# Patient Record
Sex: MALE | Race: WHITE | NOT HISPANIC OR LATINO | Employment: OTHER | URBAN - METROPOLITAN AREA
[De-identification: names, ages, dates, MRNs, and addresses within clinical notes are randomized per-mention and may not be internally consistent; named-entity substitution may affect disease eponyms.]

---

## 2021-03-03 ENCOUNTER — TELEPHONE (OUTPATIENT)
Dept: OBGYN CLINIC | Facility: HOSPITAL | Age: 66
End: 2021-03-03

## 2021-03-03 NOTE — TELEPHONE ENCOUNTER
Patient requested an appointment online for his hand  I tried to call the patient back to schedule appointment but it seems the phone number listed is a fax number

## 2021-04-08 DIAGNOSIS — Z23 ENCOUNTER FOR IMMUNIZATION: ICD-10-CM

## 2021-04-15 ENCOUNTER — OFFICE VISIT (OUTPATIENT)
Dept: OBGYN CLINIC | Facility: CLINIC | Age: 66
End: 2021-04-15
Payer: MEDICARE

## 2021-04-15 VITALS
HEART RATE: 82 BPM | HEIGHT: 67 IN | DIASTOLIC BLOOD PRESSURE: 88 MMHG | BODY MASS INDEX: 40.26 KG/M2 | WEIGHT: 256.5 LBS | SYSTOLIC BLOOD PRESSURE: 156 MMHG

## 2021-04-15 DIAGNOSIS — M62.541 ATROPHY OF MUSCLE OF RIGHT HAND: Primary | ICD-10-CM

## 2021-04-15 DIAGNOSIS — R29.898 RIGHT HAND WEAKNESS: ICD-10-CM

## 2021-04-15 DIAGNOSIS — S54.01XA ULNAR NERVE DAMAGE, RIGHT, INITIAL ENCOUNTER: ICD-10-CM

## 2021-04-15 PROCEDURE — 99204 OFFICE O/P NEW MOD 45 MIN: CPT | Performed by: SURGERY

## 2021-04-15 RX ORDER — GLIMEPIRIDE 2 MG/1
TABLET ORAL
COMMUNITY
Start: 2021-03-16 | End: 2022-08-01 | Stop reason: SDUPTHER

## 2021-04-15 RX ORDER — AMLODIPINE BESYLATE AND BENAZEPRIL HYDROCHLORIDE 5; 20 MG/1; MG/1
1 CAPSULE ORAL DAILY
COMMUNITY
Start: 2021-02-12 | End: 2022-08-01 | Stop reason: SDUPTHER

## 2021-04-15 RX ORDER — PIOGLITAZONE HCL AND METFORMIN HCL 500; 15 MG/1; MG/1
1 TABLET ORAL 2 TIMES DAILY
COMMUNITY
Start: 2021-02-14 | End: 2022-08-01 | Stop reason: SDUPTHER

## 2021-04-15 NOTE — PROGRESS NOTES
Silvio CLEMONS  Attending, Orthopaedic Surgery  Hand, Wrist, and Elbow Surgery  [facility]      ORTHOPAEDIC HAND, WRIST, AND ELBOW OFFICE  VISIT       ASSESSMENT/PLAN:      77year old male here with  right  Hand weakness and dysfunction  On exam,  Most of the patient's dysfunction was within the C7-T1 distribution  It was explained to the patient that there are at least 2 nerves being compromised -  Likely indicating an injury  At the level of the cervical spine  Possibly lower trunks within the brachial plexus  It is recommended to obtain an EMG to further evaluate which nerves are being compromised  Patient understands and agrees with this plan of care  We will follow up with the patient after the EMG  The patient verbalized understanding of exam findings and treatment plan  We engaged in the shared decision-making process and treatment options were discussed at length with the patient  Surgical and conservative management discussed today along with risks and benefits  Diagnoses and all orders for this visit:    Atrophy of muscle of right hand  -     Cancel: EMG 2 Limb Upper Extremity; Future  -     EMG 2 Limb Upper Extremity; Future    Right hand weakness  -     Cancel: EMG 2 Limb Upper Extremity; Future  -     EMG 2 Limb Upper Extremity; Future    Ulnar nerve damage, right, initial encounter    Other orders  -     pioglitazone-metFORMIN (ACTOPLUS MET)  MG per tablet; Take 1 tablet by mouth 2 (two) times a day  -     glimepiride (AMARYL) 2 mg tablet; TAKE 1 TABLET EVERY DAY BEFORE MEALS  -     amLODIPine-benazepril (LOTREL 5-20) 5-20 MG per capsule; Take 1 capsule by mouth daily        Follow Up:  Return for Follow up after Diagnostic testing      To Do Next Visit:  Re-evaluation of current issue        ____________________________________________________________________________________________________________________________________________      CHIEF COMPLAINT:  Chief Complaint   Patient presents with    Right Hand - Pain       SUBJECTIVE:  Isac Santoyo is a 77y o  year old RHD male who presents Today for right hand weakness and dysfunction  He states that about 5 years ago the small finger  Was stuck out extension, the ring and middle fingers followed suit with dis-use  He notes over the past 2 weeks the long finger started becoming more involved where he cannot fully extend the ring and long finger now  Patient is a type 2 diabetic  He is retired   Pain/symptom timing:  Worse during the day when active  Pain/symptom context:  Worse with activites and work  Pain/symptom modifying factors:  Rest makes better, activities make worse  Pain/symptom associated signs/symptoms: none    Prior treatment   · NSAIDsNo   · Injections No   · Bracing/Orthotics No    Physical Therapy No     I have personally reviewed all the relevant PMH, PSH, SH, FH, Medications and allergies      PAST MEDICAL HISTORY:  History reviewed  No pertinent past medical history  PAST SURGICAL HISTORY:  History reviewed  No pertinent surgical history  FAMILY HISTORY:  History reviewed  No pertinent family history  SOCIAL HISTORY:  Social History     Tobacco Use    Smoking status: Former Smoker    Smokeless tobacco: Never Used   Substance Use Topics    Alcohol use: Yes     Comment: social    Drug use: Not Currently       MEDICATIONS:    Current Outpatient Medications:     amLODIPine-benazepril (LOTREL 5-20) 5-20 MG per capsule, Take 1 capsule by mouth daily, Disp: , Rfl:     glimepiride (AMARYL) 2 mg tablet, TAKE 1 TABLET EVERY DAY BEFORE MEALS, Disp: , Rfl:     pioglitazone-metFORMIN (ACTOPLUS MET)  MG per tablet, Take 1 tablet by mouth 2 (two) times a day, Disp: , Rfl:     ALLERGIES:  No Known Allergies      REVIEW OF SYSTEMS:  Review of Systems   Constitutional: Negative for chills, fever and unexpected weight change     HENT: Negative for hearing loss, nosebleeds and sore throat  Eyes: Negative for pain, redness and visual disturbance  Respiratory: Negative for cough, shortness of breath and wheezing  Cardiovascular: Negative for chest pain, palpitations and leg swelling  Gastrointestinal: Negative for abdominal pain, nausea and vomiting  Endocrine: Negative for polydipsia and polyuria  Genitourinary: Negative for dysuria and hematuria  Skin: Negative for rash and wound  Neurological: Negative for dizziness, light-headedness and headaches  Psychiatric/Behavioral: Negative for decreased concentration, dysphoric mood and suicidal ideas  The patient is not nervous/anxious  VITALS:  Vitals:    04/15/21 0817   BP: 156/88   Pulse: 82       LABS:  HgA1c: No results found for: HGBA1C  BMP: No results found for: GLUCOSE, CALCIUM, NA, K, CO2, CL, BUN, CREATININE    _____________________________________________________  PHYSICAL EXAMINATION:  General: well developed and well nourished, alert, oriented times 3 and appears comfortable  Psychiatric: Normal  HEENT: Normocephalic, Atraumatic Trachea Midline, No torticollis  Pulmonary: No audible wheezing or respiratory distress   Cardiovascular: No pitting edema, 2+ radial pulse   Skin: No masses, erythema, lacerations, fluctation, ulcerations  Neurovascular: Sensation Intact to the Median, Ulnar, Radial Nerve and Pulses Intact  Musculoskeletal: Normal, except as noted in detailed exam and in HPI        MUSCULOSKELETAL EXAMINATION:  Right hand:   No swelling   Atrophy noted within the first dorsal webspace  EIP is working  Has FDS but no real FDP of the small and ring fingers  Radially deviates the wrist with extension   Weak EPL function  Ain intact  Opposition no intact  Intrinsics not intact  Biceps 4+/5 when compare to the contralateral side  Triceps 3/5 can not resist   - Tinel's at the wrist  - Tinel's at the elbow  Brisk capillary refill    ___________________________________________________  STUDIES REVIEWED:    No images reviewed at today's visit      PROCEDURES PERFORMED:  Procedures  No Procedures performed today    _____________________________________________________      Rebeka Gallegos    I,:  Delmi Garcia am acting as a scribe while in the presence of the attending physician :       I,:  Bernardo Foote MD personally performed the services described in this documentation    as scribed in my presence :

## 2021-04-20 ENCOUNTER — HOSPITAL ENCOUNTER (OUTPATIENT)
Dept: NEUROLOGY | Facility: CLINIC | Age: 66
Discharge: HOME | End: 2021-04-20
Payer: MEDICARE

## 2021-04-20 DIAGNOSIS — M62.541 ATROPHY OF MUSCLE OF RIGHT HAND: ICD-10-CM

## 2021-04-20 DIAGNOSIS — R29.898 RIGHT HAND WEAKNESS: ICD-10-CM

## 2021-04-20 PROCEDURE — 95912 NRV CNDJ TEST 11-12 STUDIES: CPT | Performed by: PSYCHIATRY & NEUROLOGY

## 2021-04-20 PROCEDURE — 95886 MUSC TEST DONE W/N TEST COMP: CPT | Performed by: PSYCHIATRY & NEUROLOGY

## 2021-05-14 ENCOUNTER — TRANSCRIBE ORDERS (OUTPATIENT)
Dept: ADMINISTRATIVE | Facility: HOSPITAL | Age: 66
End: 2021-05-14

## 2021-05-14 DIAGNOSIS — Z13.6 ENCOUNTER FOR SCREENING FOR CARDIOVASCULAR DISORDERS: ICD-10-CM

## 2021-05-14 DIAGNOSIS — N50.89 SWELLING OF LEFT TESTICLE: Primary | ICD-10-CM

## 2021-05-17 ENCOUNTER — HOSPITAL ENCOUNTER (OUTPATIENT)
Dept: ULTRASOUND IMAGING | Facility: HOSPITAL | Age: 66
Discharge: HOME | End: 2021-05-17
Payer: MEDICARE

## 2021-05-17 DIAGNOSIS — N50.89 SWELLING OF LEFT TESTICLE: ICD-10-CM

## 2021-05-17 PROCEDURE — 76870 US EXAM SCROTUM: CPT

## 2021-05-21 ENCOUNTER — HOSPITAL ENCOUNTER (OUTPATIENT)
Dept: ULTRASOUND IMAGING | Facility: HOSPITAL | Age: 66
Discharge: HOME | End: 2021-05-21
Payer: MEDICARE

## 2021-05-21 DIAGNOSIS — Z13.6 ENCOUNTER FOR SCREENING FOR CARDIOVASCULAR DISORDERS: ICD-10-CM

## 2021-05-21 PROCEDURE — 76775 US EXAM ABDO BACK WALL LIM: CPT

## 2021-05-24 ENCOUNTER — OFFICE VISIT (OUTPATIENT)
Dept: OBGYN CLINIC | Facility: CLINIC | Age: 66
End: 2021-05-24
Payer: MEDICARE

## 2021-05-24 VITALS
SYSTOLIC BLOOD PRESSURE: 119 MMHG | HEART RATE: 78 BPM | HEIGHT: 67 IN | BODY MASS INDEX: 40.81 KG/M2 | WEIGHT: 260 LBS | DIASTOLIC BLOOD PRESSURE: 78 MMHG

## 2021-05-24 DIAGNOSIS — Z01.812 PRE-PROCEDURAL LABORATORY EXAMINATION: ICD-10-CM

## 2021-05-24 DIAGNOSIS — E66.01 OBESITY, MORBID (HCC): ICD-10-CM

## 2021-05-24 DIAGNOSIS — S54.01XA ULNAR NERVE DAMAGE, RIGHT, INITIAL ENCOUNTER: ICD-10-CM

## 2021-05-24 DIAGNOSIS — R29.898 RIGHT HAND WEAKNESS: ICD-10-CM

## 2021-05-24 DIAGNOSIS — G56.01 CARPAL TUNNEL SYNDROME OF RIGHT WRIST: ICD-10-CM

## 2021-05-24 DIAGNOSIS — G56.21 CUBITAL TUNNEL SYNDROME ON RIGHT: ICD-10-CM

## 2021-05-24 DIAGNOSIS — M62.541 ATROPHY OF MUSCLE OF RIGHT HAND: Primary | ICD-10-CM

## 2021-05-24 PROCEDURE — 99214 OFFICE O/P EST MOD 30 MIN: CPT | Performed by: SURGERY

## 2021-05-24 RX ORDER — CHLORHEXIDINE GLUCONATE 0.12 MG/ML
15 RINSE ORAL ONCE
Status: CANCELLED | OUTPATIENT
Start: 2021-05-24 | End: 2021-05-24

## 2021-05-24 NOTE — H&P
ASSESSMENT/PLAN:      Rolf Oakley is a pleasant 78 yo male follow up evaluation of right hand weakness, atrophy of muscle, and ulnar nerve  Acute on chronic injury  EMG of the right upper extremity was reviewed showing  significant ulnar nerve pathology bleeding acute on chronic issues in addition to carpal tunnel syndrome  There is no evidence of radial nerve palsy which could explain his lack of finger extension  Treatment options were dicussed which includes surgical decompression of ulnar nerve  And median nerve, bracing, OT/PT  Advised with surgical decompression extension of middle and ring finger would not be improved  Risks and benefits of surgical procedure was discussed and he elected to proceed with surgery  OT/PT script was provided to begin 5 days post operatively  Surgical consent was signed  I personally obtained a written consent after risks benefits alternatives were discussed in detail with the patient  The patient verbalized understanding of exam findings and treatment plan  We engaged in the shared decision-making process and treatment options were discussed at length with the patient  Surgical and conservative management discussed today along with risks and benefits  Diagnoses and all orders for this visit:    Atrophy of muscle of right hand    Right hand weakness    Ulnar nerve damage, right, initial encounter          Cubital Tunnel Release: The anatomy and physiology of cubital tunnel syndrome were discussed with the patient today in the office  Typically, increased elbow flexion activities decrease blood flow within the intraneural spaces, resulting in a feeling of numbness, tingling, weakness, or clumsiness within the hand and fingers  Occasionally, anatomic structures such as medial elbow osteophytes, the medial head of the triceps, were subluxing ulnar nerve may result in increased pressure or aggravation at the cubital tunnel    Typical signs and symptoms usually include numbness and tingling within the ring and small finger, weakness with , and weakness with pinch  Conservative treatment and includes nocturnal bracing to keep the elbow in a semi-extended position, activity modification, therapy, and avoiding excessive elbow flexion activities  A majority of patients typically respond to conservative treatment over a period of approximately 3-6 months  Vitamin B6 one tablet daily over the counter may helpful to reduce symptoms  EMG/NCV testing of the ulnar nerve at the elbow is not as reliable as carpal tunnel syndrome  Surgical intervention in the form of in situ release of the ulnar nerve at the elbow or ulnar nerve transposition may be required in up to 20% of patients  The patient has elected to undergo cubital tunnel release  The possibility of converting to a subcutaneous or submuscular ulnar nerve transposition depending on the nerve stability was discussed with the patient  Typically, in the postoperative period, light activities are allowed immediately, driving is allowed when narcotic medications have stopped, and the incision may get wet after 5 days  Heavy activities will be allowed after follow up appointment in 1-2 weeks  While the pain within the ring and small finger of the hands generally improves rapidly, the numbness and tingling, as well as the strength, will slowly improve over a period of weeks to months  Total recovery can take up to 18 months from the time of surgery  Numbness and tingling near the incision, or near the medial aspect of the forearm was discussed with the patient  The patient has an understanding of the above mentioned discussion  The risks and benefits of the procedure were explained to the patient, which include, but are not limited to: Bleeding, infection, recurrence, pain, scar, damage to tendons, damage to nerves, and damage to blood vessels, failure to give desired results and complications related to anesthesia    These risks, along with alternative conservative treatment options, and postoperative protocols were voiced back and understood by the patient  All questions were answered to the patient's satisfaction  The patient agrees to comply with a standard postoperative protocol, and is willing to proceed  Education was provided via written and auditory forms  There were no barriers to learning  Written handouts regarding wound care, incision and scar care, and general preoperative information was provided to the patient  Prior to surgery, the patient may be requested to stop all anti-inflammatory medications  Prophylactic aspirin, Plavix, and Coumadin may be allowed to be continued  Medications including vitamin E , ginkgo, and fish oil are requested to be stopped approximately one week prior to surgery  Hypertensive medications and beta blockers, if taken, should be continued  Vitamin B6 one tablet daily over the counter may helpful to reduce symptoms  Follow Up:  No follow-ups on file  To Do Next Visit:  Re-evaluation of current issue, Splint off and Sutures out    ____________________________________________________________________________________________________________________________________________      CHIEF COMPLAINT:  Chief Complaint   Patient presents with    Right Hand - Pain, Follow-up       SUBJECTIVE:  Silvia Smith is a 77y o  year old RHD male who presents for follow up evaluation of right hand weakness, atrophy of muscle and ulnar never damage  At the last visit on 4/15/21, An EMG was ordered to evaluate for nerve compression  I have personally reviewed all the relevant PMH, PSH, SH, FH, Medications and allergies  PAST MEDICAL HISTORY:  No past medical history on file  PAST SURGICAL HISTORY:  No past surgical history on file  FAMILY HISTORY:  No family history on file      SOCIAL HISTORY:  Social History     Tobacco Use    Smoking status: Former Smoker    Smokeless tobacco: Never Used   Substance Use Topics    Alcohol use: Yes     Comment: social    Drug use: Not Currently       MEDICATIONS:    Current Outpatient Medications:     amLODIPine-benazepril (LOTREL 5-20) 5-20 MG per capsule, Take 1 capsule by mouth daily, Disp: , Rfl:     glimepiride (AMARYL) 2 mg tablet, TAKE 1 TABLET EVERY DAY BEFORE MEALS, Disp: , Rfl:     pioglitazone-metFORMIN (ACTOPLUS MET)  MG per tablet, Take 1 tablet by mouth 2 (two) times a day, Disp: , Rfl:     ALLERGIES:  No Known Allergies    REVIEW OF SYSTEMS:  Review of Systems   Constitutional: Negative for chills, fever and unexpected weight change  HENT: Negative for hearing loss, nosebleeds and sore throat  Eyes: Negative for pain, redness and visual disturbance  Respiratory: Negative for cough, shortness of breath and wheezing  Cardiovascular: Negative for chest pain, palpitations and leg swelling  Gastrointestinal: Negative for abdominal pain, nausea and vomiting  Endocrine: Negative for polydipsia and polyuria  Genitourinary: Negative for dysuria and hematuria  Skin: Negative for rash and wound  Neurological: Negative for dizziness, light-headedness and headaches  Psychiatric/Behavioral: Negative for decreased concentration, dysphoric mood and suicidal ideas  The patient is not nervous/anxious          VITALS:  Vitals:    05/24/21 0941   BP: 119/78   Pulse: 78       LABS:  HgA1c: No results found for: HGBA1C  BMP: No results found for: GLUCOSE, CALCIUM, NA, K, CO2, CL, BUN, CREATININE    _____________________________________________________  PHYSICAL EXAMINATION:  General: well developed and well nourished, alert, oriented times 3 and appears comfortable  Psychiatric: Normal  HEENT: Normocephalic, Atraumatic Trachea Midline, No torticollis  Pulmonary: No audible wheezing or respiratory distress   Cardiovascular: No pitting edema, 2+ radial pulse   Abdominal/GI: abdomen non tender, non distended   Skin: No masses, erythema, lacerations, fluctation, ulcerations  Neurovascular: Decreased Sensation to  the Ulnar Nerve and Pulses Intact  Musculoskeletal: Normal, except as noted in detailed exam and in HPI        MUSCULOSKELETAL EXAMINATION:  Right hand:  Intrinsic Atrophy,  Thenar atrophy  Extension of index and  Small finger to approximately neutral but no hyperextension   preserve centralized wrist extension   poor EPL function,  Improved with wrist flexion    ___________________________________________________  STUDIES REVIEWED:  EMG/NCS of  Bilateral upper extremitieswhich demonstrates  Acute on chronic ulnar nerve issues with denervation of the ulnar innervated muscles in addition to median nerve pathology consistent with carpal tunnel bilaterally          PROCEDURES PERFORMED:  Procedures       _____________________________________________________      Scribe Attestation    I,:  Tae Patterson MA am acting as a scribe while in the presence of the attending physician :       I,:  Loly Chaparro MD personally performed the services described in this documentation    as scribed in my presence :

## 2021-05-24 NOTE — PROGRESS NOTES
ASSESSMENT/PLAN:      Orin Benavidez is a pleasant 76 yo male follow up evaluation of right hand weakness, atrophy of muscle, and ulnar nerve  Acute on chronic injury  EMG of the right upper extremity was reviewed showing  significant ulnar nerve pathology bleeding acute on chronic issues in addition to carpal tunnel syndrome  There is no evidence of radial nerve palsy which could explain his lack of finger extension  Treatment options were dicussed which includes surgical decompression of ulnar nerve  And median nerve, bracing, OT/PT  Advised with surgical decompression extension of middle and ring finger would not be improved  Risks and benefits of surgical procedure was discussed and he elected to proceed with surgery  OT/PT script was provided to begin 5 days post operatively  Surgical consent was signed  I personally obtained a written consent after risks benefits alternatives were discussed in detail with the patient  The patient verbalized understanding of exam findings and treatment plan  We engaged in the shared decision-making process and treatment options were discussed at length with the patient  Surgical and conservative management discussed today along with risks and benefits  Diagnoses and all orders for this visit:    Atrophy of muscle of right hand    Right hand weakness    Ulnar nerve damage, right, initial encounter          Cubital Tunnel Release: The anatomy and physiology of cubital tunnel syndrome were discussed with the patient today in the office  Typically, increased elbow flexion activities decrease blood flow within the intraneural spaces, resulting in a feeling of numbness, tingling, weakness, or clumsiness within the hand and fingers  Occasionally, anatomic structures such as medial elbow osteophytes, the medial head of the triceps, were subluxing ulnar nerve may result in increased pressure or aggravation at the cubital tunnel    Typical signs and symptoms usually include numbness and tingling within the ring and small finger, weakness with , and weakness with pinch  Conservative treatment and includes nocturnal bracing to keep the elbow in a semi-extended position, activity modification, therapy, and avoiding excessive elbow flexion activities  A majority of patients typically respond to conservative treatment over a period of approximately 3-6 months  Vitamin B6 one tablet daily over the counter may helpful to reduce symptoms  EMG/NCV testing of the ulnar nerve at the elbow is not as reliable as carpal tunnel syndrome  Surgical intervention in the form of in situ release of the ulnar nerve at the elbow or ulnar nerve transposition may be required in up to 20% of patients  The patient has elected to undergo cubital tunnel release  The possibility of converting to a subcutaneous or submuscular ulnar nerve transposition depending on the nerve stability was discussed with the patient  Typically, in the postoperative period, light activities are allowed immediately, driving is allowed when narcotic medications have stopped, and the incision may get wet after 5 days  Heavy activities will be allowed after follow up appointment in 1-2 weeks  While the pain within the ring and small finger of the hands generally improves rapidly, the numbness and tingling, as well as the strength, will slowly improve over a period of weeks to months  Total recovery can take up to 18 months from the time of surgery  Numbness and tingling near the incision, or near the medial aspect of the forearm was discussed with the patient  The patient has an understanding of the above mentioned discussion  The risks and benefits of the procedure were explained to the patient, which include, but are not limited to: Bleeding, infection, recurrence, pain, scar, damage to tendons, damage to nerves, and damage to blood vessels, failure to give desired results and complications related to anesthesia    These risks, along with alternative conservative treatment options, and postoperative protocols were voiced back and understood by the patient  All questions were answered to the patient's satisfaction  The patient agrees to comply with a standard postoperative protocol, and is willing to proceed  Education was provided via written and auditory forms  There were no barriers to learning  Written handouts regarding wound care, incision and scar care, and general preoperative information was provided to the patient  Prior to surgery, the patient may be requested to stop all anti-inflammatory medications  Prophylactic aspirin, Plavix, and Coumadin may be allowed to be continued  Medications including vitamin E , ginkgo, and fish oil are requested to be stopped approximately one week prior to surgery  Hypertensive medications and beta blockers, if taken, should be continued  Vitamin B6 one tablet daily over the counter may helpful to reduce symptoms  Follow Up:  No follow-ups on file  To Do Next Visit:  Re-evaluation of current issue, Splint off and Sutures out    ____________________________________________________________________________________________________________________________________________      CHIEF COMPLAINT:  Chief Complaint   Patient presents with    Right Hand - Pain, Follow-up       SUBJECTIVE:  Sam Muro is a 77y o  year old RHD male who presents for follow up evaluation of right hand weakness, atrophy of muscle and ulnar never damage  At the last visit on 4/15/21, An EMG was ordered to evaluate for nerve compression  I have personally reviewed all the relevant PMH, PSH, SH, FH, Medications and allergies  PAST MEDICAL HISTORY:  No past medical history on file  PAST SURGICAL HISTORY:  No past surgical history on file  FAMILY HISTORY:  No family history on file      SOCIAL HISTORY:  Social History     Tobacco Use    Smoking status: Former Smoker    Smokeless tobacco: Never Used   Substance Use Topics    Alcohol use: Yes     Comment: social    Drug use: Not Currently       MEDICATIONS:    Current Outpatient Medications:     amLODIPine-benazepril (LOTREL 5-20) 5-20 MG per capsule, Take 1 capsule by mouth daily, Disp: , Rfl:     glimepiride (AMARYL) 2 mg tablet, TAKE 1 TABLET EVERY DAY BEFORE MEALS, Disp: , Rfl:     pioglitazone-metFORMIN (ACTOPLUS MET)  MG per tablet, Take 1 tablet by mouth 2 (two) times a day, Disp: , Rfl:     ALLERGIES:  No Known Allergies    REVIEW OF SYSTEMS:  Review of Systems   Constitutional: Negative for chills, fever and unexpected weight change  HENT: Negative for hearing loss, nosebleeds and sore throat  Eyes: Negative for pain, redness and visual disturbance  Respiratory: Negative for cough, shortness of breath and wheezing  Cardiovascular: Negative for chest pain, palpitations and leg swelling  Gastrointestinal: Negative for abdominal pain, nausea and vomiting  Endocrine: Negative for polydipsia and polyuria  Genitourinary: Negative for dysuria and hematuria  Skin: Negative for rash and wound  Neurological: Negative for dizziness, light-headedness and headaches  Psychiatric/Behavioral: Negative for decreased concentration, dysphoric mood and suicidal ideas  The patient is not nervous/anxious          VITALS:  Vitals:    05/24/21 0941   BP: 119/78   Pulse: 78       LABS:  HgA1c: No results found for: HGBA1C  BMP: No results found for: GLUCOSE, CALCIUM, NA, K, CO2, CL, BUN, CREATININE    _____________________________________________________  PHYSICAL EXAMINATION:  General: well developed and well nourished, alert, oriented times 3 and appears comfortable  Psychiatric: Normal  HEENT: Normocephalic, Atraumatic Trachea Midline, No torticollis  Pulmonary: No audible wheezing or respiratory distress   Cardiovascular: No pitting edema, 2+ radial pulse   Abdominal/GI: abdomen non tender, non distended   Skin: No masses, erythema, lacerations, fluctation, ulcerations  Neurovascular: Decreased Sensation to  the Ulnar Nerve and Pulses Intact  Musculoskeletal: Normal, except as noted in detailed exam and in HPI        MUSCULOSKELETAL EXAMINATION:  Right hand:  Intrinsic Atrophy,  Thenar atrophy  Extension of index and  Small finger to approximately neutral but no hyperextension   preserve centralized wrist extension   poor EPL function,  Improved with wrist flexion    ___________________________________________________  STUDIES REVIEWED:  EMG/NCS of  Bilateral upper extremitieswhich demonstrates  Acute on chronic ulnar nerve issues with denervation of the ulnar innervated muscles in addition to median nerve pathology consistent with carpal tunnel bilaterally          PROCEDURES PERFORMED:  Procedures       _____________________________________________________      Scribe Attestation    I,:  Jo Oneal MA am acting as a scribe while in the presence of the attending physician :       I,:  Haim Wheat MD personally performed the services described in this documentation    as scribed in my presence :

## 2021-06-18 ENCOUNTER — APPOINTMENT (OUTPATIENT)
Dept: LAB | Facility: AMBULARY SURGERY CENTER | Age: 66
End: 2021-06-18
Attending: SURGERY
Payer: MEDICARE

## 2021-06-18 ENCOUNTER — OFFICE VISIT (OUTPATIENT)
Dept: LAB | Facility: CLINIC | Age: 66
End: 2021-06-18
Payer: MEDICARE

## 2021-06-18 DIAGNOSIS — E66.01 OBESITY, MORBID (HCC): ICD-10-CM

## 2021-06-18 DIAGNOSIS — G56.21 CUBITAL TUNNEL SYNDROME ON RIGHT: ICD-10-CM

## 2021-06-18 DIAGNOSIS — G56.01 CARPAL TUNNEL SYNDROME OF RIGHT WRIST: ICD-10-CM

## 2021-06-18 DIAGNOSIS — R35.1 NOCTURIA: ICD-10-CM

## 2021-06-18 DIAGNOSIS — M62.541 ATROPHY OF MUSCLE OF RIGHT HAND: ICD-10-CM

## 2021-06-18 DIAGNOSIS — Z01.812 PRE-PROCEDURAL LABORATORY EXAMINATION: ICD-10-CM

## 2021-06-18 LAB
ANION GAP SERPL CALCULATED.3IONS-SCNC: 5 MMOL/L (ref 4–13)
ATRIAL RATE: 73 BPM
BUN SERPL-MCNC: 35 MG/DL (ref 5–25)
CALCIUM SERPL-MCNC: 9.6 MG/DL (ref 8.3–10.1)
CHLORIDE SERPL-SCNC: 113 MMOL/L (ref 100–108)
CO2 SERPL-SCNC: 24 MMOL/L (ref 21–32)
CREAT SERPL-MCNC: 1.21 MG/DL (ref 0.6–1.3)
GFR SERPL CREATININE-BSD FRML MDRD: 62 ML/MIN/1.73SQ M
GLUCOSE P FAST SERPL-MCNC: 113 MG/DL (ref 65–99)
P AXIS: 54 DEGREES
POTASSIUM SERPL-SCNC: 4.6 MMOL/L (ref 3.5–5.3)
PR INTERVAL: 154 MS
PSA SERPL-MCNC: 1.3 NG/ML (ref 0–4)
QRS AXIS: 32 DEGREES
QRSD INTERVAL: 90 MS
QT INTERVAL: 400 MS
QTC INTERVAL: 440 MS
SODIUM SERPL-SCNC: 142 MMOL/L (ref 136–145)
T WAVE AXIS: 94 DEGREES
VENTRICULAR RATE: 73 BPM

## 2021-06-18 PROCEDURE — G0103 PSA SCREENING: HCPCS

## 2021-06-18 PROCEDURE — 80048 BASIC METABOLIC PNL TOTAL CA: CPT

## 2021-06-18 PROCEDURE — 93005 ELECTROCARDIOGRAM TRACING: CPT

## 2021-06-18 PROCEDURE — 93010 ELECTROCARDIOGRAM REPORT: CPT | Performed by: INTERNAL MEDICINE

## 2021-06-18 PROCEDURE — 36415 COLL VENOUS BLD VENIPUNCTURE: CPT

## 2021-06-22 ENCOUNTER — ANESTHESIA EVENT (OUTPATIENT)
Dept: PERIOP | Facility: AMBULARY SURGERY CENTER | Age: 66
End: 2021-06-22
Payer: MEDICARE

## 2021-07-02 NOTE — PRE-PROCEDURE INSTRUCTIONS
Pre-Surgery Instructions:   Medication Instructions    amLODIPine-benazepril (LOTREL 5-20) 5-20 MG per capsule Instructed patient per Anesthesia Guidelines  HOLD 7/6    glimepiride (AMARYL) 2 mg tablet Instructed patient per Anesthesia Guidelines  HOLD 7/6    pioglitazone-metFORMIN (ACTOPLUS MET)  MG per tablet Instructed patient per Anesthesia Guidelines  HOLD 7/6   Pre procedure instructions reviewed verbalizes understanding  NPO after MN  Bathing reviewed    No NSAIDS  Hold all medications dos

## 2021-07-06 ENCOUNTER — HOSPITAL ENCOUNTER (OUTPATIENT)
Facility: AMBULARY SURGERY CENTER | Age: 66
Setting detail: OUTPATIENT SURGERY
Discharge: HOME | End: 2021-07-06
Attending: SURGERY | Admitting: SURGERY
Payer: MEDICARE

## 2021-07-06 ENCOUNTER — ANESTHESIA (OUTPATIENT)
Dept: PERIOP | Facility: AMBULARY SURGERY CENTER | Age: 66
End: 2021-07-06
Payer: MEDICARE

## 2021-07-06 VITALS
OXYGEN SATURATION: 92 % | HEART RATE: 71 BPM | DIASTOLIC BLOOD PRESSURE: 69 MMHG | TEMPERATURE: 97.2 F | RESPIRATION RATE: 16 BRPM | SYSTOLIC BLOOD PRESSURE: 139 MMHG

## 2021-07-06 DIAGNOSIS — G56.02 CARPAL TUNNEL SYNDROME, LEFT: ICD-10-CM

## 2021-07-06 DIAGNOSIS — M62.541 ATROPHY OF MUSCLE OF RIGHT HAND: ICD-10-CM

## 2021-07-06 DIAGNOSIS — Z47.89 AFTERCARE FOLLOWING SURGERY OF THE MUSCULOSKELETAL SYSTEM: ICD-10-CM

## 2021-07-06 DIAGNOSIS — G56.21 CUBITAL TUNNEL SYNDROME ON RIGHT: Primary | ICD-10-CM

## 2021-07-06 DIAGNOSIS — G56.01 CARPAL TUNNEL SYNDROME OF RIGHT WRIST: ICD-10-CM

## 2021-07-06 LAB
GLUCOSE SERPL-MCNC: 112 MG/DL (ref 65–140)
GLUCOSE SERPL-MCNC: 129 MG/DL (ref 65–140)

## 2021-07-06 PROCEDURE — 82948 REAGENT STRIP/BLOOD GLUCOSE: CPT

## 2021-07-06 PROCEDURE — 64721 CARPAL TUNNEL SURGERY: CPT | Performed by: PHYSICIAN ASSISTANT

## 2021-07-06 PROCEDURE — 64721 CARPAL TUNNEL SURGERY: CPT | Performed by: SURGERY

## 2021-07-06 PROCEDURE — 64718 REVISE ULNAR NERVE AT ELBOW: CPT | Performed by: SURGERY

## 2021-07-06 PROCEDURE — NC001 PR NO CHARGE: Performed by: SURGERY

## 2021-07-06 PROCEDURE — 64718 REVISE ULNAR NERVE AT ELBOW: CPT | Performed by: PHYSICIAN ASSISTANT

## 2021-07-06 RX ORDER — CEFAZOLIN SODIUM 2 G/50ML
2000 SOLUTION INTRAVENOUS ONCE
Status: COMPLETED | OUTPATIENT
Start: 2021-07-06 | End: 2021-07-06

## 2021-07-06 RX ORDER — HYDROCODONE BITARTRATE AND ACETAMINOPHEN 5; 325 MG/1; MG/1
1 TABLET ORAL EVERY 6 HOURS PRN
Status: DISCONTINUED | OUTPATIENT
Start: 2021-07-06 | End: 2021-07-06 | Stop reason: HOSPADM

## 2021-07-06 RX ORDER — MIDAZOLAM HYDROCHLORIDE 2 MG/2ML
INJECTION, SOLUTION INTRAMUSCULAR; INTRAVENOUS
Status: COMPLETED | OUTPATIENT
Start: 2021-07-06 | End: 2021-07-06

## 2021-07-06 RX ORDER — ONDANSETRON 2 MG/ML
INJECTION INTRAMUSCULAR; INTRAVENOUS AS NEEDED
Status: DISCONTINUED | OUTPATIENT
Start: 2021-07-06 | End: 2021-07-06

## 2021-07-06 RX ORDER — LIDOCAINE HYDROCHLORIDE 10 MG/ML
INJECTION, SOLUTION EPIDURAL; INFILTRATION; INTRACAUDAL; PERINEURAL
Status: COMPLETED | OUTPATIENT
Start: 2021-07-06 | End: 2021-07-06

## 2021-07-06 RX ORDER — DEXMEDETOMIDINE HYDROCHLORIDE 100 UG/ML
INJECTION, SOLUTION INTRAVENOUS AS NEEDED
Status: DISCONTINUED | OUTPATIENT
Start: 2021-07-06 | End: 2021-07-06

## 2021-07-06 RX ORDER — MAGNESIUM HYDROXIDE 1200 MG/15ML
LIQUID ORAL AS NEEDED
Status: DISCONTINUED | OUTPATIENT
Start: 2021-07-06 | End: 2021-07-06 | Stop reason: HOSPADM

## 2021-07-06 RX ORDER — HYDROCODONE BITARTRATE AND ACETAMINOPHEN 5; 325 MG/1; MG/1
1 TABLET ORAL EVERY 6 HOURS PRN
Qty: 15 TABLET | Refills: 0 | Status: SHIPPED | OUTPATIENT
Start: 2021-07-06 | End: 2021-07-16

## 2021-07-06 RX ORDER — FENTANYL CITRATE 50 UG/ML
INJECTION, SOLUTION INTRAMUSCULAR; INTRAVENOUS
Status: COMPLETED | OUTPATIENT
Start: 2021-07-06 | End: 2021-07-06

## 2021-07-06 RX ORDER — PROPOFOL 10 MG/ML
INJECTION, EMULSION INTRAVENOUS CONTINUOUS PRN
Status: DISCONTINUED | OUTPATIENT
Start: 2021-07-06 | End: 2021-07-06

## 2021-07-06 RX ORDER — CHLORHEXIDINE GLUCONATE 0.12 MG/ML
15 RINSE ORAL ONCE
Status: DISCONTINUED | OUTPATIENT
Start: 2021-07-06 | End: 2021-07-06 | Stop reason: HOSPADM

## 2021-07-06 RX ORDER — ROPIVACAINE HYDROCHLORIDE 5 MG/ML
INJECTION, SOLUTION EPIDURAL; INFILTRATION; PERINEURAL
Status: COMPLETED | OUTPATIENT
Start: 2021-07-06 | End: 2021-07-06

## 2021-07-06 RX ORDER — SODIUM CHLORIDE, SODIUM LACTATE, POTASSIUM CHLORIDE, CALCIUM CHLORIDE 600; 310; 30; 20 MG/100ML; MG/100ML; MG/100ML; MG/100ML
125 INJECTION, SOLUTION INTRAVENOUS CONTINUOUS
Status: DISCONTINUED | OUTPATIENT
Start: 2021-07-06 | End: 2021-07-06 | Stop reason: HOSPADM

## 2021-07-06 RX ADMIN — DEXMEDETOMIDINE HYDROCHLORIDE 50 MCG: 100 INJECTION, SOLUTION INTRAVENOUS at 08:58

## 2021-07-06 RX ADMIN — FENTANYL CITRATE 25 MCG: 50 INJECTION, SOLUTION INTRAMUSCULAR; INTRAVENOUS at 08:58

## 2021-07-06 RX ADMIN — SODIUM CHLORIDE, SODIUM LACTATE, POTASSIUM CHLORIDE, AND CALCIUM CHLORIDE: .6; .31; .03; .02 INJECTION, SOLUTION INTRAVENOUS at 08:49

## 2021-07-06 RX ADMIN — CEFAZOLIN SODIUM 2000 MG: 2 SOLUTION INTRAVENOUS at 09:16

## 2021-07-06 RX ADMIN — LIDOCAINE HYDROCHLORIDE 5 ML: 10 INJECTION, SOLUTION EPIDURAL; INFILTRATION; INTRACAUDAL at 08:58

## 2021-07-06 RX ADMIN — PROPOFOL 90 MCG/KG/MIN: 10 INJECTION, EMULSION INTRAVENOUS at 09:23

## 2021-07-06 RX ADMIN — ROPIVACAINE HYDROCHLORIDE 20 ML: 5 INJECTION, SOLUTION EPIDURAL; INFILTRATION; PERINEURAL at 08:58

## 2021-07-06 RX ADMIN — MIDAZOLAM HYDROCHLORIDE 1 MG: 1 INJECTION, SOLUTION INTRAMUSCULAR; INTRAVENOUS at 08:58

## 2021-07-06 RX ADMIN — ONDANSETRON 4 MG: 2 INJECTION INTRAMUSCULAR; INTRAVENOUS at 09:59

## 2021-07-06 NOTE — ANESTHESIA PREPROCEDURE EVALUATION
Procedure:  RELEASE CUBITAL TUNNEL (Right Elbow)  RELEASE CARPAL TUNNEL (Right Wrist)    Relevant Problems   No relevant active problems        Physical Exam    Airway    Mallampati score: III  TM Distance: >3 FB  Neck ROM: full     Dental       Cardiovascular      Pulmonary      Other Findings  Multiple missing teeth, none loose      Anesthesia Plan  ASA Score- 3     Anesthesia Type- IV sedation with anesthesia and regional with ASA Monitors  Additional Monitors:   Airway Plan:           Plan Factors-Exercise tolerance (METS): >4 METS  Chart reviewed  EKG reviewed  Existing labs reviewed  Patient summary reviewed  Patient is not a current smoker  There is medical exclusion for perioperative obstructive sleep apnea risk education  Induction- intravenous  Postoperative Plan-     Informed Consent- Anesthetic plan and risks discussed with patient  I personally reviewed this patient with the CRNA  Discussed and agreed on the Anesthesia Plan with the CRNA  Rosella Goldmann

## 2021-07-06 NOTE — ANESTHESIA POSTPROCEDURE EVALUATION
Post-Op Assessment Note    CV Status:  Stable  Pain Score: 0    Pain management: adequate     Mental Status:  Alert and awake   Hydration Status:  Euvolemic   PONV Controlled:  Controlled   Airway Patency:  Patent      Post Op Vitals Reviewed: Yes      Staff: CRNA         No complications documented      BP   88/53   Temp   97   Pulse  65   Resp   16   SpO2   96

## 2021-07-06 NOTE — OP NOTE
OPERATIVE REPORT  PATIENT NAME: Filomena García  :  1955  MRN: [id number]  Pt Location: [facility]    SURGERY DATE: 21    Surgeon(s) and Role:     * Ilda Guerrier MD - Primary     * Antoinette Ortega PA-C - Assisting    Pre-Op Diagnosis:  Atrophy of muscle of right hand [M62 541]  Right hand weakness [R29 898]  Carpal tunnel syndrome of right wrist [G56 01]  Cubital tunnel syndrome on right [G56 21]    Post-Op Diagnosis Codes:     * Atrophy of muscle of right hand [M62 541]     * Right hand weakness [R29 898]     * Carpal tunnel syndrome of right wrist [G56 01]     * Cubital tunnel syndrome on right [G56 ]    Procedure(s):  RELEASE CUBITAL TUNNEL (Right)  RELEASE CARPAL TUNNEL (Right)    Specimen(s):  * No orders in the log *    Estimated Blood Loss:   Minimal    Anesthesia Type:   Regional with Sedation    IMPLANTS:  * No implants in log *    PERIOPERATIVE ANTIBIOTICS:    cefazolin, 2 grams    Tourniquet Time: 22 min  at 250 mmHg          Operative Indications: The patient has a history of Carpal Tunnel Syndrome  right and Cubital Tunnel Syndrome  right that was recalcitrant to conservative management  The decision was made to bring the patient to the operating room for Open Carpal Tunnel Release  right and Cubital Tunnel Release  right  Risks of the procedure were explained which include, but are not limited to bleeding; infection; damage to nerves, arteries,veins, tendons; scar; pain; need for reoperation; failure to give desired result; and risks of anaesthesia  All questions were answered to satisfaction and they were willing to proceed  Operative Findings:  Hypertrophy TCL   significantly flattened ulnar nerve proximal to, at the cubital tunnel and at the deep FCY fascia  Complications:   None    Procedure and Technique:  After the patient, site, and procedure were identified, the patient was brought into the operating room in a supine position    Regional anaesthesia and sedation were provided  A well padded tourniquet was applied to the extremity, set at 250 mmHg  The  right upper extremity was then prepped and drapped in a normal, sterile, orthopedic fashion  An anterior approach to the carpal tunnel was undertaken  A 2 cm incision was made in line with the fourth digit, proximal to Swenson's line  The skin and the subcutaneous tissue were sharply incised  Under loupe magnification, dissection was carried down to the palmar fascia and it was incised  The transverse carpal ligament was visualized and  Released distally with a #64 blade  A freer was was passed above and below the TCL in a retrograde fashion, freeing up any adhesions  A Knife Lite was used to release the proximal portion of the transverse carpal ligament under direct visualization  Distally the superficial arch was identified  A complete release was carried out and exploration of the carpal canal revealed no significant tenosynovitis  The median nerve and its branches were intact  A 6 cm Incision was made with a 15 blade between medial epicondyle and olecranon  two branches of the MABC were  identified distally and protected  One was identified proximal the medial epicondyle, and protected  The ulnar nerve was identified at the level of the cubital tunnel  The nerve was not found to be subluxed  The Ulnar nerve was traced 1st traced proximally  Proximal release was performed under direct visualization  the overlying fascia was released with a scissor up to the level of the intermuscular septum which was incised  Next dissection was taken proximally both with a scissor and with a 64 blade  Care was taken preserve any crossing branches of the medial antebrachial cutaneous nerve  Decompression was taken around the olecranon, and then to the FCU fascia  The super all facial layer of the fascia was released with a knife and the 2 bellies of the FCU spread gently with a scissor    The deep fascia of the FCU was identified and with the nerve protected all times incised  Release was taken just past the level of the 1st motor branch to the FCU  Once a complete release was verified,  the Elbow was taken through a full range of motion and the nerve was found to be stable with no subluxation  After the release, it was appreciated that the nerve had been significantly constricted at the level of the cubital tunnel  with flattening of the nerve for approximately 5 cm segment, proximal to , within and distal tot he cubital tunnel proper  The tourniquet was brought down and hemostasis was obtained  The wound was copiously irrigated and closed in a layered fashion with 3-0 monocryl  for deep dermal       At the completion of the procedure, hemostasis was obtained with cautery and direct pressure  The wounds were copiously irrigated with sterile solution  The wounds were closed with Prolene, Monocryl and Steri-strips  Sterile dressings were applied, including Xeroform, gauze, tweeners, webril, ACE and Sling  Please note, all sponge, needle, and instrument counts were correct prior to closure  Loupe magnification was utilized  The patient tolerated the procedure well  I was present for the entire procedure, A qualified resident physician was not available and A physician assistant was required during the procedure for retraction tissue handling,dissection and suturing  The aid of Heather CARMONA was required in the approach dissection and handling of soft tissues with retraction and with closure during this cubital tunnel release    Her aide was instrumental in protecting the medial antebrachial cutaneous branches  during the approach as well as a allowing  for adequate visualization through a minimally invasive incision      Patient Disposition:  PACU     SIGNATURE: Mariam Goldberg, MD  DATE: 07/06/21  TIME: 10:16 AM

## 2021-07-06 NOTE — INTERVAL H&P NOTE
H&P reviewed  After examining the patient I find no changes in the patients condition since the H&P had been written      Vitals:    07/06/21 0811   BP: 155/71   Resp: 20   SpO2: 97%

## 2021-07-06 NOTE — ANESTHESIA PROCEDURE NOTES
Peripheral Block    Patient location during procedure: holding area  Start time: 7/6/2021 8:50 AM  Reason for block: at surgeon's request and post-op pain management  Staffing  Performed: anesthesiologist   Anesthesiologist: Jamila Gunn MD  Preanesthetic Checklist  Completed: patient identified, IV checked, site marked, risks and benefits discussed, surgical consent, monitors and equipment checked, pre-op evaluation and timeout performed  Peripheral Block  Patient position: supine  Prep: ChloraPrep  Patient monitoring: continuous pulse ox, frequent blood pressure checks, heart rate and cardiac monitor  Block type: supraclavicular  Laterality: right  Injection technique: single-shot  Procedures: ultrasound guided, Ultrasound guidance required for the procedure to increase accuracy and safety of medication placement and decrease risk of complications    Ultrasound permanent image savedropivacaine (NAROPIN) 0 5 % perineural infiltration, 20 mL  midazolam (VERSED) 2 mg/2 mL IV, 1 mg  fentaNYL 50 mcg/mL IV, 25 mcg  lidocaine (PF) (XYLOCAINE-MPF) 1 % infiltration, 5 mL  Needle  Needle type: Stimuplex   Needle gauge: 22 G  Needle length: 10 cm  Needle localization: anatomical landmarks and ultrasound guidance  Needle insertion depth: 6 cm  Assessment  Injection assessment: incremental injection, local visualized surrounding nerve on ultrasound, negative aspiration for heme and no paresthesia on injection  Paresthesia pain: none  Heart rate change: no  Slow fractionated injection: yes  Post-procedure:  site cleaned  patient tolerated the procedure well with no immediate complications

## 2021-07-06 NOTE — DISCHARGE INSTRUCTIONS
Implanted Venous Access Port Removal    WHAT YOU NEED TO KNOW:   An implanted venous access port is a device used to give treatments and take blood  It may also be called a central venous access device (CVAD)  The port is a small container that is placed under your skin, usually in your upper chest  A port can also be placed in your arm or abdomen  The port is attached to a catheter that enters a large vein  DISCHARGE INSTRUCTIONS:   Resume your normal diet  Small sips of flat soda will help with mild nausea  Prevent an infection:     Wash your hands often  Use soap and water  Clean your hands before and  after you care for your incision  Check your skin for infection every day  Look for redness, swelling, or fluid oozing from the incision site  Dressing may come off in 24 hours  Medical glue will peel off on its own in 5 to 10 days  You may shower 24 hours after procedure  Follow up with your healthcare provider as directed  Write down your questions so you remember to ask them during your visits  Activity:  You may return to your daily activities when the area heals  Contact Interventional Radiology at [phone] [place] PATIENTS: Contact Interventional Radiology at [phone]) [place] PATIENTS: Contact Interventional Radiology at [phone]) if:     You have a fever  You have persistent nausea  Your inciscion site is red, swollen, or draining pus  You have questions or concerns about your condition or care  Seek care immediately or call 911 if:  Blood soaks through your bandage  The skin over or around your incision breaks open  Your heart is jumping or fluttering  You have a headache, blurred vision, and feel confused  You have pain in your arm, neck, shoulder, or chest     You have trouble breathing that is getting worse over time  Post Operative Instructions    You have had surgery on your arm today, please read and follow the information below:  · Elevate your hand above your elbow during the next 24-48 hours to help with swelling  · Place your hand and arm over your head with motion at your shoulder three times a day  · Do not apply any cream/ointment/oil to your incisions including antibiotics  · Do not soak your hands in standing water (dishwater, tubs, Jacuzzi's, pools, etc ) until given permission (typically 2-3 weeks after injury)    Call the office if you notice any:  · Increased numbness or tingling of your hand or fingers that is not relieved with elevation  · Increasing pain that is not controlled with medication  · Difficulty chewing, breathing, swallowing  · Chest pains or shortness of breath  · Fever over 101 4 degrees  Bandage: Please keep bandages clean and dry  Remove bandage after 5 days  Once bandages are removed you may wash hands with soap and water  Short showers are okay as well, but please avoid soaking the hand as described above  Any suture tails and steri strips at the elbow will fall off on their own or be removed in office at your first postop visit  Sutures at the hand will be removed in office as well  Please do NOT put any topical agents on the surgical wound including neosporin, peroxide, tea tree oil, vitamin E, etc  as these can delay wound healing  Motion: Move fingers into a fist 5 times a day, DO NOT move any splinted fingers  Weight bearing status: Avoid heavy lifting (>5 pounds) with the extremity that was operated on until follow up appointment  Normal activities of daily living are OK  Ice: Ice for 10 minutes every hour as needed for swelling x 24 hours  Sling: Sling for comfort for 2-3 days  Once block wears off begin moving your elbow and hand  Discontinue sling when comfortable      Pain medication:   Naproxen 220 mg two time a day (do not take this medication if you were told by your doctor that you cannot take anti-inflammatories or NSAIDS)  Tylenol Extended Release 650 mg every 8 hours  Norco/Hydrocodone one tab every 6 hours ONLY AS NEEDED for severe pain         Follow-up Appointment: 10-14 days with Dr Johanna Miller        Please call the office if you have any questions or concerns regarding your post-operative care

## 2021-07-06 NOTE — H&P
ASSESSMENT/PLAN:       Maria Dolores Olson is a pleasant 78 yo male follow up evaluation of right hand weakness, atrophy of muscle, and ulnar nerve  Acute on chronic injury  EMG of the right upper extremity was reviewed showing  significant ulnar nerve pathology bleeding acute on chronic issues in addition to carpal tunnel syndrome  There is no evidence of radial nerve palsy which could explain his lack of finger extension  Treatment options were dicussed which includes surgical decompression of ulnar nerve  And median nerve, bracing, OT/PT  Advised with surgical decompression extension of middle and ring finger would not be improved  Risks and benefits of surgical procedure was discussed and he elected to proceed with surgery  OT/PT script was provided to begin 5 days post operatively  Surgical consent was signed       I personally obtained a written consent after risks benefits alternatives were discussed in detail with the patient      The patient verbalized understanding of exam findings and treatment plan  We engaged in the shared decision-making process and treatment options were discussed at length with the patient  Surgical and conservative management discussed today along with risks and benefits      Diagnoses and all orders for this visit:     Atrophy of muscle of right hand     Right hand weakness     Ulnar nerve damage, right, initial encounter             Cubital Tunnel Release: The anatomy and physiology of cubital tunnel syndrome were discussed with the patient today in the office  Typically, increased elbow flexion activities decrease blood flow within the intraneural spaces, resulting in a feeling of numbness, tingling, weakness, or clumsiness within the hand and fingers  Occasionally, anatomic structures such as medial elbow osteophytes, the medial head of the triceps, were subluxing ulnar nerve may result in increased pressure or aggravation at the cubital tunnel    Typical signs and symptoms usually include numbness and tingling within the ring and small finger, weakness with , and weakness with pinch  Conservative treatment and includes nocturnal bracing to keep the elbow in a semi-extended position, activity modification, therapy, and avoiding excessive elbow flexion activities  A majority of patients typically respond to conservative treatment over a period of approximately 3-6 months  Vitamin B6 one tablet daily over the counter may helpful to reduce symptoms  EMG/NCV testing of the ulnar nerve at the elbow is not as reliable as carpal tunnel syndrome  Surgical intervention in the form of in situ release of the ulnar nerve at the elbow or ulnar nerve transposition may be required in up to 20% of patients  The patient has elected to undergo cubital tunnel release  The possibility of converting to a subcutaneous or submuscular ulnar nerve transposition depending on the nerve stability was discussed with the patient  Typically, in the postoperative period, light activities are allowed immediately, driving is allowed when narcotic medications have stopped, and the incision may get wet after 5 days  Heavy activities will be allowed after follow up appointment in 1-2 weeks  While the pain within the ring and small finger of the hands generally improves rapidly, the numbness and tingling, as well as the strength, will slowly improve over a period of weeks to months  Total recovery can take up to 18 months from the time of surgery  Numbness and tingling near the incision, or near the medial aspect of the forearm was discussed with the patient  The patient has an understanding of the above mentioned discussion  The risks and benefits of the procedure were explained to the patient, which include, but are not limited to: Bleeding, infection, recurrence, pain, scar, damage to tendons, damage to nerves, and damage to blood vessels, failure to give desired results and complications related to anesthesia  These risks, along with alternative conservative treatment options, and postoperative protocols were voiced back and understood by the patient  All questions were answered to the patient's satisfaction  The patient agrees to comply with a standard postoperative protocol, and is willing to proceed  Education was provided via written and auditory forms  There were no barriers to learning  Written handouts regarding wound care, incision and scar care, and general preoperative information was provided to the patient  Prior to surgery, the patient may be requested to stop all anti-inflammatory medications  Prophylactic aspirin, Plavix, and Coumadin may be allowed to be continued  Medications including vitamin E , ginkgo, and fish oil are requested to be stopped approximately one week prior to surgery  Hypertensive medications and beta blockers, if taken, should be continued  Vitamin B6 one tablet daily over the counter may helpful to reduce symptoms        Follow Up:  No follow-ups on file         To Do Next Visit:  Re-evaluation of current issue, Splint off and Sutures out     ____________________________________________________________________________________________________________________________________________        CHIEF COMPLAINT:      Chief Complaint   Patient presents with    Right Hand - Pain, Follow-up         SUBJECTIVE:  Liss Orellana is a 77y o  year old RHD male who presents for follow up evaluation of right hand weakness, atrophy of muscle and ulnar never damage   At the last visit on 4/15/21, An EMG was ordered to evaluate for nerve compression          I have personally reviewed all the relevant PMH, PSH, SH, FH, Medications and allergies       PAST MEDICAL HISTORY:  No past medical history on file      PAST SURGICAL HISTORY:  No past surgical history on file      FAMILY HISTORY:  No family history on file      SOCIAL HISTORY:  Social History            Tobacco Use    Smoking status: Former Smoker    Smokeless tobacco: Never Used   Substance Use Topics    Alcohol use: Yes       Comment: social    Drug use: Not Currently         MEDICATIONS:     Current Outpatient Medications:     amLODIPine-benazepril (LOTREL 5-20) 5-20 MG per capsule, Take 1 capsule by mouth daily, Disp: , Rfl:     glimepiride (AMARYL) 2 mg tablet, TAKE 1 TABLET EVERY DAY BEFORE MEALS, Disp: , Rfl:     pioglitazone-metFORMIN (ACTOPLUS MET)  MG per tablet, Take 1 tablet by mouth 2 (two) times a day, Disp: , Rfl:      ALLERGIES:  No Known Allergies     REVIEW OF SYSTEMS:  Review of Systems   Constitutional: Negative for chills, fever and unexpected weight change  HENT: Negative for hearing loss, nosebleeds and sore throat  Eyes: Negative for pain, redness and visual disturbance  Respiratory: Negative for cough, shortness of breath and wheezing  Cardiovascular: Negative for chest pain, palpitations and leg swelling  Gastrointestinal: Negative for abdominal pain, nausea and vomiting  Endocrine: Negative for polydipsia and polyuria  Genitourinary: Negative for dysuria and hematuria  Skin: Negative for rash and wound  Neurological: Negative for dizziness, light-headedness and headaches  Psychiatric/Behavioral: Negative for decreased concentration, dysphoric mood and suicidal ideas   The patient is not nervous/anxious           VITALS:      Vitals:     05/24/21 0941   BP: 119/78   Pulse: 78         LABS:  HgA1c: No results found for: HGBA1C  BMP: No results found for: GLUCOSE, CALCIUM, NA, K, CO2, CL, BUN, CREATININE     _____________________________________________________  PHYSICAL EXAMINATION:  General: well developed and well nourished, alert, oriented times 3 and appears comfortable  Psychiatric: Normal  HEENT: Normocephalic, Atraumatic Trachea Midline, No torticollis  Pulmonary: No audible wheezing or respiratory distress   Cardiovascular: No pitting edema, 2+ radial pulse   Abdominal/GI: abdomen non tender, non distended   Skin: No masses, erythema, lacerations, fluctation, ulcerations  Neurovascular: Decreased Sensation to  the Ulnar Nerve and Pulses Intact  Musculoskeletal: Normal, except as noted in detailed exam and in HPI         MUSCULOSKELETAL EXAMINATION:  Right hand:  Intrinsic Atrophy,  Thenar atrophy  Extension of index and  Small finger to approximately neutral but no hyperextension   preserve centralized wrist extension   poor EPL function,  Improved with wrist flexion     ___________________________________________________  STUDIES REVIEWED:  EMG/NCS of  Bilateral upper extremitieswhich demonstrates  Acute on chronic ulnar nerve issues with denervation of the ulnar innervated muscles in addition to median nerve pathology consistent with carpal tunnel bilaterally              PROCEDURES PERFORMED:  Procedures

## 2021-07-09 ENCOUNTER — EVALUATION (OUTPATIENT)
Dept: OCCUPATIONAL THERAPY | Facility: CLINIC | Age: 66
End: 2021-07-09
Payer: MEDICARE

## 2021-07-09 DIAGNOSIS — S54.01XA ULNAR NERVE DAMAGE, RIGHT, INITIAL ENCOUNTER: ICD-10-CM

## 2021-07-09 DIAGNOSIS — R29.898 RIGHT HAND WEAKNESS: ICD-10-CM

## 2021-07-09 DIAGNOSIS — M62.541 ATROPHY OF MUSCLE OF RIGHT HAND: ICD-10-CM

## 2021-07-09 PROCEDURE — 97110 THERAPEUTIC EXERCISES: CPT | Performed by: OCCUPATIONAL THERAPIST

## 2021-07-09 PROCEDURE — 97166 OT EVAL MOD COMPLEX 45 MIN: CPT | Performed by: OCCUPATIONAL THERAPIST

## 2021-07-09 NOTE — PROGRESS NOTES
OT Evaluation     Today's date: 2021  Patient name: Mitch Escobar  : 1955  MRN: [id number]  Referring provider: Isadora Mei MD  Dx:   Encounter Diagnosis     ICD-10-CM    1  Atrophy of muscle of right hand  M62 541 Ambulatory referral to PT/OT hand therapy   2  Right hand weakness  R29 898 Ambulatory referral to PT/OT hand therapy   3  Ulnar nerve damage, right, initial encounter  S54  [facility] Ambulatory referral to PT/OT hand therapy                  Assessment  Assessment details: Nieves Crowell presents with significant weakness atrophy and sensory deficits with his right hand status post cubital and carpal tunnel release on 2021  Primarily he shows significant intrinsic weakness as well as loss of range of motion due to his weakness  He does note that he has improved active range of motion in the small finger since his surgery  Goals  STG) Motion increased by 25% in 4-6 weeks  STG) Strength/Motor skills improved by 25% in 4-6 weeks      LTG) ADL and IADL skills improved   LTG) Leisure skills improved    Patient Goals: I wanna use my hand as much as I can      Goals, plan of care and treatment condition discussed with patient  Patient expresses their understanding and questions regarding these issues were addressed        Plan  Patient would benefit from: OT eval and custom splinting  Planned modality interventions: TENS, thermotherapy: hydrocollator packs, thermotherapy: paraffin bath and ultrasound  Planned therapy interventions: neuromuscular re-education, motor coordination training, manual therapy, joint mobilization, Elias taping, strengthening, stretching, therapeutic activities, therapeutic exercise, functional ROM exercises, fine motor coordination training and orthotic fitting/training  Frequency: 3x week  Duration in visits: 10  Treatment plan discussed with: patient        Subjective Evaluation    History of Present Illness  Mechanism of injury:  cubital + carpal tunnel release R, he has a history of extensive weakness    Pain  Current pain ratin          Objective     Active Range of Motion     Right Thumb   Flexion     MP: 60    DIP: 40  Extension     MP: 0    DIP: 0  Palmar Abduction    CMC: 30  Radial Abduction    CMC: 45    Right Digits   Flexion   Index     MCP: 72    PIP: 76    DIP: 50  Middle     MCP: 74    PIP: 74    DIP: 40  Ring     MCP: 80    PIP: 70    DIP: 28  Little     MCP: 60    PIP: 40    DIP: 10  Extension   Index     MCP: 0  Middle     MCP: -30    PIP: -45    DIP: -25  Ring     MCP: -30    PIP: -45    DIP: -25  MCP Abduction     Index: 10    Rin    Little: 10  MCP Adduction     Index: 10    Rin    Little: 10    Additional Active Range of Motion Details  LF RF no active PIP/DIP extension    Strength/Myotome Testing     Left Wrist/Hand      (2nd hand position)     Trial 1: 38 5    Thumb Strength  Key/Lateral Pinch     Trial 1: 6 3    Right Wrist/Hand      (2nd hand position)     Trial 1: 22 5    Thumb Strength   Key/Lateral Pinch     Trial 1: 5 7    Tests     Additional Tests Details  SW 3 61 R IF, 4 56 Thumb, LF RF SF             Precautions: Universal  HEP: TGE, Digit + thumb AROM      Manuals                                                                 Neuro Re-Ed             NMES             Reverse block             Grasp             opposition             Digit ab/ad                                       Ther Ex             PROM                                                                                                        Ther Activity                                                                              Modalities             [facility]

## 2021-07-12 ENCOUNTER — OFFICE VISIT (OUTPATIENT)
Dept: OCCUPATIONAL THERAPY | Facility: CLINIC | Age: 66
End: 2021-07-12
Payer: MEDICARE

## 2021-07-12 DIAGNOSIS — S54.01XA ULNAR NERVE DAMAGE, RIGHT, INITIAL ENCOUNTER: ICD-10-CM

## 2021-07-12 DIAGNOSIS — R29.898 RIGHT HAND WEAKNESS: ICD-10-CM

## 2021-07-12 DIAGNOSIS — M62.541 ATROPHY OF MUSCLE OF RIGHT HAND: Primary | ICD-10-CM

## 2021-07-12 PROCEDURE — 97112 NEUROMUSCULAR REEDUCATION: CPT

## 2021-07-12 PROCEDURE — 97110 THERAPEUTIC EXERCISES: CPT

## 2021-07-12 NOTE — PROGRESS NOTES
Daily Note     Today's date: 2021  Patient name: Tania Islas  : 1955  MRN: [id number]  Referring provider: Irasema Rae MD  Dx:   Encounter Diagnosis     ICD-10-CM    1  Atrophy of muscle of right hand  M62 541    2  Right hand weakness  R29 898    3  Ulnar nerve damage, right, initial encounter  S54  01XA                   Subjective: I can't get these straight on their own (MF/RF)  Objective: See treatment diary below      Assessment: Tolerated treatment good  Patient demonstrated fatigue post treatment and would benefit from continued OT      Plan: Continue per plan of care        Precautions: Universal  HEP: TGE, Digit + thumb AROM      Manuals                                                                 Neuro Re-Ed             NMES             Reverse block 10            Grasp             opposition marbles x20            Digit ab/ad marbles x20            dexterity Med balls 2'                         Ther Ex             PROM wrist/digits 15'                                                                                                       Ther Activity                                                                              Modalities             MH 5' elbow/wrist            Fluido

## 2021-07-14 ENCOUNTER — OFFICE VISIT (OUTPATIENT)
Dept: OCCUPATIONAL THERAPY | Facility: CLINIC | Age: 66
End: 2021-07-14
Payer: MEDICARE

## 2021-07-14 DIAGNOSIS — M62.541 ATROPHY OF MUSCLE OF RIGHT HAND: Primary | ICD-10-CM

## 2021-07-14 DIAGNOSIS — R29.898 RIGHT HAND WEAKNESS: ICD-10-CM

## 2021-07-14 DIAGNOSIS — S54.01XA ULNAR NERVE DAMAGE, RIGHT, INITIAL ENCOUNTER: ICD-10-CM

## 2021-07-14 PROCEDURE — 97110 THERAPEUTIC EXERCISES: CPT

## 2021-07-14 PROCEDURE — 97112 NEUROMUSCULAR REEDUCATION: CPT

## 2021-07-14 NOTE — PROGRESS NOTES
Daily Note     Today's date: 2021  Patient name: Sharene Fothergill  : 1955  MRN: [id number]  Referring provider: Devin Simmons MD  Dx:   Encounter Diagnosis     ICD-10-CM    1  Atrophy of muscle of right hand  M62 541    2  Right hand weakness  R29 898    3  Ulnar nerve damage, right, initial encounter  S54  01XA                   Subjective: I can't get these straight on their own (MF/RF)  Objective: See treatment diary below      Assessment: Tolerated treatment good  Patient demonstrated fatigue post treatment and would benefit from continued OT      Plan: Continue per plan of care        Precautions: Universal  HEP: TGE, Digit + thumb AROM      Manuals                                                                 Neuro Re-Ed             NMES             Reverse block 10            Grasp             opposition marbles x20            Digit ab/ad marbles x20            dexterity Med balls 2'                         Ther Ex             PROM wrist/digits 15'                                                                                                       Ther Activity                                                                              Modalities             MH 5' elbow/wrist            Fluido

## 2021-07-16 ENCOUNTER — OFFICE VISIT (OUTPATIENT)
Dept: OCCUPATIONAL THERAPY | Facility: CLINIC | Age: 66
End: 2021-07-16
Payer: MEDICARE

## 2021-07-16 DIAGNOSIS — R29.898 RIGHT HAND WEAKNESS: ICD-10-CM

## 2021-07-16 DIAGNOSIS — M62.541 ATROPHY OF MUSCLE OF RIGHT HAND: Primary | ICD-10-CM

## 2021-07-16 DIAGNOSIS — S54.01XA ULNAR NERVE DAMAGE, RIGHT, INITIAL ENCOUNTER: ICD-10-CM

## 2021-07-16 PROCEDURE — 97110 THERAPEUTIC EXERCISES: CPT

## 2021-07-16 PROCEDURE — 97112 NEUROMUSCULAR REEDUCATION: CPT

## 2021-07-16 NOTE — PROGRESS NOTES
Daily Note     Today's date: 2021  Patient name: Gissel Carcamo  : 1955  MRN: [id number]  Referring provider: Mariela Ruiz MD  Dx:   Encounter Diagnosis     ICD-10-CM    1  Atrophy of muscle of right hand  M62 541    2  Right hand weakness  R29 898    3  Ulnar nerve damage, right, initial encounter  S54  01XA                   Subjective: The dr said this would never get better (finger ext)  That the damage is permanent  Objective: See treatment diary below      Assessment: Tolerated treatment good  Patient demonstrated fatigue post treatment and would benefit from continued OT  Trial of NMES for finger extensors  Plan: Continue per plan of care        Precautions: Universal  HEP: TGE, Digit + thumb AROM      Manuals                                                                Neuro Re-Ed             NMES  10'           Reverse block 10            Grasp             opposition marbles x20 marbles x20           Digit ab/ad marbles x20 marbles x20           dexterity Med balls 2'                         Ther Ex             PROM wrist/digits 15' 15'                                                                                                      Ther Activity                                                                              Modalities             MH 5' elbow/wrist Wrist 5'           Fluido

## 2021-07-19 ENCOUNTER — OFFICE VISIT (OUTPATIENT)
Dept: OCCUPATIONAL THERAPY | Facility: CLINIC | Age: 66
End: 2021-07-19
Payer: MEDICARE

## 2021-07-19 DIAGNOSIS — S54.01XA ULNAR NERVE DAMAGE, RIGHT, INITIAL ENCOUNTER: ICD-10-CM

## 2021-07-19 DIAGNOSIS — M62.541 ATROPHY OF MUSCLE OF RIGHT HAND: Primary | ICD-10-CM

## 2021-07-19 DIAGNOSIS — R29.898 RIGHT HAND WEAKNESS: ICD-10-CM

## 2021-07-19 PROCEDURE — 97112 NEUROMUSCULAR REEDUCATION: CPT

## 2021-07-19 PROCEDURE — 97110 THERAPEUTIC EXERCISES: CPT

## 2021-07-19 NOTE — PROGRESS NOTES
Daily Note     Today's date: 2021  Patient name: King Aguilar  : 1955  MRN: [id number]  Referring provider: Bree Hooks MD  Dx:   Encounter Diagnosis     ICD-10-CM    1  Atrophy of muscle of right hand  M62 541    2  Right hand weakness  R29 898    3  Ulnar nerve damage, right, initial encounter  S54  01XA                   Subjective: I can do a little more since the surgery  Objective: See treatment diary below      Assessment: Tolerated treatment good  Patient demonstrated fatigue post treatment and would benefit from continued OT   Derick with NMES last session  Focusing on functional hand use  Plan: Continue per plan of care  F/U with dr for suture removal on Wed       Precautions: Universal  HEP: TGE, Digit + thumb AROM      Manuals                                                               Neuro Re-Ed             NMES  10' D/C          Reverse block 10            Grasp             opposition marbles x20 marbles x20 Key pegs          Digit ab/ad marbles x20 marbles x20 marbles x20          dexterity Med balls 2'                         Ther Ex             PROM wrist/digits 15' 15' 15'          Wrist exerciser   10x                                                                                        Ther Activity                                                                              Modalities             MH 5' elbow/wrist Wrist 5' 5'          Fluido

## 2021-07-21 ENCOUNTER — APPOINTMENT (OUTPATIENT)
Dept: OCCUPATIONAL THERAPY | Facility: CLINIC | Age: 66
End: 2021-07-21
Payer: MEDICARE

## 2021-07-21 ENCOUNTER — OFFICE VISIT (OUTPATIENT)
Dept: OBGYN CLINIC | Facility: CLINIC | Age: 66
End: 2021-07-21

## 2021-07-21 VITALS
SYSTOLIC BLOOD PRESSURE: 135 MMHG | DIASTOLIC BLOOD PRESSURE: 82 MMHG | HEIGHT: 67 IN | HEART RATE: 79 BPM | BODY MASS INDEX: 40.81 KG/M2 | WEIGHT: 260 LBS

## 2021-07-21 DIAGNOSIS — R29.898 RIGHT HAND WEAKNESS: ICD-10-CM

## 2021-07-21 DIAGNOSIS — Z98.890 S/P CARPAL TUNNEL RELEASE: Primary | ICD-10-CM

## 2021-07-21 DIAGNOSIS — Z98.890 S/P CUBITAL TUNNEL RELEASE: ICD-10-CM

## 2021-07-21 DIAGNOSIS — M62.541 ATROPHY OF MUSCLE OF RIGHT HAND: ICD-10-CM

## 2021-07-21 PROCEDURE — 99024 POSTOP FOLLOW-UP VISIT: CPT | Performed by: SURGERY

## 2021-07-21 NOTE — PROGRESS NOTES
Assessment/Plan:  Patient ID: Stephen Aceves 77 y o  male   Surgery: Release Cubital Tunnel - Right and Release Carpal Tunnel - Right  Date of Surgery: 7/6/2021    15 days s/p right cubital tunnel release and carpal tunnel release  Sutures were removed today, he was advised to perform scar massage  Advised to avoid standing water x1 week  He can expect to see improvement in paraesthesia's for up to 18 months following surgical intervention  If his long/ring finger extension fails to improve we may order further testing to look at the extensor tendons  Follow Up:  6  week(s)    To Do Next Visit:  Re-evaluation       CHIEF COMPLAINT:  Chief Complaint   Patient presents with    Right Hand - Follow-up         SUBJECTIVE:  Stephen Aceves is a 77y o  year old male who presents for follow up after Release Cubital Tunnel - Right and Release Carpal Tunnel - Right  Overall Simone Kefatmatas is doing well  He notes improvement in his hand paraesthesia's  He has been attending therapy to work on long/ring finger extension, which he notes improvement in  Johann's  has improved         PHYSICAL EXAMINATION:  General: well developed and well nourished, alert, oriented times 3 and appears comfortable  Psychiatric: Normal    MUSCULOSKELETAL EXAMINATION:  Incision: clean, dry and suture(s) intact   Surgery Site: normal, no evidence of infection   Range of Motion: As expected  Neurovascular status: Neuro intact, good cap refill  Activity Restrictions: avoid standing water x1 week  Done today: Sutures out      STUDIES REVIEWED:  No Studies to review      PROCEDURES PERFORMED:  Procedures  No Procedures performed today         Scribe Attestation    I,:  Sukhjinder Elkins am acting as a scribe while in the presence of the attending physician :       I,:  Jolanta Philippe MD personally performed the services described in this documentation    as scribed in my presence :

## 2021-07-22 ENCOUNTER — APPOINTMENT (OUTPATIENT)
Dept: OCCUPATIONAL THERAPY | Facility: CLINIC | Age: 66
End: 2021-07-22
Payer: MEDICARE

## 2021-07-23 ENCOUNTER — OFFICE VISIT (OUTPATIENT)
Dept: OCCUPATIONAL THERAPY | Facility: CLINIC | Age: 66
End: 2021-07-23
Payer: MEDICARE

## 2021-07-23 DIAGNOSIS — S54.01XA ULNAR NERVE DAMAGE, RIGHT, INITIAL ENCOUNTER: ICD-10-CM

## 2021-07-23 DIAGNOSIS — R29.898 RIGHT HAND WEAKNESS: ICD-10-CM

## 2021-07-23 DIAGNOSIS — M62.541 ATROPHY OF MUSCLE OF RIGHT HAND: Primary | ICD-10-CM

## 2021-07-23 PROCEDURE — 97110 THERAPEUTIC EXERCISES: CPT

## 2021-07-23 PROCEDURE — 97112 NEUROMUSCULAR REEDUCATION: CPT

## 2021-07-23 PROCEDURE — 97530 THERAPEUTIC ACTIVITIES: CPT

## 2021-07-23 NOTE — PROGRESS NOTES
Daily Note     Today's date: 2021  Patient name: Jennifer Ellis  : 1955  MRN: [id number]  Referring provider: Janine Marin MD  Dx:   Encounter Diagnosis     ICD-10-CM    1  Atrophy of muscle of right hand  M62 541    2  Right hand weakness  R29 898    3  Ulnar nerve damage, right, initial encounter  S54  01XA                   Subjective: The dr said she will do a test if my fingers aren't straighter when I go back to see her  Objective: See treatment diary below      Assessment: Tolerated treatment good  Patient demonstrated fatigue post treatment and would benefit from continued OT  Tried manipulation of a TB - difficulty due to inability to extend MF & RF  Plan: Continue per plan of care  F/U with dr again        Precautions: Universal  HEP: TGE, Digit + thumb AROM      Manuals                                                              Neuro Re-Ed             NMES  10' D/C          Reverse block 10            Grasp             opposition marbles x20 marbles x20 Key pegs Key pegs         Digit ab/ad marbles x20 marbles x20 marbles x20 marbles x25         dexterity Med balls 2'   TB                      Ther Ex             PROM wrist/digits 15' 15' 15' 10         Wrist exerciser   10x 10x                                                                                       Ther Activity             Functional grasp/ext    lg dowel 15x                                                             Modalities             MH 5' elbow/wrist Wrist 5' 5' 5'         Fluido

## 2021-07-26 ENCOUNTER — OFFICE VISIT (OUTPATIENT)
Dept: OCCUPATIONAL THERAPY | Facility: CLINIC | Age: 66
End: 2021-07-26
Payer: MEDICARE

## 2021-07-26 DIAGNOSIS — S54.01XA ULNAR NERVE DAMAGE, RIGHT, INITIAL ENCOUNTER: ICD-10-CM

## 2021-07-26 DIAGNOSIS — M62.541 ATROPHY OF MUSCLE OF RIGHT HAND: Primary | ICD-10-CM

## 2021-07-26 DIAGNOSIS — R29.898 RIGHT HAND WEAKNESS: ICD-10-CM

## 2021-07-26 PROCEDURE — 97110 THERAPEUTIC EXERCISES: CPT

## 2021-07-26 PROCEDURE — 97112 NEUROMUSCULAR REEDUCATION: CPT

## 2021-07-26 PROCEDURE — 97530 THERAPEUTIC ACTIVITIES: CPT

## 2021-07-26 NOTE — PROGRESS NOTES
Daily Note     Today's date: 2021  Patient name: Abril Napoles  : 1955  MRN: [id number]  Referring provider: Chon Rice MD  Dx:   Encounter Diagnosis     ICD-10-CM    1  Atrophy of muscle of right hand  M62 541    2  Right hand weakness  R29 898    3  Ulnar nerve damage, right, initial encounter  S54  01XA                   Subjective: They are a little better (straighter)  Objective: See treatment diary below      Assessment: Tolerated treatment good  Patient demonstrated fatigue post treatment and would benefit from continued OT  Plan: Continue per plan of care  F/U with dr miranda        Precautions: Universal  HEP: TGE, Digit + thumb AROM      Manuals                                                             Neuro Re-Ed             NMES  10' D/C          Reverse block 10            Grasp             opposition marbles x20 marbles x20 Key pegs Key pegs Key pegs        Digit ab/ad marbles x20 marbles x20 marbles x20 marbles x25 marbles x25        dexterity Med balls 2'   TB                      Ther Ex             PROM wrist/digits 15' 15' 15' 10 10        Wrist exerciser   10x 10x 10x                                                                                      Ther Activity             Functional grasp/ext    lg dowel 15x lg dowel 15x                                                            Modalities             MH 5' elbow/wrist Wrist 5' 5' 5' 5'        Fluido

## 2021-07-28 ENCOUNTER — OFFICE VISIT (OUTPATIENT)
Dept: OCCUPATIONAL THERAPY | Facility: CLINIC | Age: 66
End: 2021-07-28
Payer: MEDICARE

## 2021-07-28 DIAGNOSIS — S54.01XA ULNAR NERVE DAMAGE, RIGHT, INITIAL ENCOUNTER: ICD-10-CM

## 2021-07-28 DIAGNOSIS — R29.898 RIGHT HAND WEAKNESS: ICD-10-CM

## 2021-07-28 DIAGNOSIS — M62.541 ATROPHY OF MUSCLE OF RIGHT HAND: Primary | ICD-10-CM

## 2021-07-28 PROCEDURE — 97110 THERAPEUTIC EXERCISES: CPT

## 2021-07-28 PROCEDURE — 97530 THERAPEUTIC ACTIVITIES: CPT

## 2021-07-28 PROCEDURE — 97112 NEUROMUSCULAR REEDUCATION: CPT

## 2021-07-28 NOTE — PROGRESS NOTES
Daily Note     Today's date: 2021  Patient name: Peterson Smith  : 1955  MRN: [id number]  Referring provider: Christine Beard MD  Dx:   Encounter Diagnosis     ICD-10-CM    1  Atrophy of muscle of right hand  M62 541    2  Right hand weakness  R29 898    3  Ulnar nerve damage, right, initial encounter  S54  01XA                   Subjective:  Reports he notes improvement since IE  Objective: See treatment diary below   NMES re trial   300 us 30 pps 21 mA 2s ramp, 10 min    Assessment: Tolerated treatment well  Patient demonstrated fatigue post treatment and would benefit from continued OT  Plan: Continue per plan of care  F/U with dr miranda        Precautions: Universal  HEP: TGE, Digit + thumb AROM      Manuals                                                            Neuro Re-Ed             NMES  10' D/C   15'       Reverse block 10            Grasp             opposition marbles x20 marbles x20 Key pegs Key pegs Key pegs        Digit ab/ad marbles x20 marbles x20 marbles x20 marbles x25 marbles x25        dexterity Med balls 2'   TB                      Ther Ex             PROM wrist/digits 15' 15' 15' 10 10 10'       Wrist exerciser   10x 10x 10x                                                                                      Ther Activity             Functional grasp/ext    lg dowel 15x lg dowel 15x        In hand manipulation      Timed key pegs 15 I&O, R only 6         Ortho fit and train      Anti claw splint, HB                                 Modalities             MH 5' elbow/wrist Wrist 5' 5' 5' 5' 5'       Fluido

## 2021-07-30 ENCOUNTER — APPOINTMENT (OUTPATIENT)
Dept: OCCUPATIONAL THERAPY | Facility: CLINIC | Age: 66
End: 2021-07-30
Payer: MEDICARE

## 2021-08-02 ENCOUNTER — OFFICE VISIT (OUTPATIENT)
Dept: OCCUPATIONAL THERAPY | Facility: CLINIC | Age: 66
End: 2021-08-02
Payer: MEDICARE

## 2021-08-02 DIAGNOSIS — S54.01XA ULNAR NERVE DAMAGE, RIGHT, INITIAL ENCOUNTER: ICD-10-CM

## 2021-08-02 DIAGNOSIS — M62.541 ATROPHY OF MUSCLE OF RIGHT HAND: Primary | ICD-10-CM

## 2021-08-02 DIAGNOSIS — R29.898 RIGHT HAND WEAKNESS: ICD-10-CM

## 2021-08-02 PROCEDURE — 97110 THERAPEUTIC EXERCISES: CPT

## 2021-08-02 PROCEDURE — 97112 NEUROMUSCULAR REEDUCATION: CPT

## 2021-08-02 NOTE — PROGRESS NOTES
Daily Note     Today's date: 2021  Patient name: Sharene Fothergill  : 1955  MRN: [id number]  Referring provider: Devin Simmons MD  Dx:   Encounter Diagnosis     ICD-10-CM    1  Atrophy of muscle of right hand  M62 541    2  Right hand weakness  R29 898    3  Ulnar nerve damage, right, initial encounter  S54  01XA                   Subjective: This splint is really helpful  Objective: See treatment diary below       Assessment: Tolerated treatment well  Patient demonstrated fatigue post treatment and would benefit from continued OT  Added extensor strengthening; good tolerance  Plan: Continue per plan of care  F/U with dr miranda        Precautions: Universal  HEP: TGE, Digit + thumb AROM      Manuals                                                           Neuro Re-Ed             NMES  10' D/C   15' 10'      Reverse block 10            Grasp             opposition marbles x20 marbles x20 Key pegs Key pegs Key pegs  Key pegs      Digit ab/ad marbles x20 marbles x20 marbles x20 marbles x25 marbles x25  marbles       dexterity Med balls 2'   TB                      Ther Ex             PROM wrist/digits 15' 15' 15' 10 10 10' 10'      Wrist exerciser   10x 10x 10x        Wrist ext        3# 3x10      s/p       hammer 30x                                                          Ther Activity             Functional grasp/ext    lg dowel 15x lg dowel 15x        In hand manipulation      Timed key pegs 15 I&O, R only 6         Ortho fit and train      Anti claw splint, HB                                 Modalities             MH 5' elbow/wrist Wrist 5' 5' 5' 5' 5' 5'      Fluido

## 2021-08-04 ENCOUNTER — EVALUATION (OUTPATIENT)
Dept: OCCUPATIONAL THERAPY | Facility: CLINIC | Age: 66
End: 2021-08-04
Payer: MEDICARE

## 2021-08-04 DIAGNOSIS — R29.898 RIGHT HAND WEAKNESS: ICD-10-CM

## 2021-08-04 DIAGNOSIS — S54.01XA ULNAR NERVE DAMAGE, RIGHT, INITIAL ENCOUNTER: ICD-10-CM

## 2021-08-04 DIAGNOSIS — M62.541 ATROPHY OF MUSCLE OF RIGHT HAND: Primary | ICD-10-CM

## 2021-08-04 PROCEDURE — 97530 THERAPEUTIC ACTIVITIES: CPT

## 2021-08-04 PROCEDURE — 97110 THERAPEUTIC EXERCISES: CPT

## 2021-08-04 NOTE — PROGRESS NOTES
OT Re-Evaluation     Today's date: 2021  Patient name: Ailyn Delgado  : 1955  MRN: [id number]  Referring provider: Pat Jacobo MD  Dx:   Encounter Diagnosis     ICD-10-CM    1  Atrophy of muscle of right hand  M62 541    2  Right hand weakness  R29 898    3  Ulnar nerve damage, right, initial encounter  S54  01XA                   Assessment  Assessment details: Manuelito Jacobs presented with significant weakness atrophy and sensory deficits with his right hand status post cubital and carpal tunnel release on 2021  Primarily, he showed significant intrinsic weakness as well as loss of range of motion due to his weakness  Since IE, Manuelito Jacobs has met all established STG and exceeded expectations as far as strength and motion improvement  He continues with significant intrinsic weakness and loss of AROM  He would benefit from continued OPOT, as well as possible fitting for digital extension assist bracing, as recommended below to address above listed deficits and maximize (I) in ADL/IADL  Impairments: abnormal muscle firing, abnormal or restricted ROM, impaired physical strength, lacks appropriate home exercise program and weight-bearing intolerance  Understanding of Dx/Px/POC: good   Prognosis: fair    Goals  STG) Motion increased by 25% in 4-6 weeks MET  STG) Strength/Motor skills improved by 25% in 4-6 weeks MET    NEW STG  1) Improve both pinch and grasp strength by 25% in 4-6 weeks  2) Improve AROM by 15% in 4-6 weeks  3) (I) with orthosis mgmt for function within 4-6 weeks   4) Improve tricep MMT by 1/2 grade  LTG) ADL and IADL skills improved   LTG) Leisure skills improved      Goals, plan of care and treatment condition discussed with patient  Patient expresses their understanding and questions regarding these issues were addressed        Plan  Patient would benefit from: custom splinting and skilled occupational therapy  Planned modality interventions: TENS, thermotherapy: hydrocollator packs, thermotherapy: paraffin bath and ultrasound  Planned therapy interventions: neuromuscular re-education, motor coordination training, manual therapy, joint mobilization, Elias taping, strengthening, stretching, therapeutic activities, therapeutic exercise, functional ROM exercises, fine motor coordination training, orthotic fitting/training and patient education  Frequency: 2x week  Duration in visits: 10  Treatment plan discussed with: patient        Subjective Evaluation    History of Present Illness  Mechanism of injury:  cubital + carpal tunnel release R, he has a history of extensive weakness  Pain  Current pain ratin  At best pain ratin  At worst pain ratin    Patient Goals  Patient goals for therapy: increased motion and increased strength  Patient goal: cutting with knife, scissors, etc  (due to both strength and knife)        Objective     Active Range of Motion     Right Elbow   Flexion: WFL  Extension: -15 degrees   Forearm supination: 70 degrees   Forearm pronation: 90 degrees WFL    Right Wrist   Wrist flexion: 45 degrees   Wrist extension: 60 degrees   Radial deviation: 15 degrees   Ulnar deviation: -10 degrees     Right Thumb   Flexion     MP: 60    DIP: 60  Extension     MP: 0    DIP: 0    Right Digits   Flexion   Index     MCP: 90    PIP: 105    DIP: 60  Middle     MCP: 90    PIP: 105    DIP: 65  Ring     MCP: 90    PIP: 110    DIP: 30  Little     MCP: 70    PIP: 75    DIP: 10  Extension   Index     MCP: -25    PIP: 0    DIP: 0  Middle     MCP: -70    PIP: -50    DIP: 0  Ring     MCP: -65    PIP: -25    DIP: 0    Additional Active Range of Motion Details  Stiffness geared radial wrist 2* weak to absent UD actively in GE range     LF RF no active PIP/DIP extension  Trace to no abd/add IF,LR,RF  Min SF up to 10* active arc  Heavy compensation noted     Strength/Myotome Testing     Right Elbow   Extension: 3-  Forearm supination: 3+    Left Wrist/Hand      (2nd hand position)     Trial 1: 38 5    Thumb Strength  Key/Lateral Pinch     Trial 1: 6 3    Right Wrist/Hand      (2nd hand position)     Trial 1: 27    Trial 2: 27    Trial 3: 25    Thumb Strength   Key/Lateral Pinch     Trial 1: 6 6    Trial 2: 5 6    Trial 3: 6 2    Average: 6 13    Tests     Additional Tests Details  SW 3 61 R IF LF RF, 6 65 SF             Precautions: Universal  HEP: TGE, Digit + thumb AROM      Manuals 7/12 7/16 7/19 7/23 7/26 7/28 8/2 8/4                                                         Neuro Re-Ed             NMES  10' D/C   15' 10'      Reverse block 10            Grasp             opposition marbles x20 marbles x20 Key pegs Key pegs Key pegs  Key pegs      Digit ab/ad marbles x20 marbles x20 marbles x20 marbles x25 marbles x25  marbles       dexterity Med balls 2'   TB                      Ther Ex             PROM wrist/digits 15' 15' 15' 10 10 10' 10'      Wrist exerciser   10x 10x 10x        Wrist ext        3# 3x10      s/p       hammer 30x      RE        30'                                            Ther Activity             Functional grasp/ext    lg dowel 15x lg dowel 15x        In hand manipulation      Timed key pegs 15 I&O, R only 6         Ortho fit and train      Anti claw splint, HB       Grasp & pinch        YP (for Hep) 10'                  Modalities             MH 5' elbow/wrist Wrist 5' 5' 5' 5' 5' 5' 5'     Fluido

## 2021-08-06 ENCOUNTER — APPOINTMENT (OUTPATIENT)
Dept: OCCUPATIONAL THERAPY | Facility: CLINIC | Age: 66
End: 2021-08-06
Payer: MEDICARE

## 2021-08-09 ENCOUNTER — OFFICE VISIT (OUTPATIENT)
Dept: OCCUPATIONAL THERAPY | Facility: CLINIC | Age: 66
End: 2021-08-09
Payer: MEDICARE

## 2021-08-09 DIAGNOSIS — M62.541 ATROPHY OF MUSCLE OF RIGHT HAND: Primary | ICD-10-CM

## 2021-08-09 DIAGNOSIS — S54.01XA ULNAR NERVE DAMAGE, RIGHT, INITIAL ENCOUNTER: ICD-10-CM

## 2021-08-09 DIAGNOSIS — R29.898 RIGHT HAND WEAKNESS: ICD-10-CM

## 2021-08-09 PROCEDURE — 97110 THERAPEUTIC EXERCISES: CPT

## 2021-08-09 PROCEDURE — 97112 NEUROMUSCULAR REEDUCATION: CPT

## 2021-08-09 NOTE — PROGRESS NOTES
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; he plays drums  Plan: Continue per plan of care        Precautions: Universal  HEP: TGE, Digit + thumb AROM      Manuals                                                         Neuro Re-Ed             NMES  10' D/C   15' 10'  10'    Reverse block 10            Grasp             opposition marbles x20 marbles x20 Key pegs Key pegs Key pegs  Key pegs  Key pegs    Digit ab/ad marbles x20 marbles x20 marbles x20 marbles x25 marbles x25  marbles       dexterity Med balls 2'   TB                      Ther Ex             PROM wrist/digits 15' 15' 15' 10 10 10' 10'  10'    Wrist exerciser   10x 10x 10x        Wrist ext        3# 3x10  3# 3x10    s/p       hammer 30x  hammer 30x    RE        30'                                            Ther Activity             Functional grasp/ext    lg dowel 15x lg dowel 15x        In hand manipulation      Timed key pegs 15 I&O, R only 6         Ortho fit and train      Anti claw splint, HB       Grasp & pinch        YP (for Hep) 10'                  Modalities             MH 5' elbow/wrist Wrist 5' 5' 5' 5' 5' 5' 5' 5'    Fluido

## 2021-08-11 ENCOUNTER — OFFICE VISIT (OUTPATIENT)
Dept: OCCUPATIONAL THERAPY | Facility: CLINIC | Age: 66
End: 2021-08-11
Payer: MEDICARE

## 2021-08-11 DIAGNOSIS — S54.01XA ULNAR NERVE DAMAGE, RIGHT, INITIAL ENCOUNTER: ICD-10-CM

## 2021-08-11 DIAGNOSIS — R29.898 RIGHT HAND WEAKNESS: ICD-10-CM

## 2021-08-11 DIAGNOSIS — M62.541 ATROPHY OF MUSCLE OF RIGHT HAND: Primary | ICD-10-CM

## 2021-08-11 PROCEDURE — 97112 NEUROMUSCULAR REEDUCATION: CPT

## 2021-08-11 PROCEDURE — 97110 THERAPEUTIC EXERCISES: CPT

## 2021-08-11 NOTE — PROGRESS NOTES
Daily Note     Today's date: 2021  Patient name: Jennifer Ellis  : 1955  MRN: [id number]  Referring provider: Janine Marin MD  Dx:   Encounter Diagnosis     ICD-10-CM    1  Atrophy of muscle of right hand  M62 541    2  Right hand weakness  R29 898    3  Ulnar nerve damage, right, initial encounter  S54  01XA                   Subjective:  I'm going to try the drums tonight so we'll see how that goes  Objective: See treatment diary below      Assessment: Tolerated treatment well  Patient demonstrated fatigue post treatment and would benefit from continued OT  Still unable to extend LF and RF  Functional hand use is improved with use of anti-claw splint  Plan: Continue per plan of care        Precautions: Universal  HEP: TGE, Digit + thumb AROM      Manuals                                                        Neuro Re-Ed             NMES  10' D/C   15' 10'  10' 10'   Reverse block 10            Grasp             opposition marbles x20 marbles x20 Key pegs Key pegs Key pegs  Key pegs  Key pegs coins   Digit ab/ad marbles x20 marbles x20 marbles x20 marbles x25 marbles x25  marbles       dexterity Med balls 2'   TB                      Ther Ex             PROM wrist/digits 15' 15' 15' 10 10 10' 10'  10' 10   Wrist exerciser   10x 10x 10x        Wrist ext        3# 3x10  3# 3x10 3# 4x10   s/p       hammer 30x  hammer 30x hammer 3x10   RE        30'                                            Ther Activity             Functional grasp/ext    lg dowel 15x lg dowel 15x        In hand manipulation      Timed key pegs 15 I&O, R only 6         Ortho fit and train      Anti claw splint, HB       Grasp & pinch        YP (for Hep) 10'  YPW 3x10                Modalities             MH 5' elbow/wrist Wrist 5' 5' 5' 5' 5' 5' 5' 5' 5'   Fluido

## 2021-08-17 ENCOUNTER — OFFICE VISIT (OUTPATIENT)
Dept: OCCUPATIONAL THERAPY | Facility: CLINIC | Age: 66
End: 2021-08-17
Payer: MEDICARE

## 2021-08-17 DIAGNOSIS — R29.898 RIGHT HAND WEAKNESS: ICD-10-CM

## 2021-08-17 DIAGNOSIS — S54.01XA ULNAR NERVE DAMAGE, RIGHT, INITIAL ENCOUNTER: Primary | ICD-10-CM

## 2021-08-17 DIAGNOSIS — M62.541 ATROPHY OF MUSCLE OF RIGHT HAND: ICD-10-CM

## 2021-08-17 PROCEDURE — 97110 THERAPEUTIC EXERCISES: CPT

## 2021-08-17 PROCEDURE — 97112 NEUROMUSCULAR REEDUCATION: CPT

## 2021-08-17 NOTE — PROGRESS NOTES
Daily Note     Today's date: 2021  Patient name: Cassius Buckley  : 1955  MRN: [id number]  Referring provider: Starla Andrew MD  Dx:   Encounter Diagnosis     ICD-10-CM    1  Ulnar nerve damage, right, initial encounter  S54  01XA    2  Right hand weakness  R29 898    3  Atrophy of muscle of right hand  M62 541                   Subjective:  Patient reports it's going to take some time to get back to [facility], due to being out of practice and wrists/ are weak  Objective: See treatment diary below      Assessment: Tolerated treatment well  Patient demonstrated fatigue post treatment and would benefit from continued OT  Still unable to extend LF and RF regardless of MP position and with significantly impaired coordination/strength/motion  Plan: Continue per plan of care        Precautions: Universal  HEP: TGE, Digit + thumb AROM      Manuals                                                        Neuro Re-Ed             NMES   D/C   15' 10'  10' 10'   Reverse block             Grasp             opposition Key pegs w/ translation (was too difficult, completed without) *emphasis on avoiding full body compensation to encourage manipulation  Key pegs Key pegs Key pegs  Key pegs  Key pegs coins   Digit ab/ad x15 assisted, GE  marbles x20 marbles x25 marbles x25  marbles       dexterity    TB                      Ther Ex             PROM wrist/digits 10  15' 10 10 10' 10'  10' 10   Wrist exerciser   10x 10x 10x        Wrist ext  3#4x10      3# 3x10  3# 3x10 3# 4x10   s/p       hammer 30x  hammer 30x hammer 3x10   RE        30'     Wrist flex 3# 4x10, assist for ulnar control                                      Ther Activity             Functional grasp/ext    lg dowel 15x lg dowel 15x        In hand manipulation      Timed key pegs 15 I&O, R only 6         Ortho fit and train      Anti claw splint, HB       Grasp & pinch        YP (for Hep) 10' YPW 3x10                Modalities              5'  5' 5' 5' 5' 5' 5' 5' 5'   Fluido

## 2021-08-19 ENCOUNTER — OFFICE VISIT (OUTPATIENT)
Dept: OCCUPATIONAL THERAPY | Facility: CLINIC | Age: 66
End: 2021-08-19
Payer: MEDICARE

## 2021-08-19 DIAGNOSIS — R29.898 RIGHT HAND WEAKNESS: ICD-10-CM

## 2021-08-19 DIAGNOSIS — S54.01XA ULNAR NERVE DAMAGE, RIGHT, INITIAL ENCOUNTER: Primary | ICD-10-CM

## 2021-08-19 DIAGNOSIS — M62.541 ATROPHY OF MUSCLE OF RIGHT HAND: ICD-10-CM

## 2021-08-19 PROCEDURE — 97110 THERAPEUTIC EXERCISES: CPT

## 2021-08-19 PROCEDURE — 97530 THERAPEUTIC ACTIVITIES: CPT

## 2021-08-19 PROCEDURE — 97112 NEUROMUSCULAR REEDUCATION: CPT

## 2021-08-19 NOTE — PROGRESS NOTES
Daily Note     Today's date: 2021  Patient name: Sanjay Larson  : 1955  MRN: [id number]  Referring provider: John Dennis MD  Dx:   Encounter Diagnosis     ICD-10-CM    1  Ulnar nerve damage, right, initial encounter  S54  01XA    2  Right hand weakness  R29 898    3  Atrophy of muscle of right hand  M62 541                   Subjective:  Patient reports hand feels good today  Anti claw splint is feeling a bit loose  Objective: See treatment diary below      Assessment: Tolerated treatment well  Patient demonstrated fatigue post treatment and would benefit from continued OT  Still with significantly impaired coordination/strength/motion  Heavy compensation for gravity assist, focus manip on in hand coord  Plan: Continue per plan of care        Precautions: Universal  HEP: TGE, Digit + thumb AROM      Manuals                                                        Neuro Re-Ed             NMES  (D/C)  10' - d/c 2* poor peripheral response    15' 10'  10' 10'   Reverse block             Grasp             opposition Key pegs w/ translation (was too difficult, completed without) *emphasis on avoiding full body compensation to encourage manipulation    Key pegs  Key pegs  Key pegs coins   Digit ab/ad x15 assisted, GE    marbles x25  marbles       dexterity                          Ther Ex             PROM wrist/digits 10 10'   10 10' 10'  10' 10   Wrist exerciser     10x        Wrist ext  3#4x10      3# 3x10  3# 3x10 3# 4x10   s/p       hammer 30x  hammer 30x hammer 3x10   RE        30'     Wrist flex 3# 4x10, assist for ulnar control                                      Ther Activity             Functional grasp/ext     lg dowel 15x        In hand manipulation  Unable to transition peg; RB textured TB x2 FW and BW *with palm up*    Timed key pegs 15 I&O, R only 6         Ortho fit and train  Anti claw splint re-fit, pegs with and without x2 @ 15 Anti claw splint, HB       Grasp & pinch        YP (for Hep) 10'  YPW 3x10                Modalities             MH 5' 5'   5' 5' 5' 5' 5' 5'   Fluido

## 2021-08-24 ENCOUNTER — APPOINTMENT (OUTPATIENT)
Dept: OCCUPATIONAL THERAPY | Facility: CLINIC | Age: 66
End: 2021-08-24
Payer: MEDICARE

## 2021-08-27 ENCOUNTER — OFFICE VISIT (OUTPATIENT)
Dept: OCCUPATIONAL THERAPY | Facility: CLINIC | Age: 66
End: 2021-08-27
Payer: MEDICARE

## 2021-08-27 DIAGNOSIS — S54.01XA ULNAR NERVE DAMAGE, RIGHT, INITIAL ENCOUNTER: Primary | ICD-10-CM

## 2021-08-27 DIAGNOSIS — R29.898 RIGHT HAND WEAKNESS: ICD-10-CM

## 2021-08-27 DIAGNOSIS — M62.541 ATROPHY OF MUSCLE OF RIGHT HAND: ICD-10-CM

## 2021-08-27 PROCEDURE — 97110 THERAPEUTIC EXERCISES: CPT

## 2021-08-27 PROCEDURE — 97530 THERAPEUTIC ACTIVITIES: CPT

## 2021-08-27 NOTE — PROGRESS NOTES
Daily Note     Today's date: 2021  Patient name: Baltazar Honeycutt  : 1955  MRN: [id number]  Referring provider: Sylvie Hussein MD  Dx:   Encounter Diagnosis     ICD-10-CM    1  Ulnar nerve damage, right, initial encounter  S54  01XA    2  Right hand weakness  R29 898    3  Atrophy of muscle of right hand  M62 541                   Subjective:  (the hand) is ok  The splint definitely helps (with functional hand use)  Objective: See treatment diary below      Assessment: Tolerated treatment well  Patient demonstrated fatigue post treatment and would benefit from continued OT  Most difficulty is dexterity and in hand manipulation  Plan: Continue per plan of care  F/U with  next Wed       Precautions: Universal  HEP: TGE, Digit + thumb AROM      Manuals                                                        Neuro Re-Ed             NMES  (D/C)  10' - d/c 2* poor peripheral response    15' 10'  10' 10'   Reverse block             Grasp             opposition Key pegs w/ translation (was too difficult, completed without) *emphasis on avoiding full body compensation to encourage manipulation    Key pegs  Key pegs  Key pegs coins   Digit ab/ad x15 assisted, GE    marbles x25  marbles       dexterity                          Ther Ex             PROM wrist/digits 10 10' 10'  10 10' 10'  10' 10   Wrist exerciser     10x        Wrist ext  3#4x10  4# 3x10    3# 3x10  3# 3x10 3# 4x10   s/p       hammer 30x  hammer 30x hammer 3x10   RE        30'     Wrist flex 3# 4x10, assist for ulnar control  3# 4x10                                    Ther Activity             Functional grasp/ext     lg dowel 15x        In hand manipulation  Unable to transition peg; RB textured TB x2 FW and BW *with palm up* TB manipulation - AA from L hand prn   Timed key pegs 15 I&O, R only 6         Ortho fit and train  Anti claw splint re-fit, pegs with and without x2 @ 15    Anti claw splint, HB       Grasp & pinch        YP (for Hep) 10'  YPW 3x10                Modalities             MH 5' 5' 5'  5' 5' 5' 5' 5' 5'   Fluido

## 2021-08-31 ENCOUNTER — EVALUATION (OUTPATIENT)
Dept: OCCUPATIONAL THERAPY | Facility: CLINIC | Age: 66
End: 2021-08-31
Payer: MEDICARE

## 2021-08-31 DIAGNOSIS — M62.541 ATROPHY OF MUSCLE OF RIGHT HAND: ICD-10-CM

## 2021-08-31 DIAGNOSIS — S54.01XA ULNAR NERVE DAMAGE, RIGHT, INITIAL ENCOUNTER: Primary | ICD-10-CM

## 2021-08-31 DIAGNOSIS — R29.898 RIGHT HAND WEAKNESS: ICD-10-CM

## 2021-08-31 PROCEDURE — 97530 THERAPEUTIC ACTIVITIES: CPT

## 2021-08-31 PROCEDURE — 97110 THERAPEUTIC EXERCISES: CPT

## 2021-08-31 NOTE — PROGRESS NOTES
OT Discharge     Today's date: 2021  Patient name: Jolie James  : 1955  MRN: [id number]  Referring provider: Carolina Knowles MD  Dx:   Encounter Diagnosis     ICD-10-CM    1  Ulnar nerve damage, right, initial encounter  S54  01XA    2  Right hand weakness  R29 898    3  Atrophy of muscle of right hand  M62 541                   Assessment  Assessment details: Since last re-evaluation, Kg Castro has met 2 and partially met 2 STG  He has, in a modified capacity, achieved LTG est on IE improving his participation in ADL/IADL and leisure  He states he has also met his established personal goal of improving use of hand for cutting with knife/scissors  Both are "coming along " He continues with significant intrinsic weakness and loss of AROM, which seem to have plateau-ed in progress at the digital level  As demonstrated by below, coordination and function are improved objectively and subjectively by use of anti claw splint  At this time, recommend discharge from [address] with HEP and continued use of anti claw splint recommended unless otherwise indicated by physician  Impairments: abnormal muscle firing, abnormal or restricted ROM and impaired physical strength  Understanding of Dx/Px/POC: good   Prognosis: fair    Goals  STG) Motion increased by 25% in 4-6 weeks MET  STG) Strength/Motor skills improved by 25% in 4-6 weeks MET    NEW STG  1) Improve both pinch and grasp strength by 25% in 4-6 weeks PARTIALLY MET (pinch over )  2) Improve AROM by 15% in 4-6 weeks PARTIALLY MET (wrist over hand)  3) (I) with orthosis mgmt for function within 4-6 weeks MET  4) Improve tricep MMT by 1/2 grade  MET    LTG) ADL and IADL skills improved MET  LTG) Leisure skills improved MET      Goals, plan of care and treatment condition discussed with patient  Patient expresses their understanding and questions regarding these issues were addressed        Plan  Planned therapy interventions: home exercise program  Treatment plan discussed with: patient        Subjective Evaluation    History of Present Illness  Mechanism of injury:  cubital + carpal tunnel release R, he has a history of extensive weakness       D/C - "Use of scissors is way better and knife is coming along"  Pain  Current pain ratin  At best pain ratin  At worst pain ratin    Patient Goals  Patient goals for therapy: increased motion and increased strength  Patient goal: cutting with knife, scissors, etc  (due to both strength and knife) MET        Objective     Active Range of Motion     Right Elbow   Flexion: WFL  Extension: -5 degrees   Forearm supination: 75 degrees   Forearm pronation: 90 degrees WFL    Right Wrist   Wrist flexion: 55 degrees   Wrist extension: 60 degrees   Radial deviation: 20 degrees   Ulnar deviation: 0 degrees     Right Thumb   Flexion     MP: 60    DIP: 60  Extension     MP: 0    DIP: 0    Right Digits   Flexion   Index     MCP: 90    PIP: 105    DIP: 60  Middle     MCP: 90    PIP: 105    DIP: 65  Ring     MCP: 90    PIP: 110    DIP: 30  Little     MCP: 70    PIP: 75    DIP: 10  Extension   Index     MCP: -25    PIP: 0    DIP: 0  Middle     MCP: -60    PIP: -50    DIP: 0  Ring     MCP: -65    PIP: -55    DIP: 0  Little     MCP: -60    Additional Active Range of Motion Details  Neutral wrist achieved increasing arc of deviation to 20* actively   LF RF 20* arc of PIP/DIP extension  Trace abd/add IF,LR,RF  Min SF up to 10* active arc  Heavy compensation noted     Strength/Myotome Testing     Right Elbow   Extension: 3+  Forearm supination: 3+    Left Wrist/Hand      (2nd hand position)     Trial 1: 38 5    Thumb Strength  Key/Lateral Pinch     Trial 1: 6 3    Right Wrist/Hand      (2nd hand position)     Trial 1: 27    Trial 2: 24    Trial 3: 18    Thumb Strength   Key/Lateral Pinch     Trial 1: 6    Trial 2: 7    Trial 3: 6 2    Average: 6 4    Additional Strength Details  Min improvement in pinch, decreased  strength since last re-eval     Tests     Additional Tests Details  SW 3 61 R IF LF RF, 6 65 SF    RUE  Fine Motor Coordination  9 Hole Peg   Without anti claw 61 3 sec  With anti claw 59 sec             Precautions: Universal  HEP: TGE, Digit + thumb AROM  Precautions: Universal  HEP: TGE, Digit + thumb AROM      Manuals 8/17 8/19 8/27 8/31 7/26 7/28 8/2 8/4 8/9 8/11                                                       Neuro Re-Ed             NMES  (D/C)  10' - d/c 2* poor peripheral response    15' 10'  10' 10'   Reverse block             Grasp             opposition Key pegs w/ translation (was too difficult, completed without) *emphasis on avoiding full body compensation to encourage manipulation    Key pegs  Key pegs  Key pegs coins   Digit ab/ad x15 assisted, GE    marbles x25  marbles       dexterity                          Ther Ex             PROM wrist/digits 10 10' 10'  10 10' 10'  10' 10   Wrist exerciser     10x        Wrist ext  3#4x10  4# 3x10    3# 3x10  3# 3x10 3# 4x10   s/p       hammer 30x  hammer 30x hammer 3x10   RE    15'    30'     Wrist flex 3# 4x10, assist for ulnar control  3# 4x10                                    Ther Activity             Functional grasp/ext     lg dowel 15x        In hand manipulation  Unable to transition peg; RB textured TB x2 FW and BW *with palm up* TB manipulation - AA from L hand prn   Timed key pegs 15 I&O, R only 6         Ortho fit and train  Anti claw splint re-fit, pegs with and without x2 @ 15    Anti claw splint, HB       Grasp & pinch    15' 9 hole peg with and without anti claw, large pegs with and without anti claw, yellow clip large pegs in/out x2     YP (for Hep) 10'  YPW 3x10                Modalities             MH 5' 5' 5'  5' 5' 5' 5' 5' 5'   Fluido

## 2021-09-01 ENCOUNTER — OFFICE VISIT (OUTPATIENT)
Dept: OBGYN CLINIC | Facility: CLINIC | Age: 66
End: 2021-09-01

## 2021-09-01 VITALS
DIASTOLIC BLOOD PRESSURE: 74 MMHG | WEIGHT: 265 LBS | SYSTOLIC BLOOD PRESSURE: 122 MMHG | HEART RATE: 79 BPM | RESPIRATION RATE: 18 BRPM | BODY MASS INDEX: 42.59 KG/M2 | HEIGHT: 66 IN

## 2021-09-01 DIAGNOSIS — Z98.890 S/P CARPAL TUNNEL RELEASE: ICD-10-CM

## 2021-09-01 DIAGNOSIS — M62.541 ATROPHY OF MUSCLE OF RIGHT HAND: ICD-10-CM

## 2021-09-01 DIAGNOSIS — Z98.890 S/P CUBITAL TUNNEL RELEASE: Primary | ICD-10-CM

## 2021-09-01 PROCEDURE — 99024 POSTOP FOLLOW-UP VISIT: CPT | Performed by: SURGERY

## 2021-09-01 PROCEDURE — 1124F ACP DISCUSS-NO DSCNMKR DOCD: CPT | Performed by: SURGERY

## 2021-09-01 NOTE — PROGRESS NOTES
Assessment/Plan:  Patient ID: Gissel Carcamo 77 y o  male   Surgery: Release Cubital Tunnel - Right and Release Carpal Tunnel - Right  Date of Surgery: 7/6/2021    Michele Morton is doing overall very well, he has progressed functionally with OT and reports he is able to do most things with only minor difficulty  He does have little to no active extension of the long and ring fingers, unchanged from previous exam  We discussed possible further evaluation of the extensor tendons at his last visit, however patient would like to live with his hand the way it is and see how it goes  He will follow up with us in the future if need be  Activity as tolerated  Continue HEP    Follow Up:  PRN    To Do Next Visit:         CHIEF COMPLAINT:  Chief Complaint   Patient presents with    Right Wrist - Follow-up    Right Elbow - Follow-up         SUBJECTIVE:  Gissel Carcamo is a 77y o  year old male who presents for follow up after Release Cubital Tunnel - Right and Release Carpal Tunnel - Right  Today patient has no pain and minimal paresthesias  He has been doing OT since his last visit with us and reports significant functional improvement  He and his therapist do feel that he has plateaued in his progress and he has been discharged to a HEP  Overall he is happy with his progress         PHYSICAL EXAMINATION:  General: well developed and well nourished, alert, oriented times 3 and appears comfortable  Psychiatric: Normal    MUSCULOSKELETAL EXAMINATION:  Incision: healed  Surgery Site: normal, no evidence of infection   Range of Motion: full passive ROM of the digits, full composite fist possible, no active extension of the long and ring fingers  Neurovascular status: sensation intact, cap refill appropriate  Activity Restrictions: No restrictions         STUDIES REVIEWED:  No Studies to review      PROCEDURES PERFORMED:  Procedures  No Procedures performed today      Uzair Pugh PA-C

## 2021-09-10 ENCOUNTER — APPOINTMENT (OUTPATIENT)
Dept: LAB | Facility: HOSPITAL | Age: 66
End: 2021-09-10
Attending: SPECIALIST
Payer: MEDICARE

## 2021-09-10 DIAGNOSIS — Z01.818 PREOPERATIVE CLEARANCE: ICD-10-CM

## 2021-09-10 LAB
BASOPHILS # BLD AUTO: 0.03 THOUSANDS/ΜL (ref 0–0.1)
BASOPHILS NFR BLD AUTO: 0 % (ref 0–1)
EOSINOPHIL # BLD AUTO: 0.18 THOUSAND/ΜL (ref 0–0.61)
EOSINOPHIL NFR BLD AUTO: 2 % (ref 0–6)
ERYTHROCYTE [DISTWIDTH] IN BLOOD BY AUTOMATED COUNT: 15.1 % (ref 11.6–15.1)
HCT VFR BLD AUTO: 46.2 % (ref 36.5–49.3)
HGB BLD-MCNC: 14.7 G/DL (ref 12–17)
IMM GRANULOCYTES # BLD AUTO: 0.04 THOUSAND/UL (ref 0–0.2)
IMM GRANULOCYTES NFR BLD AUTO: 0 % (ref 0–2)
LYMPHOCYTES # BLD AUTO: 1.15 THOUSANDS/ΜL (ref 0.6–4.47)
LYMPHOCYTES NFR BLD AUTO: 12 % (ref 14–44)
MCH RBC QN AUTO: 29.1 PG (ref 26.8–34.3)
MCHC RBC AUTO-ENTMCNC: 31.8 G/DL (ref 31.4–37.4)
MCV RBC AUTO: 92 FL (ref 82–98)
MONOCYTES # BLD AUTO: 0.82 THOUSAND/ΜL (ref 0.17–1.22)
MONOCYTES NFR BLD AUTO: 9 % (ref 4–12)
NEUTROPHILS # BLD AUTO: 7.43 THOUSANDS/ΜL (ref 1.85–7.62)
NEUTS SEG NFR BLD AUTO: 77 % (ref 43–75)
NRBC BLD AUTO-RTO: 0 /100 WBCS
PLATELET # BLD AUTO: 280 THOUSANDS/UL (ref 149–390)
PMV BLD AUTO: 11.4 FL (ref 8.9–12.7)
RBC # BLD AUTO: 5.05 MILLION/UL (ref 3.88–5.62)
WBC # BLD AUTO: 9.65 THOUSAND/UL (ref 4.31–10.16)

## 2021-09-10 PROCEDURE — 85025 COMPLETE CBC W/AUTO DIFF WBC: CPT

## 2021-09-10 PROCEDURE — 36415 COLL VENOUS BLD VENIPUNCTURE: CPT

## 2021-09-13 RX ORDER — CEPHALEXIN 500 MG/1
500 CAPSULE ORAL EVERY 12 HOURS SCHEDULED
COMMUNITY
End: 2022-03-04

## 2021-09-13 NOTE — PRE-PROCEDURE INSTRUCTIONS
My Surgical Experience    The following information was developed to assist you to prepare for your operation  What do I need to do before coming to the hospital?   Arrange for a responsible person to drive you to and from the hospital    Arrange care for your children at home  Children are not allowed in the recovery areas of the hospital   Plan to wear clothing that is easy to put on and take off  If you are having shoulder surgery, wear a shirt that buttons or zippers in the front  Bathing  o Shower the evening before and the morning of your surgery with an antibacterial soap  Please refer to the Pre Op Showering Instructions for Surgery Patients Sheet   o Remove nail polish and all body piercing jewelry  o Do not shave any body part for at least 24 hours before surgery-this includes face, arms, legs and upper body  Food  o Nothing to eat or drink after midnight the night before your surgery  This includes candy and chewing gum  o Exception: If your surgery is after 12:00pm (noon), you may have clear liquids such as 7-Up®, ginger ale, apple or cranberry juice, Jell-O®, water, or clear broth until 8:00 am  o Do not drink milk or juice with pulp on the morning before surgery  o Do not drink alcohol 24 hours before surgery  Medicine  o Follow instructions you received from your surgeon about which medicines you may take on the day of surgery  o If instructed to take medicine on the morning of surgery, take pills with just a small sip of water  Call your prescribing doctor for specific infroamtion on what to do if you take insulin    What should I bring to the hospital?    Bring:  Virginia Vu or a walker, if you have them, for foot or knee surgery   A list of the daily medicines, vitamins, minerals, herbals and nutritional supplements you take   Include the dosages of medicines and the time you take them each day   Glasses, dentures or hearing aids   Minimal clothing; you will be wearing hospital sleepwear   Photo ID; required to verify your identity   If you have a Living Will or Power of , bring a copy of the documents   If you have an ostomy, bring an extra pouch and any supplies you use    Do not bring   Medicines or inhalers   Money, valuables or jewelry    What other information should I know about the day of surgery?  Notify your surgeons if you develop a cold, sore throat, cough, fever, rash or any other illness   Report to the Ambulatory Surgical/Same Day Surgery Unit   You will be instructed to stop at Registration only if you have not been pre-registered   Inform your  fi they do not stay that they will be asked by the staff to leave a phone number where they can be reached   Be available to be reached before surgery  In the event the operating room schedule changes, you may be asked to come in earlier or later than expected    *It is important to tell your doctor and others involved in your health care if you are taking or have been taking any non-prescription drugs, vitamins, minerals, herbals or other nutritional supplements  Any of these may interact with some food or medicines and cause a reaction      Pre-Surgery Instructions:   Medication Instructions    amLODIPine-benazepril (LOTREL 5-20) 5-20 MG per capsule Instructed patient per Anesthesia Guidelines   cephalexin (KEFLEX) 500 mg capsule Instructed patient per Anesthesia Guidelines   glimepiride (AMARYL) 2 mg tablet Instructed patient per Anesthesia Guidelines   pioglitazone-metFORMIN (ACTOPLUS MET)  MG per tablet Instructed patient per Anesthesia Guidelines  To take lotrel a m  of surgery

## 2021-09-15 ENCOUNTER — ANESTHESIA EVENT (OUTPATIENT)
Dept: PERIOP | Facility: HOSPITAL | Age: 66
End: 2021-09-15
Payer: MEDICARE

## 2021-09-15 NOTE — H&P
H&P Exam - Urology       Patient: Baltazar Honeycutt   : 1955 Sex: male   MRN: [id number]     CSN: [id number]      History of Present Illness   HPI:  Baltazar Honeycutt is a 77 y o  male who presents with worsening left scrotal swelling and pain for the last few years found to have tense large 500 cc left spermatocele  Patient on initial evaluation also had open draining left scrotal abscess now healing with secondary granulation tissue        Review of Systems:   Constitutional:  Negative for activity change, fever, chills and diaphoresis  HENT: Negative for hearing loss and trouble swallowing  Eyes: Negative for itching and visual disturbance  Respiratory: Negative for chest tightness and shortness of breath  Cardiovascular: Negative for chest pain, edema  Gastrointestinal: Negative for abdominal distention, na abdominal pain, constipation, diarrhea, Nausea and vomiting  Genitourinary: Negative for decreased urine volume, difficulty urinating, dysuria, enuresis, frequency, hematuria and urgency  Musculoskeletal: Negative for gait problem and myalgias  Neurological: Negative for dizziness and headaches  Hematological: Does not bruise/bleed easily         Historical Information   Past Medical History:   Diagnosis Date    Diabetes mellitus ([facility] [address] )     Hypertension      Past Surgical History:   Procedure Laterality Date    CARPAL TUNNEL RELEASE      EYE SURGERY      strabibusmus    HAND SURGERY      left index finger    AK REVISE MEDIAN N/CARPAL TUNNEL SURG Right 2021    Procedure: RELEASE CARPAL TUNNEL;  Surgeon: Delores Pizano MD;  [facility] MA[facility];  Service: Orthopedics    AK REVISE ULNAR NERVE AT ELBOW Right 2021    Procedure: RELEASE CUBITAL TUNNEL;  Surgeon: Delores Pizano MD;  Location: [facility] MAIN [facility];  Service: Orthopedics     Social History   Social History     Substance and Sexual Activity   Alcohol Use Yes    Comment: social     Social History     Substance and Sexual Activity   Drug Use Not Currently     Social History     Tobacco Use   Smoking Status Former Smoker    Packs/day: 1 00    Years: 47 00    Pack years: 47 00    Types: Cigarettes    Quit date: 2021    Years since quittin 6   Smokeless Tobacco Never Used   Tobacco Comment    advised not to smoke     Family History:   Family History   Problem Relation Age of Onset    Diabetes Mother     Diabetes Father        Meds/Allergies   No medications prior to admission  No Known Allergies    Objective   Vitals: There were no vitals taken for this visit  Physical Exam:  General Alert awake   Normocephalic atraumatic PERRLA  Lungs clear bilaterally  Cardiac normal S1 normal S2  Abdomen soft, flank pain  Normal right testicle  Tense left hemiscrotum  2 cm opened subcu granulating tissue  Extremities no edema    No intake/output data recorded      Invasive Devices     None                     Lab Results: CBC:   Lab Results   Component Value Date    WBC 9 65 09/10/2021    HGB 14 7 09/10/2021    HCT 46 2 09/10/2021    MCV 92 09/10/2021     09/10/2021    MCH 29 1 09/10/2021    MCHC 31 8 09/10/2021    RDW 15 1 09/10/2021    MPV 11 4 09/10/2021    NRBC 0 09/10/2021     CMP:   Lab Results   Component Value Date     (H) 2021    CO2 24 2021    BUN 35 (H) 2021    CREATININE 1 21 2021    CALCIUM 9 6 2021    EGFR 62 2021     Urinalysis: No results found for: Tacy Serve, SPECGRAV, PHUR, LEUKOCYTESUR, NITRITE, PROTEINUA, GLUCOSEU, KETONESU, BILIRUBINUR, BLOODU  Urine Culture: No results found for: URINECX  PSA:   Lab Results   Component Value Date    PSA 1 3 2021           Assessment/ Plan:  Left spermatocele ectomy left scrotal debridement      Lisa Seth MD

## 2021-09-16 ENCOUNTER — HOSPITAL ENCOUNTER (OUTPATIENT)
Facility: HOSPITAL | Age: 66
Setting detail: OUTPATIENT SURGERY
Discharge: HOME | End: 2021-09-16
Attending: SPECIALIST | Admitting: SPECIALIST
Payer: MEDICARE

## 2021-09-16 ENCOUNTER — ANESTHESIA (OUTPATIENT)
Dept: PERIOP | Facility: HOSPITAL | Age: 66
End: 2021-09-16
Payer: MEDICARE

## 2021-09-16 VITALS
BODY MASS INDEX: 41.75 KG/M2 | WEIGHT: 259.8 LBS | DIASTOLIC BLOOD PRESSURE: 69 MMHG | TEMPERATURE: 98.1 F | RESPIRATION RATE: 18 BRPM | SYSTOLIC BLOOD PRESSURE: 128 MMHG | HEART RATE: 77 BPM | HEIGHT: 66 IN | OXYGEN SATURATION: 94 %

## 2021-09-16 DIAGNOSIS — N43.40 SPERMATOCELE OF EPIDIDYMIS, UNSPECIFIED: ICD-10-CM

## 2021-09-16 LAB — GLUCOSE SERPL-MCNC: 115 MG/DL (ref 65–140)

## 2021-09-16 PROCEDURE — 88302 TISSUE EXAM BY PATHOLOGIST: CPT | Performed by: PATHOLOGY

## 2021-09-16 PROCEDURE — 82948 REAGENT STRIP/BLOOD GLUCOSE: CPT

## 2021-09-16 RX ORDER — HYDROMORPHONE HCL/PF 1 MG/ML
0.5 SYRINGE (ML) INJECTION
Status: DISCONTINUED | OUTPATIENT
Start: 2021-09-16 | End: 2021-09-16 | Stop reason: HOSPADM

## 2021-09-16 RX ORDER — PROPOFOL 10 MG/ML
INJECTION, EMULSION INTRAVENOUS AS NEEDED
Status: DISCONTINUED | OUTPATIENT
Start: 2021-09-16 | End: 2021-09-16

## 2021-09-16 RX ORDER — FENTANYL CITRATE 50 UG/ML
INJECTION, SOLUTION INTRAMUSCULAR; INTRAVENOUS AS NEEDED
Status: DISCONTINUED | OUTPATIENT
Start: 2021-09-16 | End: 2021-09-16

## 2021-09-16 RX ORDER — MIDAZOLAM HYDROCHLORIDE 2 MG/2ML
INJECTION, SOLUTION INTRAMUSCULAR; INTRAVENOUS AS NEEDED
Status: DISCONTINUED | OUTPATIENT
Start: 2021-09-16 | End: 2021-09-16

## 2021-09-16 RX ORDER — MAGNESIUM HYDROXIDE 1200 MG/15ML
LIQUID ORAL AS NEEDED
Status: DISCONTINUED | OUTPATIENT
Start: 2021-09-16 | End: 2021-09-16 | Stop reason: HOSPADM

## 2021-09-16 RX ORDER — ONDANSETRON 2 MG/ML
4 INJECTION INTRAMUSCULAR; INTRAVENOUS ONCE AS NEEDED
Status: DISCONTINUED | OUTPATIENT
Start: 2021-09-16 | End: 2021-09-16 | Stop reason: HOSPADM

## 2021-09-16 RX ORDER — CEFAZOLIN SODIUM 2 G/50ML
2000 SOLUTION INTRAVENOUS ONCE
Status: COMPLETED | OUTPATIENT
Start: 2021-09-16 | End: 2021-09-16

## 2021-09-16 RX ORDER — ONDANSETRON 2 MG/ML
INJECTION INTRAMUSCULAR; INTRAVENOUS AS NEEDED
Status: DISCONTINUED | OUTPATIENT
Start: 2021-09-16 | End: 2021-09-16

## 2021-09-16 RX ORDER — DEXAMETHASONE SODIUM PHOSPHATE 10 MG/ML
INJECTION, SOLUTION INTRAMUSCULAR; INTRAVENOUS AS NEEDED
Status: DISCONTINUED | OUTPATIENT
Start: 2021-09-16 | End: 2021-09-16

## 2021-09-16 RX ORDER — LIDOCAINE HYDROCHLORIDE 10 MG/ML
INJECTION, SOLUTION EPIDURAL; INFILTRATION; INTRACAUDAL; PERINEURAL AS NEEDED
Status: DISCONTINUED | OUTPATIENT
Start: 2021-09-16 | End: 2021-09-16 | Stop reason: HOSPADM

## 2021-09-16 RX ORDER — LIDOCAINE HYDROCHLORIDE 10 MG/ML
INJECTION, SOLUTION EPIDURAL; INFILTRATION; INTRACAUDAL; PERINEURAL AS NEEDED
Status: DISCONTINUED | OUTPATIENT
Start: 2021-09-16 | End: 2021-09-16

## 2021-09-16 RX ORDER — MEPERIDINE HYDROCHLORIDE 25 MG/ML
12.5 INJECTION INTRAMUSCULAR; INTRAVENOUS; SUBCUTANEOUS
Status: DISCONTINUED | OUTPATIENT
Start: 2021-09-16 | End: 2021-09-16 | Stop reason: HOSPADM

## 2021-09-16 RX ORDER — FENTANYL CITRATE/PF 50 MCG/ML
25 SYRINGE (ML) INJECTION
Status: DISCONTINUED | OUTPATIENT
Start: 2021-09-16 | End: 2021-09-16 | Stop reason: HOSPADM

## 2021-09-16 RX ORDER — SODIUM CHLORIDE, SODIUM LACTATE, POTASSIUM CHLORIDE, CALCIUM CHLORIDE 600; 310; 30; 20 MG/100ML; MG/100ML; MG/100ML; MG/100ML
125 INJECTION, SOLUTION INTRAVENOUS CONTINUOUS
Status: DISCONTINUED | OUTPATIENT
Start: 2021-09-16 | End: 2021-09-16 | Stop reason: HOSPADM

## 2021-09-16 RX ADMIN — DEXAMETHASONE SODIUM PHOSPHATE 4 MG: 10 INJECTION, SOLUTION INTRAMUSCULAR; INTRAVENOUS at 08:47

## 2021-09-16 RX ADMIN — CEFAZOLIN SODIUM 2000 MG: 2 SOLUTION INTRAVENOUS at 08:48

## 2021-09-16 RX ADMIN — FENTANYL CITRATE 50 MCG: 50 INJECTION, SOLUTION INTRAMUSCULAR; INTRAVENOUS at 09:01

## 2021-09-16 RX ADMIN — FENTANYL CITRATE 25 MCG: 50 INJECTION, SOLUTION INTRAMUSCULAR; INTRAVENOUS at 08:42

## 2021-09-16 RX ADMIN — SODIUM CHLORIDE, SODIUM LACTATE, POTASSIUM CHLORIDE, AND CALCIUM CHLORIDE: .6; .31; .03; .02 INJECTION, SOLUTION INTRAVENOUS at 09:12

## 2021-09-16 RX ADMIN — MIDAZOLAM 1 MG: 1 INJECTION INTRAMUSCULAR; INTRAVENOUS at 08:27

## 2021-09-16 RX ADMIN — SODIUM CHLORIDE, SODIUM LACTATE, POTASSIUM CHLORIDE, AND CALCIUM CHLORIDE: .6; .31; .03; .02 INJECTION, SOLUTION INTRAVENOUS at 08:33

## 2021-09-16 RX ADMIN — MIDAZOLAM 1 MG: 1 INJECTION INTRAMUSCULAR; INTRAVENOUS at 08:38

## 2021-09-16 RX ADMIN — ONDANSETRON 4 MG: 2 INJECTION INTRAMUSCULAR; INTRAVENOUS at 08:47

## 2021-09-16 RX ADMIN — FENTANYL CITRATE 25 MCG: 50 INJECTION, SOLUTION INTRAMUSCULAR; INTRAVENOUS at 09:18

## 2021-09-16 RX ADMIN — FENTANYL CITRATE 25 MCG: 50 INJECTION, SOLUTION INTRAMUSCULAR; INTRAVENOUS at 08:49

## 2021-09-16 RX ADMIN — PROPOFOL 150 MG: 10 INJECTION, EMULSION INTRAVENOUS at 08:37

## 2021-09-16 RX ADMIN — LIDOCAINE HYDROCHLORIDE 50 MG: 10 INJECTION, SOLUTION EPIDURAL; INFILTRATION; INTRACAUDAL; PERINEURAL at 08:37

## 2021-09-16 NOTE — OP NOTE
OPERATIVE REPORT  PATIENT NAME: Gregg Cox    :  1955  MRN: [id number]  Pt Location: [facility] OR ROOM 04    SURGERY DATE: 2021    Surgeon(s) and Role:     * Brandon Slade MD - Primary    Preop Diagnosis:  Spermatocele of epididymis, unspecified [N43 40]    Post-Op Diagnosis Codes:  Left hydrocele  Left scrotal    Procedure(s) (LRB):  HYDROCELECTOMY, SCROTAL DEBRIDEMENT (Left)    Specimen(s):  ID Type Source Tests Collected by Time Destination   1 : Hydrocele Sac Tissue Hydrocele Sac TISSUE EXAM Brandon Slade MD 2021 [id number]        Estimated Blood Loss:   Minimal    Drains:  Open Drain Left Groin (Active)   Number of days: 0       Anesthesia Type:   Choice    Operative Indications: This 23-year-old male seen in the office last week for significant worsening left scrotal pain and swelling found to have a tense hemiscrotum on ultrasound told to have spermatocele as well as open granulating scrotal carbuncle  Patient stated that some week before he had severe pain swelling and pus in the hemiscrotum patient was started on Keflex and return to the office yesterday now noting healing granulating lower inferior scrotal caruncle patient now wishes to undergo left spermatocele ectomy    FINDINGS  LARGE 700 CC LEFT HYDROCELE NO OBVIOUS SPERMATOCELE AS INDICATED PREVIOUSLY ON ULTRASOUND SMALL 1 CM CARUNCLE GRANULATING IN SLIGHTLY DEBRIDED DRAIN PLACED    Complications:   None    Procedure and Technique:  After the patient identified in the holding area left side marked consent signed he was placed in the op suite after anesthesia was induced he was placed thigh position draped prep standard fashion  Time-out performed  A 4 x 4 with Ancef irrigation placed over the 1 cm granulating conchal and covered with a sterile dressing time-out performed 3 cm incision made in the left hemiscrotum carried down through subcu identifying the blue dome hydrocele    Blunt dissection carried out surrounding the hydrocele in dissecting the cremasteric fibers were bluntly dissected and left in site 2 in the left hemiscrotum the blue dome cyst was then brought through the incision site opened 7 cc of hydrocele fluid was drained the sac was completely opened there was no spermatocele present  Hydrocele sac was sent to pathology the hydrocele sac was then completely invaginated around the epididymis and a bile capping technique sutured underneath the epididymis to prevent recurrence with 2-0 Vicryl running suture hemostasis by cautery    Copious amounts of Ancef irrigation Will placed into the incision site the scrotal caruncle the granulating did appear to be opened into the left hemiscrotum a quarter-inch drain was then placed through this opening into the left hemiscrotum the testicle was then dropped back down into normal position the scrotal fascia was closed with a 2-0 Vicryl running suture the skin was closed with a 2-0 chromic interrupted suture the drain was sutured into position with 2-0 chromic sterile dressing applied postop procedure was awakened taken recovery room stable condition   I was present for the entire procedure    Patient Disposition:  PACU     SIGNATURE: Karin Sotelo MD  DATE: September 16, 2021  TIME: 10:30 AM

## 2021-09-16 NOTE — ANESTHESIA POSTPROCEDURE EVALUATION
Post-Op Assessment Note    CV Status:  Stable  Pain Score: 0    Pain management: adequate     Mental Status:  Awake   Hydration Status:  Stable   PONV Controlled:  None   Airway Patency:  Patent      Post Op Vitals Reviewed: Yes      Staff: CRNA         No complications documented      BP      Temp 98 1 °F (36 7 °C) (09/16/21 1015)    Pulse     Resp      SpO2

## 2021-09-16 NOTE — DISCHARGE INSTRUCTIONS
#1 no heavy straining or lifting above 10 pounds for 2 weeks    #2 call office fevers, chills, or worsening blood in the urine  #3 Patient has follow-up Monday September 27th 9:30 p m  To remove drain no bathtub can shower on Saturday      Gwyn CLEMONS  [address] [facility] office  [address] [place] [address] [facility]  [phone]  8:30 AM to 4:30 PM  Monday through Friday    [place] office  [phone] [address]  [address]  [phone]  1:00 to 5:00 PM  Wednesday

## 2021-09-16 NOTE — PERIOPERATIVE NURSING NOTE
Pt d/c to home at this time w/friend sharonda,  Via w/c  Pt left with all belongings  Iv was D/C intact with dry sterile dressing, sEncouraged to keep follow up appointments, Verbalized understanding  D/C instructions reviewed and explained with patient and family member  Verbalized understanding  New Rx given and  explained, Verbalized understanding

## 2021-09-16 NOTE — ANESTHESIA POSTPROCEDURE EVALUATION
Post-Op Assessment Note    CV Status:  Stable  Pain Score: 0    Pain management: adequate     Mental Status:  Awake   Hydration Status:  Stable   PONV Controlled:  None   Airway Patency:  Patent      Post Op Vitals Reviewed: Yes      Staff: CRNA   Comments: spontaneousl breathing, HOB @ 30 degrees, vss, simple maask to O2, fully endorsed to recovery w/o AC        No complications documented      BP      Temp 98 1 °F (36 7 °C) (09/16/21 1015)    Pulse     Resp      SpO2

## 2021-09-16 NOTE — PROGRESS NOTES
Progress Note - Urology      Patient: Gregg Cox   : 1955 Sex: male   MRN: [id number]     CSN: [id number]  Unit/Bed#: OR POOL     SUBJECTIVE:   In preop area  No change to history/physical  Aware infection, bleeding, additional procedure      Objective   Vitals: There were no vitals taken for this visit  No intake/output data recorded        Physical Exam:   General Alert awake   Normocephalic atraumatic PERRLA  Lungs clear bilaterally  Cardiac normal S1 normal S2  Abdomen soft, flank pain  Lt spermocele    Extremities no edema      Lab Results: CBC:   Lab Results   Component Value Date    WBC 9 65 09/10/2021    HGB 14 7 09/10/2021    HCT 46 2 09/10/2021    MCV 92 09/10/2021     09/10/2021    MCH 29 1 09/10/2021    MCHC 31 8 09/10/2021    RDW 15 1 09/10/2021    MPV 11 4 09/10/2021    NRBC 0 09/10/2021     CMP:   Lab Results   Component Value Date     (H) 2021    CO2 24 2021    BUN 35 (H) 2021    CREATININE 1 21 2021    CALCIUM 9 6 2021    EGFR 62 2021     Urinalysis: No results found for: Raguel Haff, SPECGRAV, PHUR, LEUKOCYTESUR, NITRITE, PROTEINUA, GLUCOSEU, KETONESU, BILIRUBINUR, BLOODU  Urine Culture: No results found for: URINECX  PSA:   Lab Results   Component Value Date    PSA 1 3 2021         Assessment/ Plan:  Left spermatocelectomy scrotal debridement/          Brandon Slade MD

## 2021-09-16 NOTE — PERIOPERATIVE NURSING NOTE
Patient received from  in room 2, patient is alert and awake, VSS, no distress noted, penrose drain intact in surgical site, draining serosanguinous drainage, sutures intact, patient tolerating PO fluids, call bell placed in reach,   Will continue to monitor until discharge criteria is met

## 2022-03-04 ENCOUNTER — APPOINTMENT (EMERGENCY)
Dept: ULTRASOUND IMAGING | Facility: HOSPITAL | Age: 67
End: 2022-03-04
Payer: MEDICARE

## 2022-03-04 ENCOUNTER — APPOINTMENT (EMERGENCY)
Dept: CT IMAGING | Facility: HOSPITAL | Age: 67
End: 2022-03-04
Payer: MEDICARE

## 2022-03-04 ENCOUNTER — HOSPITAL ENCOUNTER (EMERGENCY)
Facility: HOSPITAL | Age: 67
Discharge: HOME | End: 2022-03-04
Attending: EMERGENCY MEDICINE
Payer: MEDICARE

## 2022-03-04 VITALS
SYSTOLIC BLOOD PRESSURE: 145 MMHG | OXYGEN SATURATION: 95 % | HEART RATE: 82 BPM | RESPIRATION RATE: 18 BRPM | TEMPERATURE: 98.1 F | DIASTOLIC BLOOD PRESSURE: 69 MMHG

## 2022-03-04 DIAGNOSIS — R10.9 ABDOMINAL PAIN: Primary | ICD-10-CM

## 2022-03-04 DIAGNOSIS — R10.9 FLANK PAIN: ICD-10-CM

## 2022-03-04 LAB
ALBUMIN SERPL BCP-MCNC: 3.5 G/DL (ref 3.5–5)
ALP SERPL-CCNC: 82 U/L (ref 46–116)
ALT SERPL W P-5'-P-CCNC: 28 U/L (ref 12–78)
ANION GAP SERPL CALCULATED.3IONS-SCNC: 9 MMOL/L (ref 4–13)
AST SERPL W P-5'-P-CCNC: 27 U/L (ref 5–45)
BACTERIA UR QL AUTO: NORMAL /HPF
BASOPHILS # BLD AUTO: 0.03 THOUSANDS/ΜL (ref 0–0.1)
BASOPHILS NFR BLD AUTO: 0 % (ref 0–1)
BILIRUB SERPL-MCNC: 0.53 MG/DL (ref 0.2–1)
BILIRUB UR QL STRIP: NEGATIVE
BUN SERPL-MCNC: 14 MG/DL (ref 5–25)
CALCIUM SERPL-MCNC: 10.1 MG/DL (ref 8.3–10.1)
CHLORIDE SERPL-SCNC: 105 MMOL/L (ref 100–108)
CLARITY UR: CLEAR
CO2 SERPL-SCNC: 25 MMOL/L (ref 21–32)
COLOR UR: YELLOW
CREAT SERPL-MCNC: 0.97 MG/DL (ref 0.6–1.3)
EOSINOPHIL # BLD AUTO: 0.11 THOUSAND/ΜL (ref 0–0.61)
EOSINOPHIL NFR BLD AUTO: 1 % (ref 0–6)
ERYTHROCYTE [DISTWIDTH] IN BLOOD BY AUTOMATED COUNT: 14.7 % (ref 11.6–15.1)
GFR SERPL CREATININE-BSD FRML MDRD: 79 ML/MIN/1.73SQ M
GLUCOSE SERPL-MCNC: 131 MG/DL (ref 65–140)
GLUCOSE UR STRIP-MCNC: NEGATIVE MG/DL
HCT VFR BLD AUTO: 46.4 % (ref 36.5–49.3)
HGB BLD-MCNC: 15.7 G/DL (ref 12–17)
HGB UR QL STRIP.AUTO: NEGATIVE
IMM GRANULOCYTES # BLD AUTO: 0.02 THOUSAND/UL (ref 0–0.2)
IMM GRANULOCYTES NFR BLD AUTO: 0 % (ref 0–2)
KETONES UR STRIP-MCNC: NEGATIVE MG/DL
LEUKOCYTE ESTERASE UR QL STRIP: NEGATIVE
LIPASE SERPL-CCNC: 41 U/L (ref 73–393)
LYMPHOCYTES # BLD AUTO: 1.07 THOUSANDS/ΜL (ref 0.6–4.47)
LYMPHOCYTES NFR BLD AUTO: 11 % (ref 14–44)
MCH RBC QN AUTO: 29.2 PG (ref 26.8–34.3)
MCHC RBC AUTO-ENTMCNC: 33.8 G/DL (ref 31.4–37.4)
MCV RBC AUTO: 86 FL (ref 82–98)
MONOCYTES # BLD AUTO: 0.79 THOUSAND/ΜL (ref 0.17–1.22)
MONOCYTES NFR BLD AUTO: 8 % (ref 4–12)
NEUTROPHILS # BLD AUTO: 7.7 THOUSANDS/ΜL (ref 1.85–7.62)
NEUTS SEG NFR BLD AUTO: 80 % (ref 43–75)
NITRITE UR QL STRIP: NEGATIVE
NON-SQ EPI CELLS URNS QL MICRO: NORMAL /HPF
NRBC BLD AUTO-RTO: 0 /100 WBCS
PH UR STRIP.AUTO: 6 [PH]
PLATELET # BLD AUTO: 297 THOUSANDS/UL (ref 149–390)
PMV BLD AUTO: 10.8 FL (ref 8.9–12.7)
POTASSIUM SERPL-SCNC: 5 MMOL/L (ref 3.5–5.3)
PROT SERPL-MCNC: 7.2 G/DL (ref 6.4–8.2)
PROT UR STRIP-MCNC: ABNORMAL MG/DL
RBC # BLD AUTO: 5.38 MILLION/UL (ref 3.88–5.62)
RBC #/AREA URNS AUTO: NORMAL /HPF
SODIUM SERPL-SCNC: 139 MMOL/L (ref 136–145)
SP GR UR STRIP.AUTO: >=1.03 (ref 1–1.03)
UROBILINOGEN UR QL STRIP.AUTO: 0.2 E.U./DL
WBC # BLD AUTO: 9.72 THOUSAND/UL (ref 4.31–10.16)
WBC #/AREA URNS AUTO: NORMAL /HPF

## 2022-03-04 PROCEDURE — 74176 CT ABD & PELVIS W/O CONTRAST: CPT

## 2022-03-04 PROCEDURE — 36415 COLL VENOUS BLD VENIPUNCTURE: CPT

## 2022-03-04 PROCEDURE — 85025 COMPLETE CBC W/AUTO DIFF WBC: CPT

## 2022-03-04 PROCEDURE — 80053 COMPREHEN METABOLIC PANEL: CPT

## 2022-03-04 PROCEDURE — 83690 ASSAY OF LIPASE: CPT

## 2022-03-04 PROCEDURE — 81001 URINALYSIS AUTO W/SCOPE: CPT

## 2022-03-04 PROCEDURE — G1004 CDSM NDSC: HCPCS

## 2022-03-04 PROCEDURE — 99284 EMERGENCY DEPT VISIT MOD MDM: CPT | Performed by: EMERGENCY MEDICINE

## 2022-03-04 PROCEDURE — 99284 EMERGENCY DEPT VISIT MOD MDM: CPT

## 2022-03-04 PROCEDURE — 93005 ELECTROCARDIOGRAM TRACING: CPT

## 2022-03-04 PROCEDURE — 76705 ECHO EXAM OF ABDOMEN: CPT

## 2022-03-04 RX ORDER — METHOCARBAMOL 500 MG/1
500 TABLET, FILM COATED ORAL 2 TIMES DAILY PRN
Qty: 20 TABLET | Refills: 0 | Status: SHIPPED | OUTPATIENT
Start: 2022-03-04

## 2022-03-04 RX ORDER — KETOROLAC TROMETHAMINE 30 MG/ML
15 INJECTION, SOLUTION INTRAMUSCULAR; INTRAVENOUS ONCE
Status: DISCONTINUED | OUTPATIENT
Start: 2022-03-04 | End: 2022-03-04 | Stop reason: HOSPADM

## 2022-03-04 RX ORDER — IBUPROFEN 600 MG/1
600 TABLET ORAL EVERY 6 HOURS PRN
Qty: 20 TABLET | Refills: 0 | Status: SHIPPED | OUTPATIENT
Start: 2022-03-04 | End: 2022-03-09

## 2022-03-04 NOTE — ED PROVIDER NOTES
History  Chief Complaint   Patient presents with    Groin Pain     Pt arrives via EMS from home for groin pain since Wednesday morning  Pt reports waking up to go to the bathroom at 430, stepped out of bed and got a sharp right sided groin pain, radiated down his leg, and even into his lower back  Pt reports pain comes and goes  Reports drinking a lot of ice tea and coffee  Pt reports his urine is darker than normal and his stream is not as strong  43-year-old male with past medical history of diabetes, hypertension, left spermatocele excision, who came in to the emergency department because of the right groin pain  He felt the pain in his right groin area going up to the right side of the abdomen and feeling discomfort in the right flank as well  Before that he was feeling sensation of pressure in his lower back  His pain started around a week ago, when he woke up around 4:00 a m  To go to the bathroom and that is when he felt this sharp pain  The pain subsided by itself after a while so the patient did not seek help  Last night the same happen and the patient decided to come to the emergency department to get checked out  He says that the pain sensation is not completely gone and is still there, but is not bothering him right now  Prior to Admission Medications   Prescriptions Last Dose Informant Patient Reported? Taking?    amLODIPine-benazepril (LOTREL 5-20) 5-20 MG per capsule 3/4/2022 at Unknown time Self Yes Yes   Sig: Take 1 capsule by mouth daily   glimepiride (AMARYL) 2 mg tablet 3/4/2022 at Unknown time Self Yes Yes   Sig: daily at bedtime    pioglitazone-metFORMIN (ACTOPLUS MET)  MG per tablet 3/4/2022 at Unknown time Self Yes Yes   Sig: Take 1 tablet by mouth 2 (two) times a day      Facility-Administered Medications: None       Past Medical History:   Diagnosis Date    Diabetes mellitus ([facility] [address] )     Hypertension        Past Surgical History:   Procedure Laterality Date    CARPAL TUNNEL RELEASE      EYE SURGERY      strabibusmus    HAND SURGERY      left index finger    HI EXCIS SPERMATOCELE Left 2021    Procedure: HYDROCELECTOMY, SCROTAL DEBRIDEMENT;  Surgeon: Artem Kaiser MD;  Location: [address] [place] [address];  Service: Urology    HI REVISE MEDIAN N/CARPAL TUNNEL SURG Right 2021    Procedure: RELEASE CARPAL TUNNEL;  Surgeon: Hayes Dalton MD;  Location[facility];  Service: Orthopedics    HI REVISE ULNAR NERVE AT ELBOW Right 2021    Procedure: Queta Bright;  Surgeon: Hayes Dalton MD;  Location[facility];  Service: Orthopedics       Family History   Problem Relation Age of Onset    Diabetes Mother     Diabetes Father      I have reviewed and agree with the history as documented  E-Cigarette/Vaping    E-Cigarette Use Never User      E-Cigarette/Vaping Substances    Nicotine No     THC No     CBD No     Flavoring No     Other No     Unknown No      Social History     Tobacco Use    Smoking status: Former Smoker     Packs/day: 1 00     Years: 47 00     Pack years: 47 00     Types: Cigarettes     Quit date: 2021     Years since quittin 1    Smokeless tobacco: Never Used    Tobacco comment: advised not to smoke   Vaping Use    Vaping Use: Never used   Substance Use Topics    Alcohol use: Not Currently     Comment: social    Drug use: Not Currently        Review of Systems   Constitutional: Negative for activity change, chills, fatigue and unexpected weight change  HENT: Negative for congestion, facial swelling, nosebleeds, sneezing and sore throat  Eyes: Negative for photophobia and discharge  Respiratory: Negative for apnea, chest tightness and shortness of breath  Cardiovascular: Negative for chest pain and leg swelling  Gastrointestinal: Positive for abdominal pain (Right groin, radiating to the right sign of the abdomen) and constipation  Negative for blood in stool, diarrhea, nausea and vomiting  Endocrine: Negative for cold intolerance and heat intolerance  Genitourinary: Positive for flank pain (Discomfort bilateral)  Negative for difficulty urinating, frequency, scrotal swelling, testicular pain and urgency  Musculoskeletal: Negative for arthralgias, joint swelling and neck stiffness  Neurological: Negative for dizziness, speech difficulty, light-headedness and headaches  Psychiatric/Behavioral: Negative for agitation, confusion, hallucinations and self-injury  Physical Exam  ED Triage Vitals [03/04/22 1238]   Temperature Pulse Respirations Blood Pressure SpO2   98 1 °F (36 7 °C) 82 18 145/69 95 %      Temp Source Heart Rate Source Patient Position - Orthostatic VS BP Location FiO2 (%)   Oral Monitor Lying Right arm --      Pain Score       --             Orthostatic Vital Signs  Vitals:    03/04/22 1238   BP: 145/69   Pulse: 82   Patient Position - Orthostatic VS: Lying       Physical Exam  Constitutional:       General: He is not in acute distress  HENT:      Nose: No congestion or rhinorrhea  Mouth/Throat:      Pharynx: No oropharyngeal exudate or posterior oropharyngeal erythema  Eyes:      General: No scleral icterus  Right eye: No discharge  Left eye: No discharge  Cardiovascular:      Rate and Rhythm: Normal rate  Pulses: Normal pulses  Heart sounds: No murmur heard  Pulmonary:      Effort: No respiratory distress  Breath sounds: No wheezing  Abdominal:      Tenderness: There is abdominal tenderness (Discomfort on palpation) in the right lower quadrant  There is no guarding  Positive signs include Rovsing's sign  Hernia: No hernia is present  Musculoskeletal:         General: No swelling  Cervical back: No rigidity  Right lower leg: No edema  Left lower leg: No edema  Skin:     Coloration: Skin is not jaundiced  Neurological:      Mental Status: He is alert and oriented to person, place, and time        Cranial Nerves: No cranial nerve deficit  Motor: No weakness  Psychiatric:         Mood and Affect: Mood normal          Behavior: Behavior normal          Thought Content:  Thought content normal          Judgment: Judgment normal          ED Medications  Medications   ketorolac (TORADOL) injection 15 mg (0 mg Intravenous Hold 3/4/22 1636)       Diagnostic Studies  Results Reviewed     Procedure Component Value Units Date/Time    Urine Microscopic [[id number]]  (Normal) Collected: 03/04/22 1631    Lab Status: Final result Specimen: Urine, Clean Catch Updated: 03/04/22 1740     RBC, UA 0-1 /hpf      WBC, UA 0-1 /hpf      Epithelial Cells None Seen /hpf      Bacteria, UA None Seen /hpf     UA w Reflex to Microscopic w Reflex to Culture [[id number]]  (Abnormal) Collected: 03/04/22 1631    Lab Status: Final result Specimen: Urine, Clean Catch Updated: 03/04/22 1711     Color, UA Yellow     Clarity, UA Clear     Specific Gravity, UA >=1 030     pH, UA 6 0     Leukocytes, UA Negative     Nitrite, UA Negative     Protein,  (2+) mg/dl      Glucose, UA Negative mg/dl      Ketones, UA Negative mg/dl      Urobilinogen, UA 0 2 E U /dl      Bilirubin, UA Negative     Blood, UA Negative    Comprehensive metabolic panel [[id number]] Collected: 03/04/22 1352    Lab Status: Final result Specimen: Blood from Arm, Right Updated: 03/04/22 1434     Sodium 139 mmol/L      Potassium 5 0 mmol/L      Chloride 105 mmol/L      CO2 25 mmol/L      ANION GAP 9 mmol/L      BUN 14 mg/dL      Creatinine 0 97 mg/dL      Glucose 131 mg/dL      Calcium 10 1 mg/dL      AST 27 U/L      ALT 28 U/L      Alkaline Phosphatase 82 U/L      Total Protein 7 2 g/dL      Albumin 3 5 g/dL      Total Bilirubin 0 53 mg/dL      eGFR 79 ml/min/1 73sq m     Narrative:      [place] guidelines for Chronic Kidney Disease (CKD):     Stage 1 with normal or high GFR (GFR > 90 mL/min/1 73 square meters)    Stage 2 Mild CKD (GFR = 60-89 mL/min/1 73 square meters)    Stage 3A Moderate CKD (GFR = 45-59 mL/min/1 73 square meters)    Stage 3B Moderate CKD (GFR = 30-44 mL/min/1 73 square meters)    Stage 4 Severe CKD (GFR = 15-29 mL/min/1 73 square meters)    Stage 5 End Stage CKD (GFR <15 mL/min/1 73 square meters)  Note: GFR calculation is accurate only with a steady state creatinine    Lipase [[id number]]  (Abnormal) Collected: 03/04/22 1352    Lab Status: Final result Specimen: Blood from Arm, Right Updated: 03/04/22 1434     Lipase 41 u/L     CBC and differential [[id number]]  (Abnormal) Collected: 03/04/22 1352    Lab Status: Final result Specimen: Blood from Arm, Right Updated: 03/04/22 1403     WBC 9 72 Thousand/uL      RBC 5 38 Million/uL      Hemoglobin 15 7 g/dL      Hematocrit 46 4 %      MCV 86 fL      MCH 29 2 pg      MCHC 33 8 g/dL      RDW 14 7 %      MPV 10 8 fL      Platelets 901 Thousands/uL      nRBC 0 /100 WBCs      Neutrophils Relative 80 %      Immat GRANS % 0 %      Lymphocytes Relative 11 %      Monocytes Relative 8 %      Eosinophils Relative 1 %      Basophils Relative 0 %      Neutrophils Absolute 7 70 Thousands/µL      Immature Grans Absolute 0 02 Thousand/uL      Lymphocytes Absolute 1 07 Thousands/µL      Monocytes Absolute 0 79 Thousand/µL      Eosinophils Absolute 0 11 Thousand/µL      Basophils Absolute 0 03 Thousands/µL                  US right upper quadrant   Final Result by Eneida Kruger MD (03/04 1627)      Cholelithiasis  No biliary dilatation or sonographic evidence of acute cholecystitis  Workstation performed: [id number]         CT renal stone study abdomen pelvis wo contrast   Final Result by Dameon Chan MD (03/04 1427)      Bilateral nonobstructing intrarenal calculi  No hydronephrosis  Gallstones without surrounding inflammation  No acute inflammatory process identified in the abdomen or pelvis           Workstation performed: [id number] Procedures  Procedures      ED Course  ED Course as of 03/04/22 1809   Kely Tovar Mar 04, 2022   1344 Patient denied any need of pain medication for now  He said that he feels comfortable and does not feel pain right now  Kidney stone CT scan, UA were ordered   1737 It was discussed with the patient what kind of side effects Motrin, Advil and NSAIDs can have in elderly  Patient feels okay to go home, the information was provided regarding gallstones, kidney stones  He will follow-up with his PCP             SBIRT 22yo+      Most Recent Value   SBIRT (22 yo +)    In order to provide better care to our patients, we are screening all of our patients for alcohol and drug use  Would it be okay to ask you these screening questions? Yes Filed at: 03/04/2022 1239   Initial Alcohol Screen: US AUDIT-C     1  How often do you have a drink containing alcohol? 0 Filed at: 03/04/2022 1239   2  How many drinks containing alcohol do you have on a typical day you are drinking? 0 Filed at: 03/04/2022 1239   3b  FEMALE Any Age, or MALE 65+: How often do you have 4 or more drinks on one occassion? 0 Filed at: 03/04/2022 1239   Audit-C Score 0 Filed at: 03/04/2022 1239   NEVIN: How many times in the past year have you    Used an illegal drug or used a prescription medication for non-medical reasons? Never Filed at: 03/04/2022 1239                MDM  Number of Diagnoses or Management Options  Abdominal pain  Flank pain  Diagnosis management comments: The patient's CT scan showed bilateral kidney stones, nonobstructing, no signs of hydronephrosis and also showed gallstones  No signs of appendicitis, or cholecystitis  Abdominal ultrasound was done and it did not show any inflammation in gallbladder  UA did not show any blood in the urine  The patient feels well and does not have any complaints now  Most likely this has been musculoskeletal pain  The patient is getting discharged on Robaxin and Motrin as needed    He will follow-up with his PCP       Amount and/or Complexity of Data Reviewed  Clinical lab tests: reviewed and ordered  Tests in the radiology section of CPT®: reviewed and ordered  Tests in the medicine section of CPT®: reviewed  Review and summarize past medical records: yes  Discuss the patient with other providers: yes        Disposition  Final diagnoses:   Abdominal pain   Flank pain     Time reflects when diagnosis was documented in both MDM as applicable and the Disposition within this note     Time User Action Codes Description Comment    3/4/2022  4:53 PM Lacey Dalal Add [R10 9] Abdominal pain     3/4/2022  4:53 PM Lacey Dalal Add [R10 9] Flank pain       ED Disposition     ED Disposition Condition Date/Time Comment    Discharge Stable Fri Mar 4, 2022  4:53 PM Beto Cruz discharge to home/self care  Follow-up Information     Follow up With Specialties Details Why Contact Info Additional Information    Romi Becerra MD Internal Medicine Call  For making the PCP aware about the visit, and letting the PCP know that you have kidney stones and gallstones  Also to follow up as needed   [address] [place] [address] Emergency Department Emergency Medicine  As needed, If symptoms worsen [address] Emergency Department, [address], [place], [place], [address]          Patient's Medications   Discharge Prescriptions    IBUPROFEN (MOTRIN) 600 MG TABLET    Take 1 tablet (600 mg total) by mouth every 6 (six) hours as needed for mild pain for up to 5 days       Start Date: 3/4/2022  End Date: 3/9/2022       Order Dose: 600 mg       Quantity: 20 tablet    Refills: 0    METHOCARBAMOL (ROBAXIN) 500 MG TABLET    Take 1 tablet (500 mg total) by mouth 2 (two) times a day as needed for muscle spasms       Start Date: 3/4/2022  End Date: --       Order Dose: 500 mg       Quantity: 20 tablet    Refills: 0     No discharge procedures on file  PDMP Review       Value Time User    PDMP Reviewed  Yes 7/6/2021  9:10 AM ARSENIO BruceC           ED Provider  Attending physically available and evaluated Kristy Angie BAUTISTA managed the patient along with the ED Attending      Electronically Signed by         Stormy Hidalgo MD  03/04/22 407 Yaakov Lopez MD  03/04/22 0355

## 2022-03-04 NOTE — DISCHARGE INSTRUCTIONS
Please take Motrin as needed 1 tablet every 6 hours for pain, and Robaxin 2 times daily as needed for muscle spasms

## 2022-03-04 NOTE — ED ATTENDING ATTESTATION
3/4/2022  Claude BAUTISTA, DO, saw and evaluated the patient  I have discussed the patient with the resident/non-physician practitioner and agree with the resident's/non-physician practitioner's findings, Plan of Care, and MDM as documented in the resident's/non-physician practitioner's note, except where noted  All available labs and Radiology studies were reviewed  I was present for key portions of any procedure(s) performed by the resident/non-physician practitioner and I was immediately available to provide assistance  At this point I agree with the current assessment done in the Emergency Department  I have conducted an independent evaluation of this patient a history and physical is as follows:    73 yo M presenting for eval of right sided groin and abdominal pain since Wednesday, ongoing for 2 days  Pain radiates down the right leg at times, also in the right flank  Pain very mild at this time  C/o darker urine than normal but no dysuria, no fevers, chills, vomiting or diarrhea  On physical exam: pt is well appearing, in NAD  Morbidly obese  mucous membranes moist   CTA b/l , heart RRR  Abdomen: mild TTP RLQ and right mid abdomen, no hernia or mass Normal bowel sounds  Neuro intact, gcs 15  Cap refill < 2 sec, skin warm and dry  ED Course     Workup negative - no acute findings on CT abd pelvis, or labs/urine  Symptoms likely musculoskeletal, ie  Muscle strain or radiculapthy from back  Stable for d/c home, outpt pcp f/u      Critical Care Time  Procedures

## 2022-03-06 LAB
ATRIAL RATE: 73 BPM
P AXIS: 33 DEGREES
PR INTERVAL: 144 MS
QRS AXIS: 0 DEGREES
QRSD INTERVAL: 88 MS
QT INTERVAL: 388 MS
QTC INTERVAL: 427 MS
T WAVE AXIS: 72 DEGREES
VENTRICULAR RATE: 73 BPM

## 2022-03-06 PROCEDURE — 93010 ELECTROCARDIOGRAM REPORT: CPT | Performed by: INTERNAL MEDICINE

## 2022-04-01 ENCOUNTER — HOSPITAL ENCOUNTER (OUTPATIENT)
Dept: RADIOLOGY | Facility: HOSPITAL | Age: 67
Discharge: HOME | End: 2022-04-01

## 2022-04-01 ENCOUNTER — HOSPITAL ENCOUNTER (OUTPATIENT)
Dept: MRI IMAGING | Facility: HOSPITAL | Age: 67
Discharge: HOME | End: 2022-04-01
Payer: MEDICARE

## 2022-04-01 DIAGNOSIS — G44.53 PRIMARY THUNDERCLAP HEADACHE: ICD-10-CM

## 2022-04-01 PROCEDURE — A9585 GADOBUTROL INJECTION: HCPCS

## 2022-04-01 PROCEDURE — 70553 MRI BRAIN STEM W/O & W/DYE: CPT

## 2022-04-01 PROCEDURE — G1004 CDSM NDSC: HCPCS

## 2022-04-01 RX ADMIN — GADOBUTROL 11 ML: 604.72 INJECTION INTRAVENOUS at 19:01

## 2022-07-14 ENCOUNTER — OFFICE VISIT (OUTPATIENT)
Dept: FAMILY MEDICINE CLINIC | Facility: CLINIC | Age: 67
End: 2022-07-14
Payer: MEDICARE

## 2022-07-14 VITALS
SYSTOLIC BLOOD PRESSURE: 134 MMHG | DIASTOLIC BLOOD PRESSURE: 73 MMHG | OXYGEN SATURATION: 98 % | HEART RATE: 75 BPM | WEIGHT: 250 LBS | TEMPERATURE: 98 F | BODY MASS INDEX: 40.18 KG/M2 | HEIGHT: 66 IN

## 2022-07-14 DIAGNOSIS — E66.01 OBESITY, MORBID (HCC): ICD-10-CM

## 2022-07-14 DIAGNOSIS — M62.541 ATROPHY OF MUSCLE OF RIGHT HAND: Primary | ICD-10-CM

## 2022-07-14 DIAGNOSIS — M54.50 CHRONIC BILATERAL LOW BACK PAIN WITHOUT SCIATICA: ICD-10-CM

## 2022-07-14 DIAGNOSIS — E55.9 VITAMIN D DEFICIENCY: ICD-10-CM

## 2022-07-14 DIAGNOSIS — G89.29 CHRONIC BILATERAL LOW BACK PAIN WITHOUT SCIATICA: ICD-10-CM

## 2022-07-14 DIAGNOSIS — E11.9 TYPE 2 DIABETES MELLITUS WITHOUT COMPLICATION, WITHOUT LONG-TERM CURRENT USE OF INSULIN (HCC): ICD-10-CM

## 2022-07-14 PROBLEM — I10 PRIMARY HYPERTENSION: Status: ACTIVE | Noted: 2022-07-14

## 2022-07-14 PROCEDURE — 99214 OFFICE O/P EST MOD 30 MIN: CPT

## 2022-07-14 NOTE — PROGRESS NOTES
Assessment/Plan:         Problem List Items Addressed This Visit        Musculoskeletal and Integument    Atrophy of muscle of right hand - Primary     Okay to refer patient to a neurologist           Relevant Orders    Ambulatory Referral to Neurology       Other    Chronic bilateral low back pain without sciatica     We will refer patient to pain management           Relevant Orders    Ambulatory Referral to Pain Management            Subjective:      Patient ID: Yasmine Lomas is a 74 y o  male  Patient here complaining of weakness on the right upper extremity it is a chronic problem but getting worse  Even did not improve after his carpal tunnel surgery  His muscles in the hand and forearm are wasting  Patient also complaining of chronic low back pain  Which is also getting worse  But not radiating to lower extremity or no neurological symptoms  The following portions of the patient's history were reviewed and updated as appropriate:   Past Medical History:  He has a past medical history of Diabetes mellitus ([address] ), ED (erectile dysfunction), Hyperlipidemia, Hypertension, and Obesity  ,  _______________________________________________________________________  Medical Problems:  does not have any pertinent problems on file ,  _______________________________________________________________________  Past Surgical History:   has a past surgical history that includes Hand surgery; Eye surgery; pr revise ulnar nerve at elbow (Right, 07/06/2021); pr revise median n/carpal tunnel surg (Right, 07/06/2021); Carpal tunnel release (Right); and pr excis spermatocele (Left, 09/16/2021)  ,  _______________________________________________________________________  Family History:  family history includes Diabetes in his father and mother; Hypertension in his father and mother ,  _______________________________________________________________________  Social History:   reports that he quit smoking about 17 months ago  His smoking use included cigarettes  He has a 47 00 pack-year smoking history  He has never used smokeless tobacco  He reports previous alcohol use  He reports previous drug use ,  _______________________________________________________________________  Allergies:  has No Known Allergies     _______________________________________________________________________  Current Outpatient Medications   Medication Sig Dispense Refill    amLODIPine-benazepril (LOTREL 5-20) 5-20 MG per capsule Take 1 capsule by mouth daily      glimepiride (AMARYL) 2 mg tablet daily at bedtime       methocarbamol (ROBAXIN) 500 mg tablet Take 1 tablet (500 mg total) by mouth 2 (two) times a day as needed for muscle spasms 20 tablet 0    pioglitazone-metFORMIN (ACTOPLUS MET)  MG per tablet Take 1 tablet by mouth 2 (two) times a day      ibuprofen (MOTRIN) 600 mg tablet Take 1 tablet (600 mg total) by mouth every 6 (six) hours as needed for mild pain for up to 5 days 20 tablet 0     No current facility-administered medications for this visit      _______________________________________________________________________  Review of Systems   Constitutional: Negative for chills and fever  HENT: Negative for congestion, ear pain and sore throat  Eyes: Negative for pain and visual disturbance  Respiratory: Negative for cough and shortness of breath  Cardiovascular: Negative for chest pain and palpitations  Gastrointestinal: Negative for abdominal pain and vomiting  Genitourinary: Negative for difficulty urinating, dysuria, frequency and hematuria  Musculoskeletal: Positive for back pain  Negative for arthralgias  Skin: Negative for color change and rash  Neurological: Positive for weakness (Right hand and right forearm)  Negative for dizziness, seizures, syncope, light-headedness and headaches  Psychiatric/Behavioral: Negative for agitation and behavioral problems  All other systems reviewed and are negative  Objective:  Vitals:    07/14/22 1333   BP: 134/73   BP Location: Left arm   Patient Position: Sitting   Pulse: 75   Temp: 98 °F (36 7 °C)   SpO2: 98%   Weight: 113 kg (250 lb)   Height: 5' 6" (1 676 m)     Body mass index is 40 35 kg/m²  Physical Exam  Vitals reviewed  Constitutional:       Appearance: He is obese  Comments: Patient walks with a cane   HENT:      Head: Normocephalic and atraumatic  Right Ear: Tympanic membrane normal       Left Ear: Tympanic membrane normal       Nose: Nose normal       Mouth/Throat:      Mouth: Mucous membranes are moist    Eyes:      Conjunctiva/sclera: Conjunctivae normal       Pupils: Pupils are equal, round, and reactive to light  Cardiovascular:      Rate and Rhythm: Normal rate and regular rhythm  Heart sounds: Normal heart sounds  Pulmonary:      Effort: Pulmonary effort is normal       Breath sounds: Normal breath sounds  Abdominal:      General: Abdomen is flat  Bowel sounds are normal       Palpations: Abdomen is soft  Musculoskeletal:         General: Normal range of motion  Cervical back: Normal range of motion and neck supple  Comments: Paraspinal muscular tightness   Skin:     General: Skin is warm and dry  Capillary Refill: Capillary refill takes 2 to 3 seconds  Neurological:      General: No focal deficit present  Mental Status: He is alert and oriented to person, place, and time  Motor: Weakness and atrophy (Right hand and right forearm) present        Gait: Gait abnormal    Psychiatric:         Mood and Affect: Mood normal

## 2022-07-14 NOTE — ASSESSMENT & PLAN NOTE
No results found for: HGBA1C   Patient has been prescribed for his lab meanwhile current medication should be continued

## 2022-07-29 ENCOUNTER — TELEPHONE (OUTPATIENT)
Dept: NEUROLOGY | Facility: CLINIC | Age: 67
End: 2022-07-29

## 2022-08-01 DIAGNOSIS — I10 BENIGN ESSENTIAL HYPERTENSION: ICD-10-CM

## 2022-08-01 DIAGNOSIS — E11.9 TYPE 2 DIABETES MELLITUS WITHOUT COMPLICATION, WITHOUT LONG-TERM CURRENT USE OF INSULIN (HCC): Primary | ICD-10-CM

## 2022-08-01 RX ORDER — GLIMEPIRIDE 2 MG/1
2 TABLET ORAL
Qty: 90 TABLET | Refills: 0 | Status: SHIPPED | OUTPATIENT
Start: 2022-08-01

## 2022-08-01 RX ORDER — AMLODIPINE BESYLATE AND BENAZEPRIL HYDROCHLORIDE 5; 20 MG/1; MG/1
1 CAPSULE ORAL DAILY
Qty: 90 CAPSULE | Refills: 0 | Status: SHIPPED | OUTPATIENT
Start: 2022-08-01

## 2022-08-01 RX ORDER — PIOGLITAZONE HCL AND METFORMIN HCL 500; 15 MG/1; MG/1
1 TABLET ORAL 2 TIMES DAILY
Qty: 180 TABLET | Refills: 0 | Status: SHIPPED | OUTPATIENT
Start: 2022-08-01

## 2022-08-01 NOTE — TELEPHONE ENCOUNTER
Souleymane Nicole called and is requesting:      Glimipride 2mg 1 poqd #90  Pigliotizole metformin  1 po bid #180  Amlodipine Benazpril  5-20mg 1 poQD #90      [facility] to [facility] [address]     Please Sabrina RODRIGUEZA

## 2022-08-08 ENCOUNTER — APPOINTMENT (OUTPATIENT)
Dept: LAB | Facility: HOSPITAL | Age: 67
End: 2022-08-08
Payer: MEDICARE

## 2022-08-08 DIAGNOSIS — E11.9 TYPE 2 DIABETES MELLITUS WITHOUT COMPLICATION, WITHOUT LONG-TERM CURRENT USE OF INSULIN (HCC): ICD-10-CM

## 2022-08-08 DIAGNOSIS — E55.9 VITAMIN D DEFICIENCY: ICD-10-CM

## 2022-08-08 LAB
25(OH)D3 SERPL-MCNC: 56.3 NG/ML (ref 30–100)
ALBUMIN SERPL BCP-MCNC: 3.9 G/DL (ref 3.5–5)
ALP SERPL-CCNC: 89 U/L (ref 34–104)
ALT SERPL W P-5'-P-CCNC: 15 U/L (ref 7–52)
ANION GAP SERPL CALCULATED.3IONS-SCNC: 10 MMOL/L (ref 4–13)
AST SERPL W P-5'-P-CCNC: 19 U/L (ref 13–39)
BASOPHILS # BLD AUTO: 0.03 THOUSANDS/ΜL (ref 0–0.1)
BASOPHILS NFR BLD AUTO: 0 % (ref 0–1)
BILIRUB SERPL-MCNC: 0.47 MG/DL (ref 0.2–1)
BILIRUB UR QL STRIP: NEGATIVE
BUN SERPL-MCNC: 26 MG/DL (ref 5–25)
CALCIUM SERPL-MCNC: 9.4 MG/DL (ref 8.4–10.2)
CHLORIDE SERPL-SCNC: 104 MMOL/L (ref 96–108)
CHOLEST SERPL-MCNC: 132 MG/DL
CLARITY UR: CLEAR
CO2 SERPL-SCNC: 26 MMOL/L (ref 21–32)
COLOR UR: YELLOW
CREAT SERPL-MCNC: 1.04 MG/DL (ref 0.6–1.3)
CREAT UR-MCNC: 50.6 MG/DL
EOSINOPHIL # BLD AUTO: 0.19 THOUSAND/ΜL (ref 0–0.61)
EOSINOPHIL NFR BLD AUTO: 2 % (ref 0–6)
ERYTHROCYTE [DISTWIDTH] IN BLOOD BY AUTOMATED COUNT: 14.6 % (ref 11.6–15.1)
EST. AVERAGE GLUCOSE BLD GHB EST-MCNC: 140 MG/DL
GFR SERPL CREATININE-BSD FRML MDRD: 73 ML/MIN/1.73SQ M
GLUCOSE P FAST SERPL-MCNC: 93 MG/DL (ref 65–99)
GLUCOSE UR STRIP-MCNC: NEGATIVE MG/DL
HBA1C MFR BLD: 6.5 %
HCT VFR BLD AUTO: 45 % (ref 36.5–49.3)
HDLC SERPL-MCNC: 40 MG/DL
HGB BLD-MCNC: 14.9 G/DL (ref 12–17)
HGB UR QL STRIP.AUTO: NEGATIVE
IMM GRANULOCYTES # BLD AUTO: 0.02 THOUSAND/UL (ref 0–0.2)
IMM GRANULOCYTES NFR BLD AUTO: 0 % (ref 0–2)
KETONES UR STRIP-MCNC: NEGATIVE MG/DL
LDLC SERPL CALC-MCNC: 66 MG/DL (ref 0–100)
LEUKOCYTE ESTERASE UR QL STRIP: NEGATIVE
LYMPHOCYTES # BLD AUTO: 1 THOUSANDS/ΜL (ref 0.6–4.47)
LYMPHOCYTES NFR BLD AUTO: 12 % (ref 14–44)
MCH RBC QN AUTO: 29.9 PG (ref 26.8–34.3)
MCHC RBC AUTO-ENTMCNC: 33.1 G/DL (ref 31.4–37.4)
MCV RBC AUTO: 90 FL (ref 82–98)
MICROALBUMIN UR-MCNC: 116 MG/L (ref 0–20)
MICROALBUMIN/CREAT 24H UR: 229 MG/G CREATININE (ref 0–30)
MONOCYTES # BLD AUTO: 0.73 THOUSAND/ΜL (ref 0.17–1.22)
MONOCYTES NFR BLD AUTO: 9 % (ref 4–12)
NEUTROPHILS # BLD AUTO: 6.48 THOUSANDS/ΜL (ref 1.85–7.62)
NEUTS SEG NFR BLD AUTO: 77 % (ref 43–75)
NITRITE UR QL STRIP: NEGATIVE
NRBC BLD AUTO-RTO: 0 /100 WBCS
PH UR STRIP.AUTO: 7 [PH]
PLATELET # BLD AUTO: 303 THOUSANDS/UL (ref 149–390)
PMV BLD AUTO: 11.4 FL (ref 8.9–12.7)
POTASSIUM SERPL-SCNC: 4.2 MMOL/L (ref 3.5–5.3)
PROT SERPL-MCNC: 6.8 G/DL (ref 6.4–8.4)
PROT UR STRIP-MCNC: NEGATIVE MG/DL
RBC # BLD AUTO: 4.99 MILLION/UL (ref 3.88–5.62)
SODIUM SERPL-SCNC: 140 MMOL/L (ref 135–147)
SP GR UR STRIP.AUTO: 1.01 (ref 1–1.03)
TRIGL SERPL-MCNC: 131 MG/DL
UROBILINOGEN UR QL STRIP.AUTO: 0.2 E.U./DL
WBC # BLD AUTO: 8.45 THOUSAND/UL (ref 4.31–10.16)

## 2022-08-08 PROCEDURE — 82570 ASSAY OF URINE CREATININE: CPT

## 2022-08-08 PROCEDURE — 80053 COMPREHEN METABOLIC PANEL: CPT

## 2022-08-08 PROCEDURE — 82306 VITAMIN D 25 HYDROXY: CPT

## 2022-08-08 PROCEDURE — 83036 HEMOGLOBIN GLYCOSYLATED A1C: CPT

## 2022-08-08 PROCEDURE — 85025 COMPLETE CBC W/AUTO DIFF WBC: CPT

## 2022-08-08 PROCEDURE — 81003 URINALYSIS AUTO W/O SCOPE: CPT

## 2022-08-08 PROCEDURE — 36415 COLL VENOUS BLD VENIPUNCTURE: CPT

## 2022-08-08 PROCEDURE — 80061 LIPID PANEL: CPT

## 2022-08-08 PROCEDURE — 82043 UR ALBUMIN QUANTITATIVE: CPT

## 2022-08-16 ENCOUNTER — TRANSCRIBE ORDERS (OUTPATIENT)
Dept: PAIN MEDICINE | Facility: CLINIC | Age: 67
End: 2022-08-16

## 2022-08-16 ENCOUNTER — CONSULT (OUTPATIENT)
Dept: PAIN MEDICINE | Facility: CLINIC | Age: 67
End: 2022-08-16
Payer: MEDICARE

## 2022-08-16 VITALS
SYSTOLIC BLOOD PRESSURE: 169 MMHG | BODY MASS INDEX: 42.13 KG/M2 | DIASTOLIC BLOOD PRESSURE: 98 MMHG | HEART RATE: 70 BPM | WEIGHT: 261 LBS

## 2022-08-16 DIAGNOSIS — M51.16 LUMBAR DISC DISEASE WITH RADICULOPATHY: ICD-10-CM

## 2022-08-16 DIAGNOSIS — R29.898 WEAKNESS OF EXTREMITY: ICD-10-CM

## 2022-08-16 DIAGNOSIS — G89.4 CHRONIC PAIN SYNDROME: Primary | ICD-10-CM

## 2022-08-16 PROCEDURE — 99204 OFFICE O/P NEW MOD 45 MIN: CPT | Performed by: PHYSICAL MEDICINE & REHABILITATION

## 2022-08-16 NOTE — PROGRESS NOTES
Assessment  1  Chronic pain syndrome    2  Lumbar disc disease with radiculopathy    3  Weakness of extremity        Plan  Mr Shaq Duarte is a pleasant 57-year-old male presents to [facility] for 2 5 years duration of low back pain with worsening radiculopathy into the right and left lower extremities  Patient reports previously trying physical therapy at [facility] for at least 6 weeks in the last 6 months with minimal improvements in his pain  Also reports near falls while climbing stairs  During today's evaluation he is demonstrating clinical evidence of lumbar radiculopathy and suspected lumbar spinal stenosis with neurogenic claudication, however, no recent diagnostic imaging has been done  At this time we will   1  Order lumbar x-ray and lumbar MRI without contrast to better identify disc and spine pathology contributing to symptoms  If diagnostic imaging matches clinical presentation would likely benefit from interventional approaches including epidural steroid injection, however, he does also have subtle weakness in right hip flexion and may require neurosurgical evaluation  For now we will await imaging results and discuss plan moving forward from there  My impressions and treatment recommendations were discussed in detail with the patient who verbalized understanding and had no further questions  Discharge instructions were provided  I personally saw and examined the patient and I agree with the above discussed plan of care  Orders Placed This Encounter   Procedures    X-ray lumbar spine 2 or 3 views     Standing Status:   Future     Standing Expiration Date:   8/16/2026     Scheduling Instructions:      Bring along any outside films relating to this procedure   MRI lumbar spine without contrast     Standing Status:   Future     Standing Expiration Date:   8/16/2026     Scheduling Instructions:       There is no preparation for this test  Please leave your jewelry and valuables at home, wedding rings are the exception  Magnetic nail polish must be removed prior to arrival for your test  Please bring your insurance cards, a form of photo ID and a list of your medications with you  Arrive 15 minutes prior to your appointment time in order to register  Please bring any prior CT or MRI studies of this area that were not performed at a [facility]  To schedule this appointment, please contact Central Scheduling at [phone]  Prior to your appointment, please make sure you complete the MRI Screening Form when you e-Check in for your appointment  This will be available starting 7 days before your appointment in [address]  You may receive an e-mail with an activation code if you do not have a [employer] account  If you do not have access to a device, we will complete your screening at your appointment  Order Specific Question:   What is the patient's sedation requirement? Answer:   No Sedation     Order Specific Question:   Release to patient through [employer]     Answer:   Immediate     Order Specific Question:   Is order priority selected as STAT? Answer:   No     Order Specific Question:   Reason for Exam (FREE TEXT)     Answer:   Lumbar radiculopathy     No orders of the defined types were placed in this encounter  History of Present Illness    August Castellanos is a 74 y o  male significant past medical history of bilateral ulnar and median neuropathies presents for initial evaluation regarding 2 5 years duration of bilateral upper extremity pain and low back pain with radicular symptoms into bilateral lower extremities  He has been referred by his PCP Dr Zoë Looney  Today patient reports moderate to severe pain rated 7/10 and interfering with daily activities  Pain is intermittent 30-60% of the time that is worse in the morning  Describes symptoms as cramping, numbness, sharp, pressure-like    Also reports upper and lower extremity weakness as well as dropping objects  Requires a cane for ambulation  Symptoms are worse with bending, exercise  Has had no significant improvements with physical therapy and reports moderate relief with heat  Drinks alcohol once a week  Denies smoking or marijuana use  Currently taking over-the-counter Motrin and Robaxin which has provided some relief  Presents today for initial evaluation  I have personally reviewed and/or updated the patient's past medical history, past surgical history, family history, social history, current medications, allergies, and vital signs today  Review of Systems   Constitutional: Negative for fever and unexpected weight change  HENT: Negative for trouble swallowing  Eyes: Negative for visual disturbance  Respiratory: Negative for shortness of breath and wheezing  Cardiovascular: Negative for chest pain and palpitations  Gastrointestinal: Negative for constipation, diarrhea, nausea and vomiting  Endocrine: Negative for cold intolerance, heat intolerance and polydipsia  Genitourinary: Negative for difficulty urinating and frequency  Musculoskeletal: Positive for back pain, gait problem and joint swelling  Negative for arthralgias and myalgias  Skin: Negative for rash  Neurological: Positive for weakness and numbness  Negative for dizziness, seizures, syncope and headaches  Hematological: Does not bruise/bleed easily  Psychiatric/Behavioral: Negative for dysphoric mood  All other systems reviewed and are negative        Patient Active Problem List   Diagnosis    Cubital tunnel syndrome on right    Carpal tunnel syndrome, left    Obesity, morbid (HCC)    Carpal tunnel syndrome of right wrist    Atrophy of muscle of right hand    Primary hypertension    Type 2 diabetes mellitus without complication, without long-term current use of insulin (HCC)    Chronic bilateral low back pain without sciatica       Past Medical History: Diagnosis Date    Diabetes mellitus ([facility] )     ED (erectile dysfunction)     Hyperlipidemia     Hypertension     Obesity        Past Surgical History:   Procedure Laterality Date    CARPAL TUNNEL RELEASE Right     EYE SURGERY      strabibusmus    HAND SURGERY      left index finger    DE EXCIS SPERMATOCELE Left 2021    Procedure: HYDROCELECTOMY, SCROTAL DEBRIDEMENT;  Surgeon: Robyn Randle MD;  Location: [address] [place] [address];  Service: Urology    DE REVISE MEDIAN N/CARPAL TUNNEL SURG Right 2021    Procedure: RELEASE CARPAL TUNNEL;  Surgeon: Angel Carrera MD;  [facility];  Service: Orthopedics    DE REVISE ULNAR NERVE AT ELBOW Right 2021    Procedure: RELEASE CUBITAL TUNNEL;  Surgeon: Angel Carrera MD;  [facility];  Service: Orthopedics       Family History   Problem Relation Age of Onset    Hypertension Mother     Diabetes Mother     Hypertension Father     Diabetes Father        Social History     Occupational History    Not on file   Tobacco Use    Smoking status: Former Smoker     Packs/day: 1 00     Years: 47 00     Pack years: 47 00     Types: Cigarettes     Quit date: 2021     Years since quittin 5    Smokeless tobacco: Never Used    Tobacco comment: advised not to smoke   Vaping Use    Vaping Use: Never used   Substance and Sexual Activity    Alcohol use: Not Currently     Comment: social    Drug use: Not Currently    Sexual activity: Not Currently       Current Outpatient Medications on File Prior to Visit   Medication Sig    amLODIPine-benazepril (LOTREL 5-20) 5-20 MG per capsule Take 1 capsule by mouth daily    glimepiride (AMARYL) 2 mg tablet Take 1 tablet (2 mg total) by mouth daily with breakfast    ibuprofen (MOTRIN) 600 mg tablet Take 1 tablet (600 mg total) by mouth every 6 (six) hours as needed for mild pain for up to 5 days    methocarbamol (ROBAXIN) 500 mg tablet Take 1 tablet (500 mg total) by mouth 2 (two) times a day as needed for muscle spasms    pioglitazone-metFORMIN (ACTOPLUS MET)  MG per tablet Take 1 tablet by mouth 2 (two) times a day     No current facility-administered medications on file prior to visit  No Known Allergies    Physical Exam    /98   Pulse 70   Wt 118 kg (261 lb)   BMI 42 13 kg/m²     Constitutional: normal, well developed, well nourished, alert, in no distress and non-toxic and no overt pain behavior    Eyes: anicteric  HEENT: grossly intact  Neck: supple, symmetric, trachea midline and no masses   Pulmonary:even and unlabored  Cardiovascular:No edema or pitting edema present  Skin:Normal without rashes or lesions and well hydrated  Psychiatric:Mood and affect appropriate  Neurologic:Cranial Nerves II-XII grossly intact  Musculoskeletal:antalgic, tenderness to palpation bilateral lumbar paraspinals, decreased active and passive range of motion with lumbar flexion and extension limited by pain, MMT 5/5 bilateral lower extremities except for right hip flexion 4/5, decreased sensation to light touch in patchy distribution right lower extremity, DTRs hyporeflexic bilateral patellar and Achilles reflexes, positive straight leg raise in the seated position radicular pain into the right leg    Imaging

## 2022-08-17 PROBLEM — F17.200 TOBACCO DEPENDENCE SYNDROME: Status: ACTIVE | Noted: 2022-08-17

## 2022-08-17 PROBLEM — E78.2 MIXED HYPERLIPIDEMIA: Status: ACTIVE | Noted: 2022-08-17

## 2022-08-26 ENCOUNTER — OFFICE VISIT (OUTPATIENT)
Dept: FAMILY MEDICINE CLINIC | Facility: CLINIC | Age: 67
End: 2022-08-26
Payer: MEDICARE

## 2022-08-26 VITALS
TEMPERATURE: 97.2 F | WEIGHT: 253 LBS | HEIGHT: 66 IN | SYSTOLIC BLOOD PRESSURE: 120 MMHG | HEART RATE: 73 BPM | OXYGEN SATURATION: 97 % | DIASTOLIC BLOOD PRESSURE: 64 MMHG | BODY MASS INDEX: 40.66 KG/M2 | RESPIRATION RATE: 16 BRPM

## 2022-08-26 DIAGNOSIS — E66.01 OBESITY, MORBID (HCC): ICD-10-CM

## 2022-08-26 DIAGNOSIS — E11.9 TYPE 2 DIABETES MELLITUS WITHOUT COMPLICATION, WITHOUT LONG-TERM CURRENT USE OF INSULIN (HCC): Primary | ICD-10-CM

## 2022-08-26 DIAGNOSIS — I10 PRIMARY HYPERTENSION: ICD-10-CM

## 2022-08-26 DIAGNOSIS — E78.2 MIXED HYPERLIPIDEMIA: ICD-10-CM

## 2022-08-26 PROCEDURE — 99214 OFFICE O/P EST MOD 30 MIN: CPT

## 2022-08-26 NOTE — PATIENT INSTRUCTIONS
Frequent home monitor of blood pressure  Diet high in fruit and vegetables and low in salt and cholesterol  1800 [address]  Monitor home glucose at least 2 times a day  Regular cardiovascular exercise  Take all medications regularly as prescribed  Loose weight   Potential consequences of obesity explained to patient

## 2022-08-26 NOTE — ASSESSMENT & PLAN NOTE
Lab Results   Component Value Date    HGBA1C 6 5 (H) 08/08/2022   Under control  Continue current medication  We will re-evaluate at next office visit

## 2022-08-26 NOTE — PROGRESS NOTES
Assessment/Plan:         Problem List Items Addressed This Visit        Endocrine    Type 2 diabetes mellitus without complication, without long-term current use of insulin ([facility] [address] ) - Primary       Lab Results   Component Value Date    HGBA1C 6 5 (H) 08/08/2022   Under control  Continue current medication  We will re-evaluate at next office visit  Cardiovascular and Mediastinum    Primary hypertension     Under control  Continue current medication  We will re-evaluate at next office visit  Other    Obesity, morbid ([facility] [address] )     Discussed with patient about importance of losing weight with portion control and proper food to eat patient understood well about the consequences0         Mixed hyperlipidemia     Under control  Continue current medication  We will re-evaluate at next office visit  Other Visit Diagnoses     BMI 40 0-44 9, adult ([facility])                Subjective:      Patient ID: Beto Cruz is a 74 y o  male  Patient here for review of chronic medical problems and review of the labs and imaging if it is applicable  Currently has no specific complaints other than mentioned in the review of systems  Denies chest pain, SOB, cough, abdominal pain, nausea, vomiting, fever, chills, lightheadedness, dizziness,headache, tingling or numbness  No bowel or bladder problem  The following portions of the patient's history were reviewed and updated as appropriate:   Past Medical History:  He has a past medical history of Diabetes mellitus ([facility] [address] ), ED (erectile dysfunction), Hyperlipidemia, Hypertension, and Obesity  ,  _______________________________________________________________________  Medical Problems:  does not have any pertinent problems on file ,  _______________________________________________________________________  Past Surgical History:   has a past surgical history that includes Hand surgery;  Eye surgery; pr revise ulnar nerve at elbow (Right, 07/06/2021); pr revise median n/carpal tunnel surg (Right, 07/06/2021); Carpal tunnel release (Right); and pr excis spermatocele (Left, 09/16/2021)  ,  _______________________________________________________________________  Family History:  family history includes Diabetes in his father and mother; Hypertension in his father and mother ,  _______________________________________________________________________  Social History:   reports that he quit smoking about 19 months ago  His smoking use included cigarettes  He has a 47 00 pack-year smoking history  He has never used smokeless tobacco  He reports previous alcohol use  He reports previous drug use ,  _______________________________________________________________________  Allergies:  has No Known Allergies     _______________________________________________________________________  Current Outpatient Medications   Medication Sig Dispense Refill    amLODIPine-benazepril (LOTREL 5-20) 5-20 MG per capsule Take 1 capsule by mouth daily 90 capsule 0    glimepiride (AMARYL) 2 mg tablet Take 1 tablet (2 mg total) by mouth daily with breakfast 90 tablet 0    ibuprofen (MOTRIN) 600 mg tablet Take 1 tablet (600 mg total) by mouth every 6 (six) hours as needed for mild pain for up to 5 days 20 tablet 0    methocarbamol (ROBAXIN) 500 mg tablet Take 1 tablet (500 mg total) by mouth 2 (two) times a day as needed for muscle spasms 20 tablet 0    pioglitazone-metFORMIN (ACTOPLUS MET)  MG per tablet Take 1 tablet by mouth 2 (two) times a day 180 tablet 0     No current facility-administered medications for this visit      _______________________________________________________________________  Review of Systems   Constitutional: Negative for chills, fatigue and fever  HENT: Negative for congestion, ear pain, rhinorrhea, sneezing and sore throat  Eyes: Negative for pain, redness and visual disturbance     Respiratory: Negative for cough, chest tightness and shortness of breath  Cardiovascular: Negative for chest pain, palpitations and leg swelling  Gastrointestinal: Negative for abdominal pain, blood in stool, diarrhea, nausea and vomiting  Endocrine: Negative for cold intolerance and heat intolerance  Genitourinary: Negative for dysuria, frequency and hematuria  Musculoskeletal: Positive for arthralgias, back pain, gait problem and myalgias  Skin: Negative for color change and rash  Neurological: Positive for weakness (both hands)  Negative for dizziness, light-headedness, numbness and headaches  Hematological: Does not bruise/bleed easily  Psychiatric/Behavioral: Negative for behavioral problems and confusion  Objective:  Vitals:    08/26/22 0937   BP: 120/64   Pulse: 73   Resp: 16   Temp: (!) 97 2 °F (36 2 °C)   SpO2: 97%   Weight: 115 kg (253 lb)   Height: 5' 6" (1 676 m)     Body mass index is 40 84 kg/m²  Physical Exam  Vitals and nursing note reviewed  Constitutional:       General: He is not in acute distress  Appearance: Normal appearance  He is obese  He is not ill-appearing, toxic-appearing or diaphoretic  HENT:      Head: Normocephalic and atraumatic  Right Ear: Tympanic membrane normal       Left Ear: Tympanic membrane normal       Nose: Nose normal  No congestion  Mouth/Throat:      Mouth: Mucous membranes are moist    Eyes:      General: No scleral icterus  Right eye: No discharge  Left eye: No discharge  Extraocular Movements: Extraocular movements intact  Conjunctiva/sclera: Conjunctivae normal       Pupils: Pupils are equal, round, and reactive to light  Cardiovascular:      Rate and Rhythm: Normal rate and regular rhythm  Pulses: Normal pulses  no weak pulses          Dorsalis pedis pulses are 2+ on the right side and 2+ on the left side  Heart sounds: Normal heart sounds  No murmur heard  No gallop     Pulmonary:      Effort: Pulmonary effort is normal  No respiratory distress  Breath sounds: Normal breath sounds  No wheezing, rhonchi or rales  Abdominal:      General: Abdomen is flat  Bowel sounds are normal  There is no distension  Palpations: Abdomen is soft  Tenderness: There is no abdominal tenderness  There is no guarding  Musculoskeletal:         General: No swelling or tenderness  Normal range of motion  Cervical back: Normal range of motion and neck supple  No rigidity  Comments: Venous stasis changes  Paraspinal muscular tightness   Feet:      Right foot:      Skin integrity: Callus and dry skin present  No ulcer, skin breakdown, erythema or warmth  Left foot:      Skin integrity: Callus and dry skin present  No ulcer, skin breakdown, erythema or warmth  Lymphadenopathy:      Cervical: No cervical adenopathy  Skin:     General: Skin is warm  Capillary Refill: Capillary refill takes 2 to 3 seconds  Coloration: Skin is not jaundiced  Findings: No bruising or rash  Neurological:      General: No focal deficit present  Mental Status: He is alert and oriented to person, place, and time  Mental status is at baseline  Motor: Weakness (both hands  weak 3/5) present  Gait: Gait abnormal (Ambulates with cane)  Psychiatric:         Mood and Affect: Mood normal        BMI Counseling: Body mass index is 40 84 kg/m²  The BMI is above normal  Nutrition recommendations include decreasing portion sizes, decreasing fast food intake, limiting drinks that contain sugar, moderation in carbohydrate intake, increasing intake of lean protein and reducing intake of saturated and trans fat  Exercise recommendations include vigorous physical activity 75 minutes/week  No pharmacotherapy was ordered  Rationale for BMI follow-up plan is due to patient being overweight or obese  Patient's shoes and socks removed      Right Foot/Ankle   Right Foot Inspection  Skin Exam: skin normal, skin intact, dry skin, callus and callus  No warmth, no erythema, no maceration, no abnormal color, no pre-ulcer and no ulcer  Toe Exam: ROM and strength within normal limits  No swelling and erythema    Sensory   Monofilament testing: intact    Vascular  Capillary refills: < 3 seconds  The right DP pulse is 2+  Left Foot/Ankle  Left Foot Inspection  Skin Exam: skin normal, skin intact, dry skin and callus  No warmth, no erythema, no maceration, normal color, no pre-ulcer and no ulcer  Toe Exam: ROM and strength within normal limits  No swelling and no erythema  Sensory   Monofilament testing: intact    Vascular  Capillary refills: < 3 seconds  The left DP pulse is 2+       Assign Risk Category  No deformity present  No loss of protective sensation  No weak pulses  Risk: 0

## 2022-08-26 NOTE — ASSESSMENT & PLAN NOTE
Discussed with patient about importance of losing weight with portion control and proper food to eat patient understood well about the consequences0

## 2022-09-13 ENCOUNTER — HOSPITAL ENCOUNTER (OUTPATIENT)
Dept: MRI IMAGING | Facility: HOSPITAL | Age: 67
Discharge: HOME | End: 2022-09-13
Attending: PHYSICAL MEDICINE & REHABILITATION
Payer: MEDICARE

## 2022-09-13 ENCOUNTER — HOSPITAL ENCOUNTER (OUTPATIENT)
Dept: RADIOLOGY | Facility: HOSPITAL | Age: 67
Discharge: HOME | End: 2022-09-13
Attending: PHYSICAL MEDICINE & REHABILITATION
Payer: MEDICARE

## 2022-09-13 DIAGNOSIS — R29.898 WEAKNESS OF EXTREMITY: ICD-10-CM

## 2022-09-13 DIAGNOSIS — M51.16 LUMBAR DISC DISEASE WITH RADICULOPATHY: ICD-10-CM

## 2022-09-13 DIAGNOSIS — G89.4 CHRONIC PAIN SYNDROME: ICD-10-CM

## 2022-09-13 PROCEDURE — 72100 X-RAY EXAM L-S SPINE 2/3 VWS: CPT

## 2022-09-13 PROCEDURE — G1004 CDSM NDSC: HCPCS

## 2022-09-13 PROCEDURE — 72148 MRI LUMBAR SPINE W/O DYE: CPT

## 2022-09-16 DIAGNOSIS — M48.062 SPINAL STENOSIS OF LUMBAR REGION WITH NEUROGENIC CLAUDICATION: Primary | ICD-10-CM

## 2022-09-16 DIAGNOSIS — G89.4 CHRONIC PAIN SYNDROME: ICD-10-CM

## 2022-10-14 ENCOUNTER — CLINICAL SUPPORT (OUTPATIENT)
Dept: FAMILY MEDICINE CLINIC | Facility: CLINIC | Age: 67
End: 2022-10-14
Payer: MEDICARE

## 2022-10-14 DIAGNOSIS — Z23 IMMUNIZATION DUE: Primary | ICD-10-CM

## 2022-10-14 PROCEDURE — G0008 ADMIN INFLUENZA VIRUS VAC: HCPCS

## 2022-10-14 PROCEDURE — 90662 IIV NO PRSV INCREASED AG IM: CPT

## 2022-11-01 DIAGNOSIS — E11.9 TYPE 2 DIABETES MELLITUS WITHOUT COMPLICATION, WITHOUT LONG-TERM CURRENT USE OF INSULIN (HCC): ICD-10-CM

## 2022-11-01 RX ORDER — PIOGLITAZONE HCL AND METFORMIN HCL 500; 15 MG/1; MG/1
1 TABLET ORAL 2 TIMES DAILY
Qty: 180 TABLET | Refills: 0 | Status: SHIPPED | OUTPATIENT
Start: 2022-11-01 | End: 2022-11-02 | Stop reason: SDUPTHER

## 2022-11-01 NOTE — TELEPHONE ENCOUNTER
This is [employer]  I'm calling about getting a refill on one of my medications that prozone metformin  and I usually get it at [address] [place] [address] [facility] [place] [facility] and my number is [phone]  Refill has been sent to pharmacy   [facility] on [address] as per patient request    Ramesh Valadez

## 2022-11-02 DIAGNOSIS — I10 BENIGN ESSENTIAL HYPERTENSION: ICD-10-CM

## 2022-11-02 DIAGNOSIS — E11.9 TYPE 2 DIABETES MELLITUS WITHOUT COMPLICATION, WITHOUT LONG-TERM CURRENT USE OF INSULIN (HCC): ICD-10-CM

## 2022-11-02 RX ORDER — AMLODIPINE BESYLATE AND BENAZEPRIL HYDROCHLORIDE 5; 20 MG/1; MG/1
1 CAPSULE ORAL DAILY
Qty: 90 CAPSULE | Refills: 0 | Status: SHIPPED | OUTPATIENT
Start: 2022-11-02

## 2022-11-02 RX ORDER — GLIMEPIRIDE 2 MG/1
2 TABLET ORAL
Qty: 90 TABLET | Refills: 0 | Status: SHIPPED | OUTPATIENT
Start: 2022-11-02

## 2022-11-02 RX ORDER — PIOGLITAZONE HCL AND METFORMIN HCL 500; 15 MG/1; MG/1
1 TABLET ORAL 2 TIMES DAILY
Qty: 180 TABLET | Refills: 0 | Status: SHIPPED | OUTPATIENT
Start: 2022-11-02

## 2022-11-20 ENCOUNTER — RA CDI HCC (OUTPATIENT)
Dept: OTHER | Facility: HOSPITAL | Age: 67
End: 2022-11-20

## 2022-11-20 NOTE — PROGRESS NOTES
[facility] [address]  coding opportunities       Chart reviewed, no opportunity found: CHART REVIEWED, NO OPPORTUNITY FOUND        Patients Insurance     Medicare Insurance: Medicare

## 2022-11-30 ENCOUNTER — OFFICE VISIT (OUTPATIENT)
Dept: FAMILY MEDICINE CLINIC | Facility: CLINIC | Age: 67
End: 2022-11-30

## 2022-11-30 VITALS
SYSTOLIC BLOOD PRESSURE: 120 MMHG | OXYGEN SATURATION: 93 % | RESPIRATION RATE: 20 BRPM | BODY MASS INDEX: 41.14 KG/M2 | WEIGHT: 256 LBS | HEIGHT: 66 IN | HEART RATE: 74 BPM | TEMPERATURE: 97.6 F | DIASTOLIC BLOOD PRESSURE: 60 MMHG

## 2022-11-30 DIAGNOSIS — E11.9 TYPE 2 DIABETES MELLITUS WITHOUT COMPLICATION, WITHOUT LONG-TERM CURRENT USE OF INSULIN (HCC): ICD-10-CM

## 2022-11-30 DIAGNOSIS — Z00.00 MEDICARE ANNUAL WELLNESS VISIT, SUBSEQUENT: Primary | ICD-10-CM

## 2022-11-30 DIAGNOSIS — E78.2 MIXED HYPERLIPIDEMIA: ICD-10-CM

## 2022-11-30 DIAGNOSIS — G89.29 CHRONIC BILATERAL LOW BACK PAIN WITHOUT SCIATICA: ICD-10-CM

## 2022-11-30 DIAGNOSIS — I10 PRIMARY HYPERTENSION: ICD-10-CM

## 2022-11-30 DIAGNOSIS — Z11.59 NEED FOR HEPATITIS C SCREENING TEST: ICD-10-CM

## 2022-11-30 DIAGNOSIS — M54.50 CHRONIC BILATERAL LOW BACK PAIN WITHOUT SCIATICA: ICD-10-CM

## 2022-11-30 NOTE — ASSESSMENT & PLAN NOTE
Under control  Continue current medication  We will re-evaluate at next office visit    Has been followed by [facility]

## 2022-11-30 NOTE — PATIENT INSTRUCTIONS
Medicare Preventive Visit Patient Instructions  Thank you for completing your Welcome to Medicare Visit or Medicare Annual Wellness Visit today  Your next wellness visit will be due in one year (12/1/2023)  The screening/preventive services that you may require over the next 5-10 years are detailed below  Some tests may not apply to you based off risk factors and/or age  Screening tests ordered at today's visit but not completed yet may show as past due  Also, please note that scanned in results may not display below  Preventive Screenings:  Service Recommendations Previous Testing/Comments   Colorectal Cancer Screening  · Colonoscopy    · Fecal Occult Blood Test (FOBT)/Fecal Immunochemical Test (FIT)  · Fecal DNA/Cologuard Test  · Flexible Sigmoidoscopy Age: 39-70 years old   Colonoscopy: every 10 years (May be performed more frequently if at higher risk)  OR  FOBT/FIT: every 1 year  OR  Cologuard: every 3 years  OR  Sigmoidoscopy: every 5 years  Screening may be recommended earlier than age 39 if at higher risk for colorectal cancer  Also, an individualized decision between you and your healthcare provider will decide whether screening between the ages of 74-80 would be appropriate  Colonoscopy: Not on file  FOBT/FIT: Not on file  Cologuard: Not on file  Sigmoidoscopy: Not on file          Prostate Cancer Screening Individualized decision between patient and health care provider in men between ages of 53-78   Medicare will cover every 12 months beginning on the day after your 50th birthday PSA: 1 3 ng/mL           Hepatitis C Screening Once for adults born between 1945 and 1965  More frequently in patients at high risk for Hepatitis C Hep C Antibody: Not on file        Diabetes Screening 1-2 times per year if you're at risk for diabetes or have pre-diabetes Fasting glucose: 93 mg/dL (8/8/2022)  A1C: 6 5 % (8/8/2022)      Cholesterol Screening Once every 5 years if you don't have a lipid disorder   May order more often based on risk factors  Lipid panel: 08/08/2022         Other Preventive Screenings Covered by Medicare:  1  Abdominal Aortic Aneurysm (AAA) Screening: covered once if your at risk  You're considered to be at risk if you have a family history of AAA or a male between the age of 73-68 who smoking at least 100 cigarettes in your lifetime  2  Lung Cancer Screening: covers low dose CT scan once per year if you meet all of the following conditions: (1) Age 50-69; (2) No signs or symptoms of lung cancer; (3) Current smoker or have quit smoking within the last 15 years; (4) You have a tobacco smoking history of at least 20 pack years (packs per day x number of years you smoked); (5) You get a written order from a healthcare provider  3  Glaucoma Screening: covered annually if you're considered high risk: (1) You have diabetes OR (2) Family history of glaucoma OR (3)  aged 48 and older OR (3)  American aged 72 and older  3  Osteoporosis Screening: covered every 2 years if you meet one of the following conditions: (1) Have a vertebral abnormality; (2) On glucocorticoid therapy for more than 3 months; (3) Have primary hyperparathyroidism; (4) On osteoporosis medications and need to assess response to drug therapy  5  HIV Screening: covered annually if you're between the age of 12-76  Also covered annually if you are younger than 13 and older than 72 with risk factors for HIV infection  For pregnant patients, it is covered up to 3 times per pregnancy      Immunizations:  Immunization Recommendations   Influenza Vaccine Annual influenza vaccination during flu season is recommended for all persons aged >= 6 months who do not have contraindications   Pneumococcal Vaccine   * Pneumococcal conjugate vaccine = PCV13 (Prevnar 13), PCV15 (Vaxneuvance), PCV20 (Prevnar 20)  * Pneumococcal polysaccharide vaccine = PPSV23 (Pneumovax) Adults 25-60 years old: 1-3 doses may be recommended based on certain risk factors  Adults 72 years old: 1-2 doses may be recommended based off what pneumonia vaccine you previously received   Hepatitis B Vaccine 3 dose series if at intermediate or high risk (ex: diabetes, end stage renal disease, liver disease)   Tetanus (Td) Vaccine - COST NOT COVERED BY MEDICARE PART B Following completion of primary series, a booster dose should be given every 10 years to maintain immunity against tetanus  Td may also be given as tetanus wound prophylaxis  Tdap Vaccine - COST NOT COVERED BY MEDICARE PART B Recommended at least once for all adults  For pregnant patients, recommended with each pregnancy  Shingles Vaccine (Shingrix) - COST NOT COVERED BY MEDICARE PART B  2 shot series recommended in those aged 48 and above     Health Maintenance Due:      Topic Date Due   • Hepatitis C Screening  Never done   • Lung Cancer Screening  08/26/2023 (Originally 9/26/2003)   • Colorectal Cancer Screening  08/26/2023 (Originally 9/26/1998)     Immunizations Due:      Topic Date Due   • Hepatitis A Vaccine (1 of 2 - Risk 2-dose series) Never done   • Hepatitis B Vaccine (1 of 3 - Risk 3-dose series) Never done   • COVID-19 Vaccine (4 - Booster for Salma Amie series) 03/19/2022     Advance Directives   What are advance directives? Advance directives are legal documents that state your wishes and plans for medical care  These plans are made ahead of time in case you lose your ability to make decisions for yourself  Advance directives can apply to any medical decision, such as the treatments you want, and if you want to donate organs  What are the types of advance directives? There are many types of advance directives, and each state has rules about how to use them  You may choose a combination of any of the following:  · Living will: This is a written record of the treatment you want  You can also choose which treatments you do not want, which to limit, and which to stop at a certain time   This includes surgery, medicine, IV fluid, and tube feedings  · Durable power of  for healthcare [facility]): This is a written record that states who you want to make healthcare choices for you when you are unable to make them for yourself  This person, called a proxy, is usually a family member or a friend  You may choose more than 1 proxy  · Do not resuscitate (DNR) order:  A DNR order is used in case your heart stops beating or you stop breathing  It is a request not to have certain forms of treatment, such as CPR  A DNR order may be included in other types of advance directives  · Medical directive: This covers the care that you want if you are in a coma, near death, or unable to make decisions for yourself  You can list the treatments you want for each condition  Treatment may include pain medicine, surgery, blood transfusions, dialysis, IV or tube feedings, and a ventilator (breathing machine)  · Values history: This document has questions about your views, beliefs, and how you feel and think about life  This information can help others choose the care that you would choose  Why are advance directives important? An advance directive helps you control your care  Although spoken wishes may be used, it is better to have your wishes written down  Spoken wishes can be misunderstood, or not followed  Treatments may be given even if you do not want them  An advance directive may make it easier for your family to make difficult choices about your care  Weight Management   Why it is important to manage your weight:  Being overweight increases your risk of health conditions such as heart disease, high blood pressure, type 2 diabetes, and certain types of cancer  It can also increase your risk for osteoarthritis, sleep apnea, and other respiratory problems  Aim for a slow, steady weight loss  Even a small amount of weight loss can lower your risk of health problems    How to lose weight safely:  A safe and healthy way to lose weight is to eat fewer calories and get regular exercise  You can lose up about 1 pound a week by decreasing the number of calories you eat by 500 calories each day  Healthy meal plan for weight management:  A healthy meal plan includes a variety of foods, contains fewer calories, and helps you stay healthy  A healthy meal plan includes the following:  · Eat whole-grain foods more often  A healthy meal plan should contain fiber  Fiber is the part of grains, fruits, and vegetables that is not broken down by your body  Whole-grain foods are healthy and provide extra fiber in your diet  Some examples of whole-grain foods are whole-wheat breads and pastas, oatmeal, brown rice, and bulgur  · Eat a variety of vegetables every day  Include dark, leafy greens such as spinach, kale, sarah beth greens, and mustard greens  Eat yellow and orange vegetables such as carrots, sweet potatoes, and winter squash  · Eat a variety of fruits every day  Choose fresh or canned fruit (canned in its own juice or light syrup) instead of juice  Fruit juice has very little or no fiber  · Eat low-fat dairy foods  Drink fat-free (skim) milk or 1% milk  Eat fat-free yogurt and low-fat cottage cheese  Try low-fat cheeses such as mozzarella and other reduced-fat cheeses  · Choose meat and other protein foods that are low in fat  Choose beans or other legumes such as split peas or lentils  Choose fish, skinless poultry (chicken or turkey), or lean cuts of red meat (beef or pork)  Before you cook meat or poultry, cut off any visible fat  · Use less fat and oil  Try baking foods instead of frying them  Add less fat, such as margarine, sour cream, regular salad dressing and mayonnaise to foods  Eat fewer high-fat foods  Some examples of high-fat foods include french fries, doughnuts, ice cream, and cakes  · Eat fewer sweets  Limit foods and drinks that are high in sugar   This includes candy, cookies, regular soda, and sweetened drinks  Exercise:  Exercise at least 30 minutes per day on most days of the week  Some examples of exercise include walking, biking, dancing, and swimming  You can also fit in more physical activity by taking the stairs instead of the elevator or parking farther away from stores  Ask your healthcare provider about the best exercise plan for you  © Copyright [employer] 2018 Information is for End User's use only and may not be sold, redistributed or otherwise used for commercial purposes   All illustrations and images included in CareNotes® are the copyrighted property of A D A M , Inc  or [address] [place] [address] [employer]

## 2022-11-30 NOTE — PROGRESS NOTES
Assessment and Plan:     Problem List Items Addressed This Visit        Endocrine    Type 2 diabetes mellitus without complication, without long-term current use of insulin ([facility] [address] )       Lab Results   Component Value Date    HGBA1C 6 5 (H) 08/08/2022   Under control  Continue current medication  We will re-evaluate at next office visit  Relevant Orders    Glucose, fasting    Hemoglobin A1C       Cardiovascular and Mediastinum    Primary hypertension     Under control  Continue current medication  We will re-evaluate at next office visit  Other    Chronic bilateral low back pain without sciatica     Under control  Continue current medication  We will re-evaluate at next office visit  Has been followed by Orthopedic and Pain Management         Mixed hyperlipidemia     Under control  Continue current medication  We will re-evaluate at next office visit  Other Visit Diagnoses     Medicare annual wellness visit, subsequent    -  Primary    Need for hepatitis C screening test        Relevant Orders    Hepatitis C Antibody ([facility])        BMI Counseling: Body mass index is 41 32 kg/m²  The BMI is above normal  Nutrition recommendations include decreasing portion sizes, decreasing fast food intake, limiting drinks that contain sugar, moderation in carbohydrate intake, increasing intake of lean protein and reducing intake of saturated and trans fat  Exercise recommendations include vigorous physical activity 75 minutes/week  No pharmacotherapy was ordered  Rationale for BMI follow-up plan is due to patient being overweight or obese  Depression Screening and Follow-up Plan: Patient was screened for depression during today's encounter  They screened negative with a PHQ-2 score of 2  Preventive health issues were discussed with patient, and age appropriate screening tests were ordered as noted in patient's After Visit Summary    [facility] advice and appropriate referrals for health education or preventive services given if needed, as noted in patient's After Visit Summary  History of Present Illness:     Patient presents for a Medicare Wellness Visit    Patient here for review of chronic medical problems and review of the labs and imaging if it is applicable  Currently has no specific complaints other than mentioned in the review of systems  Denies chest pain, SOB, cough, abdominal pain, nausea, vomiting, fever, chills, lightheadedness, dizziness,headache, tingling or numbness  No bowel or bladder problem  Patient Care Team:  Dallin Mensah MD as PCP - General (Internal Medicine)     Review of Systems:     Review of Systems   Constitutional: Negative for chills, fatigue and fever  HENT: Negative for congestion, ear pain, rhinorrhea, sneezing and sore throat  Eyes: Negative for redness, itching and visual disturbance  Respiratory: Negative for cough, chest tightness and shortness of breath  Cardiovascular: Negative for chest pain, palpitations and leg swelling  Gastrointestinal: Negative for abdominal pain, blood in stool, diarrhea, nausea and vomiting  Endocrine: Negative for cold intolerance and heat intolerance  Genitourinary: Negative for dysuria, frequency and urgency  Musculoskeletal: Positive for arthralgias, back pain and gait problem  Negative for myalgias  Skin: Negative for color change and rash  Neurological: Positive for weakness (Both hands)  Negative for dizziness, light-headedness, numbness and headaches  Hematological: Does not bruise/bleed easily  Psychiatric/Behavioral: Negative for agitation, behavioral problems and confusion          Problem List:     Patient Active Problem List   Diagnosis   • Cubital tunnel syndrome on right   • Carpal tunnel syndrome, left   • Obesity, morbid (HCC)   • Carpal tunnel syndrome of right wrist   • Atrophy of muscle of right hand   • Primary hypertension   • Type 2 diabetes mellitus without complication, without long-term current use of insulin (HCC)   • Chronic bilateral low back pain without sciatica   • Mixed hyperlipidemia   • Tobacco dependence syndrome      Past Medical and Surgical History:     Past Medical History:   Diagnosis Date   • Diabetes mellitus ([address] )    • ED (erectile dysfunction)    • Hyperlipidemia    • Hypertension    • Obesity      Past Surgical History:   Procedure Laterality Date   • CARPAL TUNNEL RELEASE Right    • EYE SURGERY      strabibusmus   • HAND SURGERY      left index finger   • AL EXCIS SPERMATOCELE Left 2021    Procedure: HYDROCELECTOMY, SCROTAL DEBRIDEMENT;  Surgeon: Solis Blanchard MD;  Location: [address] [place] [address];  Service: Urology   • AL REVISE MEDIAN N/CARPAL TUNNEL SURG Right 2021    Procedure: RELEASE CARPAL TUNNEL;  Surgeon: Imelda Shirley MD;  Location: [facility];  Service: Orthopedics   • AL REVISE ULNAR NERVE AT ELBOW Right 2021    Procedure: Merloril Jagjit;  Surgeon: Imelda Shirley MD;  Location: [facility];  Service: Orthopedics      Family History:     Family History   Problem Relation Age of Onset   • Hypertension Mother    • Diabetes Mother    • Hypertension Father    • Diabetes Father       Social History:     Social History     Socioeconomic History   • Marital status:       Spouse name: None   • Number of children: None   • Years of education: None   • Highest education level: None   Occupational History   • None   Tobacco Use   • Smoking status: Former     Packs/day: 1 00     Years: 47 00     Pack years: 47 00     Types: Cigarettes     Quit date: 2021     Years since quittin 8   • Smokeless tobacco: Never   • Tobacco comments:     advised not to smoke   Vaping Use   • Vaping Use: Never used   Substance and Sexual Activity   • Alcohol use: Not Currently     Comment: social   • Drug use: Not Currently   • Sexual activity: Not Currently   Other Topics Concern   • None Social History Narrative   • None     Social Determinants of Health     Financial Resource Strain: Low Risk    • Difficulty of Paying Living Expenses: Not very hard   Food Insecurity: Not on file   Transportation Needs: No Transportation Needs   • Lack of Transportation (Medical): No   • Lack of Transportation (Non-Medical): No   Physical Activity: Not on file   Stress: Not on file   Social Connections: Not on file   Intimate Partner Violence: Not on file   Housing Stability: Not on file      Medications and Allergies:     Current Outpatient Medications   Medication Sig Dispense Refill   • amLODIPine-benazepril (LOTREL 5-20) 5-20 MG per capsule Take 1 capsule by mouth daily 90 capsule 0   • glimepiride (AMARYL) 2 mg tablet Take 1 tablet (2 mg total) by mouth daily with breakfast 90 tablet 0   • methocarbamol (ROBAXIN) 500 mg tablet Take 1 tablet (500 mg total) by mouth 2 (two) times a day as needed for muscle spasms 20 tablet 0   • pioglitazone-metFORMIN (ACTOPLUS MET)  MG per tablet Take 1 tablet by mouth 2 (two) times a day 180 tablet 0   • ibuprofen (MOTRIN) 600 mg tablet Take 1 tablet (600 mg total) by mouth every 6 (six) hours as needed for mild pain for up to 5 days 20 tablet 0     No current facility-administered medications for this visit  No Known Allergies   Immunizations:     Immunization History   Administered Date(s) Administered   • COVID-19 MODERNA VACC 0 5 ML IM 03/24/2021, 04/23/2021, 11/19/2021   • INFLUENZA 10/10/2020   • Influenza, high dose seasonal 0 7 mL 10/14/2022   • Pneumococcal Conjugate 13-Valent 03/05/2018   • Pneumococcal Polysaccharide PPV23 04/24/2019      Health Maintenance:         Topic Date Due   • Hepatitis C Screening  Never done   • Lung Cancer Screening  08/26/2023 (Originally 9/26/2003)   • Colorectal Cancer Screening  08/26/2023 (Originally 9/26/1998)     There are no preventive care reminders to display for this patient     Medicare Screening Tests and Risk Assessments:     Emily Yepez is here for his Subsequent Wellness visit  Last Medicare Wellness visit information reviewed, patient interviewed and updates made to the record today  Health Risk Assessment:   Patient rates overall health as fair  Patient feels that their physical health rating is slightly worse  Patient is satisfied with their life  Eyesight was rated as same  Hearing was rated as same  Patient feels that their emotional and mental health rating is slightly worse  Patients states they are sometimes angry  Patient states they are sometimes unusually tired/fatigued  Pain experienced in the last 7 days has been a lot  Patient's pain rating has been 8/10  Patient states that he has experienced no weight loss or gain in last 6 months  Fall Risk Screening: In the past year, patient has experienced: history of falling in past year    Injured during fall?: No    Feels unsteady when standing or walking?: Yes    Worried about falling?: Yes      Home Safety:  Patient has trouble with stairs inside or outside of their home  Patient has working smoke alarms and has working carbon monoxide detector  Home safety hazards include: none  Nutrition:   Current diet is Regular  Medications:   Patient is not currently taking any over-the-counter supplements  Patient is able to manage medications  Activities of Daily Living (ADLs)/Instrumental Activities of Daily Living (IADLs):   Walk and transfer into and out of bed and chair?: Yes  Dress and groom yourself?: Yes    Bathe or shower yourself?: Yes    Feed yourself? Yes  Do your laundry/housekeeping?: Yes  Manage your money, pay your bills and track your expenses?: Yes  Make your own meals?: Yes    Do your own shopping?: Yes    Previous Hospitalizations:   Any hospitalizations or ED visits within the last 12 months?: Yes      Hospitalization Comments: ER visit in March    Advance Care Planning:   Living will: No    Durable POA for healthcare:  No Advanced directive: No    Advanced directive counseling given: Yes    Five wishes given: Yes    Patient declined ACP directive: No    End of Life Decisions reviewed with patient: Yes    Provider agrees with end of life decisions: Yes      Cognitive Screening:   Provider or family/friend/caregiver concerned regarding cognition?: No    PREVENTIVE SCREENINGS      Cardiovascular Screening:    General: Screening Not Indicated and History Lipid Disorder      Diabetes Screening:     General: Screening Not Indicated and History Diabetes      Abdominal Aortic Aneurysm (AAA) Screening:    Risk factors include: age between 73-69 yo and tobacco use      Screening, Brief Intervention, and Referral to Treatment (SBIRT)    Screening    Typical number of drinks in a week: 2    AUDIT-C Screenin) How often did you have a drink containing alcohol in the past year? 2 to 4 times a month  2) How many drinks did you have on a typical day when you were drinking in the past year? 1 to 2  3) How often did you have 6 or more drinks on one occasion in the past year? never    AUDIT-C Score: 2  Interpretation: Score 0-3 (male): Negative screen for alcohol misuse    Single Item Drug Screening:  How often have you used an illegal drug (including marijuana) or a prescription medication for non-medical reasons in the past year? never    Single Item Drug Screen Score: 0  Interpretation: Negative screen for possible drug use disorder    No results found  Physical Exam:     /60 (BP Location: Left arm, Patient Position: Sitting, Cuff Size: Standard)   Pulse 74 Comment: irregular  Temp 97 6 °F (36 4 °C) (Tympanic)   Resp 20   Ht 5' 6" (1 676 m)   Wt 116 kg (256 lb)   SpO2 93%   BMI 41 32 kg/m²     Physical Exam  Vitals and nursing note reviewed  Constitutional:       General: He is not in acute distress  Appearance: Normal appearance  He is well-developed  He is obese  He is not ill-appearing, toxic-appearing or diaphoretic  HENT:      Head: Normocephalic and atraumatic  Nose: Nose normal  No congestion or rhinorrhea  Mouth/Throat:      Mouth: Mucous membranes are moist       Pharynx: Oropharynx is clear  Eyes:      General: No scleral icterus  Right eye: No discharge  Left eye: No discharge  Extraocular Movements: Extraocular movements intact  Conjunctiva/sclera: Conjunctivae normal       Pupils: Pupils are equal, round, and reactive to light  Cardiovascular:      Rate and Rhythm: Normal rate and regular rhythm  Pulses: Normal pulses  Heart sounds: Normal heart sounds  No murmur heard  Pulmonary:      Effort: Pulmonary effort is normal  No respiratory distress  Breath sounds: Normal breath sounds  No wheezing  Abdominal:      General: Abdomen is flat  Bowel sounds are normal  There is no distension  Palpations: Abdomen is soft  Tenderness: There is no abdominal tenderness  Musculoskeletal:      Cervical back: Normal range of motion and neck supple  Comments: Atrophy both hands  Bilateral paraspinal muscular spasms lower back   Skin:     General: Skin is warm and dry  Capillary Refill: Capillary refill takes 2 to 3 seconds  Coloration: Skin is not jaundiced  Neurological:      General: No focal deficit present  Mental Status: He is alert and oriented to person, place, and time  Mental status is at baseline  Sensory: Sensory deficit ( both hands) present  Motor: Weakness (both hands) present  Gait: Gait abnormal (ambulates with walker)     Psychiatric:         Mood and Affect: Mood normal           Karl Pulliam MD

## 2022-11-30 NOTE — ASSESSMENT & PLAN NOTE
Quit smoking  Potential consequences of smoking discussed with patient  We will continue to re-evaluate every visit

## 2022-12-22 ENCOUNTER — OFFICE VISIT (OUTPATIENT)
Dept: NEUROSURGERY | Facility: CLINIC | Age: 67
End: 2022-12-22

## 2022-12-22 VITALS
HEART RATE: 76 BPM | SYSTOLIC BLOOD PRESSURE: 140 MMHG | WEIGHT: 256 LBS | HEIGHT: 66 IN | TEMPERATURE: 98.3 F | DIASTOLIC BLOOD PRESSURE: 72 MMHG | BODY MASS INDEX: 41.14 KG/M2 | OXYGEN SATURATION: 96 %

## 2022-12-22 DIAGNOSIS — M48.062 SPINAL STENOSIS OF LUMBAR REGION WITH NEUROGENIC CLAUDICATION: ICD-10-CM

## 2022-12-22 DIAGNOSIS — G89.4 CHRONIC PAIN SYNDROME: ICD-10-CM

## 2022-12-22 PROBLEM — M48.061 SPINAL STENOSIS OF LUMBAR REGION AT MULTIPLE LEVELS: Status: ACTIVE | Noted: 2022-12-22

## 2022-12-22 NOTE — ASSESSMENT & PLAN NOTE
Patient seen for evaluation of spinal stenosis with RLE heaviness  · 2-3/10 LBP with RLE heaviness/lead weight feeling with walking  If walks too far, feels like his legs are going to cave in  No BBI  · Exam: no low back TTP, right HF 4/5 otherwise 5/5 throughout  LT intact, 2+ DTRs, no clonus  Imaging:   · MRI lumbar spine 9/13/22: No omental spinal canal narrowing with superimposed degenerative changes throughout the lumbar spine resulting in moderate to severe central stenosis L1-2 through L4-5  Severe facet degenerative change L4-5 with annular bulge eccentric to the right results in severe right foraminal narrowing  · XR lumbar spine 9/13/22: Advanced multilevel degenerative disc disease of the lumbar spine and mild grade 1 anterolisthesis at L4-L5  No compression fracture    Plan:   · Reviewed images with patient and Dr Tana Aguirre  He has evidence of spinal stenosis at multiple levels, but notably at L4-5 with right foraminal stenosis which would account for his symptoms  · He would like to avoid surgery at this point and exhaust all conservative options  · Recommend PT evaluation again  Referral placed  Discussed weight loss  Follow up with pain management for possible injections  · Reviewed that if his symptoms change or worsen, advised to call the office  · Follow up as needed  Call with any questions or concerns

## 2022-12-22 NOTE — PROGRESS NOTES
Neurosurgery Office Note  Altagracia Dawkins 69 y o  male MRN: [id number]      Assessment/Plan     Spinal stenosis of lumbar region at multiple levels  Patient seen for evaluation of spinal stenosis with RLE heaviness  · 2-3/10 LBP with RLE heaviness/lead weight feeling with walking  If walks too far, feels like his legs are going to cave in  No BBI  · Exam: no low back TTP, right HF 4/5 otherwise 5/5 throughout  LT intact, 2+ DTRs, no clonus  Imaging:   · MRI lumbar spine 9/13/22: No omental spinal canal narrowing with superimposed degenerative changes throughout the lumbar spine resulting in moderate to severe central stenosis L1-2 through L4-5  Severe facet degenerative change L4-5 with annular bulge eccentric to the right results in severe right foraminal narrowing  · XR lumbar spine 9/13/22: Advanced multilevel degenerative disc disease of the lumbar spine and mild grade 1 anterolisthesis at L4-L5  No compression fracture    Plan:   · Reviewed images with patient and Dr Bess Filter  He has evidence of spinal stenosis at multiple levels, but notably at L4-5 with right foraminal stenosis which would account for his symptoms  · He would like to avoid surgery at this point and exhaust all conservative options  · Recommend PT evaluation again  Referral placed  Discussed weight loss  Follow up with pain management for possible injections  · Reviewed that if his symptoms change or worsen, advised to call the office  · Follow up as needed  Call with any questions or concerns  Diagnoses and all orders for this visit:    Spinal stenosis of lumbar region with neurogenic claudication  -     Ambulatory referral to Neurosurgery  -     Ambulatory Referral to Physical Therapy;  Future    Chronic pain syndrome  -     Ambulatory referral to Neurosurgery          I spent 40 minutes with the patient today in which >50% of the time was spent counseling/coordination of care regarding diagnosis, imaging review, symptoms and treatment plan  CHIEF COMPLAINT    Chief Complaint   Patient presents with   • Consult     Spinal stenosis of lumbar region with neurogenic claudication       HISTORY    History of Present Illness     69y o  year old male     69year old gentleman seen for evaluation of LBP with right leg heaviness  Has had back pain x 5 years, started after he went to step on a mat and had a twinge in his back  About 5 months later it came back  With a muscle relaxer he did ok for a couple years  In 3/2022 he started with right sided back and flank pain  His right leg feels like a lead weight and at times feels like he has trouble lifting it  Describes it as the right leg feeling heavy and tight  Currently his back pain is 2-3/10  He has been ambulating with a walker since his right leg gave out on him causing him to fall  If he walks around the grocery store, he can't go as far because he feels like both legs are going to cave in  Leaning forward on the cart helps him go farther  No BBI  He has tried PT which helped at the time, but the next day his pain was worse  Saw pain management, but has not had injections yet  Hx of DM2 and morbid obesity  Last hemoglobin A1c of 6 5  No MI/CVA  No CP/SOB  Hx of right carpal and cubital tunnel release with right claw hand for about a year  Right handed  See Discussion    REVIEW OF SYSTEMS    Review of Systems   Constitutional: Negative  HENT: Negative  Eyes: Negative  Respiratory: Negative  Cardiovascular: Negative  Gastrointestinal: Negative  Endocrine: Negative  Genitourinary:        Occasional urinary incontinence "once in a blue moon," occasional urinary hesitancy   Musculoskeletal: Positive for gait problem (using rolling walker, feels wobbly when walking, right leg has given out) and myalgias (right leg feels tight and stiff)          PT- first session help at the time, next day pain was worse, next few session aggravated even more, then d/c PT and sent to PM  PM- no injections, wanted him to see nsx first   Skin: Negative  Allergic/Immunologic: Negative  Neurological: Positive for weakness (right leg feels like lead, has given out) and numbness (a little bit in the bottom of right foot)  Hematological: Negative  Psychiatric/Behavioral: Positive for sleep disturbance (sometimes leg discomfort keeps him up)  All other systems reviewed and are negative  Meds/Allergies     Current Outpatient Medications   Medication Sig Dispense Refill   • amLODIPine-benazepril (LOTREL 5-20) 5-20 MG per capsule Take 1 capsule by mouth daily 90 capsule 0   • glimepiride (AMARYL) 2 mg tablet Take 1 tablet (2 mg total) by mouth daily with breakfast 90 tablet 0   • methocarbamol (ROBAXIN) 500 mg tablet Take 1 tablet (500 mg total) by mouth 2 (two) times a day as needed for muscle spasms 20 tablet 0   • pioglitazone-metFORMIN (ACTOPLUS MET)  MG per tablet Take 1 tablet by mouth 2 (two) times a day 180 tablet 0     No current facility-administered medications for this visit         No Known Allergies    PAST HISTORY    Past Medical History:   Diagnosis Date   • Diabetes mellitus ([facility] [address] )    • ED (erectile dysfunction)    • Hyperlipidemia    • Hypertension    • Obesity    • Spinal stenosis of lumbar region at multiple levels 12/22/2022       Past Surgical History:   Procedure Laterality Date   • CARPAL TUNNEL RELEASE Right    • EYE SURGERY      strabibusmus   • HAND SURGERY      left index finger   • HI EXCIS SPERMATOCELE Left 09/16/2021    Procedure: HYDROCELECTOMY, SCROTAL DEBRIDEMENT;  Surgeon: Anthony Goodman MD;  Location: [address] [place] [address];  Service: Urology   • HI REVISE MEDIAN N/CARPAL TUNNEL SURG Right 07/06/2021    Procedure: RELEASE CARPAL TUNNEL;  Surgeon: Darwin Butt MD;  [facility] [place] [facility];  Service: Orthopedics   • HI REVISE ULNAR NERVE AT ELBOW Right 07/06/2021    Procedure: RELEASE CUBITAL TUNNEL;  Surgeon: Darwin Butt MD; [place] [facility];  Service: Orthopedics       Social History     Tobacco Use   • Smoking status: Former     Packs/day: 1 00     Years: 47 00     Pack years: 47 00     Types: Cigarettes     Quit date: 2021     Years since quittin 9   • Smokeless tobacco: Never   • Tobacco comments:     advised not to smoke   Vaping Use   • Vaping Use: Never used   Substance Use Topics   • Alcohol use: Not Currently     Comment: social   • Drug use: Not Currently       Family History   Problem Relation Age of Onset   • Hypertension Mother    • Diabetes Mother    • Hypertension Father    • Diabetes Father          Above history personally reviewed  EXAM    Vitals:Blood pressure 140/72, pulse 76, temperature 98 3 °F (36 8 °C), temperature source Temporal, height 5' 6" (1 676 m), weight 116 kg (256 lb), SpO2 96 %  ,Body mass index is 41 32 kg/m²  Physical Exam  Vitals reviewed  Constitutional:       General: He is awake  Appearance: Normal appearance  HENT:      Head: Normocephalic and atraumatic  Eyes:      Conjunctiva/sclera: Conjunctivae normal    Neck:      Comments: No neck TTP  Cardiovascular:      Rate and Rhythm: Normal rate  Pulmonary:      Effort: Pulmonary effort is normal    Musculoskeletal:         General: No tenderness  Cervical back: No tenderness  Comments: No TTP midline spine or paraspinal muscles  No pain with ROM  Right claw hand, left hand muscle wasting  Skin:     General: Skin is warm and dry  Neurological:      Mental Status: He is alert and oriented to person, place, and time  Deep Tendon Reflexes:      Reflex Scores:       Bicep reflexes are 3+ on the right side and 3+ on the left side  Brachioradialis reflexes are 2+ on the right side and 2+ on the left side  Patellar reflexes are 2+ on the right side and 2+ on the left side  Achilles reflexes are 2+ on the right side and 2+ on the left side    Psychiatric:         Attention and Perception: Attention and perception normal          Mood and Affect: Mood and affect normal          Speech: Speech normal          Behavior: Behavior normal  Behavior is cooperative  Thought Content: Thought content normal          Cognition and Memory: Cognition and memory normal          Judgment: Judgment normal          Neurologic Exam     Mental Status   Oriented to person, place, and time  Attention: normal  Concentration: normal    Speech: speech is normal   Level of consciousness: alert  Knowledge: good  Normal comprehension  Motor Exam   RUE - deltoid/biceps/WE 5/5, triceps/WF 4/5,  4/5, IO 2/5, claw hand, pt has to lift his fingers to extend/lift them but is able grasp hand  LUE - 5/5 throughout  RLE - 4/5 right HF, otherwise 5/5  LLE - 5/5 throughout     Sensory Exam   Right arm light touch: normal  Left arm light touch: normal  Right leg light touch: normal  Left leg light touch: normal    Gait, Coordination, and Reflexes     Reflexes   Right brachioradialis: 2+  Left brachioradialis: 2+  Right biceps: 3+  Left biceps: 3+  Right patellar: 2+  Left patellar: 2+  Right achilles: 2+  Left achilles: 2+  Right ankle clonus: absent  Left ankle clonus: absent  Ambulates with walker          MEDICAL DECISION MAKING    Imaging Studies:     No results found  I have personally reviewed pertinent reports     and I have personally reviewed pertinent films in PACS

## 2023-01-10 DIAGNOSIS — E11.9 TYPE 2 DIABETES MELLITUS WITHOUT COMPLICATION, WITHOUT LONG-TERM CURRENT USE OF INSULIN (HCC): ICD-10-CM

## 2023-01-10 DIAGNOSIS — I10 BENIGN ESSENTIAL HYPERTENSION: ICD-10-CM

## 2023-01-10 RX ORDER — AMLODIPINE BESYLATE AND BENAZEPRIL HYDROCHLORIDE 5; 20 MG/1; MG/1
1 CAPSULE ORAL DAILY
Qty: 90 CAPSULE | Refills: 0 | Status: SHIPPED | OUTPATIENT
Start: 2023-01-10

## 2023-01-10 RX ORDER — GLIMEPIRIDE 2 MG/1
2 TABLET ORAL
Qty: 90 TABLET | Refills: 0 | Status: SHIPPED | OUTPATIENT
Start: 2023-01-10

## 2023-01-10 RX ORDER — PIOGLITAZONE HCL AND METFORMIN HCL 500; 15 MG/1; MG/1
1 TABLET ORAL 2 TIMES DAILY
Qty: 180 TABLET | Refills: 0 | Status: SHIPPED | OUTPATIENT
Start: 2023-01-10

## 2023-01-10 NOTE — TELEPHONE ENCOUNTER
Hi, this is [employer]  I'm calling because what I want to do is change my pharmacy from [facility] and get my maintenance drugs through the mail with my prescription plan  So I guess you have to call it in  Probably need the refills in about 3 weeks  So I'm hoping that we can get it together and I can get them through the mail by then  But if you could give me a call when you have time  [phone] Area code 610  Talk to you soon

## 2023-01-16 ENCOUNTER — EVALUATION (OUTPATIENT)
Dept: PHYSICAL THERAPY | Facility: REHABILITATION | Age: 68
End: 2023-01-16

## 2023-01-16 DIAGNOSIS — M48.062 SPINAL STENOSIS OF LUMBAR REGION WITH NEUROGENIC CLAUDICATION: Primary | ICD-10-CM

## 2023-01-16 NOTE — PROGRESS NOTES
PT Evaluation     Today's date: 2023  Patient name: Rishi Etienne  : 1955  MRN: [id number]  Referring provider: Liz Becker PA-C  Dx:   Encounter Diagnosis     ICD-10-CM    1  Spinal stenosis of lumbar region with neurogenic claudication  [id number] Ambulatory Referral to Physical Therapy          Start Time: 1215  Stop Time: 1305  Total time in clinic (min): 50 minutes    Assessment  Assessment details: Rishi Etienne is a 69y o  year old male who presents with chronic midline to right sided low back pain  Current deficits include limited ROM, impaired RLE strength, increased neural tension, and limited positional and activity tolerance  These deficits impair his ability to perform all typical I/ADLs, household tasks, and social/recreational activities  Recommend skilled PT services to address previously stated deficits to promote progress towards PLOF  Impairments: abnormal gait, abnormal muscle firing, abnormal or restricted ROM, activity intolerance, impaired balance, impaired physical strength, lacks appropriate home exercise program, pain with function and poor body mechanics  Understanding of Dx/Px/POC: good   Prognosis: good    Goals  STG (4 weeks):  1  R lumbar side bend ROM without symptom provocation to promote improved activity tolerance  2  Increase R hip IR strength to at least 4-/5 for improved activity tolerance  3  Decrease pain at worst from 5/10 to 3/10 or less for improved QoL  LTG (8 weeks):  1  Increased global hip girdle strength to at least 4+/5 to promote improved activity tolerance  2  Able to stand for at least 30 minutes without symptom exacerbation to promote improved ability to cook dinner  3  Able to walk at least 10 minutes without symptom exacerbation to promote improved community ambulation  4  Able to ascend/descend 1 flight of stairs without compensation to promote improved household and community ambulation  5  Independent with HEP      Plan  Plan details: Thank you for referring Albina Candelario to Physical Therapy at [place] [address] and for the opportunity to coordinate care  Patient would benefit from: skilled physical therapy  Planned modality interventions: cryotherapy, electrical stimulation/Russian stimulation, TENS, thermotherapy: hydrocollator packs and unattended electrical stimulation  Planned therapy interventions: abdominal trunk stabilization, activity modification, ADL retraining, balance, balance/weight bearing training, behavior modification, body mechanics training, breathing training, fine motor coordination training, flexibility, functional ROM exercises, gait training, graded activity, graded exercise, graded motor, home exercise program, work reintegration, transfer training, therapeutic training, therapeutic exercise, therapeutic activities, stretching, strengthening, sensory integrative techniques, self care, postural training, patient education, neuromuscular re-education, muscle pump exercises, motor coordination training, Elias taping, manual therapy, joint mobilization and IADL retraining  Frequency: 2x week  Duration in visits: 10  Plan of Care beginning date: 1/16/2023  Treatment plan discussed with: patient        Subjective Evaluation    History of Present Illness  Mechanism of injury:   01/16/23:  Albina Candelario presents to skilled physical therapy with complaints of chronic low back pain; states pain has started to go down into the R leg  Patient reports symptoms began in March of 2022  Kolton Huber reports pain located at midline of the low back to the R anterior lateral thigh  Since onset, symptoms have worsened  Kolton Huber previously did PT for these symptoms 3x/week for about a month in June 2022 but states symptoms worsened over time  Jennifermurray Cecile then followed up with pain management who referred him to neurosurgery   Currently, Kolton Huber is having difficulty with walking, prolonged sitting and standing, bending to pick something up, balance, donning socks/shoes and stair navigation  States his R leg is weaker than his L  Patient denies difficulty sleeping secondary to low back pain  Patient admits to intermittent numbness, tingling in right foot  Denies bowel and bladder incontinence  Pain  Current pain rating: 3  At best pain ratin  At worst pain ratin  Location: low back  Relieving factors: medications    Social Support  Steps to enter house: no  Stairs in house: yes   Lives in: multiple-level home  Lives with: alone    Employment status: not working (retired)    Diagnostic Tests  X-ray: abnormal  MRI studies: abnormal  Treatments  Previous treatment: physical therapy and medication  Current treatment: physical therapy  Patient Goals  Patient goals for therapy: independence with ADLs/IADLs, return to sport/leisure activities and increased motion          Objective     Palpation   Left   No palpable tenderness to the erector spinae and lumbar paraspinals  Right   No palpable tenderness to the erector spinae and lumbar paraspinals  Tenderness     Lumbar Spine  No tenderness in the spinous process  Left Hip   No tenderness in the PSIS  Right Hip   No tenderness in the PSIS  Active Range of Motion     Lumbar   Left lateral flexion: Active left lumbar lateral flexion: R lateral thigh tightness  Restriction level: moderate  Right lateral flexion:  WFL  Left rotation:  Restriction level: moderate  Right rotation: Active right lumbar rotation: R lateral leg tightness  Restriction level: moderate  Left Hip   Flexion: WFL  Abduction: [facility]  External rotation (90/90): [facility]  Internal rotation (90/90): WFL    Right Hip   Flexion: WFL  Abduction: [facility]  External rotation (90/90): [facility]  Internal rotation (90/90):  [facility]    Strength/Myotome Testing     Left Hip   Planes of Motion   Flexion: 5  Abduction: 5  Adduction: 5  External rotation: 5  Internal rotation: 5    Right Hip   Planes of Motion   Flexion: 4-  Abduction: 4+  Adduction: 5  External rotation: 4  Internal rotation: 3-    Tests     Lumbar     Left   Positive femoral stretch  Negative crossed SLR, passive SLR and slump test      Right   Positive femoral stretch     Negative crossed SLR, passive SLR and slump test              Precautions: Diabetes, hyperlipidemia, HTN, lumbar spinal stenosis    * Indicates part of HEP     Daily Treatment Diary     Manuals 1/16          Lumbar distraction w/ feet on ball Try nv                                Therapeutic Exercise           Bike  nv          Hamstring stretch           Quad stretch* Seated 10x5" ea          Seated ball roll out Try nv          Clamshells nv          SLR flexion           SLR abduction           Paloff press           Supine hip extension bridge on ball           Supine lateral ball roll                                 Patient education done                     Neuro Re-ed           Slump slider           Femoral nerve glide nv          ADIM with PPT           ADIM with kate           ADIM with BKFO           Bridges nv          Supine ball crush nv          Standing ball crush           Standing ball crush kate           [employer] holds           Dead bug           Plank shoulder taps                                                       Therapeutic Activity           Sit to stands           Leg press           Band resisted side stepping           Monster walks           Goblet squat           Bent over row           Deadlift           Farmer's carry                                            Modalities

## 2023-01-19 ENCOUNTER — CONSULT (OUTPATIENT)
Dept: NEUROLOGY | Facility: CLINIC | Age: 68
End: 2023-01-19

## 2023-01-19 VITALS — DIASTOLIC BLOOD PRESSURE: 70 MMHG | SYSTOLIC BLOOD PRESSURE: 100 MMHG | HEART RATE: 90 BPM

## 2023-01-19 DIAGNOSIS — R29.2 HYPERREFLEXIA: ICD-10-CM

## 2023-01-19 DIAGNOSIS — M62.541 ATROPHY OF MUSCLE OF RIGHT HAND: Primary | ICD-10-CM

## 2023-01-19 DIAGNOSIS — G56.23 CUBITAL TUNNEL SYNDROME, BILATERAL: ICD-10-CM

## 2023-01-19 DIAGNOSIS — G56.03 BILATERAL CARPAL TUNNEL SYNDROME: ICD-10-CM

## 2023-01-19 NOTE — PATIENT INSTRUCTIONS
Patient Instructions:  -please schedule your MRI cervical spine with and without contrast  -please schedule your EMG of your bilateral upper extremities  -occupational therapy referral  -follow up in about 4-5 months

## 2023-01-19 NOTE — PROGRESS NOTES
[address] Neurology Consult  PATIENT:  Timothy Quintero  MRN:  [id number]  :  1955  DATE OF SERVICE:  2023  REFERRED BY: Sheyla Benson MD  PMD: Simone Nunes MD    Assessment/Plan:     Timothy Quintero is a very pleasant 69 y o  male with a past medical history that includes lumbar spinal stenosis at multiple levels, T2DM, HLD, HTN, carpal tunnel syndrome, and obesity who is presenting for a consult regarding atrophy of his right hand  Bilateral CTS  Bilateral cubital tunnel syndrome  BUE hyperreflexia  -exam notable for bilateral UE weakness detailed further below, hyperreflexia and positive nelson's  Given findings, recommend further imaging with MRI cervical spine  Previously found to have bilateral CTS and cubital tunnel on EMG and is now s/p carpal/cubital tunnel release on the R  -will repeat EMG bilateral UE  -will likely need to f/u with orthopedic surgery for possible intervention on his LUE as well given previous findings, but will await further testing to assess degree of cervical involvement  -occupational therapy referral    CC:   R hand atrophy    History of Present Illness:     69 y o  male with a past medical history significant for lumbar spinal stenosis at multiple levels, T2DM, HLD, HTN, carpal tunnel syndrome, and obesity who is presenting for a consult regarding atrophy of his right hand  This started 7 years ago when he initially noticed weakness in his 4th and 5th digits that progressed to difficulty extending/flexing and using those fingers  This has now progressed to his entire hand over the last several years  He notices the pattern of weakness is occurring in a stepwise fashion, with an increase in weakness and loss of function each winter  Last year, his thumb and 2nd digit were working well, but this past winter, they have lost function and he is having difficulty extending and gripping objects with those fingers as well   He also notes atrophy of his right forearm progressing slowly over a similar time frame  He denies any pain, tingling, or twitching in his right arm and hand  He denies any pain in his neck  EMG performed 4/20/21:    1  There is evidence of a bilateral median nerve entrapment neuropathy at the wrist  It is of a moderate degree on the left and a mild to moderate degree on the right  There is evidence of reinnervation changes suggestive of a chronic process  This is consistent with a diagnosis of carpal tunnel syndrome   2  There is evidence of an ulnar neuropathy on the right at or below the elbow predominantly axonal with secondary demyelination across the elbow  There is evidence of denervation as well as reinnervation changes in ulnar innervated muscles consistent with a severe with on ongoing process of a 4 to 6 week duration  This is consistent with a diagnosis of cubital tunnel syndrome   3  There is evidence of an ulnar neuropathy on the left at or below the elbow predominantly axonal with denervation noted in ulnar innervated muscles below the elbow and reinnervation consistent with a chronic process but now with acute denervation suggestive of duration of 4 to 6 week duration   But this is consistent with a diagnosis of cubital tunnel syndrome   There is no evidence of a cervical radiculopathy or a brachial given the lack of other muscle involvement as well as normal paraspinal testing     Throughout 2021, he did see occupational therapy, but stopped because he was told it was not helping his strength  In July of 2021, during the time he was seeing OT, he had a carpal and cubital tunnel release on his right, which improved function for several months until it became cold and he regressed  Over the last several months, he has now noticed the same pattern beginning in his left hand, beginning with weakness and loss of some functionality of the 4th and 5th digits  He denies any trauma to the head or neck          Past Medical History:     Past Medical History:   Diagnosis Date   • Diabetes mellitus ([facility] [address] )    • ED (erectile dysfunction)    • Hyperlipidemia    • Hypertension    • Obesity    • Spinal stenosis of lumbar region at multiple levels 12/22/2022       Patient Active Problem List   Diagnosis   • Cubital tunnel syndrome on right   • Carpal tunnel syndrome, left   • Obesity, morbid ([facility] [address] )   • Carpal tunnel syndrome of right wrist   • Atrophy of muscle of right hand   • Primary hypertension   • Type 2 diabetes mellitus without complication, without long-term current use of insulin ([facility])   • Chronic bilateral low back pain without sciatica   • Mixed hyperlipidemia   • Tobacco dependence syndrome   • Spinal stenosis of lumbar region at multiple levels       Medications:      Current Outpatient Medications   Medication Sig Dispense Refill   • amLODIPine-benazepril (LOTREL 5-20) 5-20 MG per capsule Take 1 capsule by mouth daily 90 capsule 0   • glimepiride (AMARYL) 2 mg tablet Take 1 tablet (2 mg total) by mouth daily with breakfast 90 tablet 0   • methocarbamol (ROBAXIN) 500 mg tablet Take 1 tablet (500 mg total) by mouth 2 (two) times a day as needed for muscle spasms 20 tablet 0   • pioglitazone-metFORMIN (ACTOPLUS MET)  MG per tablet Take 1 tablet by mouth 2 (two) times a day 180 tablet 0     No current facility-administered medications for this visit  Allergies:    No Known Allergies    Family History:     Family History   Problem Relation Age of Onset   • Hypertension Mother    • Diabetes Mother    • Hypertension Father    • Diabetes Father        Social History:       Social History     Socioeconomic History   • Marital status:       Spouse name: Not on file   • Number of children: Not on file   • Years of education: Not on file   • Highest education level: Not on file   Occupational History   • Not on file   Tobacco Use   • Smoking status: Former     Packs/day: 1 00     Years: 47 00     Pack years: 47 00     Types: Cigarettes Quit date: 2021     Years since quittin 0   • Smokeless tobacco: Never   • Tobacco comments:     advised not to smoke   Vaping Use   • Vaping Use: Never used   Substance and Sexual Activity   • Alcohol use: Not Currently     Comment: social   • Drug use: Not Currently   • Sexual activity: Not Currently   Other Topics Concern   • Not on file   Social History Narrative   • Not on file     Social Determinants of Health     Financial Resource Strain: Low Risk    • Difficulty of Paying Living Expenses: Not very hard   Food Insecurity: Not on file   Transportation Needs: No Transportation Needs   • Lack of Transportation (Medical): No   • Lack of Transportation (Non-Medical): No   Physical Activity: Not on file   Stress: Not on file   Social Connections: Not on file   Intimate Partner Violence: Not on file   Housing Stability: Not on file         Objective:   /70 (BP Location: Left arm, Patient Position: Sitting, Cuff Size: Large)   Pulse 90     General: Patient is not in any acute/apparent distress, well nourished, well developed and cooperative  HEENT: normocephalic, atraumatic, moist membranes  Neck: No TTP  Extremities: no edema noted   Skin: no lesions or rash  Musculosketal: no bony abnormalities    Neurologic Examination:   Mental status: alert, awake, oriented X 3 and following commands  Speech/Language: Speech is fluent without any dysarthria, no aphasia noted, can name, repeat, read and write and comprehension intact    Cranial Nerves:   CN I: smell not tested  CN II: Visual fields full to confrontation, fundus - no papilledema noted  CN III, IV, VI: Extraocular movements intact bilaterally  Pupils equal round and reactive to light bilaterally  CN V: Facial sensation is normal   CN VII: Full and symmetric facial movement  CN VIII: Hearing is normal   CN IX, X: Palate elevates symmetrically     CN XI: Shoulder shrug strength is normal   CN XII: Tongue midline without atrophy or fasciculations  Motor:   3/5 RUE extension, 4-/5 LUE extension  4+/5 BUE flexion   3/5 bilaterally  Decreased bulk in bilateral UE  Bilateral thenar atrophy R>L  RLE hip flexion 4/5  Otherwise strength 5/5  RUE claw hand  Fasiculations - none    Sensory:   Sensation intact to soft touch, vibration and pinprick in all 4 extremities  Cerebellar:   Finger-to-nose intact, normal heel to shin  Reflexes: 3+ in bilateral UE 4 extremities  2+ throughout BLE  Pathologic reflexes - Hoffmans reflex - positive    Gait:   Antalgic gait  Romberg negative       Review of Systems:     Review of Systems   Constitutional: Negative  Negative for appetite change and fever  HENT: Negative  Negative for hearing loss, tinnitus, trouble swallowing and voice change  Eyes: Negative  Negative for photophobia, pain and visual disturbance  Respiratory: Negative  Negative for shortness of breath  Cardiovascular: Negative  Negative for palpitations  Gastrointestinal: Negative  Negative for nausea and vomiting  Endocrine: Negative  Negative for cold intolerance  Genitourinary: Negative  Negative for dysuria, frequency and urgency  Musculoskeletal: Negative  Negative for gait problem, myalgias and neck pain  Skin: Negative  Negative for rash  Allergic/Immunologic: Negative  Neurological: Positive for numbness  Negative for dizziness, tremors, seizures, syncope, facial asymmetry, speech difficulty, weakness, light-headedness and headaches  Patient states R hand numbness  Starting to feel slight same symptoms on L hand  Difficulty opening and closing on R hand  Hematological: Negative  Does not bruise/bleed easily  Psychiatric/Behavioral: Negative  Negative for confusion, hallucinations and sleep disturbance       I have spent 65 minutes with Patient today in which greater than 50% of this time was spent in counseling/coordination of care regarding Risks and benefits of tx options, Intructions for management, Patient and family education, Risk factor reductions and Impressions  I also spent 20 minutes non face to face for this patient the same day

## 2023-01-23 ENCOUNTER — OFFICE VISIT (OUTPATIENT)
Dept: PHYSICAL THERAPY | Facility: REHABILITATION | Age: 68
End: 2023-01-23

## 2023-01-23 DIAGNOSIS — M48.062 SPINAL STENOSIS OF LUMBAR REGION WITH NEUROGENIC CLAUDICATION: Primary | ICD-10-CM

## 2023-01-23 NOTE — PROGRESS NOTES
Daily Note     Today's date: 2023  Patient name: Adrianne Rabago  : 1955  MRN: [id number]  Referring provider: Leonila Brown PA-C  Dx:   Encounter Diagnosis     ICD-10-CM    1  Spinal stenosis of lumbar region with neurogenic claudication  M48 062           Start Time: 1133  Stop Time: 1220  Total time in clinic (min): 47 minutes    Subjective: Adam Lopezedel reports he followed up with MD regarding his hand and was referred for OT  Denies any significant changes since IE regarding the low back pain  Objective: See treatment diary below      Assessment: Tolerated treatment well  Difficulty noted with bed mobility during session  Good tolerance to initiation of POC this session with no reports of pain or discomfort  Patient demonstrated appropriate level of challenge and muscular fatigue throughout session and noted good status at end of visit  Updated and reviewed HEP with patient; patient verbalized and demonstrated understanding of all exercises  Patient demonstrated fatigue post treatment, exhibited good technique with therapeutic exercises and would benefit from continued PT  Plan: Continue per plan of care  Progress treatment as tolerated         Precautions: Diabetes, hyperlipidemia, HTN, lumbar spinal stenosis    * Indicates part of HEP   HEP code: [id number]     Daily Treatment Diary     Manuals          Lumbar distraction w/ feet on ball Try nv done                               Therapeutic Exercise           Bike (muscular endurance) nv 5' lvl 0         Hamstring stretch           Quad stretch* Seated 10x5" ea Seated 10x5" R         Seated ball roll out Try nv 30x5"         Clamshells* nv Supine GTB 3x10         SLR flexion           SLR abduction           Paloff press           Supine hip extension bridge on ball           Supine lateral ball roll                                 Patient education done                     Neuro Re-ed           Slump slider  15x3" ea Femoral nerve glide nv 15x3" R supine w/ leg off table         ADIM with PPT           ADIM with kate           ADIM with BKFO           Bridges* nv 3x10         Supine ball crush nv 2x10x3"         Standing ball crush           Standing ball crush kate           Bird dogs           Bear holds           Dead bug           Plank shoulder taps                                                       Therapeutic Activity           Sit to stands           Leg press           Band resisted side stepping           Monster walks           Goblet squat           Bent over row           Deadlift           Farmer's carry                                            Modalities

## 2023-01-25 ENCOUNTER — OFFICE VISIT (OUTPATIENT)
Dept: PHYSICAL THERAPY | Facility: REHABILITATION | Age: 68
End: 2023-01-25

## 2023-01-25 DIAGNOSIS — M48.062 SPINAL STENOSIS OF LUMBAR REGION WITH NEUROGENIC CLAUDICATION: Primary | ICD-10-CM

## 2023-01-25 NOTE — PROGRESS NOTES
Daily Note     Today's date: 2023  Patient name: Elidia Garcia  : 1955  MRN: [id number]  Referring provider: Amaya Nassar PA-C  Dx:   Encounter Diagnosis     ICD-10-CM    1  Spinal stenosis of lumbar region with neurogenic claudication  M48 062           Start Time: 1000  Stop Time: 1048  Total time in clinic (min): 48 minutes    Subjective: Avery Thomas reports a heavy sensation in the R leg following bridges; otherwise, no complaints since last session  Objective: See treatment diary below      Assessment: Tolerated treatment well  Held bridges this session secondary to symptom provocation  Introduced ADIM exercises for improved inner unit stability with good tolerance; difficulty maintaining TA engagement with LE movements  Patient demonstrated appropriate level of challenge and muscular fatigue throughout session and noted good status at end of visit  Updated and reviewed HEP with patient; patient verbalized and demonstrated understanding of all exercises  Patient demonstrated fatigue post treatment, exhibited good technique with therapeutic exercises and would benefit from continued PT  Plan: Continue per plan of care  Progress treatment as tolerated         Precautions: Diabetes, hyperlipidemia, HTN, lumbar spinal stenosis    * Indicates part of HEP   HEP code: [id number]     Daily Treatment Diary     Manuals         Lumbar distraction w/ feet on ball Try nv done np                              Therapeutic Exercise           Bike (muscular endurance) nv 5' lvl 0 5' lvl 1        Hamstring stretch           Quad stretch* Seated 10x5" ea Seated 10x5" R         Seated ball roll out Try nv 30x5"         Clamshells* nv Supine GTB 3x10 Supine GTB 3x12        SLR flexion           SLR abduction           Paloff press           Supine hip extension bridge on ball           Supine lateral ball roll                                 Patient education done                     Neuro Re-ed           Slump slider  15x3" ea         Femoral nerve glide nv 15x3" R supine w/ leg off table 15x3" R supine w/ leg off table        ADIM with PPT   2x10        ADIM with marches   10x        ADIM with BKFO           Bridges* nv 3x10         Supine ball crush nv 2x10x3"         Standing ball crush           Standing ball crush kate           Bird dogs           Bear holds           Dead bug           Plank shoulder taps                                                       Therapeutic Activity           Sit to stands   3x10 from 24" table        Leg press           Band resisted side stepping           Monster walks           Goblet squat           Bent over row           Deadlift           Farmer's carry                                            Modalities

## 2023-01-30 ENCOUNTER — OFFICE VISIT (OUTPATIENT)
Dept: PHYSICAL THERAPY | Facility: REHABILITATION | Age: 68
End: 2023-01-30

## 2023-01-30 DIAGNOSIS — M48.062 SPINAL STENOSIS OF LUMBAR REGION WITH NEUROGENIC CLAUDICATION: Primary | ICD-10-CM

## 2023-01-30 NOTE — PROGRESS NOTES
Daily Note     Today's date: 2023  Patient name: Becky Srivastava  : 1955  MRN: [id number]  Referring provider: Lazara Khan PA-C  Dx:   Encounter Diagnosis     ICD-10-CM    1  Spinal stenosis of lumbar region with neurogenic claudication  M48 062           Start Time: 1130  Stop Time: 1219  Total time in clinic (min): 49 minutes    Subjective: Victorino Zhao reports his low back and leg are feeling good at start of session  Objective: See treatment diary below      Assessment: Tolerated treatment well  Good response to all exercises throughout session without reports of symptoms  Improved coordination of ADIM marches this session but increased challenge lifting the R leg compared to the L leg  Patient demonstrated appropriate level of challenge and muscular fatigue throughout session and noted good status at end of visit  Patient demonstrated fatigue post treatment, exhibited good technique with therapeutic exercises and would benefit from continued PT  Plan: Continue per plan of care  Progress treatment as tolerated         Precautions: Diabetes, hyperlipidemia, HTN, lumbar spinal stenosis    * Indicates part of HEP   HEP code: [id number]     Daily Treatment Diary     Manuals        Lumbar distraction w/ feet on ball Try nv done np np                             Therapeutic Exercise           Bike (muscular endurance) nv 5' lvl 0 5' lvl 1 5' lvl 1       Hamstring stretch    seated 10x5" ea       LTR    10x5"       Quad stretch* Seated 10x5" ea Seated 10x5" R         Seated ball roll out Try nv 30x5"  2x10x5"       Clamshells* nv Supine GTB 3x10 Supine GTB 3x12 Supine GTB 3x12       SLR flexion           SLR abduction           Paloff press           Supine hip extension bridge on ball           Supine lateral ball roll                                 Patient education done                     Neuro Re-ed           Slump slider  15x3" ea         Femoral nerve glide nv 15x3" R supine w/ leg off table 15x3" R supine w/ leg off table        ADIM with PPT   2x10 3x10       ADIM with marches   10x 2x10       ADIM with BKFO           Bridges* nv 3x10         Supine ball crush nv 2x10x3"         Standing ball crush           Standing ball crush marches           Bird dogs           Bear holds           Dead bug           Plank shoulder taps                                                       Therapeutic Activity           Sit to stands   3x10 from 24" table 3x10 from 24" table       Leg press           Band resisted side stepping           Monster walks           Goblet squat           Bent over row           Deadlift           Farmer's carry                                            Modalities

## 2023-02-02 ENCOUNTER — OFFICE VISIT (OUTPATIENT)
Dept: PHYSICAL THERAPY | Facility: REHABILITATION | Age: 68
End: 2023-02-02

## 2023-02-02 DIAGNOSIS — M48.062 SPINAL STENOSIS OF LUMBAR REGION WITH NEUROGENIC CLAUDICATION: Primary | ICD-10-CM

## 2023-02-02 NOTE — PROGRESS NOTES
Daily Note     Today's date: 2023  Patient name: Reginald Lazcano  : 1955  MRN: [id number]  Referring provider: Adela Fierro PA-C  Dx:   Encounter Diagnosis     ICD-10-CM    1  Spinal stenosis of lumbar region with neurogenic claudication  M48 062           Start Time: 97  Stop Time: 162  Total time in clinic (min): 54 minutes    Subjective: Semaj Gentile reports he woke up this morning and the R side of his back is tight and painful which is making his R leg feel heavy  Objective: See treatment diary below      Assessment: Tolerated treatment well  Improvement noted in low back discomfort; continued to report pain at end of session but lower intensity  Improved ability to perform sit to stands from lower surface; decreased cuing required for proper form  Patient demonstrated appropriate level of challenge and muscular fatigue throughout session and noted good status at end of visit  Patient demonstrated fatigue post treatment, exhibited good technique with therapeutic exercises and would benefit from continued PT  Plan: Continue per plan of care  Progress treatment as tolerated         Precautions: Diabetes, hyperlipidemia, HTN, lumbar spinal stenosis    * Indicates part of HEP   HEP code: [id number]     Daily Treatment Diary     Manuals       Lumbar distraction w/ feet on ball Try nv done np np                             Therapeutic Exercise           Bike (muscular endurance) nv 5' lvl 0 5' lvl 1 5' lvl 1 5' lvl 1      Hamstring stretch    seated 10x5" ea Seated 10x5" ea      LTR    10x5"       Quad stretch* Seated 10x5" ea Seated 10x5" R         Seated ball roll out Try nv 30x5"  2x10x5" 10x5" 3-way      Clamshells* nv Supine GTB 3x10 Supine GTB 3x12 Supine GTB 3x12 Supine GTB 3x15      SLR flexion           SLR abduction           Paloff press           Supine hip extension bridge on ball           Supine lateral ball roll                                 Patient education done                     Neuro Re-ed           Slump slider  15x3" ea         Femoral nerve glide nv 15x3" R supine w/ leg off table 15x3" R supine w/ leg off table        ADIM with PPT   2x10 3x10 3x10      ADIM with marches   10x 2x10 2x10      ADIM with BKFO           Bridges* nv 3x10   3x10      Supine ball crush nv 2x10x3"         Standing ball crush           Standing ball crush marches           Bird dogs           Bear holds           Dead bug           Plank shoulder taps                                                       Therapeutic Activity           Sit to stands   3x10 from 24" table 3x10 from 24" table 1x10 23" table, 2x10 chair + foam      Leg press           Band resisted side stepping           Monster walks           Goblet squat           Bent over row           Deadlift           Farmer's carry                                            Modalities

## 2023-02-06 ENCOUNTER — OFFICE VISIT (OUTPATIENT)
Dept: PHYSICAL THERAPY | Facility: REHABILITATION | Age: 68
End: 2023-02-06

## 2023-02-06 DIAGNOSIS — M48.062 SPINAL STENOSIS OF LUMBAR REGION WITH NEUROGENIC CLAUDICATION: Primary | ICD-10-CM

## 2023-02-09 ENCOUNTER — OFFICE VISIT (OUTPATIENT)
Dept: PHYSICAL THERAPY | Facility: REHABILITATION | Age: 68
End: 2023-02-09

## 2023-02-09 DIAGNOSIS — M48.062 SPINAL STENOSIS OF LUMBAR REGION WITH NEUROGENIC CLAUDICATION: Primary | ICD-10-CM

## 2023-02-09 NOTE — PROGRESS NOTES
Daily Note     Today's date: 2023  Patient name: Jayshree Coffman  : 1955  MRN: [id number]  Referring provider: Herman Mckeon PA-C  Dx:   Encounter Diagnosis     ICD-10-CM    1  Spinal stenosis of lumbar region with neurogenic claudication  M48 062           Start Time: 1530  Stop Time: 1615  Total time in clinic (min): 45 minutes    Subjective: Reports improvement in symptoms since starting PT but states "I think I have more issues with my leg than I do with my back," stating his R leg feels heavy when he's walking  Reports the muscles at the anterior/lateral thigh are very tight  Objective: See treatment diary below      Assessment: Tolerated treatment well  Notes tightness in the front of the R thigh; no significant change with seated quad stretch  Patient demonstrated appropriate level of challenge and muscular fatigue throughout session and noted good status at end of visit  Patient demonstrated fatigue post treatment, exhibited good technique with therapeutic exercises and would benefit from continued PT  Plan: Continue per plan of care  Progress treatment as tolerated         Precautions: Diabetes, hyperlipidemia, HTN, lumbar spinal stenosis    * Indicates part of HEP   HEP code: [id number]     Daily Treatment Diary     Manuals     Lumbar distraction w/ feet on ball Try nv done np np                             Therapeutic Exercise           Bike (muscular endurance) nv 5' lvl 0 5' lvl 1 5' lvl 1 5' lvl 1 5' lvl 1  5' lvl 1    Hamstring stretch    seated 10x5" ea Seated 10x5" ea Seated 15x5" Seated 10x10" ea    LTR    10x5"   2x10x5" to L    Quad stretch* Seated 10x5" ea Seated 10x5" R     Seated 2x10x5"  R    Seated ball roll out Try nv 30x5"  2x10x5" 10x5" 3-way 10x5" 3-way  10x5" 3-way     Clamshells* nv Supine GTB 3x10 Supine GTB 3x12 Supine GTB 3x12 Supine GTB 3x15 Supine GTB 3x15x2"      SLR flexion           SLR abduction           Paloff press Seated 1x10x3" ea side     Supine hip extension bridge on ball           Supine lateral ball roll                                 Patient education done                     Neuro Re-ed           Slump slider  15x3" ea         Femoral nerve glide nv 15x3" R supine w/ leg off table 15x3" R supine w/ leg off table        ADIM with PPT   2x10 3x10 3x10 3x10 3x10    ADIM with marches   10x 2x10 2x10 2x10     ADIM with BKFO           Bridges* nv 3x10   3x10 3x10 3x12    Supine ball crush nv 2x10x3"         Standing ball crush           Standing ball crush marches           Bird dogs           Bear holds           Dead bug           Plank shoulder taps           Rows      Blue TB 2x10x1" seated                                        Therapeutic Activity           Sit to stands   3x10 from 24" table 3x10 from 24" table 1x10 23" table, 2x10 chair + foam 2x10   23" table 3x10 chair + foam    Leg press           Band resisted side stepping           Monster walks           Goblet squat           Bent over row           Deadlift           Farmer's carry                                            Modalities

## 2023-02-13 ENCOUNTER — OFFICE VISIT (OUTPATIENT)
Dept: PHYSICAL THERAPY | Facility: REHABILITATION | Age: 68
End: 2023-02-13

## 2023-02-13 DIAGNOSIS — M48.062 SPINAL STENOSIS OF LUMBAR REGION WITH NEUROGENIC CLAUDICATION: Primary | ICD-10-CM

## 2023-02-13 NOTE — PROGRESS NOTES
Daily Note     Today's date: 2023  Patient name: Sherman Barone  : 1955  MRN: [id number]  Referring provider: Cooper Peters PA-C  Dx:   Encounter Diagnosis     ICD-10-CM    1  Spinal stenosis of lumbar region with neurogenic claudication  M48 062                      Subjective: Pt reports that he was doing good until he had to get into his car this morning that he got a lot of pain on his R low back  He was able to walk yesterday without his walker  Objective: See treatment diary below      Assessment: Tolerated treatment poor  Patient demonstrated fatigue post treatment and would benefit from continued PT  Pt had difficulty performing program this visit and required signficiant assistance to get on the bike, session was modified this visit  When performing PB 3 way pt's pt on the R sided low back pain kept increasing with forward flexion  Trail of seated L/S extension which decreased the pt's overall pain at rest and decreased pain with forward flexion  Pt did continue to require modified session as he was unable to perform sets at previous  Plan: Continue per plan of care  Progress treatment as tolerated  Precautions: Diabetes, hyperlipidemia, HTN, lumbar spinal stenosis    * Indicates part of HEP   HEP code: [id number]     Daily Treatment Diary     Manuals    Lumbar distraction w/ feet on ball Try nv done np np                             Therapeutic Exercise           Bike (muscular endurance) nv 5' lvl 0 5' lvl 1 5' lvl 1 5' lvl 1 5' lvl 1  5' lvl 1 5' lvl 0   Hamstring stretch    seated 10x5" ea Seated 10x5" ea Seated 15x5" Seated 10x10" ea Seated 10x10" ea   LTR    10x5"   2x10x5" to L 10x5" to L   Quad stretch* Seated 10x5" ea Seated 10x5" R     Seated 2x10x5"  R Seated 2x10x5"  R   Seated ball roll out Try nv 30x5"  2x10x5" 10x5" 3-way 10x5" 3-way  10x5" 3-way  Pain !    Clamshells* nv Supine GTB 3x10 Supine GTB 3x12 Supine GTB 3x12 Supine GTB 3x15 Supine GTB 3x15x2"   Supine GTB 3x15x2"    SLR flexion           SLR abduction           Paloff press      Seated 1x10x3" ea side     Supine hip extension bridge on ball           Supine lateral ball roll           Seated lumbar ext        2x10              Patient education done                     Neuro Re-ed           Slump slider  15x3" ea         Femoral nerve glide nv 15x3" R supine w/ leg off table 15x3" R supine w/ leg off table        ADIM with PPT   2x10 3x10 3x10 3x10 3x10 3x10   ADIM with marches   10x 2x10 2x10 2x10     ADIM with BKFO           Bridges* nv 3x10   3x10 3x10 3x12 3x12   Supine ball crush nv 2x10x3"         Standing ball crush           Standing ball crush marches           Bird dogs           Bear holds           Dead bug           Plank shoulder taps           Rows      Blue TB 2x10x1" seated                                        Therapeutic Activity           Sit to stands   3x10 from 24" table 3x10 from 24" table 1x10 23" table, 2x10 chair + foam 2x10   23" table 3x10 chair + foam 2x10 chair + foam   Leg press           Band resisted side stepping           Monster walks           Goblet squat           Bent over row           Deadlift           Farmer's carry                                            Modalities

## 2023-02-16 ENCOUNTER — OFFICE VISIT (OUTPATIENT)
Dept: PHYSICAL THERAPY | Facility: REHABILITATION | Age: 68
End: 2023-02-16

## 2023-02-16 DIAGNOSIS — M48.062 SPINAL STENOSIS OF LUMBAR REGION WITH NEUROGENIC CLAUDICATION: Primary | ICD-10-CM

## 2023-02-16 NOTE — PROGRESS NOTES
Daily Note     Today's date: 2023  Patient name: Radha Lezama  : 1955  MRN: [id number]  Referring provider: Juanito Hernandez PA-C  Dx:   Encounter Diagnosis     ICD-10-CM    1  Spinal stenosis of lumbar region with neurogenic claudication  M48 062           Start Time: 1530  Stop Time: 1624  Total time in clinic (min): 54 minutes    Subjective: Ayleen Cid reports he's doing "okay" at start of session  Objective: See treatment diary below      Assessment: Tolerated treatment well  Patient demonstrated appropriate level of challenge and muscular fatigue throughout session and noted good status at end of visit  Patient demonstrated fatigue post treatment, exhibited good technique with therapeutic exercises and would benefit from continued PT  Plan: Continue per plan of care  Progress treatment as tolerated  Precautions: Diabetes, hyperlipidemia, HTN, lumbar spinal stenosis    * Indicates part of HEP   HEP code: [id number]     Daily Treatment Diary     Manuals    Lumbar distraction w/ feet on ball   np np                             Therapeutic Exercise           Bike (muscular endurance) 5' lvl 1  5' lvl 1 5' lvl 1 5' lvl 1 5' lvl 1  5' lvl 1 5' lvl 0   Hamstring stretch Seated 10x10" ea   seated 10x5" ea Seated 10x5" ea Seated 15x5" Seated 10x10" ea Seated 10x10" ea   LTR 10x5"   10x5"   2x10x5" to L 10x5" to L   Quad stretch*       Seated 2x10x5"  R Seated 2x10x5"  R   Seated ball roll out    2x10x5" 10x5" 3-way 10x5" 3-way  10x5" 3-way  Pain !    Clamshells* Supine BTB 3x12x3"  Supine GTB 3x12 Supine GTB 3x12 Supine GTB 3x15 Supine GTB 3x15x2"   Supine GTB 3x15x2"    SLR flexion           SLR abduction           Paloff press      Seated 1x10x3" ea side     Supine hip extension bridge on ball           Supine lateral ball roll           Seated lumbar ext        2x10              Patient education                      Neuro Re-ed           Slump slider Femoral nerve glide   15x3" R supine w/ leg off table        ADIM with PPT 2x10x5"  2x10 3x10 3x10 3x10 3x10 3x10   ADIM with marches   10x 2x10 2x10 2x10     ADIM with BKFO           Bridges* 3x12    3x10 3x10 3x12 3x12   Supine ball crush           Standing ball crush           Standing ball crush marches           Bird dogs           Bear holds           Dead bug           Plank shoulder taps           Rows      Blue TB 2x10x1" seated                                        Therapeutic Activity           Sit to stands 3x10 chair + foam  3x10 from 24" table 3x10 from 24" table 1x10 23" table, 2x10 chair + foam 2x10   23" table 3x10 chair + foam 2x10 chair + foam   Leg press           Band resisted side stepping           Monster walks           Goblet squat           Bent over row           Deadlift           Farmer's carry                                            Modalities

## 2023-02-21 ENCOUNTER — APPOINTMENT (OUTPATIENT)
Dept: LAB | Facility: HOSPITAL | Age: 68
End: 2023-02-21

## 2023-02-21 DIAGNOSIS — E11.9 TYPE 2 DIABETES MELLITUS WITHOUT COMPLICATION, WITHOUT LONG-TERM CURRENT USE OF INSULIN (HCC): ICD-10-CM

## 2023-02-21 DIAGNOSIS — R29.2 HYPERREFLEXIA: ICD-10-CM

## 2023-02-21 DIAGNOSIS — Z11.59 NEED FOR HEPATITIS C SCREENING TEST: ICD-10-CM

## 2023-02-21 LAB
ALBUMIN SERPL BCP-MCNC: 4.1 G/DL (ref 3.5–5)
ANION GAP SERPL CALCULATED.3IONS-SCNC: 9 MMOL/L (ref 4–13)
BUN SERPL-MCNC: 25 MG/DL (ref 5–25)
CALCIUM SERPL-MCNC: 9.7 MG/DL (ref 8.4–10.2)
CHLORIDE SERPL-SCNC: 103 MMOL/L (ref 96–108)
CO2 SERPL-SCNC: 29 MMOL/L (ref 21–32)
CREAT SERPL-MCNC: 0.93 MG/DL (ref 0.6–1.3)
GFR SERPL CREATININE-BSD FRML MDRD: 83 ML/MIN/1.73SQ M
GLUCOSE P FAST SERPL-MCNC: 99 MG/DL (ref 65–99)
GLUCOSE P FAST SERPL-MCNC: 99 MG/DL (ref 65–99)
HCV AB SER QL: NORMAL
PHOSPHATE SERPL-MCNC: 3.4 MG/DL (ref 2.3–4.1)
POTASSIUM SERPL-SCNC: 4.7 MMOL/L (ref 3.5–5.3)
SODIUM SERPL-SCNC: 141 MMOL/L (ref 135–147)

## 2023-02-22 ENCOUNTER — HOSPITAL ENCOUNTER (OUTPATIENT)
Dept: MRI IMAGING | Facility: HOSPITAL | Age: 68
Discharge: HOME | End: 2023-02-22
Attending: STUDENT IN AN ORGANIZED HEALTH CARE EDUCATION/TRAINING PROGRAM

## 2023-02-22 DIAGNOSIS — R29.2 HYPERREFLEXIA: ICD-10-CM

## 2023-02-22 DIAGNOSIS — M62.541 ATROPHY OF MUSCLE OF RIGHT HAND: ICD-10-CM

## 2023-02-22 LAB
EST. AVERAGE GLUCOSE BLD GHB EST-MCNC: 126 MG/DL
HBA1C MFR BLD: 6 %

## 2023-02-22 RX ADMIN — GADOBUTROL 11 ML: 604.72 INJECTION INTRAVENOUS at 20:15

## 2023-02-28 ENCOUNTER — APPOINTMENT (OUTPATIENT)
Dept: PHYSICAL THERAPY | Facility: REHABILITATION | Age: 68
End: 2023-02-28

## 2023-03-01 ENCOUNTER — OFFICE VISIT (OUTPATIENT)
Dept: FAMILY MEDICINE CLINIC | Facility: CLINIC | Age: 68
End: 2023-03-01

## 2023-03-01 VITALS
WEIGHT: 247.2 LBS | BODY MASS INDEX: 41.19 KG/M2 | OXYGEN SATURATION: 99 % | HEART RATE: 53 BPM | DIASTOLIC BLOOD PRESSURE: 66 MMHG | TEMPERATURE: 96.6 F | HEIGHT: 65 IN | SYSTOLIC BLOOD PRESSURE: 118 MMHG | RESPIRATION RATE: 16 BRPM

## 2023-03-01 DIAGNOSIS — E78.2 MIXED HYPERLIPIDEMIA: ICD-10-CM

## 2023-03-01 DIAGNOSIS — E11.9 TYPE 2 DIABETES MELLITUS WITHOUT COMPLICATION, WITHOUT LONG-TERM CURRENT USE OF INSULIN (HCC): ICD-10-CM

## 2023-03-01 DIAGNOSIS — G95.20 CERVICAL SPINAL CORD COMPRESSION (HCC): Primary | ICD-10-CM

## 2023-03-01 DIAGNOSIS — I10 PRIMARY HYPERTENSION: ICD-10-CM

## 2023-03-01 NOTE — ASSESSMENT & PLAN NOTE
MRI done - pt is waiting for results still  Reviewed with patient - given cervical cord compression, neurosurg eval is indicated  Symptoms have been ongoing for a while now and not necessarily getting worse day to day so will place outpatient consult to neurosurg to be seen ASAP however explained that if he hears from neurology office and is given different directions then should follow their recommendation- pt understood and agreed  ER precautions reviewed  Explained risk for paralysis with cervical cord compression, hence the more urgent referral - pt understood

## 2023-03-01 NOTE — ASSESSMENT & PLAN NOTE
Lab Results   Component Value Date    HGBA1C 6 0 (H) 02/21/2023     Continue same medications, well controlled  Follow up in 3 months  Encouraged low carb diet

## 2023-03-01 NOTE — PROGRESS NOTES
Assessment/Plan:       Problem List Items Addressed This Visit        Endocrine    Type 2 diabetes mellitus without complication, without long-term current use of insulin ([facility] [address] )       Lab Results   Component Value Date    HGBA1C 6 0 (H) 02/21/2023     Continue same medications, well controlled  Follow up in 3 months  Encouraged low carb diet           Relevant Orders    Ambulatory referral to Podiatry       Cardiovascular and Mediastinum    Primary hypertension     Blood pressure is well controlled on current regimen  Continue same meds without changes              Nervous and Auditory    Cervical spinal cord compression ([facility] [address] ) - Primary     MRI done - pt is waiting for results still  Reviewed with patient - given cervical cord compression, neurosurg eval is indicated  Symptoms have been ongoing for a while now and not necessarily getting worse day to day so will place outpatient consult to neurosurg to be seen ASAP however explained that if he hears from neurology office and is given different directions then should follow their recommendation- pt understood and agreed  ER precautions reviewed  Explained risk for paralysis with cervical cord compression, hence the more urgent referral - pt understood         Relevant Orders    Ambulatory referral to Neurosurgery       Other    Mixed hyperlipidemia         Subjective:     Chief Complaint   Patient presents with   • Follow-up     Regular follow up and still struggling with his hands they seize up and he is unable to use them  Patient ID: Altagracia Dawkins is a 69 y o  male who presents for a follow up  History of diabetes treated with oral medicaitons  Compliant with medications  Denies side effects  Does not check blood glucose at home  Denies feelings of hypoglycemia  Foot exam up todate, eye exam due, urine microalbumin up to date  Denies neuropathy  He is compliant with medications       Last HbA1c:  Lab Results   Component Value Date    HGBA1C 6 0 (H) 02/21/2023      He has been having issues with his right hand for 3-4 years now  He saw ortho hand in the past  Saw neurology recently who ordered an MRI which was just recently done  Feels like symptoms continue to be progressive  Denies significant lower extremity issues at this time  No numbness  No incontinence  Patient's past medical history, surgical history, family history, medications, allergies and social history reviewed and updated    Review of Systems   Constitutional: Negative for chills and fever  HENT: Negative for congestion and sore throat  Respiratory: Negative for cough and shortness of breath  Cardiovascular: Negative for chest pain and leg swelling  Gastrointestinal: Negative for abdominal pain, blood in stool and diarrhea  Genitourinary: Negative for dysuria and frequency  Musculoskeletal: Positive for arthralgias and neck stiffness  Neurological: Negative for tremors and headaches  All other ROS negative  Objective:    Vitals:    03/01/23 1056   BP: 118/66   Pulse: (!) 53   Resp: 16   Temp: (!) 96 6 °F (35 9 °C)   SpO2: 99%          Physical Exam  Vitals reviewed  Constitutional:       General: He is not in acute distress  Appearance: He is obese  HENT:      Right Ear: External ear normal       Left Ear: External ear normal       Nose: Nose normal       Mouth/Throat:      Mouth: Mucous membranes are moist    Eyes:      Conjunctiva/sclera: Conjunctivae normal    Cardiovascular:      Rate and Rhythm: Normal rate and regular rhythm  Heart sounds: No murmur heard  Pulmonary:      Effort: Pulmonary effort is normal  No respiratory distress  Breath sounds: No wheezing  Abdominal:      General: There is no distension  Palpations: Abdomen is soft  Tenderness: There is no abdominal tenderness     Musculoskeletal:      Comments: Right hand deformity, strength decreased compared to left, lower extremity strength and sensation normal and equal Lymphadenopathy:      Cervical: No cervical adenopathy  Neurological:      Mental Status: He is alert and oriented to person, place, and time     Psychiatric:         Mood and Affect: Mood normal              Aretha Centeno MD  Internal Medicine and Pediatrics

## 2023-03-02 ENCOUNTER — OFFICE VISIT (OUTPATIENT)
Dept: PHYSICAL THERAPY | Facility: REHABILITATION | Age: 68
End: 2023-03-02

## 2023-03-02 DIAGNOSIS — M48.062 SPINAL STENOSIS OF LUMBAR REGION WITH NEUROGENIC CLAUDICATION: Primary | ICD-10-CM

## 2023-03-02 NOTE — PROGRESS NOTES
Daily Note     Today's date: 3/2/2023  Patient name: Reginald Lazcano  : 1955  MRN: [id number]  Referring provider: Adela Fierro PA-C  Dx:   Encounter Diagnosis     ICD-10-CM    1  Spinal stenosis of lumbar region with neurogenic claudication  M48 062                      Subjective: Patient reports that he is not having a good day today  Objective: See treatment diary below      Assessment: Tolerated treatment well  Patient fatigues quickly, continue to progress as pt is able to tolerate  Plan: Continue per plan of care  Precautions: Diabetes, hyperlipidemia, HTN, lumbar spinal stenosis    * Indicates part of HEP   HEP code: [id number]     Daily Treatment Diary     Manuals 2/16 3/2  1/30 2/2 2/6 2/9 2/13   Lumbar distraction w/ feet on ball    np                             Therapeutic Exercise           Bike (muscular endurance) 5' lvl 1 5' lvl 1  5' lvl 1 5' lvl 1 5' lvl 1  5' lvl 1 5' lvl 0   Hamstring stretch Seated 10x10" ea Seated 10x10"  seated 10x5" ea Seated 10x5" ea Seated 15x5" Seated 10x10" ea Seated 10x10" ea   LTR 10x5"   10x5"   2x10x5" to L 10x5" to L   Quad stretch*       Seated 2x10x5"  R Seated 2x10x5"  R   Seated ball roll out    2x10x5" 10x5" 3-way 10x5" 3-way  10x5" 3-way  Pain !    Clamshells* Supine BTB 3x12x3"   Supine GTB 3x12 Supine GTB 3x15 Supine GTB 3x15x2"   Supine GTB 3x15x2"    SLR flexion           SLR abduction           Paloff press      Seated 1x10x3" ea side     Supine hip extension bridge on ball           Supine lateral ball roll           Seated lumbar ext        2x10              Patient education                      Neuro Re-ed           Slump slider           Femoral nerve glide           ADIM with PPT 2x10x5" 3x10 5"  3x10 3x10 3x10 3x10 3x10   ADIM with marches    2x10 2x10 2x10     ADIM with BKFO           Bridges* 3x12 3x12   3x10 3x10 3x12 3x12   Supine ball crush           Standing ball crush           Standing ball crush [employer] dogs           Bear holds           Dead bug           Plank shoulder taps           Rows      Blue TB 2x10x1" seated                                        Therapeutic Activity           Sit to stands 3x10 chair + foam 3x10 chair + foam  3x10 from 24" table 1x10 23" table, 2x10 chair + foam 2x10   23" table 3x10 chair + foam 2x10 chair + foam   Leg press           Band resisted side stepping           Monster walks           Goblet squat           Bent over row           Deadlift           Farmer's carry                                            Modalities

## 2023-03-06 ENCOUNTER — OFFICE VISIT (OUTPATIENT)
Dept: PHYSICAL THERAPY | Facility: REHABILITATION | Age: 68
End: 2023-03-06

## 2023-03-06 ENCOUNTER — TELEPHONE (OUTPATIENT)
Dept: NEUROLOGY | Facility: CLINIC | Age: 68
End: 2023-03-06

## 2023-03-06 DIAGNOSIS — M48.062 SPINAL STENOSIS OF LUMBAR REGION WITH NEUROGENIC CLAUDICATION: Primary | ICD-10-CM

## 2023-03-06 NOTE — PROGRESS NOTES
Daily Note     Today's date: 3/6/2023  Patient name: Timothy Quintero  : 1955  MRN: [id number]  Referring provider: Ivan Sandoval PA-C  Dx:   Encounter Diagnosis     ICD-10-CM    1  Spinal stenosis of lumbar region with neurogenic claudication  M48 062                      Subjective: Pt reports that it is not a good day for him  He notes that last couple of days have been really bad  He has needed his walker  Objective: See treatment diary below      Assessment: Pt had difficulty with maintaining RLE in neutral in order to keep his foot on the bike  He demonstrated lateral rotation on the RLE during bridges and required cues to activate musculature to keep his leg in neutral  Due to this integrated supine hip adduction this visit  He did require frequent rest breaks in order to complete program  Continue to progress as able  Plan: Continue per plan of care  Progress treatment as tolerated  Precautions: Diabetes, hyperlipidemia, HTN, lumbar spinal stenosis    * Indicates part of HEP   HEP code: [id number]     Daily Treatment Diary     Manuals 2/16 3/2 3/6  2/2 2/6 2/9 2/13   Lumbar distraction w/ feet on ball                                 Therapeutic Exercise           Bike (muscular endurance) 5' lvl 1 5' lvl 1 5' lvl 0  5' lvl 1 5' lvl 1  5' lvl 1 5' lvl 0   Hamstring stretch Seated 10x10" ea Seated 10x10" Seated 10x10"  Seated 10x5" ea Seated 15x5" Seated 10x10" ea Seated 10x10" ea   LTR 10x5"      2x10x5" to L 10x5" to L   Quad stretch*       Seated 2x10x5"  R Seated 2x10x5"  R   Seated ball roll out     10x5" 3-way 10x5" 3-way  10x5" 3-way  Pain !    Clamshells* Supine BTB 3x12x3"  Supine BTB 3x12x3"  Supine GTB 3x15 Supine GTB 3x15x2"   Supine GTB 3x15x2"    SLR flexion           SLR abduction           Paloff press      Seated 1x10x3" ea side     Supine hip extension bridge on ball           Supine lateral ball roll           Seated lumbar ext        2x10   hooklying hip add sets    3x10x3"        Patient education                      Neuro Re-ed           Slump slider           Femoral nerve glide           ADIM with PPT 2x10x5" 3x10 5" 3x10 5"  3x10 3x10 3x10 3x10   ADIM with marches     2x10 2x10     ADIM with BKFO           Bridges* 3x12 3x12 3x12  3x10 3x10 3x12 3x12   Supine ball crush           Standing ball crush           Standing ball crush marches           Bird dogs           Bear holds           Dead bug           Plank shoulder taps           Rows      Blue TB 2x10x1" seated                                        Therapeutic Activity           Sit to stands 3x10 chair + foam 3x10 chair + foam 3x10 chair + foam  1x10 23" table, 2x10 chair + foam 2x10   23" table 3x10 chair + foam 2x10 chair + foam   Leg press           Band resisted side stepping           Monster walks           Goblet squat           Bent over row           Deadlift           Farmer's carry                                            Modalities

## 2023-03-06 NOTE — TELEPHONE ENCOUNTER
Called and advised ptl of the below  He verbalized clear understanding of all instructions  Pt states that someone from the neurosurgery dept called him this morning   He has neurosurgery appt w/ Dr Analy Oviedo on 4/27/23 at [address] [place] [address]

## 2023-03-06 NOTE — TELEPHONE ENCOUNTER
----- Message from Mallory Sorto MD sent at 2/27/2023  8:18 AM EST -----  Reviewed imaging findings which show cervical cord compression  Please contact pt and advise him to present to the ED for neurosurgical evaluation  Admission

## 2023-03-09 ENCOUNTER — EVALUATION (OUTPATIENT)
Dept: PHYSICAL THERAPY | Facility: REHABILITATION | Age: 68
End: 2023-03-09

## 2023-03-09 DIAGNOSIS — M48.062 SPINAL STENOSIS OF LUMBAR REGION WITH NEUROGENIC CLAUDICATION: Primary | ICD-10-CM

## 2023-03-09 NOTE — PROGRESS NOTES
PT Re-Evaluation     Today's date: 3/9/2023  Patient name: Ely Millan  : 1955  MRN: [id number]  Referring provider: Mamadou Riojas PA-C  Dx:   Encounter Diagnosis     ICD-10-CM    1  Spinal stenosis of lumbar region with neurogenic claudication  M48 062           Start Time: 1226  Stop Time: 1310  Total time in clinic (min): 44 minutes    Assessment  Assessment details: Per patient report and clinical findings, Ely Millan has made fair progress since starting skilled physical therapy services  Ely Millan has been compliant with PT POC and HEP since initial evaluation  To this date, Ely Millan has completed 12 visits of skilled physical therapy  Ely Millan demonstrates improvements in objective data however, continues to be limited compared to his prior level of function  Remaining deficits include impaired RLE strength, impaired gait, and limited activity and positional tolerance  Recommend continuation of skilled physical therapy services with remaining scheduled appointments (2x/2 weeks) given improvements in symptoms since IE, to address remaining deficits to promote progress towards his prior level of function then put POC on hold until follow up with neurosurgeon  Twila Drake verbalized understanding and was in agreement with POC  Impairments: abnormal gait, abnormal muscle firing, abnormal or restricted ROM, activity intolerance, impaired balance, impaired physical strength, lacks appropriate home exercise program, pain with function and poor body mechanics  Understanding of Dx/Px/POC: good   Prognosis: good    Goals  STG (4 weeks):  1  R lumbar side bend ROM without symptom provocation to promote improved activity tolerance  - progressing  2  Increase R hip IR strength to at least 4-/5 for improved activity tolerance  - met  3  Decrease pain at worst from 5/10 to 3/10 or less for improved QoL  - progressing    LTG (8 weeks):  1   Increased global hip girdle strength to at least 4+/5 to promote improved activity tolerance  - progressing  2  Able to stand for at least 30 minutes without symptom exacerbation to promote improved ability to cook dinner  - progressing  3  Able to walk at least 10 minutes without symptom exacerbation to promote improved community ambulation  - progressing  4  Able to ascend/descend 1 flight of stairs without compensation to promote improved household and community ambulation  - progressing  5  Independent with HEP  - progressing    Plan  Plan details: Thank you for referring Horace Pereyra to Physical Therapy at [place] [address] and for the opportunity to coordinate care  Patient would benefit from: skilled physical therapy  Planned modality interventions: cryotherapy, electrical stimulation/Russian stimulation, TENS, thermotherapy: hydrocollator packs and unattended electrical stimulation  Planned therapy interventions: abdominal trunk stabilization, activity modification, ADL retraining, balance, balance/weight bearing training, behavior modification, body mechanics training, breathing training, fine motor coordination training, flexibility, functional ROM exercises, gait training, graded activity, graded exercise, graded motor, home exercise program, work reintegration, transfer training, therapeutic training, therapeutic exercise, therapeutic activities, stretching, strengthening, sensory integrative techniques, self care, postural training, patient education, neuromuscular re-education, muscle pump exercises, motor coordination training, Elias taping, manual therapy, joint mobilization and IADL retraining  Frequency: 2x week  Duration in visits: 5  Plan of Care beginning date: 3/9/2023  Treatment plan discussed with: patient        Subjective Evaluation    History of Present Illness  Mechanism of injury: 03/09/23:  Horace Pereyra reports feeling he has made 40% progress since IE   Eduard Pederson reports improvements in LE strength, slight increase in standing tolerance, and decreased pain frequency and intensity  Despite improvements, Cheryle Pun reports continued difficulty with varying levels of back pain with the colder weather  Cheryle Pun reports difficulty with walking, prolonged sitting and standing, bending to pick something up, balance, donning socks/shoes and stair navigation  23:  Timothy Quintero presents to skilled physical therapy with complaints of chronic low back pain; states pain has started to go down into the R leg  Patient reports symptoms began in 2022  Cheryle Pun reports pain located at midline of the low back to the R anterior lateral thigh  Since onset, symptoms have worsened  Cheryle Pun previously did PT for these symptoms 3x/week for about a month in 2022 but states symptoms worsened over time  Cheryle Pun then followed up with pain management who referred him to neurosurgery  Currently, Cheryle Pun is having difficulty with walking, prolonged sitting and standing, bending to pick something up, balance, donning socks/shoes and stair navigation  States his R leg is weaker than his L  Patient denies difficulty sleeping secondary to low back pain  Patient admits to intermittent numbness, tingling in right foot  Denies bowel and bladder incontinence  Pain  Current pain ratin  At best pain ratin  At worst pain ratin  Location: low back  Relieving factors: medications    Social Support  Steps to enter house: no  Stairs in house: yes   Lives in: multiple-level home  Lives with: alone    Employment status: not working (retired)    Diagnostic Tests  X-ray: abnormal  MRI studies: abnormal  Treatments  Previous treatment: physical therapy and medication  Current treatment: physical therapy  Patient Goals  Patient goals for therapy: independence with ADLs/IADLs, return to sport/leisure activities and increased motion          Objective     Palpation   Left   No palpable tenderness to the erector spinae and lumbar paraspinals  Right   No palpable tenderness to the erector spinae and lumbar paraspinals  Tenderness     Lumbar Spine  No tenderness in the spinous process  Left Hip   No tenderness in the PSIS  Right Hip   No tenderness in the PSIS  Active Range of Motion     Lumbar   Left lateral flexion: Active left lumbar lateral flexion: R lateral thigh tightness  Restriction level: moderate  Right lateral flexion:  WFL  Left rotation:  Restriction level: moderate  Right rotation: Active right lumbar rotation: R lateral leg tightness  Restriction level: moderate  Left Hip   Flexion: WFL  [facility]: [facility] [place]  External rotation (90/90): [facility] [place]  Internal rotation (90/90): WFL    Right Hip   Flexion: WFL  [facility] [place]  External rotation (90/90): [facility] [place]  Internal rotation (90/90): [facility] [employer]    Strength/Myotome Testing     Left Hip   Planes of Motion   Flexion: 5  Abduction: 5  Adduction: 5  External rotation: 5  Internal rotation: 5    Right Hip   Planes of Motion   Flexion: 4  Abduction: 5  Adduction: 5  External rotation: 4+  Internal rotation: 3+    Left Knee   Flexion: 5  Extension: 5    Right Knee   Flexion: 5  Extension: 5    Left Ankle/Foot   Dorsiflexion: 5  Plantar flexion: 5    Right Ankle/Foot   Dorsiflexion: 4-  Plantar flexion: 4    Tests     Lumbar     Left   Positive femoral stretch  Negative crossed SLR, passive SLR and slump test      Right   Positive femoral stretch     Negative crossed SLR, passive SLR and slump test              Precautions: Diabetes, hyperlipidemia, HTN, lumbar spinal stenosis    * Indicates part of HEP   HEP code: [id number]     Daily Treatment Diary     Manuals 2/16 3/2 3/6 3/9  2/6 2/9 2/13   Lumbar distraction w/ feet on ball                                 Therapeutic Exercise           Bike (muscular endurance) 5' lvl 1 5' lvl 1 5' lvl 0 5' lvl 1  5' lvl 1  5' lvl 1 5' lvl 0   Hamstring stretch Seated 10x10" ea Seated 10x10" Seated 10x10" Seated 10x10" ea  Seated 15x5" Seated 10x10" ea Seated 10x10" ea   LTR 10x5"      2x10x5" to L 10x5" to L   Quad stretch*       Seated 2x10x5"  R Seated 2x10x5"  R   Seated ball roll out      10x5" 3-way  10x5" 3-way  Pain !    Clamshells* Supine BTB 3x12x3"  Supine BTB 3x12x3" Seated BTB 3x10x5"  Supine GTB 3x15x2"   Supine GTB 3x15x2"    SLR flexion           SLR abduction           Paloff press      Seated 1x10x3" ea side     Supine hip extension bridge on ball           Supine lateral ball roll           Seated lumbar ext        2x10   hooklying hip add sets    3x10x3" Seated ball squeeze 3x10x5"       Patient education    done                  Neuro Re-ed           Slump slider           Femoral nerve glide           ADIM with PPT 2x10x5" 3x10 5" 3x10 5"   3x10 3x10 3x10   ADIM with marches      2x10     ADIM with BKFO           Bridges* 3x12 3x12 3x12   3x10 3x12 3x12   Supine ball crush           Standing ball crush           Standing ball crush marches           Bird dogs           Bear holds           Dead bug           Plank shoulder taps           Rows      Blue TB 2x10x1" seated                                        Therapeutic Activity           Sit to stands 3x10 chair + foam 3x10 chair + foam 3x10 chair + foam   2x10   23" table 3x10 chair + foam 2x10 chair + foam   Leg press           Band resisted side stepping           Monster walks           Goblet squat           Bent over row           Deadlift           Farmer's carry                                            Modalities

## 2023-03-13 ENCOUNTER — OFFICE VISIT (OUTPATIENT)
Dept: PHYSICAL THERAPY | Facility: REHABILITATION | Age: 68
End: 2023-03-13

## 2023-03-13 DIAGNOSIS — M48.062 SPINAL STENOSIS OF LUMBAR REGION WITH NEUROGENIC CLAUDICATION: Primary | ICD-10-CM

## 2023-03-13 NOTE — PROGRESS NOTES
Daily Note     Today's date: 3/13/2023  Patient name: Jayshree Coffman  : 1955  MRN: [id number]  Referring provider: Herman Mckeon PA-C  Dx:   Encounter Diagnosis     ICD-10-CM    1  Spinal stenosis of lumbar region with neurogenic claudication  M48 062           Start Time: 1215  Stop Time: 1301  Total time in clinic (min): 46 minutes    Subjective: No Ojeda reports his R leg is feeling stiff and heavy over the weekend and today  Objective: See treatment diary below      Assessment: Tolerated treatment well  Reintroduced seated quad stretch with reported decrease in R leg stiffness following  Patient demonstrated appropriate level of challenge and muscular fatigue throughout session and noted good status at end of visit  Patient demonstrated fatigue post treatment, exhibited good technique with therapeutic exercises and would benefit from continued PT  Plan: Continue per plan of care  Progress treatment as tolerated  Precautions: Diabetes, hyperlipidemia, HTN, lumbar spinal stenosis    * Indicates part of HEP   HEP code: [id number]     Daily Treatment Diary     Manuals 2/16 3/2 3/6 3/9 3/13  2/9 2/13   Lumbar distraction w/ feet on ball                                 Therapeutic Exercise           Bike (muscular endurance) 5' lvl 1 5' lvl 1 5' lvl 0 5' lvl 1 5' lvl 1  5' lvl 1 5' lvl 0   Hamstring stretch Seated 10x10" ea Seated 10x10" Seated 10x10" Seated 10x10" ea Seated 10x10" ea  Seated 10x10" ea Seated 10x10" ea   LTR 10x5"      2x10x5" to L 10x5" to L   Quad stretch*     Seated 10x5" R  Seated 2x10x5"  R Seated 2x10x5"  R   Seated ball roll out       10x5" 3-way  Pain !    Clamshells* Supine BTB 3x12x3"  Supine BTB 3x12x3" Seated BTB 3x10x5"    Supine GTB 3x15x2"    SLR flexion           SLR abduction           Paloff press           Supine hip extension bridge on ball           Supine lateral ball roll           Seated lumbar ext        2x10   hooklying hip add sets    3x10x3" Seated ball squeeze 3x10x5" 3x12x3"      Patient education    done                  Neuro Re-ed           Slump slider     15x3" ea      Femoral nerve glide           ADIM with PPT 2x10x5" 3x10 5" 3x10 5"  3x12x5"  3x10 3x10   ADIM with marches           ADIM with BKFO           Bridges* 3x12 3x12 3x12  3x15  3x12 3x12   Supine ball crush           Standing ball crush           Standing ball crush marches           Bird dogs           Bear holds           Dead bug           Plank shoulder taps           Rows                                            Therapeutic Activity           Sit to stands 3x10 chair + foam 3x10 chair + foam 3x10 chair + foam  3x12 chair + foam  3x10 chair + foam 2x10 chair + foam   Leg press           Band resisted side stepping           Monster walks           Goblet squat           Bent over row           Deadlift           Farmer's carry                                            Modalities

## 2023-03-16 ENCOUNTER — OFFICE VISIT (OUTPATIENT)
Dept: PHYSICAL THERAPY | Facility: REHABILITATION | Age: 68
End: 2023-03-16

## 2023-03-16 DIAGNOSIS — M48.062 SPINAL STENOSIS OF LUMBAR REGION WITH NEUROGENIC CLAUDICATION: Primary | ICD-10-CM

## 2023-03-16 NOTE — PROGRESS NOTES
Daily Note     Today's date: 3/16/2023  Patient name: Sherman Barone  : 1955  MRN: [id number]  Referring provider: Cooper Peters PA-C  Dx:   Encounter Diagnosis     ICD-10-CM    1  Spinal stenosis of lumbar region with neurogenic claudication  M48 062           Start Time: 1200  Stop Time: 1249  Total time in clinic (min): 49 minutes    Subjective: Bakari Waddell reports that he "almost didn't make it today " States his R leg got very heavy starting last night and could barely lift his leg to get up his steps  Denies saddle numbness/tingling and bowel and bladder incontinence  Objective: See treatment diary below      Assessment: Tolerated treatment well  Slow performance of exercises throughout session  Improvement in leg heaviness following recumbent bike warm up but no other change in symptoms through session  Patient demonstrated appropriate level of challenge and muscular fatigue throughout session and noted good status at end of visit  Patient demonstrated fatigue post treatment, exhibited good technique with therapeutic exercises and would benefit from continued PT  Plan: Continue per plan of care  Progress treatment as tolerated  Precautions: Diabetes, hyperlipidemia, HTN, lumbar spinal stenosis    * Indicates part of HEP   HEP code: [id number]     Daily Treatment Diary     Manuals 2/16 3/2 3/6 3/9 3/13 3/16  2/13   Lumbar distraction w/ feet on ball                                 Therapeutic Exercise           Bike (muscular endurance) 5' lvl 1 5' lvl 1 5' lvl 0 5' lvl 1 5' lvl 1 5' lvl 0  5' lvl 0   Hamstring stretch Seated 10x10" ea Seated 10x10" Seated 10x10" Seated 10x10" ea Seated 10x10" ea Seated 10x10" ea  Seated 10x10" ea   LTR 10x5"       10x5" to L   Quad stretch*     Seated 10x5" R   Seated 2x10x5"  R   Seated ball roll out        Pain !    Clamshells* Supine BTB 3x12x3"  Supine BTB 3x12x3" Seated BTB 3x10x5"    Supine GTB 3x15x2"    SLR flexion           SLR abduction Paloff press           Supine hip extension bridge on ball           Supine lateral ball roll           Seated lumbar ext        2x10   hooklying hip add sets    3x10x3" Seated ball squeeze 3x10x5" 3x12x3" 20x5" seated squeeze     Patient education    done                  Neuro Re-ed           Slump slider     15x3" ea 15x3" ea     Femoral nerve glide           ADIM with PPT 2x10x5" 3x10 5" 3x10 5"  3x12x5"   3x10   ADIM with marches           ADIM with BKFO           Bridges* 3x12 3x12 3x12  3x15   3x12   Supine ball crush           Standing ball crush           Standing ball crush marches           Bird dogs           Bear holds           Dead bug           Plank shoulder taps           Rows                                            Therapeutic Activity           Sit to stands 3x10 chair + foam 3x10 chair + foam 3x10 chair + foam  3x12 chair + foam 3x12 chair + foam  2x10 chair + foam   Leg press           Band resisted side stepping           Monster walks           Goblet squat           Bent over row           Deadlift           Farmer's carry                                            Modalities

## 2023-03-20 ENCOUNTER — OFFICE VISIT (OUTPATIENT)
Dept: PHYSICAL THERAPY | Facility: REHABILITATION | Age: 68
End: 2023-03-20

## 2023-03-20 DIAGNOSIS — M48.062 SPINAL STENOSIS OF LUMBAR REGION WITH NEUROGENIC CLAUDICATION: Primary | ICD-10-CM

## 2023-03-20 NOTE — PROGRESS NOTES
Daily Note     Today's date: 3/20/2023  Patient name: Kristy Adams  : 1955  MRN: [id number]  Referring provider: Adeola House PA-C  Dx:   Encounter Diagnosis     ICD-10-CM    1  Spinal stenosis of lumbar region with neurogenic claudication  M48 062           Start Time: 1448  Stop Time: 1304  Total time in clinic (min): 44 minutes    Subjective: Delores Balderas reports he's tired today but doesn't know why because he slept well last night  Denies changes in the stiffness/heaviness in the R leg since last session  Reports hypersensitivity when running his hand over the R thigh  Objective: See treatment diary below      Assessment: Tolerated treatment well  Occasional VCs required for proper form during self stretches  No significant change in R leg symptoms with exercises this session  Patient demonstrated appropriate level of challenge and muscular fatigue throughout session and noted good status at end of visit  Patient demonstrated fatigue post treatment, exhibited good technique with therapeutic exercises and would benefit from continued PT  Plan: Continue per plan of care  Progress treatment as tolerated         Precautions: Diabetes, hyperlipidemia, HTN, lumbar spinal stenosis    * Indicates part of HEP   HEP code: [id number]     Daily Treatment Diary     Manuals 2/16 3/2 3/6 3/9 3/13 3/16 3/20    Lumbar distraction w/ feet on ball                                 Therapeutic Exercise           Bike (muscular endurance) 5' lvl 1 5' lvl 1 5' lvl 0 5' lvl 1 5' lvl 1 5' lvl 0 5' lvl 1    Hamstring stretch Seated 10x10" ea Seated 10x10" Seated 10x10" Seated 10x10" ea Seated 10x10" ea Seated 10x10" ea Seated 10x10" ea    LTR 10x5"          Quad stretch*     Seated 10x5" R  Seated 10x10" ea    Seated ball roll out           Clamshells* Supine BTB 3x12x3"  Supine BTB 3x12x3" Seated BTB 3x10x5"   Seated BTB 3x12x5"    SLR flexion           SLR abduction           Paloff press           Supine hip extension bridge on ball           Supine lateral ball roll           Seated lumbar ext           hooklying hip add sets    3x10x3" Seated ball squeeze 3x10x5" 3x12x3" 20x5" seated squeeze     Patient education    done                  Neuro Re-ed           Slump slider     15x3" ea 15x3" ea 15x3" ea    Femoral nerve glide           ADIM with PPT 2x10x5" 3x10 5" 3x10 5"  3x12x5"      ADIM with marches           ADIM with BKFO           Bridges* 3x12 3x12 3x12  3x15      Supine ball crush           Standing ball crush           Standing ball crush marches           Bird dogs           Bear holds           Dead bug           Plank shoulder taps           Rows                                            Therapeutic Activity           Sit to stands 3x10 chair + foam 3x10 chair + foam 3x10 chair + foam  3x12 chair + foam 3x12 chair + foam 3x10     Leg press           Band resisted side stepping           Monster walks           Goblet squat           Bent over row           Deadlift           Farmer's carry                                            Modalities

## 2023-03-23 ENCOUNTER — OFFICE VISIT (OUTPATIENT)
Dept: PHYSICAL THERAPY | Facility: REHABILITATION | Age: 68
End: 2023-03-23

## 2023-03-23 DIAGNOSIS — M48.062 SPINAL STENOSIS OF LUMBAR REGION WITH NEUROGENIC CLAUDICATION: Primary | ICD-10-CM

## 2023-03-23 NOTE — PROGRESS NOTES
Daily Note     Today's date: 3/23/2023  Patient name: Gustavo Celestin  : 1955  MRN: [id number]  Referring provider: Adrian Newman PA-C  Dx:   Encounter Diagnosis     ICD-10-CM    1  Spinal stenosis of lumbar region with neurogenic claudication  M48 062           Start Time: 008  Stop Time: 1304  Total time in clinic (min): 44 minutes    Subjective: Ole Reed reports "today is not a good day" and attributes increased pain and stiffness to the rainy weather  Objective: See treatment diary below      Assessment: Tolerated treatment well  Demonstrates good form with all exercises throughout session with no reports of symptom provocation  Updated and reviewed HEP with patient; patient verbalized and demonstrated understanding of all exercises  Answered all questions prior to end of session  Patient demonstrated fatigue post treatment and exhibited good technique with therapeutic exercises  Plan: POC to be placed on hold with transition to independent HEP until follow up with neurosurgery; will follow up as appropriate       Precautions: Diabetes, hyperlipidemia, HTN, lumbar spinal stenosis    * Indicates part of HEP   HEP code: [id number]     Daily Treatment Diary     Manuals 2/16 3/2 3/6 3/9 3/13 3/16 3/20 3/23   Lumbar distraction w/ feet on ball                                 Therapeutic Exercise           Bike (muscular endurance) 5' lvl 1 5' lvl 1 5' lvl 0 5' lvl 1 5' lvl 1 5' lvl 0 5' lvl 1    Hamstring stretch* Seated 10x10" ea Seated 10x10" Seated 10x10" Seated 10x10" ea Seated 10x10" ea Seated 10x10" ea Seated 10x10" ea Seated 10x10" ea   LTR 10x5"          Quad stretch*     Seated 10x5" R  Seated 10x10" ea Seated 10x10" ea   Seated ball roll out*        3x10x5"   Clamshells* Supine BTB 3x12x3"  Supine BTB 3x12x3" Seated BTB 3x10x5"   Seated BTB 3x12x5" Seated BTB 3x12x5"   SLR flexion           SLR abduction           Paloff press           Supine hip extension bridge on ball Supine lateral ball roll           Seated lumbar ext           hooklying hip add sets*   3x10x3" Seated ball squeeze 3x10x5" 3x12x3" 20x5" seated squeeze  3x10x5" seated squeeze   Patient education    done                  Neuro Re-ed           Slump slider*     15x3" ea 15x3" ea 15x3" ea 15x3" ea   Femoral nerve glide           ADIM with PPT 2x10x5" 3x10 5" 3x10 5"  3x12x5"      ADIM with marches           ADIM with BKFO           Bridges* 3x12 3x12 3x12  3x15      Supine ball crush           Standing ball crush           Standing ball crush marches           Bird dogs           Bear holds           Dead bug           Plank shoulder taps           Rows                                            Therapeutic Activity           Sit to stands* 3x10 chair + foam 3x10 chair + foam 3x10 chair + foam  3x12 chair + foam 3x12 chair + foam 3x10     Leg press           Band resisted side stepping           Monster walks           Goblet squat           Bent over row           Deadlift           Farmer's carry                                            Modalities

## 2023-04-03 DIAGNOSIS — I10 BENIGN ESSENTIAL HYPERTENSION: ICD-10-CM

## 2023-04-03 DIAGNOSIS — E11.9 TYPE 2 DIABETES MELLITUS WITHOUT COMPLICATION, WITHOUT LONG-TERM CURRENT USE OF INSULIN (HCC): ICD-10-CM

## 2023-04-04 RX ORDER — PIOGLITAZONE HCL AND METFORMIN HCL 500; 15 MG/1; MG/1
TABLET ORAL
Qty: 180 TABLET | Refills: 0 | Status: SHIPPED | OUTPATIENT
Start: 2023-04-04

## 2023-04-04 RX ORDER — GLIMEPIRIDE 2 MG/1
TABLET ORAL
Qty: 90 TABLET | Refills: 0 | Status: SHIPPED | OUTPATIENT
Start: 2023-04-04

## 2023-04-04 RX ORDER — AMLODIPINE BESYLATE AND BENAZEPRIL HYDROCHLORIDE 5; 20 MG/1; MG/1
CAPSULE ORAL
Qty: 90 CAPSULE | Refills: 0 | Status: SHIPPED | OUTPATIENT
Start: 2023-04-04

## 2023-04-27 ENCOUNTER — OFFICE VISIT (OUTPATIENT)
Dept: NEUROSURGERY | Facility: CLINIC | Age: 68
End: 2023-04-27

## 2023-04-27 VITALS
TEMPERATURE: 98 F | SYSTOLIC BLOOD PRESSURE: 133 MMHG | RESPIRATION RATE: 16 BRPM | DIASTOLIC BLOOD PRESSURE: 70 MMHG | HEIGHT: 65 IN | WEIGHT: 250 LBS | BODY MASS INDEX: 41.65 KG/M2 | HEART RATE: 72 BPM

## 2023-04-27 DIAGNOSIS — G95.19 NEUROGENIC CLAUDICATION: ICD-10-CM

## 2023-04-27 DIAGNOSIS — E66.01 OBESITY, MORBID (HCC): ICD-10-CM

## 2023-04-27 DIAGNOSIS — G95.20 CERVICAL SPINAL CORD COMPRESSION (HCC): ICD-10-CM

## 2023-04-27 DIAGNOSIS — S14.105A: Primary | ICD-10-CM

## 2023-04-27 PROBLEM — R29.818 NEUROGENIC CLAUDICATION: Status: ACTIVE | Noted: 2023-04-27

## 2023-04-27 RX ORDER — CHLORHEXIDINE GLUCONATE 0.12 MG/ML
15 RINSE ORAL ONCE
OUTPATIENT
Start: 2023-04-27 | End: 2023-04-27

## 2023-04-27 RX ORDER — CEFAZOLIN SODIUM 2 G/50ML
2000 SOLUTION INTRAVENOUS ONCE
OUTPATIENT
Start: 2023-04-27 | End: 2023-04-27

## 2023-04-27 NOTE — PROGRESS NOTES
DISCUSSION SUMMARY   This is a 69 y o  male with neurogenic claudication and increasing difficulty with ambulation and severe spinal stenosis at the cervical thoracic junction  He nearly fell in the office  He is using a walker  He is unsteady but was much improved after some instruction in using a walker properly and being careful with turnaround's  He does need to have a decompression done urgently  I believe this should be done within the next month  The risks, benefits and complications of surgery were explained in detail to [facility]  including hemorrhage, infection, CSF leakage, wound problems, pain, weakness, numbness, dysesthesiae, paralysis, coma, and death  Also, the possibility  of further surgery being required was emphasized  Other potential medical complications were outlined, including deep venous thrombosis, pulmonary embolism, pneumonia, urinary tract infection, myocardial infarction,  and stroke  The need for physical therapy, occupational therapy, and rehabilitation was also mentioned  The alternatives to surgery were discussed  [facility] asked relevant questions and asked that we proceed with arrangements for surgery  Return for Surgical intervention  Diagnosis ICD-10-CM Associated Orders   1  Spinal cord injury at C5-C7 level without injury of spinal bone ([facility] [address] )  S14 105A Case request operating room: Posterior cervical decompressive laminectomy and instrumented fusion C4-T1     CBC and differential     EKG 12 lead     Type and screen     Case request operating room: Posterior cervical decompressive laminectomy and instrumented fusion C4-T1      2   Cervical spinal cord compression (HCC)  G95 20 Ambulatory referral to Neurosurgery     Case request operating room: Posterior cervical decompressive laminectomy and instrumented fusion C4-T1     CBC and differential     EKG 12 lead     Type and screen     Case request operating room: Posterior cervical decompressive laminectomy and instrumented fusion C4-T1      3  Neurogenic claudication  R29 818 Case request operating room: Posterior cervical decompressive laminectomy and instrumented fusion C4-T1     CBC and differential     EKG 12 lead     Type and screen     Case request operating room: Posterior cervical decompressive laminectomy and instrumented fusion C4-T1      4  Obesity, morbid ([facility] [address] )  E66 01            Chief Complaint   Follow-up and Neck Pain (F/U NEW PROBLEM CSPINE MRI 2/22/2023; SEE REFERRAL)       History of Present Illness   Neck Pain   This is a new problem  The current episode started more than 1 month ago  The problem occurs daily  The problem has been unchanged  The pain is associated with nothing  Pain location: left posterior neck slightly into the left shoulder  The pain is at a severity of 2/10  Stiffness is present all day  Associated symptoms include leg pain (right thigh tightness), numbness (R>L hands), tingling (R>L hands) and weakness (R>L hands)  Pertinent negatives include no headaches, pain with swallowing or trouble swallowing  He has tried acetaminophen, bed rest, heat, home exercises, muscle relaxants and NSAIDs for the symptoms  The treatment provided mild relief  Has had increasing difficulties with sleeping  At night his legs jump with the right side being involved to a much greater extent than the left  His hands have become progressively less useful  He is status post a carpal tunnel release however this was not helpful for him and in fact his hands continue to get worse  He has severe atrophy of the thenar musculature  He says that he has nearly fallen multiple times  He sometimes uses a cane but more frequently uses a walker  I recommended very strongly that he continue to use only as a walker  His difficulty in eating has progressed because his his hands do not hold the utensils properly  He lives alone but there is family support for him    He lives on a split-level and "he has difficulty navigating the stairs  We talked about specific mechanisms to make sure that his hands are never filled and he never carries anything up and down the stairs  I specifically recommended the use of a backpack for him  All of the symptoms are progressing and have gotten worse just since December  Review of Systems   Constitutional: Positive for activity change (restricted)  HENT: Negative  Negative for trouble swallowing  Eyes: Negative  Respiratory: Negative  Cardiovascular: Negative  Gastrointestinal: Negative  Endocrine: Negative  Genitourinary: Negative  Musculoskeletal: Positive for back pain (chronic h/o of low back pain), gait problem (using a walker), neck pain (posterior neck slightly ionto left shoulder) and neck stiffness  Skin: Negative  Allergic/Immunologic: Negative  Neurological: Positive for tingling (R>L hands), weakness (R>L hands) and numbness (R>L hands)  Negative for headaches  Hematological: Negative  Psychiatric/Behavioral: Negative  All other systems reviewed and are negative        Vitals:    /70 (BP Location: Right arm, Patient Position: Sitting, Cuff Size: Standard)   Pulse 72   Temp 98 °F (36 7 °C) (Temporal)   Resp 16   Ht 5' 5\" (1 651 m)   Wt 113 kg (250 lb)   BMI 41 60 kg/m²     MEDICAL HISTORY  Past Medical History:   Diagnosis Date   • Diabetes mellitus ([address] )    • ED (erectile dysfunction)    • Hyperlipidemia    • Hypertension    • Obesity    • Spinal stenosis of lumbar region at multiple levels 12/22/2022     Past Surgical History:   Procedure Laterality Date   • CARPAL TUNNEL RELEASE Right    • EYE SURGERY      strabibusmus   • HAND SURGERY      left index finger   • MA EXCISION SPERMATOCELE W/WO EPIDIDYMECTOMY Left 09/16/2021    Procedure: HYDROCELECTOMY, SCROTAL DEBRIDEMENT;  Surgeon: Yaquelin Chapman MD;  Location: [address] [place] [address];  Service: Urology   • MA NEUROPLASTY &/TRANSPOS MEDIAN NRV CARPAL Saqib Maxwell Right " 2021    Procedure: RELEASE CARPAL TUNNEL;  Surgeon: Cristo Blanton MD;  [facility];  Service: Orthopedics   • AR NEUROPLASTY &/TRANSPOSITION ULNAR NERVE ELBOW Right 2021    Procedure: RELEASE CUBITAL TUNNEL;  Surgeon: Cristo Blanton MD;  Location[facility];  Service: Orthopedics     Social History     Tobacco Use   • Smoking status: Former     Packs/day: 1 00     Years: 47 00     Pack years: 47 00     Types: Cigarettes     Quit date: 2021     Years since quittin 2   • Smokeless tobacco: Never   • Tobacco comments:     advised not to smoke   Vaping Use   • Vaping Use: Never used   Substance Use Topics   • Alcohol use: Not Currently     Comment: social   • Drug use: Not Currently      Family History   Problem Relation Age of Onset   • Hypertension Mother    • Diabetes Mother    • Hypertension Father    • Diabetes Father         Current Outpatient Medications:   •  amLODIPine-benazepril (LOTREL 5-20) 5-20 MG per capsule, TAKE 1 CAPSULE DAILY, Disp: 90 capsule, Rfl: 0  •  glimepiride (AMARYL) 2 mg tablet, TAKE 1 TABLET DAILY WITH   BREAKFAST, Disp: 90 tablet, Rfl: 0  •  methocarbamol (ROBAXIN) 500 mg tablet, Take 1 tablet (500 mg total) by mouth 2 (two) times a day as needed for muscle spasms, Disp: 20 tablet, Rfl: 0  •  pioglitazone-metFORMIN (ACTOPLUS MET)  MG per tablet, TAKE 1 TABLET TWICE A DAY, Disp: 180 tablet, Rfl: 0     No Known Allergies     The following portions of the patient's history were updated by MA and reviewed by MD: allergies, current medications, past family history, past medical history, past social history, past surgical history and problem list     Physical Exam  Vitals and nursing note reviewed  Constitutional:       General: He is not in acute distress  Appearance: Normal appearance  He is obese  He is not ill-appearing, toxic-appearing or diaphoretic  HENT:      Head: Normocephalic and atraumatic        Nose: Nose normal    Eyes: Extraocular Movements: Extraocular movements intact  Pupils: Pupils are equal, round, and reactive to light  Cardiovascular:      Rate and Rhythm: Regular rhythm  Tachycardia present  Pulses: Normal pulses  Heart sounds: Normal heart sounds  No murmur heard  No friction rub  No gallop  Pulmonary:      Effort: Pulmonary effort is normal  No respiratory distress  Breath sounds: Normal breath sounds  No stridor  No wheezing, rhonchi or rales  Chest:      Chest wall: No tenderness  Abdominal:      General: Bowel sounds are normal  There is no distension  Comments: Obese   Musculoskeletal:         General: No swelling, tenderness, deformity or signs of injury  Normal range of motion  Cervical back: Rigidity present  Right lower leg: No edema  Left lower leg: No edema  Skin:     General: Skin is warm and dry  Neurological:      Mental Status: He is alert and oriented to person, place, and time  Mental status is at baseline  Cranial Nerves: No cranial nerve deficit  Sensory: Sensory deficit (  Sensory deficits in the C6-C7 and C8 dermatome) present  Motor: Weakness present  Coordination: Coordination abnormal ( Very slow hand opening and utility with the right being worse than the left)  Gait: Gait abnormal (  Severely abnormal gait  He uses a walker but at turnaround loses his balance  His feet and hands are very clumsy  )  Deep Tendon Reflexes: Reflexes abnormal ( Hyperreflexic in the lower extremities much more severely so on the right than on the left)  Comments: Hands are clenched in a claw form  The right side is much worse than the left  There is atrophy of the thenar musculature and the snuffbox is completely open on the right side and to a lesser extent but also present on the left side  His tricep power is diminished bilaterally but almost 0 out of 5 on the right side and 1 out of 5 on the left      His deltoids are at 4+ out of 5 with the left being slightly better than the right  His pectoralis muscle on the right side is at 4 out of 5 in 5- out of 5 on the left  Bicep is at 4 out of 5 on the right side and 4+ out of 5 on the left  Brachioradialis is 4 out of 5 on the right  Sensation is reduced significantly on the right lower extremity as compared to the left  Deep vibratory is absent in the lower extremity   Psychiatric:         Mood and Affect: Mood normal          Behavior: Behavior normal          Thought Content: Thought content normal          Judgment: Judgment normal          RESULTS/DATA  I have personally reviewed pertinent films in PACS     RI of the cervical spine is carefully reviewed  This demonstrates cord signal change from severe spinal stenosis  There is autofusion occurring in the anterior spine  There is a reversal of the normal lordosis

## 2023-04-27 NOTE — H&P (VIEW-ONLY)
DISCUSSION SUMMARY   This is a 69 y o  male with neurogenic claudication and increasing difficulty with ambulation and severe spinal stenosis at the cervical thoracic junction  He nearly fell in the office  He is using a walker  He is unsteady but was much improved after some instruction in using a walker properly and being careful with turnaround's  He does need to have a decompression done urgently  I believe this should be done within the next month  The risks, benefits and complications of surgery were explained in detail to [facility]  including hemorrhage, infection, CSF leakage, wound problems, pain, weakness, numbness, dysesthesiae, paralysis, coma, and death  Also, the possibility  of further surgery being required was emphasized  Other potential medical complications were outlined, including deep venous thrombosis, pulmonary embolism, pneumonia, urinary tract infection, myocardial infarction,  and stroke  The need for physical therapy, occupational therapy, and rehabilitation was also mentioned  The alternatives to surgery were discussed  [facility] asked relevant questions and asked that we proceed with arrangements for surgery  Return for Surgical intervention  Diagnosis ICD-10-CM Associated Orders   1  Spinal cord injury at C5-C7 level without injury of spinal bone ([facility] [address] )  S14 105A Case request operating room: Posterior cervical decompressive laminectomy and instrumented fusion C4-T1     CBC and differential     EKG 12 lead     Type and screen     Case request operating room: Posterior cervical decompressive laminectomy and instrumented fusion C4-T1      2   Cervical spinal cord compression (HCC)  G95 20 Ambulatory referral to Neurosurgery     Case request operating room: Posterior cervical decompressive laminectomy and instrumented fusion C4-T1     CBC and differential     EKG 12 lead     Type and screen     Case request operating room: Posterior cervical decompressive laminectomy and instrumented fusion C4-T1      3  Neurogenic claudication  R29 818 Case request operating room: Posterior cervical decompressive laminectomy and instrumented fusion C4-T1     CBC and differential     EKG 12 lead     Type and screen     Case request operating room: Posterior cervical decompressive laminectomy and instrumented fusion C4-T1      4  Obesity, morbid ([facility] [address] )  E66 01            Chief Complaint   Follow-up and Neck Pain (F/U NEW PROBLEM CSPINE MRI 2/22/2023; SEE REFERRAL)       History of Present Illness   Neck Pain   This is a new problem  The current episode started more than 1 month ago  The problem occurs daily  The problem has been unchanged  The pain is associated with nothing  Pain location: left posterior neck slightly into the left shoulder  The pain is at a severity of 2/10  Stiffness is present all day  Associated symptoms include leg pain (right thigh tightness), numbness (R>L hands), tingling (R>L hands) and weakness (R>L hands)  Pertinent negatives include no headaches, pain with swallowing or trouble swallowing  He has tried acetaminophen, bed rest, heat, home exercises, muscle relaxants and NSAIDs for the symptoms  The treatment provided mild relief  Has had increasing difficulties with sleeping  At night his legs jump with the right side being involved to a much greater extent than the left  His hands have become progressively less useful  He is status post a carpal tunnel release however this was not helpful for him and in fact his hands continue to get worse  He has severe atrophy of the thenar musculature  He says that he has nearly fallen multiple times  He sometimes uses a cane but more frequently uses a walker  I recommended very strongly that he continue to use only as a walker  His difficulty in eating has progressed because his his hands do not hold the utensils properly  He lives alone but there is family support for him    He lives on a split-level and "he has difficulty navigating the stairs  We talked about specific mechanisms to make sure that his hands are never filled and he never carries anything up and down the stairs  I specifically recommended the use of a backpack for him  All of the symptoms are progressing and have gotten worse just since December  Review of Systems   Constitutional: Positive for activity change (restricted)  HENT: Negative  Negative for trouble swallowing  Eyes: Negative  Respiratory: Negative  Cardiovascular: Negative  Gastrointestinal: Negative  Endocrine: Negative  Genitourinary: Negative  Musculoskeletal: Positive for back pain (chronic h/o of low back pain), gait problem (using a walker), neck pain (posterior neck slightly ionto left shoulder) and neck stiffness  Skin: Negative  Allergic/Immunologic: Negative  Neurological: Positive for tingling (R>L hands), weakness (R>L hands) and numbness (R>L hands)  Negative for headaches  Hematological: Negative  Psychiatric/Behavioral: Negative  All other systems reviewed and are negative        Vitals:    /70 (BP Location: Right arm, Patient Position: Sitting, Cuff Size: Standard)   Pulse 72   Temp 98 °F (36 7 °C) (Temporal)   Resp 16   Ht 5' 5\" (1 651 m)   Wt 113 kg (250 lb)   BMI 41 60 kg/m²     MEDICAL HISTORY  Past Medical History:   Diagnosis Date   • Diabetes mellitus ([address] )    • ED (erectile dysfunction)    • Hyperlipidemia    • Hypertension    • Obesity    • Spinal stenosis of lumbar region at multiple levels 12/22/2022     Past Surgical History:   Procedure Laterality Date   • CARPAL TUNNEL RELEASE Right    • EYE SURGERY      strabibusmus   • HAND SURGERY      left index finger   • SD EXCISION SPERMATOCELE W/WO EPIDIDYMECTOMY Left 09/16/2021    Procedure: HYDROCELECTOMY, SCROTAL DEBRIDEMENT;  Surgeon: Macey Calero MD;  Location: [address] [place] [address];  Service: Urology   • SD NEUROPLASTY &/TRANSPOS MEDIAN NRV CARPAL Brittany Lone Right " 2021    Procedure: RELEASE CARPAL TUNNEL;  Surgeon: Gordon Staley MD;  Location[facility];  Service: Orthopedics   • [place] NEUROPLASTY &/TRANSPOSITION ULNAR NERVE ELBOW Right 2021    Procedure: RELEASE CUBITAL TUNNEL;  Surgeon: Gordon Staley MD;  [facility];  Service: Orthopedics     Social History     Tobacco Use   • Smoking status: Former     Packs/day: 1 00     Years: 47 00     Pack years: 47 00     Types: Cigarettes     Quit date: 2021     Years since quittin 2   • Smokeless tobacco: Never   • Tobacco comments:     advised not to smoke   Vaping Use   • Vaping Use: Never used   Substance Use Topics   • Alcohol use: Not Currently     Comment: social   • Drug use: Not Currently      Family History   Problem Relation Age of Onset   • Hypertension Mother    • Diabetes Mother    • Hypertension Father    • Diabetes Father         Current Outpatient Medications:   •  amLODIPine-benazepril (LOTREL 5-20) 5-20 MG per capsule, TAKE 1 CAPSULE DAILY, Disp: 90 capsule, Rfl: 0  •  glimepiride (AMARYL) 2 mg tablet, TAKE 1 TABLET DAILY WITH   BREAKFAST, Disp: 90 tablet, Rfl: 0  •  methocarbamol (ROBAXIN) 500 mg tablet, Take 1 tablet (500 mg total) by mouth 2 (two) times a day as needed for muscle spasms, Disp: 20 tablet, Rfl: 0  •  pioglitazone-metFORMIN (ACTOPLUS MET)  MG per tablet, TAKE 1 TABLET TWICE A DAY, Disp: 180 tablet, Rfl: 0     No Known Allergies     The following portions of the patient's history were updated by MA and reviewed by MD: allergies, current medications, past family history, past medical history, past social history, past surgical history and problem list     Physical Exam  Vitals and nursing note reviewed  Constitutional:       General: He is not in acute distress  Appearance: Normal appearance  He is obese  He is not ill-appearing, toxic-appearing or diaphoretic  HENT:      Head: Normocephalic and atraumatic        Nose: Nose normal    Eyes: Extraocular Movements: Extraocular movements intact  Pupils: Pupils are equal, round, and reactive to light  Cardiovascular:      Rate and Rhythm: Regular rhythm  Tachycardia present  Pulses: Normal pulses  Heart sounds: Normal heart sounds  No murmur heard  No friction rub  No gallop  Pulmonary:      Effort: Pulmonary effort is normal  No respiratory distress  Breath sounds: Normal breath sounds  No stridor  No wheezing, rhonchi or rales  Chest:      Chest wall: No tenderness  Abdominal:      General: Bowel sounds are normal  There is no distension  Comments: Obese   Musculoskeletal:         General: No swelling, tenderness, deformity or signs of injury  Normal range of motion  Cervical back: Rigidity present  Right lower leg: No edema  Left lower leg: No edema  Skin:     General: Skin is warm and dry  Neurological:      Mental Status: He is alert and oriented to person, place, and time  Mental status is at baseline  Cranial Nerves: No cranial nerve deficit  Sensory: Sensory deficit (  Sensory deficits in the C6-C7 and C8 dermatome) present  Motor: Weakness present  Coordination: Coordination abnormal ( Very slow hand opening and utility with the right being worse than the left)  Gait: Gait abnormal (  Severely abnormal gait  He uses a walker but at turnaround loses his balance  His feet and hands are very clumsy  )  Deep Tendon Reflexes: Reflexes abnormal ( Hyperreflexic in the lower extremities much more severely so on the right than on the left)  Comments: Hands are clenched in a claw form  The right side is much worse than the left  There is atrophy of the thenar musculature and the snuffbox is completely open on the right side and to a lesser extent but also present on the left side  His tricep power is diminished bilaterally but almost 0 out of 5 on the right side and 1 out of 5 on the left      His deltoids are at 4+ out of 5 with the left being slightly better than the right  His pectoralis muscle on the right side is at 4 out of 5 in 5- out of 5 on the left  Bicep is at 4 out of 5 on the right side and 4+ out of 5 on the left  Brachioradialis is 4 out of 5 on the right  Sensation is reduced significantly on the right lower extremity as compared to the left  Deep vibratory is absent in the lower extremity   Psychiatric:         Mood and Affect: Mood normal          Behavior: Behavior normal          Thought Content: Thought content normal          Judgment: Judgment normal          RESULTS/DATA  I have personally reviewed pertinent films in PACS     RI of the cervical spine is carefully reviewed  This demonstrates cord signal change from severe spinal stenosis  There is autofusion occurring in the anterior spine  There is a reversal of the normal lordosis

## 2023-05-02 ENCOUNTER — LAB REQUISITION (OUTPATIENT)
Dept: LAB | Facility: HOSPITAL | Age: 68
End: 2023-05-02

## 2023-05-02 ENCOUNTER — APPOINTMENT (OUTPATIENT)
Dept: LAB | Facility: CLINIC | Age: 68
End: 2023-05-02

## 2023-05-02 ENCOUNTER — OFFICE VISIT (OUTPATIENT)
Dept: LAB | Facility: CLINIC | Age: 68
End: 2023-05-02

## 2023-05-02 DIAGNOSIS — G95.19 NEUROGENIC CLAUDICATION: ICD-10-CM

## 2023-05-02 DIAGNOSIS — S14.105A: ICD-10-CM

## 2023-05-02 DIAGNOSIS — G95.20 UNSPECIFIED CORD COMPRESSION (HCC): ICD-10-CM

## 2023-05-02 DIAGNOSIS — G95.20 CERVICAL SPINAL CORD COMPRESSION (HCC): ICD-10-CM

## 2023-05-02 DIAGNOSIS — E66.01 OBESITY, MORBID (HCC): ICD-10-CM

## 2023-05-02 LAB
ABO GROUP BLD: NORMAL
ALBUMIN SERPL BCP-MCNC: 3.7 G/DL (ref 3.5–5)
ALP SERPL-CCNC: 68 U/L (ref 34–104)
ALT SERPL W P-5'-P-CCNC: 18 U/L (ref 7–52)
ANION GAP SERPL CALCULATED.3IONS-SCNC: 8 MMOL/L (ref 4–13)
APTT PPP: 26 SECONDS (ref 23–37)
AST SERPL W P-5'-P-CCNC: 19 U/L (ref 13–39)
BACTERIA UR QL AUTO: NORMAL /HPF
BASOPHILS # BLD AUTO: 0.02 THOUSANDS/ÂΜL (ref 0–0.1)
BASOPHILS NFR BLD AUTO: 0 % (ref 0–1)
BILIRUB SERPL-MCNC: 0.55 MG/DL (ref 0.2–1)
BILIRUB UR QL STRIP: NEGATIVE
BLD GP AB SCN SERPL QL: NEGATIVE
BUN SERPL-MCNC: 27 MG/DL (ref 5–25)
CALCIUM SERPL-MCNC: 9.5 MG/DL (ref 8.4–10.2)
CHLORIDE SERPL-SCNC: 106 MMOL/L (ref 96–108)
CLARITY UR: CLEAR
CO2 SERPL-SCNC: 28 MMOL/L (ref 21–32)
COLOR UR: YELLOW
CREAT SERPL-MCNC: 1.02 MG/DL (ref 0.6–1.3)
EOSINOPHIL # BLD AUTO: 0.28 THOUSAND/ÂΜL (ref 0–0.61)
EOSINOPHIL NFR BLD AUTO: 3 % (ref 0–6)
ERYTHROCYTE [DISTWIDTH] IN BLOOD BY AUTOMATED COUNT: 15.5 % (ref 11.6–15.1)
GFR SERPL CREATININE-BSD FRML MDRD: 74 ML/MIN/1.73SQ M
GLUCOSE P FAST SERPL-MCNC: 73 MG/DL (ref 65–99)
GLUCOSE UR STRIP-MCNC: NEGATIVE MG/DL
HCT VFR BLD AUTO: 42.2 % (ref 36.5–49.3)
HGB BLD-MCNC: 13.5 G/DL (ref 12–17)
HGB UR QL STRIP.AUTO: NEGATIVE
IMM GRANULOCYTES # BLD AUTO: 0.02 THOUSAND/UL (ref 0–0.2)
IMM GRANULOCYTES NFR BLD AUTO: 0 % (ref 0–2)
INR PPP: 1.07 (ref 0.84–1.19)
KETONES UR STRIP-MCNC: NEGATIVE MG/DL
LEUKOCYTE ESTERASE UR QL STRIP: NEGATIVE
LYMPHOCYTES # BLD AUTO: 1.11 THOUSANDS/ÂΜL (ref 0.6–4.47)
LYMPHOCYTES NFR BLD AUTO: 13 % (ref 14–44)
MCH RBC QN AUTO: 28.9 PG (ref 26.8–34.3)
MCHC RBC AUTO-ENTMCNC: 32 G/DL (ref 31.4–37.4)
MCV RBC AUTO: 90 FL (ref 82–98)
MONOCYTES # BLD AUTO: 0.79 THOUSAND/ÂΜL (ref 0.17–1.22)
MONOCYTES NFR BLD AUTO: 10 % (ref 4–12)
NEUTROPHILS # BLD AUTO: 6.06 THOUSANDS/ÂΜL (ref 1.85–7.62)
NEUTS SEG NFR BLD AUTO: 74 % (ref 43–75)
NITRITE UR QL STRIP: NEGATIVE
NON-SQ EPI CELLS URNS QL MICRO: NORMAL /HPF
NRBC BLD AUTO-RTO: 0 /100 WBCS
PH UR STRIP.AUTO: 6.5 [PH]
PLATELET # BLD AUTO: 271 THOUSANDS/UL (ref 149–390)
PMV BLD AUTO: 11.3 FL (ref 8.9–12.7)
POTASSIUM SERPL-SCNC: 4.4 MMOL/L (ref 3.5–5.3)
PROT SERPL-MCNC: 6.4 G/DL (ref 6.4–8.4)
PROT UR STRIP-MCNC: ABNORMAL MG/DL
PROTHROMBIN TIME: 14.2 SECONDS (ref 11.6–14.5)
RBC # BLD AUTO: 4.67 MILLION/UL (ref 3.88–5.62)
RBC #/AREA URNS AUTO: NORMAL /HPF
RH BLD: POSITIVE
SODIUM SERPL-SCNC: 142 MMOL/L (ref 135–147)
SP GR UR STRIP.AUTO: 1.02 (ref 1–1.03)
SPECIMEN EXPIRATION DATE: NORMAL
UROBILINOGEN UR STRIP-ACNC: <2 MG/DL
WBC # BLD AUTO: 8.28 THOUSAND/UL (ref 4.31–10.16)
WBC #/AREA URNS AUTO: NORMAL /HPF

## 2023-05-03 LAB
ATRIAL RATE: 72 BPM
MRSA NOSE QL CULT: NORMAL
P AXIS: 39 DEGREES
PR INTERVAL: 162 MS
QRS AXIS: -4 DEGREES
QRSD INTERVAL: 98 MS
QT INTERVAL: 392 MS
QTC INTERVAL: 429 MS
T WAVE AXIS: 111 DEGREES
VENTRICULAR RATE: 72 BPM

## 2023-05-04 ENCOUNTER — CONSULT (OUTPATIENT)
Dept: FAMILY MEDICINE CLINIC | Facility: CLINIC | Age: 68
End: 2023-05-04

## 2023-05-04 VITALS
WEIGHT: 248 LBS | OXYGEN SATURATION: 97 % | DIASTOLIC BLOOD PRESSURE: 82 MMHG | TEMPERATURE: 97.9 F | HEART RATE: 72 BPM | HEIGHT: 65 IN | SYSTOLIC BLOOD PRESSURE: 122 MMHG | BODY MASS INDEX: 41.32 KG/M2

## 2023-05-04 DIAGNOSIS — G95.20 CERVICAL SPINAL CORD COMPRESSION (HCC): ICD-10-CM

## 2023-05-04 DIAGNOSIS — I10 PRIMARY HYPERTENSION: ICD-10-CM

## 2023-05-04 DIAGNOSIS — E11.9 TYPE 2 DIABETES MELLITUS WITHOUT COMPLICATION, WITHOUT LONG-TERM CURRENT USE OF INSULIN (HCC): ICD-10-CM

## 2023-05-04 DIAGNOSIS — E78.2 MIXED HYPERLIPIDEMIA: ICD-10-CM

## 2023-05-04 DIAGNOSIS — Z01.818 PREOPERATIVE CLEARANCE: Primary | ICD-10-CM

## 2023-05-04 LAB
EST. AVERAGE GLUCOSE BLD GHB EST-MCNC: 126 MG/DL
HBA1C MFR BLD: 6 %

## 2023-05-04 NOTE — PROGRESS NOTES
Assessment/Plan:         Problem List Items Addressed This Visit        Endocrine    Type 2 diabetes mellitus without complication, without long-term current use of insulin ([address] )       Lab Results   Component Value Date    HGBA1C 6 0 (H) 05/02/2023   Under control  Continue current medication  We will re-evaluate at next office visit  Cardiovascular and Mediastinum    Primary hypertension     Under control  Continue current medication  We will re-evaluate at next office visit  Other    Mixed hyperlipidemia     Under control  Continue current medication  We will re-evaluate at next office visit  Cervical spinal cord compression (HCC)     Scheduled for cervical decompression and fusion         Preoperative clearance - Primary     Higher than average risk for surgery  No contraindication for surgery  Continue all medication postoperatively  Call for any perioperative problems                Subjective:      Patient ID: Janae Saini is a 69 y o  male  Patient here for preoperative clearance as he is going forPosterior cervical decompressive laminectomy and instrumented fusion C4-T1 (Spine Cervical) and  review of chronic medical problems and review of the labs and imaging if it is applicable  Currently has no specific complaints other than mentioned in the review of systems  Denies chest pain, SOB, cough, abdominal pain, nausea, vomiting, fever, chills, lightheadedness, dizziness,headache, tingling or numbness  No bowel or bladder problem  The following portions of the patient's history were reviewed and updated as appropriate:   Past Medical History:  He has a past medical history of Diabetes mellitus ([facility] [address] ), ED (erectile dysfunction), Hyperlipidemia, Hypertension, Obesity, and Spinal stenosis of lumbar region at multiple levels (12/22/2022)  ,  _______________________________________________________________________  Medical Problems:  does not have any pertinent problems on file ,  _______________________________________________________________________  Past Surgical History:   has a past surgical history that includes Hand surgery; Eye surgery; pr neuroplasty &/transposition ulnar nerve elbow (Right, 07/06/2021); pr neuroplasty &/transpos median nrv carpal tunne (Right, 07/06/2021); Carpal tunnel release (Right); and pr excision spermatocele w/wo epididymectomy (Left, 09/16/2021)  ,  _______________________________________________________________________  Family History:  family history includes Diabetes in his father and mother; Hypertension in his father and mother ,  _______________________________________________________________________  Social History:   reports that he quit smoking about 2 years ago  His smoking use included cigarettes  He has a 47 00 pack-year smoking history  He has never used smokeless tobacco  He reports that he does not currently use alcohol  He reports that he does not currently use drugs  ,  _______________________________________________________________________  Allergies:  has No Known Allergies     _______________________________________________________________________  Current Outpatient Medications   Medication Sig Dispense Refill    amLODIPine-benazepril (LOTREL 5-20) 5-20 MG per capsule TAKE 1 CAPSULE DAILY 90 capsule 0    glimepiride (AMARYL) 2 mg tablet TAKE 1 TABLET DAILY WITH   BREAKFAST 90 tablet 0    methocarbamol (ROBAXIN) 500 mg tablet Take 1 tablet (500 mg total) by mouth 2 (two) times a day as needed for muscle spasms 20 tablet 0    pioglitazone-metFORMIN (ACTOPLUS MET)  MG per tablet TAKE 1 TABLET TWICE A  tablet 0     No current facility-administered medications for this visit      _______________________________________________________________________  Review of Systems   Constitutional: Negative for chills, fatigue and fever     HENT: Negative for congestion, ear pain, rhinorrhea, sneezing and sore "throat  Eyes: Negative for redness, itching and visual disturbance  Respiratory: Negative for cough, chest tightness and shortness of breath  Cardiovascular: Negative for chest pain, palpitations and leg swelling  Gastrointestinal: Negative for abdominal pain, blood in stool, diarrhea, nausea and vomiting  Endocrine: Negative for cold intolerance and heat intolerance  Genitourinary: Negative for dysuria, frequency and urgency  Musculoskeletal: Positive for arthralgias, back pain, gait problem, myalgias and neck stiffness  Skin: Negative for color change and rash  Neurological: Positive for weakness (Both hands)  Negative for dizziness, tremors, light-headedness, numbness and headaches  Hematological: Does not bruise/bleed easily  Psychiatric/Behavioral: Negative for agitation, behavioral problems and confusion  Objective:  Vitals:    05/04/23 1443   BP: 122/82   BP Location: Left arm   Patient Position: Sitting   Cuff Size: Adult   Pulse: 72   Temp: 97 9 °F (36 6 °C)   TempSrc: Tympanic   SpO2: 97%   Weight: 112 kg (248 lb)   Height: 5' 5\" (1 651 m)     Body mass index is 41 27 kg/m²  Physical Exam  Vitals and nursing note reviewed  Constitutional:       General: He is not in acute distress  Appearance: He is obese  He is not ill-appearing, toxic-appearing or diaphoretic  Comments: Patient walks with a cane   HENT:      Head: Normocephalic and atraumatic  Right Ear: Tympanic membrane normal       Left Ear: Tympanic membrane normal       Nose: Nose normal       Mouth/Throat:      Mouth: Mucous membranes are moist       Pharynx: No oropharyngeal exudate or posterior oropharyngeal erythema  Eyes:      General: No scleral icterus  Right eye: No discharge  Left eye: No discharge  Conjunctiva/sclera: Conjunctivae normal       Pupils: Pupils are equal, round, and reactive to light     Cardiovascular:      Rate and Rhythm: Normal rate and regular " rhythm  Heart sounds: Normal heart sounds  No murmur heard  Pulmonary:      Effort: Pulmonary effort is normal  No respiratory distress  Breath sounds: Normal breath sounds  No wheezing  Abdominal:      General: Abdomen is flat  Bowel sounds are normal       Palpations: Abdomen is soft  Tenderness: There is no abdominal tenderness  Musculoskeletal:      Cervical back: Normal range of motion and neck supple  Comments: Paraspinal muscular tightness   Skin:     General: Skin is warm and dry  Capillary Refill: Capillary refill takes 2 to 3 seconds  Findings: No erythema or rash  Neurological:      General: No focal deficit present  Mental Status: He is alert and oriented to person, place, and time  Mental status is at baseline  Motor: Weakness (Both upper extremity) and atrophy (Right hand and right forearm) present  Gait: Gait abnormal (ambulates with walker)     Psychiatric:         Mood and Affect: Mood normal          Behavior: Behavior normal

## 2023-05-04 NOTE — ASSESSMENT & PLAN NOTE
Lab Results   Component Value Date    HGBA1C 6 0 (H) 05/02/2023   Under control  Continue current medication  We will re-evaluate at next office visit

## 2023-05-05 NOTE — PRE-PROCEDURE INSTRUCTIONS
Pre-Surgery Instructions:   Medication Instructions   • amLODIPine-benazepril (LOTREL 5-20) 5-20 MG per capsule Hold day of surgery     • glimepiride (AMARYL) 2 mg tablet Hold day of surgery     • methocarbamol (ROBAXIN) 500 mg tablet Hold day of surgery     • pioglitazone-metFORMIN (ACTOPLUS MET)  MG per tablet Hold day of surgery      Medication instructions for day surgery reviewed  Please use only a sip of water to take your instructed medications  Avoid all over the counter vitamins, supplements and NSAIDS for one week prior to surgery per anesthesia guidelines  Tylenol is ok to take as needed  You will receive a call one business day prior to surgery with an arrival time and hospital directions  If your surgery is scheduled on a Monday, the hospital will be calling you on the Friday prior to your surgery  If you have not heard from anyone by 8pm, please call the hospital supervisor through the hospital  at [phone]  [employer] [phone])  Do not eat or drink anything after midnight the night before your surgery, including candy, mints, lifesavers, or chewing gum  Do not drink alcohol 24hrs before your surgery  Try not to smoke at least 24hrs before your surgery  Follow the pre surgery showering instructions as listed in the [facility] Surgical Experience Booklet” or otherwise provided by your surgeon's office  Do not shave the surgical area 24 hours before surgery  Do not apply any lotions, creams, including makeup, cologne, deodorant, or perfumes after showering on the day of your surgery  No contact lenses, eye make-up, or artificial eyelashes  Remove nail polish, including gel polish, and any artificial, gel, or acrylic nails if possible  Remove all jewelry including rings and body piercing jewelry  Wear causal clothing that is easy to take on and off  Consider your type of surgery  Keep any valuables, jewelry, piercings at home   Please bring any specially ordered equipment (sling, braces) if indicated  Arrange for a responsible person to drive you to and from the hospital on the day of your surgery  Visitor Guidelines discussed  Call the surgeon's office with any new illnesses, exposures, or additional questions prior to surgery  Please reference your [facility] Surgical Experience Booklet” for additional information to prepare for your upcoming surgery

## 2023-05-10 ENCOUNTER — ANESTHESIA EVENT (OUTPATIENT)
Dept: PERIOP | Facility: HOSPITAL | Age: 68
End: 2023-05-10

## 2023-05-10 ENCOUNTER — HOSPITAL ENCOUNTER (INPATIENT)
Facility: HOSPITAL | Age: 68
LOS: 4 days | End: 2023-05-14
Attending: NEUROLOGICAL SURGERY | Admitting: NEUROLOGICAL SURGERY

## 2023-05-10 ENCOUNTER — ANESTHESIA (OUTPATIENT)
Dept: PERIOP | Facility: HOSPITAL | Age: 68
End: 2023-05-10

## 2023-05-10 ENCOUNTER — HOSPITAL ENCOUNTER (OUTPATIENT)
Dept: RADIOLOGY | Facility: HOSPITAL | Age: 68
Setting detail: SURGERY ADMIT
Discharge: HOME | End: 2023-05-10

## 2023-05-10 DIAGNOSIS — S14.105D SPINAL CORD INJURY AT C5-C7 LEVEL WITHOUT INJURY OF SPINAL BONE, SUBSEQUENT ENCOUNTER (HCC): ICD-10-CM

## 2023-05-10 DIAGNOSIS — G95.19 NEUROGENIC CLAUDICATION: ICD-10-CM

## 2023-05-10 DIAGNOSIS — Z98.890 POST-OPERATIVE STATE: ICD-10-CM

## 2023-05-10 DIAGNOSIS — M62.541 ATROPHY OF MUSCLE OF RIGHT HAND: ICD-10-CM

## 2023-05-10 DIAGNOSIS — G95.20 CERVICAL SPINAL CORD COMPRESSION (HCC): Primary | ICD-10-CM

## 2023-05-10 DIAGNOSIS — G95.20 CERVICAL SPINAL CORD COMPRESSION (HCC): ICD-10-CM

## 2023-05-10 DIAGNOSIS — M48.061 SPINAL STENOSIS OF LUMBAR REGION AT MULTIPLE LEVELS: ICD-10-CM

## 2023-05-10 LAB
ABO GROUP BLD: NORMAL
ANION GAP SERPL CALCULATED.3IONS-SCNC: 5 MMOL/L (ref 4–13)
BUN SERPL-MCNC: 26 MG/DL (ref 5–25)
CALCIUM SERPL-MCNC: 10.4 MG/DL (ref 8.3–10.1)
CHLORIDE SERPL-SCNC: 107 MMOL/L (ref 96–108)
CO2 SERPL-SCNC: 26 MMOL/L (ref 21–32)
CREAT SERPL-MCNC: 1.03 MG/DL (ref 0.6–1.3)
GFR SERPL CREATININE-BSD FRML MDRD: 73 ML/MIN/1.73SQ M
GLUCOSE P FAST SERPL-MCNC: 101 MG/DL (ref 65–99)
GLUCOSE SERPL-MCNC: 101 MG/DL (ref 65–140)
GLUCOSE SERPL-MCNC: 143 MG/DL (ref 65–140)
GLUCOSE SERPL-MCNC: 145 MG/DL (ref 65–140)
GLUCOSE SERPL-MCNC: 254 MG/DL (ref 65–140)
GLUCOSE SERPL-MCNC: 93 MG/DL (ref 65–140)
INR PPP: 0.99 (ref 0.84–1.19)
PLATELET # BLD AUTO: 259 THOUSANDS/UL (ref 149–390)
PMV BLD AUTO: 10.8 FL (ref 8.9–12.7)
POTASSIUM SERPL-SCNC: 4.1 MMOL/L (ref 3.5–5.3)
PROTHROMBIN TIME: 13.3 SECONDS (ref 11.6–14.5)
RH BLD: POSITIVE
SODIUM SERPL-SCNC: 138 MMOL/L (ref 135–147)

## 2023-05-10 PROCEDURE — 0RG4071 FUSION OF CERVICOTHORACIC VERTEBRAL JOINT WITH AUTOLOGOUS TISSUE SUBSTITUTE, POSTERIOR APPROACH, POSTERIOR COLUMN, OPEN APPROACH: ICD-10-PCS | Performed by: NEUROLOGICAL SURGERY

## 2023-05-10 PROCEDURE — 01N10ZZ RELEASE CERVICAL NERVE, OPEN APPROACH: ICD-10-PCS | Performed by: NEUROLOGICAL SURGERY

## 2023-05-10 PROCEDURE — 0RG2071 FUSION OF 2 OR MORE CERVICAL VERTEBRAL JOINTS WITH AUTOLOGOUS TISSUE SUBSTITUTE, POSTERIOR APPROACH, POSTERIOR COLUMN, OPEN APPROACH: ICD-10-PCS | Performed by: NEUROLOGICAL SURGERY

## 2023-05-10 DEVICE — TRANSITION MAS G3605525 5.5 X 25MM
Type: IMPLANTABLE DEVICE | Site: POSTERIOR CERVICAL | Status: FUNCTIONAL
Brand: INFINITY™ OCCIPITOCERVICAL UPPER THORACIC SYSTEM

## 2023-05-10 DEVICE — DBM T45010 10CC PASTE GRAFTON PLUS
Type: IMPLANTABLE DEVICE | Site: POSTERIOR CERVICAL | Status: FUNCTIONAL
Brand: GRAFTON®AND GRAFTON PLUS®DEMINERALIZED BONE MATRIX (DBM)

## 2023-05-10 RX ORDER — ONDANSETRON 2 MG/ML
INJECTION INTRAMUSCULAR; INTRAVENOUS AS NEEDED
Status: DISCONTINUED | OUTPATIENT
Start: 2023-05-10 | End: 2023-05-10

## 2023-05-10 RX ORDER — METHOCARBAMOL 500 MG/1
250 TABLET, FILM COATED ORAL EVERY 6 HOURS SCHEDULED
Status: DISCONTINUED | OUTPATIENT
Start: 2023-05-10 | End: 2023-05-14 | Stop reason: HOSPADM

## 2023-05-10 RX ORDER — FENTANYL CITRATE/PF 50 MCG/ML
50 SYRINGE (ML) INJECTION
Status: DISCONTINUED | OUTPATIENT
Start: 2023-05-10 | End: 2023-05-10 | Stop reason: HOSPADM

## 2023-05-10 RX ORDER — AMLODIPINE BESYLATE 5 MG/1
5 TABLET ORAL DAILY
Status: DISCONTINUED | OUTPATIENT
Start: 2023-05-11 | End: 2023-05-14 | Stop reason: HOSPADM

## 2023-05-10 RX ORDER — HEPARIN SODIUM 5000 [USP'U]/ML
5000 INJECTION, SOLUTION INTRAVENOUS; SUBCUTANEOUS EVERY 8 HOURS SCHEDULED
Status: DISCONTINUED | OUTPATIENT
Start: 2023-05-11 | End: 2023-05-14 | Stop reason: HOSPADM

## 2023-05-10 RX ORDER — DEXAMETHASONE SODIUM PHOSPHATE 10 MG/ML
INJECTION, SOLUTION INTRAMUSCULAR; INTRAVENOUS AS NEEDED
Status: DISCONTINUED | OUTPATIENT
Start: 2023-05-10 | End: 2023-05-10

## 2023-05-10 RX ORDER — HYDROMORPHONE HCL/PF 1 MG/ML
0.5 SYRINGE (ML) INJECTION EVERY 2 HOUR PRN
Status: DISCONTINUED | OUTPATIENT
Start: 2023-05-10 | End: 2023-05-10

## 2023-05-10 RX ORDER — LIDOCAINE HYDROCHLORIDE AND EPINEPHRINE 10; 10 MG/ML; UG/ML
INJECTION, SOLUTION INFILTRATION; PERINEURAL AS NEEDED
Status: DISCONTINUED | OUTPATIENT
Start: 2023-05-10 | End: 2023-05-10 | Stop reason: HOSPADM

## 2023-05-10 RX ORDER — ONDANSETRON 2 MG/ML
4 INJECTION INTRAMUSCULAR; INTRAVENOUS EVERY 6 HOURS PRN
Status: DISCONTINUED | OUTPATIENT
Start: 2023-05-10 | End: 2023-05-14 | Stop reason: HOSPADM

## 2023-05-10 RX ORDER — SODIUM CHLORIDE, SODIUM LACTATE, POTASSIUM CHLORIDE, CALCIUM CHLORIDE 600; 310; 30; 20 MG/100ML; MG/100ML; MG/100ML; MG/100ML
125 INJECTION, SOLUTION INTRAVENOUS CONTINUOUS
Status: DISCONTINUED | OUTPATIENT
Start: 2023-05-10 | End: 2023-05-10

## 2023-05-10 RX ORDER — SODIUM CHLORIDE, SODIUM LACTATE, POTASSIUM CHLORIDE, CALCIUM CHLORIDE 600; 310; 30; 20 MG/100ML; MG/100ML; MG/100ML; MG/100ML
75 INJECTION, SOLUTION INTRAVENOUS CONTINUOUS
Status: DISCONTINUED | OUTPATIENT
Start: 2023-05-10 | End: 2023-05-14 | Stop reason: HOSPADM

## 2023-05-10 RX ORDER — ONDANSETRON 2 MG/ML
4 INJECTION INTRAMUSCULAR; INTRAVENOUS ONCE AS NEEDED
Status: DISCONTINUED | OUTPATIENT
Start: 2023-05-10 | End: 2023-05-10 | Stop reason: HOSPADM

## 2023-05-10 RX ORDER — HYDROMORPHONE HCL IN WATER/PF 6 MG/30 ML
0.2 PATIENT CONTROLLED ANALGESIA SYRINGE INTRAVENOUS EVERY 4 HOURS PRN
Status: DISCONTINUED | OUTPATIENT
Start: 2023-05-10 | End: 2023-05-12

## 2023-05-10 RX ORDER — INSULIN LISPRO 100 [IU]/ML
1-6 INJECTION, SOLUTION INTRAVENOUS; SUBCUTANEOUS
Status: DISCONTINUED | OUTPATIENT
Start: 2023-05-10 | End: 2023-05-14 | Stop reason: HOSPADM

## 2023-05-10 RX ORDER — METHOCARBAMOL 750 MG/1
750 TABLET, FILM COATED ORAL EVERY 6 HOURS SCHEDULED
Status: DISCONTINUED | OUTPATIENT
Start: 2023-05-10 | End: 2023-05-10

## 2023-05-10 RX ORDER — SODIUM CHLORIDE 9 MG/ML
INJECTION, SOLUTION INTRAVENOUS CONTINUOUS PRN
Status: DISCONTINUED | OUTPATIENT
Start: 2023-05-10 | End: 2023-05-10

## 2023-05-10 RX ORDER — SENNOSIDES 8.6 MG
1 TABLET ORAL DAILY
Status: DISCONTINUED | OUTPATIENT
Start: 2023-05-10 | End: 2023-05-14 | Stop reason: HOSPADM

## 2023-05-10 RX ORDER — BISACODYL 10 MG
10 SUPPOSITORY, RECTAL RECTAL DAILY PRN
Status: DISCONTINUED | OUTPATIENT
Start: 2023-05-10 | End: 2023-05-14 | Stop reason: HOSPADM

## 2023-05-10 RX ORDER — METHADONE HYDROCHLORIDE 5 MG/1
5 TABLET ORAL EVERY 12 HOURS SCHEDULED
Status: COMPLETED | OUTPATIENT
Start: 2023-05-11 | End: 2023-05-11

## 2023-05-10 RX ORDER — CHLORHEXIDINE GLUCONATE 0.12 MG/ML
15 RINSE ORAL ONCE
Status: COMPLETED | OUTPATIENT
Start: 2023-05-10 | End: 2023-05-10

## 2023-05-10 RX ORDER — CEFAZOLIN SODIUM 2 G/50ML
2000 SOLUTION INTRAVENOUS ONCE
Status: DISCONTINUED | OUTPATIENT
Start: 2023-05-10 | End: 2023-05-10

## 2023-05-10 RX ORDER — ALBUMIN, HUMAN INJ 5% 5 %
SOLUTION INTRAVENOUS CONTINUOUS PRN
Status: DISCONTINUED | OUTPATIENT
Start: 2023-05-10 | End: 2023-05-10

## 2023-05-10 RX ORDER — BUPIVACAINE HYDROCHLORIDE AND EPINEPHRINE 5; 5 MG/ML; UG/ML
INJECTION, SOLUTION EPIDURAL; INTRACAUDAL; PERINEURAL AS NEEDED
Status: DISCONTINUED | OUTPATIENT
Start: 2023-05-10 | End: 2023-05-10 | Stop reason: HOSPADM

## 2023-05-10 RX ORDER — DOCUSATE SODIUM 100 MG/1
100 CAPSULE, LIQUID FILLED ORAL 2 TIMES DAILY
Status: DISCONTINUED | OUTPATIENT
Start: 2023-05-10 | End: 2023-05-14 | Stop reason: HOSPADM

## 2023-05-10 RX ORDER — LIDOCAINE HYDROCHLORIDE 20 MG/ML
INJECTION, SOLUTION EPIDURAL; INFILTRATION; INTRACAUDAL; PERINEURAL AS NEEDED
Status: DISCONTINUED | OUTPATIENT
Start: 2023-05-10 | End: 2023-05-10

## 2023-05-10 RX ORDER — ACETAMINOPHEN 325 MG/1
975 TABLET ORAL EVERY 8 HOURS SCHEDULED
Status: DISCONTINUED | OUTPATIENT
Start: 2023-05-10 | End: 2023-05-14 | Stop reason: HOSPADM

## 2023-05-10 RX ORDER — SODIUM CHLORIDE 9 MG/ML
75 INJECTION, SOLUTION INTRAVENOUS CONTINUOUS
Status: DISCONTINUED | OUTPATIENT
Start: 2023-05-10 | End: 2023-05-10

## 2023-05-10 RX ORDER — FENTANYL CITRATE 50 UG/ML
INJECTION, SOLUTION INTRAMUSCULAR; INTRAVENOUS AS NEEDED
Status: DISCONTINUED | OUTPATIENT
Start: 2023-05-10 | End: 2023-05-10

## 2023-05-10 RX ORDER — METHADONE HYDROCHLORIDE 10 MG/ML
20 INJECTION, SOLUTION INTRAMUSCULAR; INTRAVENOUS; SUBCUTANEOUS ONCE
Status: CANCELLED | OUTPATIENT
Start: 2023-05-10 | End: 2023-05-10

## 2023-05-10 RX ORDER — METHADONE HYDROCHLORIDE 5 MG/1
2.5 TABLET ORAL EVERY 12 HOURS SCHEDULED
Status: COMPLETED | OUTPATIENT
Start: 2023-05-12 | End: 2023-05-12

## 2023-05-10 RX ORDER — SODIUM CHLORIDE, SODIUM LACTATE, POTASSIUM CHLORIDE, CALCIUM CHLORIDE 600; 310; 30; 20 MG/100ML; MG/100ML; MG/100ML; MG/100ML
50 INJECTION, SOLUTION INTRAVENOUS CONTINUOUS
Status: DISCONTINUED | OUTPATIENT
Start: 2023-05-10 | End: 2023-05-10

## 2023-05-10 RX ORDER — MIDAZOLAM HYDROCHLORIDE 2 MG/2ML
INJECTION, SOLUTION INTRAMUSCULAR; INTRAVENOUS AS NEEDED
Status: DISCONTINUED | OUTPATIENT
Start: 2023-05-10 | End: 2023-05-10

## 2023-05-10 RX ORDER — CEFAZOLIN SODIUM 2 G/50ML
SOLUTION INTRAVENOUS AS NEEDED
Status: DISCONTINUED | OUTPATIENT
Start: 2023-05-10 | End: 2023-05-10

## 2023-05-10 RX ORDER — CEFAZOLIN SODIUM 1 G/50ML
1000 SOLUTION INTRAVENOUS EVERY 8 HOURS
Status: COMPLETED | OUTPATIENT
Start: 2023-05-10 | End: 2023-05-11

## 2023-05-10 RX ORDER — NALOXONE HYDROCHLORIDE 0.4 MG/ML
0.1 INJECTION, SOLUTION INTRAMUSCULAR; INTRAVENOUS; SUBCUTANEOUS
Status: DISCONTINUED | OUTPATIENT
Start: 2023-05-10 | End: 2023-05-10

## 2023-05-10 RX ORDER — SUCCINYLCHOLINE/SOD CL,ISO/PF 100 MG/5ML
SYRINGE (ML) INTRAVENOUS AS NEEDED
Status: DISCONTINUED | OUTPATIENT
Start: 2023-05-10 | End: 2023-05-10

## 2023-05-10 RX ORDER — LIDOCAINE HYDROCHLORIDE 10 MG/ML
0.5 INJECTION, SOLUTION EPIDURAL; INFILTRATION; INTRACAUDAL; PERINEURAL ONCE
Status: COMPLETED | OUTPATIENT
Start: 2023-05-10 | End: 2023-05-10

## 2023-05-10 RX ORDER — LISINOPRIL 20 MG/1
20 TABLET ORAL DAILY
Status: DISCONTINUED | OUTPATIENT
Start: 2023-05-11 | End: 2023-05-14 | Stop reason: HOSPADM

## 2023-05-10 RX ORDER — PROPOFOL 10 MG/ML
INJECTION, EMULSION INTRAVENOUS AS NEEDED
Status: DISCONTINUED | OUTPATIENT
Start: 2023-05-10 | End: 2023-05-10

## 2023-05-10 RX ORDER — PROPOFOL 10 MG/ML
INJECTION, EMULSION INTRAVENOUS CONTINUOUS PRN
Status: DISCONTINUED | OUTPATIENT
Start: 2023-05-10 | End: 2023-05-10

## 2023-05-10 RX ORDER — OXYCODONE HYDROCHLORIDE 5 MG/1
5 TABLET ORAL EVERY 4 HOURS PRN
Status: DISCONTINUED | OUTPATIENT
Start: 2023-05-10 | End: 2023-05-14 | Stop reason: HOSPADM

## 2023-05-10 RX ORDER — CALCIUM CARBONATE 200(500)MG
1000 TABLET,CHEWABLE ORAL DAILY PRN
Status: DISCONTINUED | OUTPATIENT
Start: 2023-05-10 | End: 2023-05-11

## 2023-05-10 RX ORDER — METHADONE HYDROCHLORIDE 10 MG/ML
INJECTION, SOLUTION INTRAMUSCULAR; INTRAVENOUS; SUBCUTANEOUS AS NEEDED
Status: DISCONTINUED | OUTPATIENT
Start: 2023-05-10 | End: 2023-05-10

## 2023-05-10 RX ORDER — LIDOCAINE HYDROCHLORIDE 10 MG/ML
INJECTION, SOLUTION EPIDURAL; INFILTRATION; INTRACAUDAL; PERINEURAL AS NEEDED
Status: DISCONTINUED | OUTPATIENT
Start: 2023-05-10 | End: 2023-05-10 | Stop reason: HOSPADM

## 2023-05-10 RX ADMIN — REMIFENTANIL HYDROCHLORIDE 0.2 MCG/KG/MIN: 1 INJECTION, POWDER, LYOPHILIZED, FOR SOLUTION INTRAVENOUS at 10:45

## 2023-05-10 RX ADMIN — FENTANYL CITRATE 100 MCG: 50 INJECTION INTRAMUSCULAR; INTRAVENOUS at 12:00

## 2023-05-10 RX ADMIN — CHLORHEXIDINE GLUCONATE 15 ML: 1.2 SOLUTION ORAL at 10:10

## 2023-05-10 RX ADMIN — OXYCODONE HYDROCHLORIDE 5 MG: 5 TABLET ORAL at 16:26

## 2023-05-10 RX ADMIN — SENNOSIDES 8.6 MG: 8.6 TABLET, FILM COATED ORAL at 16:26

## 2023-05-10 RX ADMIN — METHADONE HYDROCHLORIDE 10 MG: 10 INJECTION, SOLUTION INTRAMUSCULAR; INTRAVENOUS; SUBCUTANEOUS at 10:42

## 2023-05-10 RX ADMIN — SODIUM CHLORIDE: 0.9 INJECTION, SOLUTION INTRAVENOUS at 10:56

## 2023-05-10 RX ADMIN — SODIUM CHLORIDE, SODIUM LACTATE, POTASSIUM CHLORIDE, AND CALCIUM CHLORIDE: .6; .31; .03; .02 INJECTION, SOLUTION INTRAVENOUS at 13:06

## 2023-05-10 RX ADMIN — DEXAMETHASONE SODIUM PHOSPHATE 10 MG: 10 INJECTION, SOLUTION INTRAMUSCULAR; INTRAVENOUS at 10:58

## 2023-05-10 RX ADMIN — LIDOCAINE HYDROCHLORIDE 100 MG: 20 INJECTION, SOLUTION EPIDURAL; INFILTRATION; INTRACAUDAL; PERINEURAL at 10:42

## 2023-05-10 RX ADMIN — PROPOFOL 50 MG: 10 INJECTION, EMULSION INTRAVENOUS at 10:42

## 2023-05-10 RX ADMIN — INSULIN LISPRO 3 UNITS: 100 INJECTION, SOLUTION INTRAVENOUS; SUBCUTANEOUS at 21:52

## 2023-05-10 RX ADMIN — METHOCARBAMOL 250 MG: 500 TABLET ORAL at 20:13

## 2023-05-10 RX ADMIN — OXYCODONE HYDROCHLORIDE 5 MG: 5 TABLET ORAL at 21:52

## 2023-05-10 RX ADMIN — PROPOFOL 150 MCG/KG/MIN: 10 INJECTION, EMULSION INTRAVENOUS at 10:45

## 2023-05-10 RX ADMIN — ONDANSETRON 4 MG: 2 INJECTION INTRAMUSCULAR; INTRAVENOUS at 13:15

## 2023-05-10 RX ADMIN — DOCUSATE SODIUM 100 MG: 100 CAPSULE, LIQUID FILLED ORAL at 20:13

## 2023-05-10 RX ADMIN — MIDAZOLAM 2 MG: 1 INJECTION INTRAMUSCULAR; INTRAVENOUS at 10:36

## 2023-05-10 RX ADMIN — LIDOCAINE HYDROCHLORIDE 0.5 ML: 10 INJECTION, SOLUTION EPIDURAL; INFILTRATION; INTRACAUDAL; PERINEURAL at 10:20

## 2023-05-10 RX ADMIN — ACETAMINOPHEN 975 MG: 325 TABLET ORAL at 21:52

## 2023-05-10 RX ADMIN — CEFAZOLIN SODIUM 2000 MG: 2 SOLUTION INTRAVENOUS at 11:30

## 2023-05-10 RX ADMIN — ACETAMINOPHEN 975 MG: 325 TABLET ORAL at 16:26

## 2023-05-10 RX ADMIN — PHENYLEPHRINE HYDROCHLORIDE 40 MCG/MIN: 10 INJECTION INTRAVENOUS at 10:56

## 2023-05-10 RX ADMIN — SODIUM CHLORIDE, SODIUM LACTATE, POTASSIUM CHLORIDE, AND CALCIUM CHLORIDE 125 ML/HR: .6; .31; .03; .02 INJECTION, SOLUTION INTRAVENOUS at 10:21

## 2023-05-10 RX ADMIN — Medication 120 MG: at 10:43

## 2023-05-10 RX ADMIN — CEFAZOLIN SODIUM 1000 MG: 1 SOLUTION INTRAVENOUS at 20:13

## 2023-05-10 RX ADMIN — ALBUMIN (HUMAN): 12.5 INJECTION, SOLUTION INTRAVENOUS at 12:17

## 2023-05-10 NOTE — OP NOTE
OPERATIVE REPORT  PATIENT NAME: Alejandra Torres    :  1955  MRN: [id number]  Pt Location: [facility] OR ROOM 17    SURGERY DATE: 5/10/2023    Surgeon(s) and Role:     * Carmen Gonzales MD - Primary    Preop Diagnosis:  Cervical spinal cord compression ([address] ) [G95 20]  Spinal cord injury at C5-C7 level without injury of spinal bone ([address] ) [S14 105A]  Neurogenic claudication [R29 818]    Post-Op Diagnosis Codes:     * Cervical spinal cord compression ([address] ) [G95 20]     * Spinal cord injury at C5-C7 level without injury of spinal bone ([address] ) [S14 105A]     * Neurogenic claudication [R29 818]    Procedure(s):  Posterior cervical decompressive laminectomy and instrumented fusion C4-T1    Specimen(s):  * No specimens in log *    Estimated Blood Loss:   150 mL    Drains:  Closed/Suction Drain Right;Posterior Neck Bulb (Active)   Number of days: 0       Open Drain Left Groin (Active)   Number of days: 601       [REMOVED] Urethral Catheter Latex 16 Fr  (Removed)   Number of days: 0       Anesthesia Type:   General    Operative Indications:  Cervical spinal cord compression (HCC) [G95 20]  Spinal cord injury at C5-C7 level without injury of spinal bone ([address] ) [S14 105A]  Neurogenic claudication [R29 818]    Operative Findings:  Severe spinal stenosis    Complications:   None    Procedure and Technique: The head was placed into 3 point head fixation after the patient had been placed in adequate general endotracheal anesthesia  The patient was turned prone and the head and neck were clipped free of hair  This was then prepped with Betadine soap then DuraPrep  Double layer drapes were placed in normal fashion and a Betadine impregnated sticky drape was placed over these  A time-out was called and all parameters a time-out were followed  The the skin in the midline centered over C3 spinous process to the T2 spinous process was injected with 1% lidocaine with 1 100,000 epinephrine  A 15    Blade was used to incise the skin   Bovie and bipolar were used throughout the procedure to maintain hemostasis  The Bovie and a cutting setting was used to divide the tissues to the dorsal fascia  A subperiosteal dissection was carried out of the spinous processes lamina and lateral masses of C4 through C7  Self-retaining retractors retra 70 ctors were used to maintain operative exposure  Under fluoroscopic guidance Infinity (Ansible) lateral mass screws were placed into the lateral masses of C4 through C7   5 5 mm by 25 mm screws were placed into T1 after each pedicle had been probed with a pedicle finder and then tapped  Next, 70 mm rods were cut to the appropriate length and introduced into the tulips of the aforementioned screws  Caps were placed and tightened to the appropriate torque  The cartilaginous material in the facets was carefully debrided and the bone was lightly decorticated  This was done so as to facilitate later posterior lateral fusion  A trough was then drilled at the lateral aspect of the lamina and medial to the lateral masses from C5 through C7  The interspinous ligament was released with the use of the Bovie, bipolar and scissors  Trenton Beltre was then used to hold the spinous process and elevate the spinous process and laminar an interspinous ligament elements off the underlying dura  As this was done epidural veins were coagulated and divided  Copious quantities of irrigation were used to cleanse out the region  FloSeal was placed in the lateral gutters as well as bone wax were appropriate  Copious quantities of antibiotic irrigation were used to cleanse out the region  Locally harvested bone from the spinous process and lamina was debrided of soft tissue and then placed the bone mill and morcellated  This was mixed with demineralized bone matrix and then placed in the posterior lateral position so as to allow for a posterior lateral fusion to be performed    The muscle was then approximated with interrupted 0 Vicryl suture  The fascia was closed with interrupted 0 Vicryl suture  The fat was approximated with interrupted inverted to 0 Vicryl suture the skin was closed with staples  A 7 mm flat Jarrett-Tarango drain was placed in the epidural space  Clean sterile dressings were placed  The patient was taken out of pins, turned supine  There were no significant alterations in the EMG the, somatosensory-evoked potentials were motor-evoked potentials in this case  I was present for the entire procedure      Patient Disposition:  PACU         SIGNATURE: Rajeev Vargas MD  DATE: May 10, 2023  TIME: 1:48 PM

## 2023-05-10 NOTE — PLAN OF CARE
Problem: Prexisting or High Potential for Compromised Skin Integrity  Goal: Skin integrity is maintained or improved  Description: INTERVENTIONS:  - Identify patients at risk for skin breakdown  - Assess and monitor skin integrity  - Assess and monitor nutrition and hydration status  - Monitor labs   - Assess for incontinence   - Turn and reposition patient  - Assist with mobility/ambulation  - Relieve pressure over bony prominences  - Avoid friction and shearing  - Provide appropriate hygiene as needed including keeping skin clean and dry  - Evaluate need for skin moisturizer/barrier cream  - Collaborate with interdisciplinary team   - Patient/family teaching  - Consider wound care consult   Outcome: Progressing     Problem: MOBILITY - ADULT  Goal: Maintain or return to baseline ADL function  Description: INTERVENTIONS:  -  Assess patient's ability to carry out ADLs; assess patient's baseline for ADL function and identify physical deficits which impact ability to perform ADLs (bathing, care of mouth/teeth, toileting, grooming, dressing, etc )  - Assess/evaluate cause of self-care deficits   - Assess range of motion  - Assess patient's mobility; develop plan if impaired  - Assess patient's need for assistive devices and provide as appropriate  - Encourage maximum independence but intervene and supervise when necessary  - Involve family in performance of ADLs  - Assess for home care needs following discharge   - Consider OT consult to assist with ADL evaluation and planning for discharge  - Provide patient education as appropriate  Outcome: Progressing  Goal: Maintains/Returns to pre admission functional level  Description: INTERVENTIONS:  - Perform BMAT or MOVE assessment daily    - Set and communicate daily mobility goal to care team and patient/family/caregiver  - Collaborate with rehabilitation services on mobility goals if consulted  - Perform Range of Motion  times a day    - Reposition patient every hours   - Dangle patient  times a day  - Stand patient  times a day  - Ambulate patient  times a day  - Out of bed to chair  times a day   - Out of bed for meals  times a day  - Out of bed for toileting  - Record patient progress and toleration of activity level   Outcome: Progressing     Problem: PAIN - ADULT  Goal: Verbalizes/displays adequate comfort level or baseline comfort level  Description: Interventions:  - Encourage patient to monitor pain and request assistance  - Assess pain using appropriate pain scale  - Administer analgesics based on type and severity of pain and evaluate response  - Implement non-pharmacological measures as appropriate and evaluate response  - Consider cultural and social influences on pain and pain management  - Notify physician/advanced practitioner if interventions unsuccessful or patient reports new pain  Outcome: Progressing     Problem: INFECTION - ADULT  Goal: Absence or prevention of progression during hospitalization  Description: INTERVENTIONS:  - Assess and monitor for signs and symptoms of infection  - Monitor lab/diagnostic results  - Monitor all insertion sites, i e  indwelling lines, tubes, and drains  - Monitor endotracheal if appropriate and nasal secretions for changes in amount and color  - [place] appropriate cooling/warming therapies per order  - Administer medications as ordered  - Instruct and encourage patient and family to use good hand hygiene technique  - Identify and instruct in appropriate isolation precautions for identified infection/condition  Outcome: Progressing  Goal: Absence of fever/infection during neutropenic period  Description: INTERVENTIONS:  - Monitor WBC    Outcome: Progressing     Problem: SAFETY ADULT  Goal: Maintain or return to baseline ADL function  Description: INTERVENTIONS:  -  Assess patient's ability to carry out ADLs; assess patient's baseline for ADL function and identify physical deficits which impact ability to perform ADLs (bathing, care of mouth/teeth, toileting, grooming, dressing, etc )  - Assess/evaluate cause of self-care deficits   - Assess range of motion  - Assess patient's mobility; develop plan if impaired  - Assess patient's need for assistive devices and provide as appropriate  - Encourage maximum independence but intervene and supervise when necessary  - Involve family in performance of ADLs  - Assess for home care needs following discharge   - Consider OT consult to assist with ADL evaluation and planning for discharge  - Provide patient education as appropriate  Outcome: Progressing  Goal: Maintains/Returns to pre admission functional level  Description: INTERVENTIONS:  - Perform BMAT or MOVE assessment daily    - Set and communicate daily mobility goal to care team and patient/family/caregiver  - Collaborate with rehabilitation services on mobility goals if consulted  - Perform Range of Motion  times a day  - Reposition patient every  hours    - Dangle patient  times a day  - Stand patient  times a day  - Ambulate patient  times a day  - Out of bed to chair  times a day   - Out of bed for meals  times a day  - Out of bed for toileting  - Record patient progress and toleration of activity level   Outcome: Progressing  Goal: Patient will remain free of falls  Description: INTERVENTIONS:  - Educate patient/family on patient safety including physical limitations  - Instruct patient to call for assistance with activity   - Consult OT/PT to assist with strengthening/mobility   - Keep Call bell within reach  - Keep bed low and locked with side rails adjusted as appropriate  - Keep care items and personal belongings within reach  - Initiate and maintain comfort rounds  - Make Fall Risk Sign visible to staff  - Offer Toileting every  Hours, in advance of need  - Initiate/Maintain alarm  - Obtain necessary fall risk management equipment:  - Apply yellow socks and bracelet for high fall risk patients  - Consider moving patient to room near nurses station  Outcome: Progressing     Problem: DISCHARGE PLANNING  Goal: Discharge to home or other facility with appropriate resources  Description: INTERVENTIONS:  - Identify barriers to discharge w/patient and caregiver  - Arrange for needed discharge resources and transportation as appropriate  - Identify discharge learning needs (meds, wound care, etc )  - Arrange for interpretive services to assist at discharge as needed  - Refer to Case Management Department for coordinating discharge planning if the patient needs post-hospital services based on physician/advanced practitioner order or complex needs related to functional status, cognitive ability, or social support system  Outcome: Progressing     Problem: Knowledge Deficit  Goal: Patient/family/caregiver demonstrates understanding of disease process, treatment plan, medications, and discharge instructions  Description: Complete learning assessment and assess knowledge base    Interventions:  - Provide teaching at level of understanding  - Provide teaching via preferred learning methods  Outcome: Progressing

## 2023-05-10 NOTE — CONSULTS
Consult Note- Acute Pain Service   Salazar Malik 69 y o  male MRN: [id number]  Unit/Bed#: -01 Encounter: [id number]               Assessment/Plan     Assessment:   Patient Active Problem List   Diagnosis   • Cubital tunnel syndrome on right   • Carpal tunnel syndrome, left   • Obesity, morbid (HCC)   • Carpal tunnel syndrome of right wrist   • Atrophy of muscle of right hand   • Primary hypertension   • Type 2 diabetes mellitus without complication, without long-term current use of insulin (HCC)   • Chronic bilateral low back pain without sciatica   • Mixed hyperlipidemia   • Tobacco dependence syndrome   • Spinal stenosis of lumbar region at multiple levels   • Cervical spinal cord compression (HCC)   • Spinal cord injury at C5-C7 level without injury of spinal bone (HCC)   • Neurogenic claudication   • Preoperative clearance      Salazar Malik is a 69 y o  male with a past medical history significant for morbid obesity, hypertension, insulin-dependent type 2 diabetes mellitus with most recent hemoglobin A1c of 6 0, hyperlipidemia, chronic pain in the setting of lumbar spinal stenosis, C5-C7 spinal cord injury with cord compression and neurogenic claudication in the form of increased difficulty with ambulation who presents for surgical repair of cervical spinal injury  Patient underwent posterior cervical decompressive laminectomy and instrumented fusion of C4-T1 on 5/10/2023  Patient received intraoperative methadone for postoperative analgesic assistance  Acute pain service has been consulted for postoperative pain management in the setting of intraoperative methadone  On evaluation, patient is awake in bed answering questions appropriately  He appears comfortable though he does refer significant achy neck pain      Plan:   Continue acetaminophen 975 mg p o  every 8 hours scheduled  Start oxycodone 2 5 mg p o  every 4 hours as needed for moderate pain  Start oxycodone 5 mg p o  every 4 hours as needed for severe pain  Start hydromorphone 0 2 mg IV every 4 hours as needed for breakthrough pain  We will start methadone taper to start on 5/11/2023 as follows:   5/11/2023 - 5 mg p o  twice daily   5/12/2023 - 2 5 mg p o  twice daily   5/13/2023 - off  Decrease methocarbamol to 250 mg p o  every 6 hours scheduled  Bowel regimen per primary team to avoid opioid-induced constipation  Start naloxone as needed for opioid reversal/respiratory depression    APS will continue to follow  Please contact Acute Pain Service - [facility] via [employer] from [phone] with additional questions or concerns  See [employer] or [facility] for additional contacts and after hours information  History of Present Illness    Admit Date:  5/10/2023  Hospital Day:  0 days  Primary Service:  Neurosurgery  Attending Provider:  Haresh Lerma MD  Reason for Consult / Principal Problem: Postoperative pain management in the setting of intraoperative methadone  HPI: Sb Cook is a 69 y o  male with a past medical history significant for morbid obesity, hypertension, insulin-dependent type 2 diabetes mellitus, hyperlipidemia, chronic pain in the setting of lumbar spinal stenosis, C5-C7 spinal cord injury with cord compression and neurogenic claudication in the form of increased difficulty with ambulation who presents for surgical repair of cervical spinal injury  Patient underwent posterior cervical decompressive laminectomy and instrumented fusion of C4-T1 on 5/10/2023  Patient received intraoperative methadone for postoperative analgesic assistance  Postoperatively, patient has been hypertensive at times but otherwise hemodynamically stable and afebrile  Patient is not requiring supplemental oxygen  Most recent laboratory studies significant for creatinine 1 03 with EGFR 73, calcium 10 4, INR 0 99, AST 19, ALT 18, alkaline phosphatase 68, albumin 3 7, total bilirubin 0 55, PTT 26, platelets 550      Current pain location(s): Incision site  Pain Scale:   0-5  Quality: Achy  Current Analgesic regimen:    Acetaminophen 975 mg p o  every 8 hours scheduled  Methocarbamol 750 mg p o  every 6 hours scheduled  Patient received 10 mg of intravenous methadone at 10:42 AM    Pain History: Patient has chronic pain as above  Patient follows with Jose Coles  He states he will take acetaminophen or ibuprofen at home for pain  He says he will occasionally take methocarbamol but this makes him sleepy/weak  I have reviewed the patient's controlled substance dispensing history in the Prescription Drug Monitoring Program in compliance with the [facility] regulations before prescribing any controlled substances  Filled  Written  Sold  ID  Drug  QTY  Days  Prescriber  RX #  Dispenser  Refill  Daily Dose*  Pymt Type       09/16/2021 09/16/2021   1  Oxycodone-Acetaminophen 5-325 12 00  6  Shobha Ant  [id number]   Pen (7857)   15 00 MME  [place]     07/06/2021 07/06/2021   1  Hydrocodone-Acetamin 5-325 Mg 15 00  4  Ju Ashley  [id number]   Pen (5630)   18 75 MME  [place]         Inpatient consult to Acute Pain Service  Consult performed by: Keaton Cole MD  Consult ordered by: Cynthia Anand PA-C    Inpatient consult to Acute Pain Service  Consult performed by: Keaton Cole MD  Consult ordered by: Jarod Zapien MD          Review of Systems   Constitutional: Negative for appetite change, chills, diaphoresis, fatigue, fever and unexpected weight change  HENT: Negative for sore throat  Eyes: Negative for visual disturbance  Respiratory: Negative for cough, chest tightness, shortness of breath and wheezing  Cardiovascular: Negative for chest pain, palpitations and leg swelling  Gastrointestinal: Negative for abdominal distention, abdominal pain, blood in stool, constipation, diarrhea, nausea and vomiting  Genitourinary: Negative for difficulty urinating, flank pain and urgency  Musculoskeletal: Positive for neck pain   Negative for arthralgias and myalgias  Skin: Negative for pallor and rash  Neurological: Negative for dizziness, weakness, light-headedness and headaches         Historical Information   Past Medical History:   Diagnosis Date   • Diabetes mellitus ([address] )    • ED (erectile dysfunction)    • Hyperlipidemia    • Hypertension    • Obesity    • Spinal stenosis of lumbar region at multiple levels 2022     Past Surgical History:   Procedure Laterality Date   • CARPAL TUNNEL RELEASE Right    • EYE SURGERY      strabibusmus   • HAND SURGERY      left index finger   • HI EXCISION SPERMATOCELE W/WO EPIDIDYMECTOMY Left 2021    Procedure: HYDROCELECTOMY, SCROTAL DEBRIDEMENT;  Surgeon: Jyoti Tanner MD;  Location: [facility];  Service: Urology   • HI NEUROPLASTY &/TRANSPOS MEDIAN NRV CARPAL [place] Right 2021    Procedure: RELEASE CARPAL TUNNEL;  Surgeon: Chon Hernandez MD;  Location: [facility];  Service: Orthopedics   • HI NEUROPLASTY &/TRANSPOSITION ULNAR NERVE ELBOW Right 2021    Procedure: RELEASE CUBITAL TUNNEL;  Surgeon: Chon Hernandez MD;  Location: [facility];  Service: Orthopedics     Social History   Social History     Substance and Sexual Activity   Alcohol Use Not Currently    Comment: social     Social History     Substance and Sexual Activity   Drug Use Not Currently     Social History     Tobacco Use   Smoking Status Former   • Packs/day: 1 00   • Years: 47 00   • Pack years: 47 00   • Types: Cigarettes   • Quit date: 2021   • Years since quittin 3   Smokeless Tobacco Never   Tobacco Comments    advised not to smoke     Family History:   Family History   Problem Relation Age of Onset   • Hypertension Mother    • Diabetes Mother    • Hypertension Father    • Diabetes Father        Meds/Allergies   all current active meds have been reviewed    No Known Allergies    Objective   Temp:  [94 3 °F (34 6 °C)-98 2 °F (36 8 °C)] 94 3 °F (34 6 °C)  HR:  [72-80] 74  Resp:  [12-18] 14  BP: (119-151)/() 144/91    Intake/Output Summary (Last 24 hours) at 5/10/2023 1533  Last data filed at 5/10/2023 1430  Gross per 24 hour   Intake 2014 58 ml   Output 305 ml   Net 1709 58 ml       Physical Exam  Vitals and nursing note reviewed  Constitutional:       General: He is not in acute distress  Appearance: Normal appearance  He is not ill-appearing or toxic-appearing  HENT:      Head: Normocephalic and atraumatic  Eyes:      General: No scleral icterus  Conjunctiva/sclera: Conjunctivae normal    Cardiovascular:      Rate and Rhythm: Normal rate  Pulmonary:      Effort: Pulmonary effort is normal  No respiratory distress  Musculoskeletal:      Cervical back: Tenderness present  Skin:     General: Skin is warm and dry  Neurological:      Mental Status: He is alert  Comments: Awake, alert and oriented to person place time situation  POSS 1   Psychiatric:         Thought Content: Thought content normal          Lab Results: I have personally reviewed pertinent labs  Imaging Studies: I have personally reviewed pertinent reports  EKG, Pathology, and Other Studies: I have personally reviewed pertinent reports  Please note that the APS provides consultative services regarding pain management only  With the exception of ketamine and epidural infusions and except when indicated, final decisions regarding starting or changing doses of analgesic medications are at the discretion of the consulting service  Off hours consultation and/or medication management is generally not available      Chay Mckeon MD  Acute Pain Service

## 2023-05-10 NOTE — INTERVAL H&P NOTE
H&P reviewed  After examining the patient I find no changes in the patients condition since the H&P had been written      Vitals:    05/10/23 0954   BP: 139/71   Pulse: 80   Resp: 18   Temp: 98 2 °F (36 8 °C)   SpO2: 96%

## 2023-05-10 NOTE — ANESTHESIA PREPROCEDURE EVALUATION
Procedure:  Posterior cervical decompressive laminectomy and instrumented fusion C4-T1 (Spine Cervical)    Relevant Problems   CARDIO   (+) Mixed hyperlipidemia   (+) Primary hypertension      ENDO   (+) Type 2 diabetes mellitus without complication, without long-term current use of insulin (HCC)      MUSCULOSKELETAL   (+) Atrophy of muscle of right hand   (+) Chronic bilateral low back pain without sciatica   (+) Neurogenic claudication      NEURO/PSYCH   (+) Cervical spinal cord compression (HCC)   (+) Chronic bilateral low back pain without sciatica   (+) Neurogenic claudication   (+) Spinal cord injury at C5-C7 level without injury of spinal bone (HCC)        Physical Exam    Airway    Mallampati score: III  TM Distance: <3 FB  Neck ROM: limited     Dental       Cardiovascular  Rhythm: regular, Rate: normal,     Pulmonary  Breath sounds clear to auscultation, Decreased breath sounds,     Other Findings        Anesthesia Plan  ASA Score- 3     Anesthesia Type- general with ASA Monitors  Additional Monitors:   Airway Plan: ETT  Plan Factors-Exercise tolerance (METS): >4 METS  Chart reviewed  EKG reviewed  Existing labs reviewed  Patient summary reviewed  Patient instructed to abstain from smoking on day of procedure  Patient did not smoke on day of surgery  Induction- intravenous  Postoperative Plan- Plan for postoperative opioid use  Informed Consent- Anesthetic plan and risks discussed with patient and spouse  I personally reviewed this patient with the CRNA  Discussed and agreed on the Anesthesia Plan with the CRNA  Harrison Hernández

## 2023-05-10 NOTE — ANESTHESIA POSTPROCEDURE EVALUATION
Post-Op Assessment Note    CV Status:  Stable  Pain Score: 0    Pain management: adequate     Mental Status:  Awake and alert   Hydration Status:  Stable   PONV Controlled:  Controlled   Airway Patency:  Patent   Two or more mitigation strategies used for obstructive sleep apnea   Post Op Vitals Reviewed: Yes      Staff: Anesthesiologist, CRNA         No notable events documented      BP   119/80   Temp   98 8   Pulse 75   Resp 12   SpO2 100

## 2023-05-10 NOTE — PERIOPERATIVE NURSING NOTE
"  PACU to Floor TRANSFER NOTE      Pre-op Diagnosis:  Cervical spinal cord compression (HCC) [G95 20]  Spinal cord injury at C5-C7 level without injury of spinal bone ([address] ) [S14 105A]  Neurogenic claudication [R29 818]    Procedure Done:  Posterior cervical decompressive laminectomy and instrumented fusion C4-T1 (Spine Cervical)     Post Op Diagnosis:  * No post-op diagnosis entered *    Any Significant Events Intra-Op:  none    Abnormal Assessment in PACU: none    Level of Consciousness:  Level of Consciousness: Alert/awake    Last Set of VS:   Vitals:    05/05/23 1056 05/10/23 0954 05/10/23 1359 05/10/23 1415   BP:  139/71 119/80 125/100   BP Location:  Left arm     Pulse:  80 74 74   Resp:  18 16 12   Temp:  98 2 °F (36 8 °C) (!) 96 9 °F (36 1 °C)    TempSrc:  Temporal     SpO2:  96% 98% 97%   Weight: 112 kg (248 lb)      Height: 5' 6\" (1 676 m)                   Baseline Neuro/developmental Status: A&Ox4  Pupils PERRLA 3  Full sensation BLE &BUE, RUE 4+ strength, LUE 4+ strength, LLE 4+ strength, RLE 3+ strength    Labs Collected: No  Special Needs of the Patient: NA  Antibiotics: Given in OR    MEDICATION LIST LAST 24 HOURS  Periop Administered Medications from 05/09/2023 0229 to 05/10/2023 1429       Date/Time Order Dose Route Action Action by Comments     05/10/2023 1300 EDT albumin human (FLEXBUMIN) 5 % injection 0  Intravenous Stopped Tomer Cordova CRNA --     05/10/2023 1217 EDT albumin human (FLEXBUMIN) 5 % injection -- Intravenous New Mert Govea CRNA --     05/10/2023 1327 EDT bupivacaine-epinephrine (PF) (MARCAINE/EPINEPHRINE PF) 0 5 %-1:[id number] injection 10 mL Infiltration Given Glory Hinojosa MD --     05/10/2023 1130 EDT ceFAZolin (ANCEF) IVPB (premix in dextrose) 2,000 mg Intravenous Given Tomer Cordova CRNA --     05/10/2023 1411 EDT ceFAZolin (ANCEF) IVPB (premix in dextrose) 2,000 mg 50 mL 2,000 mg Intravenous Not Given Ferny Rivas LPN for pre-procedure     05/10/2023 1010 " EDT chlorhexidine (PERIDEX) 0 12 % oral rinse 15 mL 15 mL Swish & Spit Given Anjali Hernandez, RN --     05/10/2023 1058 EDT dexamethasone (PF) (DECADRON) injection 10 mg Intravenous Given Nankillian Govea, CRNA --     05/10/2023 1200 EDT fentanyl citrate (PF) 100 MCG/2ML 100 mcg Intravenous Given Juliann Plaza, CRNA --     05/10/2023 1359 EDT lactated ringers infusion 125 mL/hr Intravenous Continued from [facility] , RN --     05/10/2023 1358 EDT lactated ringers infusion -- Intravenous Anesthesia Volume Adjustment Juliann Plaza, CRNA --     05/10/2023 1306 EDT lactated ringers infusion -- Intravenous New Bag Juliann Plaza, CRNA --     05/10/2023 1305 EDT lactated ringers infusion -- Intravenous Paused Juliann Plaza, CRNA Switch to gravity     05/10/2023 1038 EDT lactated ringers infusion -- Intravenous Continue from [id number] [facility], CRNA --     05/10/2023 1021 EDT lactated ringers infusion 125 mL/hr Intravenous New Bag Anjali Hernandez, RN --     05/10/2023 1104 EDT lidocaine (PF) (XYLOCAINE-MPF) 1 % injection 5 mL Infiltration Given Michael Sharpe MD [facility]     05/10/2023 1020 EDT lidocaine (PF) (XYLOCAINE-MPF) 1 % injection 0 5 mL 0 5 mL Infiltration Given Anjali Hernandez, RN --     05/10/2023 1042 EDT lidocaine (PF) (XYLOCAINE-MPF) 2 % injection 100 mg Intravenous Given Juliann Plaza, CRNA --     05/10/2023 1154 EDT lidocaine-epinephrine (XYLOCAINE/EPINEPHRINE) 1 %-1:100,000 injection 7 5 mL Infiltration Given Michael Sharpe MD --     05/10/2023 1042 EDT methadone (DOLOPHINE) injection 10 mg Intravenous Given Juliann Plaza, CRNA --     05/10/2023 1036 EDT midazolam (VERSED) injection 2 mg Intravenous Given Juliann Plaza, CRNA --     05/10/2023 1300 EDT neomycin-polymyxin B (NEOSPORIN-) 1 mL in sodium chloride 0 9 % 1,000 mL irrigation bottle 1,000 mL Irrigation Given Michael Sharpe MD --     05/10/2023 1315 EDT ondansetron (ZOFRAN) injection 4 mg Intravenous Given [facility] Radha Fleming, CRNA --     05/10/2023 1347 EDT phenylephrine (ROSA MARIA-SYNEPHRINE) 50 mg (STANDARD CONCENTRATION) in sodium chloride 0 9% 250 mL 0 mcg/min Intravenous Stopped Nan Govea, CRNA --     05/10/2023 1334 EDT phenylephrine (ROSA MARIA-SYNEPHRINE) 50 mg (STANDARD CONCENTRATION) in sodium chloride 0 9% 250 mL 50 mcg/min Intravenous Rate/Dose Change Nan Govea, CRNA --     05/10/2023 1221 EDT phenylephrine (ROSA MARIA-SYNEPHRINE) 50 mg (STANDARD CONCENTRATION) in sodium chloride 0 9% 250 mL 10 mcg/min Intravenous Rate/Dose Change Nan Govea, CRNA --     05/10/2023 1219 EDT phenylephrine (ROSA MARIA-SYNEPHRINE) 50 mg (STANDARD CONCENTRATION) in sodium chloride 0 9% 250 mL 40 mcg/min Intravenous Rate/Dose Change Nan Govea, CRNA --     05/10/2023 1211 EDT phenylephrine (ROSA MARIA-SYNEPHRINE) 50 mg (STANDARD CONCENTRATION) in sodium chloride 0 9% 250 mL 50 mcg/min Intravenous Rate/Dose Change Nan Govea, CRNA --     05/10/2023 1209 EDT phenylephrine (ROSA MARIA-SYNEPHRINE) 50 mg (STANDARD CONCENTRATION) in sodium chloride 0 9% 250 mL 40 mcg/min Intravenous Restarted Gilberto Roblero, CRNA --     05/10/2023 1159 EDT phenylephrine (ROSA MARIA-SYNEPHRINE) 50 mg (STANDARD CONCENTRATION) in sodium chloride 0 9% 250 mL 0 mcg/min Intravenous Stopped Nan Govea CRNA --     05/10/2023 1157 EDT phenylephrine (ROSA MARIA-SYNEPHRINE) 50 mg (STANDARD CONCENTRATION) in sodium chloride 0 9% 250 mL 20 mcg/min Intravenous Rate/Dose Change Nan Govea, CRNA --     05/10/2023 1155 EDT phenylephrine (ROSA MARIA-SYNEPHRINE) 50 mg (STANDARD CONCENTRATION) in sodium chloride 0 9% 250 mL 70 mcg/min Intravenous Rate/Dose Change Nan Govea, CRNA --     05/10/2023 1151 EDT phenylephrine (ROSA MARIA-SYNEPHRINE) 50 mg (STANDARD CONCENTRATION) in sodium chloride 0 9% 250 mL 90 mcg/min Intravenous Rate/Dose Change Nan Govea CRNA --     05/10/2023 1149 EDT phenylephrine (ROSA MARIA-SYNEPHRINE) 50 mg (STANDARD CONCENTRATION) in sodium chloride 0 9% 250 mL 50 mcg/min Intravenous Rate/Dose Change Steph Patti, CRNA --     05/10/2023 1147 EDT phenylephrine (ROSA MARIA-SYNEPHRINE) 50 mg (STANDARD CONCENTRATION) in sodium chloride 0 9% 250 mL 30 mcg/min Intravenous Restarted Stephbeatriz Armstrong, CRNA --     05/10/2023 1141 EDT phenylephrine (ROSA MARIA-SYNEPHRINE) 50 mg (STANDARD CONCENTRATION) in sodium chloride 0 9% 250 mL 0 mcg/min Intravenous Stopped Nan Govea CRNA --     05/10/2023 1136 EDT phenylephrine (ROSA MARIA-SYNEPHRINE) 50 mg (STANDARD CONCENTRATION) in sodium chloride 0 9% 250 mL 40 mcg/min Intravenous Rate/Dose Change Nan Govea, CRNA --     05/10/2023 1128 EDT phenylephrine (ROSA MAIRA-SYNEPHRINE) 50 mg (STANDARD CONCENTRATION) in sodium chloride 0 9% 250 mL 60 mcg/min Intravenous Rate/Dose Change Nan Govea, NOHELIA --     05/10/2023 1056 EDT phenylephrine (ROSA MARIA-SYNEPHRINE) 50 mg (STANDARD CONCENTRATION) in sodium chloride 0 9% 250 mL 40 mcg/min Intravenous [employer], CRNA --     05/10/2023 1334 EDT propofol (DIPRIVAN) 1000 mg in 100 mL infusion (premix) 0 mcg/kg/min Intravenous Stopped Nan Govea CRNA --     05/10/2023 1318 EDT propofol (DIPRIVAN) 1000 mg in 100 mL infusion (premix) 50 mcg/kg/min Intravenous Rate/Dose Change Nan Govea CRNA --     05/10/2023 1137 EDT propofol (DIPRIVAN) 1000 mg in 100 mL infusion (premix) 130 mcg/kg/min Intravenous Rate/Dose Change Nan Govea CRNA --     05/10/2023 1045 EDT propofol (DIPRIVAN) 1000 mg in 100 mL infusion (premix) 150 mcg/kg/min Intravenous New Bag Nan Govea, CRNA --     05/10/2023 1042 EDT propofol (DIPRIVAN) 200 MG/20ML bolus injection 50 mg Intravenous Given Steph Armstrong, CRNA --     05/10/2023 1329 EDT remifentanil (ULTIVA) 50 mcg/mL in sodium chloride 0 9 % 40 mL 0 mcg/kg/min Intravenous Stopped Nan Govea CRNA --     05/10/2023 1317 EDT remifentanil (ULTIVA) 50 mcg/mL in sodium chloride 0 9 % 40 mL 0 1 mcg/kg/min Intravenous Rate/Dose Change Nan Govea CRNA --     05/10/2023 1218 EDT remifentanil (ULTIVA) 50 mcg/mL in sodium chloride 0 9 % 40 mL 0 2 mcg/kg/min Intravenous Rate/Dose Change Nan Govea, CRNA --     05/10/2023 1045 EDT remifentanil (ULTIVA) 50 mcg/mL in sodium chloride 0 9 % 40 mL 0 2 mcg/kg/min Intravenous New Bag Nan Govea, CRNA --     05/10/2023 1358 EDT sodium chloride 0 9 % infusion 0  Intravenous Stopped Kevin Gamez, CRNA --     05/10/2023 1056 EDT sodium chloride 0 9 % infusion -- Intravenous New Bag [place] Franco, CRNA --     05/10/2023 1043 EDT Succinylcholine Chloride 100 mg/5 mL syringe 120 mg Intravenous Given [place] Billyumb CRNA --             IV Access:   Peripheral IV 05/10/23 Left Arm (Active)   Site Assessment WDL 05/10/23 1359   Dressing Type Transparent 05/10/23 1359   Line Status Infusing 05/10/23 1359   Dressing Status Clean;Dry; Intact 05/10/23 1359     IV Fluids: lactated ringers, 125 mL/hr, Last Rate: 125 mL/hr (05/10/23 1359)        INTAKE / OUTPUT  I/O last 24 hours: In: 1950 [I V :1700; IV Piggyback:250]  Out: 270 [Urine:120; Blood:150]     WOUNDS  Wound 07/06/21 Skin Tear Abrasion(s) Leg Left; Lower (Active)   Date First Assessed/Time First Assessed: 07/06/21 0814   Pre-Existing Wound: Yes  Primary Wound Type: Skin Tear  Traumatic Wound Type: Abrasion(s)  Location: Leg  Wound Location Orientation: Left; Lower      Assessments 7/6/2021  8:11 AM   Wound Description Clean;Dry; Intact   Yuni-wound Assessment Clean;Dry; Intact   Drainage Amount None   Dressing Open to air       No Linked orders to display       Wound 07/06/21 Hand Right (Active)   Date First Assessed/Time First Assessed: 07/06/21 1002   Location: Hand  Wound Location Orientation: Right  Wound Description (Comments): [place], 2 approximated surgical incisions  Incision's 1st Dressing: DRESSING SPONGE 4 x 4 SINGLE PACK (x1), DRES          Assessments 7/6/2021 10:28 AM 7/6/2021 11:38 AM   Wound Description Unable to assess --   Drainage Amount None None   Treatments Ice applied --   Dressing Dry dressing --   Dressing Status Clean;Dry; Intact Clean;Dry; Intact       No Linked orders to display       Wound 09/16/21 Scrotum Left (Active)   Date First Assessed/Time First Assessed: 09/16/21 1005   Location: Scrotum  Wound Location Orientation: Left  Wound Description (Comments): Scrotal Support  Incision's 1st Dressing: KERLIX SUPER SPONGE 6 IN      Assessments 9/16/2021 10:15 AM   Wound Description Unable to assess   Dressing Other (Comment)   Wound packed? No       No Linked orders to display       Wound 09/16/21 Abdomen Left (Active)   Date First Assessed/Time First Assessed: 09/16/21 1005   Location: Abdomen  Wound Location Orientation: Left      Assessments 9/16/2021 10:15 AM   Wound Description Unable to assess   Dressing Gauze   Wound packed? No       No Linked orders to display       Wound 09/16/21 Penis Left (Active)   Date First Assessed/Time First Assessed: 09/16/21 1005   Location: Penis  Wound Location Orientation: Left      Assessments 9/16/2021 10:15 AM   Wound Description Unable to assess   Wound packed? No       No Linked orders to display       Wound 09/16/21 Thigh Left (Active)   Date First Assessed/Time First Assessed: 09/16/21 1005   Location: Thigh  Wound Location Orientation: Left      Assessments 9/16/2021 10:15 AM   Wound Description Unable to assess       No Linked orders to display       Wound 05/10/23 Head Bilateral (Active)   Date First Assessed/Time First Assessed: 05/10/23 1250   Location: Head  Wound Location Orientation: Bilateral  Wound Description (Comments): [place] pin sites x3 open to air      Assessments 5/10/2023  1:59 PM 5/10/2023  2:15 PM   Wound Description Unable to assess Unable to assess   Yuni-wound Assessment Unable to assess Unable to assess   Wound Site Closure None None   Drainage Amount Small Small   Drainage Description Bloody Bloody   Dressing Other (Comment) Other (Comment)   Dressing Status Clean;Dry; Intact Clean;Dry; Intact       No Linked orders to display       Wound 05/10/23 Cervical Neck Posterior N/A (Active)   Date First Assessed/Time First Assessed: 05/10/23 1324   Location: Cervical Neck Posterior  Wound Location Orientation: N/A  Wound Description (Comments): closed with staples  Incision's 1st Dressing: DRESSING MEPILEX BORDER 4 X 8 IN (x1), DRESSING TR    Assessments 5/10/2023  1:59 PM 5/10/2023  2:15 PM   Wound Description Unable to assess Unable to assess   Yuni-wound Assessment Unable to assess Unable to assess   Wound Site Closure Staples Staples   Drainage Amount Moderate Moderate   Drainage Description Bloody Bloody   Dressing Other (Comment) Other (Comment)   Dressing Status Clean;Dry; Intact Clean;Dry; Intact       No Linked orders to display                                                Time of Last Void or Time that Urinary Catheter was Removed Intra-Op: Hua removed @ removed before PACU arrival    Patient family members in attendance upon patient transport to floor:   With family in [address] waiting area    Patient Belongings:  with patient @ bedside    Additional Information: baseline, patient has weakness in RLE    Contact DILSHAD Reilly Counts RN #[phone]

## 2023-05-11 ENCOUNTER — APPOINTMENT (OUTPATIENT)
Dept: RADIOLOGY | Facility: HOSPITAL | Age: 68
End: 2023-05-11

## 2023-05-11 ENCOUNTER — TELEPHONE (OUTPATIENT)
Dept: NEUROSURGERY | Facility: CLINIC | Age: 68
End: 2023-05-11

## 2023-05-11 LAB
ANION GAP SERPL CALCULATED.3IONS-SCNC: 6 MMOL/L (ref 4–13)
APTT PPP: 28 SECONDS (ref 23–37)
BUN SERPL-MCNC: 23 MG/DL (ref 5–25)
CALCIUM SERPL-MCNC: 10.1 MG/DL (ref 8.3–10.1)
CHLORIDE SERPL-SCNC: 104 MMOL/L (ref 96–108)
CO2 SERPL-SCNC: 25 MMOL/L (ref 21–32)
CREAT SERPL-MCNC: 1.07 MG/DL (ref 0.6–1.3)
ERYTHROCYTE [DISTWIDTH] IN BLOOD BY AUTOMATED COUNT: 14.7 % (ref 11.6–15.1)
GFR SERPL CREATININE-BSD FRML MDRD: 70 ML/MIN/1.73SQ M
GLUCOSE SERPL-MCNC: 138 MG/DL (ref 65–140)
GLUCOSE SERPL-MCNC: 165 MG/DL (ref 65–140)
GLUCOSE SERPL-MCNC: 167 MG/DL (ref 65–140)
GLUCOSE SERPL-MCNC: 174 MG/DL (ref 65–140)
GLUCOSE SERPL-MCNC: 192 MG/DL (ref 65–140)
HCT VFR BLD AUTO: 43.1 % (ref 36.5–49.3)
HGB BLD-MCNC: 14.1 G/DL (ref 12–17)
INR PPP: 1.11 (ref 0.84–1.19)
MCH RBC QN AUTO: 28.7 PG (ref 26.8–34.3)
MCHC RBC AUTO-ENTMCNC: 32.7 G/DL (ref 31.4–37.4)
MCV RBC AUTO: 88 FL (ref 82–98)
PLATELET # BLD AUTO: 293 THOUSANDS/UL (ref 149–390)
PMV BLD AUTO: 10.9 FL (ref 8.9–12.7)
POTASSIUM SERPL-SCNC: 4.1 MMOL/L (ref 3.5–5.3)
PROTHROMBIN TIME: 14.5 SECONDS (ref 11.6–14.5)
RBC # BLD AUTO: 4.91 MILLION/UL (ref 3.88–5.62)
SODIUM SERPL-SCNC: 135 MMOL/L (ref 135–147)
WBC # BLD AUTO: 18.52 THOUSAND/UL (ref 4.31–10.16)

## 2023-05-11 RX ORDER — CALCIUM CARBONATE 200(500)MG
750 TABLET,CHEWABLE ORAL 3 TIMES DAILY PRN
Status: DISCONTINUED | OUTPATIENT
Start: 2023-05-11 | End: 2023-05-14 | Stop reason: HOSPADM

## 2023-05-11 RX ADMIN — OXYCODONE HYDROCHLORIDE 5 MG: 5 TABLET ORAL at 17:42

## 2023-05-11 RX ADMIN — DOCUSATE SODIUM 100 MG: 100 CAPSULE, LIQUID FILLED ORAL at 17:42

## 2023-05-11 RX ADMIN — CEFAZOLIN SODIUM 1000 MG: 1 SOLUTION INTRAVENOUS at 02:54

## 2023-05-11 RX ADMIN — DOCUSATE SODIUM 100 MG: 100 CAPSULE, LIQUID FILLED ORAL at 08:32

## 2023-05-11 RX ADMIN — CALCIUM CARBONATE (ANTACID) CHEW TAB 500 MG 1000 MG: 500 CHEW TAB at 02:52

## 2023-05-11 RX ADMIN — CEFAZOLIN SODIUM 1000 MG: 1 SOLUTION INTRAVENOUS at 11:18

## 2023-05-11 RX ADMIN — METHOCARBAMOL 250 MG: 500 TABLET ORAL at 02:52

## 2023-05-11 RX ADMIN — INSULIN LISPRO 2 UNITS: 100 INJECTION, SOLUTION INTRAVENOUS; SUBCUTANEOUS at 16:17

## 2023-05-11 RX ADMIN — METHADONE HYDROCHLORIDE 5 MG: 5 TABLET ORAL at 08:32

## 2023-05-11 RX ADMIN — HEPARIN SODIUM 5000 UNITS: 5000 INJECTION INTRAVENOUS; SUBCUTANEOUS at 22:30

## 2023-05-11 RX ADMIN — METHOCARBAMOL 250 MG: 500 TABLET ORAL at 13:30

## 2023-05-11 RX ADMIN — SENNOSIDES 8.6 MG: 8.6 TABLET, FILM COATED ORAL at 08:32

## 2023-05-11 RX ADMIN — INSULIN LISPRO 1 UNITS: 100 INJECTION, SOLUTION INTRAVENOUS; SUBCUTANEOUS at 22:33

## 2023-05-11 RX ADMIN — CALCIUM CARBONATE (ANTACID) CHEW TAB 500 MG 750 MG: 500 CHEW TAB at 13:37

## 2023-05-11 RX ADMIN — AMLODIPINE BESYLATE 5 MG: 5 TABLET ORAL at 08:32

## 2023-05-11 RX ADMIN — METHOCARBAMOL 250 MG: 500 TABLET ORAL at 08:32

## 2023-05-11 RX ADMIN — ACETAMINOPHEN 975 MG: 325 TABLET ORAL at 06:18

## 2023-05-11 RX ADMIN — LISINOPRIL 20 MG: 20 TABLET ORAL at 08:32

## 2023-05-11 RX ADMIN — INSULIN LISPRO 1 UNITS: 100 INJECTION, SOLUTION INTRAVENOUS; SUBCUTANEOUS at 06:19

## 2023-05-11 RX ADMIN — OXYCODONE HYDROCHLORIDE 5 MG: 5 TABLET ORAL at 06:18

## 2023-05-11 RX ADMIN — ACETAMINOPHEN 975 MG: 325 TABLET ORAL at 13:30

## 2023-05-11 RX ADMIN — METHADONE HYDROCHLORIDE 5 MG: 5 TABLET ORAL at 22:30

## 2023-05-11 RX ADMIN — HEPARIN SODIUM 5000 UNITS: 5000 INJECTION INTRAVENOUS; SUBCUTANEOUS at 13:31

## 2023-05-11 RX ADMIN — ACETAMINOPHEN 975 MG: 325 TABLET ORAL at 22:30

## 2023-05-11 RX ADMIN — METHOCARBAMOL 250 MG: 500 TABLET ORAL at 17:42

## 2023-05-11 NOTE — PROGRESS NOTES
Progress Note - Acute Pain Service    Celestine Isaacs 69 y o  male MRN: [id number]  Unit/Bed#: -01 Encounter: [id number]      Assessment:   Celestine Isaacs is a 69 y o  male with a past medical history significant for morbid obesity, hypertension, insulin-dependent type 2 diabetes mellitus with most recent hemoglobin A1c of 6 0, hyperlipidemia, chronic pain in the setting of lumbar spinal stenosis, C5-C7 spinal cord injury with cord compression and neurogenic claudication in the form of increased difficulty with ambulation who presents for surgical repair of cervical spinal injury  Patient underwent posterior cervical decompressive laminectomy and instrumented fusion of C4-T1 on 5/10/2023  Patient received intraoperative methadone for postoperative analgesic assistance  Acute pain service has been consulted for postoperative pain management in the setting of intraoperative methadone  Most recent QTc 429 ms from 5/2/23      On evaluation, patient is awake in bed answering questions appropriately  He appears comfortable though he does refer achy neck pain  He states he has been able to work with physical therapy and even was able to walk the steps  He does not refer any oversedation or trouble breathing    He does have some gastric reflux for which he required calcium carbonate      Plan:   Continue acetaminophen 975 mg p o  every 8 hours scheduled  Continue oxycodone 2 5 mg p o  every 4 hours as needed for moderate pain  Continue oxycodone 5 mg p o  every 4 hours as needed for severe pain  Continue hydromorphone 0 2 mg IV every 4 hours as needed for breakthrough pain  We will continue methadone taper as follows:              5/11/2023 - 5 mg p o  twice daily              5/12/2023 - 2 5 mg p o  twice daily              5/13/2023 - off  Continue methocarbamol to 250 mg p o  every 6 hours scheduled  Bowel regimen per primary team to avoid opioid-induced constipation  Continue naloxone as needed for opioid reversal/respiratory depression    APS will continue to follow  Please contact Acute Pain Service - [facility] via [facility] from [phone] with additional questions or concerns  See Fozia or Taras for additional contacts and after hours information  Pain History  Current pain location(s): Upper back/neck midline  Pain Scale:   7-8  Quality: Achy  24 hour history: Patient underwent procedures as above  Patient was started on methadone taper this morning  Patient is remained hemodynamically stable and afebrile  Patient is not requiring supplemental oxygen  Lab studies from today significant for leukocytosis with WBC 18 52  No imaging from today  Opioid requirement previous 24 hours:   Methadone 5 mg p o  Oxycodone 15 mg p o  Meds/Allergies   all current active meds have been reviewed    No Known Allergies    Objective     Temp:  [94 3 °F (34 6 °C)-98 3 °F (36 8 °C)] 97 5 °F (36 4 °C)  HR:  [61-89] 88  Resp:  [12-17] 17  BP: (118-154)/() 118/84    Physical Exam  Vitals and nursing note reviewed  Constitutional:       General: He is not in acute distress  Appearance: Normal appearance  He is not ill-appearing or toxic-appearing  HENT:      Head: Normocephalic and atraumatic  Eyes:      General: No scleral icterus  Conjunctiva/sclera: Conjunctivae normal    Cardiovascular:      Rate and Rhythm: Normal rate  Pulmonary:      Effort: Pulmonary effort is normal  No respiratory distress  Musculoskeletal:      Cervical back: Tenderness present  Skin:     General: Skin is warm and dry  Neurological:      Mental Status: He is alert  Comments: Awake, alert and oriented to person place time situation  POSS 1   Psychiatric:         Thought Content:  Thought content normal          Lab Results:   Results from last 7 days   Lab Units 05/11/23  0920   WBC Thousand/uL 18 52*   HEMOGLOBIN g/dL 14 1   HEMATOCRIT % 43 1   PLATELETS Thousands/uL 293      Results from last 7 days   Lab Units 05/11/23  0557   POTASSIUM mmol/L 4 1   CHLORIDE mmol/L 104   CO2 mmol/L 25   BUN mg/dL 23   CREATININE mg/dL 1 07   CALCIUM mg/dL 10 1       Imaging Studies: I have personally reviewed pertinent reports  EKG, Pathology, and Other Studies: I have personally reviewed pertinent reports  Please note that the APS provides consultative services regarding pain management only  With the exception of ketamine and epidural infusions and except when indicated, final decisions regarding starting or changing doses of analgesic medications are at the discretion of the consulting service  Off hours consultation and/or medication management is generally not available      Marcela Lake MD  Acute Pain Service

## 2023-05-11 NOTE — ASSESSMENT & PLAN NOTE
POD 1 Posterior cervical decompressive laminectomy and instrumented fusion C4-T1 (DKO, 5/10)    Imaging:  · XR cervical spine, 5/11/23: Final read pending  Per my interpretation, stable appearance of C4-T1 posterior fusion  Plan:  · Continue to monitor neurological exam  · HILLARY drain to FS, 270cc since surgery  · Will maintain at this time  · Upright imaging obtained and personally reviewed  · Post op labs reviewed  · WBC 18 52, possibly due to intraoperative decadron  Will repeat CBC in am  · No collar indicated  · Pain control as ordered, APS following given intraoperative methadone use  · Home medications ordered  · SSI ordered for diabetes management   · Bowel regimen with colace, senna, dulcolax PRN  · D/c IVF as patient tolerating PO  · PT OT evaluation, acute rehab recommended  · DVT ppx: SCDs, SQH    Rounds completed with Quin Gupta RN    Neurosurgery will continue to follow as primary team   Please call questions or concerns

## 2023-05-11 NOTE — TELEPHONE ENCOUNTER
05/12/2023- [address]  05/24/2023-2 [facility] POV W/NURSE  06/27/2023-6 [facility] POV W/DKO W/CERVICAL SPINE XRAY

## 2023-05-11 NOTE — PLAN OF CARE
Problem: Prexisting or High Potential for Compromised Skin Integrity  Goal: Skin integrity is maintained or improved  Description: INTERVENTIONS:  - Identify patients at risk for skin breakdown  - Assess and monitor skin integrity  - Assess and monitor nutrition and hydration status  - Monitor labs   - Assess for incontinence   - Turn and reposition patient  - Assist with mobility/ambulation  - Relieve pressure over bony prominences  - Avoid friction and shearing  - Provide appropriate hygiene as needed including keeping skin clean and dry  - Evaluate need for skin moisturizer/barrier cream  - Collaborate with interdisciplinary team   - Patient/family teaching  - Consider wound care consult   Outcome: Progressing     Problem: MOBILITY - ADULT  Goal: Maintain or return to baseline ADL function  Description: INTERVENTIONS:  -  Assess patient's ability to carry out ADLs; assess patient's baseline for ADL function and identify physical deficits which impact ability to perform ADLs (bathing, care of mouth/teeth, toileting, grooming, dressing, etc )  - Assess/evaluate cause of self-care deficits   - Assess range of motion  - Assess patient's mobility; develop plan if impaired  - Assess patient's need for assistive devices and provide as appropriate  - Encourage maximum independence but intervene and supervise when necessary  - Involve family in performance of ADLs  - Assess for home care needs following discharge   - Consider OT consult to assist with ADL evaluation and planning for discharge  - Provide patient education as appropriate  Outcome: Progressing  Goal: Maintains/Returns to pre admission functional level  Description: INTERVENTIONS:  - Perform BMAT or MOVE assessment daily    - Set and communicate daily mobility goal to care team and patient/family/caregiver  - Collaborate with rehabilitation services on mobility goals if consulted  - Perform Range of Motion 3 times a day    - Reposition patient every 2 hours   - Dangle patient 3 times a day  - Stand patient 3 times a day  - Ambulate patient 3 times a day  - Out of bed to chair 3 times a day   - Out of bed for meals 3 times a day  - Out of bed for toileting  - Record patient progress and toleration of activity level   Outcome: Progressing     Problem: PAIN - ADULT  Goal: Verbalizes/displays adequate comfort level or baseline comfort level  Description: Interventions:  - Encourage patient to monitor pain and request assistance  - Assess pain using appropriate pain scale  - Administer analgesics based on type and severity of pain and evaluate response  - Implement non-pharmacological measures as appropriate and evaluate response  - Consider cultural and social influences on pain and pain management  - Notify physician/advanced practitioner if interventions unsuccessful or patient reports new pain  Outcome: Not Progressing     Problem: INFECTION - ADULT  Goal: Absence or prevention of progression during hospitalization  Description: INTERVENTIONS:  - Assess and monitor for signs and symptoms of infection  - Monitor lab/diagnostic results  - Monitor all insertion sites, i e  indwelling lines, tubes, and drains  - Monitor endotracheal if appropriate and nasal secretions for changes in amount and color  - [place] appropriate cooling/warming therapies per order  - Administer medications as ordered  - Instruct and encourage patient and family to use good hand hygiene technique  - Identify and instruct in appropriate isolation precautions for identified infection/condition  Outcome: Progressing  Goal: Absence of fever/infection during neutropenic period  Description: INTERVENTIONS:  - Monitor WBC    Outcome: Progressing     Problem: SAFETY ADULT  Goal: Maintain or return to baseline ADL function  Description: INTERVENTIONS:  -  Assess patient's ability to carry out ADLs; assess patient's baseline for ADL function and identify physical deficits which impact ability to perform ADLs (bathing, care of mouth/teeth, toileting, grooming, dressing, etc )  - Assess/evaluate cause of self-care deficits   - Assess range of motion  - Assess patient's mobility; develop plan if impaired  - Assess patient's need for assistive devices and provide as appropriate  - Encourage maximum independence but intervene and supervise when necessary  - Involve family in performance of ADLs  - Assess for home care needs following discharge   - Consider OT consult to assist with ADL evaluation and planning for discharge  - Provide patient education as appropriate  Outcome: Progressing  Goal: Maintains/Returns to pre admission functional level  Description: INTERVENTIONS:  - Perform BMAT or MOVE assessment daily    - Set and communicate daily mobility goal to care team and patient/family/caregiver  - Collaborate with rehabilitation services on mobility goals if consulted  - Perform Range of Motion 3 times a day  - Reposition patient every 2 hours    - Dangle patient 3 times a day  - Stand patient 3 times a day  - Ambulate patient 3 times a day  - Out of bed to chair 3 times a day   - Out of bed for meals 3 times a day  - Out of bed for toileting  - Record patient progress and toleration of activity level   Outcome: Progressing  Goal: Patient will remain free of falls  Description: INTERVENTIONS:  - Educate patient/family on patient safety including physical limitations  - Instruct patient to call for assistance with activity   - Consult OT/PT to assist with strengthening/mobility   - Keep Call bell within reach  - Keep bed low and locked with side rails adjusted as appropriate  - Keep care items and personal belongings within reach  - Initiate and maintain comfort rounds  - Make Fall Risk Sign visible to staff  - Offer Toileting every 2 Hours, in advance of need  - Obtain necessary fall risk management equipment: non-skid footwear, instructed to call for assistance, call bell in reach, patient in room near nurses' station  - Apply yellow socks and bracelet for high fall risk patients  - Consider moving patient to room near nurses station  Outcome: Progressing     Problem: DISCHARGE PLANNING  Goal: Discharge to home or other facility with appropriate resources  Description: INTERVENTIONS:  - Identify barriers to discharge w/patient and caregiver  - Arrange for needed discharge resources and transportation as appropriate  - Identify discharge learning needs (meds, wound care, etc )  - Arrange for interpretive services to assist at discharge as needed  - Refer to Case Management Department for coordinating discharge planning if the patient needs post-hospital services based on physician/advanced practitioner order or complex needs related to functional status, cognitive ability, or social support system  Outcome: Progressing     Problem: Knowledge Deficit  Goal: Patient/family/caregiver demonstrates understanding of disease process, treatment plan, medications, and discharge instructions  Description: Complete learning assessment and assess knowledge base    Interventions:  - Provide teaching at level of understanding  - Provide teaching via preferred learning methods  Outcome: Progressing

## 2023-05-11 NOTE — PROGRESS NOTES
[facility]  Progress Note  Name: Edith Mccarthy  MRN: [id number]  Unit/Bed#: -89 I Date of Admission: 5/10/2023   Date of Service: 5/11/2023 I Hospital Day: 1    Assessment/Plan   * Cervical spinal cord compression [facility])  Assessment & Plan  POD 1 Posterior cervical decompressive laminectomy and instrumented fusion C4-T1 (DKO, 5/10)    Imaging:  · XR cervical spine, 5/11/23: Final read pending  Per my interpretation, stable appearance of C4-T1 posterior fusion  Plan:  · Continue to monitor neurological exam  · HILLARY drain to FS, 270cc since surgery  · Will maintain at this time  · Upright imaging obtained and personally reviewed  · Post op labs reviewed  · WBC 18 52, possibly due to intraoperative decadron  Will repeat CBC in am  · No collar indicated  · Pain control as ordered, APS following given intraoperative methadone use  · Home medications ordered  · SSI ordered for diabetes management   · Bowel regimen with colace, senna, dulcolax PRN  · D/c IVF as patient tolerating PO  · PT OT evaluation, acute rehab recommended  · DVT ppx: SCDs, SQH    Rounds completed with Herman Wheeler RN    Neurosurgery will continue to follow as primary team   Please call questions or concerns  Type 2 diabetes mellitus without complication, without long-term current use of insulin [facility])  Assessment & Plan  Lab Results   Component Value Date    HGBA1C 6 0 (H) 05/02/2023       Recent Labs     05/10/23  1624 05/10/23  2107 05/11/23  0556 05/11/23  1118   POCGLU 145* 254* 165* 138       Blood Sugar Average: Last 72 hrs:  (P) 422 [id number]     SSI ordered                     Subjective/Objective   Chief Complaint: none    Subjective: Patient with no acute events overnight  He states that he did have severe heartburn around 11 PM which kept him up  He is requesting additional Tums to be ordered  He states that he feels stiff but his left hand numbness has improved    He is still "unable to open up his right hand  He ambulated with therapy  Overall, his pain is well controlled  He is urinating and tolerating p o  diet  Objective: Patient sitting in chair eating lunch in no acute distress  Vital signs stable  HILLARY drain to full suction with serosanguineous output  Intake/Output                 05/11/23 0701 - 05/12/23 0700     [id number] 1950-4300 Total              Intake    P O   100  -- 100    Total Intake 100 -- 100       Output    Urine  100  -- 100    Urine 100 -- 100    Drains  20  -- 20    Output (mL) (Closed/Suction Drain Right;Posterior Neck Bulb) 20 -- 20    Total Output 120 -- 120       Net I/O     -20 -- -20          Invasive Devices     Peripheral Intravenous Line  Duration           Peripheral IV 05/10/23 Left Arm 1 day    Peripheral IV 05/10/23 Right;Ventral (anterior) Forearm <1 day          Drain  Duration           Open Drain Left Groin 602 days    Closed/Suction Drain Right;Posterior Neck Bulb <1 day                Vitals: Blood pressure 118/84, pulse 88, temperature 97 5 °F (36 4 °C), resp  rate 17, height 5' 6\" (1 676 m), weight 111 kg (244 lb 14 9 oz), SpO2 96 %  ,Body mass index is 39 53 kg/m²  General appearance: alert, appears stated age, cooperative and no distress  Head: Normocephalic, without obvious abnormality, atraumatic  Eyes: Conjugate gaze, tracks appropriately  Neck: Posterior cervical incision CDI with Mepilex dressing  HILLARY drain to full suction  Lungs: non labored breathing  Heart: regular heart rate  Neurologic:   Mental status: Alert, oriented, thought content appropriate, speech clear and fluent  Cranial nerves: grossly intact (Cranial nerves II-XII)  Sensory: normal to LT except decreased sensation RUE lateral forearm and fifth finger  Motor: moving all extremities  5/5 throughout except right hand contracture    Lab Results: I have personally reviewed pertinent results        Results from last 7 days   Lab Units 05/11/23  0920 " 05/10/23  1617   WBC Thousand/uL 18 52*  --    HEMOGLOBIN g/dL 14 1  --    HEMATOCRIT % 43 1  --    PLATELETS Thousands/uL 293 259     Results from last 7 days   Lab Units 05/11/23  0557 05/10/23  1000   POTASSIUM mmol/L 4 1 4 1   CHLORIDE mmol/L 104 107   CO2 mmol/L 25 26   BUN mg/dL 23 26*   CREATININE mg/dL 1 07 1 03   CALCIUM mg/dL 10 1 10 4*             Results from last 7 days   Lab Units 05/11/23  0759 05/10/23  1000   INR  1 11 0 99   PTT seconds 28  --      No results found for: TROPONINT  ABG:No results found for: PHART, XGL8MUC, PO2ART, USA5IEG, J4MMDVFP, BEART, SOURCE    Imaging Studies: I have personally reviewed pertinent reports  and I have personally reviewed pertinent films in PACS     No results found  EKG, Pathology, and Other Studies: I have personally reviewed pertinent reports        VTE Pharmacologic Prophylaxis: Heparin    VTE Mechanical Prophylaxis: sequential compression device

## 2023-05-11 NOTE — CASE MANAGEMENT
Case Management Assessment & Discharge Planning Note    Patient name Shaggy Nipple  Location [id number] MRN [id number]  : 1955 Date 2023       Current Admission Date: 5/10/2023  Current Admission Diagnosis:Cervical spinal cord compression [facility])   Patient Active Problem List    Diagnosis Date Noted   • Preoperative clearance 2023   • Spinal cord injury at C5-C7 level without injury of spinal bone ([address] ) 2023   • Neurogenic claudication 2023   • Cervical spinal cord compression ([address] ) 2023   • Spinal stenosis of lumbar region at multiple levels 2022   • Mixed hyperlipidemia 2022   • Tobacco dependence syndrome 2022   • Primary hypertension 2022   • Type 2 diabetes mellitus without complication, without long-term current use of insulin ([facility] [address] ) 2022   • Chronic bilateral low back pain without sciatica 2022   • Carpal tunnel syndrome of right wrist 2021   • Atrophy of muscle of right hand 2021   • Obesity, morbid ([address] ) 2021   • Cubital tunnel syndrome on right    • Carpal tunnel syndrome, left       LOS (days): 1  Geometric Mean LOS (GMLOS) (days): 4 90  Days to GMLOS:4     OBJECTIVE:    Risk of Unplanned Readmission Score: 7 84         Current admission status: Inpatient       Preferred Pharmacy:   [facility]eg[facility] [address]  Phone: [phone] Fax: [phone] , [place] - [address] [place] [address] [facility] 18 [facility] 94 [facility] 38 [address] [facility]  Phone: [phone] Fax: [phone]    Primary Care Provider: Galen Lopez MD    Primary Insurance: MEDICARE  Secondary Insurance: BLUE CROSS    ASSESSMENT:  [facility], [address] [place] [address] Drive Representative - Brother   Primary Phone: [phone] (Mobile)                              Patient Information  Admitted from[de-identified] Home  Mental Status: Alert  During Assessment patient was accompanied by: Not accompanied during assessment  Assessment information provided by[de-identified] Patient  Primary Caregiver: Self  Support Systems: Self, Family members  What [place] you [place] [facility] [address] [place] [address] Street entry access options   Select all that apply : Stairs  Number of steps to enter home : 6  Do the steps have railings?: Yes  Type of Current Residence: 2 story home  Upon entering residence, is there a bedroom on the main floor (no further steps)?: No  A bedroom is located on the following floor levels of residence (select all that apply):: 2nd Floor  Upon entering residence, is there a bathroom on the main floor (no further steps)?: No  Indicate which floors of current residence have a bathroom (select all the apply):: 2nd Floor  Number of steps to 2nd floor from main floor: 6  In the last 12 months, was there a time when you were not able to pay the mortgage or rent on time?: No  In the last 12 months, was there a time when you did not have a steady place to sleep or slept in a shelter (including now)?: No  Homeless/housing insecurity resource given?: N/A  Living Arrangements: Lives Alone    Activities of Daily Living Prior to Admission  Functional Status: Independent  Completes ADLs independently?: Yes  Ambulates independently?: Yes  Does patient use assisted devices?: Yes  Assisted Devices (DME) used: Reginald Muscat  Does patient currently own DME?: Yes  What DME does the patient currently own?: Abbie Nirav  Does patient have a history of Outpatient Therapy (PT/OT)?: Yes  Does the patient have a history of Short-Term Rehab?: No  Does patient have a history of HHC?: No  Does patient currently have [address]?: No         Patient Information Continued  Income Source: SSI/SSD  Does patient have prescription coverage?: Yes  Within the past 12 months, you worried that your food would run out before you got the money to buy more : Never true  Within the past 12 months, the food you bought just didn't last and you didn't have money to get more : Never true  Food insecurity resource given?: N/A  Does patient receive dialysis treatments?: No  Does patient have a history of substance abuse?: No  Does patient have a history of Mental Health Diagnosis?: No         Means of Transportation  Means of Transport to Appts[de-identified] Drives Self  In the past 12 months, has lack of transportation kept you from medical appointments or from getting medications?: No  In the past 12 months, has lack of transportation kept you from meetings, work, or from getting things needed for daily living?: No  Was application for public transport provided?: N/A        DISCHARGE DETAILS:    Discharge planning discussed with[de-identified] Patient at bedside  Freedom of Choice: Yes  Comments - Freedom of Choice: FOC discussed regarding rehab placement  CM contacted family/caregiver?: No- see comments (declined)  Were Treatment Team discharge recommendations reviewed with patient/caregiver?: Yes  Did patient/caregiver verbalize understanding of patient care needs?: Yes  Were patient/caregiver advised of the risks associated with not following Treatment Team discharge recommendations?: Yes    Contacts  Patient Contacts: Cesar Lopes Book  Contact Method: In Person  Reason/Outcome: Discharge [address]         Is the patient interested in [address] at discharge?: No    DME Referral Provided  Referral made for DME?: No    Other Referral/Resources/Interventions Provided:  Interventions: Short Term Rehab, Acute Rehab  Referral Comments: Pt is in agreement with rehab placement at discharge -- [facility] discussed and pt would like to admit to [facility] AND [facility] for faster-paced therapies  Pt prefers [facility] as SNF b/u  [place] referrals placed -- CM will follow for responses           Treatment Team Recommendation: Short Term Rehab, Acute Rehab  Discharge Destination Plan[de-identified] Short Term Rehab, Acute Rehab

## 2023-05-11 NOTE — PLAN OF CARE
Problem: OCCUPATIONAL THERAPY ADULT  Goal: Performs self-care activities at highest level of function for planned discharge setting  See evaluation for individualized goals  Description: Treatment Interventions: ADL retraining, Functional transfer training, UE strengthening/ROM, Endurance training, Patient/family training, Equipment evaluation/education, Neuromuscular reeducation, Fine motor coordination activities, Activityengagement          See flowsheet documentation for full assessment, interventions and recommendations  Note: Limitation: Decreased ADL status, Decreased endurance, Decreased UE ROM, Decreased UE strength, Decreased sensation, Decreased fine motor control, Decreased self-care trans, Decreased high-level ADLs  Prognosis: Good  Assessment: Pt is a 69 y o  male who was admitted to [facility] on 5/10/2023 with Cervical spinal cord compression (HCC) POD 1 Posterior cervical decompressive laminectomy and instrumented fusion C4-T1    Pt's problem list also includes PMH of DM, HTN, obesity and HLD, spinal stenosis of lumbar region  At baseline pt was completing adls and mobility independently- I iadls  Pt lives alone in split level home with 6 steps between levels  Currently pt requires min to mod assist for overall ADLS and min assist for functional mobility/transfers  Pt currently presents with impairments in the following categories -steps to enter environment, limited home support, difficulty performing ADLS, difficulty performing IADLS , health management  and environment activity tolerance, endurance, standing balance/tolerance, sitting balance/tolerance, UE strength, UE ROM and FMC   These impairments, as well as pt's fatigue, pain, spinal precautions, decreased caregiver support, risk for falls and home environment  limit pt's ability to safely engage in all baseline areas of occupation, includinggrooming, bathing, dressing, toileting, functional mobility/transfers, community mobility, laundry , driving, house maintenance, meal prep, cleaning, social participation  and leisure activities  From OT standpoint, recommend inpt rehab upon D/C  OT will continue to follow to address the below stated goals       OT Discharge Recommendation: Post acute rehabilitation services

## 2023-05-11 NOTE — PHYSICAL THERAPY NOTE
PHYSICAL THERAPY EVALUATION  NAME:  Chalo Shepherd  DATE: 05/11/23    AGE:   69 y o   Mrn:   [id number]  ADMIT DX:  Cervical spinal cord compression (HCC) [G95 20]  Spinal cord injury at C5-C7 level without injury of spinal bone ([address] ) [S14 105A]  Neurogenic claudication [R29 818]    Past Medical History:   Diagnosis Date    Diabetes mellitus [facility])     ED (erectile dysfunction)     Hyperlipidemia     Hypertension     Obesity     Spinal stenosis of lumbar region at multiple levels 12/22/2022       Past Surgical History:   Procedure Laterality Date    CARPAL TUNNEL RELEASE Right     EYE SURGERY      strabibusmus    HAND SURGERY      left index finger    OH EXCISION SPERMATOCELE W/WO EPIDIDYMECTOMY Left 09/16/2021    Procedure: HYDROCELECTOMY, SCROTAL DEBRIDEMENT;  Surgeon: Biju Lerma MD;  Location: [address] [place] [address];  Service: Urology    OH NEUROPLASTY &/TRANSPOS MEDIAN NRV CARPAL Jillian Mulch Right 07/06/2021    Procedure: RELEASE CARPAL TUNNEL;  Surgeon: Murray Collet, MD;  [facility];  Service: Orthopedics    OH NEUROPLASTY &/TRANSPOSITION ULNAR NERVE ELBOW Right 07/06/2021    Procedure: RELEASE CUBITAL TUNNEL;  Surgeon: Murray Collet, MD;  [facility];  Service: Orthopedics       Length Of Stay: 1    PHYSICAL THERAPY EVALUATION:        05/11/23 0905   Note Type   Note type Evaluation   Pain Assessment   Pain Assessment Tool 0-10   Pain Score 8   Pain Location/Orientation Location: Neck   Pain Onset/Description Onset: Ongoing;Frequency: Constant/Continuous; Descriptor: Aching   Effect of Pain on Daily Activities increased pain with activity   Patient's Stated Pain Goal No pain   Hospital Pain Intervention(s) Ambulation/increased activity;Repositioned   Restrictions/Precautions   Weight Bearing Precautions Per Order No   Braces or Orthoses   (no c/s brace required per neurosx)   Other Precautions Multiple lines; Fall Risk;Pain;Spinal precautions   Home Living   Type of [facility] 25 Layout Multi-level  (6+6 steps to bedroom level)   Home Equipment Walker;Cane   Additional Comments Pt reports living alone, bt reports he has local family who could provide some assistance if needed   Prior Function   Level of [place] Independent with functional mobility   Lives With Alone   Receives Help From [facility] in the last 6 months 0   Comments Pt reports the use of a RW for ambulation PTA   General   Family/Caregiver Present No   Cognition   Overall Cognitive Status WFL   Arousal/Participation Alert   Orientation Level Oriented X4   Memory Within functional limits   Following Commands Follows all commands and directions without difficulty   RUE Assessment   RUE Assessment X   LUE Assessment   LUE Assessment X   RLE Assessment   RLE Assessment WFL   Strength RLE   RLE Overall Strength 4-/5   LLE Assessment   LLE Assessment WFL   Strength LLE   LLE Overall Strength 4/5   Bed Mobility   Additional Comments NA, Pt seated OOB in chair at time of PT eval   Transfers   Sit to Stand 4  Minimal assistance   Additional items Assist x 1; Increased time required;Verbal cues   Stand to Sit 4  Minimal assistance   Additional items Assist x 1; Increased time required;Verbal cues   Additional Comments cues needed for hand placement during transfers   Ambulation/Elevation   Gait pattern Excessively slow; Short stride; Foward flexed; Inconsistent preeti   Gait Assistance 4  Minimal assist   Additional items Assist x 1   Assistive Device Rolling walker   Distance 55ft x 2  (seated rest break required)   Stair Management Assistance 4  Minimal assist   Additional items Assist x 1   Stair Management Technique Two rails   Number of Stairs 3   Balance   Static Sitting Fair -   Static Standing Poor +   Ambulatory Poor +   Endurance Deficit   Endurance Deficit Yes   Endurance Deficit Description fatigue, pain   Activity Tolerance   Activity Tolerance Patient limited by fatigue;Patient limited by pain   Medical Staff Made Rodríguez Hernandez, LORENZO   Nurse Made Aware Pt appropriate to be seen and mobilize per nsg   Assessment   Prognosis Good   Problem List Decreased strength;Decreased range of motion;Decreased endurance; Impaired balance;Decreased mobility;Pain;Orthopedic restrictions   Assessment Pt is 69 y o  male seen for PT evaluation s/p admit to [facility] on 5/10/2023  Two pt identifiers were used to confirm  Pt presented for scheduled Posterior cervical decompressive laminectomy and instrumented fusion C4-T1 which was performed on 5/10/23   Pt was admitted with a primary dx of: cervical spinal cord compression s/p Posterior cervical decompressive laminectomy and instrumented fusion C4-T1  PT now consulted for assessment of mobility and d/c needs  Pt with Out of bed orders  Pts current co morbidities affecting treatment include:  has a past medical history of Diabetes mellitus ([facility] [address] ), ED (erectile dysfunction), Hyperlipidemia, Hypertension, Obesity, and Spinal stenosis of lumbar region at multiple levels    Pts current clinical presentation is Unstable/ Unpredictable (high complexity) due to Ongoing medical management for primary dx, Decreased activity tolerance compared to baseline, Fall risk, Increased assistance needed from caregiver at current time, Spinal precautions at current time, Continuous pulse oximetry monitoring , s/p surgical intervention    Upon evaluation, pt currently is requiring Min Ax1 for transfers and Min Ax1 for ambulation w/ RW  Pt presents at PT eval functioning below baseline and currently w/ overall mobility deficits 2* to: BLE weakness, decreased ROM, impaired balance, decreased endurance, gait deviations, pain, decreased activity tolerance compared to baseline, fall risk, spinal precautions  Pt currently at a fall risk 2* to impairments listed above    Based on the aforementioned PT evaluation, pt will continue to benefit from skilled Acute PT interventions to address stated impairments; to maximize "functional mobility; for ongoing pt/ family training; and DME needs  At conclusion of PT session pt returned back in chair with phone and call bell within reach  Pt denies any further questions at this time  PT is currently recommending Rehab  PT will continue to follow during hospital stay  Barriers to Discharge Inaccessible home environment;Decreased caregiver support   Goals   Patient Goals \" to get better\"   STG Expiration Date 05/21/23   Short Term Goal #1 In 10 days pt will complete: 1) Bed mobility skills with mod I to increase safety and independence as well as decrease caregiver burden  2) Functional transfers with mod I to promote increased independence, safety, and QOL  3) Ambulate 200' using least restrictive AD with mod I without LOB and stable vitals so that pt can negotiate previous living environment safely and promote independence with functional mobility and return to PLOF  4) Stair training up/ down 6 step/s using rail/s with mod I so that pt can enter/negotiate previous living environment safely and decrease fall risk  5) Improve balance grades by 1/2 grade to increase safety with all mobility and decrease fall risk  6) Improve BLE strength by 1/2 grade to help increase overall functional mobility and decrease fall risk  Plan   Treatment/Interventions Functional transfer training;LE strengthening/ROM; Elevations; Therapeutic exercise; Endurance training;Patient/family training;Equipment eval/education; Bed mobility;Gait training;Spoke to nursing;OT   PT Frequency 3-5x/wk   Recommendation   PT Discharge Recommendation Post acute rehabilitation services   Equipment Recommended [facility] Recommended Wheeled walker   AM-PAC Basic Mobility Inpatient   Turning in Flat Bed Without Bedrails 3   Lying on Back to Sitting on Edge of Flat Bed Without Bedrails 3   Moving Bed to Chair 3   Standing Up From Chair Using Arms 3   Walk in Room 2   Climb 3-5 Stairs With Railing 2   Basic Mobility " Inpatient Raw Score 16   Basic Mobility Standardized Score 38 32   Highest Level Of Mobility   -[facility] Goal 5: Stand one or more mins   -HL Achieved 7: Walk 25 feet or more   Modified Ferdinand Scale   Modified [place] Scale 4   Barthel Index   Feeding 10   Bathing 0   Grooming Score 5   Dressing Score 5   Bladder Score 10   Bowels Score 10   Toilet Use Score 10   Transfers (Bed/Chair) Score 10   Mobility (Level Surface) Score 0  (< 50 yds during PT eval)   Stairs Score 5   Barthel Index Score 65   Portions of the documentation may have been created using voice recognition software  Occasional wrong word or sound alike substitutions may have occurred due to the inherent limitations of the voice recognition software  Read the chart carefully and recognize, using context, where substitutions have occurred      Sheela Ornelast, PT, DPT

## 2023-05-11 NOTE — PLAN OF CARE
Problem: PHYSICAL THERAPY ADULT  Goal: Performs mobility at highest level of function for planned discharge setting  See evaluation for individualized goals  Description: Treatment/Interventions: Functional transfer training, LE strengthening/ROM, Elevations, Therapeutic exercise, Endurance training, Patient/family training, Equipment eval/education, Bed mobility, Gait training, Spoke to nursing, OT  Equipment Recommended: Joelle Richardson       See flowsheet documentation for full assessment, interventions and recommendations  Note: Prognosis: Good  Problem List: Decreased strength, Decreased range of motion, Decreased endurance, Impaired balance, Decreased mobility, Pain, Orthopedic restrictions  Assessment: Pt is 69 y o  male seen for PT evaluation s/p admit to [facility] on 5/10/2023  Two pt identifiers were used to confirm  Pt presented for scheduled Posterior cervical decompressive laminectomy and instrumented fusion C4-T1 which was performed on 5/10/23   Pt was admitted with a primary dx of: cervical spinal cord compression s/p Posterior cervical decompressive laminectomy and instrumented fusion C4-T1  PT now consulted for assessment of mobility and d/c needs  Pt with Out of bed orders  Pts current co morbidities affecting treatment include:  has a past medical history of Diabetes mellitus ([address] ), ED (erectile dysfunction), Hyperlipidemia, Hypertension, Obesity, and Spinal stenosis of lumbar region at multiple levels    Pts current clinical presentation is Unstable/ Unpredictable (high complexity) due to Ongoing medical management for primary dx, Decreased activity tolerance compared to baseline, Fall risk, Increased assistance needed from caregiver at current time, Spinal precautions at current time, Continuous pulse oximetry monitoring , s/p surgical intervention    Upon evaluation, pt currently is requiring Min Ax1 for transfers and Min Ax1 for ambulation w/ RW   Pt presents at PT eval functioning below baseline and currently w/ overall mobility deficits 2* to: BLE weakness, decreased ROM, impaired balance, decreased endurance, gait deviations, pain, decreased activity tolerance compared to baseline, fall risk, spinal precautions  Pt currently at a fall risk 2* to impairments listed above  Based on the aforementioned PT evaluation, pt will continue to benefit from skilled Acute PT interventions to address stated impairments; to maximize functional mobility; for ongoing pt/ family training; and DME needs  At conclusion of PT session pt returned back in chair with phone and call bell within reach  Pt denies any further questions at this time  PT is currently recommending Rehab  PT will continue to follow during hospital stay  Barriers to Discharge: Inaccessible home environment, Decreased caregiver support     PT Discharge Recommendation: Post acute rehabilitation services    See flowsheet documentation for full assessment

## 2023-05-11 NOTE — ASSESSMENT & PLAN NOTE
Lab Results   Component Value Date    HGBA1C 6 0 (H) 05/02/2023       Recent Labs     05/10/23  1624 05/10/23  2107 05/11/23  0556 05/11/23  1118   POCGLU 145* 254* 165* 138       Blood Sugar Average: Last 72 hrs:  (P) 861 [id number]     SSI ordered

## 2023-05-11 NOTE — OCCUPATIONAL THERAPY NOTE
Occupational Therapy Evaluation     Patient Name: Nyasia SANCHEZ Date: 5/11/2023  Problem List  Principal Problem:    Cervical spinal cord compression ([facility] [address] )  Active Problems:    Type 2 diabetes mellitus without complication, without long-term current use of insulin [facility])    Past Medical History  Past Medical History:   Diagnosis Date    Diabetes mellitus ([facility] [address] )     ED (erectile dysfunction)     Hyperlipidemia     Hypertension     Obesity     Spinal stenosis of lumbar region at multiple levels 12/22/2022     Past Surgical History  Past Surgical History:   Procedure Laterality Date    CARPAL TUNNEL RELEASE Right     EYE SURGERY      strabibusmus    HAND SURGERY      left index finger    NC ARTHRD PST/PSTLAT TQ 1NTRSPC CRV BELW C2 SEGMENT N/A 5/10/2023    Procedure: Posterior cervical decompressive laminectomy and instrumented fusion C4-T1; Surgeon: Devin Pimentel MD;  [facility];  Service: Neurosurgery    NC EXCISION SPERMATOCELE W/WO EPIDIDYMECTOMY Left 09/16/2021    Procedure: HYDROCELECTOMY, SCROTAL DEBRIDEMENT;  Surgeon: Theresa Randolph MD;  Location: [address] [place] [address];  Service: Urology    NC NEUROPLASTY &/TRANSPOS MEDIAN NRV Rody Yessy Right 07/06/2021    Procedure: RELEASE CARPAL TUNNEL;  Surgeon: Jaki Pulido MD;  [facility];  Service: Orthopedics    NC NEUROPLASTY &/TRANSPOSITION ULNAR NERVE ELBOW Right 07/06/2021    Procedure: RELEASE CUBITAL TUNNEL;  Surgeon: Jaki Pulido MD;  [facility];  Service: Orthopedics         05/11/23 0845   OT Last Visit   OT Visit Date 05/11/23   Note Type   Note type Evaluation   Pain Assessment   Pain Assessment Tool 0-10   Pain Score 6   Pain Location/Orientation Location: Neck; Location: Shoulder   Hospital Pain Intervention(s) Repositioned; Ambulation/increased activity; Emotional support;Relaxation technique   Restrictions/Precautions   Weight Bearing Precautions Per Order No   Home Living   Type of Home House "  Home Layout Multi-level  (split level home)   Home Equipment Walker;Cane   Prior Function   Level of [place] Independent with ADLs; Independent with functional mobility; Independent with IADLS   Lives With (S)  Alone   Receives Help From Family   IADLs Independent with driving; Independent with medication management; Independent with meal prep   Falls in the last 6 months 0   Vocational Retired   [address] and mobility - i iadls   Reciprocal Relationships supportive family/friends   Service to Others retired   Intrinsic Gratification mostly sedentary   Subjective   Subjective c/o neck and shld pain   ADL   Eating Assistance 7  Independent   Grooming Assistance 5  Supervision/Setup   UB Bathing Assistance 4  Minimal Assistance   LB Bathing Assistance 3  Moderate Assistance   UB Dressing Assistance 4  Minimal Assistance   LB Dressing Assistance 3  Moderate Assistance   Toileting Assistance  3  Moderate Assistance   Bed Mobility   Additional Comments oob in chair   Transfers   Sit to Stand 4  Minimal assistance   Stand to Sit 4  Minimal assistance   Functional Mobility   Functional Mobility 4  Minimal assistance   Additional Comments admits to feeling \"off balance\"   Additional items Rolling walker   Balance   Static Sitting Fair +   Dynamic Sitting Fair   Static Standing Fair   Dynamic Standing Fair -   Ambulatory Poor +   Activity Tolerance   Activity Tolerance Patient limited by fatigue;Patient limited by pain   RUE Assessment   RUE Assessment X  (limited digit function especially extension - gross grasp/poor dexterity)   LUE Assessment   LUE Assessment X  (impaired dexterity)   Hand Function   Gross Motor Coordination Functional   Fine Motor Coordination Impaired   Hand Function Comments decreased strength and dexterity R>L   Sensation   Additional Comments admits to numbness B hands   Cognition   Overall Cognitive Status WFL   Assessment   Limitation Decreased ADL status; Decreased " "endurance;Decreased UE ROM; Decreased UE strength;Decreased sensation;Decreased fine motor control;Decreased self-care trans;Decreased high-level ADLs   Prognosis Good   Assessment Pt is a 69 y o  male who was admitted to [facility] on 5/10/2023 with Cervical spinal cord compression (HCC) POD 1 Posterior cervical decompressive laminectomy and instrumented fusion C4-T1    Pt's problem list also includes PMH of DM, HTN, obesity and HLD, spinal stenosis of lumbar region  At baseline pt was completing adls and mobility independently- I iadls  Pt lives alone in split level home with 6 steps between levels  Currently pt requires min to mod assist for overall ADLS and min assist for functional mobility/transfers  Pt currently presents with impairments in the following categories -steps to enter environment, limited home support, difficulty performing ADLS, difficulty performing IADLS , health management  and environment activity tolerance, endurance, standing balance/tolerance, sitting balance/tolerance, UE strength, UE ROM and FMC  These impairments, as well as pt's fatigue, pain, spinal precautions, decreased caregiver support, risk for falls and home environment  limit pt's ability to safely engage in all baseline areas of occupation, includinggrooming, bathing, dressing, toileting, functional mobility/transfers, community mobility, laundry , driving, house maintenance, meal prep, cleaning, social participation  and leisure activities  From OT standpoint, recommend inpt rehab upon D/C  OT will continue to follow to address the below stated goals  Goals   Patient Goals \"get better\"   LTG Time Frame 10-14   Long Term Goal #1 refer to established goals below   Plan   Treatment Interventions ADL retraining;Functional transfer training;UE strengthening/ROM; Endurance training;Patient/family training;Equipment evaluation/education; Neuromuscular reeducation; Fine motor coordination activities; Activityengagement   Goal " Expiration Date 05/25/23   OT Frequency 3-5x/wk   Recommendation   OT Discharge Recommendation Post acute rehabilitation services   AM-[facility] Daily Activity Inpatient   Lower Body Dressing 2   Bathing 2   Toileting 2   Upper Body Dressing 3   Grooming 4   Eating 4   Daily Activity Raw Score 17   Daily Activity Standardized Score (Calc for Raw Score >=11) 37 26   AM-[facility] Applied Cognition Inpatient   Following a Speech/Presentation 4   Understanding Ordinary Conversation 4   Taking Medications 4   Remembering Where Things Are Placed or Put Away 4   Remembering List of 4-5 Errands 4   Taking Care of Complicated Tasks 4   Applied Cognition Raw Score 24   Applied Cognition Standardized Score 62 21   End of Consult   Education Provided Yes   Patient Position at End of Consult Bedside chair;Bed/Chair alarm activated; All needs within reach   Nurse Communication Nurse aware of consult       OCCUPATIONAL THERAPY GOALS:    *Mod I adls after setup with use of AE PRN  *Mod I toileting and clothing management   *Mod I functional mobility and transfers to/from all surfaces with good dynamic balance and safety for participation in dynamic adls and iadl tasks   *Increase BUE AROM to [facility] with at least 4/5 strength and fair to fair+ [facility] for functional use with self care tasks   *Demonstrate good carryover with safe use of RW, spinal precautions and use of proper body mechanics during functional tasks   *Assess DME needs   *Increase activity tolerance to 35-40 minutes for participation in adls and enjoyable activities  *Assist with safe d/c recommendations         The patient's raw score on the AM-PAC Daily Activity Inpatient Short Form is 17  A raw score of less than 19 suggests the patient may benefit from discharge to post-acute rehabilitation services  Please refer to the recommendation of the Occupational Therapist for safe discharge planning      [facility]

## 2023-05-12 LAB
BASOPHILS # BLD AUTO: 0.02 THOUSANDS/ÂΜL (ref 0–0.1)
BASOPHILS NFR BLD AUTO: 0 % (ref 0–1)
EOSINOPHIL # BLD AUTO: 0.05 THOUSAND/ÂΜL (ref 0–0.61)
EOSINOPHIL NFR BLD AUTO: 0 % (ref 0–6)
ERYTHROCYTE [DISTWIDTH] IN BLOOD BY AUTOMATED COUNT: 15 % (ref 11.6–15.1)
GLUCOSE SERPL-MCNC: 145 MG/DL (ref 65–140)
GLUCOSE SERPL-MCNC: 149 MG/DL (ref 65–140)
GLUCOSE SERPL-MCNC: 155 MG/DL (ref 65–140)
GLUCOSE SERPL-MCNC: 216 MG/DL (ref 65–140)
HCT VFR BLD AUTO: 40.8 % (ref 36.5–49.3)
HGB BLD-MCNC: 13.7 G/DL (ref 12–17)
IMM GRANULOCYTES # BLD AUTO: 0.05 THOUSAND/UL (ref 0–0.2)
IMM GRANULOCYTES NFR BLD AUTO: 0 % (ref 0–2)
LYMPHOCYTES # BLD AUTO: 0.85 THOUSANDS/ÂΜL (ref 0.6–4.47)
LYMPHOCYTES NFR BLD AUTO: 7 % (ref 14–44)
MCH RBC QN AUTO: 29.1 PG (ref 26.8–34.3)
MCHC RBC AUTO-ENTMCNC: 33.6 G/DL (ref 31.4–37.4)
MCV RBC AUTO: 87 FL (ref 82–98)
MONOCYTES # BLD AUTO: 1.61 THOUSAND/ÂΜL (ref 0.17–1.22)
MONOCYTES NFR BLD AUTO: 12 % (ref 4–12)
NEUTROPHILS # BLD AUTO: 10.51 THOUSANDS/ÂΜL (ref 1.85–7.62)
NEUTS SEG NFR BLD AUTO: 81 % (ref 43–75)
NRBC BLD AUTO-RTO: 0 /100 WBCS
PLATELET # BLD AUTO: 286 THOUSANDS/UL (ref 149–390)
PMV BLD AUTO: 11.1 FL (ref 8.9–12.7)
RBC # BLD AUTO: 4.71 MILLION/UL (ref 3.88–5.62)
WBC # BLD AUTO: 13.09 THOUSAND/UL (ref 4.31–10.16)

## 2023-05-12 RX ORDER — LIDOCAINE 50 MG/G
2 PATCH TOPICAL ONCE
Status: COMPLETED | OUTPATIENT
Start: 2023-05-12 | End: 2023-05-12

## 2023-05-12 RX ADMIN — ACETAMINOPHEN 975 MG: 325 TABLET ORAL at 05:53

## 2023-05-12 RX ADMIN — METHOCARBAMOL 250 MG: 500 TABLET ORAL at 01:16

## 2023-05-12 RX ADMIN — CALCIUM CARBONATE (ANTACID) CHEW TAB 500 MG 750 MG: 500 CHEW TAB at 21:24

## 2023-05-12 RX ADMIN — HEPARIN SODIUM 5000 UNITS: 5000 INJECTION INTRAVENOUS; SUBCUTANEOUS at 13:35

## 2023-05-12 RX ADMIN — SENNOSIDES 8.6 MG: 8.6 TABLET, FILM COATED ORAL at 08:34

## 2023-05-12 RX ADMIN — METHADONE HYDROCHLORIDE 2.5 MG: 5 TABLET ORAL at 21:22

## 2023-05-12 RX ADMIN — HEPARIN SODIUM 5000 UNITS: 5000 INJECTION INTRAVENOUS; SUBCUTANEOUS at 21:22

## 2023-05-12 RX ADMIN — METHADONE HYDROCHLORIDE 2.5 MG: 5 TABLET ORAL at 08:43

## 2023-05-12 RX ADMIN — ACETAMINOPHEN 975 MG: 325 TABLET ORAL at 21:24

## 2023-05-12 RX ADMIN — LIDOCAINE 5% 2 PATCH: 700 PATCH TOPICAL at 11:10

## 2023-05-12 RX ADMIN — ACETAMINOPHEN 975 MG: 325 TABLET ORAL at 13:36

## 2023-05-12 RX ADMIN — METHOCARBAMOL 250 MG: 500 TABLET ORAL at 17:29

## 2023-05-12 RX ADMIN — DOCUSATE SODIUM 100 MG: 100 CAPSULE, LIQUID FILLED ORAL at 08:34

## 2023-05-12 RX ADMIN — METHOCARBAMOL 250 MG: 500 TABLET ORAL at 11:10

## 2023-05-12 RX ADMIN — INSULIN LISPRO 2 UNITS: 100 INJECTION, SOLUTION INTRAVENOUS; SUBCUTANEOUS at 11:10

## 2023-05-12 RX ADMIN — METHOCARBAMOL 250 MG: 500 TABLET ORAL at 05:53

## 2023-05-12 RX ADMIN — INSULIN LISPRO 1 UNITS: 100 INJECTION, SOLUTION INTRAVENOUS; SUBCUTANEOUS at 21:24

## 2023-05-12 RX ADMIN — DOCUSATE SODIUM 100 MG: 100 CAPSULE, LIQUID FILLED ORAL at 17:29

## 2023-05-12 RX ADMIN — HEPARIN SODIUM 5000 UNITS: 5000 INJECTION INTRAVENOUS; SUBCUTANEOUS at 05:53

## 2023-05-12 NOTE — PLAN OF CARE
Problem: Prexisting or High Potential for Compromised Skin Integrity  Goal: Skin integrity is maintained or improved  Description: INTERVENTIONS:  - Identify patients at risk for skin breakdown  - Assess and monitor skin integrity  - Assess and monitor nutrition and hydration status  - Monitor labs   - Assess for incontinence   - Turn and reposition patient  - Assist with mobility/ambulation  - Relieve pressure over bony prominences  - Avoid friction and shearing  - Provide appropriate hygiene as needed including keeping skin clean and dry  - Evaluate need for skin moisturizer/barrier cream  - Collaborate with interdisciplinary team   - Patient/family teaching  - Consider wound care consult   Outcome: Progressing     Problem: MOBILITY - ADULT  Goal: Maintain or return to baseline ADL function  Description: INTERVENTIONS:  -  Assess patient's ability to carry out ADLs; assess patient's baseline for ADL function and identify physical deficits which impact ability to perform ADLs (bathing, care of mouth/teeth, toileting, grooming, dressing, etc )  - Assess/evaluate cause of self-care deficits   - Assess range of motion  - Assess patient's mobility; develop plan if impaired  - Assess patient's need for assistive devices and provide as appropriate  - Encourage maximum independence but intervene and supervise when necessary  - Involve family in performance of ADLs  - Assess for home care needs following discharge   - Consider OT consult to assist with ADL evaluation and planning for discharge  - Provide patient education as appropriate  Outcome: Progressing  Goal: Maintains/Returns to pre admission functional level  Description: INTERVENTIONS:  - Perform BMAT or MOVE assessment daily    - Set and communicate daily mobility goal to care team and patient/family/caregiver  - Collaborate with rehabilitation services on mobility goals if consulted  - Perform Range of Motion 3 times a day    - Reposition patient every 2 hours   - Dangle patient 3 times a day  - Stand patient 3 times a day  - Ambulate patient 3 times a day  - Out of bed to chair 3 times a day   - Out of bed for meals 3 times a day  - Out of bed for toileting  - Record patient progress and toleration of activity level   Outcome: Progressing     Problem: PAIN - ADULT  Goal: Verbalizes/displays adequate comfort level or baseline comfort level  Description: Interventions:  - Encourage patient to monitor pain and request assistance  - Assess pain using appropriate pain scale  - Administer analgesics based on type and severity of pain and evaluate response  - Implement non-pharmacological measures as appropriate and evaluate response  - Consider cultural and social influences on pain and pain management  - Notify physician/advanced practitioner if interventions unsuccessful or patient reports new pain  Outcome: Progressing     Problem: INFECTION - ADULT  Goal: Absence or prevention of progression during hospitalization  Description: INTERVENTIONS:  - Assess and monitor for signs and symptoms of infection  - Monitor lab/diagnostic results  - Monitor all insertion sites, i e  indwelling lines, tubes, and drains  - Monitor endotracheal if appropriate and nasal secretions for changes in amount and color  - [place] appropriate cooling/warming therapies per order  - Administer medications as ordered  - Instruct and encourage patient and family to use good hand hygiene technique  - Identify and instruct in appropriate isolation precautions for identified infection/condition  Outcome: Progressing  Goal: Absence of fever/infection during neutropenic period  Description: INTERVENTIONS:  - Monitor WBC    Outcome: Progressing     Problem: SAFETY ADULT  Goal: Maintain or return to baseline ADL function  Description: INTERVENTIONS:  -  Assess patient's ability to carry out ADLs; assess patient's baseline for ADL function and identify physical deficits which impact ability to perform ADLs (bathing, care of mouth/teeth, toileting, grooming, dressing, etc )  - Assess/evaluate cause of self-care deficits   - Assess range of motion  - Assess patient's mobility; develop plan if impaired  - Assess patient's need for assistive devices and provide as appropriate  - Encourage maximum independence but intervene and supervise when necessary  - Involve family in performance of ADLs  - Assess for home care needs following discharge   - Consider OT consult to assist with ADL evaluation and planning for discharge  - Provide patient education as appropriate  Outcome: Progressing  Goal: Maintains/Returns to pre admission functional level  Description: INTERVENTIONS:  - Perform BMAT or MOVE assessment daily    - Set and communicate daily mobility goal to care team and patient/family/caregiver  - Collaborate with rehabilitation services on mobility goals if consulted  - Perform Range of Motion 3 times a day  - Reposition patient every 2 hours    - Dangle patient 3 times a day  - Stand patient 3 times a day  - Ambulate patient 3 times a day  - Out of bed to chair 3 times a day   - Out of bed for meals 3 times a day  - Out of bed for toileting  - Record patient progress and toleration of activity level   Outcome: Progressing  Goal: Patient will remain free of falls  Description: INTERVENTIONS:  - Educate patient/family on patient safety including physical limitations  - Instruct patient to call for assistance with activity   - Consult OT/PT to assist with strengthening/mobility   - Keep Call bell within reach  - Keep bed low and locked with side rails adjusted as appropriate  - Keep care items and personal belongings within reach  - Initiate and maintain comfort rounds  - Make Fall Risk Sign visible to staff  - Offer Toileting every 2 Hours, in advance of need  - Initiate/Maintain alarm  - Obtain necessary fall risk management equipment:   - Apply yellow socks and bracelet for high fall risk patients  - Consider moving patient to room near nurses station  Outcome: Progressing     Problem: DISCHARGE PLANNING  Goal: Discharge to home or other facility with appropriate resources  Description: INTERVENTIONS:  - Identify barriers to discharge w/patient and caregiver  - Arrange for needed discharge resources and transportation as appropriate  - Identify discharge learning needs (meds, wound care, etc )  - Arrange for interpretive services to assist at discharge as needed  - Refer to Case Management Department for coordinating discharge planning if the patient needs post-hospital services based on physician/advanced practitioner order or complex needs related to functional status, cognitive ability, or social support system  Outcome: Progressing     Problem: Knowledge Deficit  Goal: Patient/family/caregiver demonstrates understanding of disease process, treatment plan, medications, and discharge instructions  Description: Complete learning assessment and assess knowledge base    Interventions:  - Provide teaching at level of understanding  - Provide teaching via preferred learning methods  Outcome: Progressing

## 2023-05-12 NOTE — PROGRESS NOTES
"[facility]  Progress Note  Name: Wesley Garrison  MRN: [id number]  Unit/Bed#: -94 I Date of Admission: 5/10/2023   Date of Service: 5/12/2023 I Hospital Day: 2    Assessment/Plan   * Cervical spinal cord compression [facility])  Assessment & Plan  POD 2 Posterior cervical decompressive laminectomy and instrumented fusion C4-T1 (DKO, 5/10)    Imaging:  · XR cervical spine, 5/11/23: Final read pending  Per my interpretation, stable appearance of C4-T1 posterior fusion  Plan:  · Continue to monitor neurological exam  · HILLARY drain to FS, 180cc p24h  · Will maintain at this time  · Upright imaging obtained and personally reviewed  · Post op labs reviewed  · WBC 18 52, possibly due to intraoperative decadron  Will repeat CBC today to trend  · No collar indicated  · Pain control as ordered, APS following given intraoperative methadone use  · Today is last day of methadone  · Will add lidocaine patches  · Home medications ordered  · SSI ordered for diabetes management   · Bowel regimen with colace, senna, dulcolax PRN  · PT OT evaluation, acute rehab recommended  · DVT ppx: SCDs, SQH    Rounds completed with Pelon Rosen RN    Neurosurgery will continue to follow as primary team   Please call questions or concerns  Type 2 diabetes mellitus without complication, without long-term current use of insulin [facility])  Assessment & Plan  Lab Results   Component Value Date    HGBA1C 6 0 (H) 05/02/2023       Recent Labs     05/11/23  1118 05/11/23  1551 05/11/23  2030 05/12/23  0548   POCGLU 138 192* 174* 145*       Blood Sugar Average: Last 72 hrs:  (P) 161     SSI ordered                     Subjective/Objective   Chief Complaint: \"My shoulders are sore\"    Subjective: Patient with NAEO  Drain continues with high output but decreasing  Ambulated to bathroom  Tolerating PO  Passing gas but no BM yet; will eat prunes today as this usually works for him   States incisional pain is well " "controlled and more itchy than anything  Urinating without difficulty  Shoulder soreness improved with repositioning  Objective: Patient sitting in chair in NAD  Intake/Output                 05/12/23 0701 - 05/13/23 0700     5360-0522 [id number] Total              Intake    P O   300  -- 300    Total Intake 300 -- 300       Output    Urine  100  -- 100    Urine 100 -- 100    Total Output 100 -- 100       Net I/O     200 -- 200          Invasive Devices     Peripheral Intravenous Line  Duration           Peripheral IV 05/10/23 Left Arm 2 days    Peripheral IV 05/10/23 Right;Ventral (anterior) Forearm 1 day          Drain  Duration           Open Drain Left Groin 603 days    Closed/Suction Drain Right;Posterior Neck Bulb 1 day                Vitals: Blood pressure 102/53, pulse 98, temperature 98 3 °F (36 8 °C), resp  rate 18, height 5' 6\" (1 676 m), weight 111 kg (244 lb 14 9 oz), SpO2 92 %  ,Body mass index is 39 53 kg/m²  General appearance: alert, appears stated age, cooperative and no distress  Head: Normocephalic, without obvious abnormality, atraumatic  Eyes: conjugate gaze, tracks appropriately  Neck: posterior incision CDI, HILLARY drain to FS with SS drainage in bulb  Lungs: non labored breathing  Heart: regular heart rate  Neurologic:   Mental status: Alert, oriented, thought content appropriate, speech clear and fluent  Cranial nerves: grossly intact (Cranial nerves II-XII)  Sensory: normal to LT except decreased sensation R lateral forearm and 5th finger  Motor: moving all extremities without focal weakness except right hand contracture which is slightly improved today    Lab Results: I have personally reviewed pertinent results        Results from last 7 days   Lab Units 05/11/23  0920 05/10/23  1617   WBC Thousand/uL 18 52*  --    HEMOGLOBIN g/dL 14 1  --    HEMATOCRIT % 43 1  --    PLATELETS Thousands/uL 293 259     Results from last 7 days   Lab Units 05/11/23  0557 05/10/23  1000   POTASSIUM " mmol/L 4 1 4 1   CHLORIDE mmol/L 104 107   CO2 mmol/L 25 26   BUN mg/dL 23 26*   CREATININE mg/dL 1 07 1 03   CALCIUM mg/dL 10 1 10 4*             Results from last 7 days   Lab Units 05/11/23  0759 05/10/23  1000   INR  1 11 0 99   PTT seconds 28  --      No results found for: TROPONINT  ABG:No results found for: PHART, TEE8RUB, PO2ART, BPJ3GYX, T0WYAUPY, BEART, SOURCE    Imaging Studies: I have personally reviewed pertinent reports  and I have personally reviewed pertinent films in PACS     XR spine cervical 2 or 3 vw injury    Result Date: 5/11/2023  Narrative: C-ARM -cervical spine INDICATION: G95 20: Unspecified cord compression  Procedure guidance  COMPARISON: MRI cervical spine 2/22/2023 TECHNIQUE: FLUOROSCOPY TIME:   122 2 SEC 8 FLUOROSCOPIC IMAGES FINDINGS: Fluoroscopic guidance provided for intraoperative localization and procedure guidance  Lateral cervical fluoroscopic  view (image 1) demonstrates a horizontal alexandro like density projecting along the posterior elements at C4  Intraoperative localization provided by my colleague Dr Shaylee Solano  Subsequent images demonstrate posterior alexandro and screw fusion C4-T1  Osseous and soft tissue detail limited by technique  Impression: Fluoroscopic guidance provided for procedure guidance as above  Please refer to the separate procedure notes for additional details  Workstation performed: [id number]     EKG, Pathology, and Other Studies: I have personally reviewed pertinent reports        VTE Pharmacologic Prophylaxis: Heparin    VTE Mechanical Prophylaxis: sequential compression device

## 2023-05-12 NOTE — ASSESSMENT & PLAN NOTE
POD 2 Posterior cervical decompressive laminectomy and instrumented fusion C4-T1 (DKO, 5/10)    Imaging:  · XR cervical spine, 5/11/23: Final read pending  Per my interpretation, stable appearance of C4-T1 posterior fusion  Plan:  · Continue to monitor neurological exam  · HILLARY drain to FS, 180cc p24h  · Will maintain at this time  · Upright imaging obtained and personally reviewed  · Post op labs reviewed  · WBC 18 52, possibly due to intraoperative decadron  Will repeat CBC today to trend  · No collar indicated  · Pain control as ordered, APS following given intraoperative methadone use  · Today is last day of methadone  · Will add lidocaine patches  · Home medications ordered  · SSI ordered for diabetes management   · Bowel regimen with colace, senna, dulcolax PRN  · PT OT evaluation, acute rehab recommended  · DVT ppx: SCDs, SQH    Rounds completed with Jennifer Oleary, RN    Neurosurgery will continue to follow as primary team   Please call questions or concerns

## 2023-05-12 NOTE — [facility] ADMISSION
Patients case reviewed with [facility] MD and patient is approved for [facility] admission pending medical stability and clearance  If drain is to stay in place while at [facility] patient will need to admit to B  If no drain in place, patient will be able to admit to [address] [place] [address]  Please have PT/OT continue to work with patient as able  CM updated regarding same

## 2023-05-12 NOTE — PLAN OF CARE
Problem: Prexisting or High Potential for Compromised Skin Integrity  Goal: Skin integrity is maintained or improved  Description: INTERVENTIONS:  - Identify patients at risk for skin breakdown  - Assess and monitor skin integrity  - Assess and monitor nutrition and hydration status  - Monitor labs   - Assess for incontinence   - Turn and reposition patient  - Assist with mobility/ambulation  - Relieve pressure over bony prominences  - Avoid friction and shearing  - Provide appropriate hygiene as needed including keeping skin clean and dry  - Evaluate need for skin moisturizer/barrier cream  - Collaborate with interdisciplinary team   - Patient/family teaching  - Consider wound care consult   Outcome: Progressing     Problem: MOBILITY - ADULT  Goal: Maintain or return to baseline ADL function  Description: INTERVENTIONS:  -  Assess patient's ability to carry out ADLs; assess patient's baseline for ADL function and identify physical deficits which impact ability to perform ADLs (bathing, care of mouth/teeth, toileting, grooming, dressing, etc )  - Assess/evaluate cause of self-care deficits   - Assess range of motion  - Assess patient's mobility; develop plan if impaired  - Assess patient's need for assistive devices and provide as appropriate  - Encourage maximum independence but intervene and supervise when necessary  - Involve family in performance of ADLs  - Assess for home care needs following discharge   - Consider OT consult to assist with ADL evaluation and planning for discharge  - Provide patient education as appropriate  Outcome: Progressing  Goal: Maintains/Returns to pre admission functional level  Description: INTERVENTIONS:  - Perform BMAT or MOVE assessment daily    - Set and communicate daily mobility goal to care team and patient/family/caregiver  - Collaborate with rehabilitation services on mobility goals if consulted  - Perform Range of Motion times a day    - Reposition patient every hours   - Dangle patient  times a day  - Stand patient  times a day  - Ambulate patient  times a day  - Out of bed to chair times a day   - Out of bed for meals times a day  - Out of bed for toileting  - Record patient progress and toleration of activity level   Outcome: Progressing     Problem: PAIN - ADULT  Goal: Verbalizes/displays adequate comfort level or baseline comfort level  Description: Interventions:  - Encourage patient to monitor pain and request assistance  - Assess pain using appropriate pain scale  - Administer analgesics based on type and severity of pain and evaluate response  - Implement non-pharmacological measures as appropriate and evaluate response  - Consider cultural and social influences on pain and pain management  - Notify physician/advanced practitioner if interventions unsuccessful or patient reports new pain  Outcome: Progressing     Problem: INFECTION - ADULT  Goal: Absence or prevention of progression during hospitalization  Description: INTERVENTIONS:  - Assess and monitor for signs and symptoms of infection  - Monitor lab/diagnostic results  - Monitor all insertion sites, i e  indwelling lines, tubes, and drains  - Monitor endotracheal if appropriate and nasal secretions for changes in amount and color  - [place] appropriate cooling/warming therapies per order  - Administer medications as ordered  - Instruct and encourage patient and family to use good hand hygiene technique  - Identify and instruct in appropriate isolation precautions for identified infection/condition  Outcome: Progressing  Goal: Absence of fever/infection during neutropenic period  Description: INTERVENTIONS:  - Monitor WBC    Outcome: Progressing     Problem: SAFETY ADULT  Goal: Maintain or return to baseline ADL function  Description: INTERVENTIONS:  -  Assess patient's ability to carry out ADLs; assess patient's baseline for ADL function and identify physical deficits which impact ability to perform ADLs (bathing, care of mouth/teeth, toileting, grooming, dressing, etc )  - Assess/evaluate cause of self-care deficits   - Assess range of motion  - Assess patient's mobility; develop plan if impaired  - Assess patient's need for assistive devices and provide as appropriate  - Encourage maximum independence but intervene and supervise when necessary  - Involve family in performance of ADLs  - Assess for home care needs following discharge   - Consider OT consult to assist with ADL evaluation and planning for discharge  - Provide patient education as appropriate  Outcome: Progressing  Goal: Maintains/Returns to pre admission functional level  Description: INTERVENTIONS:  - Perform BMAT or MOVE assessment daily    - Set and communicate daily mobility goal to care team and patient/family/caregiver  - Collaborate with rehabilitation services on mobility goals if consulted  - Perform Range of Motion times a day  - Reposition patient every  hours    - Dangle patient  times a day  - Stand patient times a day  - Ambulate patient  times a day  - Out of bed to chair times a day   - Out of bed for meals  times a day  - Out of bed for toileting  - Record patient progress and toleration of activity level   Outcome: Progressing  Goal: Patient will remain free of falls  Description: INTERVENTIONS:  - Educate patient/family on patient safety including physical limitations  - Instruct patient to call for assistance with activity   - Consult OT/PT to assist with strengthening/mobility   - Keep Call bell within reach  - Keep bed low and locked with side rails adjusted as appropriate  - Keep care items and personal belongings within reach  - Initiate and maintain comfort rounds  - Make Fall Risk Sign visible to staff  - Offer Toileting every  Hours, in advance of need  - Initiate/Maintain alarm  - Obtain necessary fall risk management equipment:   - Apply yellow socks and bracelet for high fall risk patients  - Consider moving patient to room near nurses station  Outcome: Progressing     Problem: DISCHARGE PLANNING  Goal: Discharge to home or other facility with appropriate resources  Description: INTERVENTIONS:  - Identify barriers to discharge w/patient and caregiver  - Arrange for needed discharge resources and transportation as appropriate  - Identify discharge learning needs (meds, wound care, etc )  - Arrange for interpretive services to assist at discharge as needed  - Refer to Case Management Department for coordinating discharge planning if the patient needs post-hospital services based on physician/advanced practitioner order or complex needs related to functional status, cognitive ability, or social support system  Outcome: Progressing     Problem: Knowledge Deficit  Goal: Patient/family/caregiver demonstrates understanding of disease process, treatment plan, medications, and discharge instructions  Description: Complete learning assessment and assess knowledge base    Interventions:  - Provide teaching at level of understanding  - Provide teaching via preferred learning methods  Outcome: Progressing

## 2023-05-12 NOTE — ASSESSMENT & PLAN NOTE
Lab Results   Component Value Date    HGBA1C 6 0 (H) 05/02/2023       Recent Labs     05/11/23  1118 05/11/23  1551 05/11/23  2030 05/12/23  0548   POCGLU 138 192* 174* 145*       Blood Sugar Average: Last 72 hrs:  (P) 161     SSI ordered

## 2023-05-12 NOTE — CASE MANAGEMENT
Case Management Discharge Planning Note    Patient name [facility]  Location [address] [phone]/-[id number] MRN [id number]  : 1955 Date 2023       Current Admission Date: 5/10/2023  Current Admission Diagnosis:Cervical spinal cord compression [facility])   Patient Active Problem List    Diagnosis Date Noted   • Preoperative clearance 2023   • Spinal cord injury at C5-C7 level without injury of spinal bone ([address] ) 2023   • Neurogenic claudication 2023   • Cervical spinal cord compression ([address] ) 2023   • Spinal stenosis of lumbar region at multiple levels 2022   • Mixed hyperlipidemia 2022   • Tobacco dependence syndrome 2022   • Primary hypertension 2022   • Type 2 diabetes mellitus without complication, without long-term current use of insulin ([facility] [address] ) 2022   • Chronic bilateral low back pain without sciatica 2022   • Carpal tunnel syndrome of right wrist 2021   • Atrophy of muscle of right hand 2021   • Obesity, morbid ([address] ) 2021   • Cubital tunnel syndrome on right    • Carpal tunnel syndrome, left       LOS (days): 2  Geometric Mean LOS (GMLOS) (days): 4 90  Days to GMLOS:2 9     OBJECTIVE:  Risk of Unplanned Readmission Score: 7 78         Current admission status: Inpatient   Preferred Pharmacy:   [facility] [address] [place] [address]  Phone: [phone] [facility] [address] [facility] [address] [facility] [address] [facility] 94 [facility] 38 [facility]  Phone: [phone] Fax: [phone]    Primary Care Provider: Allen Lockhart MD    Primary Insurance: MEDICARE  Secondary Insurance: BLUE CROSS    DISCHARGE DETAILS:    Discharge planning discussed with[de-identified] Patient at bedside  Freedom of Choice: Yes  Comments - Freedom of Choice: CM provided list of accepting acute rehab facilities at bedside, per his request ([facility] vs  [facility]) -- Pt prefers [facility]  [address] made aware  Other Referral/Resources/Interventions Provided:  Interventions: Acute Rehab  Referral Comments: Pt will likely be stable for discharge tomorrow, per neurosurgery team  Pt may admit with HILLARY drain, per admissions representative Randy Sandhu 83  Pt will admit to [facility] once bed is available           Treatment Team Recommendation: Acute Rehab  Discharge Destination Plan[de-identified] Acute Rehab

## 2023-05-12 NOTE — PROGRESS NOTES
Progress Note - Acute Pain Service    Chalo Shepherd 69 y o  male MRN: [id number]  Unit/Bed#: -01 Encounter: [id number]      Assessment:   Deisy Sanchez a 69 y  o  male with a past medical history significant for morbid obesity, hypertension, insulin-dependent type 2 diabetes mellitus with most recent hemoglobin A1c of 6 0, hyperlipidemia, chronic pain in the setting of lumbar spinal stenosis, C5-C7 spinal cord injury with cord compression and neurogenic claudication in the form of increased difficulty with ambulation who presents for surgical repair of cervical spinal injury   Patient underwent posterior cervical decompressive laminectomy and instrumented fusion of C4-T1 on 5/10/2023   Patient received intraoperative methadone for postoperative analgesic assistance   Acute pain service has been consulted for postoperative pain management in the setting of intraoperative methadone  Most recent QTc 429 ms from 5/2/23      On evaluation, patient is awake in bed answering questions appropriately   He appears comfortable though he does refer achy neck pain which he attributes to not be able to find a comfortable position  He has been working with physical therapy   He does not refer any oversedation or trouble breathing      Plan:   Continue acetaminophen 975 mg p o  every 8 hours scheduled  Continue oxycodone 2 5 mg p o  every 4 hours as needed for moderate pain  Continue oxycodone 5 mg p o  every 4 hours as needed for severe pain  Discontinue intravenous hydromorphone  We will continue methadone taper as follows:              5/11/2023 - 5 mg p o  twice daily              5/12/2023 - 2 5 mg p o  twice daily              5/13/2023 - off  Continue methocarbamol to 250 mg p o  every 6 hours scheduled  Start lidocaine patch x2 to the upper back/neck avoiding incision  Bowel regimen per primary team to avoid opioid-induced constipation  Continue naloxone as needed for opioid reversal/respiratory depression    APS will continue to follow  Please contact Acute Pain Service - [facility] via DJZ from [phone] with additional questions or concerns  See Julianexkarli or Taras for additional contacts and after hours information  Pain History  Current pain location(s): post neck  Pain Scale:   3-5  Quality: achy, sore  24 hour history: Patient continues with methadone taper  Patient has remained hemodynamically stable and afebrile  Patient is not requiring supplemental oxygen  No labs or imaging from today  Opioid requirement previous 24 hours:   Methadone 7 5 mg p o  Oxycodone 5 mg p o  Meds/Allergies   all current active meds have been reviewed    No Known Allergies    Objective     Temp:  [97 4 °F (36 3 °C)-98 6 °F (37 °C)] 98 3 °F (36 8 °C)  HR:  [69-99] 98  Resp:  [17-18] 18  BP: (101-137)/(52-94) 102/53    Physical Exam  Vitals and nursing note reviewed  Constitutional:       General: He is not in acute distress  Appearance: Normal appearance  He is not ill-appearing or toxic-appearing  HENT:      Head: Normocephalic and atraumatic  Eyes:      General: No scleral icterus  Conjunctiva/sclera: Conjunctivae normal    Cardiovascular:      Rate and Rhythm: Normal rate  Pulmonary:      Effort: Pulmonary effort is normal  No respiratory distress  Musculoskeletal:      Cervical back: Tenderness present  Skin:     General: Skin is warm and dry  Neurological:      Mental Status: He is alert  Comments: Awake, alert and oriented to person place time situation  POSS 1   Psychiatric:         Thought Content:  Thought content normal          Lab Results:   Results from last 7 days   Lab Units 05/11/23  0920   WBC Thousand/uL 18 52*   HEMOGLOBIN g/dL 14 1   HEMATOCRIT % 43 1   PLATELETS Thousands/uL 293      Results from last 7 days   Lab Units 05/11/23  0557   POTASSIUM mmol/L 4 1   CHLORIDE mmol/L 104   CO2 mmol/L 25   BUN mg/dL 23   CREATININE mg/dL 1 07   CALCIUM mg/dL 10 1       Imaging Studies: I have personally reviewed pertinent reports  EKG, Pathology, and Other Studies: I have personally reviewed pertinent reports  Please note that the APS provides consultative services regarding pain management only  With the exception of ketamine and epidural infusions and except when indicated, final decisions regarding starting or changing doses of analgesic medications are at the discretion of the consulting service  Off hours consultation and/or medication management is generally not available      Paulette Roberts MD  Acute Pain Service

## 2023-05-13 LAB
FLUAV RNA RESP QL NAA+PROBE: NEGATIVE
FLUBV RNA RESP QL NAA+PROBE: NEGATIVE
GLUCOSE SERPL-MCNC: 150 MG/DL (ref 65–140)
GLUCOSE SERPL-MCNC: 178 MG/DL (ref 65–140)
GLUCOSE SERPL-MCNC: 195 MG/DL (ref 65–140)
GLUCOSE SERPL-MCNC: 198 MG/DL (ref 65–140)
RSV RNA RESP QL NAA+PROBE: NEGATIVE
SARS-COV-2 RNA RESP QL NAA+PROBE: NEGATIVE

## 2023-05-13 RX ORDER — POLYETHYLENE GLYCOL 3350 17 G/17G
17 POWDER, FOR SOLUTION ORAL DAILY
Status: DISCONTINUED | OUTPATIENT
Start: 2023-05-13 | End: 2023-05-14 | Stop reason: HOSPADM

## 2023-05-13 RX ADMIN — LISINOPRIL 20 MG: 20 TABLET ORAL at 08:20

## 2023-05-13 RX ADMIN — ACETAMINOPHEN 975 MG: 325 TABLET ORAL at 05:06

## 2023-05-13 RX ADMIN — METHOCARBAMOL 250 MG: 500 TABLET ORAL at 05:06

## 2023-05-13 RX ADMIN — DOCUSATE SODIUM 100 MG: 100 CAPSULE, LIQUID FILLED ORAL at 17:22

## 2023-05-13 RX ADMIN — ACETAMINOPHEN 975 MG: 325 TABLET ORAL at 21:33

## 2023-05-13 RX ADMIN — HEPARIN SODIUM 5000 UNITS: 5000 INJECTION INTRAVENOUS; SUBCUTANEOUS at 14:03

## 2023-05-13 RX ADMIN — METHOCARBAMOL 250 MG: 500 TABLET ORAL at 11:21

## 2023-05-13 RX ADMIN — INSULIN LISPRO 2 UNITS: 100 INJECTION, SOLUTION INTRAVENOUS; SUBCUTANEOUS at 21:34

## 2023-05-13 RX ADMIN — ACETAMINOPHEN 975 MG: 325 TABLET ORAL at 14:03

## 2023-05-13 RX ADMIN — HEPARIN SODIUM 5000 UNITS: 5000 INJECTION INTRAVENOUS; SUBCUTANEOUS at 05:06

## 2023-05-13 RX ADMIN — POLYETHYLENE GLYCOL 3350 17 G: 17 POWDER, FOR SOLUTION ORAL at 14:59

## 2023-05-13 RX ADMIN — DOCUSATE SODIUM 100 MG: 100 CAPSULE, LIQUID FILLED ORAL at 08:20

## 2023-05-13 RX ADMIN — HEPARIN SODIUM 5000 UNITS: 5000 INJECTION INTRAVENOUS; SUBCUTANEOUS at 21:35

## 2023-05-13 RX ADMIN — CALCIUM CARBONATE (ANTACID) CHEW TAB 500 MG 750 MG: 500 CHEW TAB at 21:45

## 2023-05-13 RX ADMIN — SENNOSIDES 8.6 MG: 8.6 TABLET, FILM COATED ORAL at 08:20

## 2023-05-13 RX ADMIN — METHOCARBAMOL 250 MG: 500 TABLET ORAL at 21:34

## 2023-05-13 RX ADMIN — METHOCARBAMOL 250 MG: 500 TABLET ORAL at 17:22

## 2023-05-13 RX ADMIN — AMLODIPINE BESYLATE 5 MG: 5 TABLET ORAL at 08:20

## 2023-05-13 RX ADMIN — INSULIN LISPRO 2 UNITS: 100 INJECTION, SOLUTION INTRAVENOUS; SUBCUTANEOUS at 17:23

## 2023-05-13 RX ADMIN — BISACODYL 10 MG: 10 SUPPOSITORY RECTAL at 14:03

## 2023-05-13 RX ADMIN — INSULIN LISPRO 1 UNITS: 100 INJECTION, SOLUTION INTRAVENOUS; SUBCUTANEOUS at 11:21

## 2023-05-13 NOTE — CASE MANAGEMENT
Case Management Assessment & Discharge Planning Note    Patient name Zenaida Bush  Location [address] 463/-[id number] MRN [id number]  : 1955 Date 2023       Current Admission Date: 5/10/2023  Current Admission Diagnosis:Cervical spinal cord compression [facility])   Patient Active Problem List    Diagnosis Date Noted   • Preoperative clearance 2023   • Spinal cord injury at C5-C7 level without injury of spinal bone ([address] ) 2023   • Neurogenic claudication 2023   • Cervical spinal cord compression ([address] ) 2023   • Spinal stenosis of lumbar region at multiple levels 2022   • Mixed hyperlipidemia 2022   • Tobacco dependence syndrome 2022   • Primary hypertension 2022   • Type 2 diabetes mellitus without complication, without long-term current use of insulin ([address] ) 2022   • Chronic bilateral low back pain without sciatica 2022   • Carpal tunnel syndrome of right wrist 2021   • Atrophy of muscle of right hand 2021   • Obesity, morbid ([address] ) 2021   • Cubital tunnel syndrome on right    • Carpal tunnel syndrome, left       LOS (days): 3  Geometric Mean LOS (GMLOS) (days): 4 90  Days to GMLOS:2     OBJECTIVE:    Risk of Unplanned Readmission Score: 7 8         Current admission status: Inpatient       [facility] [address] [place] [address]  Phone: [phone] [facility] [address] [facility] [address] [facility] 38 210 [facility]  Phone: [phone] Fax: [phone]    Primary Care Provider: Swetha Rizvi MD    Primary Insurance: MEDICARE  Secondary Insurance: BLUE CROSS    ASSESSMENT:  [facility], [address] [place] [address] Representative - Brother   Primary Phone: [phone] (Mobile)                                                                DISCHARGE DETAILS:    Discharge planning discussed with[de-identified] Cm rec'd TT from medical team, per medical team, pt is medically cleared for discharged pending removal drain and placement  ARC following pt pending medical clearance and bed availability  Cm contact ARC liaison via TT made aware of medical clearance and enquired on bed availbility  ARC reviewing pending determination  Cm will continue to follow

## 2023-05-13 NOTE — PHYSICAL THERAPY NOTE
Physical Therapy Progress Note     05/13/23 0920   PT Last Visit   PT Visit Date 05/13/23   Note Type   Note Type Treatment   Pain Assessment   Pain Assessment Tool FLACC   Pain Rating: FLACC (Rest) - Face 1   Pain Rating: FLACC (Rest) - Legs 0   Pain Rating: FLACC (Rest) - Activity 0   Pain Rating: FLACC (Rest) - Cry 1   Pain Rating: FLACC (Rest) - Consolability 0   Score: FLACC (Rest) 2   Pain Rating: FLACC (Activity) - Face 1   Pain Rating: FLACC (Activity) - Legs 1   Pain Rating: FLACC (Activity) - Activity 1   Pain Rating: FLACC (Activity) - Cry 1   Pain Rating: FLACC (Activity) - Consolability 0   Score: FLACC (Activity) 4   Restrictions/Precautions   Other Precautions Pain; Fall Risk;Spinal precautions;Multiple lines  (HILLARY drain to gravity?)   Subjective   Subjective Pt encountered seated in recliner, pleasant and agreeable to treatment  Reports controlled pain & RN reports he has declined opioid pain meds this AM   Pt reports ongoing LE weakness, but premorbid pain has resided since procedure  When asked if family can assist, he reports he has a very supportive brother who may offer to stay with him if necessary at d/c  Transfers   Sit to Stand 5  Supervision   Additional items Assist x 1; Armrests; Increased time required;Verbal cues   Stand to Sit 5  Supervision   Additional items Assist x 1; Armrests; Increased time required;Verbal cues   Ambulation/Elevation   Gait pattern Short stride; Foward flexed; Inconsistent preeti; Shuffling;Decreased foot clearance;Decreased R stance;Narrow MICHAEL; Improper Weight shift   Gait Assistance 4  Minimal assist   Additional items Assist x 1   Assistive Device Rolling walker   Distance 55' x 2   Balance   Static Sitting Fair -   Static Standing Poor +   Ambulatory Poor +   Activity Tolerance   Activity Tolerance Patient tolerated treatment well;Patient limited by fatigue;Patient limited by pain   Nurse Made Aware Jeryl Councilman, RN   Assessment   Prognosis Good   Problem List Decreased strength;Decreased range of motion;Decreased endurance; Impaired balance;Decreased mobility;Pain;Orthopedic restrictions   Assessment Pt continues to perform all transfers & ambulatory tasks with Ax1 throughout, which is similar to eval   He performed transfers with improved technique after instructions for hand placement  He demosntrates gait deviations as noted above, with worsening RLE foot clearance & foot drag as he fatigues, eventually requiring seated rest after 1st gait trial & 1 minor  LOB while turning towards R when returning to room  Pt also demosntrates kyphotic posture throughout gait, which pt can correct with sitting lumbar, toracic & cerical extension activities in sitting & standing  pt encouraged to maintain hip extension in standing to facilitate improved posture along with closer RW support, which did improve posture slightly during return to room  Although pt is making progress in terms of mobilty since surgery, he will continue to benefit from aggresive rehab intervention at d/c to address functional deficits & progress to PLOF  Goals   Patient Goals to keep getting better   STG Expiration Date 05/21/23   PT Treatment Day 1   Plan   Treatment/Interventions Functional transfer training;LE strengthening/ROM; Elevations; Therapeutic exercise; Endurance training;Patient/family training;Equipment eval/education; Bed mobility;Gait training   Progress Progressing toward goals   PT Frequency 3-5x/wk   Recommendation   PT Discharge Recommendation Post acute rehabilitation services   Equipment Recommended [facility] Recommended Wheeled walker   AM-PAC Basic Mobility Inpatient   Turning in Flat Bed Without Bedrails 3   Lying on Back to Sitting on Edge of Flat Bed Without Bedrails 3   Moving Bed to Chair 3   Standing Up From Chair Using Arms 3   Walk in Room 3   Climb 3-5 Stairs With Railing 2   Basic Mobility Inpatient Raw Score 17   Basic Mobility Standardized Score 39 67   Highest Level Of Mobility   JH-HLM Goal 5: Stand one or more mins   JH-HLM Achieved 7: Walk 25 feet or more       Sha Mccarthy PTA    An [facility] Basic Mobility Raw Score less than 17 suggests pt would benefit from post acute rehab  Please also refer to the recommendation of the Physical Therapist for safe discharge planning

## 2023-05-13 NOTE — PHYSICAL THERAPY NOTE
Physical Therapy Progress Note     05/13/23 0920   PT Last Visit   PT Visit Date 05/13/23   Note Type   Note Type Treatment   Pain Assessment   Pain Assessment Tool FLACC   Pain Rating: FLACC (Rest) - Face 1   Pain Rating: FLACC (Rest) - Legs 0   Pain Rating: FLACC (Rest) - Activity 0   Pain Rating: FLACC (Rest) - Cry 1   Pain Rating: FLACC (Rest) - Consolability 0   Score: FLACC (Rest) 2   Pain Rating: FLACC (Activity) - Face 1   Pain Rating: FLACC (Activity) - Legs 1   Pain Rating: FLACC (Activity) - Activity 1   Pain Rating: FLACC (Activity) - Cry 1   Pain Rating: FLACC (Activity) - Consolability 0   Score: FLACC (Activity) 4   Restrictions/Precautions   Other Precautions Pain; Fall Risk;Spinal precautions;Multiple lines  (HILLARY drain to gravity?)   Subjective   Subjective Pt encountered seated in recliner, pleasant and agreeable to treatment  Reports controlled pain & RN reports he has declined opioid pain meds this AM   Pt reports ongoing LE weakness, but premorbid pain has resided since procedure  When asked if family can assist, he reports he has a very supportive brother who may offer to stay with him if necessary at d/c  Transfers   Sit to Stand 5  Supervision   Additional items Assist x 1; Armrests; Increased time required;Verbal cues   Stand to Sit 5  Supervision   Additional items Assist x 1; Armrests; Increased time required;Verbal cues   Ambulation/Elevation   Gait pattern Short stride; Foward flexed; Inconsistent preeti; Shuffling;Decreased foot clearance;Decreased R stance;Narrow MICHAEL; Improper Weight shift   Gait Assistance 4  Minimal assist   Additional items Assist x 1   Assistive Device Rolling walker   Distance 55' x 2   Balance   Static Sitting Fair -   Static Standing Poor +   Ambulatory Poor +   Activity Tolerance   Activity Tolerance Patient tolerated treatment well;Patient limited by fatigue;Patient limited by pain   Nurse [address], RN   Exercises   Glute Sets Standing;10 reps;AROM; Bilateral  (2-3 second holds)   UE Exercise Sitting;15 reps;AROM; Bilateral  (shoulder retraction/rows)   Assessment   Prognosis Good   Problem List Decreased strength;Decreased range of motion;Decreased endurance; Impaired balance;Decreased mobility;Pain;Orthopedic restrictions   Assessment Pt continues to perform all transfers & ambulatory tasks with Ax1 throughout, which is similar to eval   He performed transfers with improved technique after instructions for hand placement  He demosntrates gait deviations as noted above, with worsening RLE foot clearance & foot drag as he fatigues, eventually requiring seated rest after 1st gait trial & 1 minor  LOB while turning towards R when returning to room  Pt also demosntrates kyphotic posture throughout gait, which pt can correct with sitting lumbar, toracic & cerical extension activities in sitting & standing  pt encouraged to maintain hip extension in standing to facilitate improved posture along with closer RW support, which did improve posture slightly during return to room  Although pt is making progress in terms of mobilty since surgery, he will continue to benefit from aggresive rehab intervention at d/c to address functional deficits & progress to PLOF  Goals   Patient Goals to keep getting better   STG Expiration Date 05/21/23   PT Treatment Day 1   Plan   Treatment/Interventions Functional transfer training;LE strengthening/ROM; Elevations; Therapeutic exercise; Endurance training;Patient/family training;Equipment eval/education; Bed mobility;Gait training   Progress Progressing toward goals   PT Frequency 3-5x/wk   Recommendation   PT Discharge Recommendation Post acute rehabilitation services   Equipment Recommended [facility] Recommended Wheeled walker   AM-PAC Basic Mobility Inpatient   Turning in Flat Bed Without Bedrails 3   Lying on Back to Sitting on Edge of Flat Bed Without Bedrails 3   Moving Bed to Chair 3   Standing Up From Chair Using Arms 3   Walk in Room 3   Climb 3-5 Stairs With Railing 2   Basic Mobility Inpatient Raw Score 17   Basic Mobility Standardized Score 39 67   Highest Level Of Mobility   JH-HLM Goal 5: Stand one or more mins   JH-HLM Achieved 7: Walk 25 feet or more       Katia Magaña PTA    An [facility] Basic Mobility Raw Score less than 17 suggests pt would benefit from post acute rehab  Please also refer to the recommendation of the Physical Therapist for safe discharge planning

## 2023-05-13 NOTE — CASE MANAGEMENT
Case Management Assessment & Discharge Planning Note    Patient name Macy Denny  Location [address] 463/-56 MRN [id number]  : 1955 Date 2023       Current Admission Date: 5/10/2023  Current Admission Diagnosis:Cervical spinal cord compression [facility])   Patient Active Problem List    Diagnosis Date Noted   • Preoperative clearance 2023   • Spinal cord injury at C5-C7 level without injury of spinal bone ([address] ) 2023   • Neurogenic claudication 2023   • Cervical spinal cord compression ([address] ) 2023   • Spinal stenosis of lumbar region at multiple levels 2022   • Mixed hyperlipidemia 2022   • Tobacco dependence syndrome 2022   • Primary hypertension 2022   • Type 2 diabetes mellitus without complication, without long-term current use of insulin ([facility] ) 2022   • Chronic bilateral low back pain without sciatica 2022   • Carpal tunnel syndrome of right wrist 2021   • Atrophy of muscle of right hand 2021   • Obesity, morbid ([address] ) 2021   • Cubital tunnel syndrome on right    • Carpal tunnel syndrome, left       LOS (days): 3  Geometric Mean LOS (GMLOS) (days): 4 90  Days to GMLOS:2     OBJECTIVE:    Risk of Unplanned Readmission Score: 7 8         Current admission status: Inpatient       Preferred Pharmacy:   [facility] [address] [place] [address]  Phone: [phone] Fax: [phone] [facility] 308 [facility] [address] [facility] 94 [facility] 38 210 [facility]  Phone: [phone] Fax: [phone]    Primary Care Provider: Ole Minor MD    Primary Insurance: MEDICARE  Secondary Insurance: BLUE CROSS    ASSESSMENT:  [facility], [address] [place] [address] Representative - Brother   Primary Phone: [phone] (Mobile)                                                                DISCHARGE DETAILS:    Discharge planning discussed with[de-identified] Cm [facility]'d [facility] and [facility] pt today pending covid test  Assigned bed  room 451 pending time of transfer  Cm spoke to pt updated on accepting facility and assigned bed                                                                                     IMM Given (Date):: 05/13/23  IMM Given to[de-identified] Patient          Accepting Facility Name, [facility] : [facility]

## 2023-05-13 NOTE — ASSESSMENT & PLAN NOTE
Lab Results   Component Value Date    HGBA1C 6 0 (H) 05/02/2023       Recent Labs     05/12/23  0548 05/12/23  1042 05/12/23  1607 05/12/23 2057   POCGLU 145* 216* 149* 155*       Blood Sugar Average: Last 72 hrs:  (P) 151 [id number]     SSI ordered  - will continue home regimen upon discharge   - encourage continued outpatient follow-up with PCP

## 2023-05-13 NOTE — PROGRESS NOTES
[address] [facility]  Progress Note  Name: Kris Sanabria  MRN: [id number]  Unit/Bed#: -41 I Date of Admission: 5/10/2023   Date of Service: 5/13/2023 I Hospital Day: 3    Assessment/Plan   * Cervical spinal cord compression [facility])  Assessment & Plan  POD#3 - s/p Posterior cervical decompressive laminectomy and instrumented fusion C4-T1 (DKO, 5/10)    Imaging:  · XR cervical spine, 5/11/23: pending final radiology read  Per my interpretation, stable appearance of C4-T1 posterior fusion  Plan:  · Continue to monitor neurological exam  · Continue frequent neuro checks   · Maintain normotensive BP goals, MAP > 65   · No further neurosurgical intervention indicated at this time   · Case and imaging reviewed this am on rounds   · Pt doing well post-op, pain well controlled, he has ambulated with assistance in the halls   · 1 posterior HILLARY drain to FS  · ~100cc/24hr output noted, serosanguinous fluid   · Will place drain to gravity this am and reassess this afternoon to remove   · No collar indicated  · Labs/Vitals:   · Leukocytosis resolved, hgb stable   · VSS overnight, afebrile  · Pain control:  · APS was following --> pt received intra-op methadone   · Completed methadone taper   · Continue sched tylenol 975mg q 8hrs   · Continue sched robaxin 250mg q 6hrs   · Prn oxycodone 2 5/5mg q 4hrs prn mod-severe pain   · No longer requiring IV pain medications   · Bowel regimen: senna, colace, prn dulcolax   · Advance as needed   · DVT ppx: SCDs, SQH   · Continue medical management   · Home medications ordered  · SSI ordered for diabetes management   · PT OT evaluation --> acute rehab recommended  · Social work following for assistance with dispo once medically cleared   · Pending [facility] admission    Neurosurgery will continue to follow as primary team   Please call questions or concerns          Type 2 diabetes mellitus without complication, without long-term current use of insulin "([facility] )  Assessment & Plan  Lab Results   Component Value Date    HGBA1C 6 0 (H) 05/02/2023       Recent Labs     05/12/23  0548 05/12/23  1042 05/12/23  1607 05/12/23 2057   POCGLU 145* 216* 149* 155*       Blood Sugar Average: Last 72 hrs:  (P) 997 [id number]     SSI ordered  - will continue home regimen upon discharge   - encourage continued outpatient follow-up with PCP     Subjective/Objective   Chief Complaint: \" Good morning\"    Subjective: Patient seen and examined this a m  on rounds  No acute events overnight  This morning, the patient is well-appearing sitting up comfortably in bed  Offers no complaints at this time  When asked, he states he does have some posterior neck pain as well as some bilateral shoulder pain  Patient states his pain is worse with movements  Patient states that the pain medications do significantly help his pain  States he has peed on his own but has not yet had a bowel movement  We will advance his bowel regimen in order for this to occur  He states he is passing gas  Patient states he is eating and drinking well but not as much as he typically would at home  Encourage p o  intake at this time  Patient is understanding of the plan for discharge to rehab  Objective: Pleasant, middle-aged male sitting comfortably in bed  No acute distress  I/O       05/11 0701 05/12 0700 05/12 0701 05/13 0700    P  O  340 540    Total Intake(mL/kg) 340 (3 1) 540 (4 9)    Urine (mL/kg/hr) 500 (0 2) 100 (0)    Drains 180 50    Total Output 680 150    Net -340 +390          Unmeasured Urine Occurrence 2 x         Invasive Devices     Peripheral Intravenous Line  Duration           Peripheral IV 05/10/23 Left Arm 2 days    Peripheral IV 05/10/23 Right;Ventral (anterior) Forearm 2 days          Drain  Duration           Open Drain Left Groin 603 days    Closed/Suction Drain Right;Posterior Neck Bulb 2 days              Physical Exam:  Vitals: Blood pressure 103/64, pulse 91, " "temperature 97 6 °F (36 4 °C), resp  rate 21, height 5' 6\" (1 676 m), weight 111 kg (244 lb 14 9 oz), SpO2 94 %  ,Body mass index is 39 53 kg/m²  General appearance: alert, appears stated age, cooperative and no distress  Head: Normocephalic, without obvious abnormality, atraumatic  Eyes: EOMI, PERRL  Neck: supple, symmetrical, trachea midline and NT  Posterior incision is clean, dry, intact  1 HILLARY drain to full suction noted with serosanguineous output  Back: no kyphosis present, no tenderness to percussion or palpation  Lungs: non labored breathing, no respiratory distress on room air  Heart: regular heart rate  Neurologic:   Mental status: Alert, oriented x3, thought content appropriate  Cranial nerves: grossly intact (Cranial nerves II-XII)  Sensory: Mild decrease in sensation to bilateral feet/toes consistent with peripheral neuropathy  Mild decrease in sensation over the right lateral fingers which is chronic  Otherwise sensation is equal and intact bilaterally throughout  Motor:   -Mild RUE weakness, R  4/5, R tricep 4/5    Right hand mildly contracted at baseline   -Otherwise rest of right upper extremity with intact strength rated 5/5 throughout  -LUE with intact strength rated 5/5 throughout  -Bilateral LE with intact strength rated 5/5 throughout  Reflexes: Risk in the lower extremities, R > L    Lab Results:  Results from last 7 days   Lab Units 05/12/23  1102 05/11/23  0920 05/10/23  1617   WBC Thousand/uL 13 09* 18 52*  --    HEMOGLOBIN g/dL 13 7 14 1  --    HEMATOCRIT % 40 8 43 1  --    PLATELETS Thousands/uL 286 293 259   NEUTROS PCT % 81*  --   --    MONOS PCT % 12  --   --      Results from last 7 days   Lab Units 05/11/23  0557 05/10/23  1000   POTASSIUM mmol/L 4 1 4 1   CHLORIDE mmol/L 104 107   CO2 mmol/L 25 26   BUN mg/dL 23 26*   CREATININE mg/dL 1 07 1 03   CALCIUM mg/dL 10 1 10 4*             Results from last 7 days   Lab Units 05/11/23  0759 05/10/23  1000   INR  1 11 0 99   PTT " seconds 28  --      No results found for: TROPONINT  ABG:No results found for: PHART, ELP0HEW, PO2ART, XGP7NHK, V6UJMTJB, BEART, SOURCE    Imaging Studies: I have personally reviewed pertinent reports  and I have personally reviewed pertinent films in PACS    XR cervical spine 2 or 3 views    Result Date: 5/12/2023  Impression: Postoperative changes reflecting posterior instrumented fusion C4-T1  Workstation performed: [id number]     XR spine cervical 2 or 3 vw injury    Result Date: 5/11/2023  Impression: Fluoroscopic guidance provided for procedure guidance as above  Please refer to the separate procedure notes for additional details  Workstation performed: [id number]       EKG, Pathology, and Other Studies: I have personally reviewed pertinent reports        VTE Pharmacologic Prophylaxis: Sequential compression device (Venodyne)  and Heparin    VTE Mechanical Prophylaxis: sequential compression device

## 2023-05-13 NOTE — PLAN OF CARE
Problem: Prexisting or High Potential for Compromised Skin Integrity  Goal: Skin integrity is maintained or improved  Description: INTERVENTIONS:  - Identify patients at risk for skin breakdown  - Assess and monitor skin integrity  - Assess and monitor nutrition and hydration status  - Monitor labs   - Assess for incontinence   - Turn and reposition patient  - Assist with mobility/ambulation  - Relieve pressure over bony prominences  - Avoid friction and shearing  - Provide appropriate hygiene as needed including keeping skin clean and dry  - Evaluate need for skin moisturizer/barrier cream  - Collaborate with interdisciplinary team   - Patient/family teaching  - Consider wound care consult   Outcome: Progressing     Problem: MOBILITY - ADULT  Goal: Maintain or return to baseline ADL function  Description: INTERVENTIONS:  -  Assess patient's ability to carry out ADLs; assess patient's baseline for ADL function and identify physical deficits which impact ability to perform ADLs (bathing, care of mouth/teeth, toileting, grooming, dressing, etc )  - Assess/evaluate cause of self-care deficits   - Assess range of motion  - Assess patient's mobility; develop plan if impaired  - Assess patient's need for assistive devices and provide as appropriate  - Encourage maximum independence but intervene and supervise when necessary  - Involve family in performance of ADLs  - Assess for home care needs following discharge   - Consider OT consult to assist with ADL evaluation and planning for discharge  - Provide patient education as appropriate  Outcome: Progressing  Goal: Maintains/Returns to pre admission functional level  Description: INTERVENTIONS:  - Perform BMAT or MOVE assessment daily    - Set and communicate daily mobility goal to care team and patient/family/caregiver  - Collaborate with rehabilitation services on mobility goals if consulted  - Perform Range of Motion times a day    - Reposition patient every hours   - Dangle patient  times a day  - Stand patient times a day  - Ambulate patient  times a day  - Out of bed to chair  times a day   - Out of bed for meals times a day  - Out of bed for toileting  - Record patient progress and toleration of activity level   Outcome: Progressing     Problem: PAIN - ADULT  Goal: Verbalizes/displays adequate comfort level or baseline comfort level  Description: Interventions:  - Encourage patient to monitor pain and request assistance  - Assess pain using appropriate pain scale  - Administer analgesics based on type and severity of pain and evaluate response  - Implement non-pharmacological measures as appropriate and evaluate response  - Consider cultural and social influences on pain and pain management  - Notify physician/advanced practitioner if interventions unsuccessful or patient reports new pain  Outcome: Progressing     Problem: INFECTION - ADULT  Goal: Absence or prevention of progression during hospitalization  Description: INTERVENTIONS:  - Assess and monitor for signs and symptoms of infection  - Monitor lab/diagnostic results  - Monitor all insertion sites, i e  indwelling lines, tubes, and drains  - Monitor endotracheal if appropriate and nasal secretions for changes in amount and color  - Suches appropriate cooling/warming therapies per order  - Administer medications as ordered  - Instruct and encourage patient and family to use good hand hygiene technique  - Identify and instruct in appropriate isolation precautions for identified infection/condition  Outcome: Progressing  Goal: Absence of fever/infection during neutropenic period  Description: INTERVENTIONS:  - Monitor WBC    Outcome: Progressing     Problem: SAFETY ADULT  Goal: Maintain or return to baseline ADL function  Description: INTERVENTIONS:  -  Assess patient's ability to carry out ADLs; assess patient's baseline for ADL function and identify physical deficits which impact ability to perform ADLs (bathing, care of mouth/teeth, toileting, grooming, dressing, etc )  - Assess/evaluate cause of self-care deficits   - Assess range of motion  - Assess patient's mobility; develop plan if impaired  - Assess patient's need for assistive devices and provide as appropriate  - Encourage maximum independence but intervene and supervise when necessary  - Involve family in performance of ADLs  - Assess for home care needs following discharge   - Consider OT consult to assist with ADL evaluation and planning for discharge  - Provide patient education as appropriate  Outcome: Progressing  Goal: Maintains/Returns to pre admission functional level  Description: INTERVENTIONS:  - Perform BMAT or MOVE assessment daily    - Set and communicate daily mobility goal to care team and patient/family/caregiver  - Collaborate with rehabilitation services on mobility goals if consulted  - Perform Range of Motion  times a day  - Reposition patient every  hours    - Dangle patient  times a day  - Stand patient  times a day  - Ambulate patient  times a day  - Out of bed to chair  times a day   - Out of bed for meals times a day  - Out of bed for toileting  - Record patient progress and toleration of activity level   Outcome: Progressing  Goal: Patient will remain free of falls  Description: INTERVENTIONS:  - Educate patient/family on patient safety including physical limitations  - Instruct patient to call for assistance with activity   - Consult OT/PT to assist with strengthening/mobility   - Keep Call bell within reach  - Keep bed low and locked with side rails adjusted as appropriate  - Keep care items and personal belongings within reach  - Initiate and maintain comfort rounds  - Make Fall Risk Sign visible to staff  - Offer Toileting every  Hours, in advance of need  - Initiate/Maintain alarm  - Obtain necessary fall risk management equipment:   - Apply yellow socks and bracelet for high fall risk patients  - Consider moving patient to room near nurses station  Outcome: Progressing     Problem: DISCHARGE PLANNING  Goal: Discharge to home or other facility with appropriate resources  Description: INTERVENTIONS:  - Identify barriers to discharge w/patient and caregiver  - Arrange for needed discharge resources and transportation as appropriate  - Identify discharge learning needs (meds, wound care, etc )  - Arrange for interpretive services to assist at discharge as needed  - Refer to Case Management Department for coordinating discharge planning if the patient needs post-hospital services based on physician/advanced practitioner order or complex needs related to functional status, cognitive ability, or social support system  Outcome: Progressing     Problem: Knowledge Deficit  Goal: Patient/family/caregiver demonstrates understanding of disease process, treatment plan, medications, and discharge instructions  Description: Complete learning assessment and assess knowledge base    Interventions:  - Provide teaching at level of understanding  - Provide teaching via preferred learning methods  Outcome: Progressing

## 2023-05-13 NOTE — PROGRESS NOTES
PHYSICAL MEDICINE AND REHABILITATION   PREADMISSION ASSESSMENT     Projected [facility] Diagnoses:  Impairment of mobility, safety and Activities of Daily Living (ADLs) due to Spinal Cord Dysfunction:  Non-Traumatic:  04 130 Other Non-Traumatic cervical spinal cord compression, SCI at C5-C7 level, neurogenic claudication  Etiologic: cervical cord compression, SCI at C5-C7, neurogenic claudication s/p posterior cervical decompressive laminectomy and instrumented fusion C4-T1  Date of Onset: 5/10/23   Date of surgery: 5/10/23 posterior cervical decompressive laminectomy and instrumented fusion C4-T1    PATIENT INFORMATION  Name: Salazar Malik Phone #: [phone] (home)   Address: [address] [place] [address]  YOB: 1955 Age: 69 y o  SS#   Marital Status:    Ethnicity: Not  or  or Croatian   Employment Status: retired  Extended Emergency Contact Information  Primary Emergency Contact: [address] [facility],  [address]  Mobile Phone: [phone]  Relation: Brother  Secondary Emergency Contact: [facility]  Mobile Phone: [phone]  Relation: Sister In-Law  Advance Directive: Level 1 Full Code (no ACP docs)    INSURANCE/COVERAGE:     Primary Payor: MEDICARE / Plan: MEDICARE A AND B / Product Type: Medicare A & B Fee for Service /   Secondary PayerCleon Kocher #[id number]   Payer Contact:  Payer Contact:   Contact Phone:  Contact Phone:     Authorization #: n/a  Coverage Dates: n/a  LCD: n/a  MEDICARE #: [id number]  Medicare Days: 60/30/60  Medical Record #: [id number]    REFERRAL SOURCE:   Referring provider: Lavell Haider MD  Referring facility: [address] [place] [address]  Room: [facility]  PCP: Mariusz Miguel MD PCP phone number: [phone]    MEDICAL INFORMATION  HPI: This is a 69year old male with history of cervical spinal cord compression, spinal cord injury at C5-C7 level without injury of spinal bone and neurogenic claudication who is s/p posterior cervical decompressive laminectomy and instrumented fusion C4-T1 on 5/10/23  No collar indicated  Patient still has HILLARY drain which drained ~100 ml in 24 hours prior to admitting to [facility]  Acute pain management was consulted and patient was ordered a methadone taper, IV and PO pain medications and Lidoderm patches following surgery  Methadone and IV pain medications are now discontinued  Patient had an elevated WBC post-operatively, possible due to intra-op decadron  WBC down to 13 09 on 5/12  Patient worked with PT and OT and recommended for post acute rehab following his surgery  He has demonstrated that he can tolerate three hours of therapy, five days a week and is now medically stable for discharge to the [facility]  Past Medical History:   Past Surgical History: Allergies:     Past Medical History:   Diagnosis Date   • Diabetes mellitus ([address] )    • ED (erectile dysfunction)    • Hyperlipidemia    • Hypertension    • Obesity    • Spinal stenosis of lumbar region at multiple levels 12/22/2022    Past Surgical History:   Procedure Laterality Date   • CARPAL TUNNEL RELEASE Right    • EYE SURGERY      strabibusmus   • HAND SURGERY      left index finger   • ID ARTHRD PST/PSTLAT TQ 1NTRSPC CRV BELW C2 SEGMENT N/A 5/10/2023    Procedure: Posterior cervical decompressive laminectomy and instrumented fusion C4-T1;   Surgeon: Deisy Cox MD;  Location:  MAIN OR;  Service: Neurosurgery   • ID EXCISION SPERMATOCELE W/WO EPIDIDYMECTOMY Left 09/16/2021    Procedure: HYDROCELECTOMY, SCROTAL DEBRIDEMENT;  Surgeon: Ashley Rush MD;  Location: [address] [place] [address];  Service: Urology   • ID NEUROPLASTY &/TRANSPOS MEDIAN NRV CARPAL [place] Right 07/06/2021    Procedure: RELEASE CARPAL TUNNEL;  Surgeon: Marguerite Arboleda MD;  [facility] MA[facility] OR;  Service: Orthopedics   • ID NEUROPLASTY &/TRANSPOSITION ULNAR NERVE ELBOW Right 07/06/2021    Procedure: RELEASE CUBITAL TUNNEL; Surgeon: Mark Mancilla MD;  [facility];  Service: Orthopedics     No Known Allergies      Medical/functional conditions requiring inpatient rehabilitation:   Cervical cord compression, SCI at C5-C7, neurogenic claudication s/p posterior cervical decompressive laminectomy and instrumented fusion C4-T1    Post-op pain  Ambulatory dysfunction  Obesity  DM  HTN  Hyperlipidemia      Risk for medical/clinical complications: risk for falls, risk for uncontrolled pain, risk for infection, risk for complications with incision, risk for DVT/PE/CVA    Comorbidities/Surgeries in the last 100 days:   5/10/23- posterior cervical decompressive laminectomy and instrumented fusion C4-T1  Obesity  DM  HTN  Hyperlipidemia      CURRENT VITAL SIGNS:   Temp:  [97 6 °F (36 4 °C)-99 5 °F (37 5 °C)] 97 7 °F (36 5 °C)  HR:  [81-91] 81  Resp:  [21] 21  BP: (102-123)/(63-69) 123/69   Intake/Output Summary (Last 24 hours) at 5/13/2023 1226  Last data filed at 5/13/2023 0601  Gross per 24 hour   Intake --   Output 110 ml   Net -110 ml        LABORATORY RESULTS:      Lab Results   Component Value Date    HGB 13 7 05/12/2023    HCT 40 8 05/12/2023    WBC 13 09 (H) 05/12/2023     Lab Results   Component Value Date    BUN 23 05/11/2023    K 4 1 05/11/2023     05/11/2023    CREATININE 1 07 05/11/2023     Lab Results   Component Value Date    PROTIME 14 5 05/11/2023    INR 1 11 05/11/2023        DIAGNOSTIC STUDIES:  XR cervical spine 2 or 3 views    Result Date: 5/12/2023  Impression: Postoperative changes reflecting posterior instrumented fusion C4-T1  Workstation performed: [id number]     XR spine cervical 2 or 3 vw injury    Result Date: 5/11/2023  Impression: Fluoroscopic guidance provided for procedure guidance as above  Please refer to the separate procedure notes for additional details   Workstation performed: [id number]       PRECAUTIONS/SPECIAL NEEDS:  Blood Sugar Management: per MD orders, Pain Management, Dietary Restrictions: OZZY/CHO diet, Visually Impaired, Language Preference: English and fall precautions, spinal precautions    MEDICATIONS:     Current Facility-Administered Medications:   •  acetaminophen (TYLENOL) tablet 975 mg, 975 mg, Oral, Q8H [address], [facility], 975 mg at 05/13/23 0506  •  amLODIPine (NORVASC) tablet 5 mg, 5 mg, Oral, Daily, 5 mg at 05/13/23 0820 **AND** lisinopril (ZESTRIL) tablet 20 mg, 20 mg, Oral, Daily, [facility], 20 mg at 05/13/23 0820  •  bisacodyl (DULCOLAX) rectal suppository 10 mg, 10 mg, Rectal, Daily PRN, [facility]  •  calcium carbonate (TUMS) chewable tablet 750 mg, 750 mg, Oral, TID PRN, Leoncio York MD, 750 mg at 05/12/23 2124  •  docusate sodium (COLACE) capsule 100 mg, 100 mg, Oral, BID, [facility], 100 mg at 05/13/23 0820  •  heparin (porcine) subcutaneous injection 5,000 Units, 5,000 Units, Subcutaneous, Q8H [address], 5,000 Units at 05/13/23 0506 **AND** [COMPLETED] Platelet count, , , Once, [facility]  •  insulin lispro (HumaLOG) 100 units/mL subcutaneous injection 1-6 Units, 1-6 Units, Subcutaneous, 4x Daily (AC & HS), 1 Units at 05/13/23 1121 **AND** Fingerstick Glucose (POCT), , , 4x Daily AC and at bedtime, [facility]  •  lactated ringers infusion, 75 mL/hr, Intravenous, Continuous, [facility], Last Rate: 75 mL/hr at 05/10/23 1538, 75 mL/hr at 05/10/23 1538  •  methocarbamol (ROBAXIN) tablet 250 mg, 250 mg, Oral, Q6H [address], Leoncio York MD, 250 mg at 05/13/23 1121  •  naloxone (NARCAN) 0 04 mg/mL syringe 0 04 mg, 0 04 mg, Intravenous, Q1MIN PRN, Leoncio York MD  •  ondansetron (ZOFRAN) injection 4 mg, 4 mg, Intravenous, Q6H PRN, Itzel Vásquez PA-C  •  oxyCODONE (ROXICODONE) split tablet 2 5 mg, 2 5 mg, Oral, Q4H PRN **OR** oxyCODONE (ROXICODONE) IR tablet 5 mg, 5 mg, Oral, Q4H PRN, Leoncio York MD, 5 mg at 05/11/23 1749  •  senna (SENOKOT) tablet 8 6 mg, 1 tablet, Oral, Daily, [facility] David Farzana Solomon PA-C, 8 6 mg at 05/13/23 0820    SKIN INTEGRITY:   Surgical incision on back    PRIOR LEVEL OF FUNCTION:  He lives in a(n) bi-level home with 6+6 steps to get to 1st floor area  Celestine Isaacs is  and lives alone  Self Care: Independent, Indoor Mobility: used a RW PTA, Stairs (in/outdoor): Independent and Cognition: Independent    FALLS IN THE LAST 6 MONTHS: 0    HOME ENVIRONMENT:  The living area: Patient lives in a bi-level home  There are No steps to enter the home  The patient will not have 24 hour supervision/physical assistance available upon discharge  PREVIOUS DME:  Equipment in home (previous DME): Rolling Walker and [facility]    FUNCTIONAL STATUS:  Physical Therapy Occupational Therapy Speech Therapy   05/11/23 0905    Note Type   Note type Evaluation   Pain Assessment   Pain Assessment Tool 0-10   Pain Score 8   Pain Location/Orientation Location: Neck   Pain Onset/Description Onset: Ongoing;Frequency: Constant/Continuous; Descriptor: Aching   Effect of Pain on Daily Activities increased pain with activity   Patient's Stated Pain Goal No pain   Hospital Pain Intervention(s) Ambulation/increased activity;Repositioned   Restrictions/Precautions   Weight Bearing Precautions Per Order No   Braces or Orthoses    (no c/s brace required per neurosx)   Other Precautions Multiple lines; Fall Risk;Pain;Spinal precautions   Home Living   Type of Home House   Home Layout Multi-level  (6+6 steps to bedroom level)   Home Equipment Walker;Cane   Additional Comments Pt reports living alone, bt reports he has local family who could provide some assistance if needed   Prior Function   Level of [place] Independent with functional mobility   Lives With MARE [address] in the last 6 months 0   Comments Pt reports the use of a RW for ambulation PTA   General   Family/Caregiver Present No   Cognition   Overall Cognitive Status [facility]   Arousal/Participation Alert   Orientation Level Oriented X4   Memory Within functional limits   Following Commands Follows all commands and directions without difficulty   RUE Assessment   RUE Assessment X   LUE Assessment   LUE Assessment X   RLE Assessment   RLE Assessment WFL   Strength RLE   RLE Overall Strength 4-/5   LLE Assessment   LLE Assessment WFL   Strength LLE   LLE Overall Strength 4/5   Bed Mobility   Additional Comments NA, Pt seated OOB in chair at time of PT eval   Transfers   Sit to Stand 4  Minimal assistance   Additional items Assist x 1; Increased time required;Verbal cues   Stand to Sit 4  Minimal assistance   Additional items Assist x 1; Increased time required;Verbal cues   Additional Comments cues needed for hand placement during transfers   Ambulation/Elevation   Gait pattern Excessively slow; Short stride; Foward flexed; Inconsistent preeti   Gait Assistance 4  Minimal assist   Additional items Assist x 1   Assistive Device Rolling walker   Distance 55ft x 2  (seated rest break required)   Stair Management Assistance 4  Minimal assist   Additional items Assist x 1   Stair Management Technique Two rails   Number of Stairs 3   Balance   Static Sitting Fair -   Static Standing Poor +   Ambulatory Poor +   Endurance Deficit   Endurance Deficit Yes   Endurance Deficit Description fatigue, pain   Activity Tolerance   Activity Tolerance Patient limited by fatigue;Patient limited by pain   Medical Staff Made Aware General Safer, OT   Nurse Made Aware Pt appropriate to be seen and mobilize per nsg   Assessment   Prognosis Good   Problem List Decreased strength;Decreased range of motion;Decreased endurance; Impaired balance;Decreased mobility;Pain;Orthopedic restrictions   Assessment Pt is 69 y o  male seen for PT evaluation s/p admit to [facility] on 5/10/2023  Two pt identifiers were used to confirm  Pt presented for scheduled Posterior cervical decompressive laminectomy and instrumented fusion C4-T1 which was performed on 5/10/23     Pt was admitted with a primary dx of: cervical spinal cord compression s/p Posterior cervical decompressive laminectomy and instrumented fusion C4-T1  PT now consulted for assessment of mobility and d/c needs  Pt with Out of bed orders  Pts current co morbidities affecting treatment include:  has a past medical history of Diabetes mellitus ([address] ), ED (erectile dysfunction), Hyperlipidemia, Hypertension, Obesity, and Spinal stenosis of lumbar region at multiple levels    Pts current clinical presentation is Unstable/ Unpredictable (high complexity) due to Ongoing medical management for primary dx, Decreased activity tolerance compared to baseline, Fall risk, Increased assistance needed from caregiver at current time, Spinal precautions at current time, Continuous pulse oximetry monitoring , s/p surgical intervention    Upon evaluation, pt currently is requiring Min Ax1 for transfers and Min Ax1 for ambulation w/ RW  Pt presents at PT eval functioning below baseline and currently w/ overall mobility deficits 2* to: BLE weakness, decreased ROM, impaired balance, decreased endurance, gait deviations, pain, decreased activity tolerance compared to baseline, fall risk, spinal precautions  Pt currently at a fall risk 2* to impairments listed above  Based on the aforementioned PT evaluation, pt will continue to benefit from skilled Acute PT interventions to address stated impairments; to maximize functional mobility; for ongoing pt/ family training; and DME needs  At conclusion of PT session pt returned back in chair with phone and call bell within reach  Pt denies any further questions at this time  PT is currently recommending Rehab  PT will continue to follow during hospital stay  05/11/23 0845    OT Last Visit   OT Visit Date 05/11/23   Note Type   Note type Evaluation   Pain Assessment   Pain Assessment Tool 0-10   Pain Score 6   Pain Location/Orientation Location: Neck; Location: Shoulder   Hospital Pain Intervention(s) "Repositioned; Ambulation/increased activity; Emotional support;Relaxation technique   Restrictions/Precautions   Weight Bearing Precautions Per Order No   Home Living   Type of Home House   Home Layout Multi-level  (split level home)   Home Equipment Walker;Cane   Prior Function   Level of [place] Independent with ADLs; Independent with functional mobility; Independent with IADLS   Lives With (S)  Alone   Receives Help From Family   IADLs Independent with driving; Independent with medication management; Independent with meal prep   Falls in the last 6 months 0   Vocational Retired   [address] and mobility - i iadls   Reciprocal Relationships supportive family/friends   Service to Others retired   Intrinsic Gratification mostly sedentary   Subjective   Subjective c/o neck and shld pain   ADL   Eating Assistance 7  Independent   Grooming Assistance 5  Supervision/Setup   UB Bathing Assistance 4  Minimal Assistance   LB Bathing Assistance 3  Moderate Assistance   UB Dressing Assistance 4  Minimal Assistance   LB Dressing Assistance 3  Moderate Assistance   Toileting Assistance  3  Moderate Assistance   Bed Mobility   Additional Comments oob in chair   Transfers   Sit to Stand 4  Minimal assistance   Stand to Sit 4  Minimal assistance   Functional Mobility   Functional Mobility 4  Minimal assistance   Additional Comments admits to feeling \"off balance\"   Additional items Rolling walker   Balance   Static Sitting Fair +   Dynamic Sitting Fair   Static Standing Fair   Dynamic Standing Fair -   Ambulatory Poor +   Activity Tolerance   Activity Tolerance Patient limited by fatigue;Patient limited by pain   RUE Assessment   RUE Assessment X  (limited digit function especially extension - gross grasp/poor dexterity)   LUE Assessment   LUE Assessment X  (impaired dexterity)   Hand Function   Gross Motor Coordination Functional   Fine Motor Coordination Impaired   Hand Function Comments decreased strength and " dexterity R>L   Sensation   Additional Comments admits to numbness B hands   Cognition   Overall Cognitive Status WFL   Assessment   Limitation Decreased ADL status; Decreased endurance;Decreased UE ROM; Decreased UE strength;Decreased sensation;Decreased fine motor control;Decreased self-care trans;Decreased high-level ADLs   Prognosis Good   Assessment Pt is a 69 y o  male who was [facility] to [facility] on [facility]10[facility]23 with Cervical spinal cord compression (HCC) POD 1 Posterior cervical decompressive laminectomy and instrumented fusion C4-T1    Pt's problem list also includes PMH of DM, HTN, obesity and HLD, spinal stenosis of lumbar region  At baseline pt was completing adls and mobility independently- I iadls  Pt lives alone in split level home with 6 steps between levels  Currently pt requires min to mod assist for overall ADLS and min assist for functional mobility/transfers  Pt currently presents with impairments in the following categories -steps to enter environment, limited home support, difficulty performing ADLS, difficulty performing IADLS , health management  and environment activity tolerance, endurance, standing balance/tolerance, sitting balance/tolerance, UE strength, UE ROM and FMC  These impairments, as well as pt's fatigue, pain, spinal precautions, decreased caregiver support, risk for falls and home environment  limit pt's ability to safely engage in all baseline areas of occupation, includinggrooming, bathing, dressing, toileting, functional mobility/transfers, community mobility, laundry , driving, house maintenance, meal prep, cleaning, social participation  and leisure activities  From OT standpoint, recommend inpt rehab upon D/C  OT will continue to follow to address the below stated goals  CARE SCORES:  Self Care:  Eatin:  Independent  Oral hygiene: 04: Supervision or touching  assistance  Toilet hygiene: 03: Partial/moderate assistance  Shower/bathing self: 03: Partial/moderate assistance  Upper body dressin: Partial/moderate assistance  Lower body dressin: Partial/moderate assistance  Putting on/taking off footwear: 09: Not applicable  Transfers:  Roll left and right: 09: Not applicable  Sit to lyin: Not applicable  Lying to sitting on side of bed: 09: Not applicable  Sit to stand: 03: Partial/moderate assistance  Chair/bed to chair transfer: 03: Partial/moderate assistance  Toilet transfer: 09: Not applicable  Mobility:  Walk 10 ft: 03: Partial/moderate assistance  Walk 50 ft with two turns: 03: Partial/moderate assistance  Walk 184U: Not applicable    CURRENT GAP IN FUNCTION  Prior to Admission: Functional Status: Patient was independent with mobility/ambulation, transfers, ADL's, IADL's  Use RW when ambulating PTA    Expected functional outcomes: It is expected that with skilled acute rehabilitation services the patient will progress to Independent for self care and Independent for mobility     Estimated length of stay: 10 to 14 days    Anticipated Post-Discharge Disposition/Treatment  Disposition: Return to previous home/apartment  Outpatient Services: Physical Therapy (PT) and Occupational Therapy (OT)    BARRIERS TO DISCHARGE  Home Accessibility, Caregiver Accessibility and Lives Alone;Decreased strength;Decreased range of motion;Decreased endurance; Impaired balance;Decreased mobility;Pain;Orthopedic restriction;Decreased ADL status; Decreased UE ROM; Decreased UE strength;Decreased sensation;Decreased fine motor control;Decreased self-care trans;Decreased high-level ADLs    INTERVENTIONS FOR DISCHARGE  ADL retraining;Functional transfer training;UE strengthening/ROM; Endurance training;Patient/family training;Equipment evaluation/education; Neuromuscular reeducation; Fine motor coordination activities; Activity engagement;LE strengthening/ROM; Elevations; Therapeutic exercise; Bed mobility;Gait training    REQUIRED THERAPY:  Patient will require PT and OT 90 minutes each per day, five days per week to achieve rehab goals  REQUIRED FUNCTIONAL AND MEDICAL MANAGEMENT FOR INPATIENT REHABILITATION:  Skin:  There are no pressure sores currently, Patient will require close monitoring of skin for any breakdown  He will also require close monitoring of surgical incision for any complications, Pain Management: Overall pain is moderately controlled, Deep Vein Thrombosis (DVT) Prophylaxis:  heparin and SCD's while in bed, Diabetes Management: continue sliding scale insulin, patient to do finger sticks as ordered, SLIM to continue to manage diabetes, and other medical conditions, PT and OT interventions, any labs, consults, imaging and medication adjustments patient may need while on ARC    RECOMMENDED LEVEL OF CARE:   This is a 69year old male with history of cervical spinal cord compression, spinal cord injury at C5-C7 level without injury of spinal bone and neurogenic claudication who is s/p posterior cervical decompressive laminectomy and instrumented fusion C4-T1 on 5/10/23  No collar indicated  Patient still has HILLARY drain which drained ~100 ml in 24 hours prior to admitting to [facility]  Acute pain management was consulted and patient was ordered a methadone taper, IV and PO pain medications and Lidoderm patches following surgery  Methadone and IV pain medications are now discontinued  Patient had an elevated WBC post-operatively, possible due to intra-op decadron  WBC down to 13 09 on 5/12  Patient worked with PT and OT and recommended for post acute rehab following his surgery  He has demonstrated that he can tolerate three hours of therapy, five days a week and is now medically stable for discharge to the [facility]  PTA, patient was independent with his ADLs, IADL and was driving  He ambulated with  PTA  He is now functioning below his baseline, requiring supervision to moderate assistance for his ADLs    With PT, he is requiring min x 1 assistance for sit to stand and stand to sit transfers and a min x 1 assistance for ambulation  He ambulated 55 feet x 2 (seated rest break required) with RW and min x 1 assistance  Gait pattern: excessively slow, short stride, forward flexed and inconsistent preeti  Patient requires close oversight by a physician, PM&R management, 24/7 rehabilitation nursing care and specialized interdisciplinary therapy services in preparation for discharge which can only be provided in the inpatient acute rehabilitation setting  Patient will require close monitoring of his VS, I/Os, pain level, skin, surgical incision, blood sugars and his overall condition  Inpatient acute rehabilitation is recommended for this patient to maximize his overall strength, endurance, self care and mobility upon discharge home

## 2023-05-13 NOTE — ASSESSMENT & PLAN NOTE
POD#3 - s/p Posterior cervical decompressive laminectomy and instrumented fusion C4-T1 (DKO, 5/10)    Imaging:  · XR cervical spine, 5/11/23: pending final radiology read  Per my interpretation, stable appearance of C4-T1 posterior fusion  Plan:  · Continue to monitor neurological exam  · Continue frequent neuro checks   · Maintain normotensive BP goals, MAP > 65   · No further neurosurgical intervention indicated at this time   · Case and imaging reviewed this am on rounds   · Pt doing well post-op, pain well controlled, he has ambulated with assistance in the halls   · 1 posterior HILLARY drain to FS  · ~100cc/24hr output noted, serosanguinous fluid   · Will place drain to gravity this am and reassess this afternoon to remove   · No collar indicated  · Labs/Vitals:   · Leukocytosis resolved, hgb stable   · VSS overnight, afebrile  · Pain control:  · APS was following --> pt received intra-op methadone   · Completed methadone taper   · Continue sched tylenol 975mg q 8hrs   · Continue sched robaxin 250mg q 6hrs   · Prn oxycodone 2 5/5mg q 4hrs prn mod-severe pain   · No longer requiring IV pain medications   · Bowel regimen: senna, colace, prn dulcolax   · Advance as needed   · DVT ppx: SCDs, SQH   · Continue medical management   · Home medications ordered  · SSI ordered for diabetes management   · PT OT evaluation --> acute rehab recommended  · Social work following for assistance with dispo once medically cleared   · Pending [facility] admission    Neurosurgery will continue to follow as primary team   Please call questions or concerns

## 2023-05-13 NOTE — PLAN OF CARE
Problem: PHYSICAL THERAPY ADULT  Goal: Performs mobility at highest level of function for planned discharge setting  See evaluation for individualized goals  Description: Treatment/Interventions: Functional transfer training, LE strengthening/ROM, Elevations, Therapeutic exercise, Endurance training, Patient/family training, Equipment eval/education, Bed mobility, Gait training, Spoke to nursing, OT  Equipment Recommended: Roslyn Salas       See flowsheet documentation for full assessment, interventions and recommendations  Outcome: Progressing  Note: Prognosis: Good  Problem List: Decreased strength, Decreased range of motion, Decreased endurance, Impaired balance, Decreased mobility, Pain, Orthopedic restrictions  Assessment: Pt continues to perform all transfers & ambulatory tasks with Ax1 throughout, which is similar to eval   He performed transfers with improved technique after instructions for hand placement  He demosntrates gait deviations as noted above, with worsening RLE foot clearance & foot drag as he fatigues, eventually requiring seated rest after 1st gait trial & 1 minor  LOB while turning towards R when returning to room  Pt also demosntrates kyphotic posture throughout gait, which pt can correct with sitting lumbar, toracic & cerical extension activities in sitting & standing  pt encouraged to maintain hip extension in standing to facilitate improved posture along with closer RW support, which did improve posture slightly during return to room  Although pt is making progress in terms of mobilty since surgery, he will continue to benefit from aggresive rehab intervention at d/c to address functional deficits & progress to PLOF  Barriers to Discharge: Inaccessible home environment, Decreased caregiver support     PT Discharge Recommendation: Post acute rehabilitation services    See flowsheet documentation for full assessment

## 2023-05-13 NOTE — PROGRESS NOTES
"Progress Note - Acute Pain Service    Yuridia Gregorio 69 y o  male MRN: [id number]  Unit/Bed#: -01 Encounter: [id number]      Assessment:   Principal Problem:    Cervical spinal cord compression ([address] )  Active Problems:    Type 2 diabetes mellitus without complication, without long-term current use of insulin ([facility] [address] )    Yuridia Gregorio is a 69 y o  male with a past medical history significant for morbid obesity, hypertension, insulin-dependent type 2 diabetes mellitus with most recent hemoglobin A1c of 6 0, hyperlipidemia, chronic pain in the setting of lumbar spinal stenosis, C5-C7 spinal cord injury with cord compression and neurogenic claudication in the form of increased difficulty with ambulation who presents for surgical repair of cervical spinal injury   Patient underwent posterior cervical decompressive laminectomy and instrumented fusion of C4-T1 on 5/10/2023   Patient received intraoperative methadone for postoperative analgesic assistance   Acute pain service has been consulted for postoperative pain management in the setting of intraoperative methadone  Patient doing well sitting in his chair this morning, he says he has minimal pain in neck and shoulders instead he feels \"stiff\" and is having a hard time finding a comfortable position in his bed and chair  He has not required any PRN oxycodone      Plan:   - Continue methadone taper as follows:              5/12/2023 - 2 5 mg p o  twice daily              5/13/2023 - off  - Continue acetaminophen 975 mg p o  every 8 hours scheduled  - Continue oxycodone 2 5 mg p o  every 4 hours as needed for moderate pain  - Continue oxycodone 5 mg p o  every 4 hours as needed for severe pain  - Continue methocarbamol to 250 mg p o  every 6 hours scheduled    Pain History  Current pain location(s): Neck and shoulders  Pain Scale:   1-2/10  Quality: Stiff  24 hour history: See above    Meds/Allergies     No Known Allergies    Objective     Temp:  [97 6 °F (36 4 " °C)-99 5 °F (37 5 °C)] 97 7 °F (36 5 °C)  HR:  [81-91] 81  Resp:  [21] 21  BP: (102-123)/(63-69) 123/69    Physical Exam  Vitals and nursing note reviewed  Constitutional:       Appearance: Normal appearance  HENT:      Head: Normocephalic and atraumatic  Right Ear: External ear normal       Left Ear: External ear normal       Nose: Nose normal       Mouth/Throat:      Mouth: Mucous membranes are moist       Pharynx: Oropharynx is clear  Eyes:      Conjunctiva/sclera: Conjunctivae normal    Cardiovascular:      Rate and Rhythm: Normal rate and regular rhythm  Pulses: Normal pulses  Heart sounds: Normal heart sounds  Pulmonary:      Effort: Pulmonary effort is normal       Breath sounds: Normal breath sounds  Abdominal:      General: Bowel sounds are normal       Palpations: Abdomen is soft  Musculoskeletal:         General: Normal range of motion  Cervical back: Rigidity and tenderness present  Skin:     General: Skin is warm and dry  Neurological:      General: No focal deficit present  Mental Status: He is alert and oriented to person, place, and time  Mental status is at baseline  Psychiatric:         Mood and Affect: Mood normal          Lab Results:   Results from last 7 days   Lab Units 05/12/23  1102   WBC Thousand/uL 13 09*   HEMOGLOBIN g/dL 13 7   HEMATOCRIT % 40 8   PLATELETS Thousands/uL 286      Results from last 7 days   Lab Units 05/11/23  0557   POTASSIUM mmol/L 4 1   CHLORIDE mmol/L 104   CO2 mmol/L 25   BUN mg/dL 23   CREATININE mg/dL 1 07   CALCIUM mg/dL 10 1     Counseling / Coordination of Care  Total floor / unit time spent today 15 min  Greater than 50% of total time was spent with the patient and / or family counseling and / or coordination of care  Please note that the APS provides consultative services regarding pain management only    With the exception of ketamine and epidural infusions and except when indicated, final decisions regarding starting or changing doses of analgesic medications are at the discretion of the consulting service  Off hours consultation and/or medication management is generally not available      Pato Mcbride MD  Acute Pain Service

## 2023-05-13 NOTE — PLAN OF CARE
Problem: MOBILITY - ADULT  Goal: Maintain or return to baseline ADL function  Description: INTERVENTIONS:  -  Assess patient's ability to carry out ADLs; assess patient's baseline for ADL function and identify physical deficits which impact ability to perform ADLs (bathing, care of mouth/teeth, toileting, grooming, dressing, etc )  - Assess/evaluate cause of self-care deficits   - Assess range of motion  - Assess patient's mobility; develop plan if impaired  - Assess patient's need for assistive devices and provide as appropriate  - Encourage maximum independence but intervene and supervise when necessary  - Involve family in performance of ADLs  - Assess for home care needs following discharge   - Consider OT consult to assist with ADL evaluation and planning for discharge  - Provide patient education as appropriate  Outcome: Progressing  Goal: Maintains/Returns to pre admission functional level  Description: INTERVENTIONS:  - Perform BMAT or MOVE assessment daily    - Set and communicate daily mobility goal to care team and patient/family/caregiver  - Collaborate with rehabilitation services on mobility goals if consulted  - Perform Range of Motion 3 times a day    - Dangle patient 3 times a day  - Stand patient 3 times a day  - Ambulate patient 3 times a day  - Out of bed to chair 3 times a day   - Out of bed for meals 3 times a day  - Out of bed for toileting  - Record patient progress and toleration of activity level   Outcome: Progressing     Problem: PAIN - ADULT  Goal: Verbalizes/displays adequate comfort level or baseline comfort level  Description: Interventions:  - Encourage patient to monitor pain and request assistance  - Assess pain using appropriate pain scale  - Administer analgesics based on type and severity of pain and evaluate response  - Implement non-pharmacological measures as appropriate and evaluate response  - Consider cultural and social influences on pain and pain management  - Notify physician/advanced practitioner if interventions unsuccessful or patient reports new pain  Outcome: Progressing     Problem: INFECTION - ADULT  Goal: Absence or prevention of progression during hospitalization  Description: INTERVENTIONS:  - Assess and monitor for signs and symptoms of infection  - Monitor lab/diagnostic results  - Monitor all insertion sites, i e  indwelling lines, tubes, and drains  - Monitor endotracheal if appropriate and nasal secretions for changes in amount and color  - [place] appropriate cooling/warming therapies per order  - Administer medications as ordered  - Instruct and encourage patient and family to use good hand hygiene technique  - Identify and instruct in appropriate isolation precautions for identified infection/condition  Outcome: Progressing  Goal: Absence of fever/infection during neutropenic period  Description: INTERVENTIONS:  - Monitor WBC    Outcome: Progressing

## 2023-05-14 ENCOUNTER — HOSPITAL ENCOUNTER (INPATIENT)
Facility: HOSPITAL | Age: 68
LOS: 11 days | Discharge: HOME | End: 2023-05-25
Attending: PHYSICAL MEDICINE & REHABILITATION | Admitting: PHYSICAL MEDICINE & REHABILITATION

## 2023-05-14 VITALS
OXYGEN SATURATION: 93 % | RESPIRATION RATE: 21 BRPM | WEIGHT: 244.71 LBS | BODY MASS INDEX: 39.33 KG/M2 | TEMPERATURE: 98.2 F | HEIGHT: 66 IN | SYSTOLIC BLOOD PRESSURE: 112 MMHG | DIASTOLIC BLOOD PRESSURE: 63 MMHG | HEART RATE: 89 BPM

## 2023-05-14 DIAGNOSIS — I10 BENIGN ESSENTIAL HYPERTENSION: ICD-10-CM

## 2023-05-14 DIAGNOSIS — E11.9 TYPE 2 DIABETES MELLITUS WITHOUT COMPLICATION, WITHOUT LONG-TERM CURRENT USE OF INSULIN (HCC): ICD-10-CM

## 2023-05-14 DIAGNOSIS — Z98.890 POST-OPERATIVE STATE: ICD-10-CM

## 2023-05-14 DIAGNOSIS — I10 PRIMARY HYPERTENSION: ICD-10-CM

## 2023-05-14 DIAGNOSIS — Z74.09 IMPAIRED MOBILITY AND ACTIVITIES OF DAILY LIVING: ICD-10-CM

## 2023-05-14 DIAGNOSIS — E66.01 OBESITY, MORBID (HCC): ICD-10-CM

## 2023-05-14 DIAGNOSIS — S14.151A INCOMPLETE SPINAL CORD INJURY AT C1-C4 LEVEL WITHOUT BONE INJURY (HCC): Primary | ICD-10-CM

## 2023-05-14 DIAGNOSIS — R29.898 RIGHT HAND WEAKNESS: ICD-10-CM

## 2023-05-14 DIAGNOSIS — Z78.9 IMPAIRED MOBILITY AND ACTIVITIES OF DAILY LIVING: ICD-10-CM

## 2023-05-14 PROBLEM — M47.12 SPONDYLOSIS OF CERVICAL SPINE WITH MYELOPATHY: Status: ACTIVE | Noted: 2023-03-01

## 2023-05-14 LAB
GLUCOSE SERPL-MCNC: 143 MG/DL (ref 65–140)
GLUCOSE SERPL-MCNC: 152 MG/DL (ref 65–140)
GLUCOSE SERPL-MCNC: 166 MG/DL (ref 65–140)
GLUCOSE SERPL-MCNC: 201 MG/DL (ref 65–140)

## 2023-05-14 RX ORDER — AMLODIPINE BESYLATE 5 MG/1
5 TABLET ORAL DAILY
Status: DISCONTINUED | OUTPATIENT
Start: 2023-05-15 | End: 2023-05-16

## 2023-05-14 RX ORDER — POLYETHYLENE GLYCOL 3350 17 G/17G
17 POWDER, FOR SOLUTION ORAL DAILY
Status: DISCONTINUED | OUTPATIENT
Start: 2023-05-15 | End: 2023-05-25 | Stop reason: HOSPADM

## 2023-05-14 RX ORDER — ACETAMINOPHEN 325 MG/1
975 TABLET ORAL EVERY 8 HOURS SCHEDULED
Refills: 0
Start: 2023-05-14 | End: 2023-05-25

## 2023-05-14 RX ORDER — INSULIN LISPRO 100 [IU]/ML
1-5 INJECTION, SOLUTION INTRAVENOUS; SUBCUTANEOUS
Status: DISCONTINUED | OUTPATIENT
Start: 2023-05-14 | End: 2023-05-14

## 2023-05-14 RX ORDER — HEPARIN SODIUM 5000 [USP'U]/ML
5000 INJECTION, SOLUTION INTRAVENOUS; SUBCUTANEOUS EVERY 8 HOURS SCHEDULED
Qty: 1 ML | Refills: 0
Start: 2023-05-14 | End: 2023-05-25

## 2023-05-14 RX ORDER — SENNOSIDES 8.6 MG
2 TABLET ORAL
Status: DISCONTINUED | OUTPATIENT
Start: 2023-05-14 | End: 2023-05-25 | Stop reason: HOSPADM

## 2023-05-14 RX ORDER — SENNOSIDES 8.6 MG
8.6 TABLET ORAL DAILY
Refills: 0
Start: 2023-05-15 | End: 2023-05-25

## 2023-05-14 RX ORDER — HEPARIN SODIUM 5000 [USP'U]/ML
5000 INJECTION, SOLUTION INTRAVENOUS; SUBCUTANEOUS EVERY 8 HOURS SCHEDULED
Status: DISCONTINUED | OUTPATIENT
Start: 2023-05-14 | End: 2023-05-23

## 2023-05-14 RX ORDER — LISINOPRIL 20 MG/1
20 TABLET ORAL DAILY
Refills: 0
Start: 2023-05-14 | End: 2023-05-25

## 2023-05-14 RX ORDER — ACETAMINOPHEN 325 MG/1
975 TABLET ORAL EVERY 8 HOURS SCHEDULED
Status: DISCONTINUED | OUTPATIENT
Start: 2023-05-14 | End: 2023-05-22

## 2023-05-14 RX ORDER — OXYCODONE HYDROCHLORIDE 5 MG/1
5 TABLET ORAL EVERY 4 HOURS PRN
Refills: 0 | Status: SHIPPED | OUTPATIENT
Start: 2023-05-14 | End: 2023-05-25

## 2023-05-14 RX ORDER — INSULIN LISPRO 100 [IU]/ML
1-5 INJECTION, SOLUTION INTRAVENOUS; SUBCUTANEOUS
Status: DISCONTINUED | OUTPATIENT
Start: 2023-05-14 | End: 2023-05-17

## 2023-05-14 RX ORDER — ONDANSETRON 4 MG/1
4 TABLET, ORALLY DISINTEGRATING ORAL EVERY 6 HOURS PRN
Status: DISCONTINUED | OUTPATIENT
Start: 2023-05-14 | End: 2023-05-25 | Stop reason: HOSPADM

## 2023-05-14 RX ORDER — CALCIUM CARBONATE 200(500)MG
750 TABLET,CHEWABLE ORAL 3 TIMES DAILY PRN
Refills: 0
Start: 2023-05-14 | End: 2023-05-25

## 2023-05-14 RX ORDER — DOCUSATE SODIUM 100 MG/1
100 CAPSULE, LIQUID FILLED ORAL 2 TIMES DAILY
Status: DISCONTINUED | OUTPATIENT
Start: 2023-05-14 | End: 2023-05-25 | Stop reason: HOSPADM

## 2023-05-14 RX ORDER — INSULIN LISPRO 100 [IU]/ML
1-5 INJECTION, SOLUTION INTRAVENOUS; SUBCUTANEOUS
Status: DISCONTINUED | OUTPATIENT
Start: 2023-05-14 | End: 2023-05-25 | Stop reason: HOSPADM

## 2023-05-14 RX ORDER — METHOCARBAMOL 500 MG/1
250 TABLET, FILM COATED ORAL EVERY 6 HOURS SCHEDULED
Refills: 0
Start: 2023-05-14 | End: 2023-05-25

## 2023-05-14 RX ORDER — AMLODIPINE BESYLATE 5 MG/1
5 TABLET ORAL DAILY
Refills: 0
Start: 2023-05-14 | End: 2023-05-25

## 2023-05-14 RX ORDER — INSULIN LISPRO 100 [IU]/ML
1-6 INJECTION, SOLUTION INTRAVENOUS; SUBCUTANEOUS
Refills: 0
Start: 2023-05-14 | End: 2023-05-25

## 2023-05-14 RX ORDER — METHOCARBAMOL 500 MG/1
250 TABLET, FILM COATED ORAL EVERY 6 HOURS SCHEDULED
Status: DISCONTINUED | OUTPATIENT
Start: 2023-05-14 | End: 2023-05-17

## 2023-05-14 RX ORDER — CALCIUM CARBONATE 500 MG/1
750 TABLET, CHEWABLE ORAL 3 TIMES DAILY PRN
Status: DISCONTINUED | OUTPATIENT
Start: 2023-05-14 | End: 2023-05-25 | Stop reason: HOSPADM

## 2023-05-14 RX ORDER — BISACODYL 10 MG
10 SUPPOSITORY, RECTAL RECTAL DAILY PRN
Status: DISCONTINUED | OUTPATIENT
Start: 2023-05-14 | End: 2023-05-25 | Stop reason: HOSPADM

## 2023-05-14 RX ORDER — POLYETHYLENE GLYCOL 3350 17 G/17G
17 POWDER, FOR SOLUTION ORAL DAILY
Refills: 0
Start: 2023-05-15 | End: 2023-05-25

## 2023-05-14 RX ORDER — ONDANSETRON 2 MG/ML
4 INJECTION INTRAMUSCULAR; INTRAVENOUS EVERY 6 HOURS PRN
Refills: 0
Start: 2023-05-14 | End: 2023-05-25

## 2023-05-14 RX ORDER — DOCUSATE SODIUM 100 MG/1
100 CAPSULE, LIQUID FILLED ORAL 2 TIMES DAILY
Refills: 0
Start: 2023-05-14 | End: 2023-05-25

## 2023-05-14 RX ORDER — LISINOPRIL 20 MG/1
20 TABLET ORAL DAILY
Status: DISCONTINUED | OUTPATIENT
Start: 2023-05-15 | End: 2023-05-16

## 2023-05-14 RX ORDER — OXYCODONE HYDROCHLORIDE 5 MG/1
5 TABLET ORAL EVERY 4 HOURS PRN
Status: DISCONTINUED | OUTPATIENT
Start: 2023-05-14 | End: 2023-05-25 | Stop reason: HOSPADM

## 2023-05-14 RX ORDER — OXYCODONE HYDROCHLORIDE 5 MG/1
2.5 TABLET ORAL EVERY 4 HOURS PRN
Refills: 0 | Status: SHIPPED | OUTPATIENT
Start: 2023-05-14 | End: 2023-05-25

## 2023-05-14 RX ADMIN — SENNOSIDES 17.2 MG: 8.6 TABLET, FILM COATED ORAL at 22:36

## 2023-05-14 RX ADMIN — INSULIN LISPRO 1 UNITS: 100 INJECTION, SOLUTION INTRAVENOUS; SUBCUTANEOUS at 22:38

## 2023-05-14 RX ADMIN — DOCUSATE SODIUM 100 MG: 100 CAPSULE, LIQUID FILLED ORAL at 08:51

## 2023-05-14 RX ADMIN — DOCUSATE SODIUM 100 MG: 100 CAPSULE, LIQUID FILLED ORAL at 17:07

## 2023-05-14 RX ADMIN — ACETAMINOPHEN 975 MG: 325 TABLET ORAL at 17:08

## 2023-05-14 RX ADMIN — AMLODIPINE BESYLATE 5 MG: 5 TABLET ORAL at 08:50

## 2023-05-14 RX ADMIN — INSULIN LISPRO 1 UNITS: 100 INJECTION, SOLUTION INTRAVENOUS; SUBCUTANEOUS at 22:40

## 2023-05-14 RX ADMIN — INSULIN LISPRO 2 UNITS: 100 INJECTION, SOLUTION INTRAVENOUS; SUBCUTANEOUS at 11:26

## 2023-05-14 RX ADMIN — METHOCARBAMOL 250 MG: 500 TABLET ORAL at 11:25

## 2023-05-14 RX ADMIN — HEPARIN SODIUM 5000 UNITS: 5000 INJECTION INTRAVENOUS; SUBCUTANEOUS at 22:36

## 2023-05-14 RX ADMIN — METHOCARBAMOL 250 MG: 500 TABLET ORAL at 06:05

## 2023-05-14 RX ADMIN — SENNOSIDES 8.6 MG: 8.6 TABLET, FILM COATED ORAL at 08:51

## 2023-05-14 RX ADMIN — LISINOPRIL 20 MG: 20 TABLET ORAL at 08:51

## 2023-05-14 RX ADMIN — POLYETHYLENE GLYCOL 3350 17 G: 17 POWDER, FOR SOLUTION ORAL at 08:52

## 2023-05-14 RX ADMIN — METHOCARBAMOL 250 MG: 500 TABLET ORAL at 17:07

## 2023-05-14 RX ADMIN — INSULIN LISPRO 1 UNITS: 100 INJECTION, SOLUTION INTRAVENOUS; SUBCUTANEOUS at 06:08

## 2023-05-14 RX ADMIN — ACETAMINOPHEN 975 MG: 325 TABLET ORAL at 22:36

## 2023-05-14 RX ADMIN — METFORMIN HYDROCHLORIDE 500 MG: 500 TABLET ORAL at 17:07

## 2023-05-14 RX ADMIN — ACETAMINOPHEN 975 MG: 325 TABLET ORAL at 06:05

## 2023-05-14 RX ADMIN — HEPARIN SODIUM 5000 UNITS: 5000 INJECTION INTRAVENOUS; SUBCUTANEOUS at 06:05

## 2023-05-14 NOTE — ASSESSMENT & PLAN NOTE
Medication/Dose: Amphetamine-Dextroamphetamine 30MG, Tramadol 50MG  Patient last seen by PCP: 5/29/2018  Next office visit with PCP: None  Last Lab:5/29/2018    Above information requires contacting patient: No    PDMP needs to be reviewed by Provider    Sent to provider to approve or deny         Symptoms and weakness he reports improved with physical therapy  Pain management as detailed below

## 2023-05-14 NOTE — H&P
PHYSICAL MEDICINE AND REHABILITATION H&P/ADMISSION NOTE  Laly Hernandez 69 y o  male MRN: [id number]  Unit/Bed#: -01 Encounter: [id number]       Rehab Diagnosis: Impairment of mobility, safety and Activities of Daily Living (ADLs) due to Spinal Cord Dysfunction:  Non-Traumatic:  04 1211  Quadriplegia, Incomplete C1-4 - Examines C1 AIS D  Has some pinprick impairment at C2 and C3 bilaterally - but not clear to me that this is related to his radiographic findings  History of Present Illness:   Laly Hernandez is a 69 y o  male with a medical history of T2DM, HTN, HLD, Obesity and lumbar spinal stenosis, history of smoking (47 pack years in remission since 2021) who presented to the [address] [place] [address] on 5/10 for planned posterior cervical decompression and fusion for cervical spinal cord compression and myelopathy 2/2 severe spinal stenosis resulting in gait dysfunction  He performed a posterior cervical decompressive laminectomy with instrumented fusion from C4-T1  No significant alterations in somatosensory and motor evoked potentials throughout the case  Post-op with C-spine precautions, but no need for collar  Post-op APS was consulted for pain who started him a on a methadone taper from 5/11-5/13  IV pain meds were discontinued on 5/12  He has been comfortable outside of achy neck pain  Started on a lido patch yesterday  Admitted to the [facility] on 5/13/2023  Subjective: Overall is doing well and reports very minimal pain  His symptoms began about 5 years ago with progressive R hand weakness  He had an EMG done and had a carpal tunnel release with some improvement, but overall the weakness continued to progress, and then started to be accompanied by spasms where his entire hand would clench up  He also had some progressive gait difficulties   He denies bowel issues at home (eats a fiber rich diet which helps), but closer to his surgery had started to develop urinary frequency and urgency leading up to his surgery  This would occur primarily in the mornings, and he reports that since surgery, it seems to have resolved  He continues with impaired UE > LE sensation and weakness, and continued weakness in his R hand - although he thinks he may be getting some improvement in dexterity in his R hand  He denies any new CP, SOB, fevers, chills, N/V, abdominal pain  Last BM was yesterday and did require a suppository  Review of Systems: A 10-point review of systems was performed  Negative except as listed above  Plan:     * Incomplete spinal cord injury at C1-C4 level without bone injury [facility])  Assessment & Plan  Patient actually examines on ISNCSCI with C1 AIS D with profound loss of pinprick sensation starting at C2    - LT impairment begins at C5 bilaterally - so if C2/C3 impairment is not related to cord, he is at best C4    - However, given no other potential cause at this time - would label him C1 AIS D   - Maybe developing some neurogenic bladder/NDO prior to surgery which has improved   - Checking PVRs x3 to ensure no retention  - Has some constipation post-op, but at risk for neurogenic bowel - monitor bowel function   - Monitor autonomic function and check orthostatics given his complaints of occasional dizziness when standing     - ACE and/or JAVAN stockings as needed  - Has spasticity in his R hand which has improved since surgery  - Monitor  May benefit from resting hand splint, potentially stim in therapies  - Monitor tone, which is located primarily in his FDS, maybe some FDP    - PT/OT 3-5 hours/day, 5-7 days/week  Spondylosis of cervical spine with myelopathy  Assessment & Plan  MRI showed Multilevel cervical spondylosis, Multifactorial disease results in overall severe canal stenosis with underlying cord compression at C5-C6 and C6-C7  Associated cord edema and/or myelomalacia is present at these levels   No abnormal enhancement identified within the spinal canal   Symptoms: possibly neurogenic bladder leading up to surgery, R hand spasticity (clenched fist), R hand weakness and sensory impairment, Gait impairment/balance issues  - Examines with bilateral UE sensory impairment and R > L weakness, with some mild RLE > LLE weakness    - Also examines with diffuse hyperreflexia  - See SCI for more details  Admitted on 5/10 for planned Posterior cervical decompressive laminectomy with instrumented fusion from C4-T1 with Dr Sheldon Ignacio  No need for cervical collar per Neurosurgery  C-Spine precautions  Drain removed  Staples/Drain suture in place  Pain management as detailed below  Two week f/u with Neurosurgery on 5/24  PT/OT - 3-5 hours/day, 5-7 days/week in comprehensive acute inpatient rehab    Spinal stenosis of lumbar region at multiple levels  Assessment & Plan  Symptoms and weakness he reports improved with physical therapy  Pain management as detailed below  Tobacco dependence syndrome  Assessment & Plan  In remission  47 pack year history  Quit in 2021  Type 2 diabetes mellitus without complication, without long-term current use of insulin [facility])  Assessment & Plan  Lab Results   Component Value Date    HGBA1C 6 0 (H) 05/02/2023       Recent Labs     05/13/23  1616 05/13/23  2100 05/14/23  0606 05/14/23  1026   POCGLU 198* 195* 152* 201*       Blood Sugar Average: Last 72 hrs:  Home: Glimepiride 2mg with breakfast, Pioglitazone-Metformin 15-500mg BID  Here: Metformin 500mg BID; CDI/Accuchecks  Will try to get to home regimen  Diabetic Diet  Nutrition consult  IM consulted to assist with management  Primary hypertension  Assessment & Plan  Home: Amlodipine- Benzapril 5-20mg tablet once daily  Here: Amlodipine 5mg, Lisinopril 20mg  For therapeutic substitution  Monitor and adjust as appropriate   - Has reported some dizziness when standing too quickly   - Check Orthostatics  IM consulted to assist with management       Obesity, morbid ([address] )  Assessment & Plan  Counseling, Nutrition  Health Maintenance  #Delirium/Sleep: At risk  Optimize management of pain, bowel, bladder, and sleep-wake cycle  #Pain: Tylenol PRN, Lido patches,  Oxycodone 2 5-5mg PRN, Robaxin 250mg q6hr scheduled  #Bowel: Last BM 5/10/2023  Increase senna to two tabs  Continue docusate  Give miralax today  Has suppository and miralax ordered PRN  #Bladder: Voiding and continent of bladder  #Skin/Pressure Injury Prevention: Turn Q2hr in bed, with weight shifts [id number] in wheelchair  Float heels in bed  #DVT Prophylaxis: HSQ, SCDs  #GI Prophylaxis: PO diet  #Code Status:  Full Code  #FEN:  Diabetic Diet  #Dispo: 2 weeks goals to discharge home at modified [facility] level, able to navigate stairs  75 minutes or greater spent for this encounter which included a combination of face-to-face time with the patient and non-face-to-face time which in part specifically includes management of myelopathy, reviewing chart, performing ful isncsci, bowel/bladder management, acute pain management,  management of chronic comorbidities reviewd as above     Face-to-face time included extended discussion with patient regarding current condition, medical history, mood, medical/rehabilitation management, and disposition  Non face-to-face time included coordination of care with patient's co-managing AP and/or physician(s) thru communication and review of their recent documentation as well as reviewing vitals, bowel/bladder function, recent labs, diagnostic imaging, and notes from therapy, CM, and nursing       Drug regimen reviewed, all potential adverse effects identified and addressed:    Scheduled Meds:  Current Facility-Administered Medications   Medication Dose Route Frequency Provider Last Rate   • acetaminophen  975 mg Oral [facility] Amparo Wyatt MD     • [START ON 5/15/2023] amLODIPine  5 mg Oral Daily Amparo Wyatt MD      And   • [START ON 5/15/2023] lisinopril  20 mg Oral Daily Amparo Wyatt MD • bisacodyl  10 mg Rectal Daily PRN Jorge Hassan MD     • calcium carbonate  750 mg Oral TID PRN Jorge Hassan MD     • docusate sodium  100 mg Oral BID Jorge Hassan MD     • heparin (porcine)  5,000 Units Subcutaneous [facility] Jorge Hassan MD     • insulin lispro  1-5 Units Subcutaneous TID [facility] Jorge Hassan MD     • insulin lispro  1-5 Units Subcutaneous HS Jorge Hassan MD     • methocarbamol  250 mg Oral Q6H [address] Jorge Hassan MD     • naloxone  0 04 mg Intravenous Q1MIN PRN Jorge Hassan MD     • ondansetron  4 mg Oral Q6H PRN Jorge Hassan MD     • oxyCODONE  2 5 mg Oral Q4H PRN Jorge Hassan MD      Or   • oxyCODONE  5 mg Oral Q4H PRN Jorge Hassan MD     • [START ON 5/15/2023] polyethylene glycol  17 g Oral Daily Jorge Hassan MD     • senna  2 tablet Oral HS Jorge Hassan MD          Restrictions include:  C-Spine precautions, Fall precautions      Functional History/Home Set-up - Prior to Admission:    Lives alone  Multi-level/split level home - 6+6 steps to bed level  Has a walker/cane  Independent with driving, medication management, meal prep  Independent with ADLs and mobility - used RW to walk  Is retired  Functional Status Upon Admission to ARC:  Mobility: Erin ambulation with RW 55' x2  Transfers: Erin  ADLs: Ind eating, Sup grooming, Erin UB bathing/dressing, modA LB bathing/dressing, modA toileting       Physical Exam:  Temp:  [97 6 °F (36 4 °C)-98 2 °F (36 8 °C)] 97 6 °F (36 4 °C)  HR:  [85-95] 95  Resp:  [18] 18  BP: (112-140)/(56-74) 118/56  SpO2:  [93 %-95 %] 94 %    Gen: No acute distress, Well-nourished, well-appearing  HEENT: Moist mucus membranes, Normocephalic/Atraumatic  Cardiovascular: Regular rate, rhythm, S1/S2  Distal pulses palpable  Heme/Extr: No edema  Pulmonary: Non-labored breathing  Lungs CTAB  : No maher  GI: Soft, non-tender, non-distended   BS+  MSK: ROM is WFL in all extremities - except in R hand which due to tone/weakness tends to hang out with PIP in flexion  See below for MMT scores  Integumentary: Skin is warm, dry  Incision site intact and healing well  No dehiscence, drainage, or signs of active infection  Drain suture in place  Sacrum intact  Neuro: AAOx3, CN 2-12 intact  Expedited ISNCSCI (International Standards for Neurological Classification of Spinal Cord Injury)    ISNCSCI Exam:  Sensory level intact to C2 right and C2  left with partial  LT/PP sensation below (see ISNCSCI)   - Of note, PP is more affected than LT     - PP impairment begins at C2 bilaterally with inability to distinguish consistently between sharp/dull until we get to approximately T1 bilaterally - where it is impaired but he is able to distinguish   - LT impairment begins C5 bilaterally, but not absent  ISNCSCI Motor Scores    Right  Left   Biceps (C5) 5 5   Wrist Extensors (C6) 5 5   Triceps (C7) 3 3   Finger Flexors (C8) 2 3   Finger Abductors (T1) 1 1   Totals 16 17                 Right Left   Hip Flexors (L2) 4 5   Knee Extensors (L3) 5 5   Dorsiflexors (L4) 5 5   EHL (L5) 4 5   Plantar Flexors (S1) 5 5   Totals 23 25     Rectal: Deferred    ELVIRA Impairment Scale Grade (AIS):  C1 AIS D    DTR:     Bic BR  Tri  Knee  Ankle     Right 3 3 0 3 1   Left  3 3 3 3 1   Reflexes    [place] Clonus Plantar Response DPR ACR BCR   Right + - upgoing - NT NT   Left  + - mute - NT NT     Cross adductor present on R  Psych: Normal mood and affect         Laboratory:    Results from last 7 days   Lab Units 05/12/23  1102 05/11/23  0920   HEMOGLOBIN g/dL 13 7 14 1   HEMATOCRIT % 40 8 43 1   WBC Thousand/uL 13 09* 18 52*     Results from last 7 days   Lab Units 05/11/23  0557 05/10/23  1000   BUN mg/dL 23 26*   SODIUM mmol/L 135 138   POTASSIUM mmol/L 4 1 4 1   CHLORIDE mmol/L 104 107   CREATININE mg/dL 1 07 1 03     Results from last 7 days   Lab Units 05/11/23  0759 05/10/23  1000   PROTIME seconds 14 5 13 3   INR  1 11 0 99        Wt Readings from Last 1 "Encounters:   05/14/23 111 kg (244 lb 11 4 oz)     Estimated body mass index is 39 5 kg/m² as calculated from the following:    Height as of this encounter: 5' 6\" (1 676 m)  Weight as of this encounter: 111 kg (244 lb 11 4 oz)  Imaging: reviewed   2/22/2023 - MRI C-Spine:  Multilevel cervical spondylosis, as described above  Multifactorial disease results in overall severe canal stenosis with underlying cord compression at C5-C6 and C6-C7  Associated cord edema and/or myelomalacia is present at these levels  No abnormal enhancement identified within the spinal canal     5/11/2023 XR C-Spine:  Postoperative changes reflecting posterior instrumented fusion C4-T1  Rehabilitation Prognosis: good     Tolerance for three hours of therapy a day: good     Family/Patient Goals:  Patient/family's goals: Return to previous home/apartment  Patient will receive PT and OT 90 minutes each per day, five days per week to achieve rehab goals or participate in 900 minutes of therapy within a 7 day week period  Mobility Goals: Modified [place]  Transfer Goals: Modified [place]  Activities of Daily Living (ADLs) Goals: Modified [place]    Discharge Planning:  Rehabilitation and discharge goals discussed with the patient and/or family  Case Managment and Social Work to review patient/family resources and to coordinate Discharge Planning  Estimated length of stay: 2 weeks    Patient and Family Education and Training:  Rehabilitation and discharge goals discussed with the patient and/or family  Patient/family education/training needs to be discussed in weekly team meeting      Equipment/DME needs: Therapy teams to assess and evaluate for additional equipment/DME needs throughout rehabilitation stay    Past Medical History:   Past Surgical History:   Family History:   Social history:   Past Medical History:   Diagnosis Date   • Diabetes mellitus ([facility] [address] )    • ED (erectile dysfunction)    • " Hyperlipidemia    • Hypertension    • Obesity    • Spinal stenosis of lumbar region at multiple levels 2022    Past Surgical History:   Procedure Laterality Date   • CARPAL TUNNEL RELEASE Right    • EYE SURGERY      strabibusmus   • HAND SURGERY      left index finger   • NJ ARTHRD PST/PSTLAT TQ 1NTRSPC CRV BELW C2 SEGMENT N/A 5/10/2023    Procedure: Posterior cervical decompressive laminectomy and instrumented fusion C4-T1; Surgeon: Deshawn Calero MD;  Location: [facility];  Service: Neurosurgery   • NJ EXCISION SPERMATOCELE W/WO EPIDIDYMECTOMY Left 2021    Procedure: HYDROCELECTOMY, SCROTAL DEBRIDEMENT;  Surgeon: Jorge A Hurtado MD;  Location: [address] [place] [address];  Service: Urology   • NJ NEUROPLASTY &/TRANSPOS MEDIAN NRV Leif Gatica Right 2021    Procedure: RELEASE CARPAL TUNNEL;  Surgeon: Alfa Li MD;  [facility];  Service: Orthopedics   • NJ NEUROPLASTY &/TRANSPOSITION ULNAR NERVE ELBOW Right 2021    Procedure: RELEASE CUBITAL TUNNEL;  Surgeon: Alfa Li MD;  [facility];  Service: Orthopedics     Family History   Problem Relation Age of Onset   • Hypertension Mother    • Diabetes Mother    • Hypertension Father    • Diabetes Father       Social History     Socioeconomic History   • Marital status:       Spouse name: Not on file   • Number of children: Not on file   • Years of education: Not on file   • Highest education level: Not on file   Occupational History   • Not on file   Tobacco Use   • Smoking status: Former     Packs/day: 1 00     Years: 47 00     Pack years: 47 00     Types: Cigarettes     Quit date: 2021     Years since quittin 3   • Smokeless tobacco: Never   • Tobacco comments:     advised not to smoke   Vaping Use   • Vaping Use: Never used   Substance and Sexual Activity   • Alcohol use: Not Currently     Comment: social   • Drug use: Not Currently   • Sexual activity: Not Currently   Other Topics Concern • Not on file   Social History Narrative   • Not on file     Social Determinants of Health     Financial Resource Strain: Low Risk    • Difficulty of Paying Living Expenses: Not very hard   Food Insecurity: No Food Insecurity   • Worried About Running Out of Food in the Last Year: Never true   • Ran Out of Food in the Last Year: Never true   Transportation Needs: No Transportation Needs   • Lack of Transportation (Medical): No   • Lack of Transportation (Non-Medical): No   Physical Activity: Not on file   Stress: Not on file   Social Connections: Not on file   Intimate Partner Violence: Not on file   Housing Stability: Unknown   • Unable to Pay for Housing in the Last Year: No   • Number of Places Lived in the Last Year: Not on file   • Unstable Housing in the Last Year: No          Current Medical Diagnosis Allergies   Patient Active Problem List   Diagnosis   • Cubital tunnel syndrome on right   • Carpal tunnel syndrome, left   • Obesity, morbid ([facility] [address] )   • Carpal tunnel syndrome of right wrist   • Atrophy of muscle of right hand   • Primary hypertension   • Type 2 diabetes mellitus without complication, without long-term current use of insulin (HCC)   • Chronic bilateral low back pain without sciatica   • Mixed hyperlipidemia   • Tobacco dependence syndrome   • Spinal stenosis of lumbar region at multiple levels   • Cervical spinal cord compression (HCC)   • Spinal cord injury at C5-C7 level without injury of spinal bone (HCC)   • Neurogenic claudication   • Preoperative clearance    No Known Allergies        Medical Necessity Criteria for [facility] Admission: He is of Moderate Risk due to the following comorbid conditions: risk of neurogenic bowel/bladder, autonomic dysfunction, sensory/motor weakness related of compression of elements in canal, acute pain, incisional care, risk of Vte, risk of delirium, HTN, T2DM with increased risk of infection, HLD, leukocytosis    In addition, the preadmission screen, post-admission physical evaluation, overall plan of care and admissions order demonstrate a reasonable expectation that the following criteria were met at the time of admission to the [facility]  1  The patient requires active and ongoing therapeutic intervention of multiple therapy disciplines (physical therapy, occupational therapy, speech-language pathology, or prosthetics/orthotics), one of which is physical or occupational therapy  2  Patient requires an intensive rehabilitation therapy program, as defined in Chapter 1, section 110 2 2 of the CMS Medicare Policy Manual  This intensive rehabilitation therapy program will consist of at least 3 hours of therapy per day at least 5 days per week or at least 15 hours of intensive rehabilitation therapy within a 7 consecutive day period, beginning with the date of admission to the [facility]  3  The patient is reasonably expected to actively participate in, and benefit significantly from, the intensive rehabilitation therapy program as defined in Chapter 1, section 110 2 2 of the CMS Medicare Policy Manual at this time of admission to the [facility]  He can reasonably be expected to make measurable improvement (that will be of practical value to improve the patient’s functional capacity or adaptation to impairments) as a result of the rehabilitation treatment, as defined in section 110 3, and such improvement can be expected to be made within the prescribed period of time  As noted in the CMS Medicare Policy Manual, the patient need not be expected to achieve complete independence in the domain of self-care nor be expected to return to his or her prior level of functioning in order to meet this standard  4  The patient must require physician supervision by a rehabilitation physician   As such, a rehabilitation physician will conduct face-to-face visits with the patient at least 3 days per week throughout the patient’s stay in the [facility] to assess the patient both medically and functionally, as well as to modify the course of treatment as needed to maximize the patient’s capacity to benefit from the rehabilitation process  5  The patient requires an intensive and coordinated interdisciplinary approach to providing rehabilitation, as defined in Chapter 1, section 110 2 5 of the CMS Medicare Policy Manual  This will be achieved through periodic team conferences, conducted at least once in a 7-day period, and comprising of an interdisciplinary team of medical professionals consisting of: a rehabilitation physician, registered nurse,  and/or , and a licensed/certified therapist from each therapy discipline involved in treating the patient  Yeimi Welch MD  Physical Medicine and Rehabilitation    ** Please Note: Fluency Direct voice to text software may have been used in the creation of this document   **

## 2023-05-14 NOTE — DISCHARGE SUMMARY
Discharge Summary - Neurosurgery   Christiano Murrell 69 y o  male MRN: [id number]  Unit/Bed#: -01 Encounter: [id number]    Admission Date:   Admission Orders (From admission, onward)     Ordered        05/10/23 1412  Inpatient Admission  Once                       Discharge Date: 5/14/2023    Admitting Diagnosis: Cervical spinal cord compression ([address] ) [G95 20]  Spinal cord injury at C5-C7 level without injury of spinal bone ([address] ) [S14 105A]  Neurogenic claudication [R29 818]    Discharge Diagnosis: s/p PCDF from C4-T1    Medical Problems     Resolved Problems  Date Reviewed: 5/4/2023   None       Attending: Dr Alex Terry Physician(s):   - APS   - PMR   - PT/OT   - social work     Procedures Performed: No orders of the defined types were placed in this encounter  Pathology: N/A    Hospital Course: Patient is a 64-year-old male with a PMH significant for DMII who has been following with Dr Travis Robertson for history of neurogenic claudication with increasing difficulty with ambulation and severe spinal stenosis at the cervical thoracic junction  Patient presented to the [address] [place] [address] on 5/10 for elective PC DF from C4-T1  No Intra-Op complications  1 posterior HILLARY drain left in place  Patient was admitted to the hospital for recovery postoperatively  Hospital course without complications  Drain with high output that eventually slowed and was removed on postop day 4  Patient states he had improvement in his upper extremity sensory loss and weakness  However some of his baseline deficits did remain  Patient worked with PT/OT and was recommended discharge to rehab  Patient was ultimately excepted at [address]  Patient was tolerating a diet, urinating without difficulty, and having bowel movements  Patient was ultimately cleared for discharge on postop day 4 to rehab  Postop appointments were scheduled to be completed in the office    Patient agreeable with plan to discharge to rehab   Discharge orders placed  Condition at Discharge: good     Discharge instructions/Information to patient and family:   See after visit summary for information provided to patient and family  Provisions for Follow-Up Care:  See after visit summary for information related to follow-up care and any pertinent home health orders  Disposition: Short-term rehab at [facility] Readmission: No    Discharge Statement   I spent 25 minutes discharging the patient  This time was spent on the day of discharge  I had direct contact with the patient on the day of discharge  Additional documentation is required if more than 30 minutes were spent on discharge  Discharge Medications:  See after visit summary for reconciled discharge medications provided to patient and family

## 2023-05-14 NOTE — TREATMENT PLAN
Individualized Plan of [facility] Ranjan 69 y o  male MRN: [id number]  Unit/Bed#: -01 Encounter: [id number]     PATIENT INFORMATION  ADMISSION DATE: 5/14/2023 12:49 PM BREN CATEGORY:Spinal Cord Dysfunction:  Non-Traumatic:  04 1211  Quadriplegia, Incomplete C1-4   ADMISSION DIAGNOSIS: Degenerative arthritis of cervical spine with cord compression [M47 12]  EXPECTED LOS: 10 to 14 days     MEDICAL/FUNCTIONAL PROGNOSIS  Based on my assessment of the patient's medical conditions and current functional status, the prognosis for attaining medical and functional goals or the IRF stay is:  Good    Medical Goals: Patient will be able to manage medical conditions and comorbid conditions with medications and follow up upon completion of rehab program       1  Adequate pain control  2  Prevention of VTE  3  Adequate secretion management, and monitoring of respiratory status  4  Bowel/bladder management  5  Maintain appropriate nutrition and hydration for healing and hemodynamic stability  6  Emotional adaptation to impairment  7  Monitor for polypharmacy  8  Fall prevention  9  Patient and family caregiver training/education  10  Skin protection and prevention of pressure injury  11  Incisional care to prevent infection/dehiscence  12  Prevention of delirium  13  Appropriate management of chronic comorbidities  ANTICIPATED DISCHARGE DISPOSITION AND SERVICES  COMMUNITY SETTING: Home - independent/modified independent    ANTICIPATED FOLLOW-UP SERVICE:   Outpatient Therapy Services: PT and OT         DISCIPLINE SPECIFIC PLANS:  Required Disciplines & Services: Rehabillitation Nursing    REQUIRED THERAPY:  Therapy Hours per Day Days per Week Total Days   Physical Therapy 1 5 5 5   Occupational Therapy 1 5 5 5   NOTE: Additional therapy time(s) or changes to allocation of therapies as appropriate to meet patient needs and to achieve functional goals      Patient will participate in above therapy regimen consisting of PT and OT due to the following medical procedure/condition:Spinal Cord Dysfunction:  Non-Traumatic:  04 1211  Quadriplegia, Incomplete C1-4    ANTICIPATED FUNCTIONAL OUTCOMES:  ADL:  mod ind     Bladder/Bowel: mod Ind     Transfers:  mod Ind   Locomotion:  mod Ind   Cognitive:  premorbid     DISCHARGE PLANNING NEEDS  Equipment needs: Discharge needs to be reviewed with team      REHAB ANTICIPATED PARTICIPATION RESTRICTIONS:  C-spine precautions

## 2023-05-14 NOTE — CASE MANAGEMENT
Case Management Progress Note    Patient name Nyasia Mims  Location [phone] MRN [id number]  : 1955 Date 2023       LOS (days): 4  Geometric Mean LOS (GMLOS) (days): 4 90  Days to GMLOS:1 1        OBJECTIVE:        Current admission status: Inpatient  Preferred Pharmacy:   [facility] [address]  Phone: [phone] Fax: [address] , [facility] [place] [address] [facility] [address] [address] [facility] [address] [address]  Phone: [phone] Fax: [phone]    Primary Care Provider: Barry Wells MD    Primary Insurance: MEDICARE  Secondary Insurance: BLUE CROSS    PROGRESS NOTE:    Patient accepted at [address] [place] [address] [facility] with admissions confirmed able to accept today  Per provider, medically stable to transfer to [facility]  VM left for patient's brother to notify of transfer to [facility]      Cam Mc, LSW, ACSW, ACM, [facility]  23 10:32 AM

## 2023-05-14 NOTE — ASSESSMENT & PLAN NOTE
Home: Amlodipine- Benzapril 5-20mg tablet once daily  Here: Amlodipine 5mg daily, Lisinopril 10 mg daily

## 2023-05-14 NOTE — ASSESSMENT & PLAN NOTE
MRI showed Multilevel cervical spondylosis, Multifactorial disease results in overall severe canal stenosis with underlying cord compression at C5-C6 and C6-C7  Associated cord edema and/or myelomalacia is present at these levels  No abnormal enhancement identified within the spinal canal   Symptoms: possibly neurogenic bladder leading up to surgery, R hand spasticity (clenched fist), R hand weakness and sensory impairment, Gait impairment/balance issues  - Examines with bilateral UE sensory impairment and R > L weakness, with some mild RLE > LLE weakness    - Also examines with diffuse hyperreflexia  - See SCI for more details  Admitted on 5/10 for planned Posterior cervical decompressive laminectomy with instrumented fusion from C4-T1 with Dr Archie Durant  No need for cervical collar per Neurosurgery  C-Spine precautions  Drain removed    2 week follow-up with NSGY today

## 2023-05-14 NOTE — ASSESSMENT & PLAN NOTE
POD#4 - s/p Posterior cervical decompressive laminectomy and instrumented fusion C4-T1 (DKO, 5/10)    Imaging:  · XR cervical spine, 5/11/23: Postoperative changes reflecting posterior instrumented fusion C4-T1  Plan:  · Continue to monitor neurological exam  · Continue frequent neuro checks   · Maintain normotensive BP goals, MAP > 65   · No further neurosurgical intervention indicated at this time   · Case and imaging reviewed this am on rounds   · Pt doing well post-op, pain well controlled, he has ambulated with assistance in the halls   · 5/14 - s/p removal of posterior HILLARY at bedside   · No collar indicated  · Labs/Vitals:    · VSS overnight, afebrile, normotensive, tolerating room air   · Pain control:  · APS following --> pt received intra-op methadone   · Completed methadone taper   · Pain well controlled at this time --> not been requiring additional IV or PO narcotics   · Continue sched tylenol 975mg q 8hrs   · Continue sched robaxin 250mg q 6hrs   · Prn oxycodone 2 5/5mg q 4hrs prn mod-severe pain   · Bowel regimen: senna, colace, daily miralax prn dulcolax   · Advance as needed   · Pt reports BM yesterday evening   · DVT ppx: SCDs, SQH   · Continue medical management   · Home medications ordered  · SSI ordered for diabetes management   · PT OT evaluation --> acute rehab recommended  · Social work following for assistance with dispo once medically cleared   · Patient medically cleared for discharge to [facility] rehab  · Will discuss with social work again today to see if bed ready/patient excepted    Neurosurgery will continue to follow as primary team   Anticipate discharge to rehab today  Please call questions or concerns

## 2023-05-14 NOTE — ASSESSMENT & PLAN NOTE
Lab Results   Component Value Date    HGBA1C 6 0 (H) 05/02/2023       Home: Glimepiride 2mg with breakfast, Pioglitazone-Metformin 15-500mg BID  Here: Metformin 500mg BID; pioglitazone 15 mg BID, CDI/Accuchecks  Will try to get to home regimen  Diabetic Diet  Nutrition consult  IM consulted to assist with management

## 2023-05-14 NOTE — ASSESSMENT & PLAN NOTE
Lab Results   Component Value Date    HGBA1C 6 0 (H) 05/02/2023       Recent Labs     05/13/23  1053 05/13/23  1616 05/13/23  2100 05/14/23  0606   POCGLU 178* 198* 195* 152*       Blood Sugar Average: Last 72 hrs:  (P) 539 [id number]     SSI ordered  - will continue home regimen upon discharge   - encourage continued outpatient follow-up with PCP Abdominal Pain, N/V/D

## 2023-05-14 NOTE — CONSULTS
Internal Medicine Consultation Note    Patient: Kimberlee Spicer  Age/sex: 69 y o  male  Medical Record #: [id number]      ASSESSMENT PLAN    S/p posterior C4-T1 decompressive laminectomy and instrumented fusion  · No collar needed  · Monitor incision  · Therapy and pain control per PMR  HTN  · Home: Lotrel 5-20 daily  · Here: amlopdipine 5mg daily/lisinopril 20mg daily  · Monitor BP and adjust medications as needed  DM type 2   · HA1C 6   · Home: Amaryl 2mg daily/pioglitazone-metformin  2x daily  · Here: SSI  · Will add back metformin  · Continue SSI and accuchecks  · DM diet  Obesity  · BMI 39 5  · Recommend ongoing attempts at weight loss  Subjective/ HPI: Patient seen and examined  He is a 67yo male, with HTN, morbid obesity and DM type2, who presented 5/10/23 for an elective posterior cervical decompressive laminectomy and instrumented fusion C4-T1 by Dr Malachi German  He had known severe spinal stenosis at the cervical thoracic junction  He had developed worsening ambulation with near falls  He tolerated the surgery well  He had leukocytosis post-op without evidence of infx  He was determined to be stable for transfer to the [facility] 23  IM consulted for medical management  ROS:   A 10 point ROS was performed; negative except as noted above       Social History:    Substance Use History:   Social History     Substance and Sexual Activity   Alcohol Use Not Currently    Comment: social     Social History     Tobacco Use   Smoking Status Former   • Packs/day: 1 00   • Years: 47 00   • Pack years: 47 00   • Types: Cigarettes   • Quit date: 2021   • Years since quittin 3   Smokeless Tobacco Never   Tobacco Comments    advised not to smoke     Social History     Substance and Sexual Activity   Drug Use Not Currently       Family History:    Family History   Problem Relation Age of Onset   • Hypertension Mother    • Diabetes Mother    • Hypertension Father    • Diabetes Father Review of Scheduled Meds:      Labs:     Results from last 7 days   Lab Units 23  1102 23  0920   WBC Thousand/uL 13 09* 18 52*   HEMOGLOBIN g/dL 13 7 14 1   HEMATOCRIT % 40 8 43 1   PLATELETS Thousands/uL 286 293     Results from last 7 days   Lab Units 23  0557 05/10/23  1000   SODIUM mmol/L 135 138   POTASSIUM mmol/L 4 1 4 1   CHLORIDE mmol/L 104 107   CO2 mmol/L 25 26   BUN mg/dL 23 26*   CREATININE mg/dL 1 07 1 03   CALCIUM mg/dL 10 1 10 4*         Results from last 7 days   Lab Units 23  0759 05/10/23  1000   INR  1 11 0 99        Results from last 7 days   Lab Units 23  1026 23  0606 23  2100   POC GLUCOSE mg/dl 201* 152* 195*       No results found for: Hector Coles, WOUNDCULT, SPUTUMCULTUR    Input and Output Summary (last 24 hours):     No intake or output data in the 24 hours ending 23 1254    Imaging:     No orders to display       *Labs /Radiology studiesLabs reviewed  *Medications reviewed and reconciled as needed  *Please refer to order section for additional ordered labs studies  *Case discussed with primary attending during morning huddle case rounds    Vitals:   Temp (24hrs), Av °F (36 7 °C), Min:97 6 °F (36 4 °C), Max:98 2 °F (36 8 °C)    Temp:  [97 6 °F (36 4 °C)-98 2 °F (36 8 °C)] 97 6 °F (36 4 °C)  HR:  [85-95] 95  Resp:  [18] 18  BP: (112-140)/(56-74) 118/56  SpO2:  [93 %-95 %] 94 %  Body mass index is 39 5 kg/m²  Physical Exam:   HEENT:  Head: Normocephalic, no lesions, without obvious abnormality  CARDIAC:  regular rate and rhythm, S1, S2 normal, no murmur, click, rub or gallop  LUNGS:  normal air entry, lungs clear to auscultation  ABDOMEN:  soft, non-tender  Bowel sounds normal  No masses, no organomegaly  EXTREMITIES:  extremities normal, warm and well-perfused; no cyanosis, clubbing, or edema  NEURO:   mental status, speech normal, alert and oriented x3 and Increased tone Rt UE  Hyper-reflexia  Rt toe upgoing  Lt mute  Decreased sensation to light touch and unable to distinguish between sharp and dull  PSYCH:  Alert and oriented, appropriate affect  INCISION:  healing well and staples      Invasive Devices     Drain  Duration           Open Drain Left Groin 605 days                 VTE Pharmacologic Prophylaxis: Heparin  Code Status: Prior  Current Length of Stay: 0 day(s)    Total floor / unit time spent today 1 hour with more than 50% spent counseling/coordinating care  Counseling includes discussion with patient re: progress  and discussion with patient of his/her current medical state/information  Coordination of patient's care was performed in conjunction with primary service  Time invested included review of patient's labs, vitals, and management of their comorbidities with continued monitoring  In addition, this patient was discussed with medical team including physician and advanced extenders  The care of the patient was extensively discussed and appropriate treatment plan was formulated unique for this patient by supervising physician unless stated otherwise in their attestation statement  ** Please Note: voice to text software may have been used in the creation of this document   Audio transcription errors may occur**

## 2023-05-14 NOTE — ASSESSMENT & PLAN NOTE
Patient actually examines on ISNCSCI with C1 AIS D with profound loss of pinprick sensation starting at C2    - LT impairment begins at C5 bilaterally - so if C2/C3 impairment is not related to cord, he is at best C4    - However, given no other potential cause at this time - would label him C1 AIS D   - Maybe developing some neurogenic bladder/NDO prior to surgery which has improved   - Checking PVRs x3 to ensure no retention - PVRs low   - Has some constipation post-op, but at risk for neurogenic bowel - monitor bowel function   - Monitor autonomic function and check orthostatics given his complaints of occasional dizziness when standing     - ACE and/or JAVAN stockings as needed  - Has spasticity in his R hand which has improved since surgery  - Monitor  May benefit from resting hand splint, potentially stim in therapies  - Monitor tone, which is located primarily in his FDS, maybe some FDP    - PT/OT 3-5 hours/day, 5-7 days/week

## 2023-05-14 NOTE — PROGRESS NOTES
[facility]  Progress Note  Name: Glover Crigler  MRN: [id number]  Unit/Bed#: -77 I Date of Admission: 5/10/2023   Date of Service: 5/14/2023 I Hospital Day: 4    Assessment/Plan   * Cervical spinal cord compression [facility])  Assessment & Plan  POD#4 - s/p Posterior cervical decompressive laminectomy and instrumented fusion C4-T1 (DKO, 5/10)    Imaging:  · XR cervical spine, 5/11/23: Postoperative changes reflecting posterior instrumented fusion C4-T1  Plan:  · Continue to monitor neurological exam  · Continue frequent neuro checks   · Maintain normotensive BP goals, MAP > 65   · No further neurosurgical intervention indicated at this time   · Case and imaging reviewed this am on rounds   · Pt doing well post-op, pain well controlled, he has ambulated with assistance in the halls   · 5/14 - s/p removal of posterior HILLARY at bedside   · No collar indicated  · Labs/Vitals:    · VSS overnight, afebrile, normotensive, tolerating room air   · Pain control:  · APS following --> pt received intra-op methadone   · Completed methadone taper   · Pain well controlled at this time --> not been requiring additional IV or PO narcotics   · Continue sched tylenol 975mg q 8hrs   · Continue sched robaxin 250mg q 6hrs   · Prn oxycodone 2 5/5mg q 4hrs prn mod-severe pain   · Bowel regimen: senna, colace, daily miralax prn dulcolax   · Advance as needed   · Pt reports BM yesterday evening   · DVT ppx: SCDs, SQH   · Continue medical management   · Home medications ordered  · SSI ordered for diabetes management   · PT OT evaluation --> acute rehab recommended  · Social work following for assistance with dispo once medically cleared   · Patient medically cleared for discharge to [facility] rehab  · Will discuss with social work again today to see if bed ready/patient excepted    Neurosurgery will continue to follow as primary team   Anticipate discharge to rehab today    Please call questions or "concerns  Type 2 diabetes mellitus without complication, without long-term current use of insulin [facility])  Assessment & Plan  Lab Results   Component Value Date    HGBA1C 6 0 (H) 05/02/2023       Recent Labs     05/13/23  1053 05/13/23  1616 05/13/23  2100 05/14/23  0606   POCGLU 178* 198* 195* 152*       Blood Sugar Average: Last 72 hrs:  (P) 219 [id number]     SSI ordered  - will continue home regimen upon discharge   - encourage continued outpatient follow-up with PCP        Subjective/Objective   Chief Complaint: \" Good morning\"    Subjective: Patient seen and examined this a m  on rounds  No acute events overnight  HILLARY drain with minimal output overnight and removed at the bedside this morning  Patient states he is a little bit of neck and shoulder pain but overall it feels well controlled  Patient admits to having a bowel movement overnight  Patient states he is ready to be discharged to rehab  He is motivated to get better and back home       Objective: Pleasant, middle-aged male sitting up comfortably in bed  No acute distress  I/O       05/12 0701 05/13 0700 05/13 0701  05/14 0700    P  O  540 225    Total Intake(mL/kg) 540 (6 8) 225 (2)    Urine (mL/kg/hr) 100 (0 1) 400 (0 2)    Drains 110 25    Stool  0    Total Output 210 425    Net +330 -200          Unmeasured Urine Occurrence  3 x    Unmeasured Stool Occurrence  1 x        Invasive Devices     Peripheral Intravenous Line  Duration           Peripheral IV 05/10/23 Left Arm 3 days    Peripheral IV 05/10/23 Right;Ventral (anterior) Forearm 3 days          Drain  Duration           Open Drain Left Groin 604 days    Closed/Suction Drain Right;Posterior Neck Bulb 3 days              Physical Exam:  Vitals: Blood pressure 138/74, pulse 89, temperature 98 2 °F (36 8 °C), resp  rate 21, height 5' 6\" (1 676 m), weight 111 kg (244 lb 11 4 oz), SpO2 93 %  ,Body mass index is 39 5 kg/m²      General appearance: alert, appears stated age, " cooperative and no distress  Head: Normocephalic, without obvious abnormality, atraumatic  Eyes: EOMI, PERRL  Neck: Posterior midline incision clean, dry, intact staple line  Right posterior HILLARY with minimal serosanguineous output to gravity  Removed at bedside today  Back: no kyphosis present, no tenderness to percussion or palpation  Lungs: non labored breathing, no respiratory distress on room air  Heart: regular heart rate  Neurologic:   Mental status: Alert, oriented x3, thought content appropriate  Cranial nerves: grossly intact (Cranial nerves II-XII)  Sensory:   - Mild decrease in sensation to bilateral feet/toes consistent with peripheral neuropathy  Mild decrease in sensation over the right lateral fingers which is chronic  - Otherwise sensation is equal and intact bilaterally throughout  Motor:   - Mild RUE weakness, R  4/5, R tricep 4/5    Right hand contracted at baseline   - Otherwise rest of right upper extremity with intact strength rated 5/5 throughout  - LUE with intact strength rated 5/5 throughout  - Silverio LE with intact strength rated 5/5 throughout  Reflexes: 2+ and symmetric  Coordination: finger to nose normal bilaterally, no drift bilaterally    Lab Results:  Results from last 7 days   Lab Units 05/12/23  1102 05/11/23  0920 05/10/23  1617   WBC Thousand/uL 13 09* 18 52*  --    HEMOGLOBIN g/dL 13 7 14 1  --    HEMATOCRIT % 40 8 43 1  --    PLATELETS Thousands/uL 286 293 259   NEUTROS PCT % 81*  --   --    MONOS PCT % 12  --   --      Results from last 7 days   Lab Units 05/11/23  0557 05/10/23  1000   POTASSIUM mmol/L 4 1 4 1   CHLORIDE mmol/L 104 107   CO2 mmol/L 25 26   BUN mg/dL 23 26*   CREATININE mg/dL 1 07 1 03   CALCIUM mg/dL 10 1 10 4*             Results from last 7 days   Lab Units 05/11/23  0759 05/10/23  1000   INR  1 11 0 99   PTT seconds 28  --      No results found for: TROPONINT  ABG:No results found for: PHART, SJU8HSL, PO2ART, UYA7AJU, M2YSZLWA, BEART, SOURCE    Imaging Studies: I have personally reviewed pertinent reports  and I have personally reviewed pertinent films in PACS    XR cervical spine 2 or 3 views    Result Date: 5/12/2023  Impression: Postoperative changes reflecting posterior instrumented fusion C4-T1  Workstation performed: [id number]     XR spine cervical 2 or 3 vw injury    Result Date: 5/11/2023  Impression: Fluoroscopic guidance provided for procedure guidance as above  Please refer to the separate procedure notes for additional details  Workstation performed: [id number]       EKG, Pathology, and Other Studies: I have personally reviewed pertinent reports        VTE Pharmacologic Prophylaxis: Sequential compression device (Venodyne)  and Heparin    VTE Mechanical Prophylaxis: sequential compression device

## 2023-05-14 NOTE — PLAN OF CARE
Problem: PAIN - ADULT  Goal: Verbalizes/displays adequate comfort level or baseline comfort level  Description: Interventions:  - Encourage patient to monitor pain and request assistance  - Assess pain using appropriate pain scale  - Administer analgesics based on type and severity of pain and evaluate response  - Implement non-pharmacological measures as appropriate and evaluate response  - Consider cultural and social influences on pain and pain management  - Notify physician/advanced practitioner if interventions unsuccessful or patient reports new pain  Outcome: Progressing     Problem: INFECTION - ADULT  Goal: Absence or prevention of progression during hospitalization  Description: INTERVENTIONS:  - Assess and monitor for signs and symptoms of infection  - Monitor lab/diagnostic results  - Monitor all insertion sites, i e  indwelling lines, tubes, and drains  - Monitor endotracheal if appropriate and nasal secretions for changes in amount and color  - [place] appropriate cooling/warming therapies per order  - Administer medications as ordered  - Instruct and encourage patient and family to use good hand hygiene technique  - Identify and instruct in appropriate isolation precautions for identified infection/condition  Outcome: Progressing     Problem: SAFETY ADULT  Goal: Patient will remain free of falls  Description: INTERVENTIONS:  - Educate patient/family on patient safety including physical limitations  - Instruct patient to call for assistance with activity   - Consult OT/PT to assist with strengthening/mobility   - Keep Call bell within reach  - Keep bed low and locked with side rails adjusted as appropriate  - Keep care items and personal belongings within reach  - Initiate and maintain comfort rounds  - Make Fall Risk Sign visible to staff  - Offer Toileting every  Hours, in advance of need  - Initiate/Maintain alarm  - Obtain necessary fall risk management equipment:   - Apply yellow socks and bracelet for high fall risk patients  - Consider moving patient to room near nurses station  Outcome: Progressing  Goal: Maintain or return to baseline ADL function  Description: INTERVENTIONS:  -  Assess patient's ability to carry out ADLs; assess patient's baseline for ADL function and identify physical deficits which impact ability to perform ADLs (bathing, care of mouth/teeth, toileting, grooming, dressing, etc )  - Assess/evaluate cause of self-care deficits   - Assess range of motion  - Assess patient's mobility; develop plan if impaired  - Assess patient's need for assistive devices and provide as appropriate  - Encourage maximum independence but intervene and supervise when necessary  - Involve family in performance of ADLs  - Assess for home care needs following discharge   - Consider OT consult to assist with ADL evaluation and planning for discharge  - Provide patient education as appropriate  Outcome: Progressing  Goal: Maintains/Returns to pre admission functional level  Description: INTERVENTIONS:  - Perform BMAT or MOVE assessment daily    - Set and communicate daily mobility goal to care team and patient/family/caregiver  - Collaborate with rehabilitation services on mobility goals if consulted  - Perform Range of Motion times a day  - Reposition patient every  hours    - Dangle patient  times a day  - Stand patient  times a day  - Ambulate patient times a day  - Out of bed to chair  times a day   - Out of bed for meal  times a day  - Out of bed for toileting  - Record patient progress and toleration of activity level   Outcome: Progressing     Problem: DISCHARGE PLANNING  Goal: Discharge to home or other facility with appropriate resources  Description: INTERVENTIONS:  - Identify barriers to discharge w/patient and caregiver  - Arrange for needed discharge resources and transportation as appropriate  - Identify discharge learning needs (meds, wound care, etc )  - Arrange for interpretive services to assist at discharge as needed  - Refer to Case Management Department for coordinating discharge planning if the patient needs post-hospital services based on physician/advanced practitioner order or complex needs related to functional status, cognitive ability, or social support system  Outcome: Progressing

## 2023-05-15 LAB
GLUCOSE SERPL-MCNC: 126 MG/DL (ref 65–140)
GLUCOSE SERPL-MCNC: 150 MG/DL (ref 65–140)
GLUCOSE SERPL-MCNC: 153 MG/DL (ref 65–140)
GLUCOSE SERPL-MCNC: 193 MG/DL (ref 65–140)

## 2023-05-15 RX ADMIN — DOCUSATE SODIUM 100 MG: 100 CAPSULE, LIQUID FILLED ORAL at 08:45

## 2023-05-15 RX ADMIN — AMLODIPINE BESYLATE 5 MG: 5 TABLET ORAL at 08:45

## 2023-05-15 RX ADMIN — METHOCARBAMOL 250 MG: 500 TABLET ORAL at 23:41

## 2023-05-15 RX ADMIN — LISINOPRIL 20 MG: 20 TABLET ORAL at 08:46

## 2023-05-15 RX ADMIN — INSULIN LISPRO 1 UNITS: 100 INJECTION, SOLUTION INTRAVENOUS; SUBCUTANEOUS at 22:23

## 2023-05-15 RX ADMIN — ACETAMINOPHEN 975 MG: 325 TABLET ORAL at 13:38

## 2023-05-15 RX ADMIN — DOCUSATE SODIUM 100 MG: 100 CAPSULE, LIQUID FILLED ORAL at 17:48

## 2023-05-15 RX ADMIN — POLYETHYLENE GLYCOL 3350 17 G: 17 POWDER, FOR SOLUTION ORAL at 08:46

## 2023-05-15 RX ADMIN — ACETAMINOPHEN 975 MG: 325 TABLET ORAL at 05:39

## 2023-05-15 RX ADMIN — METHOCARBAMOL 250 MG: 500 TABLET ORAL at 00:43

## 2023-05-15 RX ADMIN — HEPARIN SODIUM 5000 UNITS: 5000 INJECTION INTRAVENOUS; SUBCUTANEOUS at 22:21

## 2023-05-15 RX ADMIN — HEPARIN SODIUM 5000 UNITS: 5000 INJECTION INTRAVENOUS; SUBCUTANEOUS at 13:39

## 2023-05-15 RX ADMIN — METFORMIN HYDROCHLORIDE 500 MG: 500 TABLET ORAL at 17:48

## 2023-05-15 RX ADMIN — HEPARIN SODIUM 5000 UNITS: 5000 INJECTION INTRAVENOUS; SUBCUTANEOUS at 05:39

## 2023-05-15 RX ADMIN — METFORMIN HYDROCHLORIDE 500 MG: 500 TABLET ORAL at 08:51

## 2023-05-15 RX ADMIN — INSULIN LISPRO 1 UNITS: 100 INJECTION, SOLUTION INTRAVENOUS; SUBCUTANEOUS at 08:48

## 2023-05-15 RX ADMIN — SENNOSIDES 17.2 MG: 8.6 TABLET, FILM COATED ORAL at 22:21

## 2023-05-15 RX ADMIN — METHOCARBAMOL 250 MG: 500 TABLET ORAL at 11:46

## 2023-05-15 RX ADMIN — METHOCARBAMOL 250 MG: 500 TABLET ORAL at 05:39

## 2023-05-15 RX ADMIN — INSULIN LISPRO 1 UNITS: 100 INJECTION, SOLUTION INTRAVENOUS; SUBCUTANEOUS at 11:47

## 2023-05-15 RX ADMIN — METHOCARBAMOL 250 MG: 500 TABLET ORAL at 17:48

## 2023-05-15 RX ADMIN — ACETAMINOPHEN 975 MG: 325 TABLET ORAL at 22:21

## 2023-05-15 NOTE — PLAN OF CARE
Problem: PAIN - ADULT  Goal: Verbalizes/displays adequate comfort level or baseline comfort level  Description: Interventions:  - Encourage patient to monitor pain and request assistance  - Assess pain using appropriate pain scale  - Administer analgesics based on type and severity of pain and evaluate response  - Implement non-pharmacological measures as appropriate and evaluate response  - Consider cultural and social influences on pain and pain management  - Notify physician/advanced practitioner if interventions unsuccessful or patient reports new pain  Outcome: Progressing     Problem: INFECTION - ADULT  Goal: Absence or prevention of progression during hospitalization  Description: INTERVENTIONS:  - Assess and monitor for signs and symptoms of infection  - Monitor lab/diagnostic results  - Monitor all insertion sites, i e  indwelling lines, tubes, and drains  - Monitor endotracheal if appropriate and nasal secretions for changes in amount and color  - [place] appropriate cooling/warming therapies per order  - Administer medications as ordered  - Instruct and encourage patient and family to use good hand hygiene technique  - Identify and instruct in appropriate isolation precautions for identified infection/condition  Outcome: Progressing     Problem: SAFETY ADULT  Goal: Patient will remain free of falls  Description: INTERVENTIONS:  - Educate patient/family on patient safety including physical limitations  - Instruct patient to call for assistance with activity   - Consult OT/PT to assist with strengthening/mobility   - Keep Call bell within reach  - Keep bed low and locked with side rails adjusted as appropriate  - Keep care items and personal belongings within reach  - Initiate and maintain comfort rounds  - Make Fall Risk Sign visible to staff  - Offer Toileting every 2 Hours, in advance of need  - Initiate/Maintain bed alarm  - Obtain necessary fall risk management equipment: bed alarm  - Apply yellow socks and bracelet for high fall risk patients  - Consider moving patient to room near nurses station  Outcome: Progressing  Goal: Maintain or return to baseline ADL function  Description: INTERVENTIONS:  -  Assess patient's ability to carry out ADLs; assess patient's baseline for ADL function and identify physical deficits which impact ability to perform ADLs (bathing, care of mouth/teeth, toileting, grooming, dressing, etc )  - Assess/evaluate cause of self-care deficits   - Assess range of motion  - Assess patient's mobility; develop plan if impaired  - Assess patient's need for assistive devices and provide as appropriate  - Encourage maximum independence but intervene and supervise when necessary  - Involve family in performance of ADLs  - Assess for home care needs following discharge   - Consider OT consult to assist with ADL evaluation and planning for discharge  - Provide patient education as appropriate  Outcome: Progressing  Goal: Maintains/Returns to pre admission functional level  Description: INTERVENTIONS:  - Perform BMAT or MOVE assessment daily    - Set and communicate daily mobility goal to care team and patient/family/caregiver  - Collaborate with rehabilitation services on mobility goals if consulted  - Perform Range of Motion 3 times a day  - Reposition patient every 2 hours    - Dangle patient 3 times a day  - Stand patient 3 times a day  - Ambulate patient 3 times a day  - Out of bed to chair 3 times a day   - Out of bed for meals 3 times a day  - Out of bed for toileting  - Record patient progress and toleration of activity level   Outcome: Progressing     Problem: DISCHARGE PLANNING  Goal: Discharge to home or other facility with appropriate resources  Description: INTERVENTIONS:  - Identify barriers to discharge w/patient and caregiver  - Arrange for needed discharge resources and transportation as appropriate  - Identify discharge learning needs (meds, wound care, etc )  - Arrange for interpretive services to assist at discharge as needed  - Refer to Case Management Department for coordinating discharge planning if the patient needs post-hospital services based on physician/advanced practitioner order or complex needs related to functional status, cognitive ability, or social support system  Outcome: Progressing

## 2023-05-15 NOTE — CASE MANAGEMENT
Met w/pt and reviewed rehab routine and cm role  Pt resides alone in a split level home with 0 larissa  He has a rec room and half bath on the entry level, then 6 steps to his living room and kitchen, then 6 steps to his bedroom and bathroom  Pt has no prior hhc or rehab experience, but has been to outpt physical therapy at [facility] and [facility]  [facility] but mostly [facility] in for his maintenance meds  Pt states his brother will most likely be available for  at the time of dc  Cm reviewed team mtg process and [facility]  Pt is interested in meals on wheels and cm explained referral process

## 2023-05-15 NOTE — PROGRESS NOTES
Pastoral Care Progress Note    5/15/2023  Patient: Vitor Aguiar : 1955  Admission Date & Time: 2023 1249  MRN: [id number] CSN: [id number]        Made introductory visit with PT, asked some get to know you questions to establish rapport, assured him of my ongoing support as needed/desired while he is in [facility]  He was thankful   remains available

## 2023-05-15 NOTE — PROGRESS NOTES
OT Initial Evaluation       05/15/23 0900   Patient Data   Rehab Impairment Impairment of mobility, safety and Activities of Daily Living (ADLs) due to Spinal Cord Dysfunction:  Non-Traumatic:  04 130 Other Non-Traumatic cervical spinal cord compression, SCI at C5-C7 level, neurogenic claudication   Etiologic Diagnosis cervical cord compression, SCI at C5-C7, neurogenic claudication s/p posterior cervical decompressive laminectomy and instrumented fusion C4-T1   Date of Onset 05/10/23   Support System   Name Nii Coronel   Relationship brother   Able to provide 24 hour supervision No   Able to provide physical help? Yes  (as needed)   Multiple Support Systems Yes  (pt reports having brother in-law and friend that can assist as needed)   Home Setup   Type of Home Split Level   Method of Entry Stairs   Number of Stairs 0  (pt reports main entrance being flat)   Number of Stairs in Home 12  (6 stairs to main floor + 6 stairs to top floor where BR is located)   First Floor Bathroom Half   Second Floor Bathroom Full;Tub; Shower;Combo;Grab Bars   Second Vitasol Grab bars by toilet;Grab bars in tub/shower; Tub bench   First Floor Setup Available No   Home Modifications Necessary?   (pending progress)   Available Equipment Roller Walker;[employer]; Tub Transfer Bench; [address] Other  (retired 5 years)   Transportation    Prior Device(s) Used Roller Walker;Single Point Cane  (using [facility] for ~4 years, RW since this past autumn)   Prior IADL Participation   Money Management Identify Money;Estimate Costs;Estimate Change;Combine Bills;Manage Checkbook   Meal Preparation Full Participation;Microwave;Stove top  (does use stove top intermittently but prefers microwave with TV dinners)   Laundry Full Participation   Home Cleaning Cleaning service; Other  (pt has had a cleaning service for past 5 years but most recent cleaning lady quit  pt reports he will clean if he physically can or he will hire another cleaning service)   Prior Level of Function   Self-Care 3  Independent - Patient completed the activities by him/herself, with or without an assistive device, with no assistance from a helper  Indoor-Mobility (Ambulation) 3  Independent - Patient completed the activities by him/herself, with or without an assistive device, with no assistance from a helper  Stairs 3  Independent - Patient completed the activities by him/herself, with or without an assistive device, with no assistance from a helper  Functional Cognition 3  Independent - Patient completed the activities by him/herself, with or without an assistive device, with no assistance from a helper  Prior Device Used D  kSinny Rossmurray in the Last Year   Number of falls in the past 12 months 1  (fall in bathroom 2* R knee buckling)   Type of Injury Associated with Fall No injury   Patient Preference   Juhi (Patient Preference) Sina Wills   Psychosocial   Psychosocial (WDL) WDL   Restrictions/Precautions   Precautions Fall Risk;Pain;Spinal precautions   ROM Restrictions Yes  (cervical spine precautions (no bend,lift,twist))   Pain Assessment   Pain Assessment Tool 0-10   Pain Score No Pain   Eating Assessment   Type of Assistance Needed Set-up / clean-up   Physical Assistance Level No physical assistance   Comment did not observe pt eating during this session but 2* R hand deficits, pt will require full setup of tray   Eating CARE Score 5   Oral Hygiene   Type of Assistance Needed Physical assistance   Physical Assistance Level 25% or less   Comment min A to steady in stance at sink   Oral Hygiene CARE Score 3   Tub/Shower Transfer   Reason Not Assessed Sponge Bath   Findings pt has tub transfer bench at home  will assess when time allows     Shower/Bathe Self   Type of Assistance Needed Physical assistance   Physical Assistance Level 76% or more   Comment PTA pt completed shower seated on tub transfer bench  pt with active shower orders but due to time restraints, pt completed sponge bath seated in recliner requiring A for LE and posterior bathing 2* cervical spinal precautions  pt attempted to fxlly reach posteriorly in stance but unable to clean thoroughly without breaking spinal precautions   Shower/Bathe Self CARE Score 2   Bathing   Assessed Bath Style Sponge Bath   Anticipated D/C Bath Style Tub; Shower   Positioning Seated;Standing   Dressing/Undressing Clothing   Type of Assistance Needed Physical assistance   Physical Assistance Level 25% or less   Comment min A to pull down back   Upper Body Dressing CARE Score 3   Type of Assistance Needed Physical assistance   Physical Assistance Level 76% or more   Comment A to thread over BLE and assist to pull up posteriorly in stance   able to don over hips in stance with CGA   Lower Body Dressing CARE Score 2   Putting On/Taking Off Footwear   Type of Assistance Needed Physical assistance   Physical Assistance Level Total assistance   Comment TA to don socks and JAVAN stockings   Putting On/Taking Off Footwear CARE Score 1   Toileting Hygiene   Type of Assistance Needed Physical assistance   Physical Assistance Level 51%-75%   Comment A for bowel hygiene and CGA in stance for CM up/down   Toileting Hygiene CARE Score 2   Toilet Transfer   Type of Assistance Needed Physical assistance   Physical Assistance Level 51%-75%   Comment mod A without use of grab bars to simulate home env  min A/CG with use of BL grab bars   Toilet Transfer CARE Score 2   Transfer Bed/Chair/Wheelchair   Type of Assistance Needed Physical assistance   Physical Assistance Level 25% or less   Comment min A SPT with RW which pt was using PTA   Chair/Bed-to-Chair Transfer CARE Score 3   Sit to Stand   Type of Assistance Needed Physical assistance   Physical Assistance Level 25% or less   Comment min A/CG with RW   Sit to Stand CARE Score 3   Comprehension   QI: Comprehension "4  Undestands: Clear comprehension without cues or repetitions   Expression   QI: Expression 4  Express complex messages without difficulty and with speech that is clear and easy to [place]   Memory   Recognize People Yes   Remember Routine Yes   Initiates Tasks Yes   Short-Term Intact   Long Term Intact   Recalls Precaution Other  (able to verbally recall 3/3 spinal precautions but functionally required reminders that a specific mvmt would be breaking them  sign left in room within pts view to remind of precautions including not looking down at pts phone)   RUE Assessment   RUE Assessment X  (pt observed to have dec AROM in R digits - need to assess further)   Strength - RUE   R Gross Arm Strength 3+/5   LUE Assessment   LUE Assessment WFL   Coordination   Movements are Fluid and Coordinated 1   Sensation   Light Touch Not tested  (need to assess further)   Sharp/Dull Not tested  (need to assess further)   Stereognosis Not tested  (need to assess further)   Propioception Not tested  (need to assess further)   Additional Comments need to assess further   Cognition   Overall Cognitive Status [facility]   Arousal/Participation Alert; Cooperative   Attention Attends with cues to redirect   Orientation Level Oriented X4   Memory Decreased recall of precautions   Following Commands Follows multistep commands with increased time or repetition   Discharge Information   Vocational Plan Retired/not working   Patient's Discharge Plan DC home Mod I   Patient's Rehab Expectations \"My only goal is to stop using this damn walker  I'm ok with the cane but I don't want the walker\"   Barriers to Discharge Home No Family Support; Unsafe Home Setup; Decreased Strength;Decreased Endurance;Pain; Safety Considerations   Impressions Pt is a 69 y o  male who was admitted on 5/10/23 due to posterior cervical decompressive laminectomy and instrumented fusion C4-T1  Pt stabilized and transferred to [facility] to receive skilled therapy services   PMH includes " DM, ED, HLD, HTN, Obesity and spinal stenosis  Current precautions include cervical spinal precautions, pain and fall risk  See flowsheet for details of pts home setup, PLOF and current functional status  Pts impairments include pain, endurance, activity tolerance, functional mobility, balance, functional standing tolerance, decreased I w/ ADLS/IADLS and strength  These impairments along with  limited caregiver support, inaccesible home environment, steps to enter, difficulty performing ADLs, difficulty performing IADLs and health management causes the inability to safely complete A/IADLs including Eating, Grooming, Bathing, UB dressing, LB dressing, Toileting, Toilet transfer, Tub/shower Transfer and IADL Management  Pt presents with good rehab potential with motivation to participate and achieve goal of DC home  Pt is unsafe to DC home at this time, recommending 2 weeks to achieve independent with assistive device level goals with bedside commode, shower chair, cane, reacher, sock aide and RW  Plan to achieve stated goals with interventions focused on ADL Retraining ,  LHAE education/training, IADL training , Kitchen Mobilty, Meal preparation, Medication management , Functional Transfers, Functional Cognition, Functional Attention, MOCA, Standing tolerance, Standing balance , DME training/education, Energy conservation training/education, healthy coping education, Leisure and social pursuits and community re-integration     OT Therapy Minutes   OT Time In 0900   OT Time Out 1030   OT Total Time (minutes) 90   OT Mode of treatment - Individual (minutes) 90   OT Mode of treatment - Concurrent (minutes) 0   OT Mode of treatment - Group (minutes) 0   OT Mode of treatment - Co-treat (minutes) 0   OT Mode of Treatment - Total time(minutes) 90 minutes   OT Cumulative Minutes 90   Cumulative Minutes   Cumulative therapy minutes 90

## 2023-05-15 NOTE — PLAN OF CARE
Problem: PAIN - ADULT  Goal: Verbalizes/displays adequate comfort level or baseline comfort level  Description: Interventions:  - Encourage patient to monitor pain and request assistance  - Assess pain using appropriate pain scale  - Administer analgesics based on type and severity of pain and evaluate response  - Implement non-pharmacological measures as appropriate and evaluate response  - Consider cultural and social influences on pain and pain management  - Notify physician/advanced practitioner if interventions unsuccessful or patient reports new pain  Outcome: Progressing     Problem: INFECTION - ADULT  Goal: Absence or prevention of progression during hospitalization  Description: INTERVENTIONS:  - Assess and monitor for signs and symptoms of infection  - Monitor lab/diagnostic results  - Monitor all insertion sites, i e  indwelling lines, tubes, and drains  - Monitor endotracheal if appropriate and nasal secretions for changes in amount and color  - [place] appropriate cooling/warming therapies per order  - Administer medications as ordered  - Instruct and encourage patient and family to use good hand hygiene technique  - Identify and instruct in appropriate isolation precautions for identified infection/condition  Outcome: Progressing     Problem: SAFETY ADULT  Goal: Patient will remain free of falls  Description: INTERVENTIONS:  - Educate patient/family on patient safety including physical limitations  - Instruct patient to call for assistance with activity   - Consult OT/PT to assist with strengthening/mobility   - Keep Call bell within reach  - Keep bed low and locked with side rails adjusted as appropriate  - Keep care items and personal belongings within reach  - Initiate and maintain comfort rounds  - Make Fall Risk Sign visible to staff  - Offer Toileting every 2 Hours, in advance of need  - Initiate/Maintain bed/chair alarm  - Obtain necessary fall risk management equipment: non skid footwear  - Apply yellow socks and bracelet for high fall risk patients  - Consider moving patient to room near nurses station  Outcome: Progressing  Goal: Maintain or return to baseline ADL function  Description: INTERVENTIONS:  -  Assess patient's ability to carry out ADLs; assess patient's baseline for ADL function and identify physical deficits which impact ability to perform ADLs (bathing, care of mouth/teeth, toileting, grooming, dressing, etc )  - Assess/evaluate cause of self-care deficits   - Assess range of motion  - Assess patient's mobility; develop plan if impaired  - Assess patient's need for assistive devices and provide as appropriate  - Encourage maximum independence but intervene and supervise when necessary  - Involve family in performance of ADLs  - Assess for home care needs following discharge   - Consider OT consult to assist with ADL evaluation and planning for discharge  - Provide patient education as appropriate  Outcome: Progressing  Goal: Maintains/Returns to pre admission functional level  Description: INTERVENTIONS:  - Perform BMAT or MOVE assessment daily    - Set and communicate daily mobility goal to care team and patient/family/caregiver  - Collaborate with rehabilitation services on mobility goals if consulted  - Perform Range of Motion 3 times a day  - Reposition patient every 2 hours    - Dangle patient 3 times a day  - Stand patient 3 times a day  - Ambulate patient 3 times a day  - Out of bed to chair 3 times a day   - Out of bed for meals 3 times a day  - Out of bed for toileting  - Record patient progress and toleration of activity level   Outcome: Progressing     Problem: DISCHARGE PLANNING  Goal: Discharge to home or other facility with appropriate resources  Description: INTERVENTIONS:  - Identify barriers to discharge w/patient and caregiver  - Arrange for needed discharge resources and transportation as appropriate  - Identify discharge learning needs (meds, wound care, etc )  - Arrange for interpretive services to assist at discharge as needed  - Refer to Case Management Department for coordinating discharge planning if the patient needs post-hospital services based on physician/advanced practitioner order or complex needs related to functional status, cognitive ability, or social support system  Outcome: Progressing

## 2023-05-15 NOTE — PROGRESS NOTES
PM&R PROGRESS NOTE:  Barry Min 69 y o  male MRN: [id number]  Unit/Bed#: -01 Encounter: [id number]        Rehab Diagnosis: Impairment of mobility, safety and Activities of Daily Living (ADLs) due to Spinal Cord Dysfunction:  Non-Traumatic:  04 1211  Quadriplegia, Incomplete C1-4 - Examines C1 AIS D  Has some pinprick impairment at C2 and C3 bilaterally - but not clear to me that this is related to his radiographic findings      SUBJECTIVE: Patient seen face to face  No acute issues  Progressing as expected in rehabilitation  States he does notice improvement in his right hand numbness and movement  He also feels his balance has improved    Quick update provided to his brother over the phone today  ASSESSMENT: Stable, progressing      PLAN:    Rehabilitation  • Functional deficits:  C1 AIS D, impaired sensation, neurogenic bladder, impaired mobility, impaired self care  • Continue current rehabilitation plan of care to maximize function      • Functional update:   rikki in progress  • Estimated Discharge: TBD, ELOS 3 weeks     DVT prophylaxis  • SQ Heparin     Bladder plan  • Continent  • PVRs low     Bowel plan  • Continent      * Incomplete spinal cord injury at C1-C4 level without bone injury [facility])  Assessment & Plan  Patient actually examines on [facility] with C1 AIS D with profound loss of pinprick sensation starting at C2    - LT impairment begins at C5 bilaterally - so if C2/C3 impairment is not related to cord, he is at best C4    - However, given no other potential cause at this time - would label him C1 AIS D   - Maybe developing some neurogenic bladder/NDO prior to surgery which has improved   - Checking PVRs x3 to ensure no retention - PVRs low   - Has some constipation post-op, but at risk for neurogenic bowel - monitor bowel function   - Monitor autonomic function and check orthostatics given his complaints of occasional dizziness when standing     - ACE and/or JAVAN stockings as needed  - Has spasticity in his R hand which has improved since surgery  - Monitor  May benefit from resting hand splint, potentially stim in therapies  - Monitor tone, which is located primarily in his FDS, maybe some FDP    - PT/OT 3-5 hours/day, 5-7 days/week  Spondylosis of cervical spine with myelopathy  Assessment & Plan  MRI showed Multilevel cervical spondylosis, Multifactorial disease results in overall severe canal stenosis with underlying cord compression at C5-C6 and C6-C7  Associated cord edema and/or myelomalacia is present at these levels  No abnormal enhancement identified within the spinal canal   Symptoms: possibly neurogenic bladder leading up to surgery, R hand spasticity (clenched fist), R hand weakness and sensory impairment, Gait impairment/balance issues  - Examines with bilateral UE sensory impairment and R > L weakness, with some mild RLE > LLE weakness    - Also examines with diffuse hyperreflexia  - See SCI for more details  Admitted on 5/10 for planned Posterior cervical decompressive laminectomy with instrumented fusion from C4-T1 with Dr Eugenio Meidna  No need for cervical collar per Neurosurgery  C-Spine precautions  Drain removed  Staples/Drain suture in place  Pain management as detailed below  Two week f/u with Neurosurgery on 5/24  PT/OT - 3-5 hours/day, 5-7 days/week in comprehensive acute inpatient rehab    Spinal stenosis of lumbar region at multiple levels  Assessment & Plan  Symptoms and weakness he reports improved with physical therapy  Pain management as detailed below  Tobacco dependence syndrome  Assessment & Plan  In remission  47 pack year history  Quit in 2021  Type 2 diabetes mellitus without complication, without long-term current use of insulin ([facility])  Assessment & Plan  Lab Results   Component Value Date    HGBA1C 6 0 (H) 05/02/2023       Home: Glimepiride 2mg with breakfast, Pioglitazone-Metformin 15-500mg BID    Here: Metformin "500mg BID; CDI/Accuchecks  Will try to get to home regimen  Diabetic Diet  Nutrition consult  IM consulted to assist with management  Primary hypertension  Assessment & Plan  Home: Amlodipine- Benzapril 5-20mg tablet once daily  Here: Amlodipine 5mg, Lisinopril 20mg  For therapeutic substitution  Monitor and adjust as appropriate   - Has reported some dizziness when standing too quickly   - Check Orthostatics  IM consulted to assist with management  Obesity, morbid ([address] )  Assessment & Plan  Counseling, Nutrition  Appreciate IM consultants medical co-management  Labs, medications, and imaging personally reviewed  ROS:  A ten point review of systems was completed on 05/15/23 and pertinent positives are listed in subjective section  All other systems reviewed were negative  OBJECTIVE:   /74 (BP Location: Right arm)   Pulse 82   Temp 98 5 °F (36 9 °C) (Oral)   Resp 19   Ht 5' 6\" (1 676 m)   Wt 111 kg (244 lb 11 4 oz)   SpO2 96%   BMI 39 50 kg/m²     Physical Exam  Vitals and nursing note reviewed  Constitutional:       General: He is not in acute distress  HENT:      Head: Normocephalic and atraumatic  Nose: Nose normal       Mouth/Throat:      Mouth: Mucous membranes are moist    Eyes:      Conjunctiva/sclera: Conjunctivae normal    Cardiovascular:      Rate and Rhythm: Normal rate and regular rhythm  Pulses: Normal pulses  Pulmonary:      Effort: Pulmonary effort is normal       Breath sounds: Normal breath sounds  No wheezing or rales  Abdominal:      General: Bowel sounds are normal  There is no distension  Palpations: Abdomen is soft  Tenderness: There is no abdominal tenderness  Musculoskeletal:         General: Swelling present  Cervical back: Neck supple  Comments: RUE weakness in fingers and wrist   Skin:     General: Skin is warm        Comments: Incision clean and intact with staples   Neurological:      Mental Status: He is " alert and oriented to person, place, and time  Sensory: Sensory deficit present        Comments: Balance improving   Psychiatric:         Mood and Affect: Mood normal           Lab Results   Component Value Date    WBC 13 09 (H) 05/12/2023    HGB 13 7 05/12/2023    HCT 40 8 05/12/2023    MCV 87 05/12/2023     05/12/2023     Lab Results   Component Value Date    SODIUM 135 05/11/2023    K 4 1 05/11/2023     05/11/2023    CO2 25 05/11/2023    BUN 23 05/11/2023    CREATININE 1 07 05/11/2023    GLUC 167 (H) 05/11/2023    CALCIUM 10 1 05/11/2023     Lab Results   Component Value Date    INR 1 11 05/11/2023    INR 0 99 05/10/2023    INR 1 07 05/02/2023    PROTIME 14 5 05/11/2023    PROTIME 13 3 05/10/2023    PROTIME 14 2 05/02/2023           Current Facility-Administered Medications:   •  acetaminophen (TYLENOL) tablet 975 mg, 975 mg, Oral, Q8H [facility], Suri Aguirre MD, 975 mg at 05/15/23 1338  •  amLODIPine (NORVASC) tablet 5 mg, 5 mg, Oral, Daily, 5 mg at 05/15/23 0845 **AND** lisinopril (ZESTRIL) tablet 20 mg, 20 mg, Oral, Daily, Suri Aguirre MD, 20 mg at 05/15/23 0846  •  bisacodyl (DULCOLAX) rectal suppository 10 mg, 10 mg, Rectal, Daily PRN, Suri Aguirre MD  •  calcium carbonate (TUMS) chewable tablet 750 mg, 750 mg, Oral, TID PRN, Suri Aguirre MD  •  docusate sodium (COLACE) capsule 100 mg, 100 mg, Oral, BID, Suri Aguirre MD, 100 mg at 05/15/23 1748  •  heparin (porcine) subcutaneous injection 5,000 Units, 5,000 Units, Subcutaneous, Q8H [facility], Suri Aguirre MD, 5,000 Units at 05/15/23 1339  •  insulin lispro (HumaLOG) 100 units/mL subcutaneous injection 1-5 Units, 1-5 Units, Subcutaneous, HS, Suri Aguirre MD, 1 Units at 05/14/23 2240  •  insulin lispro (HumaLOG) 100 units/mL subcutaneous injection 1-5 Units, 1-5 Units, Subcutaneous, 4x Daily (AC & HS), 1 Units at 05/15/23 1147 **AND** Fingerstick Glucose (POCT), , , TID AC, Areli Woods PA-C  •  metFORMIN (GLUCOPHAGE) tablet 500 mg, 500 mg, Oral, BID With Meals, Areli Woods PA-C, 500 mg at 05/15/23 1748  •  methocarbamol (ROBAXIN) tablet 250 mg, 250 mg, Oral, Q6H [address], Winston Lundy MD, 250 mg at 05/15/23 1748  •  naloxone (NARCAN) 0 04 mg/mL syringe 0 04 mg, 0 04 mg, Intravenous, Q1MIN PRN, Winston Lundy MD  •  ondansetron (ZOFRAN-ODT) dispersible tablet 4 mg, 4 mg, Oral, Q6H PRN, Winston Lundy MD  •  oxyCODONE (ROXICODONE) split tablet 2 5 mg, 2 5 mg, Oral, Q4H PRN **OR** oxyCODONE (ROXICODONE) IR tablet 5 mg, 5 mg, Oral, Q4H PRN, Winston Lundy MD  •  polyethylene glycol (MIRALAX) packet 17 g, 17 g, Oral, Daily, Winston Lundy MD, 17 g at 05/15/23 0846  •  senna (SENOKOT) tablet 17 2 mg, 2 tablet, Oral, HS, Winston Lundy MD, 17 2 mg at 05/14/23 2236    Past Medical History:   Diagnosis Date   • Diabetes mellitus [facility])    • ED (erectile dysfunction)    • Hyperlipidemia    • Hypertension    • Obesity    • Spinal stenosis of lumbar region at multiple levels 12/22/2022       Patient Active Problem List    Diagnosis Date Noted   • Incomplete spinal cord injury at C1-C4 level without bone injury ([facility] [address] ) 04/27/2023   • Preoperative clearance 05/04/2023   • Neurogenic claudication 04/27/2023   • Spondylosis of cervical spine with myelopathy 03/01/2023   • Spinal stenosis of lumbar region at multiple levels 12/22/2022   • Mixed hyperlipidemia 08/17/2022   • Tobacco dependence syndrome 08/17/2022   • Primary hypertension 07/14/2022   • Type 2 diabetes mellitus without complication, without long-term current use of insulin ([facility] [address] ) 07/14/2022   • Chronic bilateral low back pain without sciatica 07/14/2022   • Carpal tunnel syndrome of right wrist 07/06/2021   • Atrophy of muscle of right hand 07/06/2021   • Obesity, morbid ([address] ) 05/24/2021   • Cubital tunnel syndrome on right    • Carpal tunnel syndrome, left           Peter Mcgovern MD    I have spent a total time of 40 minutes on 05/15/23 in caring for this patient including Impressions, Counseling / Coordination of care, Documenting in the medical record, Communicating with other healthcare professionals  and update provided to brother  ** Please Note:  voice to text software may have been used in the creation of this document   Although proof errors in transcription or interpretation are a potential of such software**

## 2023-05-15 NOTE — PROGRESS NOTES
[facility] PT EVAL   05/15/23 1230   Patient Data   Rehab Impairment Impairment of mobility, safety and Activities of Daily Living (ADLs) due to Spinal Cord Dysfunction:  Non-Traumatic:  04 130 Other Non-Traumatic cervical spinal cord compression, SCI at C5-C7 level, neurogenic claudication   Etiologic Diagnosis cervical cord compression, SCI at C5-C7, neurogenic claudication s/p posterior cervical decompressive laminectomy and instrumented fusion C4-T1   Date of Onset 05/10/23   Support System   Name Kenisha Hardin   Relationship brother   Able to provide 24 hour supervision No   Home Setup   Type of Home Split Level   Method of Entry Stairs   Number of Stairs 0  (to foyer area; family room entrance, bathroom on this floor)   Number of Stairs in Home   (6 to kitchen/living room/dining room+6 to bed/bath area with b/l HRs)   In Home Hand Rail Bilateral   Second Floor Bathroom Full;Tub; Shower;Combo;Grab Bars   Second [facility] Grab bars by toilet;Grab bars in tub/shower; Tub bench   First Floor Setup Available No   Home Modifications Necessary?   (pending progress)   Available Equipment Roller Walker;Single Point Cane;Tub Transfer Bench  (x2 RWs)   Baseline Information   Transportation    Prior Device(s) Used Roller Walker;Single Point Cane  (RW on main living area and in foyer/outdoors, [facility] on bed/bath floor)   Prior Level of Function   Indoor-Mobility (Ambulation) 3  Independent - Patient completed the activities by him/herself, with or without an assistive device, with no assistance from a helper  Stairs 3  Independent - Patient completed the activities by him/herself, with or without an assistive device, with no assistance from a helper  Functional Cognition 3  Independent - Patient completed the activities by him/herself, with or without an assistive device, with no assistance from a helper  Prior Device Used DEONTE Vuong in the Last Year   Number of falls in the past 12 months 1  (fall in bathroom in Aug 2022 when R knee gave out; reports he crawled to stairs where he was able to put legs down steps and utilize b/l HRs to stand self back up)   Type of Injury Associated with Fall No injury   Patient Preference   Nickname (Patient Preference) Etta Wills   Psychosocial   Psychosocial (WDL) WDL   Restrictions/Precautions   Precautions Fall Risk;Pain;Spinal precautions   ROM Restrictions Yes  (cervical spinal precautions)   Braces or Orthoses   (b/l TEDs)   Pain Assessment   Pain Assessment Tool 0-10   Pain Score No Pain   Toilet Transfer   Type of Assistance Needed Physical assistance   Physical Assistance Level 51%-75%   Toilet Transfer CARE Score 2   Transfer Bed/Chair/Wheelchair   Type of Assistance Needed Physical assistance   Physical Assistance Level 76% or more   Comment w/o RW pt requires increased A to perform SPT; with RW minAx1   Chair/Bed-to-Chair Transfer CARE Score 2   Roll Left and Right   Comment (S)  perform next session on mat table as pt has queen size bed, difficulty rolling in twin bed due to size   Sit to Lying   Type of Assistance Needed Supervision   Comment had pt perform in manner he did PTA   Pt crawls onto bed and then rolls onto R side as he is a side sleeper at home   Sit to Lying CARE Score 4   Lying to Sitting on Side of Bed   Type of Assistance Needed Physical assistance   Physical Assistance Level 26%-50%   Comment getting OOB on R side of bed to simulate home setup, abiding by spinal precautions   Lying to Sitting on Side of Bed CARE Score 3   Sit to Stand   Type of Assistance Needed Physical assistance   Physical Assistance Level 25% or less   Comment w/ and w/o RW   Sit to Stand CARE Score 3   Picking Up Object   Type of Assistance Needed Physical assistance   Physical Assistance Level Total assistance   Comment w/o AD as pt would perform PTA pt would require total A due to c/s precautions, with [place] with RW and reacher to retrieve marker from floor   Picking Up Object CARE Score 1   Car Transfer   Type of Assistance Needed Physical assistance   Physical Assistance Level 25% or less   Comment with RW; pt reports having truck and sedan at home   Car Transfer CARE Score 3   Ambulation   Primary Mode of Locomotion Prior to Admission Walk   Distance Walked (feet) 90 ft  (x1 with RW; 30'x1 at R HR in hallway)   Assist Device Roller Walker   Gait Pattern Inconsistant Tonya; Slow Tonya;Decreased foot clearance; Forward Flexion; Step to; Step through; Improper weight shift   Limitations Noted In Balance;Device Management; Endurance; Heel Strike;Posture; Safety;Speed;Swing;Strength   Provided Assistance with: Balance   Findings w/o AD as pt performs when ambulating in basement pt would require x2 person assist at this time; with R HR pt minAx1, with RW pt minAx1   Plan to trial [facility] in upcoming sessions however due to time purposes unable to trial this session; use of CF for longer distance for pt safety   Walk 10 Feet   Type of Assistance Needed Physical assistance   Physical Assistance Level Total assistance   Walk 10 Feet CARE Score 1   Walk 50 Feet with Two Turns   Type of Assistance Needed Physical assistance   Physical Assistance Level Total assistance   Walk 50 Feet with Two Turns CARE Score 1   Walk 150 Feet   Reason if not Attempted Safety concerns   Walk 150 Feet CARE Score 88   Walking 10 Feet on Uneven Surfaces   Type of Assistance Needed Physical assistance   Physical Assistance Level 26%-50%   Comment with RW across floor mat as pt uses RW outdoors   Walking 10 Feet on Uneven Surfaces CARE Score 3   Wheel 50 Feet with Two Turns   Reason if not Attempted Activity not applicable   Wheel 50 Feet with Two Turns CARE Score 9   Wheel 150 Feet   Reason if not Attempted Activity not applicable   Wheel 316 Feet CARE Score 9   Curb or Single Stair   Comment (S)  please asssess in upcoming session   4 Steps   Comment (S)  please asssess in upcoming session   12 Steps "  Reason if not Attempted Safety concerns   12 Steps CARE Score 88   Comprehension   QI: Comprehension 4  Undestands: Clear comprehension without cues or repetitions   Expression   QI: Expression 4  Express complex messages without difficulty and with speech that is clear and easy to [place]   RLE Assessment   RLE Assessment   (grossly 4-/5)   LLE Assessment   LLE Assessment WFL  (grossly 4+/5)   Sensation   Light Touch No apparent deficits  (in b/l UEs or LEs)   Cognition   Overall Cognitive Status [facility]   Arousal/Participation Alert; Cooperative   Attention Attends with cues to redirect   Orientation Level Oriented X4   Memory Decreased recall of precautions   Following Commands Follows multistep commands with increased time or repetition   Comments requires cueing t/o session to abide by C/S precautions   Objective Measure   PT Measure(s) perform 5x STS and TUG in upcoming session   PT Findings educated pt on ELOS 2 weeks, goals to be set at [address] with [facility]  Educated pt on 3 hours of therapy, 5 days per week  Educated pt to ring for A for all transfers/mobilities in room at this time which pt is agreeable to  EHOB cushion provided to pt on w/c and 20\" w/c obtained for pt for passive transport  Therapeutic Exercise   Therapeutic Exercise/Activity LE hamstring/gastroc stretch 3x30 sec hold to reduce c/o stiffness   Discharge Information   Vocational Plan Retired/not working   Patient's Discharge Plan Home alone   Patient's Rehab Expectations \"I want to walk without the cane if possible and function better in the house  I want to be able to wear jeans and zipper them up\"   Barriers to Discharge Home No Family Support; Unsafe Home Setup; Decreased Strength;Decreased Endurance;Pain; Safety Considerations   Impressions Pt is a 69 male who presents to [facility] with dx of cervical cord compression, SCI at C5-C7, neurogenic claudication s/p posterior cervical decompressive laminectomy and instrumented fusion C4-T1   Pt was " seen for a high complexity evaluation with comorbidities affecting pt's performance at time of evaluation including post-op pain, ambulatory dysfunction, obesity, DM, HTN, hyperlipidemia  Refer to preadmission screen for more comprehensive list  PTA pt lives with alone in a split level home with 0 MINDY foyer area, however, to main living area pt with 6 steps with b/l Hrs and then to bedroom/bathroom another 6 steps with b/l HR  Pt also has basement which is 4 steps with L HR down from foyer entrance where laundry and a second refrigerator are located  PTA pt functioning at independent level with use of RW on main living area of home along with in foyer area and outdoors, [facility] on bed/bath floor, and in basement held onto pipes when amb to/from refrigerator/laundry area  Upon evaluation pt presents with the following impairments: decreased strength, imbalance, decreased endurance, fatigue and spinal precautions  Pt with decreased R sided>L sided weakness, ongoing R hand weakness especially for past 5 years  Pt presents with the following functional barriers to d/c home: inaccessible home environment, steps inside of home, no family support, requires external assistance for all mobilities, positive fall history and increased fall risk due to impairments  Pt is a good rehab candidate with ELOS 2 weeks and goals set at [place]  POC to focus on gait training, transfer training, stair training, neuromuscular re-education, improving pt's activity tolerance and endurance, LE strengthening, core strengthening, balance interventions and assessment for appropriate AD     PT Therapy Minutes   PT Time In 1230   PT Time Out 1400   PT Total Time (minutes) 90   PT Mode of treatment - Individual (minutes) 90   PT Mode of treatment - Concurrent (minutes) 0   PT Mode of treatment - Group (minutes) 0   PT Mode of treatment - Co-treat (minutes) 0   PT Mode of Treatment - Total time(minutes) 90 minutes   PT Cumulative Minutes 90 Cumulative Minutes   Cumulative therapy minutes 180

## 2023-05-16 ENCOUNTER — APPOINTMENT (INPATIENT)
Dept: NON INVASIVE DIAGNOSTICS | Facility: HOSPITAL | Age: 68
End: 2023-05-16

## 2023-05-16 LAB
ANION GAP SERPL CALCULATED.3IONS-SCNC: 1 MMOL/L (ref 4–13)
BASOPHILS # BLD AUTO: 0.03 THOUSANDS/ÂΜL (ref 0–0.1)
BASOPHILS NFR BLD AUTO: 0 % (ref 0–1)
BUN SERPL-MCNC: 26 MG/DL (ref 5–25)
CALCIUM SERPL-MCNC: 9.6 MG/DL (ref 8.3–10.1)
CHLORIDE SERPL-SCNC: 107 MMOL/L (ref 96–108)
CO2 SERPL-SCNC: 27 MMOL/L (ref 21–32)
CREAT SERPL-MCNC: 1.04 MG/DL (ref 0.6–1.3)
EOSINOPHIL # BLD AUTO: 0.4 THOUSAND/ÂΜL (ref 0–0.61)
EOSINOPHIL NFR BLD AUTO: 4 % (ref 0–6)
ERYTHROCYTE [DISTWIDTH] IN BLOOD BY AUTOMATED COUNT: 14.6 % (ref 11.6–15.1)
GFR SERPL CREATININE-BSD FRML MDRD: 72 ML/MIN/1.73SQ M
GLUCOSE SERPL-MCNC: 144 MG/DL (ref 65–140)
GLUCOSE SERPL-MCNC: 158 MG/DL (ref 65–140)
GLUCOSE SERPL-MCNC: 159 MG/DL (ref 65–140)
GLUCOSE SERPL-MCNC: 167 MG/DL (ref 65–140)
GLUCOSE SERPL-MCNC: 170 MG/DL (ref 65–140)
HCT VFR BLD AUTO: 40.7 % (ref 36.5–49.3)
HGB BLD-MCNC: 13.1 G/DL (ref 12–17)
IMM GRANULOCYTES # BLD AUTO: 0.03 THOUSAND/UL (ref 0–0.2)
IMM GRANULOCYTES NFR BLD AUTO: 0 % (ref 0–2)
LYMPHOCYTES # BLD AUTO: 1.19 THOUSANDS/ÂΜL (ref 0.6–4.47)
LYMPHOCYTES NFR BLD AUTO: 12 % (ref 14–44)
MCH RBC QN AUTO: 29 PG (ref 26.8–34.3)
MCHC RBC AUTO-ENTMCNC: 32.2 G/DL (ref 31.4–37.4)
MCV RBC AUTO: 90 FL (ref 82–98)
MONOCYTES # BLD AUTO: 1.07 THOUSAND/ÂΜL (ref 0.17–1.22)
MONOCYTES NFR BLD AUTO: 11 % (ref 4–12)
NEUTROPHILS # BLD AUTO: 6.87 THOUSANDS/ÂΜL (ref 1.85–7.62)
NEUTS SEG NFR BLD AUTO: 73 % (ref 43–75)
NRBC BLD AUTO-RTO: 0 /100 WBCS
PLATELET # BLD AUTO: 313 THOUSANDS/UL (ref 149–390)
PMV BLD AUTO: 11 FL (ref 8.9–12.7)
POTASSIUM SERPL-SCNC: 4.5 MMOL/L (ref 3.5–5.3)
RBC # BLD AUTO: 4.51 MILLION/UL (ref 3.88–5.62)
SODIUM SERPL-SCNC: 135 MMOL/L (ref 135–147)
WBC # BLD AUTO: 9.59 THOUSAND/UL (ref 4.31–10.16)

## 2023-05-16 RX ORDER — LISINOPRIL 10 MG/1
10 TABLET ORAL DAILY
Status: DISCONTINUED | OUTPATIENT
Start: 2023-05-17 | End: 2023-05-25 | Stop reason: HOSPADM

## 2023-05-16 RX ORDER — AMLODIPINE BESYLATE 5 MG/1
5 TABLET ORAL DAILY
Status: DISCONTINUED | OUTPATIENT
Start: 2023-05-17 | End: 2023-05-25 | Stop reason: HOSPADM

## 2023-05-16 RX ADMIN — INSULIN LISPRO 1 UNITS: 100 INJECTION, SOLUTION INTRAVENOUS; SUBCUTANEOUS at 08:01

## 2023-05-16 RX ADMIN — METHOCARBAMOL 250 MG: 500 TABLET ORAL at 05:38

## 2023-05-16 RX ADMIN — SENNOSIDES 17.2 MG: 8.6 TABLET, FILM COATED ORAL at 21:35

## 2023-05-16 RX ADMIN — METFORMIN HYDROCHLORIDE 500 MG: 500 TABLET ORAL at 08:13

## 2023-05-16 RX ADMIN — ACETAMINOPHEN 975 MG: 325 TABLET ORAL at 12:53

## 2023-05-16 RX ADMIN — DOCUSATE SODIUM 100 MG: 100 CAPSULE, LIQUID FILLED ORAL at 18:04

## 2023-05-16 RX ADMIN — DOCUSATE SODIUM 100 MG: 100 CAPSULE, LIQUID FILLED ORAL at 08:13

## 2023-05-16 RX ADMIN — METFORMIN HYDROCHLORIDE 500 MG: 500 TABLET ORAL at 18:05

## 2023-05-16 RX ADMIN — METHOCARBAMOL 250 MG: 500 TABLET ORAL at 12:52

## 2023-05-16 RX ADMIN — ACETAMINOPHEN 975 MG: 325 TABLET ORAL at 21:35

## 2023-05-16 RX ADMIN — METHOCARBAMOL 250 MG: 500 TABLET ORAL at 18:05

## 2023-05-16 RX ADMIN — INSULIN LISPRO 1 UNITS: 100 INJECTION, SOLUTION INTRAVENOUS; SUBCUTANEOUS at 21:36

## 2023-05-16 RX ADMIN — HEPARIN SODIUM 5000 UNITS: 5000 INJECTION INTRAVENOUS; SUBCUTANEOUS at 05:38

## 2023-05-16 RX ADMIN — HEPARIN SODIUM 5000 UNITS: 5000 INJECTION INTRAVENOUS; SUBCUTANEOUS at 21:35

## 2023-05-16 RX ADMIN — INSULIN LISPRO 1 UNITS: 100 INJECTION, SOLUTION INTRAVENOUS; SUBCUTANEOUS at 12:45

## 2023-05-16 RX ADMIN — HEPARIN SODIUM 5000 UNITS: 5000 INJECTION INTRAVENOUS; SUBCUTANEOUS at 12:54

## 2023-05-16 RX ADMIN — ACETAMINOPHEN 975 MG: 325 TABLET ORAL at 05:38

## 2023-05-16 RX ADMIN — METHOCARBAMOL 250 MG: 500 TABLET ORAL at 23:33

## 2023-05-16 NOTE — PROGRESS NOTES
Internal Medicine Progress Note  Patient: Merl Gowers  Age/sex: 69 y o  male  Medical Record #: [id number]      ASSESSMENT/PLAN: (Interval History)  Merl Gowers is seen and examined and management for following issues:    S/p posterior C4-T1 decompressive laminectomy and instrumented fusion  • No collar needed  • Incision healing well     HTN  • Home: Lotrel 5-20 daily  • Here: Amlopdipine 5mg daily/Lisinopril 20mg daily  • Both held today for a BP this AM of 98/60  • Will reduce the Lisinopril to 10mg qd     DM type 2   • HA1C 6   • Home: Amaryl 2mg daily/Pioglitazone-Metformin  2x daily  • Here: Metformin 500 mg BID  • Continue QID Accuchecks/SSI and DM diet  • No changes today but likely will need to add back some Actos  • Will dec DM diet to level 2     Obesity  • BMI 39 5  • Recommended ongoing attempts at weight loss      LE edema  · For Venous doppler      The above assessment and plan was reviewed and updated as determined by my evaluation of the patient on 5/16/2023      Labs:   Results from last 7 days   Lab Units 05/16/23  0544 05/12/23  1102   WBC Thousand/uL 9 59 13 09*   HEMOGLOBIN g/dL 13 1 13 7   HEMATOCRIT % 40 7 40 8   PLATELETS Thousands/uL 313 286     Results from last 7 days   Lab Units 05/16/23  0544 05/11/23  0557   SODIUM mmol/L 135 135   POTASSIUM mmol/L 4 5 4 1   CHLORIDE mmol/L 107 104   CO2 mmol/L 27 25   BUN mg/dL 26* 23   CREATININE mg/dL 1 04 1 07   CALCIUM mg/dL 9 6 10 1         Results from last 7 days   Lab Units 05/11/23  0759 05/10/23  1000   INR  1 11 0 99     Results from last 7 days   Lab Units 05/16/23  1048 05/16/23  0619 05/15/23  2105   POC GLUCOSE mg/dl 170* 159* 153*       Review of Scheduled Meds:  Current Facility-Administered Medications   Medication Dose Route Frequency Provider Last Rate   • acetaminophen  975 mg Oral [facility] Parviz Anderson MD     • amLODIPine  5 mg Oral Daily Parviz Anderson MD      And   • lisinopril  20 mg Oral Daily Saritha Fam Carlos A Welch MD     • bisacodyl  10 mg Rectal Daily PRN Yeimi Welch MD     • calcium carbonate  750 mg Oral TID PRN Yeimi Welch MD     • docusate sodium  100 mg Oral BID Yeimi Welch MD     • heparin (porcine)  5,000 Units Subcutaneous [facility] Yeimi Welch MD     • insulin lispro  1-5 Units Subcutaneous HS Yeimi Welch MD     • insulin lispro  1-5 Units Subcutaneous 4x Daily (AC & HS) Areli Woods PA-C     • metFORMIN  500 mg Oral BID With Meals Areli Woods PA-C     • methocarbamol  250 mg Oral Q6H [address] Yeimi Welch MD     • naloxone  0 04 mg Intravenous Q1MIN PRN Yeimi Welch MD     • ondansetron  4 mg Oral Q6H PRN Yeimi Welch MD     • oxyCODONE  2 5 mg Oral Q4H PRN Yeimi Welch MD      Or   • oxyCODONE  5 mg Oral Q4H PRN Yeimi Welch MD     • polyethylene glycol  17 g Oral Daily Yeimi Welch MD     • senna  2 tablet Oral HS Yeimi Welch MD         Subjective/ HPI: Patient seen and examined  Patients overnight issues or events were reviewed with nursing or staff during rounds or morning huddle session  New or overnight issues include the following:     No overnight issues  Offers no complaints    ROS:   A 10 point ROS was performed; negative except as noted above         Imaging:     VAS lower limb venous duplex study, complete bilateral    (Results Pending)       *Labs /Radiology studies reviewed  *Medications reviewed and reconciled as needed  *Please refer to order section for additional ordered labs studies  *Case discussed with primary attending during morning huddle case rounds    Physical Examination:  Vitals:   Vitals:    05/15/23 2012 05/16/23 0440 05/16/23 0811 05/16/23 1400   BP: 134/64 120/60 98/60 121/66   BP Location: Right arm Right arm Left arm Left arm   Pulse: 80 89  79   Resp: 18 18  20   Temp: 98 1 °F (36 7 °C) 98 5 °F (36 9 °C)  98 8 °F (37 1 °C)   TempSrc: Oral Oral  Oral   SpO2: 95% 98%  96%   Weight:       Height:           General Appearance: no distress, conversive  HEENT:  External ear normal   Nose normal w/o drainage  Mucous membranes are moist  Oropharynx is clear  Conjunctiva clear w/o icterus or redness  Neck:  Incision healing well w/o erythema/drainage  Lungs: BBS without crackles/wheeze/rhonchi; respirations unlabored with normal inspiratory/expiratory effort  No retractions noted  On RA  CV: regular rate and rhythm; no rubs/murmurs/gallops, PMI normal   ABD: Abdomen is soft  Bowel sounds all quadrants  Nontender with no distention  EXT: +LE edema R>L  Skin: normal turgor, normal texture, no rashes  Psych: affect normal, mood normal  Neuro: AAO     The above physical exam was reviewed and updated as determined by my evaluation of the patient on 5/16/2023  Invasive Devices     Drain  Duration           Open Drain Left Groin 607 days                   VTE Pharmacologic Prophylaxis: Heparin  Code Status: Level 1 - Full Code  Current Length of Stay: 2 day(s)      Total time spent:  30 minutes with more than 50% spent counseling/coordinating care  Counseling includes discussion with patient re: progress  and discussion with patient of his/her current medical state/information  Coordination of patient's care was performed in conjunction with primary service  Time invested included review of patient's labs, vitals, and management of their comorbidities with continued monitoring  In addition, this patient was discussed with medical team including physician and advanced extenders  The care of the patient was extensively discussed and appropriate treatment plan was formulated unique for this patient  Medical decision making for the day was made by supervising physician unless otherwise noted in their attestation statement  ** Please Note:  voice to text software may have been used in the creation of this document   Although proof errors in transcription or interpretation are a potential of such software**

## 2023-05-16 NOTE — PCC NURSING
Pt admitted S/p posterior C4-T1 decompressive laminectomy and instrumented fusion No collar needed  Incision w staples and suture  Pain control with scheduled tylenol/ robaxin and oxycodone prn  HTN- Home: Lotrel 5-20 daily Here: Amlopdipine 5mg dailyLlisinopril 20mg daily  DM type 2 - HA1C 6  Home: Amaryl 2mg daily/Pioglitazone-Metformin  2x daily  Here: Metformin 500 mg BID  Continue QID Accuchecks/SSI and DM diet  Pt is continent of bowel and bladder  This week we will encourage independence with ADLs  We will monitor labs and vital signs  WE will educate pt/family about repositioning to prevent skin breakdown  We will assist w repositioning and perform routine skin checks  We will monitor incision for healing and s/s of infection  We will provide pt/family with DM education as needed  We will monitor for adequate pain control  We will monitor for constipation and medicate pt as ordered  We will increase safety awareness and keep pt free from falls  5/24- Pt reports adequate pain controll  VSS  Will continue with assessment , skin checks  Post  Neck  incision w/staples CDI ISAC  Pt Voiding w/o difficulty and continently  His LBM was 5/23  He reports adequate sleep with current regimen  Rings appropriately to make needs known, Demonstrates safe transfers  Will continue to maintain and reinforce safety and fall precautions

## 2023-05-16 NOTE — CASE MANAGEMENT
Referral made on line through [facility] to the jacinto CARMONA/ [employer] wheels for pt

## 2023-05-16 NOTE — PLAN OF CARE
Problem: PAIN - ADULT  Goal: Verbalizes/displays adequate comfort level or baseline comfort level  Description: Interventions:  - Encourage patient to monitor pain and request assistance  - Assess pain using appropriate pain scale  - Administer analgesics based on type and severity of pain and evaluate response  - Implement non-pharmacological measures as appropriate and evaluate response  - Consider cultural and social influences on pain and pain management  - Notify physician/advanced practitioner if interventions unsuccessful or patient reports new pain  Outcome: Progressing     Problem: INFECTION - ADULT  Goal: Absence or prevention of progression during hospitalization  Description: INTERVENTIONS:  - Assess and monitor for signs and symptoms of infection  - Monitor lab/diagnostic results  - Monitor all insertion sites, i e  indwelling lines, tubes, and drains  - Monitor endotracheal if appropriate and nasal secretions for changes in amount and color  - Emmet appropriate cooling/warming therapies per order  - Administer medications as ordered  - Instruct and encourage patient and family to use good hand hygiene technique  - Identify and instruct in appropriate isolation precautions for identified infection/condition  Outcome: Progressing     Problem: SAFETY ADULT  Goal: Patient will remain free of falls  Description: INTERVENTIONS:  - Educate patient/family on patient safety including physical limitations  - Instruct patient to call for assistance with activity   - Consult OT/PT to assist with strengthening/mobility   - Keep Call bell within reach  - Keep bed low and locked with side rails adjusted as appropriate  - Keep care items and personal belongings within reach  - Initiate and maintain comfort rounds  - Make Fall Risk Sign visible to staff  - Offer Toileting s, in advance of need  - Initiate/Main  - Obtain necessary fall risk ma  - Apply yellow socks and bracelet for high fall risk patients  - Consider moving patient to room near nurses station  Outcome: Progressing  Goal: Maintain or return to baseline ADL function  Description: INTERVENTIONS:  -  Assess patient's ability to carry out ADLs; assess patient's baseline for ADL function and identify physical deficits which impact ability to perform ADLs (bathing, care of mouth/teeth, toileting, grooming, dressing, etc )  - Assess/evaluate cause of self-care deficits   - Assess range of motion  - Assess patient's mobility; develop plan if impaired  - Assess patient's need for assistive devices and provide as appropriate  - Encourage maximum independence but intervene and supervise when necessary  - Involve family in performance of ADLs  - Assess for home care needs following discharge   - Consider OT consult to assist with ADL evaluation and planning for discharge  - Provide patient education as appropriate  Outcome: Progressing  Goal: Maintains/Returns to pre admission functional level  Description: INTERVENTIONS:  - Perform BMAT or MOVE assessment daily    - Set and communicate daily mobility goal to care team and patient/family/caregiver  - Collaborate with rehabilitation services on mobility goals if consulted  - Perform Range ofes a day  - Reposition patiours    - Dangle patiy  - Stand pa  - Ambuy  - Out of bed to   - Out of bed for   - Out of bed for toileting  - Record patient progress and toleration of activity level   Outcome: Progressing     Problem: DISCHARGE PLANNING  Goal: Discharge to home or other facility with appropriate resources  Description: INTERVENTIONS:  - Identify barriers to discharge w/patient and caregiver  - Arrange for needed discharge resources and transportation as appropriate  - Identify discharge learning needs (meds, wound care, etc )  - Arrange for interpretive services to assist at discharge as needed  - Refer to Case Management Department for coordinating discharge planning if the patient needs post-hospital services based on physician/advanced practitioner order or complex needs related to functional status, cognitive ability, or social support system  Outcome: Progressing

## 2023-05-16 NOTE — PROGRESS NOTES
05/16/23 0930   Pain Assessment   Pain Assessment Tool 0-10   Pain Score No Pain   Restrictions/Precautions   Precautions Fall Risk;Pain;Spinal precautions   ROM Restrictions Yes   Braces or Orthoses   (b/l TEDs)   Subjective   Subjective Pt agreeable to perform skilled PT   Roll Left and Right   Type of Assistance Needed Physical assistance   Physical Assistance Level 76% or more   Comment right vs left MaxA but sleeping in recliner lst night   Roll Left and Right CARE Score 2   Sit to Stand   Type of Assistance Needed Physical assistance   Physical Assistance Level 25% or less   Sit to Stand CARE Score 3   Bed-Chair Transfer   Type of Assistance Needed Physical assistance   Physical Assistance Level 76% or more   Comment with RW   Chair/Bed-to-Chair Transfer CARE Score 2   Walk 10 Feet   Type of Assistance Needed Physical assistance   Physical Assistance Level Total assistance   Comment WC follow 100 Medical Distant 2 person for WC   Walk 10 Feet CARE Score 1   Walk 50 Feet with Two Turns   Type of Assistance Needed Physical assistance; Adaptive equipment   Physical Assistance Level Total assistance   Walk 50 Feet with Two Turns CARE Score 1   Ambulation   Primary Mode of Locomotion Prior to Admission Walk   Distance Walked (feet) 99 ft  (x2)   Assist Device Roller Walker   Does the patient walk? 2  Yes   Wheel 50 Feet with Two Turns   Reason if not Attempted Activity not applicable   Wheel 50 Feet with Two Turns CARE Score 9   Wheel 150 Feet   Reason if not Attempted Activity not applicable   Wheel 124 Feet CARE Score 9   Curb or Single Stair   Style negotiated Curb   Type of Assistance Needed Physical assistance; Adaptive equipment   Physical Assistance Level 76% or more   Comment 6 inch curb steps assist with RW placement   1 Step (Curb) CARE Score 2   4 Steps   Type of Assistance Needed Physical assistance   Physical Assistance Level 51%-75%   Comment 6 inch  steps BLHR   4 Steps CARE Score 2   12 Steps   Reason if not Attempted Safety concerns   12 Steps CARE Score 88   Therapeutic Interventions   Strengthening LAQ AP marching 20 reps STS x5 \   Flexibility HS and calfs manual stretch   Balance standing balance   Assessment   Treatment Assessment pt engaged in skilled PT focus on xfers with RW and curb /  steps as above and mat rolling to [facility] home bed   Pt will cont to need skilled PT to meet dc goals , pt in recliner with all needs in reach   Barriers to Discharge Inaccessible home environment;Decreased caregiver support   Plan   Progress Progressing toward goals   PT Therapy Minutes   PT Time In 0930   PT Time Out 1000   PT Total Time (minutes) 30   PT Mode of treatment - Individual (minutes) 30   PT Mode of treatment - Concurrent (minutes) 0   PT Mode of treatment - Group (minutes) 0   PT Mode of treatment - Co-treat (minutes) 0   PT Mode of Treatment - Total time(minutes) 30 minutes   PT Cumulative Minutes 120   Therapy Time missed   Time missed?  No

## 2023-05-16 NOTE — PROGRESS NOTES
PM&R PROGRESS NOTE:  Vitor Aguiar 69 y o  male MRN: [id number]  Unit/Bed#: -01 Encounter: [id number]        Rehab Diagnosis: Impairment of mobility, safety and Activities of Daily Living (ADLs) due to Spinal Cord Dysfunction:  Non-Traumatic:  04 1211  Quadriplegia, Incomplete C1-4 - Examines C1 AIS D  Has some pinprick impairment at C2 and C3 bilaterally - but not clear to me that this is related to his radiographic findings      SUBJECTIVE: Patient seen face-to-face today  No acute issues overnight  Neck pain is at a minimum currently  Patient reports sleep was interrupted by frequent urination due to excessive drinking of water evening before  Denies dysuria, fevers, chills, pelvic discomfort or bladder spasms  ASSESSMENT: Stable, progressing      PLAN:    Rehabilitation  • Functional deficits:  C1 AIS D, impaired sensation, neurogenic bladder, impaired mobility, impaired self care  • Continue current rehabilitation plan of care to maximize function      • Functional update:   rikki in progress  • Estimated Discharge: TBD, ELOS 3 weeks     DVT prophylaxis  • SQ Heparin     Bladder plan  • Continent  • PVRs low     Bowel plan  • Continent      * Incomplete spinal cord injury at C1-C4 level without bone injury [facility])  Assessment & Plan  Patient actually examines on [facility] with C1 AIS D with profound loss of pinprick sensation starting at C2    - LT impairment begins at C5 bilaterally - so if C2/C3 impairment is not related to cord, he is at best C4    - However, given no other potential cause at this time - would label him C1 AIS D   - Maybe developing some neurogenic bladder/NDO prior to surgery which has improved   - Checking PVRs x3 to ensure no retention - PVRs low   - Has some constipation post-op, but at risk for neurogenic bowel - monitor bowel function   - Monitor autonomic function and check orthostatics given his complaints of occasional dizziness when standing     - ACE and/or JAVAN stockings as needed  - Has spasticity in his R hand which has improved since surgery  - Monitor  May benefit from resting hand splint, potentially stim in therapies  - Monitor tone, which is located primarily in his FDS, maybe some FDP    - PT/OT 3-5 hours/day, 5-7 days/week  Spondylosis of cervical spine with myelopathy  Assessment & Plan  MRI showed Multilevel cervical spondylosis, Multifactorial disease results in overall severe canal stenosis with underlying cord compression at C5-C6 and C6-C7  Associated cord edema and/or myelomalacia is present at these levels  No abnormal enhancement identified within the spinal canal   Symptoms: possibly neurogenic bladder leading up to surgery, R hand spasticity (clenched fist), R hand weakness and sensory impairment, Gait impairment/balance issues  - Examines with bilateral UE sensory impairment and R > L weakness, with some mild RLE > LLE weakness    - Also examines with diffuse hyperreflexia  - See SCI for more details  Admitted on 5/10 for planned Posterior cervical decompressive laminectomy with instrumented fusion from C4-T1 with Dr Basilio Law  No need for cervical collar per Neurosurgery  C-Spine precautions  Drain removed  Staples/Drain suture in place  Pain management as detailed below  Two week f/u with Neurosurgery on 5/24  PT/OT - 3-5 hours/day, 5-7 days/week in comprehensive acute inpatient rehab    Spinal stenosis of lumbar region at multiple levels  Assessment & Plan  Symptoms and weakness he reports improved with physical therapy  Pain management as detailed below  Tobacco dependence syndrome  Assessment & Plan  In remission  47 pack year history  Quit in 2021  Type 2 diabetes mellitus without complication, without long-term current use of insulin ([facility])  Assessment & Plan  Lab Results   Component Value Date    HGBA1C 6 0 (H) 05/02/2023       Home: Glimepiride 2mg with breakfast, Pioglitazone-Metformin 15-500mg BID    Here: "Metformin 500mg BID; CDI/Accuchecks  Will try to get to home regimen  Diabetic Diet  Nutrition consult  IM consulted to assist with management  Primary hypertension  Assessment & Plan  Home: Amlodipine- Benzapril 5-20mg tablet once daily  Here: Amlodipine 5mg, Lisinopril 20mg  For therapeutic substitution  Monitor and adjust as appropriate   - Has reported some dizziness when standing too quickly   - Check Orthostatics  IM consulted to assist with management  Obesity, morbid ([address] )  Assessment & Plan  Counseling, Nutrition  Appreciate IM consultants medical co-management  Labs, medications, and imaging personally reviewed  ROS:  A ten point review of systems was completed on 05/16/23 and pertinent positives are listed in subjective section  All other systems reviewed were negative  OBJECTIVE:   BP 98/60 (BP Location: Left arm)   Pulse 89   Temp 98 5 °F (36 9 °C) (Oral)   Resp 18   Ht 5' 6\" (1 676 m)   Wt 111 kg (244 lb 11 4 oz)   SpO2 98%   BMI 39 50 kg/m²     Physical Exam  Vitals and nursing note reviewed  Constitutional:       General: He is not in acute distress  HENT:      Head: Normocephalic and atraumatic  Nose: Nose normal       Mouth/Throat:      Mouth: Mucous membranes are moist    Eyes:      Conjunctiva/sclera: Conjunctivae normal    Cardiovascular:      Rate and Rhythm: Normal rate and regular rhythm  Pulses: Normal pulses  Pulmonary:      Effort: Pulmonary effort is normal       Breath sounds: Normal breath sounds  No wheezing or rales  Abdominal:      General: Bowel sounds are normal  There is no distension  Palpations: Abdomen is soft  Tenderness: There is no abdominal tenderness  Musculoskeletal:         General: Swelling present  Cervical back: Neck supple  Skin:     General: Skin is warm        Comments: Incision clean and intact with staples   Neurological:      Mental Status: He is alert and oriented to person, place, and " time       Sensory: Sensory deficit present        Comments: Balance improving  RUE weakness in fingers especially with finger abduction, finger extension   Psychiatric:         Mood and Affect: Mood normal           Lab Results   Component Value Date    WBC 9 59 05/16/2023    HGB 13 1 05/16/2023    HCT 40 7 05/16/2023    MCV 90 05/16/2023     05/16/2023     Lab Results   Component Value Date    SODIUM 135 05/16/2023    K 4 5 05/16/2023     05/16/2023    CO2 27 05/16/2023    BUN 26 (H) 05/16/2023    CREATININE 1 04 05/16/2023    GLUC 158 (H) 05/16/2023    CALCIUM 9 6 05/16/2023     Lab Results   Component Value Date    INR 1 11 05/11/2023    INR 0 99 05/10/2023    INR 1 07 05/02/2023    PROTIME 14 5 05/11/2023    PROTIME 13 3 05/10/2023    PROTIME 14 2 05/02/2023           Current Facility-Administered Medications:   •  acetaminophen (TYLENOL) tablet 975 mg, 975 mg, Oral, Q8H [address], Jana Purcell MD, 975 mg at 05/16/23 0538  •  amLODIPine (NORVASC) tablet 5 mg, 5 mg, Oral, Daily, 5 mg at 05/15/23 0845 **AND** lisinopril (ZESTRIL) tablet 20 mg, 20 mg, Oral, Daily, Jana Purcell MD, 20 mg at 05/15/23 0846  •  bisacodyl (DULCOLAX) rectal suppository 10 mg, 10 mg, Rectal, Daily PRN, Jana Purcell MD  •  calcium carbonate (TUMS) chewable tablet 750 mg, 750 mg, Oral, TID PRN, Jana Purcell MD  •  docusate sodium (COLACE) capsule 100 mg, 100 mg, Oral, BID, Jana Purcell MD, 100 mg at 05/16/23 0813  •  heparin (porcine) subcutaneous injection 5,000 Units, 5,000 Units, Subcutaneous, Q8H [address], Jana Purcell MD, 5,000 Units at 05/16/23 0538  •  insulin lispro (HumaLOG) 100 units/mL subcutaneous injection 1-5 Units, 1-5 Units, Subcutaneous, HS, Jana Purcell MD, 1 Units at 05/14/23 2240  •  insulin lispro (HumaLOG) 100 units/mL subcutaneous injection 1-5 Units, 1-5 Units, Subcutaneous, 4x Daily (AC & HS), 1 Units at 05/16/23 0801 **AND** Fingerstick Glucose (POCT), , , TID AC, Areli Woods PA-C  • metFORMIN (GLUCOPHAGE) tablet 500 mg, 500 mg, Oral, BID With Meals, Areli Woods PA-C, 500 mg at 05/16/23 0813  •  methocarbamol (ROBAXIN) tablet 250 mg, 250 mg, Oral, Q6H [address], Glennon Felty, MD, 250 mg at 05/16/23 0538  •  naloxone (NARCAN) 0 04 mg/mL syringe 0 04 mg, 0 04 mg, Intravenous, Q1MIN PRN, Glennon Felty, MD  •  ondansetron (ZOFRAN-ODT) dispersible tablet 4 mg, 4 mg, Oral, Q6H PRN, Glennon Felty, MD  •  oxyCODONE (ROXICODONE) split tablet 2 5 mg, 2 5 mg, Oral, Q4H PRN **OR** oxyCODONE (ROXICODONE) IR tablet 5 mg, 5 mg, Oral, Q4H PRN, Glennon Felty, MD  •  polyethylene glycol (MIRALAX) packet 17 g, 17 g, Oral, Daily, Glennon Felty, MD, 17 g at 05/15/23 0846  •  senna (SENOKOT) tablet 17 2 mg, 2 tablet, Oral, HS, Glennon Felty, MD, 17 2 mg at 05/15/23 2221    Past Medical History:   Diagnosis Date   • Diabetes mellitus [facility])    • ED (erectile dysfunction)    • Hyperlipidemia    • Hypertension    • Obesity    • Spinal stenosis of lumbar region at multiple levels 12/22/2022       Patient Active Problem List    Diagnosis Date Noted   • Incomplete spinal cord injury at C1-C4 level without bone injury ([facility] [address] ) 04/27/2023   • Preoperative clearance 05/04/2023   • Neurogenic claudication 04/27/2023   • Spondylosis of cervical spine with myelopathy 03/01/2023   • Spinal stenosis of lumbar region at multiple levels 12/22/2022   • Mixed hyperlipidemia 08/17/2022   • Tobacco dependence syndrome 08/17/2022   • Primary hypertension 07/14/2022   • Type 2 diabetes mellitus without complication, without long-term current use of insulin ([facility] [address] ) 07/14/2022   • Chronic bilateral low back pain without sciatica 07/14/2022   • Carpal tunnel syndrome of right wrist 07/06/2021   • Atrophy of muscle of right hand 07/06/2021   • Obesity, morbid ([address] ) 05/24/2021   • Cubital tunnel syndrome on right    • Carpal tunnel syndrome, left           Angela Gaona MD    I have spent a total time of 30 minutes on 05/16/23 in caring for this patient including Impressions, Counseling / Coordination of care and Documenting in the medical record  ** Please Note:  voice to text software may have been used in the creation of this document   Although proof errors in transcription or interpretation are a potential of such software**

## 2023-05-16 NOTE — PCC PHYSICAL THERAPY
Pt participting well and is making good progress with skilled PT intervention thusfar  Plan for d/c home now Thursday 5/25 with out patient therapy services  Right LE remains weak, requires RW for balance and safe gait  He has RW for main floor and lower level of home/community, SPC for upstairs for shortest distances, 10ft or less  Demo ability to safely progress to Ind in his room with RW  Will benefit from continued strengthening and gait training to maximize his funtional safety overall

## 2023-05-16 NOTE — PROGRESS NOTES
05/16/23 1401   Pain Assessment   Pain Assessment Tool 0-10   Pain Score 3   Pain Location/Orientation Orientation: Bilateral;Location: Neck   Pain Onset/Description Onset: Ongoing;Frequency: Intermittent; Descriptor: Discomfort   Effect of Pain on Daily Activities tolerates   Patient's Stated Pain Goal No pain   Hospital Pain Intervention(s) Repositioned; Rest   Cognition   Orientation Level Oriented X4   Subjective   Subjective pt agreeable to participate in PT tx  Pt requires cues t/o session to maintain C-spine precautions espeically when conversing   Sit to Stand   Type of Assistance Needed Physical assistance; Adaptive equipment;Verbal cues   Physical Assistance Level 25% or less   Comment often requires multiple attemps to complete sit > stand  cues for hand placement   Sit to Stand CARE Score 3   Walk 10 Feet   Type of Assistance Needed Physical assistance;Verbal cues; Adaptive equipment   Physical Assistance Level 25% or less   Comment RW   Walk 10 Feet CARE Score 3   Walk 50 Feet with Two Turns   Type of Assistance Needed Physical assistance;Verbal cues; Adaptive equipment   Physical Assistance Level 25% or less   Comment RW   Walk 50 Feet with Two Turns CARE Score 3   Ambulation   Distance Walked (feet) 100 ft  (x 2, addtional short distances in rehab gym and room)   Assist Device Roller Walker   Gait Pattern Inconsistant Tonya;Decreased foot clearance; Forward Flexion; Improper weight shift   Limitations Noted In Balance; Endurance; Heel Strike;Posture;Speed;Strength   Provided Assistance with: Balance;Weight Shift   Walk Assist Level Minimum Assist   Findings adjusted RW height to increase by one knotch to reduce forward trunk flexion; cues t/o gait to maintain MICHAEL within RW and reduce downward gaze   Does the patient walk? 2  Yes   Toilet Transfer   Type of Assistance Needed Physical assistance; Adaptive equipment;Verbal cues   Physical Assistance Level 25% or less   Comment RW and grab bar onto standard toiltet   Toilet Transfer CARE Score 3   Therapeutic Interventions   Strengthening seated: ankle pf/df, heel slides, LAQs with 2 5lb weights, hip add ball squeezes  standing: heel raises, hip abd with RW support  all BLE ther ex 3 sets of 10 with rest breaks provided PRN   Equipment Use   NuStep level 1 x 10 minutes BLEs only; no complaints of pain Spo2 96% post   Other Comments   Comments (S)  5 time sit to stand test, with UEs crossed over chest: 24 8s; with use of BUEs: 18 2s  TUG with RW at self selected gait speed: 33 9seconds   Assessment   Treatment Assessment Pt participated in 90 minute PT tx session focusing on BLE strengthening exercises, endurance activities, and functional mobility training  Also assessed outcome measure including 5x STS and TUG; both indicating increased risk for falls  Pt very conversant and sometimes distracted, requiring cueing to maintain spinal precautions   pt will continue to benefit from skilled PT interventions to address deficits, reduce fall rsik, and maximize functional independence  Problem List Decreased strength;Decreased range of motion;Decreased endurance; Impaired balance;Decreased mobility; Decreased coordination;Orthopedic restrictions;Pain   Barriers to Discharge Inaccessible home environment;Decreased caregiver support   Plan   Treatment/Interventions Functional transfer training;LE strengthening/ROM; Elevations; Therapeutic exercise; Endurance training;Patient/family training;Bed mobility;Gait training   Progress Progressing toward goals   PT Therapy Minutes   PT Time In 1400   PT Time Out 1530   PT Total Time (minutes) 90   PT Mode of treatment - Individual (minutes) 90   PT Mode of treatment - Concurrent (minutes) 0   PT Mode of treatment - Group (minutes) 0   PT Mode of treatment - Co-treat (minutes) 0   PT Mode of Treatment - Total time(minutes) 90 minutes   PT Cumulative Minutes 210   Therapy Time missed   Time missed?  No

## 2023-05-16 NOTE — PROGRESS NOTES
"OT Treatment Session     05/16/23 2885   Pain Assessment   Pain Assessment Tool 0-10   Pain Score No Pain   Restrictions/Precautions   Precautions Fall Risk;Pain;Spinal precautions   ROM Restrictions Yes   Lifestyle   Autonomy \"You don't want to know how I shave at home\" (pt completes seated with a hand-held mirror)   Upper Body Dressing   Type of Assistance Needed Verbal cues; Incidental touching   Physical Assistance Level No physical assistance   Comment Pt attempting to hold shirt with a chin tuck, VC to avoid this due to cervical spinal precautions  otherwise donned overhead at supervision level   Upper Body Dressing CARE Score 4   Lower Body Dressing   Type of Assistance Needed Physical assistance   Physical Assistance Level 26%-50%   Comment introduced using LHR to don pants with therapist greg  Pt able to thread BLE with max cueing for technique, A to manage over R toes  Lower Body Dressing CARE Score 3   Putting On/Taking Off Footwear   Type of Assistance Needed Physical assistance   Physical Assistance Level 51%-75%   Comment doffed socks with dressing stick, donned with sock aide with therapist greg and max cueing for technique  Pt required A to manage sock over heel  TA for TEDs   Putting On/Taking Off Footwear CARE Score 2   Sit to Stand   Type of Assistance Needed Incidental touching   Physical Assistance Level No physical assistance   Comment CGA with RW   Sit to Stand CARE Score 4   Toileting Hygiene   Type of Assistance Needed Physical assistance   Physical Assistance Level 51%-75%   Comment A for bowel hygiene, CGA for CM up/down   Toileting Hygiene CARE Score 2   Toilet Transfer   Type of Assistance Needed Incidental touching   Physical Assistance Level No physical assistance   Comment RW and grab bar use   Toilet Transfer CARE Score 4   Cognition   Overall Cognitive Status Impaired   Arousal/Participation Alert; Cooperative   Attention Attends with cues to redirect   Orientation Level Oriented X4 " Memory Decreased recall of precautions   Following Commands Follows multistep commands with increased time or repetition   Comments Completed MoCA 8 1 this session  Pt scored 20/30, indicating mild cognitive impairment  Please see below for details  Additional Activities   Additional Activities Comments Educated pt on RW safety/management due to pt demo poor RW positioning during functional mob in room and when approaching sink for grooming task  Pt acknowledged education  Assessment   Treatment Assessment Pt participated in 60 minute skilled OT session focusing on ADL re-training, [address] training and formalized cognitive assessment  Pt tolerated session well  Pt engaged in dressing with LHAE and toileting  Please see above for details on functional performance and education  Pt recalled 3/3 spinal precautions, however required cues throughout session to follow through during functional activity  MoCA 8 1 completed  Pt scored 20/30, indicating mild cognitive impairment  Please see below for details  Pt cont to be limited by decreased functional strength, balance, endurance/activity tolerance, cognition, endurance, coordination, and mobility, resulting in decreased safety and independence with I/ADLs and functional mobility  Pt will benefit from continued skilled OT to maximize independence and safety with ADLs and functional transfers  Continue POC with focus on: ADL Retraining, LHAE education/training, IADL training , [employer], Meal preparation, Medication management , Functional Transfers, Functional Cognition, Functional Attention, Standing tolerance, Standing balance , DME training/education, Energy conservation training/education, healthy coping education, Leisure and social pursuits and community re-integration    Pt with all needs met, call bell in reach, seated in recliner, alarms engaged  Prognosis Good   Problem List Decreased strength;Decreased range of motion;Decreased endurance; Impaired balance;Decreased mobility;Pain;Orthopedic restrictions   Barriers to Discharge Inaccessible home environment;Decreased caregiver support   Plan   Progress Progressing toward goals   OT Therapy Minutes   OT Time In 0830   OT Time Out 0930   OT Total Time (minutes) 60   OT Mode of treatment - Individual (minutes) 60   OT Mode of treatment - Concurrent (minutes) 0   OT Mode of treatment - Group (minutes) 0   OT Mode of treatment - Co-treat (minutes) 0   OT Mode of Treatment - Total time(minutes) 60 minutes   OT Cumulative Minutes 150   Therapy Time missed   Time missed? No     Pt completed Adarsh Cognitive Assessment (MOCA) version 8 1  Pt scored overall 20 / 30  indicating a mild cognitive deficit  Visuospatial/executive: 4 /5     Naming: 3/3     Memory:    (worth no points) - 5/5 both trials    Attention:  4/ 6     Language: 2 / 3 - able to list 12 words    Abstraction: 2 / 2     Delayed recall: 1 / 5 - MIS score 9/15    Orientation: 4 / 6 - verbal cues for date and day    +0 point for only/less than high school education          Total score 20/30, indicating a mild cognitive deficit  Pt educated on functional implications of MOCA score and acknowledged implications for rehab and discharge planning    Administered by certified [address] OTR/L

## 2023-05-17 LAB
GLUCOSE SERPL-MCNC: 140 MG/DL (ref 65–140)
GLUCOSE SERPL-MCNC: 147 MG/DL (ref 65–140)
GLUCOSE SERPL-MCNC: 163 MG/DL (ref 65–140)
GLUCOSE SERPL-MCNC: 168 MG/DL (ref 65–140)

## 2023-05-17 RX ORDER — METHOCARBAMOL 500 MG/1
250 TABLET, FILM COATED ORAL EVERY 6 HOURS PRN
Status: DISCONTINUED | OUTPATIENT
Start: 2023-05-17 | End: 2023-05-25 | Stop reason: HOSPADM

## 2023-05-17 RX ADMIN — HEPARIN SODIUM 5000 UNITS: 5000 INJECTION INTRAVENOUS; SUBCUTANEOUS at 14:31

## 2023-05-17 RX ADMIN — ACETAMINOPHEN 975 MG: 325 TABLET ORAL at 05:08

## 2023-05-17 RX ADMIN — INSULIN LISPRO 1 UNITS: 100 INJECTION, SOLUTION INTRAVENOUS; SUBCUTANEOUS at 07:25

## 2023-05-17 RX ADMIN — INSULIN LISPRO 1 UNITS: 100 INJECTION, SOLUTION INTRAVENOUS; SUBCUTANEOUS at 22:34

## 2023-05-17 RX ADMIN — DOCUSATE SODIUM 100 MG: 100 CAPSULE, LIQUID FILLED ORAL at 08:01

## 2023-05-17 RX ADMIN — METFORMIN HYDROCHLORIDE 500 MG: 500 TABLET ORAL at 07:25

## 2023-05-17 RX ADMIN — LISINOPRIL 10 MG: 10 TABLET ORAL at 08:01

## 2023-05-17 RX ADMIN — HEPARIN SODIUM 5000 UNITS: 5000 INJECTION INTRAVENOUS; SUBCUTANEOUS at 05:09

## 2023-05-17 RX ADMIN — METHOCARBAMOL 250 MG: 500 TABLET ORAL at 05:09

## 2023-05-17 RX ADMIN — METFORMIN HYDROCHLORIDE 500 MG: 500 TABLET ORAL at 17:19

## 2023-05-17 RX ADMIN — METHOCARBAMOL 250 MG: 500 TABLET ORAL at 11:09

## 2023-05-17 RX ADMIN — AMLODIPINE BESYLATE 5 MG: 5 TABLET ORAL at 08:01

## 2023-05-17 RX ADMIN — HEPARIN SODIUM 5000 UNITS: 5000 INJECTION INTRAVENOUS; SUBCUTANEOUS at 20:52

## 2023-05-17 RX ADMIN — ACETAMINOPHEN 975 MG: 325 TABLET ORAL at 20:51

## 2023-05-17 RX ADMIN — ACETAMINOPHEN 975 MG: 325 TABLET ORAL at 14:30

## 2023-05-17 NOTE — TEAM CONFERENCE
Acute RehabilitationTeam Conference Note  Date: 5/17/2023   Time: 12:00 PM       Patient Name:  Caitlyn Parisi Record Number: [id number]   YOB: 1955  Sex: Male          Room/Bed:  [facility]-01  Payor Info:  Payor: MEDICARE / Plan: MEDICARE A AND B / Product Type: Medicare A & B Fee for Service /      Admitting Diagnosis: Degenerative arthritis of cervical spine with cord compression [M47 12]   Admit Date/Time:  5/14/2023 12:49 PM  Admission Comments: No comment available     Primary Diagnosis:  Incomplete spinal cord injury at C1-C4 level without bone injury ([facility] [address] )  Principal Problem: Incomplete spinal cord injury at C1-C4 level without bone injury [facility])    Patient Active Problem List    Diagnosis Date Noted   • Preoperative clearance 05/04/2023   • Incomplete spinal cord injury at C1-C4 level without bone injury ([facility] [address] ) 04/27/2023   • Neurogenic claudication 04/27/2023   • Spondylosis of cervical spine with myelopathy 03/01/2023   • Spinal stenosis of lumbar region at multiple levels 12/22/2022   • Mixed hyperlipidemia 08/17/2022   • Tobacco dependence syndrome 08/17/2022   • Primary hypertension 07/14/2022   • Type 2 diabetes mellitus without complication, without long-term current use of insulin ([facility] [address] ) 07/14/2022   • Chronic bilateral low back pain without sciatica 07/14/2022   • Carpal tunnel syndrome of right wrist 07/06/2021   • Atrophy of muscle of right hand 07/06/2021   • Obesity, morbid ([facility] [address] ) 05/24/2021   • Cubital tunnel syndrome on right    • Carpal tunnel syndrome, left        Physical Therapy:    Weight Bearing Status: Full Weight Bearing  Transfers: Minimal Assistance  Bed Mobility: Moderate Assistance  Amulation Distance (ft): 100 feet  Ambulation: Minimal Assistance  Assistive Device for Ambulation: Roller Walker  Number of Stairs: 4  Assistive Device for Stairs: Bilateral Hand Rails  Stair Assistance:  Moderate Assistance  Discharge Recommendations: Home with:  DC Home with[de-identified] Home Physical Therapy, Family Support (brother can assist with IADLs)    Jordan Vazquez is a 69 male admitted to [facility] 5/14/23 with dx of cervical cord compression, SCI at C5-C7, neurogenic claudication s/p posterior cervical decompressive laminectomy and instrumented fusion C4-T1  Comorbidities affecting pt's performance at time of evaluation including post-op pain, ambulatory dysfunction, obesity, DM, HTN, hyperlipidemia  PTA pt lives with alone in a split level home with 0 MINDY foyer area, however, to main living area pt with 6 steps with b/l rails and then to bedroom/bathroom another 6 steps with b/l rail  5/16/23:   Pt requires mod A for bed mobility but prefers to sleep in recliner  He performs functional transfers and ambulates up to 100 ft with RW and min A; negotiates 4 stairs with bilateral rails and mod A  Pt requires reminders for spinal precautions during conversing and with mobility  He demos impaired BLE strength, standing balance, and endurance resulting in decreased (I) with functional mobility skills  Pt is highly motivated during therapy sessions and goal is to achieve mod I for household mobility at time of DC  Occupational Therapy:  Eating: Supervision  Grooming: Minimal Assistance  Bathing: Maximum Assistance  Bathing: Maximum Assistance  Upper Body Dressing: Incidental Touching  Lower Body Dressing: Minimal Assistance  Toileting: Moderate Assistance  Toilet Transfer: Minimal Assistance  Cognition: Exceptions to WNL  Cognition: Decreased Memory  Orientation: Person, Place, Situation  Discharge Recommendations: Home Independently       Pts impairments include pain, endurance, activity tolerance, functional mobility, balance, functional standing tolerance, decreased I w/ ADLS/IADLS and strength   These impairments along with  limited caregiver support, inaccesible home environment, steps to enter, difficulty performing ADLs, difficulty performing IADLs and health management causes the inability to safely complete A/IADLs including Eating, Grooming, Bathing, UB dressing, LB dressing, Toileting, Toilet transfer, Tub/shower Transfer and IADL Management  Pt presents with good rehab potential with motivation to participate and achieve goal of DC home  Pt is unsafe to DC home at this time, recommending 2 weeks to achieve independent with assistive device level goals with bedside commode, shower chair, cane, reacher, sock aide and RW  Plan to achieve stated goals with interventions focused on ADL Retraining ,  LHAE education/training, IADL training , Kitchen Mobilty, Meal preparation, Medication management , Functional Transfers, Functional Cognition, Functional Attention, MOCA, Standing tolerance, Standing balance , DME training/education, Energy conservation training/education, healthy coping education, Leisure and social pursuits and community re-integration  Speech Therapy:           No notes on file    Nursing Notes:  Appetite: Good  Diet Type: Diabetic                      Diet Patient/Family Education Complete: No    Type of Wound (LDA): Wound                    Type of Wound Patient/Family Education: No  Bladder: Continent     Bladder Patient/Family Education: No  Bowel: Continent     Bowel Patient/Family Education: No  Pain Location/Orientation: Orientation: Bilateral, Location: Neck  Pain Score: 0                       Hospital Pain Intervention(s): Repositioned, Rest  Pain Patient/Family Education: No  Medication Management/Safety  Injectable: Insulin (coverage only and heparin--will not need either for d/c)  Safe Administration: No  Reason for non-safe administration: needs assessment/education  Medication Patient/Family Education Complete: No (ongoing)    Pt admitted S/p posterior C4-T1 decompressive laminectomy and instrumented fusion No collar needed  Incision w staples and suture  Pain control with scheduled tylenol/ robaxin and oxycodone prn     HTN- Home: Lotrel 5-20 daily Here: Amlopdipine 5mg dailyLlisinopril 20mg daily  DM type 2 - HA1C 6  Home: Amaryl 2mg daily/Pioglitazone-Metformin  2x daily  Here: Metformin 500 mg BID  Continue QID Accuchecks/SSI and DM diet  Pt is continent of bowel and bladder  This week we will encourage independence with ADLs  We will monitor labs and vital signs  WE will educate pt/family about repositioning to prevent skin breakdown  We will assist w repositioning and perform routine skin checks  We will monitor incision for healing and s/s of infection  We will provide pt/family with DM education as needed  We will monitor for adequate pain control  We will monitor for constipation and medicate pt as ordered  We will increase safety awareness and keep pt free from falls  Case Management:     Discharge Planning  Living Arrangements: Lives Alone  Support Systems: Self, Family members  Assistance Needed: n/a  Type of Current Residence: Private residence  Current Home Care Services: No  Pt admitted s/p spinal cord injury, lives alone and expects to return home  Pt has a supportive brother who can assist w/transportation  Pt made aware of the potential for contd therapy services on dc  Pt requested referral be made to meals on wheels which was completed on 5/16  Following to assist w/additional dc recommendations/needs  Is the patient actively participating in therapies? yes  List any modifications to the treatment plan:     Barriers Interventions   Lives alone Progress to safest goals for dc home   Finger/hand weakness Occupational therapy exercises   Balance, coordination Therapy exercises   Fine motor and rom deficits Fine motor exercises   Mild cog Repetitive cog training     Is the patient making expected progress toward goals?  yes  List any update or changes to goals:     Medical Goals: Patient will be medically stable for discharge to [facility] upon completion of rehab program and Patient will be able to manage medical conditions and comorbid conditions with medications and follow up upon completion of rehab program    Weekly Team Goals:   Rehab Team Goals  ADL Team Goal: Patient will be independent with ADLs with least restrictive device upon completion of rehab program  Bowel/Bladder Team Goal: Patient will return to premorbid level for bladder/bowel management upon completion of rehab program    Discussion: pt presents with the above  Barriers and is functioning at min to mod with adls, min to mod with roller walker  Goals are for independent with a spc on dc  Pt has limited family support and expects to return home  Anticipated Discharge Date:  potentiall the end of next week pending pts progress  [facility] Team Members Present: The following team members are supervising care for this patient and were present during this Weekly Team Conference      Physician: Dr Patti Gao MD  : Connor Huerta MSW  Registered Nurse: Jammie Epley, RN  Physical Therapist: Paxton Galeas DPT  Occupational Therapist: Oswald Ash MS, OTR/L  Speech Therapist:

## 2023-05-17 NOTE — PROGRESS NOTES
PM&R PROGRESS NOTE:  Shikha Cuff 69 y o  male MRN: [id number]  Unit/Bed#: -01 Encounter: [id number]        Rehab Diagnosis: Impairment of mobility, safety and Activities of Daily Living (ADLs) due to Spinal Cord Dysfunction:  Non-Traumatic:  04 1211  Quadriplegia, Incomplete C1-4 - Examines C1 AIS D  Has some pinprick impairment at C2 and C3 bilaterally - but not clear to me that this is related to his radiographic findings      SUBJECTIVE: Patient seen face to face  No acute issues overnight  Seen in PT ambulating with RW - balance improved  Wanting his muscle relaxant changed to PRN  vNo other acute issues  ASSESSMENT: Stable, progressing      PLAN:    Rehabilitation  • Functional deficits:  C1 AIS D, impaired sensation, neurogenic bladder, impaired mobility, impaired self care  • Continue current rehabilitation plan of care to maximize function  I personally attended, reviewed, and discussed medical and functional updates in team conference today  Please refer to advance care planning note for details  • Expected Discharge: ELOS 10 days    Current Function:   Physical Therapy Occupational Therapy Speech Therapy   Weight Bearing Status: Full Weight Bearing  Transfers: Minimal Assistance  Bed Mobility: Moderate Assistance  Amulation Distance (ft): 100 feet  Ambulation: Minimal Assistance  Assistive Device for Ambulation: Roller Walker  Number of Stairs: 4  Assistive Device for Stairs: Bilateral Hand Rails  Stair Assistance: Moderate Assistance  Discharge Recommendations: Home with:  [address] with[de-identified] Home Physical Therapy, Family Support (brother can assist with IADLs)   Eating: Supervision  Grooming: Minimal Assistance  Bathing: Maximum Assistance  Bathing: Maximum Assistance  Upper Body Dressing: Incidental Touching  Lower Body Dressing: Minimal Assistance  Toileting:  Moderate Assistance  Toilet Transfer: Minimal Assistance  Cognition: Exceptions to WNL  Cognition: Decreased Memory  Orientation: Person, Place, Situation                   DVT prophylaxis  • SQ Heparin     Bladder plan  • Continent  • PVRs low     Bowel plan  • Continent      * Incomplete spinal cord injury at C1-C4 level without bone injury [facility])  Assessment & Plan  Patient actually examines on [employer] with C1 AIS D with profound loss of pinprick sensation starting at C2    - LT impairment begins at C5 bilaterally - so if C2/C3 impairment is not related to cord, he is at best C4    - However, given no other potential cause at this time - would label him C1 AIS D   - Maybe developing some neurogenic bladder/NDO prior to surgery which has improved   - Checking PVRs x3 to ensure no retention - PVRs low   - Has some constipation post-op, but at risk for neurogenic bowel - monitor bowel function   - Monitor autonomic function and check orthostatics given his complaints of occasional dizziness when standing     - ACE and/or JAVAN stockings as needed  - Has spasticity in his R hand which has improved since surgery  - Monitor  May benefit from resting hand splint, potentially stim in therapies  - Monitor tone, which is located primarily in his FDS, maybe some FDP    - PT/OT 3-5 hours/day, 5-7 days/week  Acute pain  Assessment & Plan  Improving Neuropathic pain  Nociceptive post op pain in his neck: Managed on Tylenol scheduled and PRN low dose Oxycodone IR  Robaxin changed to PRN  Spondylosis of cervical spine with myelopathy  Assessment & Plan  MRI showed Multilevel cervical spondylosis, Multifactorial disease results in overall severe canal stenosis with underlying cord compression at C5-C6 and C6-C7  Associated cord edema and/or myelomalacia is present at these levels  No abnormal enhancement identified within the spinal canal   Symptoms: possibly neurogenic bladder leading up to surgery, R hand spasticity (clenched fist), R hand weakness and sensory impairment, Gait impairment/balance issues      - Examines with bilateral UE sensory impairment and R > L weakness, with some mild RLE > LLE weakness    - Also examines with diffuse hyperreflexia  - See SCI for more details  Admitted on 5/10 for planned Posterior cervical decompressive laminectomy with instrumented fusion from C4-T1 with Dr Eugenio Medina  No need for cervical collar per Neurosurgery  C-Spine precautions  Drain removed  Staples/Drain suture in place  Pain management as detailed below  Two week f/u with Neurosurgery on 5/24  PT/OT - 3-5 hours/day, 5-7 days/week in comprehensive acute inpatient rehab    Spinal stenosis of lumbar region at multiple levels  Assessment & Plan  Symptoms and weakness he reports improved with physical therapy  Pain management as detailed below  Tobacco dependence syndrome  Assessment & Plan  In remission  47 pack year history  Quit in 2021  Type 2 diabetes mellitus without complication, without long-term current use of insulin ([facility])  Assessment & Plan  Lab Results   Component Value Date    HGBA1C 6 0 (H) 05/02/2023       Home: Glimepiride 2mg with breakfast, Pioglitazone-Metformin 15-500mg BID  Here: Metformin 500mg BID; CDI/Accuchecks  Will try to get to home regimen  Diabetic Diet  Nutrition consult  IM consulted to assist with management  Primary hypertension  Assessment & Plan  Home: Amlodipine- Benzapril 5-20mg tablet once daily  Here: Amlodipine 5mg, Lisinopril 20mg  For therapeutic substitution  Monitor and adjust as appropriate   - Has reported some dizziness when standing too quickly   - Check Orthostatics  IM consulted to assist with management  Obesity, morbid ([address] )  Assessment & Plan  Counseling, Nutrition  Appreciate IM consultants medical co-management  Labs, medications, and imaging personally reviewed  ROS:  A ten point review of systems was completed on 05/18/23 and pertinent positives are listed in subjective section  All other systems reviewed were negative         OBJECTIVE: "  /58 (BP Location: Right arm)   Pulse 78   Temp 98 7 °F (37 1 °C) (Oral)   Resp 18   Ht 5' 6\" (1 676 m)   Wt 110 kg (242 lb 12 8 oz)   SpO2 94%   BMI 39 19 kg/m²     Physical Exam  Vitals and nursing note reviewed  Constitutional:       General: He is not in acute distress  HENT:      Head: Normocephalic and atraumatic  Nose: Nose normal       Mouth/Throat:      Mouth: Mucous membranes are moist    Eyes:      Conjunctiva/sclera: Conjunctivae normal    Cardiovascular:      Rate and Rhythm: Normal rate and regular rhythm  Pulses: Normal pulses  Pulmonary:      Effort: Pulmonary effort is normal       Breath sounds: Normal breath sounds  No wheezing or rales  Abdominal:      General: Bowel sounds are normal  There is no distension  Palpations: Abdomen is soft  Tenderness: There is no abdominal tenderness  Musculoskeletal:         General: Swelling present  Cervical back: Neck supple  Skin:     General: Skin is warm  Comments: Incision clean and intact with staples   Neurological:      Mental Status: He is alert and oriented to person, place, and time  Sensory: Sensory deficit present        Comments: Balance improving  RUE weakness in fingers especially with finger abduction, finger extension    Gait: Step through reciprocal gait with RW    Psychiatric:         Mood and Affect: Mood normal           Lab Results   Component Value Date    WBC 9 59 05/16/2023    HGB 13 1 05/16/2023    HCT 40 7 05/16/2023    MCV 90 05/16/2023     05/16/2023     Lab Results   Component Value Date    SODIUM 135 05/16/2023    K 4 5 05/16/2023     05/16/2023    CO2 27 05/16/2023    BUN 26 (H) 05/16/2023    CREATININE 1 04 05/16/2023    GLUC 158 (H) 05/16/2023    CALCIUM 9 6 05/16/2023     Lab Results   Component Value Date    INR 1 11 05/11/2023    INR 0 99 05/10/2023    INR 1 07 05/02/2023    PROTIME 14 5 05/11/2023    PROTIME 13 3 05/10/2023    PROTIME 14 2 05/02/2023 " Current Facility-Administered Medications:   •  acetaminophen (TYLENOL) tablet 975 mg, 975 mg, Oral, Q8H [address], Kareem Armendariz MD, 975 mg at 05/18/23 0532  •  amLODIPine (NORVASC) tablet 5 mg, 5 mg, Oral, Daily, 5 mg at 05/17/23 0801 **AND** lisinopril (ZESTRIL) tablet 10 mg, 10 mg, Oral, Daily, KELVIN Jimenez, 10 mg at 05/17/23 0801  •  bisacodyl (DULCOLAX) rectal suppository 10 mg, 10 mg, Rectal, Daily PRN, Kareem Armendariz MD  •  calcium carbonate (TUMS) chewable tablet 750 mg, 750 mg, Oral, TID PRN, Kareem Armendariz MD  •  docusate sodium (COLACE) capsule 100 mg, 100 mg, Oral, BID, Kareem Armendariz MD, 100 mg at 05/17/23 0801  •  heparin (porcine) subcutaneous injection 5,000 Units, 5,000 Units, Subcutaneous, Q8H [address], Kareem Armendariz MD, 5,000 Units at 05/18/23 0532  •  insulin lispro (HumaLOG) 100 units/mL subcutaneous injection 1-5 Units, 1-5 Units, Subcutaneous, 4x Daily (AC & HS), 1 Units at 05/18/23 0737 **AND** Fingerstick Glucose (POCT), , , TID AC, Areli Woods PA-C  •  metFORMIN (GLUCOPHAGE) tablet 500 mg, 500 mg, Oral, BID With Meals, Areli Woods PA-C, 500 mg at 05/18/23 0536  •  methocarbamol (ROBAXIN) tablet 250 mg, 250 mg, Oral, Q6H PRN, Vale Gómez MD  •  naloxone (NARCAN) 0 04 mg/mL syringe 0 04 mg, 0 04 mg, Intravenous, Q1MIN PRN, Kareem Armendariz MD  •  ondansetron (ZOFRAN-ODT) dispersible tablet 4 mg, 4 mg, Oral, Q6H PRN, Kareem Armendariz MD  •  oxyCODONE (ROXICODONE) split tablet 2 5 mg, 2 5 mg, Oral, Q4H PRN **OR** oxyCODONE (ROXICODONE) IR tablet 5 mg, 5 mg, Oral, Q4H PRN, Kareem Armendariz MD  •  polyethylene glycol (MIRALAX) packet 17 g, 17 g, Oral, Daily, Kareem Armendariz MD, 17 g at 05/15/23 0846  •  senna (SENOKOT) tablet 17 2 mg, 2 tablet, Oral, HS, Kareem Armendariz MD, 17 2 mg at 05/16/23 6288    Past Medical History:   Diagnosis Date   • Diabetes mellitus [facility])    • ED (erectile dysfunction)    • Hyperlipidemia    • Hypertension    • Obesity    • Spinal stenosis of lumbar region at multiple levels 12/22/2022       Patient Active Problem List    Diagnosis Date Noted   • Incomplete spinal cord injury at C1-C4 level without bone injury ([facility] [address] ) 04/27/2023   • Acute pain 05/18/2023   • Preoperative clearance 05/04/2023   • Neurogenic claudication 04/27/2023   • Spondylosis of cervical spine with myelopathy 03/01/2023   • Spinal stenosis of lumbar region at multiple levels 12/22/2022   • Mixed hyperlipidemia 08/17/2022   • Tobacco dependence syndrome 08/17/2022   • Primary hypertension 07/14/2022   • Type 2 diabetes mellitus without complication, without long-term current use of insulin ([facility] [address] ) 07/14/2022   • Chronic bilateral low back pain without sciatica 07/14/2022   • Carpal tunnel syndrome of right wrist 07/06/2021   • Atrophy of muscle of right hand 07/06/2021   • Obesity, morbid ([facility] [address] ) 05/24/2021   • Cubital tunnel syndrome on right    • Carpal tunnel syndrome, left           Pastor Jorge MD    I have spent a total time of 55 minutes on 05/18/23 in caring for this patient including Impressions, Counseling / Coordination of care, Documenting in the medical record and weekly team conference  ** Please Note:  voice to text software may have been used in the creation of this document   Although proof errors in transcription or interpretation are a potential of such software**

## 2023-05-17 NOTE — PCC CARE MANAGEMENT
Pt is making good progress with therapy and is scheduled to return home later this week  Recommendations are for contd outpt pt and ot and pt would like to go to [facility] to follow up with transportation assist by [facility] if pt meets the radius requirements

## 2023-05-17 NOTE — PLAN OF CARE
Problem: PAIN - ADULT  Goal: Verbalizes/displays adequate comfort level or baseline comfort level  Description: Interventions:  - Encourage patient to monitor pain and request assistance  - Assess pain using appropriate pain scale  - Administer analgesics based on type and severity of pain and evaluate response  - Implement non-pharmacological measures as appropriate and evaluate response  - Consider cultural and social influences on pain and pain management  - Notify physician/advanced practitioner if interventions unsuccessful or patient reports new pain  Outcome: Progressing     Problem: INFECTION - ADULT  Goal: Absence or prevention of progression during hospitalization  Description: INTERVENTIONS:  - Assess and monitor for signs and symptoms of infection  - Monitor lab/diagnostic results  - Monitor all insertion sites, i e  indwelling lines, tubes, and drains  - Monitor endotracheal if appropriate and nasal secretions for changes in amount and color  - [place] appropriate cooling/warming therapies per order  - Administer medications as ordered  - Instruct and encourage patient and family to use good hand hygiene technique  - Identify and instruct in appropriate isolation precautions for identified infection/condition  Outcome: Progressing     Problem: SAFETY ADULT  Goal: Patient will remain free of falls  Description: INTERVENTIONS:  - Educate patient/family on patient safety including physical limitations  - Instruct patient to call for assistance with activity   - Consult OT/PT to assist with strengthening/mobility   - Keep Call bell within reach  - Keep bed low and locked with side rails adjusted as appropriate  - Keep care items and personal belongings within reach  - Initiate and maintain comfort rounds  - Make Fall Risk Sign visible to staff  - Offer Toileting every 2 Hours, in advance of need  - Apply yellow socks and bracelet for high fall risk patients  - Consider moving patient to room near nurses station  Outcome: Progressing  Goal: Maintain or return to baseline ADL function  Description: INTERVENTIONS:  -  Assess patient's ability to carry out ADLs; assess patient's baseline for ADL function and identify physical deficits which impact ability to perform ADLs (bathing, care of mouth/teeth, toileting, grooming, dressing, etc )  - Assess/evaluate cause of self-care deficits   - Assess range of motion  - Assess patient's mobility; develop plan if impaired  - Assess patient's need for assistive devices and provide as appropriate  - Encourage maximum independence but intervene and supervise when necessary  - Involve family in performance of ADLs  - Assess for home care needs following discharge   - Consider OT consult to assist with ADL evaluation and planning for discharge  - Provide patient education as appropriate  Outcome: Progressing  Goal: Maintains/Returns to pre admission functional level  Description: INTERVENTIONS:  - Perform BMAT or MOVE assessment daily    - Set and communicate daily mobility goal to care team and patient/family/caregiver  - Collaborate with rehabilitation services on mobility goals if consulted  - Perform Range of Motion 3 times a day  - Reposition patient every 2 hours    - Dangle patient 3 times a day  - Stand patient 3 times a day  - Ambulate patient 3 times a day  - Out of bed to chair 3 times a day   - Out of bed for meals 3 times a day  - Out of bed for toileting  - Record patient progress and toleration of activity level   Outcome: Progressing     Problem: DISCHARGE PLANNING  Goal: Discharge to home or other facility with appropriate resources  Description: INTERVENTIONS:  - Identify barriers to discharge w/patient and caregiver  - Arrange for needed discharge resources and transportation as appropriate  - Identify discharge learning needs (meds, wound care, etc )  - Arrange for interpretive services to assist at discharge as needed  - Refer to Case Management Department for coordinating discharge planning if the patient needs post-hospital services based on physician/advanced practitioner order or complex needs related to functional status, cognitive ability, or social support system  Outcome: Progressing     Problem: Nutrition/Hydration-ADULT  Goal: Nutrient/Hydration intake appropriate for improving, restoring or maintaining nutritional needs  Description: Monitor and assess patient's nutrition/hydration status for malnutrition  Collaborate with interdisciplinary team and initiate plan and interventions as ordered  Monitor patient's weight and dietary intake as ordered or per policy  Utilize nutrition screening tool and intervene as necessary  Determine patient's food preferences and provide high-protein, high-caloric foods as appropriate       INTERVENTIONS:  - Monitor oral intake, urinary output, labs, and treatment plans  - Assess nutrition and hydration status and recommend course of action  - Evaluate amount of meals eaten  - Assist patient with eating if necessary   - Allow adequate time for meals  - Recommend/ encourage appropriate diets, oral nutritional supplements, and vitamin/mineral supplements  - Order, calculate, and assess calorie counts as needed  - Recommend, monitor, and adjust tube feedings and TPN/PPN based on assessed needs  - Assess need for intravenous fluids  - Provide specific nutrition/hydration education as appropriate  - Include patient/family/caregiver in decisions related to nutrition  Outcome: Progressing     Problem: MOBILITY - ADULT  Goal: Maintain or return to baseline ADL function  Description: INTERVENTIONS:  -  Assess patient's ability to carry out ADLs; assess patient's baseline for ADL function and identify physical deficits which impact ability to perform ADLs (bathing, care of mouth/teeth, toileting, grooming, dressing, etc )  - Assess/evaluate cause of self-care deficits   - Assess range of motion  - Assess patient's mobility; develop plan if impaired  - Assess patient's need for assistive devices and provide as appropriate  - Encourage maximum independence but intervene and supervise when necessary  - Involve family in performance of ADLs  - Assess for home care needs following discharge   - Consider OT consult to assist with ADL evaluation and planning for discharge  - Provide patient education as appropriate  Outcome: Progressing     Problem: Prexisting or High Potential for Compromised Skin Integrity  Goal: Skin integrity is maintained or improved  Description: INTERVENTIONS:  - Identify patients at risk for skin breakdown  - Assess and monitor skin integrity  - Assess and monitor nutrition and hydration status  - Monitor labs   - Assess for incontinence   - Turn and reposition patient  - Assist with mobility/ambulation  - Relieve pressure over bony prominences  - Avoid friction and shearing  - Provide appropriate hygiene as needed including keeping skin clean and dry  - Evaluate need for skin moisturizer/barrier cream  - Collaborate with interdisciplinary team   - Patient/family teaching  - Consider wound care consult   Outcome: Progressing

## 2023-05-17 NOTE — PROGRESS NOTES
05/17/23 1200   Pain Assessment   Pain Assessment Tool 0-10   Pain Score No Pain   Restrictions/Precautions   Precautions Fall Risk;Pain;Spinal precautions   ROM Restrictions Yes  (C spine precuations)   Braces or Orthoses Other (Comment)   General   Change In Medical/Functional Status (S)  Pt amb to alcove near N elevators, sat down and able to walk back with GISELLE  Updated board in room that he may amb with staff to alcove rest and come back if they are available to A  Cognition   Overall Cognitive Status Impaired   Arousal/Participation Alert; Cooperative   Attention Attends with cues to redirect   Orientation Level Oriented X4   Memory Decreased recall of precautions   Following Commands Follows multistep commands with increased time or repetition   Sit to Lying   Type of Assistance Needed Physical assistance;Verbal cues   Physical Assistance Level 25% or less   Comment log roll technique to R side A with LLE only   Sit to Lying CARE Score 3   Lying to Sitting on Side of Bed   Type of Assistance Needed Physical assistance;Verbal cues; Adaptive equipment   Physical Assistance Level 26%-50%   Comment log roll technique HOB flat but heavy reliance on rail   Lying to Sitting on Side of Bed CARE Score 3   Sit to Stand   Type of Assistance Needed Physical assistance;Verbal cues; Adaptive equipment   Physical Assistance Level 25% or less   Comment with RW, VC's throughout session for hand placement   Sit to Stand CARE Score 3   Bed-Chair Transfer   Type of Assistance Needed Physical assistance;Verbal cues; Adaptive equipment   Physical Assistance Level 25% or less   Comment with RW   Chair/Bed-to-Chair Transfer CARE Score 3   Transfer Bed/Chair/Wheelchair   Adaptive Equipment Roller Walker   Walk 10 Feet   Type of Assistance Needed Physical assistance; Adaptive equipment;Verbal cues   Physical Assistance Level 25% or less   Comment with RW   Walk 10 Feet CARE Score 3   Walk 50 Feet with Two Turns   Type of Assistance "Needed Physical assistance;Verbal cues; Adaptive equipment   Physical Assistance Level 25% or less   Comment with RW   Walk 50 Feet with Two Turns CARE Score 3   Walk 150 Feet   Reason if not Attempted Safety concerns   Walk 150 Feet CARE Score 88   Ambulation   Primary Mode of Locomotion Prior to Admission Walk   Distance Walked (feet) 100 ft  (x2, 50' x2)   Assist Device Roller Walker   Gait Pattern Inconsistant Tonya; Slow Tonya;Decreased foot clearance; Forward Flexion; Wide MICHAEL; Improper weight shift   Limitations Noted In Balance; Coordination;Device Management; Heel Strike;Midline Orientation;Posture; Safety;Speed;Strength;Swing   Provided Assistance with: Balance;Direction   Walk Assist Level Minimum Assist   Does the patient walk? 2  Yes   Wheel 50 Feet with Two Turns   Reason if not Attempted Activity not applicable   Wheel 50 Feet with Two Turns CARE Score 9   Wheel 150 Feet   Reason if not Attempted Activity not applicable   Wheel 911 Feet CARE Score 9   Curb or Single Stair   Style negotiated Single stair   Type of Assistance Needed Physical assistance;Verbal cues; Adaptive equipment   Physical Assistance Level 26%-50%   Comment 6\"  steps with BHR's, MODA descending fwd, MIN/MODA descending bwds  1 Step (Curb) CARE Score 3   4 Steps   Type of Assistance Needed Physical assistance;Verbal cues; Adaptive equipment   Physical Assistance Level 26%-50%   Comment 6\"  steps with BHR's, MODA descending fwd, MIN/MODA descending bwds  4 Steps CARE Score 3   12 Steps   Reason if not Attempted Safety concerns   12 Steps CARE Score 88   Therapeutic Interventions   Strengthening 2 x5 STS with increased focus on proper hand placement  Neuromuscular Re-Education weaving through cones with RW and GISELLE, VC's to maintain within RW at all times     Equipment Use   NuStep l2 x10 min BLE only   Assessment   Treatment Assessment Pt participated in skilled PT session with increased focus on bed mobility with log " roll technique, increased amb, stair management and increased safety awareness  Pt worked on amb in room to alcove to allow staff to walk pt as they are available  Pt noted increased A when amb down steps fwd vs descending bwds which was recommended for safety at this time  Pt will cont to benefit from cont focus on RW management, increased safety awareness, spinal cord precaution awareness, dist amb and stair management as pt previously living home alone  Problem List Decreased strength;Decreased range of motion;Decreased endurance; Impaired balance;Decreased mobility; Decreased coordination;Decreased cognition; Impaired sensation; Impaired tone;Decreased skin integrity;Obesity;Orthopedic restrictions;Pain   Barriers to Discharge Inaccessible home environment;Decreased caregiver support   PT Barriers   Functional Limitation Stair negotiation;Standing;Transfers; Walking   Plan   Treatment/Interventions Functional transfer training;LE strengthening/ROM; Therapeutic exercise; Endurance training;Cognitive reorientation;Patient/family training;Gait training   Progress Progressing toward goals   Recommendation   PT Discharge Recommendation Home with home health rehabilitation   Equipment Recommended   (TBD)   PT Therapy Minutes   PT Time In 1200   PT Time Out 1330   PT Total Time (minutes) 90   PT Mode of treatment - Individual (minutes) 90   PT Mode of treatment - Concurrent (minutes) 0   PT Mode of treatment - Group (minutes) 0   PT Mode of treatment - Co-treat (minutes) 0   PT Mode of Treatment - Total time(minutes) 90 minutes   PT Cumulative Minutes 300   Therapy Time missed   Time missed?  No

## 2023-05-17 NOTE — PCC OCCUPATIONAL THERAPY
5/23/23  Pt has been making good progress towards OT goals  Pt was updated to have IRP today and now completes bed mobility and functional transfers within room with mod(I) using RW  Pt also able to complete toileting with mod(I) using RW/grab bars  Pt completing dry tub transfer today RW<->TTB with d(s)  Pt still requires assistance for LBD tasks to don TEDs and Erin to don socks using sock aide and LHR  OT to continue POC to focus on maximize (I) with LBD, IADLs I e  simple meal prep, laundry, and medication management tasks prior to anticipated d/c Thursday 5/25 with outpatient OT hand therapy  Pt owns necessary DME

## 2023-05-17 NOTE — PROGRESS NOTES
"   05/17/23 0177   Pain Assessment   Pain Assessment Tool 0-10   Pain Score No Pain   Restrictions/Precautions   Precautions Fall Risk;Pain;Spinal precautions   ROM Restrictions Yes  (C spine precuations)   Braces or Orthoses   (B TEDs)   Lifestyle   Autonomy \"I really would love if I could do more w/ this hand by the time I get out of here\"   Sit to Stand   Type of Assistance Needed Physical assistance   Physical Assistance Level 25% or less   Comment to stand at RW, cues for hand placement   Sit to Stand CARE Score 3   Toileting Hygiene   Type of Assistance Needed Physical assistance   Physical Assistance Level 25% or less   Comment following voiding   Toileting Hygiene CARE Score 3   Toilet Transfer   Type of Assistance Needed Physical assistance   Physical Assistance Level 25% or less   Comment RW to standard toilet   Toilet Transfer CARE Score 3   Therapeutic Exercise - ROM   UE-ROM Yes   ROM- Right Upper Extremities   R Hand AROM;AAROM;Prolonged stretch; Thumb; Index finger; Long finger;Ring finger;Little finger;PROM   RUE ROM Comment Noted increased tone, especially in digits when extended and additional PROM to wrist for extension   Cognition   Overall Cognitive Status Impaired   Arousal/Participation Alert; Cooperative   Attention Attends with cues to redirect   Orientation Level Oriented X4   Memory Decreased recall of precautions   Following Commands Follows multistep commands with increased time or repetition   Additional Activities   Additional Activities Comments Pt voicing that he is very concerned about regaining function in his R hand  Pt reports that since the surgery, he has noted less spasticity in his R hand and denies sensation deficits  Noted severe atrophy in R dorsal side of hand and mild in palm of hand  Pt endorses this is an ongoing issue for more than just several years  He is able to compelte FF w/ diminished strength, but requries active assist to extend digits fully   Discussed w/ pt " realistic expectations for hand function return, especially in acute setting  Edu provided to pt regarding splints, including Active digit extension brace and discussed w/ pt that in the future, an outpatient OT may rec a similar brace  Pt recepetive to all edu, reports previously he was given a brace from an outpatient OT, which appears to have been a short ulnar deviation splint  He reports this splint was helpful in assist w/ digit extension  He asked if he should have it brought here, encouraged pt that if he found it helpful, he could have it brought here for MD and OT to review, and if team decides it is appropriate, an order could be provided  Other Comments   Assessment Performed box and block test, 9 hole peg test,  strength assessment  See below for details  Assessment   Treatment Assessment OT session focusing on BUE function and standardized assessments, fxnl transfers, rapport building  See note for fxnl status and results of standardized assessments  Pt continues to require skilled hands on assist in order to maintain his safety  OT to continue to treat and follow established POC  Problem List Decreased strength;Decreased range of motion;Decreased endurance; Impaired balance;Decreased mobility; Decreased coordination;Decreased cognition; Impaired sensation; Impaired tone;Decreased skin integrity;Obesity;Orthopedic restrictions;Pain   Plan   Treatment/Interventions ADL retraining;Functional transfer training; Therapeutic exercise; Endurance training;Cognitive reorientation;Patient/family training;Equipment eval/education; Compensatory technique education;Continued evaluation   Progress Progressing toward goals   OT Therapy Minutes   OT Time In 0830   OT Time Out 1000   OT Total Time (minutes) 90   OT Mode of treatment - Individual (minutes) 90   OT Mode of treatment - Concurrent (minutes) 0   OT Mode of treatment - Group (minutes) 0   OT Mode of treatment - Co-treat (minutes) 0   OT Mode of Treatment - Total time(minutes) 90 minutes   OT Cumulative Minutes 240   Therapy Time missed   Time missed? No     Box and Block Test  R: 29 blocks  L: 37 blocks    9HPT  R: 1min 22s  L: 1min 1s     strength  R: 9, 8,6 lbs  L: 33, 31, 30 lbs    Pt scores below the norms for his age and gender for all tests  Pt demo's dec FMC, frequent droppage of items, dec overall strength especially in R hand

## 2023-05-17 NOTE — PROGRESS NOTES
OT Long Term Goals       05/15/23 0706   Rehab Team Goals   ADL Team Goal Patient will be independent with ADLs with least restrictive device upon completion of rehab program   Rehab Team Interventions   OT Interventions Self Care; Therapeutic Exercise;Home Management; Energy Conservation;Patient/Family Education;Community Reintegration   Eating Goal   Eating Goal 06  Independent - Patient completes the activity by him/herself with no assistance from a helper  Status Target goal - two weeks   Interventions Assistive Device; Optimal Position   Grooming Goal   Oral Hygiene Goal 06  Independent - Patient completes the activity by him/herself with no assistance from a helper  Status Target goal - two weeks   Intervention Assistive Device;Balance Work; Therapeutic Exercise; Tolerance Work;Neuromuscular Education   Tub/Shower Transfer Goal   Method Tub Shower   Assist Device Tub Bench   Status Target goal - two weeks  (mod I)   Interventions ADL Training;Assistive Device; Neuromuscular Education   Bathing Goal   Shower/bathe self Goal 06  Independent - Patient completes the activity by him/herself with no assistance from a helper  Status Target goal - two weeks   Intervention ADL Training;Assistive Device; Therapeutic Exercise;Neuromuscular Education   Upper Body Dressing Goal   Upper body dressing Goal 06  Independent - Patient completes the activity by him/herself with no assistance from a helper  Status Target goal - two weeks   Intervention Assistive Device;Balance Work;Neuromuscular Education; Therapeutic Exercise; Tolerance Work   Lower Body Dressing Goal   Lower body dressing Goal 06  Independent - Patient completes the activity by him/herself with no assistance from a helper  Putting on/taking off footwear Goal 06  Independent - Patient completes the activity by him/herself with no assistance from a helper     Status Target goal - two weeks   Intervention Assistive Device;Balance Work;Neuromuscular Education; Therapeutic Exercise; Tolerance Work   Toileting Transfer Goal   Toilet transfer Goal 06  Independent - Patient completes the activity by him/herself with no assistance from a helper  Status Target goal - two weeks   Intervention ADL Training;Balance Work;Assistive Device   Toileting Goal   Toileting hygiene Goal 06  Independent - Patient completes the activity by him/herself with no assistance from a helper     Status Target goal - two weeks   Intervention ADL Training;Balance Work;Assistive Device   Meal Prep and Kitchen Mobility   Assist Level Independent   Status Target goal - two weeks   Medication Management   Assist Level Independent   Status Target goal - two weeks   Laundry   Assist Level Independent   Status Target goal - two weeks   Finance Management   Assist Level Independent   Status Target goal - two weeks

## 2023-05-17 NOTE — PLAN OF CARE
Problem: PAIN - ADULT  Goal: Verbalizes/displays adequate comfort level or baseline comfort level  Description: Interventions:  - Encourage patient to monitor pain and request assistance  - Assess pain using appropriate pain scale  - Administer analgesics based on type and severity of pain and evaluate response  - Implement non-pharmacological measures as appropriate and evaluate response  - Consider cultural and social influences on pain and pain management  - Notify physician/advanced practitioner if interventions unsuccessful or patient reports new pain  Outcome: Progressing     Problem: INFECTION - ADULT  Goal: Absence or prevention of progression during hospitalization  Description: INTERVENTIONS:  - Assess and monitor for signs and symptoms of infection  - Monitor lab/diagnostic results  - Monitor all insertion sites, i e  indwelling lines, tubes, and drains  - Monitor endotracheal if appropriate and nasal secretions for changes in amount and color  - [place] appropriate cooling/warming therapies per order  - Administer medications as ordered  - Instruct and encourage patient and family to use good hand hygiene technique  - Identify and instruct in appropriate isolation precautions for identified infection/condition  Outcome: Progressing     Problem: SAFETY ADULT  Goal: Patient will remain free of falls  Description: INTERVENTIONS:  - Educate patient/family on patient safety including physical limitations  - Instruct patient to call for assistance with activity   - Consult OT/PT to assist with strengthening/mobility   - Keep Call bell within reach  - Keep bed low and locked with side rails adjusted as appropriate  - Keep care items and personal belongings within reach  - Initiate and maintain comfort rounds  - Make Fall Risk Sign visible to staff  - Offer Toileting every  Hours, in advance of need  - Initiate/Maintain alarm  - Obtain necessary fall risk management equipment:  - Apply yellow socks and bracelet for high fall risk patients  - Consider moving patient to room near nurses station  Outcome: Progressing  Goal: Maintain or return to baseline ADL function  Description: INTERVENTIONS:  -  Assess patient's ability to carry out ADLs; assess patient's baseline for ADL function and identify physical deficits which impact ability to perform ADLs (bathing, care of mouth/teeth, toileting, grooming, dressing, etc )  - Assess/evaluate cause of self-care deficits   - Assess range of motion  - Assess patient's mobility; develop plan if impaired  - Assess patient's need for assistive devices and provide as appropriate  - Encourage maximum independence but intervene and supervise when necessary  - Involve family in performance of ADLs  - Assess for home care needs following discharge   - Consider OT consult to assist with ADL evaluation and planning for discharge  - Provide patient education as appropriate  Outcome: Progressing  Goal: Maintains/Returns to pre admission functional level  Description: INTERVENTIONS:  - Perform BMAT or MOVE assessment daily    - Set and communicate daily mobility goal to care team and patient/family/caregiver  - Collaborate with rehabilitation services on mobility goals if consulted  - Perform Range of Motion  times a day  - Reposition patient every  hours    - Dangle patient  times a day  - Stand patient times a day  - Ambulate patient  times a day  - Out of bed to chair  times a day   - Out of bed for meals times a day  - Out of bed for toileting  - Record patient progress and toleration of activity level   Outcome: Progressing     Problem: DISCHARGE PLANNING  Goal: Discharge to home or other facility with appropriate resources  Description: INTERVENTIONS:  - Identify barriers to discharge w/patient and caregiver  - Arrange for needed discharge resources and transportation as appropriate  - Identify discharge learning needs (meds, wound care, etc )  - Arrange for interpretive services to assist at discharge as needed  - Refer to Case Management Department for coordinating discharge planning if the patient needs post-hospital services based on physician/advanced practitioner order or complex needs related to functional status, cognitive ability, or social support system  Outcome: Progressing     Problem: Nutrition/Hydration-ADULT  Goal: Nutrient/Hydration intake appropriate for improving, restoring or maintaining nutritional needs  Description: Monitor and assess patient's nutrition/hydration status for malnutrition  Collaborate with interdisciplinary team and initiate plan and interventions as ordered  Monitor patient's weight and dietary intake as ordered or per policy  Utilize nutrition screening tool and intervene as necessary  Determine patient's food preferences and provide high-protein, high-caloric foods as appropriate       INTERVENTIONS:  - Monitor oral intake, urinary output, labs, and treatment plans  - Assess nutrition and hydration status and recommend course of action  - Evaluate amount of meals eaten  - Assist patient with eating if necessary   - Allow adequate time for meals  - Recommend/ encourage appropriate diets, oral nutritional supplements, and vitamin/mineral supplements  - Order, calculate, and assess calorie counts as needed  - Recommend, monitor, and adjust tube feedings and TPN/PPN based on assessed needs  - Assess need for intravenous fluids  - Provide specific nutrition/hydration education as appropriate  - Include patient/family/caregiver in decisions related to nutrition  Outcome: Progressing

## 2023-05-17 NOTE — PROGRESS NOTES
Internal Medicine Progress Note  Patient: Ole Mckinney  Age/sex: 69 y o  male  Medical Record #: [id number]      ASSESSMENT/PLAN: (Interval History)  Ole Mckinney is seen and examined and management for following issues:    S/p posterior C4-T1 decompressive laminectomy and instrumented fusion  • No collar needed  • Incision healing well     HTN  • Home: Lotrel 5-20 daily  • Here: Amlopdipine 5mg daily/Lisinopril 10mg daily  • Both held on 5/16 for a BP of 98/60 so reduced the Lisinopril to 10mg qd  • Both given today 5/17 = will watch     DM type 2   • HA1C 6   • Home: Amaryl 2mg daily/Pioglitazone-Metformin  2x daily  • Here: Metformin 500 mg BID  • Continue QID Accuchecks/SSI and DM diet  • No changes today but likely will need to add back some Actos  • On 5/16/23, dec'd DM diet to level 2     Obesity  • BMI 39 5  • Recommended ongoing attempts at weight loss      LE edema  • Venous doppler 5/16 was preliminarily negative for DVT      Discharge date:  Possibly the end of this week      The above assessment and plan was reviewed and updated as determined by my evaluation of the patient on 5/17/2023      Labs:   Results from last 7 days   Lab Units 05/16/23  0544 05/12/23  1102   WBC Thousand/uL 9 59 13 09*   HEMOGLOBIN g/dL 13 1 13 7   HEMATOCRIT % 40 7 40 8   PLATELETS Thousands/uL 313 286     Results from last 7 days   Lab Units 05/16/23  0544 05/11/23  0557   SODIUM mmol/L 135 135   POTASSIUM mmol/L 4 5 4 1   CHLORIDE mmol/L 107 104   CO2 mmol/L 27 25   BUN mg/dL 26* 23   CREATININE mg/dL 1 04 1 07   CALCIUM mg/dL 9 6 10 1         Results from last 7 days   Lab Units 05/11/23  0759   INR  1 11     Results from last 7 days   Lab Units 05/17/23  1106 05/17/23  0626 05/16/23  2118   POC GLUCOSE mg/dl 140 163* 167*       Review of Scheduled Meds:  Current Facility-Administered Medications   Medication Dose Route Frequency Provider Last Rate   • acetaminophen  975 mg Oral [facility] Svetlana Blanton MD     • amLODIPine  5 mg Oral Daily KELVIN Rivera      And   • lisinopril  10 mg Oral Daily KELVIN Rivera     • bisacodyl  10 mg Rectal Daily PRN Glennon Felty, MD     • calcium carbonate  750 mg Oral TID PRN Glennon Felty, MD     • docusate sodium  100 mg Oral BID Glennon Felty, MD     • heparin (porcine)  5,000 Units Subcutaneous [facility] Glennon Felty, MD     • insulin lispro  1-5 Units Subcutaneous HS Glennon Felty, MD     • insulin lispro  1-5 Units Subcutaneous 4x Daily (AC & HS) Areli Woods PA-C     • metFORMIN  500 mg Oral BID With Meals Areli Woods PA-C     • methocarbamol  250 mg Oral Q6H [facility] Glennon Felty, MD     • naloxone  0 04 mg Intravenous Q1MIN PRN Glennon Felty, MD     • ondansetron  4 mg Oral Q6H PRN Glennon Felty, MD     • oxyCODONE  2 5 mg Oral Q4H PRN Glennon Felty, MD      Or   • oxyCODONE  5 mg Oral Q4H PRN Glennon Felty, MD     • polyethylene glycol  17 g Oral Daily Glennon Felty, MD     • senna  2 tablet Oral HS Glennon Felty, MD         Subjective/ HPI: Patient seen and examined  Patients overnight issues or events were reviewed with nursing or staff during rounds or morning huddle session  New or overnight issues include the following:     No new or overnight issues  Offers no complaints    ROS:   A 10 point ROS was performed; negative except as noted above         Imaging:     VAS lower limb venous duplex study, complete bilateral    (Results Pending)       *Labs /Radiology studies reviewed  *Medications reviewed and reconciled as needed  *Please refer to order section for additional ordered labs studies  *Case discussed with primary attending during morning huddle case rounds    Physical Examination:  Vitals:   Vitals:    05/16/23 1400 05/16/23 2034 05/17/23 0501 05/17/23 0801   BP: 121/66 138/69 138/76 118/61   BP Location: Left arm Left arm Right arm Left arm   Pulse: 79 87 83 85   Resp: 20 18 19    Temp: 98 8 °F (37 1 °C) 98 7 °F (37 1 °C) 98 2 °F (36 8 °C)    TempSrc: Oral Oral Oral    SpO2: 96% 94% 92%    Weight:   110 kg (242 lb 12 8 oz)    Height:           General Appearance: no distress, conversive  HEENT: PERRLA, conjuctiva normal; oropharynx clear; mucous membranes moist   Neck:  Supple, normal ROM  Lungs: CTA, normal respiratory effort, no retractions, expiratory effort normal  CV: regular rate and rhythm; no rubs/murmurs/gallops, PMI normal   ABD: soft; ND/NT; +BS  EXT: +LE edema  Skin: normal turgor, normal texture, no rashes  Psych: affect normal, mood normal  Neuro: AAO      The above physical exam was reviewed and updated as determined by my evaluation of the patient on 5/17/2023  Invasive Devices     Drain  Duration           Open Drain Left Groin 608 days                   VTE Pharmacologic Prophylaxis: Heparin  Code Status: Level 1 - Full Code  Current Length of Stay: 3 day(s)      Total time spent:  30 minutes with more than 50% spent counseling/coordinating care  Counseling includes discussion with patient re: progress  and discussion with patient of his/her current medical state/information  Coordination of patient's care was performed in conjunction with primary service  Time invested included review of patient's labs, vitals, and management of their comorbidities with continued monitoring  In addition, this patient was discussed with medical team including physician and advanced extenders  The care of the patient was extensively discussed and appropriate treatment plan was formulated unique for this patient  Medical decision making for the day was made by supervising physician unless otherwise noted in their attestation statement  ** Please Note:  voice to text software may have been used in the creation of this document   Although proof errors in transcription or interpretation are a potential of such software**

## 2023-05-18 PROBLEM — R52 ACUTE PAIN: Status: ACTIVE | Noted: 2023-05-18

## 2023-05-18 LAB
GLUCOSE SERPL-MCNC: 144 MG/DL (ref 65–140)
GLUCOSE SERPL-MCNC: 150 MG/DL (ref 65–140)
GLUCOSE SERPL-MCNC: 158 MG/DL (ref 65–140)
GLUCOSE SERPL-MCNC: 163 MG/DL (ref 65–140)

## 2023-05-18 RX ADMIN — ACETAMINOPHEN 975 MG: 325 TABLET ORAL at 05:32

## 2023-05-18 RX ADMIN — INSULIN LISPRO 1 UNITS: 100 INJECTION, SOLUTION INTRAVENOUS; SUBCUTANEOUS at 07:37

## 2023-05-18 RX ADMIN — CALCIUM CARBONATE (ANTACID) CHEW TAB 500 MG 750 MG: 500 CHEW TAB at 21:47

## 2023-05-18 RX ADMIN — HEPARIN SODIUM 5000 UNITS: 5000 INJECTION INTRAVENOUS; SUBCUTANEOUS at 21:43

## 2023-05-18 RX ADMIN — ACETAMINOPHEN 975 MG: 325 TABLET ORAL at 21:44

## 2023-05-18 RX ADMIN — METFORMIN HYDROCHLORIDE 500 MG: 500 TABLET ORAL at 07:37

## 2023-05-18 RX ADMIN — METFORMIN HYDROCHLORIDE 500 MG: 500 TABLET ORAL at 17:11

## 2023-05-18 RX ADMIN — ACETAMINOPHEN 975 MG: 325 TABLET ORAL at 14:15

## 2023-05-18 RX ADMIN — HEPARIN SODIUM 5000 UNITS: 5000 INJECTION INTRAVENOUS; SUBCUTANEOUS at 05:32

## 2023-05-18 RX ADMIN — HEPARIN SODIUM 5000 UNITS: 5000 INJECTION INTRAVENOUS; SUBCUTANEOUS at 14:16

## 2023-05-18 RX ADMIN — INSULIN LISPRO 1 UNITS: 100 INJECTION, SOLUTION INTRAVENOUS; SUBCUTANEOUS at 11:15

## 2023-05-18 RX ADMIN — INSULIN LISPRO 1 UNITS: 100 INJECTION, SOLUTION INTRAVENOUS; SUBCUTANEOUS at 21:44

## 2023-05-18 RX ADMIN — AMLODIPINE BESYLATE 5 MG: 5 TABLET ORAL at 08:05

## 2023-05-18 RX ADMIN — LISINOPRIL 10 MG: 10 TABLET ORAL at 08:05

## 2023-05-18 NOTE — PROGRESS NOTES
Internal Medicine Progress Note  Patient: Vincent George  Age/sex: 69 y o  male  Medical Record #: [id number]      ASSESSMENT/PLAN: (Interval History)  Vincent George is seen and examined and management for following issues:    S/p posterior C4-T1 decompressive laminectomy and instrumented fusion  • No collar needed  • Incision healing well     HTN  • Home: Lotrel 5-20 daily  • Here: Amlopdipine 5mg daily/Lisinopril 10mg daily  • No changes today     DM type 2   • HA1C 6   • Home: Amaryl 2mg daily/Pioglitazone-Metformin  2x daily  • Here: Metformin 500 mg BID  • Continue QID Accuchecks/SSI and DM diet  • No changes today but likely will need to add back some Actos  • On 5/16/23, dec'd DM diet to level 2     Obesity  • BMI 39 5  • Recommended ongoing attempts at weight loss      LE edema  • Venous doppler 5/16 was preliminarily negative for DVT        Discharge date:  Possibly the end of this week    The above assessment and plan was reviewed and updated as determined by my evaluation of the patient on 5/18/2023      Labs:   Results from last 7 days   Lab Units 05/16/23  0544 05/12/23  1102   WBC Thousand/uL 9 59 13 09*   HEMOGLOBIN g/dL 13 1 13 7   HEMATOCRIT % 40 7 40 8   PLATELETS Thousands/uL 313 286     Results from last 7 days   Lab Units 05/16/23  0544   SODIUM mmol/L 135   POTASSIUM mmol/L 4 5   CHLORIDE mmol/L 107   CO2 mmol/L 27   BUN mg/dL 26*   CREATININE mg/dL 1 04   CALCIUM mg/dL 9 6             Results from last 7 days   Lab Units 05/18/23  1108 05/18/23  0645 05/17/23  2130   POC GLUCOSE mg/dl 163* 158* 168*       Review of Scheduled Meds:  Current Facility-Administered Medications   Medication Dose Route Frequency Provider Last Rate   • acetaminophen  975 mg Oral [facility] Yeimi Welch MD     • amLODIPine  5 mg Oral Daily KELVIN Armas      And   • lisinopril  10 mg Oral Daily KELVIN Armas     • bisacodyl  10 mg Rectal Daily PRN Yeimi Welch MD     • calcium carbonate  750 mg Oral TID PRN Flip Bhakta MD     • docusate sodium  100 mg Oral BID Flip Bhakta MD     • heparin (porcine)  5,000 Units Subcutaneous Pending sale to [facility] Flip Bhakta MD     • insulin lispro  1-5 Units Subcutaneous 4x Daily (AC & HS) Areli Woods PA-C     • metFORMIN  500 mg Oral BID With Meals Areli Woods PA-C     • methocarbamol  250 mg Oral Q6H PRN Dar Taylor MD     • naloxone  0 04 mg Intravenous Q1MIN PRN Flip Bhakta MD     • ondansetron  4 mg Oral Q6H PRN Flip Bhakta MD     • oxyCODONE  2 5 mg Oral Q4H PRN Flip Bhakta MD      Or   • oxyCODONE  5 mg Oral Q4H PRN Flip Bhakta MD     • polyethylene glycol  17 g Oral Daily Flip Bhakta MD     • senna  2 tablet Oral HS Flip Bhakta MD         Subjective/ HPI: Patient seen and examined  Patients overnight issues or events were reviewed with nursing or staff during rounds or morning huddle session  New or overnight issues include the following:     No new or overnight issues  Offers no complaints    ROS:   A 10 point ROS was performed; negative except as noted above  Imaging:     VAS lower limb venous duplex study, complete bilateral   Final Result by Raheem Koch MD (05/17 1504)          *Labs /Radiology studies reviewed  *Medications reviewed and reconciled as needed  *Please refer to order section for additional ordered labs studies  *Case discussed with primary attending during morning huddle case rounds    Physical Examination:  Vitals:   Vitals:    05/17/23 2110 05/18/23 0530 05/18/23 0805 05/18/23 0830   BP: 129/76 125/58 112/59 103/56   BP Location: Right arm Right arm Right arm Right arm   Pulse: 88 78 74 83   Resp:  18     Temp: 98 6 °F (37 °C) 98 7 °F (37 1 °C)     TempSrc: Oral Oral     SpO2: 95% 94%     Weight:       Height:           General Appearance: no distress, conversive  HEENT:  External ear normal   Nose normal w/o drainage  Mucous membranes are moist  Oropharynx is clear  Conjunctiva clear w/o icterus or redness  Neck:  Supple, normal ROM  Lungs: BBS without crackles/wheeze/rhonchi; respirations unlabored with normal inspiratory/expiratory effort  No retractions noted  On RA  CV: regular rate and rhythm; no rubs/murmurs/gallops, PMI normal   ABD: Abdomen is soft  Bowel sounds all quadrants  Nontender with no distention  EXT: +mild LE edema  Skin: normal turgor, normal texture, no rashes  Psych: affect normal, mood normal  Neuro: AAO     The above physical exam was reviewed and updated as determined by my evaluation of the patient on 5/18/2023  Invasive Devices     Drain  Duration           Open Drain Left Groin 609 days                   VTE Pharmacologic Prophylaxis: Heparin  Code Status: Level 1 - Full Code  Current Length of Stay: 4 day(s)      Total time spent:  30 minutes with more than 50% spent counseling/coordinating care  Counseling includes discussion with patient re: progress  and discussion with patient of his/her current medical state/information  Coordination of patient's care was performed in conjunction with primary service  Time invested included review of patient's labs, vitals, and management of their comorbidities with continued monitoring  In addition, this patient was discussed with medical team including physician and advanced extenders  The care of the patient was extensively discussed and appropriate treatment plan was formulated unique for this patient  Medical decision making for the day was made by supervising physician unless otherwise noted in their attestation statement  ** Please Note:  voice to text software may have been used in the creation of this document   Although proof errors in transcription or interpretation are a potential of such software**

## 2023-05-18 NOTE — ASSESSMENT & PLAN NOTE
Improving Neuropathic pain  Nociceptive post op pain in his neck: Managed on Tylenol scheduled and PRN low dose Oxycodone IR  Robaxin changed to PRN

## 2023-05-18 NOTE — PLAN OF CARE
Problem: PAIN - ADULT  Goal: Verbalizes/displays adequate comfort level or baseline comfort level  Description: Interventions:  - Encourage patient to monitor pain and request assistance  - Assess pain using appropriate pain scale  - Administer analgesics based on type and severity of pain and evaluate response  - Implement non-pharmacological measures as appropriate and evaluate response  - Consider cultural and social influences on pain and pain management  - Notify physician/advanced practitioner if interventions unsuccessful or patient reports new pain  Outcome: Progressing     Problem: INFECTION - ADULT  Goal: Absence or prevention of progression during hospitalization  Description: INTERVENTIONS:  - Assess and monitor for signs and symptoms of infection  - Monitor lab/diagnostic results  - Monitor all insertion sites, i e  indwelling lines, tubes, and drains  - Monitor endotracheal if appropriate and nasal secretions for changes in amount and color  - Gordon appropriate cooling/warming therapies per order  - Administer medications as ordered  - Instruct and encourage patient and family to use good hand hygiene technique  - Identify and instruct in appropriate isolation precautions for identified infection/condition  Outcome: Progressing     Problem: SAFETY ADULT  Goal: Patient will remain free of falls  Description: INTERVENTIONS:  - Educate patient/family on patient safety including physical limitations  - Instruct patient to call for assistance with activity   - Consult OT/PT to assist with strengthening/mobility   - Keep Call bell within reach  - Keep bed low and locked with side rails adjusted as appropriate  - Keep care items and personal belongings within reach  - Initiate and maintain comfort rounds  - Make Fall Risk Sign visible to staff  - Offer Toileting every 2 Hours, in advance of need  - Apply yellow socks and bracelet for high fall risk patients  - Consider moving patient to room near nurses station  Outcome: Progressing  Goal: Maintain or return to baseline ADL function  Description: INTERVENTIONS:  -  Assess patient's ability to carry out ADLs; assess patient's baseline for ADL function and identify physical deficits which impact ability to perform ADLs (bathing, care of mouth/teeth, toileting, grooming, dressing, etc )  - Assess/evaluate cause of self-care deficits   - Assess range of motion  - Assess patient's mobility; develop plan if impaired  - Assess patient's need for assistive devices and provide as appropriate  - Encourage maximum independence but intervene and supervise when necessary  - Involve family in performance of ADLs  - Assess for home care needs following discharge   - Consider OT consult to assist with ADL evaluation and planning for discharge  - Provide patient education as appropriate  Outcome: Progressing  Goal: Maintains/Returns to pre admission functional level  Description: INTERVENTIONS:  - Perform BMAT or MOVE assessment daily    - Set and communicate daily mobility goal to care team and patient/family/caregiver  - Collaborate with rehabilitation services on mobility goals if consulted  - Perform Range of Motion 3 times a day  - Reposition patient every 2 hours    - Dangle patient 3 times a day  - Stand patient 3 times a day  - Ambulate patient 3 times a day  - Out of bed to chair 3 times a day   - Out of bed for meals 3 times a day  - Out of bed for toileting  - Record patient progress and toleration of activity level   Outcome: Progressing     Problem: DISCHARGE PLANNING  Goal: Discharge to home or other facility with appropriate resources  Description: INTERVENTIONS:  - Identify barriers to discharge w/patient and caregiver  - Arrange for needed discharge resources and transportation as appropriate  - Identify discharge learning needs (meds, wound care, etc )  - Arrange for interpretive services to assist at discharge as needed  - Refer to Case Management Department for coordinating discharge planning if the patient needs post-hospital services based on physician/advanced practitioner order or complex needs related to functional status, cognitive ability, or social support system  Outcome: Progressing     Problem: Nutrition/Hydration-ADULT  Goal: Nutrient/Hydration intake appropriate for improving, restoring or maintaining nutritional needs  Description: Monitor and assess patient's nutrition/hydration status for malnutrition  Collaborate with interdisciplinary team and initiate plan and interventions as ordered  Monitor patient's weight and dietary intake as ordered or per policy  Utilize nutrition screening tool and intervene as necessary  Determine patient's food preferences and provide high-protein, high-caloric foods as appropriate       INTERVENTIONS:  - Monitor oral intake, urinary output, labs, and treatment plans  - Assess nutrition and hydration status and recommend course of action  - Evaluate amount of meals eaten  - Assist patient with eating if necessary   - Allow adequate time for meals  - Recommend/ encourage appropriate diets, oral nutritional supplements, and vitamin/mineral supplements  - Order, calculate, and assess calorie counts as needed  - Recommend, monitor, and adjust tube feedings and TPN/PPN based on assessed needs  - Assess need for intravenous fluids  - Provide specific nutrition/hydration education as appropriate  - Include patient/family/caregiver in decisions related to nutrition  Outcome: Progressing     Problem: MOBILITY - ADULT  Goal: Maintain or return to baseline ADL function  Description: INTERVENTIONS:  -  Assess patient's ability to carry out ADLs; assess patient's baseline for ADL function and identify physical deficits which impact ability to perform ADLs (bathing, care of mouth/teeth, toileting, grooming, dressing, etc )  - Assess/evaluate cause of self-care deficits   - Assess range of motion  - Assess patient's mobility; develop plan if impaired  - Assess patient's need for assistive devices and provide as appropriate  - Encourage maximum independence but intervene and supervise when necessary  - Involve family in performance of ADLs  - Assess for home care needs following discharge   - Consider OT consult to assist with ADL evaluation and planning for discharge  - Provide patient education as appropriate  Outcome: Progressing    Problem: Prexisting or High Potential for Compromised Skin Integrity  Goal: Skin integrity is maintained or improved  Description: INTERVENTIONS:  - Identify patients at risk for skin breakdown  - Assess and monitor skin integrity  - Assess and monitor nutrition and hydration status  - Monitor labs   - Assess for incontinence   - Turn and reposition patient  - Assist with mobility/ambulation  - Relieve pressure over bony prominences  - Avoid friction and shearing  - Provide appropriate hygiene as needed including keeping skin clean and dry  - Evaluate need for skin moisturizer/barrier cream  - Collaborate with interdisciplinary team   - Patient/family teaching  - Consider wound care consult   Outcome: Progressing

## 2023-05-18 NOTE — PROGRESS NOTES
05/18/23 1300   Pain Assessment   Pain Assessment Tool 0-10   Pain Score No Pain   Restrictions/Precautions   Precautions Fall Risk;Spinal precautions;Cognitive   ROM Restrictions Yes  (cervical spinal precautions)   Braces or Orthoses Other (Comment)  (B/L tedS)   Cognition   Overall Cognitive Status Impaired   Arousal/Participation Alert; Cooperative   Attention Attends with cues to redirect   Orientation Level Oriented X4   Memory Decreased recall of precautions   Following Commands Follows multistep commands with increased time or repetition   Sit to Stand   Type of Assistance Needed Incidental touching; Adaptive equipment   Comment with RW   Sit to Stand CARE Score 4   Bed-Chair Transfer   Type of Assistance Needed Incidental touching; Adaptive equipment   Comment CG with RW   Chair/Bed-to-Chair Transfer CARE Score 4   Transfer Bed/Chair/Wheelchair   Adaptive Equipment Roller Walker   Car Transfer   Type of Assistance Needed Incidental touching; Adaptive equipment;Verbal cues   Comment CGA with RW, VC's to maintain precautions  Car Transfer CARE Score 4   Walk 10 Feet   Type of Assistance Needed Incidental touching; Adaptive equipment   Comment CG with RW   Walk 10 Feet CARE Score 4   Walk 50 Feet with Two Turns   Type of Assistance Needed Physical assistance; Incidental touching; Adaptive equipment   Physical Assistance Level 25% or less   Comment Erin/CGA with RW   Walk 50 Feet with Two Turns CARE Score 3   Walk 150 Feet   Type of Assistance Needed Incidental touching;Physical assistance   Physical Assistance Level 25% or less   Comment Erin/CGA with RW   Walk 150 Feet CARE Score 3   Ambulation   Primary Mode of Locomotion Prior to Admission Walk   Distance Walked (feet) 100 ft  (x2, 150')   Assist Device Roller Walker   Gait Pattern Inconsistant Tonya; Ataxic; Slow Tonya;Decreased foot clearance; Forward Flexion; Wide MICHAEL; Improper weight shift   Limitations Noted In Balance; Coordination; Heel "Strike;Posture; Safety;Speed;Strength;Swing   Provided Assistance with: Direction   Walk Assist Level Contact Guard;Minimum Assist   Does the patient walk? 2  Yes   Wheel 50 Feet with Two Turns   Reason if not Attempted Activity not applicable   Wheel 50 Feet with Two Turns CARE Score 9   Wheel 150 Feet   Reason if not Attempted Activity not applicable   Wheel 372 Feet CARE Score 9   Wheelchair mobility   Does the patient use a wheelchair? 0  No   Curb or Single Stair   Style negotiated Curb   Type of Assistance Needed Physical assistance;Verbal cues; Adaptive equipment   Physical Assistance Level 26%-50%   Comment CGA ascending 6' curb with RW, MODA descending as pt's RW slide fwd slightly  1 Step (Curb) CARE Score 3   4 Steps   Type of Assistance Needed Physical assistance;Verbal cues; Adaptive equipment   Physical Assistance Level 25% or less   Comment 6\"  steps with BHR's retro descend   4 Steps CARE Score 3   12 Steps   Reason if not Attempted Safety concerns   12 Steps CARE Score 88   Stairs   Type Stairs;Curb   # of Steps 6   Weight Bearing Precautions Fall Risk;Cervical   Assist Devices Roller Walker;Bilateral Rail   Therapeutic Interventions   Strengthening 2 x5 STS with increased focus on hand placement   Assessment   Treatment Assessment Pt participated in skilled PT session with increased focus on gait, increased act tolerance, increased safety awareness, increased stair management and insight into current deficits  Pt cont to work increased safety awareness and maintaining within RW at all times when amb  Pt noted improved stair management when descending bwds as well as curb step ascending  Pt will cont to benefit from increased safety awareness, increased I with all gait and functional transfers to decrease burden of care  Problem List Decreased strength;Decreased range of motion;Decreased endurance; Impaired balance;Decreased mobility; Decreased coordination;Decreased cognition; Impaired " sensation; Impaired tone;Obesity;Orthopedic restrictions;Pain   Barriers to Discharge Inaccessible home environment;Decreased caregiver support   PT Barriers   Functional Limitation Stair negotiation;Standing;Transfers; Walking   Plan   Treatment/Interventions Functional transfer training;LE strengthening/ROM; Endurance training; Therapeutic exercise;Patient/family training;Gait training   Progress Progressing toward goals   Recommendation   PT Discharge Recommendation Home with outpatient rehabilitation   Equipment Recommended Walker   PT Therapy Minutes   PT Time In 1300   PT Time Out 1400   PT Total Time (minutes) 60   PT Mode of treatment - Individual (minutes) 60   PT Mode of treatment - Concurrent (minutes) 0   PT Mode of treatment - Group (minutes) 0   PT Mode of treatment - Co-treat (minutes) 0   PT Mode of Treatment - Total time(minutes) 60 minutes   PT Cumulative Minutes 390   Therapy Time missed   Time missed?  No

## 2023-05-18 NOTE — PROGRESS NOTES
"OT Treatment Note       05/18/23 2182   Pain Assessment   Pain Assessment Tool 0-10   Pain Score 5   Pain Location/Orientation Orientation: Right;Location: Rib Cage   Pain Onset/Description Onset: Ongoing;Frequency: Intermittent  (neck \"stiffness\", denying pain)   Hospital Pain Intervention(s) Repositioned; Rest   Restrictions/Precautions   Precautions Fall Risk;Pain;Spinal precautions   ROM Restrictions Yes  (c spine precautions)   Lifestyle   Autonomy \"I haven't had a shower in a week but I think today I'll just wash up here  \"   Oral Hygiene   Type of Assistance Needed Set-up / clean-up   Physical Assistance Level No physical assistance   Comment oral care completed sitting at sink 2* impaired standing tolerance   Oral Hygiene CARE Score 5   Shower/Bathe Self   Type of Assistance Needed Physical assistance   Physical Assistance Level 26%-50%   Comment Pt declining shower this AM, requesting SB  Pt declining to attempt washing LEs at all, requires assist with LEs and buttocks; CGA while in stance with BUE support on RW   Shower/Bathe Self CARE Score 3   Upper Body Dressing   Type of Assistance Needed Supervision   Physical Assistance Level No physical assistance   Upper Body Dressing CARE Score 4   Lower Body Dressing   Type of Assistance Needed Physical assistance   Physical Assistance Level 51%-75%   Comment assistance to thread BLE through underwear/pant legs, able to hike over hips with CGA in stance with unilateral UE support on RW   Lower Body Dressing CARE Score 2   Putting On/Taking Off Footwear   Type of Assistance Needed Physical assistance   Physical Assistance Level 26%-50%   Comment able to doff socks using dressing stick, donning socks using sock aid with Erin to fully pull up over TEDs; TA for TEDs   Pt becoming frustrated with LHAE use, Discussed various methods to increase ease of task and potentially decrease need for LHAE i e  sitting EOB and using bed to support LEs to decrease need to bend with " functional reach to feet   Putting On/Taking Off Footwear CARE Score 3   Sit to Stand   Type of Assistance Needed Physical assistance   Physical Assistance Level 25% or less   Comment initially Erin using RW progressing to CGA during session, min vc's for hand placement with good carryover   Sit to Stand CARE Score 3   Bed-Chair Transfer   Type of Assistance Needed Physical assistance   Physical Assistance Level 25% or less   Comment initially Erin using RW, progressing to CGA with min vc's for hand placement with good carryover   Chair/Bed-to-Chair Transfer CARE Score 3   Toileting Hygiene   Type of Assistance Needed Physical assistance   Physical Assistance Level 25% or less   Comment pt urinating only 3x during session; CGA during CM   Toileting Hygiene CARE Score 3   Toilet Transfer   Type of Assistance Needed Physical assistance   Physical Assistance Level 25% or less   Comment transferred 3x during session to toilet to urinate with Erin/CGA using RW   Toilet Transfer CARE Score 3   Meal Prep   Meal Preparation edu re: walker tray for item transport, pt likes folding walker tray  PTA pt reports using metal cart that sits at counter height, has shelves; uses this to transport food items  Also uses kitchen chair to sit and prep food at [address] [facility] limited assistance available from brother/brother-in-law upon return home  Pt orders and picks up groceries, then uses hand cart to load bags out of trunk into cart and bring into garage  Transports bags one by one up into kitchen to unload food into fridge  Functional Standing Tolerance   Comments Standing at elevated table pt functionally reaching with RUE to /place mushroom pegs into inclined pegboard, min vc's to adhere to c-spine precautions during task with good carryover  pt able to tolerate 2-3minutes standing without UE support with CGA before requiring seated rest break   Pt reporting he is now standing longer than he was prior to surgery  Cognition   Overall Cognitive Status Impaired   Arousal/Participation Alert; Cooperative   Attention Attends with cues to redirect   Orientation Level Oriented X4   Memory Decreased recall of precautions   Following Commands Follows multistep commands with increased time or repetition   Assessment   Treatment Assessment Pt not scheduled for ADL session but upon OT arrival to room, pt not yet washed/dressed  Pt declining shower and requesting SB  Pt requiring Mod/maxA with both bathing and LBD tasks at this time, would benefit from further edu re: alternate methods/locations of getting dressed to potentially decrease need for use of LHAE vs further edu on LHAE for maximized (I) with tasks  Pt tolerating 2-3min in stance during session before requiring seated rest breaks  OT to continue POC to focus on standing balance/tolerance, functional strength, maximizing (I) with functional transfers/mobility, LB ADLs and IADLs prior to d/c  Prognosis Good   Problem List Decreased strength;Decreased range of motion;Decreased endurance; Impaired balance;Decreased mobility; Decreased coordination;Decreased cognition; Impaired sensation; Impaired tone;Obesity;Orthopedic restrictions;Pain   Barriers to Discharge Inaccessible home environment;Decreased caregiver support   Plan   Treatment/Interventions ADL retraining;Functional transfer training; Therapeutic exercise; Endurance training;Cognitive reorientation;Patient/family training;Equipment eval/education; Bed mobility;Gait training; Compensatory technique education   Progress Progressing toward goals   Recommendation   OT Discharge Recommendation   (pending progress)   OT Therapy Minutes   OT Time In 0830   OT Time Out 1000   OT Total Time (minutes) 90   OT Mode of treatment - Individual (minutes) 90   OT Mode of treatment - Concurrent (minutes) 0   OT Mode of treatment - Group (minutes) 0   OT Mode of treatment - Co-treat (minutes) 0   OT Mode of Treatment - Total time(minutes) 90 minutes   OT Cumulative Minutes 330   Therapy Time missed   Time missed?  No

## 2023-05-18 NOTE — CASE MANAGEMENT
Met w/pt and reviewed team mtg update and informed of potential dc next week pending his progress  Pt in agreement and hopes to receive a date soon so he has something to look forward to

## 2023-05-18 NOTE — PLAN OF CARE
Problem: PAIN - ADULT  Goal: Verbalizes/displays adequate comfort level or baseline comfort level  Description: Interventions:  - Encourage patient to monitor pain and request assistance  - Assess pain using appropriate pain scale  - Administer analgesics based on type and severity of pain and evaluate response  - Implement non-pharmacological measures as appropriate and evaluate response  - Consider cultural and social influences on pain and pain management  - Notify physician/advanced practitioner if interventions unsuccessful or patient reports new pain  Outcome: Progressing     Problem: INFECTION - ADULT  Goal: Absence or prevention of progression during hospitalization  Description: INTERVENTIONS:  - Assess and monitor for signs and symptoms of infection  - Monitor lab/diagnostic results  - Monitor all insertion sites, i e  indwelling lines, tubes, and drains  - Monitor endotracheal if appropriate and nasal secretions for changes in amount and color  - Baldwin appropriate cooling/warming therapies per order  - Administer medications as ordered  - Instruct and encourage patient and family to use good hand hygiene technique  - Identify and instruct in appropriate isolation precautions for identified infection/condition  Outcome: Progressing     Problem: SAFETY ADULT  Goal: Patient will remain free of falls  Description: INTERVENTIONS:  - Educate patient/family on patient safety including physical limitations  - Instruct patient to call for assistance with activity   - Consult OT/PT to assist with strengthening/mobility   - Keep Call bell within reach  - Keep bed low and locked with side rails adjusted as appropriate  - Keep care items and personal belongings within reach  - Initiate and maintain comfort rounds  - Make Fall Risk Sign visible to staff  - Apply yellow socks and bracelet for high fall risk patients  - Consider moving patient to room near nurses station  Outcome: Progressing  Goal: Maintain or return to baseline ADL function  Description: INTERVENTIONS:  -  Assess patient's ability to carry out ADLs; assess patient's baseline for ADL function and identify physical deficits which impact ability to perform ADLs (bathing, care of mouth/teeth, toileting, grooming, dressing, etc )  - Assess/evaluate cause of self-care deficits   - Assess range of motion  - Assess patient's mobility; develop plan if impaired  - Assess patient's need for assistive devices and provide as appropriate  - Encourage maximum independence but intervene and supervise when necessary  - Involve family in performance of ADLs  - Assess for home care needs following discharge   - Consider OT consult to assist with ADL evaluation and planning for discharge  - Provide patient education as appropriate  Outcome: Progressing  Goal: Maintains/Returns to pre admission functional level  Description: INTERVENTIONS:  - Perform BMAT or MOVE assessment daily    - Set and communicate daily mobility goal to care team and patient/family/caregiver     - Collaborate with rehabilitation services on mobility goals if consulted  - Out of bed for toileting  - Record patient progress and toleration of activity level   Outcome: Progressing     Problem: DISCHARGE PLANNING  Goal: Discharge to home or other facility with appropriate resources  Description: INTERVENTIONS:  - Identify barriers to discharge w/patient and caregiver  - Arrange for needed discharge resources and transportation as appropriate  - Identify discharge learning needs (meds, wound care, etc )  - Arrange for interpretive services to assist at discharge as needed  - Refer to Case Management Department for coordinating discharge planning if the patient needs post-hospital services based on physician/advanced practitioner order or complex needs related to functional status, cognitive ability, or social support system  Outcome: Progressing     Problem: Nutrition/Hydration-ADULT  Goal: Nutrient/Hydration intake appropriate for improving, restoring or maintaining nutritional needs  Description: Monitor and assess patient's nutrition/hydration status for malnutrition  Collaborate with interdisciplinary team and initiate plan and interventions as ordered  Monitor patient's weight and dietary intake as ordered or per policy  Utilize nutrition screening tool and intervene as necessary  Determine patient's food preferences and provide high-protein, high-caloric foods as appropriate       INTERVENTIONS:  - Monitor oral intake, urinary output, labs, and treatment plans  - Assess nutrition and hydration status and recommend course of action  - Evaluate amount of meals eaten  - Assist patient with eating if necessary   - Allow adequate time for meals  - Recommend/ encourage appropriate diets, oral nutritional supplements, and vitamin/mineral supplements  - Order, calculate, and assess calorie counts as needed  - Recommend, monitor, and adjust tube feedings and TPN/PPN based on assessed needs  - Assess need for intravenous fluids  - Provide specific nutrition/hydration education as appropriate  - Include patient/family/caregiver in decisions related to nutrition  Outcome: Progressing     Problem: MOBILITY - ADULT  Goal: Maintain or return to baseline ADL function  Description: INTERVENTIONS:  -  Assess patient's ability to carry out ADLs; assess patient's baseline for ADL function and identify physical deficits which impact ability to perform ADLs (bathing, care of mouth/teeth, toileting, grooming, dressing, etc )  - Assess/evaluate cause of self-care deficits   - Assess range of motion  - Assess patient's mobility; develop plan if impaired  - Assess patient's need for assistive devices and provide as appropriate  - Encourage maximum independence but intervene and supervise when necessary  - Involve family in performance of ADLs  - Assess for home care needs following discharge   - Consider OT consult to assist with ADL evaluation and planning for discharge  - Provide patient education as appropriate  Outcome: Progressing  Goal: Maintains/Returns to pre admission functional level  Description: INTERVENTIONS:  - Perform BMAT or MOVE assessment daily    - Set and communicate daily mobility goal to care team and patient/family/caregiver     - Collaborate with rehabilitation services on mobility goals if consulted  - Out of bed for toileting  - Record patient progress and toleration of activity level   Outcome: Progressing     Problem: Prexisting or High Potential for Compromised Skin Integrity  Goal: Skin integrity is maintained or improved  Description: INTERVENTIONS:  - Identify patients at risk for skin breakdown  - Assess and monitor skin integrity  - Assess and monitor nutrition and hydration status  - Monitor labs   - Assess for incontinence   - Turn and reposition patient  - Assist with mobility/ambulation  - Relieve pressure over bony prominences  - Avoid friction and shearing  - Provide appropriate hygiene as needed including keeping skin clean and dry  - Evaluate need for skin moisturizer/barrier cream  - Collaborate with interdisciplinary team   - Patient/family teaching  - Consider wound care consult   Outcome: Progressing

## 2023-05-18 NOTE — PROGRESS NOTES
05/18/23 1030   Pain Assessment   Pain Assessment Tool 0-10   Pain Score No Pain   Restrictions/Precautions   Precautions Fall Risk;Pain;Spinal precautions   ROM Restrictions Yes  (cervical spinal precautions)   Braces or Orthoses Other (Comment)  (B/L tedS)   Subjective   Subjective Pt with no new complaints, just reports feeling stiff   Sit to Stand   Type of Assistance Needed Physical assistance   Physical Assistance Level 25% or less   Comment CGA with RW, minAx1 w/o RW   Sit to Stand CARE Score 3   Bed-Chair Transfer   Type of Assistance Needed Incidental touching   Comment CG with RW   Chair/Bed-to-Chair Transfer CARE Score 4   Walk 10 Feet   Type of Assistance Needed Incidental touching   Comment CG with RW   Walk 10 Feet CARE Score 4   Walk 50 Feet with Two Turns   Type of Assistance Needed Physical assistance   Physical Assistance Level 25% or less   Comment Erin/CGA with RW   Walk 50 Feet with Two Turns CARE Score 3   Ambulation   Primary Mode of Locomotion Prior to Admission Walk   Distance Walked (feet) 100 ft  (x2)   Assist Device Roller Walker   Gait Pattern Inconsistant Tonya; Slow Tonya;Decreased foot clearance; Forward Flexion; Wide MICHAEL; Improper weight shift   Limitations Noted In Balance; Coordination; Endurance; Heel Strike;Posture; Safety;Speed;Strength;Swing   Walk Assist Level Contact Guard;Minimum Assist   Findings minAx1 for balance at times, otherwise CGA   Does the patient walk? 2  Yes   Wheel 50 Feet with Two Turns   Reason if not Attempted Activity not applicable   Wheel 50 Feet with Two Turns CARE Score 9   Wheel 150 Feet   Reason if not Attempted Activity not applicable   Wheel 635 Feet CARE Score 9   Therapeutic Interventions   Flexibility seated b/l gastroc stretch 3x30 sec hold   Balance amb at HR 60'x3 with unilateral support, MinAx1 for balance and ataxic gait initially, improved with continued trials   Sidestepping to L and R 20' each way minAx1 for balance   Assessment   Treatment "Assessment Pt engaged in 30 min skilled PT session focusing on balance interventions as pt reports eventual goal is to not use AD if possible  Due to decreased strength, imbalance, coordination deficits requires minAx1 for activities with single UE support  Also relies heavily on UE support through RW when ambulating, vc's to correct posture and to \"glide\" RW rather than lean on it so heavily with mobilities  At this time would cont to recommend RW for all mobilities but can continue to address deficits to help progress pt towards LRAD  Cont with POC to meet set Ellie goals with LRAD with anticipated d/c home late next week vs early week of 5/28/23  Pt reports that he would like to sit in green chair in room, however, also states that team has told pt to keep legs elevated, therefore returned pt to recliner with legs reclined end of session  Can discuss with team if pt allowed to sit in green chair for short periods with legs down  Problem List Decreased strength;Decreased range of motion;Decreased endurance; Impaired balance;Decreased mobility; Decreased coordination;Decreased cognition; Impaired sensation; Impaired tone;Obesity;Orthopedic restrictions;Pain   Barriers to Discharge Inaccessible home environment;Decreased caregiver support   PT Barriers   Functional Limitation Stair negotiation;Standing;Transfers; Walking   Plan   Treatment/Interventions Functional transfer training;LE strengthening/ROM; Elevations; Endurance training; Therapeutic exercise;Patient/family training;Bed mobility;Gait training   Progress Progressing toward goals   Recommendation   PT Discharge Recommendation Home with outpatient rehabilitation   PT Therapy Minutes   PT Time In 1030   PT Time Out 1100   PT Total Time (minutes) 30   PT Mode of treatment - Individual (minutes) 30   PT Mode of treatment - Concurrent (minutes) 0   PT Mode of treatment - Group (minutes) 0   PT Mode of treatment - Co-treat (minutes) 0   PT Mode of Treatment - Total " time(minutes) 30 minutes   PT Cumulative Minutes 330   Therapy Time missed   Time missed?  No

## 2023-05-19 LAB
ANION GAP SERPL CALCULATED.3IONS-SCNC: 6 MMOL/L (ref 4–13)
BASOPHILS # BLD AUTO: 0.04 THOUSANDS/ÂΜL (ref 0–0.1)
BASOPHILS NFR BLD AUTO: 0 % (ref 0–1)
BUN SERPL-MCNC: 21 MG/DL (ref 5–25)
CALCIUM SERPL-MCNC: 9.9 MG/DL (ref 8.3–10.1)
CHLORIDE SERPL-SCNC: 105 MMOL/L (ref 96–108)
CO2 SERPL-SCNC: 25 MMOL/L (ref 21–32)
CREAT SERPL-MCNC: 1.03 MG/DL (ref 0.6–1.3)
EOSINOPHIL # BLD AUTO: 0.31 THOUSAND/ÂΜL (ref 0–0.61)
EOSINOPHIL NFR BLD AUTO: 3 % (ref 0–6)
ERYTHROCYTE [DISTWIDTH] IN BLOOD BY AUTOMATED COUNT: 14.7 % (ref 11.6–15.1)
GFR SERPL CREATININE-BSD FRML MDRD: 73 ML/MIN/1.73SQ M
GLUCOSE P FAST SERPL-MCNC: 144 MG/DL (ref 65–99)
GLUCOSE SERPL-MCNC: 117 MG/DL (ref 65–140)
GLUCOSE SERPL-MCNC: 144 MG/DL (ref 65–140)
GLUCOSE SERPL-MCNC: 144 MG/DL (ref 65–140)
GLUCOSE SERPL-MCNC: 148 MG/DL (ref 65–140)
GLUCOSE SERPL-MCNC: 153 MG/DL (ref 65–140)
HCT VFR BLD AUTO: 39.4 % (ref 36.5–49.3)
HGB BLD-MCNC: 13 G/DL (ref 12–17)
IMM GRANULOCYTES # BLD AUTO: 0.05 THOUSAND/UL (ref 0–0.2)
IMM GRANULOCYTES NFR BLD AUTO: 1 % (ref 0–2)
LYMPHOCYTES # BLD AUTO: 1.2 THOUSANDS/ÂΜL (ref 0.6–4.47)
LYMPHOCYTES NFR BLD AUTO: 13 % (ref 14–44)
MCH RBC QN AUTO: 29.5 PG (ref 26.8–34.3)
MCHC RBC AUTO-ENTMCNC: 33 G/DL (ref 31.4–37.4)
MCV RBC AUTO: 90 FL (ref 82–98)
MONOCYTES # BLD AUTO: 0.89 THOUSAND/ÂΜL (ref 0.17–1.22)
MONOCYTES NFR BLD AUTO: 9 % (ref 4–12)
NEUTROPHILS # BLD AUTO: 7.13 THOUSANDS/ÂΜL (ref 1.85–7.62)
NEUTS SEG NFR BLD AUTO: 74 % (ref 43–75)
NRBC BLD AUTO-RTO: 0 /100 WBCS
PLATELET # BLD AUTO: 370 THOUSANDS/UL (ref 149–390)
PMV BLD AUTO: 10.3 FL (ref 8.9–12.7)
POTASSIUM SERPL-SCNC: 4.6 MMOL/L (ref 3.5–5.3)
RBC # BLD AUTO: 4.4 MILLION/UL (ref 3.88–5.62)
SODIUM SERPL-SCNC: 136 MMOL/L (ref 135–147)
WBC # BLD AUTO: 9.62 THOUSAND/UL (ref 4.31–10.16)

## 2023-05-19 RX ORDER — PIOGLITAZONEHYDROCHLORIDE 15 MG/1
15 TABLET ORAL DAILY
Status: DISCONTINUED | OUTPATIENT
Start: 2023-05-20 | End: 2023-05-23

## 2023-05-19 RX ADMIN — METFORMIN HYDROCHLORIDE 500 MG: 500 TABLET ORAL at 17:27

## 2023-05-19 RX ADMIN — HEPARIN SODIUM 5000 UNITS: 5000 INJECTION INTRAVENOUS; SUBCUTANEOUS at 21:36

## 2023-05-19 RX ADMIN — AMLODIPINE BESYLATE 5 MG: 5 TABLET ORAL at 10:30

## 2023-05-19 RX ADMIN — ACETAMINOPHEN 975 MG: 325 TABLET ORAL at 21:35

## 2023-05-19 RX ADMIN — HEPARIN SODIUM 5000 UNITS: 5000 INJECTION INTRAVENOUS; SUBCUTANEOUS at 05:52

## 2023-05-19 RX ADMIN — LISINOPRIL 10 MG: 10 TABLET ORAL at 10:30

## 2023-05-19 RX ADMIN — ACETAMINOPHEN 975 MG: 325 TABLET ORAL at 05:52

## 2023-05-19 RX ADMIN — HEPARIN SODIUM 5000 UNITS: 5000 INJECTION INTRAVENOUS; SUBCUTANEOUS at 14:41

## 2023-05-19 RX ADMIN — INSULIN LISPRO 1 UNITS: 100 INJECTION, SOLUTION INTRAVENOUS; SUBCUTANEOUS at 08:00

## 2023-05-19 RX ADMIN — METFORMIN HYDROCHLORIDE 500 MG: 500 TABLET ORAL at 08:00

## 2023-05-19 RX ADMIN — ACETAMINOPHEN 975 MG: 325 TABLET ORAL at 14:41

## 2023-05-19 NOTE — PROGRESS NOTES
Internal Medicine Progress Note  Patient: Kimberlee Spicer  Age/sex: 69 y o  male  Medical Record #: [id number]      ASSESSMENT/PLAN: (Interval History)  Kimberlee Spicer is seen and examined and management for following issues:    S/p posterior C4-T1 decompressive laminectomy and instrumented fusion  • No collar needed  • Incision healing well     HTN  • Home: Lotrel 5-20 daily  • Here: Amlopdipine 5mg daily/Lisinopril 10mg daily  • No changes today     DM type 2   • HA1C 6   • Home: Amaryl 2mg daily/Pioglitazone-Metformin  2x daily  • Here: Metformin 500 mg BID  • Continue QID Accuchecks/SSI and DM diet  • Will add back some Actos at 15mg qam starting 5/20/23     Obesity  • BMI 39 5  • Recommended ongoing attempts at weight loss      LE edema  • Venous doppler 5/16 was preliminarily negative for DVT        Discharge date:  Next week    The above assessment and plan was reviewed and updated as determined by my evaluation of the patient on 5/19/2023      Labs:   Results from last 7 days   Lab Units 05/19/23  0605 05/16/23  0544   WBC Thousand/uL 9 62 9 59   HEMOGLOBIN g/dL 13 0 13 1   HEMATOCRIT % 39 4 40 7   PLATELETS Thousands/uL 370 313     Results from last 7 days   Lab Units 05/19/23  0605 05/16/23  0544   SODIUM mmol/L 136 135   POTASSIUM mmol/L 4 6 4 5   CHLORIDE mmol/L 105 107   CO2 mmol/L 25 27   BUN mg/dL 21 26*   CREATININE mg/dL 1 03 1 04   CALCIUM mg/dL 9 9 9 6             Results from last 7 days   Lab Units 05/19/23  1049 05/19/23  0610 05/18/23  2046   POC GLUCOSE mg/dl 117 153* 150*       Review of Scheduled Meds:  Current Facility-Administered Medications   Medication Dose Route Frequency Provider Last Rate   • acetaminophen  975 mg Oral [facility] Amparo Wyatt MD     • amLODIPine  5 mg Oral Daily KELVIN Colmenares      And   • lisinopril  10 mg Oral Daily KELVIN Colmenares     • bisacodyl  10 mg Rectal Daily PRN Amparo Wyatt MD     • calcium carbonate  750 mg Oral TID PRN Ambrose Ulrich MD     • docusate sodium  100 mg Oral BID Ambrose Ulrich MD     • heparin (porcine)  5,000 Units Subcutaneous [facility] Ambrose Ulrich MD     • insulin lispro  1-5 Units Subcutaneous 4x Daily (AC & HS) Areli Woods PA-C     • metFORMIN  500 mg Oral BID With Meals Areli Woods PA-C     • methocarbamol  250 mg Oral Q6H PRN Melissa Hernández MD     • naloxone  0 04 mg Intravenous Q1MIN PRN Ambrose Ulrich MD     • ondansetron  4 mg Oral Q6H PRN Ambrose Ulrich MD     • oxyCODONE  2 5 mg Oral Q4H PRN Ambrose Ulrich MD      Or   • oxyCODONE  5 mg Oral Q4H PRN Ambrose Ulrich MD     • polyethylene glycol  17 g Oral Daily Ambrose Ulrich MD     • senna  2 tablet Oral HS Ambrose Ulrich MD         Subjective/ HPI: Patient seen and examined  Patients overnight issues or events were reviewed with nursing or staff during rounds or morning huddle session  New or overnight issues include the following:     No new or overnight issues  Offers no complaints    ROS:   A 10 point ROS was performed; negative except as noted above         Imaging:     VAS lower limb venous duplex study, complete bilateral   Final Result by Jose Rojas MD (05/17 3993)          *Labs /Radiology studies reviewed  *Medications reviewed and reconciled as needed  *Please refer to order section for additional ordered labs studies  *Case discussed with primary attending during morning huddle case rounds    Physical Examination:  Vitals:   Vitals:    05/18/23 2126 05/19/23 0547 05/19/23 1009 05/19/23 1027   BP: 139/76 140/66 105/66 118/72   BP Location: Right arm Right arm Right arm Right arm   Pulse: 80 74     Resp: 18 18     Temp: 98 3 °F (36 8 °C) 98 4 °F (36 9 °C)     TempSrc: Oral Oral     SpO2: 96% 94%     Weight:       Height:           General Appearance: no distress, conversive  HEENT: PERRLA, conjuctiva normal; oropharynx clear; mucous membranes moist   Neck:  Suture line is well approximated; no erythema or drainage  Lungs: CTA, normal respiratory effort, no retractions, expiratory effort normal  CV: regular rate and rhythm; no rubs/murmurs/gallops, PMI normal   ABD: soft; ND/NT; +BS  EXT: mild LE edema  Has TEDs on  Skin: normal turgor, normal texture, no rashes  Psych: affect normal, mood normal  Neuro: AAO      The above physical exam was reviewed and updated as determined by my evaluation of the patient on 5/19/2023  Invasive Devices     Drain  Duration           Open Drain Left Groin 610 days                   VTE Pharmacologic Prophylaxis: Heparin  Code Status: Level 1 - Full Code  Current Length of Stay: 5 day(s)      Total time spent:  30 minutes with more than 50% spent counseling/coordinating care  Counseling includes discussion with patient re: progress  and discussion with patient of his/her current medical state/information  Coordination of patient's care was performed in conjunction with primary service  Time invested included review of patient's labs, vitals, and management of their comorbidities with continued monitoring  In addition, this patient was discussed with medical team including physician and advanced extenders  The care of the patient was extensively discussed and appropriate treatment plan was formulated unique for this patient  Medical decision making for the day was made by supervising physician unless otherwise noted in their attestation statement  ** Please Note:  voice to text software may have been used in the creation of this document   Although proof errors in transcription or interpretation are a potential of such software**

## 2023-05-19 NOTE — PLAN OF CARE
Problem: PAIN - ADULT  Goal: Verbalizes/displays adequate comfort level or baseline comfort level  Description: Interventions:  - Encourage patient to monitor pain and request assistance  - Assess pain using appropriate pain scale  - Administer analgesics based on type and severity of pain and evaluate response  - Implement non-pharmacological measures as appropriate and evaluate response  - Consider cultural and social influences on pain and pain management  - Notify physician/advanced practitioner if interventions unsuccessful or patient reports new pain  Outcome: Progressing     Problem: INFECTION - ADULT  Goal: Absence or prevention of progression during hospitalization  Description: INTERVENTIONS:  - Assess and monitor for signs and symptoms of infection  - Monitor lab/diagnostic results  - Monitor all insertion sites, i e  indwelling lines, tubes, and drains  - Monitor endotracheal if appropriate and nasal secretions for changes in amount and color  - Carney appropriate cooling/warming therapies per order  - Administer medications as ordered  - Instruct and encourage patient and family to use good hand hygiene technique  - Identify and instruct in appropriate isolation precautions for identified infection/condition  Outcome: Progressing     Problem: SAFETY ADULT  Goal: Patient will remain free of falls  Description: INTERVENTIONS:  - Educate patient/family on patient safety including physical limitations  - Instruct patient to call for assistance with activity   - Consult OT/PT to assist with strengthening/mobility   - Keep Call bell within reach  - Keep bed low and locked with side rails adjusted as appropriate  - Keep care items and personal belongings within reach  - Initiate and maintain comfort rounds  - Make Fall Risk Sign visible to staff  - Offer Toiletours, in advance of need  - Initiate/Tereza  - Obtain necessary fall risk manage  - Apply yellow socks and bracelet for high fall risk patients  - Consider moving patient to room near nurses station  Outcome: Progressing  Goal: Maintain or return to baseline ADL function  Description: INTERVENTIONS:  -  Assess patient's ability to carry out ADLs; assess patient's baseline for ADL function and identify physical deficits which impact ability to perform ADLs (bathing, care of mouth/teeth, toileting, grooming, dressing, etc )  - Assess/evaluate cause of self-care deficits   - Assess range of motion  - Assess patient's mobility; develop plan if impaired  - Assess patient's need for assistive devices and provide as appropriate  - Encourage maximum independence but intervene and supervise when necessary  - Involve family in performance of ADLs  - Assess for home care needs following discharge   - Consider OT consult to assist with ADL evaluation and planning for discharge  - Provide patient education as appropriate  Outcome: Progressing  Goal: Maintains/Returns to pre admission functional level  Description: INTERVENTIONS:  - Perform BMAT or MOVE assessment daily    - Set and communicate daily mobility goal to care team and patient/family/caregiver  - Collaborate with rehabilitation services on mobility goals if consulted  - Perform Range ofimes a day  - Reposition patientours    - Dangle patient   - Stand patien  - Ambulate pat  - Out of bed to  - Out of bed for me  - Out of bed for toileting  - Record patient progress and toleration of activity level   Outcome: Progressing     Problem: DISCHARGE PLANNING  Goal: Discharge to home or other facility with appropriate resources  Description: INTERVENTIONS:  - Identify barriers to discharge w/patient and caregiver  - Arrange for needed discharge resources and transportation as appropriate  - Identify discharge learning needs (meds, wound care, etc )  - Arrange for interpretive services to assist at discharge as needed  - Refer to Case Management Department for coordinating discharge planning if the patient needs post-hospital services based on physician/advanced practitioner order or complex needs related to functional status, cognitive ability, or social support system  Outcome: Progressing     Problem: Nutrition/Hydration-ADULT  Goal: Nutrient/Hydration intake appropriate for improving, restoring or maintaining nutritional needs  Description: Monitor and assess patient's nutrition/hydration status for malnutrition  Collaborate with interdisciplinary team and initiate plan and interventions as ordered  Monitor patient's weight and dietary intake as ordered or per policy  Utilize nutrition screening tool and intervene as necessary  Determine patient's food preferences and provide high-protein, high-caloric foods as appropriate       INTERVENTIONS:  - Monitor oral intake, urinary output, labs, and treatment plans  - Assess nutrition and hydration status and recommend course of action  - Evaluate amount of meals eaten  - Assist patient with eating if necessary   - Allow adequate time for meals  - Recommend/ encourage appropriate diets, oral nutritional supplements, and vitamin/mineral supplements  - Order, calculate, and assess calorie counts as needed  - Recommend, monitor, and adjust tube feedings and TPN/PPN based on assessed needs  - Assess need for intravenous fluids  - Provide specific nutrition/hydration education as appropriate  - Include patient/family/caregiver in decisions related to nutrition  Outcome: Progressing     Problem: MOBILITY - ADULT  Goal: Maintain or return to baseline ADL function  Description: INTERVENTIONS:  -  Assess patient's ability to carry out ADLs; assess patient's baseline for ADL function and identify physical deficits which impact ability to perform ADLs (bathing, care of mouth/teeth, toileting, grooming, dressing, etc )  - Assess/evaluate cause of self-care deficits   - Assess range of motion  - Assess patient's mobility; develop plan if impaired  - Assess patient's need for assistive devices and provide as appropriate  - Encourage maximum independence but intervene and supervise when necessary  - Involve family in performance of ADLs  - Assess for home care needs following discharge   - Consider OT consult to assist with ADL evaluation and planning for discharge  - Provide patient education as appropriate  Outcome: Progressing  Goal: Maintains/Returns to pre admission functional level  Description: INTERVENTIONS:  - Perform BMAT or MOVE assessment daily    - Set and communicate daily mobility goal to care team and patient/family/caregiver  - Collaborate with rehabilitation services on mobility goals if consulted  - Perform Range es a day    - Reposition paurs   - Dangle p  - Stand pat  - Ambulate pat  - Out of bed to shade  - Out of bed f  - Out of bed for toileting  - Record patient progress and toleration of activity level   Outcome: Progressing     Problem: Prexisting or High Potential for Compromised Skin Integrity  Goal: Skin integrity is maintained or improved  Description: INTERVENTIONS:  - Identify patients at risk for skin breakdown  - Assess and monitor skin integrity  - Assess and monitor nutrition and hydration status  - Monitor labs   - Assess for incontinence   - Turn and reposition patient  - Assist with mobility/ambulation  - Relieve pressure over bony prominences  - Avoid friction and shearing  - Provide appropriate hygiene as needed including keeping skin clean and dry  - Evaluate need for skin moisturizer/barrier cream  - Collaborate with interdisciplinary team   - Patient/family teaching  - Consider wound care consult   Outcome: Progressing

## 2023-05-19 NOTE — PROGRESS NOTES
05/19/23 0830   Pain Assessment   Pain Assessment Tool 0-10   Pain Score 3   Pain Location/Orientation Location: Neck; Location: Shoulder   Pain Onset/Description Onset: Ongoing   Restrictions/Precautions   Precautions Fall Risk;Spinal precautions   Subjective   Subjective pt reports discomfort in neck but ready for therapy   Sit to Stand   Type of Assistance Needed Incidental touching;Verbal cues   Comment needs cues to increase ant wt shift   Sit to Stand CARE Score 4   Bed-Chair Transfer   Type of Assistance Needed Incidental touching; Adaptive equipment   Comment with RW   Chair/Bed-to-Chair Transfer CARE Score 4   Walk 10 Feet   Type of Assistance Needed Adaptive equipment;Physical assistance   Physical Assistance Level 25% or less   Comment HHA   Walk 10 Feet CARE Score 3   Walk 50 Feet with Two Turns   Type of Assistance Needed Physical assistance   Physical Assistance Level 25% or less   Comment HHA on L   Walk 50 Feet with Two Turns CARE Score 3   Walk 150 Feet   Type of Assistance Needed Physical assistance   Physical Assistance Level 25% or less   Comment HHA on L   Walk 150 Feet CARE Score 3   Ambulation   Distance Walked (feet) 150 ft  (x2)   Assist Device Other  (no AD)   Gait Pattern Inconsistant Tonya;Decreased foot clearance;Trendelenburg;Decreased R stance; Improper weight shift   Limitations Noted In Balance; Endurance;Speed;Strength   Findings Erin with HHA during session to work on balance and righting reactions  short step length on L decrease wt bearing on RLE   use of RW start and end of session and in room   Therapeutic Interventions   Strengthening STS x10 seated edge of mat, unsupported  seated LAQ and hip flex 2# x30 each   Neuromuscular Re-Education sidestepping along rail 30' to L and R each  4 square balance challenge, gait belt on   amb stepping over bolsters and dowels 20'x4, HHA on L   Modalities 20mins MHP to B/L shoulders/trap area    STM to upper trap [id number]   Equipment Use NuStep L1 10mins, LEs only   Assessment   Treatment Assessment pt tolerated tx well with cont focus on balance and righting reactions without AD   pt still has impaired gait increasing fall risk without AD   pt did session without it to work on balance which pt wants to improve  pt with proximal hip weakness causing trendelenburg  pt required increase verbal cues with STS to increase ant wt shift and balance as pt can be retropulsive upon standing and pushes into surface he is sitting in    cont to work on functional strengthening and balance without AD to maximize righting reactions and safety for d/c with LRAD  Problem List Decreased strength;Decreased range of motion;Decreased endurance; Impaired balance;Decreased mobility; Decreased coordination;Decreased cognition; Impaired sensation; Impaired tone;Obesity;Orthopedic restrictions;Pain   Barriers to Discharge Inaccessible home environment;Decreased caregiver support   PT Barriers   Functional Limitation Stair negotiation;Standing;Transfers; Walking   Plan   Progress Progressing toward goals   Recommendation   PT Discharge Recommendation Home with outpatient rehabilitation   Equipment Recommended Walker   PT Therapy Minutes   PT Time In 0830   PT Time Out 1000   PT Total Time (minutes) 90   PT Mode of treatment - Individual (minutes) 90   PT Mode of treatment - Concurrent (minutes) 0   PT Mode of treatment - Group (minutes) 0   PT Mode of treatment - Co-treat (minutes) 0   PT Mode of Treatment - Total time(minutes) 90 minutes   PT Cumulative Minutes 480   Therapy Time missed   Time missed?  No

## 2023-05-19 NOTE — PROGRESS NOTES
"   05/19/23 8830   Pain Assessment   Pain Assessment Tool 0-10   Pain Score No Pain   Restrictions/Precautions   Precautions Fall Risk;Spinal precautions   ROM Restrictions Yes  (C spine precautions)   Braces or Orthoses   (B TEDs)   Lifestyle   Autonomy \"To do my laundry I kick the basket down from the top level, it's 5 steps, then from the main level to the basement\"   Sit to Stand   Type of Assistance Needed Physical assistance   Physical Assistance Level 25% or less   Comment One instance of requiring Liberty, all other trials pt only required CGA  Sit to Stand CARE Score 3   Toileting Hygiene   Type of Assistance Needed Incidental touching   Physical Assistance Level No physical assistance   Comment CGA in stance, no LOB  Pt voided at start of session only  Toileting Hygiene CARE Score 4   Toilet Transfer   Type of Assistance Needed Incidental touching   Physical Assistance Level No physical assistance   Comment CGA w/ RW fxnl approach to toilet and use of grab bar   Toilet Transfer CARE Score 4   Meal Prep   Meal Preparation Pt reiterates using wheeled cart that he described in previous session to OT  It has a handle to push like a grocery cart, soild top like a  block surface, and several shelves that he doesn't typically use  He primarily uses this for object transport in his home  He has already had edu regarding RW tray this admission, will need to revisit w/ pt in future session to discuss OT rec  Kitchen Mobility   Kitchen Mobility Comments CGA w/ RW and RW basket (pt has RW basket at home ) Pt retrieves targetted items from low shelf at head height, counterheight, and thigh height  No LOB, tolerated well  Pt endorses using microwave, stovetop and oven at baseline, w/ plans to access microwave and stovetop at ND  Would be appropriate to trial meal prep in future session  ROM- Right Upper Extremities   R Wrist AROM;AAROM;Prolonged stretch; Wrist flexion;Wrist extension   R Hand " AROM;AAROM;Prolonged stretch; Thumb; Index finger; Long finger;Ring finger;Little finger   RUE ROM Comment Prolonged stretch 2' spasticity  Improved tone following stretch, continued edu provided regarding positioning and benefits of stretching  Splinting   Splinting Comments May benefit from RHS   Cognition   Overall Cognitive Status Impaired   Arousal/Participation Alert; Cooperative   Attention Attends with cues to redirect   Orientation Level Oriented X4   Following Commands Follows multistep commands with increased time or repetition   Assessment   Treatment Assessment OT session focusing on R hand NMR spasticity management and edu, kitchen mobility, toileting tasks  pt tolerated well  Pt continues to require skilled hands on assist but is progressing towards goals  Anticipate pt may be able to safely DC by end of next week, reviewed this w/ pt and he is in agreement  OT to continue to treat and follow established POC  Problem List Decreased strength;Decreased range of motion;Decreased endurance;Decreased mobility; Impaired balance;Decreased coordination;Decreased cognition; Impaired sensation; Impaired tone;Obesity;Pain;Orthopedic restrictions   Plan   Treatment/Interventions ADL retraining;Functional transfer training; Therapeutic exercise; Endurance training;Cognitive reorientation;Equipment eval/education;Patient/family training; Compensatory technique education;Continued evaluation   OT Therapy Minutes   OT Time In 1330   OT Time Out 1430   OT Total Time (minutes) 60   OT Mode of treatment - Individual (minutes) 60   OT Mode of treatment - Concurrent (minutes) 0   OT Mode of treatment - Group (minutes) 0   OT Mode of treatment - Co-treat (minutes) 0   OT Mode of Treatment - Total time(minutes) 60 minutes   OT Cumulative Minutes 425   Therapy Time missed   Time missed?  No

## 2023-05-19 NOTE — PROGRESS NOTES
"   05/19/23 1055   Pain Assessment   Pain Assessment Tool 0-10   Pain Score 3   Pain Location/Orientation Location: Neck   Restrictions/Precautions   Precautions Fall Risk;Pain;Spinal precautions   ROM Restrictions Yes  (cervical spinal precautions)   Braces or Orthoses   (b/l TEDs)   Lifestyle   Autonomy \"I already feel better than I did before this surgery  \"   Sit to Stand   Type of Assistance Needed Incidental touching   Physical Assistance Level No physical assistance   Comment CGA, cues for proper hand placement  Sit to Stand CARE Score 4   Bed-Chair Transfer   Type of Assistance Needed Incidental touching; Adaptive equipment   Physical Assistance Level No physical assistance   Comment CGA w/ RW, ambulated<>OT gym  Chair/Bed-to-Chair Transfer CARE Score 4   ROM- Right Upper Extremities   R Hand AROM; Prolonged stretch; Thumb; Index finger; Long finger;Ring finger;Little finger   R Position Seated   RUE ROM Comment Prolonged stretch to digits 2* spastic tone  Improved tone noted following strech, edu provided on positioning strategies to dec risk of contracture  Exercise Tools   Other Exercise Tool 1 Provided w/ graded hand sponges completing 3x10 hand squeezes w/ green for L hand and red for R hand  Encourage to complete as part of HEP w/ pt receptitive to same  Coordination   Fine Motor Engaged in small item retrieval from table w/ focus on facilitation of three jaw ignacio grasp/release paired w/ wrist flexion/extension  Completed x16 reps w/ 1 drop noted, req Min vc's to maximize technique  Splinting   Splinting Comments Pt may benefit from RHS  Cognition   Overall Cognitive Status Impaired   Assessment   Treatment Assessment Pt seen for skilled OT session focusing on short distance fxnl mobility w/ RW, R hand prolonged stretch, R hand FMC, and b/l hand strengthening  Time spent rapport building, pt highly motivated to return to PLOF  May benefit from R RHS w/ plan to discuss w/ MD early next week   Pt " req seated rest breaks to manage generalized fatigue  Cont OT POC: dynamic standing balance, endurance work, fxnl standing tolerance, light IADL management, and BUE strengthening  Pt was left resting in recliner w/ all needs within reach, meal tray set-up  Prognosis Good   Problem List Decreased strength;Decreased range of motion;Decreased endurance; Impaired balance;Decreased mobility; Decreased coordination;Decreased cognition; Impaired sensation; Impaired tone;Obesity;Orthopedic restrictions;Pain   Plan   Treatment/Interventions Functional transfer training; Therapeutic exercise; Endurance training;Patient/family training   Progress Progressing toward goals   Recommendation   OT Discharge Recommendation   (pending progress)   OT Therapy Minutes   OT Time In 1055   OT Time Out 1130   OT Total Time (minutes) 35   OT Mode of treatment - Individual (minutes) 35   OT Mode of treatment - Concurrent (minutes) 0   OT Mode of treatment - Group (minutes) 0   OT Mode of treatment - Co-treat (minutes) 0   OT Mode of Treatment - Total time(minutes) 35 minutes   OT Cumulative Minutes 365   Therapy Time missed   Time missed?  No

## 2023-05-19 NOTE — PROGRESS NOTES
PM&R PROGRESS NOTE:  Shamar Barnes 69 y o  male MRN: [id number]  Unit/Bed#: -01 Encounter: [id number]        Rehab Diagnosis: Impairment of mobility, safety and Activities of Daily Living (ADLs) due to Spinal Cord Dysfunction:  Non-Traumatic:  04 1211  Quadriplegia, Incomplete C1-4 - Examines C1 AIS D  Has some pinprick impairment at C2 and C3 bilaterally - but not clear to me that this is related to his radiographic findings      SUBJECTIVE:  Patient seen face to face  No acute issues  Pain is improved  Progressing in rehab  ASSESSMENT: Stable, progressing      PLAN:    Rehabilitation  • Functional deficits:  C1 AIS D, impaired sensation, neurogenic bladder, impaired mobility, impaired self care  • Continue current rehabilitation plan of care to maximize function  • Expected Discharge: ELOS 10 days    Current Function:   Physical Therapy Occupational Therapy Speech Therapy   Weight Bearing Status: Full Weight Bearing  Transfers: Minimal Assistance  Bed Mobility: Moderate Assistance  Amulation Distance (ft): 100 feet  Ambulation: Minimal Assistance  Assistive Device for Ambulation: Roller Walker  Number of Stairs: 4  Assistive Device for Stairs: Bilateral Hand Rails  Stair Assistance: Moderate Assistance  Discharge Recommendations: Home with:  [address] [place] with[de-identified] Home Physical Therapy, Family Support (brother can assist with IADLs)   Eating: Supervision  Grooming: Minimal Assistance  Bathing: Maximum Assistance  Bathing: Maximum Assistance  Upper Body Dressing: Incidental Touching  Lower Body Dressing: Minimal Assistance  Toileting:  Moderate Assistance  Toilet Transfer: Minimal Assistance  Cognition: Exceptions to WNL  Cognition: Decreased Memory  Orientation: Person, Place, Situation                   DVT prophylaxis  • SQ Heparin     Bladder plan  • Continent  • PVRs low     Bowel plan  • Continent      * Incomplete spinal cord injury at C1-C4 level without bone injury ([facility] [address] )  Assessment & Plan  Patient actually examines on ISNCSCI with C1 AIS D with profound loss of pinprick sensation starting at C2    - LT impairment begins at C5 bilaterally - so if C2/C3 impairment is not related to cord, he is at best C4    - However, given no other potential cause at this time - would label him C1 AIS D   - Maybe developing some neurogenic bladder/NDO prior to surgery which has improved   - Checking PVRs x3 to ensure no retention - PVRs low   - Has some constipation post-op, but at risk for neurogenic bowel - monitor bowel function   - Monitor autonomic function and check orthostatics given his complaints of occasional dizziness when standing     - ACE and/or JAVAN stockings as needed  - Has spasticity in his R hand which has improved since surgery  - Monitor  May benefit from resting hand splint, potentially stim in therapies  - Monitor tone, which is located primarily in his FDS, maybe some FDP    - PT/OT 3-5 hours/day, 5-7 days/week  Acute pain  Assessment & Plan  Improving Neuropathic pain  Nociceptive post op pain in his neck: Managed on Tylenol scheduled and PRN low dose Oxycodone IR  Robaxin changed to PRN  Spondylosis of cervical spine with myelopathy  Assessment & Plan  MRI showed Multilevel cervical spondylosis, Multifactorial disease results in overall severe canal stenosis with underlying cord compression at C5-C6 and C6-C7  Associated cord edema and/or myelomalacia is present at these levels  No abnormal enhancement identified within the spinal canal   Symptoms: possibly neurogenic bladder leading up to surgery, R hand spasticity (clenched fist), R hand weakness and sensory impairment, Gait impairment/balance issues  - Examines with bilateral UE sensory impairment and R > L weakness, with some mild RLE > LLE weakness    - Also examines with diffuse hyperreflexia  - See SCI for more details     Admitted on 5/10 for planned Posterior cervical decompressive laminectomy with "instrumented fusion from C4-T1 with Dr Malachi German  No need for cervical collar per Neurosurgery  C-Spine precautions  Drain removed  Staples/Drain suture in place  Pain management as detailed below  Two week f/u with Neurosurgery on 5/24  PT/OT - 3-5 hours/day, 5-7 days/week in comprehensive acute inpatient rehab    Spinal stenosis of lumbar region at multiple levels  Assessment & Plan  Symptoms and weakness he reports improved with physical therapy  Pain management as detailed below  Tobacco dependence syndrome  Assessment & Plan  In remission  47 pack year history  Quit in 2021  Type 2 diabetes mellitus without complication, without long-term current use of insulin (HCC)  Assessment & Plan  Lab Results   Component Value Date    HGBA1C 6 0 (H) 05/02/2023       Home: Glimepiride 2mg with breakfast, Pioglitazone-Metformin 15-500mg BID  Here: Metformin 500mg BID; CDI/Accuchecks  Will try to get to home regimen  Diabetic Diet  Nutrition consult  IM consulted to assist with management  Primary hypertension  Assessment & Plan  Home: Amlodipine- Benzapril 5-20mg tablet once daily  Here: Amlodipine 5mg, Lisinopril 20mg  For therapeutic substitution  Monitor and adjust as appropriate   - Has reported some dizziness when standing too quickly   - Check Orthostatics  IM consulted to assist with management  Obesity, morbid ([address] )  Assessment & Plan  Counseling, Nutrition  Appreciate IM consultants medical co-management  Labs, medications, and imaging personally reviewed  ROS:  A ten point review of systems was completed on 05/18/23 and pertinent positives are listed in subjective section  All other systems reviewed were negative  OBJECTIVE:   /76 (BP Location: Right arm)   Pulse 80   Temp 98 3 °F (36 8 °C) (Oral)   Resp 18   Ht 5' 6\" (1 676 m)   Wt 110 kg (242 lb 12 8 oz)   SpO2 96%   BMI 39 19 kg/m²     Physical Exam  Vitals and nursing note reviewed   " Constitutional:       General: He is not in acute distress  HENT:      Head: Normocephalic and atraumatic  Nose: Nose normal       Mouth/Throat:      Mouth: Mucous membranes are moist    Eyes:      Conjunctiva/sclera: Conjunctivae normal    Cardiovascular:      Rate and Rhythm: Normal rate and regular rhythm  Pulses: Normal pulses  Pulmonary:      Effort: Pulmonary effort is normal       Breath sounds: Normal breath sounds  No wheezing or rales  Abdominal:      General: Bowel sounds are normal  There is no distension  Palpations: Abdomen is soft  Tenderness: There is no abdominal tenderness  Musculoskeletal:         General: Swelling present  Cervical back: Neck supple  Skin:     General: Skin is warm  Comments: Incision clean and intact with staples   Neurological:      Mental Status: He is alert and oriented to person, place, and time  Sensory: Sensory deficit present        Comments: Balance improving  RUE weakness in fingers especially with finger abduction, finger extension    Gait: Step through reciprocal gait with RW    Psychiatric:         Mood and Affect: Mood normal           Lab Results   Component Value Date    WBC 9 59 05/16/2023    HGB 13 1 05/16/2023    HCT 40 7 05/16/2023    MCV 90 05/16/2023     05/16/2023     Lab Results   Component Value Date    SODIUM 135 05/16/2023    K 4 5 05/16/2023     05/16/2023    CO2 27 05/16/2023    BUN 26 (H) 05/16/2023    CREATININE 1 04 05/16/2023    GLUC 158 (H) 05/16/2023    CALCIUM 9 6 05/16/2023     Lab Results   Component Value Date    INR 1 11 05/11/2023    INR 0 99 05/10/2023    INR 1 07 05/02/2023    PROTIME 14 5 05/11/2023    PROTIME 13 3 05/10/2023    PROTIME 14 2 05/02/2023           Current Facility-Administered Medications:   •  acetaminophen (TYLENOL) tablet 975 mg, 975 mg, Oral, Q8H [facility], Amparo Wyatt MD, 975 mg at 05/18/23 4265  •  amLODIPine (NORVASC) tablet 5 mg, 5 mg, Oral, Daily, 5 mg at 05/18/23 0805 **AND** lisinopril (ZESTRIL) tablet 10 mg, 10 mg, Oral, Daily, Sirena Bowie, KELVIN, 10 mg at 05/18/23 8270  •  bisacodyl (DULCOLAX) rectal suppository 10 mg, 10 mg, Rectal, Daily PRN, Doyle Galicia MD  •  calcium carbonate (TUMS) chewable tablet 750 mg, 750 mg, Oral, TID PRN, Doyle Galicia MD, 750 mg at 05/18/23 2147  •  docusate sodium (COLACE) capsule 100 mg, 100 mg, Oral, BID, Doyle Galicia MD, 100 mg at 05/17/23 0801  •  heparin (porcine) subcutaneous injection 5,000 Units, 5,000 Units, Subcutaneous, Q8H [address], Doyle Galicia MD, 5,000 Units at 05/18/23 2143  •  insulin lispro (HumaLOG) 100 units/mL subcutaneous injection 1-5 Units, 1-5 Units, Subcutaneous, 4x Daily (AC & HS), 1 Units at 05/18/23 2144 **AND** Fingerstick Glucose (POCT), , , TID AC, Areli Woods PA-C  •  metFORMIN (GLUCOPHAGE) tablet 500 mg, 500 mg, Oral, BID With Meals, Areli Woods PA-C, 500 mg at 05/18/23 1711  •  methocarbamol (ROBAXIN) tablet 250 mg, 250 mg, Oral, Q6H PRN, Brandi Gordillo MD  •  naloxone (NARCAN) 0 04 mg/mL syringe 0 04 mg, 0 04 mg, Intravenous, Q1MIN PRN, Doyle Galicia MD  •  ondansetron (ZOFRAN-ODT) dispersible tablet 4 mg, 4 mg, Oral, Q6H PRN, Doyle Galicia MD  •  oxyCODONE (ROXICODONE) split tablet 2 5 mg, 2 5 mg, Oral, Q4H PRN **OR** oxyCODONE (ROXICODONE) IR tablet 5 mg, 5 mg, Oral, Q4H PRN, Doyle Galicia MD  •  polyethylene glycol (MIRALAX) packet 17 g, 17 g, Oral, Daily, Doyle Galicia MD, 17 g at 05/15/23 0846  •  senna (SENOKOT) tablet 17 2 mg, 2 tablet, Oral, HS, Doyle Galicia MD, 17 2 mg at 05/16/23 2135    Past Medical History:   Diagnosis Date   • Diabetes mellitus [facility])    • ED (erectile dysfunction)    • Hyperlipidemia    • Hypertension    • Obesity    • Spinal stenosis of lumbar region at multiple levels 12/22/2022       Patient Active Problem List    Diagnosis Date Noted   • Incomplete spinal cord injury at C1-C4 level without bone injury ([facility] [address] ) 04/27/2023   • Acute pain 05/18/2023   • Preoperative clearance 05/04/2023   • Neurogenic claudication 04/27/2023   • Spondylosis of cervical spine with myelopathy 03/01/2023   • Spinal stenosis of lumbar region at multiple levels 12/22/2022   • Mixed hyperlipidemia 08/17/2022   • Tobacco dependence syndrome 08/17/2022   • Primary hypertension 07/14/2022   • Type 2 diabetes mellitus without complication, without long-term current use of insulin ([facility] [address] ) 07/14/2022   • Chronic bilateral low back pain without sciatica 07/14/2022   • Carpal tunnel syndrome of right wrist 07/06/2021   • Atrophy of muscle of right hand 07/06/2021   • Obesity, morbid ([facility] [address] ) 05/24/2021   • Cubital tunnel syndrome on right    • Carpal tunnel syndrome, left           Misael Doherty MD    I have spent a total time of 35 minutes on 05/18/23 in caring for this patient including Impressions, Counseling / Coordination of care, Documenting in the medical record and weekly team conference  ** Please Note:  voice to text software may have been used in the creation of this document   Although proof errors in transcription or interpretation are a potential of such software**

## 2023-05-19 NOTE — PROGRESS NOTES
PM&R PROGRESS NOTE:  Raysa Teague 69 y o  male MRN: [id number]  Unit/Bed#: -01 Encounter: [id number]        Rehabilitation Diagnosis: Impairment of mobility, safety and Activities of Daily Living (ADLs) due to Spinal Cord Dysfunction:  Non-Traumatic:  04 1211  Quadriplegia, Incomplete C1-4  Examines C1 AIS D  Has some pinprick impairment at C2 and C3 bilaterally - but not clear to me that this is related to his radiographic findings  HPI: Raysa Teague is a 69 y o  male with a history of T2DM, HTN, HLD, obesity, lumbar spinal stenosis, history of smoking (47 pack years in remission since 2021) who presented to [address] [place] [address] on 5/10 for elective posterior cervical decompression and fusion with Dr Colin Vega for cervical spinal cord compression and myelopathy secondary to severe spinal stenosis resulting in gait dysfunction  A posterior cervical decompression laminectomy with instrumented fusion from C4-T1  No significant alterations in somatosensory and motor evoked potentials throughout the methadone taper from 5/11-5/13  IV pain medications were discontinued on 5/12  He has comfortable outside of achy neck pain and was started on Lidoderm patch  The patient was evaluated by the Rehabilitation team and deemed an appropriate candidate for comprehensive inpatient rehabilitation and admitted to the [facility] on 5/14/2023 12:49 PM      SUBJECTIVE: Patient seen face to face  No acute issues overnight  Progressing as expected in rehabilitation  Patient is in good spirits  Denies pian, slept well  Reports good appetite, voiding, LBM 5/18  Denies chest pain, shortness of breath, fever, chills, nausea, abdominal pain      ASSESSMENT: Stable, progressing      PLAN:  - bilateral lower extremity edema- continue hima stockings, elevation when in chair/bed  - pain: continue with current regimen  - right hand spasticity- gentle stretching, patient notices slight improvement since surgery      Rehabilitation  • Functional deficits:  Impaired balance, impaired self-care,   • Continue current rehabilitation plan of care to maximize function  • Functional update:   o PT: mobility and transfers- incidental touching, ambulation- incidental touching - min A (contact guard) RW, 100'  o OT: ADL: bathing- min- mod A, dressing- UB dressing LB mod-max A,  footwear- min-mod A, toileting- min A  • Estimated Discharge: anticipated 10-14 days      Pain  • Posterior neck pain  • Acetaminophen 975 mg every 8 hours  • Methocarbamol 250 mg every 6 hours prn  • Oxycodone 2 5 mg-5 mg every 4 hours prn    DVT prophylaxis  • Heparin sq every 8 hours    Bladder plan  • Continent    Bowel plan  • Continent  • Colace 100 mg BID  • Miralax daily  • Senokot 17 2 mg daily at bedtime  • Bisacodyl suppository prn      * Incomplete spinal cord injury at C1-C4 level without bone injury [facility])  Assessment & Plan  Patient actually examines on ISNCSCI with C1 AIS D with profound loss of pinprick sensation starting at C2    - LT impairment begins at C5 bilaterally - so if C2/C3 impairment is not related to cord, he is at best C4    - However, given no other potential cause at this time - would label him C1 AIS D   - Maybe developing some neurogenic bladder/NDO prior to surgery which has improved   - Checking PVRs x3 to ensure no retention - PVRs low   - Has some constipation post-op, but at risk for neurogenic bowel - monitor bowel function   - Monitor autonomic function and check orthostatics given his complaints of occasional dizziness when standing     - ACE and/or JAVAN stockings as needed  - Has spasticity in his R hand which has improved since surgery  - Monitor  May benefit from resting hand splint, potentially stim in therapies  - Monitor tone, which is located primarily in his FDS, maybe some FDP    - PT/OT 3-5 hours/day, 5-7 days/week       Spondylosis of cervical spine with myelopathy  Assessment & Plan  MRI showed Multilevel cervical spondylosis, Multifactorial disease results in overall severe canal stenosis with underlying cord compression at C5-C6 and C6-C7  Associated cord edema and/or myelomalacia is present at these levels  No abnormal enhancement identified within the spinal canal   Symptoms: possibly neurogenic bladder leading up to surgery, R hand spasticity (clenched fist), R hand weakness and sensory impairment, Gait impairment/balance issues  - Examines with bilateral UE sensory impairment and R > L weakness, with some mild RLE > LLE weakness    - Also examines with diffuse hyperreflexia  - See SCI for more details  Admitted on 5/10 for planned Posterior cervical decompressive laminectomy with instrumented fusion from C4-T1 with Dr Pepito Loco  No need for cervical collar per Neurosurgery  C-Spine precautions  Drain removed  Staples/Drain suture in place  Pain management as detailed below  Two week f/u with Neurosurgery on 5/24  PT/OT - 3-5 hours/day, 5-7 days/week in comprehensive acute inpatient rehab    Spinal stenosis of lumbar region at multiple levels  Assessment & Plan  Symptoms and weakness he reports improved with physical therapy  Pain management as detailed below  Acute pain  Assessment & Plan  Improving Neuropathic pain  Nociceptive post op pain in his neck: Managed on Tylenol scheduled and PRN low dose Oxycodone IR  Robaxin changed to PRN  Type 2 diabetes mellitus without complication, without long-term current use of insulin (HCC)  Assessment & Plan  Lab Results   Component Value Date    HGBA1C 6 0 (H) 05/02/2023       Home: Glimepiride 2mg with breakfast, Pioglitazone-Metformin 15-500mg BID  Here: Metformin 500mg BID; CDI/Accuchecks  Will try to get to home regimen  Diabetic Diet  Nutrition consult  IM consulted to assist with management       Primary hypertension  Assessment & Plan  Home: Amlodipine- Benzapril 5-20mg tablet once daily  Here: Amlodipine 5mg, "Lisinopril 20mg  For therapeutic substitution  Monitor and adjust as appropriate   - Has reported some dizziness when standing too quickly   - Check Orthostatics  IM consulted to assist with management  Tobacco dependence syndrome  Assessment & Plan  In remission  47 pack year history  Quit in 2021  Obesity, morbid ([address] )  Assessment & Plan  Counseling, Nutrition  Appreciate IM consultants medical co-management  Labs, medications, and imaging personally reviewed  ROS:  A ten point review of systems was completed on 05/19/23 and pertinent positives are listed in subjective section  All other systems reviewed were negative  Review of Systems     OBJECTIVE:   /72 (BP Location: Right arm)   Pulse 74   Temp 98 4 °F (36 9 °C) (Oral)   Resp 18   Ht 5' 6\" (1 676 m)   Wt 110 kg (242 lb 12 8 oz)   SpO2 94%   BMI 39 19 kg/m²     Physical Exam  Constitutional:       Appearance: Normal appearance  HENT:      Head: Normocephalic and atraumatic  Mouth/Throat:      Mouth: Mucous membranes are moist    Cardiovascular:      Rate and Rhythm: Normal rate and regular rhythm  Pulses: Normal pulses  Heart sounds: Normal heart sounds  Pulmonary:      Effort: Pulmonary effort is normal       Breath sounds: Normal breath sounds  Abdominal:      General: Bowel sounds are normal       Palpations: Abdomen is soft  Musculoskeletal:         General: Normal range of motion  Cervical back: Normal range of motion  Right lower leg: Edema present  Left lower leg: Edema present  Skin:     General: Skin is warm and dry  Capillary Refill: Capillary refill takes less than 2 seconds  Neurological:      Mental Status: He is alert and oriented to person, place, and time  Sensory: Sensory deficit present  Motor: Weakness present        Coordination: Coordination abnormal       Gait: Gait abnormal       Comments: - right hand spasticity   Psychiatric:         Mood and " Affect: Mood normal          Judgment: Judgment normal           Lab Results   Component Value Date    WBC 9 62 05/19/2023    HGB 13 0 05/19/2023    HCT 39 4 05/19/2023    MCV 90 05/19/2023     05/19/2023     Lab Results   Component Value Date    SODIUM 136 05/19/2023    K 4 6 05/19/2023     05/19/2023    CO2 25 05/19/2023    BUN 21 05/19/2023    CREATININE 1 03 05/19/2023    GLUC 144 (H) 05/19/2023    CALCIUM 9 9 05/19/2023     Lab Results   Component Value Date    INR 1 11 05/11/2023    INR 0 99 05/10/2023    INR 1 07 05/02/2023    PROTIME 14 5 05/11/2023    PROTIME 13 3 05/10/2023    PROTIME 14 2 05/02/2023           Current Facility-Administered Medications:   •  acetaminophen (TYLENOL) tablet 975 mg, 975 mg, Oral, Q8H [facility], Juan Cooper MD, 975 mg at 05/19/23 0462  •  amLODIPine (NORVASC) tablet 5 mg, 5 mg, Oral, Daily, 5 mg at 05/19/23 1030 **AND** lisinopril (ZESTRIL) tablet 10 mg, 10 mg, Oral, Daily, KELVIN Worrell, 10 mg at 05/19/23 1030  •  bisacodyl (DULCOLAX) rectal suppository 10 mg, 10 mg, Rectal, Daily PRN, Juan Cooper MD  •  calcium carbonate (TUMS) chewable tablet 750 mg, 750 mg, Oral, TID PRN, Juan Cooper MD, 750 mg at 05/18/23 2147  •  docusate sodium (COLACE) capsule 100 mg, 100 mg, Oral, BID, Juan Cooper MD, 100 mg at 05/17/23 0801  •  heparin (porcine) subcutaneous injection 5,000 Units, 5,000 Units, Subcutaneous, Q8H [facility], Juan Cooper MD, 5,000 Units at 05/19/23 0552  •  insulin lispro (HumaLOG) 100 units/mL subcutaneous injection 1-5 Units, 1-5 Units, Subcutaneous, 4x Daily (AC & HS), 1 Units at 05/19/23 0800 **AND** Fingerstick Glucose (POCT), , , TID AC, Areli Woods PA-C  •  metFORMIN (GLUCOPHAGE) tablet 500 mg, 500 mg, Oral, BID With Meals, Areli Woods PA-C, 500 mg at 05/19/23 0800  •  methocarbamol (ROBAXIN) tablet 250 mg, 250 mg, Oral, Q6H PRN, Mil Pereyra MD  •  naloxone (NARCAN) 0 04 mg/mL syringe 0 04 mg, 0 04 mg, Intravenous, Q1MIN PRN, Jana Purcell MD  •  ondansetron (ZOFRAN-ODT) dispersible tablet 4 mg, 4 mg, Oral, Q6H PRN, Jana Purcell MD  •  oxyCODONE (ROXICODONE) split tablet 2 5 mg, 2 5 mg, Oral, Q4H PRN **OR** oxyCODONE (ROXICODONE) IR tablet 5 mg, 5 mg, Oral, Q4H PRN, Jana Purcell MD  •  polyethylene glycol (MIRALAX) packet 17 g, 17 g, Oral, Daily, Jana Purcell MD, 17 g at 05/15/23 0846  •  senna (SENOKOT) tablet 17 2 mg, 2 tablet, Oral, HS, Jana Purcell MD, 17 2 mg at 05/16/23 2135    Past Medical History:   Diagnosis Date   • Diabetes mellitus [facility])    • ED (erectile dysfunction)    • Hyperlipidemia    • Hypertension    • Obesity    • Spinal stenosis of lumbar region at multiple levels 12/22/2022       Patient Active Problem List    Diagnosis Date Noted   • Incomplete spinal cord injury at C1-C4 level without bone injury ([facility] [address] ) 04/27/2023   • Spondylosis of cervical spine with myelopathy 03/01/2023   • Spinal stenosis of lumbar region at multiple levels 12/22/2022   • Acute pain 05/18/2023   • Type 2 diabetes mellitus without complication, without long-term current use of insulin ([address] ) 07/14/2022   • Primary hypertension 07/14/2022   • Preoperative clearance 05/04/2023   • Neurogenic claudication 04/27/2023   • Mixed hyperlipidemia 08/17/2022   • Tobacco dependence syndrome 08/17/2022   • Chronic bilateral low back pain without sciatica 07/14/2022   • Carpal tunnel syndrome of right wrist 07/06/2021   • Atrophy of muscle of right hand 07/06/2021   • Obesity, morbid ([address] ) 05/24/2021   • Cubital tunnel syndrome on right    • Carpal tunnel syndrome, left           KELVIN Leggett  Physical Medicine and [place] [id number]

## 2023-05-20 LAB
GLUCOSE SERPL-MCNC: 129 MG/DL (ref 65–140)
GLUCOSE SERPL-MCNC: 144 MG/DL (ref 65–140)
GLUCOSE SERPL-MCNC: 157 MG/DL (ref 65–140)
GLUCOSE SERPL-MCNC: 157 MG/DL (ref 65–140)

## 2023-05-20 RX ADMIN — DOCUSATE SODIUM 100 MG: 100 CAPSULE, LIQUID FILLED ORAL at 16:40

## 2023-05-20 RX ADMIN — PIOGLITAZONE HYDROCHLORIDE 15 MG: 15 TABLET ORAL at 09:20

## 2023-05-20 RX ADMIN — HEPARIN SODIUM 5000 UNITS: 5000 INJECTION INTRAVENOUS; SUBCUTANEOUS at 21:34

## 2023-05-20 RX ADMIN — LISINOPRIL 10 MG: 10 TABLET ORAL at 09:19

## 2023-05-20 RX ADMIN — SENNOSIDES 17.2 MG: 8.6 TABLET, FILM COATED ORAL at 21:34

## 2023-05-20 RX ADMIN — METFORMIN HYDROCHLORIDE 500 MG: 500 TABLET ORAL at 09:20

## 2023-05-20 RX ADMIN — AMLODIPINE BESYLATE 5 MG: 5 TABLET ORAL at 09:19

## 2023-05-20 RX ADMIN — POLYETHYLENE GLYCOL 3350 17 G: 17 POWDER, FOR SOLUTION ORAL at 09:13

## 2023-05-20 RX ADMIN — DOCUSATE SODIUM 100 MG: 100 CAPSULE, LIQUID FILLED ORAL at 09:19

## 2023-05-20 RX ADMIN — ACETAMINOPHEN 975 MG: 325 TABLET ORAL at 15:08

## 2023-05-20 RX ADMIN — HEPARIN SODIUM 5000 UNITS: 5000 INJECTION INTRAVENOUS; SUBCUTANEOUS at 05:34

## 2023-05-20 RX ADMIN — INSULIN LISPRO 1 UNITS: 100 INJECTION, SOLUTION INTRAVENOUS; SUBCUTANEOUS at 11:56

## 2023-05-20 RX ADMIN — HEPARIN SODIUM 5000 UNITS: 5000 INJECTION INTRAVENOUS; SUBCUTANEOUS at 15:08

## 2023-05-20 RX ADMIN — ACETAMINOPHEN 975 MG: 325 TABLET ORAL at 05:34

## 2023-05-20 RX ADMIN — ACETAMINOPHEN 975 MG: 325 TABLET ORAL at 21:34

## 2023-05-20 RX ADMIN — METFORMIN HYDROCHLORIDE 500 MG: 500 TABLET ORAL at 16:38

## 2023-05-20 RX ADMIN — CALCIUM CARBONATE (ANTACID) CHEW TAB 500 MG 750 MG: 500 CHEW TAB at 20:28

## 2023-05-20 NOTE — PROGRESS NOTES
Internal Medicine Progress Note  Patient: Kimberlee Spicer  Age/sex: 69 y o  male  Medical Record #: [id number]      ASSESSMENT/PLAN: (Interval History)  Kimberlee Spicer is seen and examined and management for following issues:    S/p posterior C4-T1 decompressive laminectomy and instrumented fusion  • No collar needed  • Incision healing well     HTN  • Home: Lotrel 5-20 daily  • Here: Amlopdipine 5mg daily/Lisinopril 10mg daily  • stable     DM type 2   • HA1C 6   • Home: Amaryl 2mg daily/Pioglitazone-Metformin  2x daily  • Here: Metformin 500 mg BID/actose 15mg (5/20)  • Continue QID Accuchecks/SSI and DM diet  • BS stable     Obesity  • BMI 39 5  • Recommended ongoing attempts at weight loss      LE edema  • Venous doppler 5/16 was preliminarily negative for DVT        Discharge date:  Next week    The above assessment and plan was reviewed and updated as determined by my evaluation of the patient on 5/20/2023      Labs:   Results from last 7 days   Lab Units 05/19/23  0605 05/16/23  0544   WBC Thousand/uL 9 62 9 59   HEMOGLOBIN g/dL 13 0 13 1   HEMATOCRIT % 39 4 40 7   PLATELETS Thousands/uL 370 313     Results from last 7 days   Lab Units 05/19/23  0605 05/16/23  0544   SODIUM mmol/L 136 135   POTASSIUM mmol/L 4 6 4 5   CHLORIDE mmol/L 105 107   CO2 mmol/L 25 27   BUN mg/dL 21 26*   CREATININE mg/dL 1 03 1 04   CALCIUM mg/dL 9 9 9 6             Results from last 7 days   Lab Units 05/20/23  1040 05/20/23  0627 05/19/23  2141   POC GLUCOSE mg/dl 157* 144* 148*       Review of Scheduled Meds:  Current Facility-Administered Medications   Medication Dose Route Frequency Provider Last Rate   • acetaminophen  975 mg Oral [facility] Amparo Wyatt MD     • amLODIPine  5 mg Oral Daily KELVIN Colmenares      And   • lisinopril  10 mg Oral Daily KELVIN Colmenares     • bisacodyl  10 mg Rectal Daily PRN Amparo Wyatt MD     • calcium carbonate  750 mg Oral TID PRN Amparo Wyatt MD     • docusate sodium  100 mg Oral BID Estevan Ro MD     • heparin (porcine)  5,000 Units Subcutaneous [facility] Estevan Ro MD     • insulin lispro  1-5 Units Subcutaneous 4x Daily (AC & HS) Areli Woods PA-C     • metFORMIN  500 mg Oral BID With Meals Areli Woods PA-C     • methocarbamol  250 mg Oral Q6H PRN Jose Fisher MD     • naloxone  0 04 mg Intravenous Q1MIN PRN Estevan Ro MD     • ondansetron  4 mg Oral Q6H PRN Estevan Ro MD     • oxyCODONE  2 5 mg Oral Q4H PRN Estevan Ro MD      Or   • oxyCODONE  5 mg Oral Q4H PRN Estevan Ro MD     • pioglitazone  15 mg Oral Daily KELVIN Butts     • polyethylene glycol  17 g Oral Daily Estevan Ro MD     • senna  2 tablet Oral HS Estevan Ro MD         Subjective/ HPI: Patient seen and examined  Patients overnight issues or events were reviewed with nursing or staff during rounds or morning huddle session  New or overnight issues include the following:     Pt seen and examined no overnight issues, agreeable to MOE VELAZQUEZ      ROS:   A 10 point ROS was performed; negative except as noted above         Imaging:     VAS lower limb venous duplex study, complete bilateral   Final Result by Vandana Graham MD (05/17 8934)          *Labs /Radiology studies reviewed  *Medications reviewed and reconciled as needed  *Please refer to order section for additional ordered labs studies  *Case discussed with primary attending during morning huddle case rounds    Physical Examination:  Vitals:   Vitals:    05/19/23 1027 05/19/23 2152 05/20/23 0530 05/20/23 0908   BP: 118/72 115/66 114/67 111/62   BP Location: Right arm Right arm Right arm Left arm   Pulse:  72 68 65   Resp:  18 18    Temp:  98 2 °F (36 8 °C) 98 °F (36 7 °C)    TempSrc:  Oral Oral    SpO2:  96% 94%    Weight:       Height:           GEN: NAD; pleasant  NEURO: Alert and oriented x4; appropriate  HEENT: Pupils are equal/reactive; normocephalic, face is symmetrical, hearing is normal  CV: S1 S2 regular, no murmur/rub/gallops, 1/4 pedal pulses, 1/4 b/l LE edema present  RESP: Lungs are clear bilaterally, no wheezes rales or rhonchi, on room air, no distress, respirations are easy and non labored  GI: Abdomen is obese, soft, non tender, +BS x4; non distended  : Voiding without issues  MUSC: Moves all extremities except generalized deconditioning  SKIN: pink, warm, normal turgor,  PVD b/l LE        The above physical exam was reviewed and updated as determined by my evaluation of the patient on 5/20/2023  Invasive Devices     Drain  Duration           Open Drain Left Groin 611 days                   VTE Pharmacologic Prophylaxis: Heparin  Code Status: Level 1 - Full Code  Current Length of Stay: 6 day(s)      Total time spent:  30 minutes with more than 50% spent counseling/coordinating care  Counseling includes discussion with patient re: progress  and discussion with patient of his/her current medical state/information  Coordination of patient's care was performed in conjunction with primary service  Time invested included review of patient's labs, vitals, and management of their comorbidities with continued monitoring  In addition, this patient was discussed with medical team including physician and advanced extenders  The care of the patient was extensively discussed and appropriate treatment plan was formulated unique for this patient  Medical decision making for the day was made by supervising physician unless otherwise noted in their attestation statement  ** Please Note:  voice to text software may have been used in the creation of this document   Although proof errors in transcription or interpretation are a potential of such software**

## 2023-05-20 NOTE — PLAN OF CARE
Reviewed    Problem: PAIN - ADULT  Goal: Verbalizes/displays adequate comfort level or baseline comfort level  Description: Interventions:  - Encourage patient to monitor pain and request assistance  - Assess pain using appropriate pain scale  - Administer analgesics based on type and severity of pain and evaluate response  - Implement non-pharmacological measures as appropriate and evaluate response  - Consider cultural and social influences on pain and pain management  - Notify physician/advanced practitioner if interventions unsuccessful or patient reports new pain  Outcome: Progressing     Problem: INFECTION - ADULT  Goal: Absence or prevention of progression during hospitalization  Description: INTERVENTIONS:  - Assess and monitor for signs and symptoms of infection  - Monitor lab/diagnostic results  - Monitor all insertion sites, i e  indwelling lines, tubes, and drains  - Monitor endotracheal if appropriate and nasal secretions for changes in amount and color  - [place] appropriate cooling/warming therapies per order  - Administer medications as ordered  - Instruct and encourage patient and family to use good hand hygiene technique  - Identify and instruct in appropriate isolation precautions for identified infection/condition  Outcome: Progressing     Problem: SAFETY ADULT  Goal: Patient will remain free of falls  Description: INTERVENTIONS:  - Educate patient/family on patient safety including physical limitations  - Instruct patient to call for assistance with activity   - Consult OT/PT to assist with strengthening/mobility   - Keep Call bell within reach  - Keep bed low and locked with side rails adjusted as appropriate  - Keep care items and personal belongings within reach  - Initiate and maintain comfort rounds  - Make Fall Risk Sign visible to staff  - Offer Toileting every 4 Hours, in advance of need  - Apply yellow socks and bracelet for high fall risk patients  - Consider moving patient to room near nurses station  Outcome: Progressing  Goal: Maintain or return to baseline ADL function  Description: INTERVENTIONS:  -  Assess patient's ability to carry out ADLs; assess patient's baseline for ADL function and identify physical deficits which impact ability to perform ADLs (bathing, care of mouth/teeth, toileting, grooming, dressing, etc )  - Assess/evaluate cause of self-care deficits   - Assess range of motion  - Assess patient's mobility; develop plan if impaired  - Assess patient's need for assistive devices and provide as appropriate  - Encourage maximum independence but intervene and supervise when necessary  - Involve family in performance of ADLs  - Assess for home care needs following discharge   - Consider OT consult to assist with ADL evaluation and planning for discharge  - Provide patient education as appropriate  Outcome: Progressing  Goal: Maintains/Returns to pre admission functional level  Description: INTERVENTIONS:  - Set and communicate daily mobility goal to care team and patient/family/caregiver     - Collaborate with rehabilitation services on mobility goals if consulted  - Out of bed for toileting  - Record patient progress and toleration of activity level   Outcome: Progressing     Problem: DISCHARGE PLANNING  Goal: Discharge to home or other facility with appropriate resources  Description: INTERVENTIONS:  - Identify barriers to discharge w/patient and caregiver  - Arrange for needed discharge resources and transportation as appropriate  - Identify discharge learning needs (meds, wound care, etc )  - Arrange for interpretive services to assist at discharge as needed  - Refer to Case Management Department for coordinating discharge planning if the patient needs post-hospital services based on physician/advanced practitioner order or complex needs related to functional status, cognitive ability, or social support system  Outcome: Progressing     Problem: Nutrition/Hydration-ADULT  Goal: Nutrient/Hydration intake appropriate for improving, restoring or maintaining nutritional needs  Description: Monitor and assess patient's nutrition/hydration status for malnutrition  Collaborate with interdisciplinary team and initiate plan and interventions as ordered  Monitor patient's weight and dietary intake as ordered or per policy  Utilize nutrition screening tool and intervene as necessary  Determine patient's food preferences and provide high-protein, high-caloric foods as appropriate       INTERVENTIONS:  - Monitor oral intake, urinary output, labs, and treatment plans  - Assess nutrition and hydration status and recommend course of action  - Evaluate amount of meals eaten  - Assist patient with eating if necessary   - Allow adequate time for meals  - Recommend/ encourage appropriate diets, oral nutritional supplements, and vitamin/mineral supplements  - Order, calculate, and assess calorie counts as needed  - Recommend, monitor, and adjust tube feedings and TPN/PPN based on assessed needs  - Assess need for intravenous fluids  - Provide specific nutrition/hydration education as appropriate  - Include patient/family/caregiver in decisions related to nutrition  Outcome: Progressing     Problem: MOBILITY - ADULT  Goal: Maintain or return to baseline ADL function  Description: INTERVENTIONS:  -  Assess patient's ability to carry out ADLs; assess patient's baseline for ADL function and identify physical deficits which impact ability to perform ADLs (bathing, care of mouth/teeth, toileting, grooming, dressing, etc )  - Assess/evaluate cause of self-care deficits   - Assess range of motion  - Assess patient's mobility; develop plan if impaired  - Assess patient's need for assistive devices and provide as appropriate  - Encourage maximum independence but intervene and supervise when necessary  - Involve family in performance of ADLs  - Assess for home care needs following discharge   - Consider OT consult to assist with ADL evaluation and planning for discharge  - Provide patient education as appropriate  Outcome: Progressing  Goal: Maintains/Returns to pre admission functional level  Description: INTERVENTIONS:  - Set and communicate daily mobility goal to care team and patient/family/caregiver     - Collaborate with rehabilitation services on mobility goals if consulted  - Out of bed for toileting  - Record patient progress and toleration of activity level   Outcome: Progressing     Problem: Prexisting or High Potential for Compromised Skin Integrity  Goal: Skin integrity is maintained or improved  Description: INTERVENTIONS:  - Identify patients at risk for skin breakdown  - Assess and monitor skin integrity  - Assess and monitor nutrition and hydration status  - Monitor labs   - Assess for incontinence   - Turn and reposition patient  - Assist with mobility/ambulation  - Relieve pressure over bony prominences  - Avoid friction and shearing  - Provide appropriate hygiene as needed including keeping skin clean and dry  - Evaluate need for skin moisturizer/barrier cream  - Collaborate with interdisciplinary team   - Patient/family teaching  - Consider wound care consult   Outcome: Progressing

## 2023-05-21 LAB
GLUCOSE SERPL-MCNC: 109 MG/DL (ref 65–140)
GLUCOSE SERPL-MCNC: 121 MG/DL (ref 65–140)
GLUCOSE SERPL-MCNC: 144 MG/DL (ref 65–140)
GLUCOSE SERPL-MCNC: 157 MG/DL (ref 65–140)

## 2023-05-21 RX ORDER — BISACODYL 10 MG
10 SUPPOSITORY, RECTAL RECTAL ONCE
Status: DISCONTINUED | OUTPATIENT
Start: 2023-05-21 | End: 2023-05-25 | Stop reason: HOSPADM

## 2023-05-21 RX ORDER — LANOLIN ALCOHOL/MO/W.PET/CERES
3 CREAM (GRAM) TOPICAL
Status: DISCONTINUED | OUTPATIENT
Start: 2023-05-21 | End: 2023-05-22

## 2023-05-21 RX ADMIN — PIOGLITAZONE HYDROCHLORIDE 15 MG: 15 TABLET ORAL at 08:03

## 2023-05-21 RX ADMIN — HEPARIN SODIUM 5000 UNITS: 5000 INJECTION INTRAVENOUS; SUBCUTANEOUS at 06:45

## 2023-05-21 RX ADMIN — MELATONIN 3 MG: at 21:29

## 2023-05-21 RX ADMIN — METHOCARBAMOL 250 MG: 500 TABLET ORAL at 08:11

## 2023-05-21 RX ADMIN — SENNOSIDES 17.2 MG: 8.6 TABLET, FILM COATED ORAL at 21:29

## 2023-05-21 RX ADMIN — METFORMIN HYDROCHLORIDE 500 MG: 500 TABLET ORAL at 07:59

## 2023-05-21 RX ADMIN — DOCUSATE SODIUM 100 MG: 100 CAPSULE, LIQUID FILLED ORAL at 08:03

## 2023-05-21 RX ADMIN — ACETAMINOPHEN 975 MG: 325 TABLET ORAL at 21:29

## 2023-05-21 RX ADMIN — ACETAMINOPHEN 975 MG: 325 TABLET ORAL at 14:59

## 2023-05-21 RX ADMIN — CALCIUM CARBONATE (ANTACID) CHEW TAB 500 MG 750 MG: 500 CHEW TAB at 21:28

## 2023-05-21 RX ADMIN — AMLODIPINE BESYLATE 5 MG: 5 TABLET ORAL at 08:03

## 2023-05-21 RX ADMIN — DOCUSATE SODIUM 100 MG: 100 CAPSULE, LIQUID FILLED ORAL at 17:53

## 2023-05-21 RX ADMIN — LISINOPRIL 10 MG: 10 TABLET ORAL at 08:02

## 2023-05-21 RX ADMIN — INSULIN LISPRO 1 UNITS: 100 INJECTION, SOLUTION INTRAVENOUS; SUBCUTANEOUS at 21:37

## 2023-05-21 RX ADMIN — POLYETHYLENE GLYCOL 3350 17 G: 17 POWDER, FOR SOLUTION ORAL at 08:00

## 2023-05-21 RX ADMIN — HEPARIN SODIUM 5000 UNITS: 5000 INJECTION INTRAVENOUS; SUBCUTANEOUS at 21:28

## 2023-05-21 RX ADMIN — METFORMIN HYDROCHLORIDE 500 MG: 500 TABLET ORAL at 17:53

## 2023-05-21 RX ADMIN — ACETAMINOPHEN 975 MG: 325 TABLET ORAL at 06:45

## 2023-05-21 RX ADMIN — HEPARIN SODIUM 5000 UNITS: 5000 INJECTION INTRAVENOUS; SUBCUTANEOUS at 14:59

## 2023-05-21 NOTE — PROGRESS NOTES
Internal Medicine Progress Note  Patient: Marsha Looney  Age/sex: 69 y o  male  Medical Record #: [id number]      ASSESSMENT/PLAN: (Interval History)  Marsha Looney is seen and examined and management for following issues:    S/p posterior C4-T1 decompressive laminectomy and instrumented fusion  • No collar needed  • Incision healing well     HTN  • Home: Lotrel 5-20 daily  • Here: Amlopdipine 5mg daily/Lisinopril 10mg daily  • stable     DM type 2   • HA1C 6   • Home: Amaryl 2mg daily/Pioglitazone-Metformin  2x daily  • Here: Metformin 500 mg BID/actose 15mg (5/20)  • Continue QID Accuchecks/SSI and DM diet  • BS stable     Obesity  • BMI 39 5  • Recommended ongoing attempts at weight loss      LE edema  • Venous doppler 5/16 was preliminarily negative for DVT  • Add TEDS        Discharge date:  Next week    The above assessment and plan was reviewed and updated as determined by my evaluation of the patient on 5/21/2023      Labs:   Results from last 7 days   Lab Units 05/19/23  0605 05/16/23  0544   WBC Thousand/uL 9 62 9 59   HEMOGLOBIN g/dL 13 0 13 1   HEMATOCRIT % 39 4 40 7   PLATELETS Thousands/uL 370 313     Results from last 7 days   Lab Units 05/19/23  0605 05/16/23  0544   SODIUM mmol/L 136 135   POTASSIUM mmol/L 4 6 4 5   CHLORIDE mmol/L 105 107   CO2 mmol/L 25 27   BUN mg/dL 21 26*   CREATININE mg/dL 1 03 1 04   CALCIUM mg/dL 9 9 9 6             Results from last 7 days   Lab Units 05/21/23  0631 05/20/23  2111 05/20/23  1547   POC GLUCOSE mg/dl 144* 157* 129       Review of Scheduled Meds:  Current Facility-Administered Medications   Medication Dose Route Frequency Provider Last Rate   • acetaminophen  975 mg Oral [facility] Mariela Roque MD     • amLODIPine  5 mg Oral Daily KELVIN Caballero      And   • lisinopril  10 mg Oral Daily Jessica RYAN ShermanNP     • bisacodyl  10 mg Rectal Daily PRN Mariela Roque MD     • calcium carbonate  750 mg Oral TID PRN Mariela Roque MD • docusate sodium  100 mg Oral BID Nathan Cage MD     • heparin (porcine)  5,000 Units Subcutaneous [facility] Nathan Cage MD     • insulin lispro  1-5 Units Subcutaneous 4x Daily (AC & HS) Areli Woods PA-C     • metFORMIN  500 mg Oral BID With Meals Areli Woods PA-C     • methocarbamol  250 mg Oral Q6H PRN Rut Osman MD     • naloxone  0 04 mg Intravenous Q1MIN PRN Nathan Cage MD     • ondansetron  4 mg Oral Q6H PRN Nathan Cage MD     • oxyCODONE  2 5 mg Oral Q4H PRN Nathan Cage MD      Or   • oxyCODONE  5 mg Oral Q4H PRN Nathan Cage MD     • pioglitazone  15 mg Oral Daily KELVIN Norton     • polyethylene glycol  17 g Oral Daily Nathan Cage MD     • senna  2 tablet Oral HS Nathan Cage MD         Subjective/ HPI: Patient seen and examined  Patients overnight issues or events were reviewed with nursing or staff during rounds or morning huddle session  New or overnight issues include the following:     Pt seen and examined, slept in chair overnight again  Recommended he try to keep legs elevated when OOB  [place] that TEDS did help yesterday      ROS:   A 10 point ROS was performed; negative except as noted above         Imaging:     VAS lower limb venous duplex study, complete bilateral   Final Result by Jessica Harry MD (05/17 9353)          *Labs /Radiology studies reviewed  *Medications reviewed and reconciled as needed  *Please refer to order section for additional ordered labs studies  *Case discussed with primary attending during morning huddle case rounds    Physical Examination:  Vitals:   Vitals:    05/20/23 0908 05/20/23 1419 05/20/23 2114 05/21/23 0603   BP: 111/62 118/62 124/65 132/72   BP Location: Left arm Right arm Left arm Right arm   Pulse: 65 62 65 71   Resp:  16 18 18   Temp:  98 °F (36 7 °C) 98 5 °F (36 9 °C) 98 7 °F (37 1 °C)   TempSrc:  Oral Oral Oral   SpO2:  95% 96% 93%   Weight:       Height:           GEN: NAD; pleasant  NEURO: Alert and oriented x4; appropriate  HEENT: Pupils are equal/reactive; normocephalic, face is symmetrical, hearing is normal  CV: S1 S2 regular, no murmur/rub/gallops, 1/4 pedal pulses, 1/4 b/l LE edema present  RESP: Lungs are clear bilaterally, no wheezes rales or rhonchi, on room air, no distress, respirations are easy and non labored  GI: Abdomen is obese, soft, non tender, +BS x4; non distended  : Voiding without issues  MUSC: Moves all extremities except generalized deconditioning  SKIN: pink, warm, normal turgor,  PVD b/l LE; cervical incision with staples intact no evidence of infection        The above physical exam was reviewed and updated as determined by my evaluation of the patient on 5/21/2023  Invasive Devices     Drain  Duration           Open Drain Left Groin 611 days                   VTE Pharmacologic Prophylaxis: Heparin  Code Status: Level 1 - Full Code  Current Length of Stay: 7 day(s)      Total time spent:  30 minutes with more than 50% spent counseling/coordinating care  Counseling includes discussion with patient re: progress  and discussion with patient of his/her current medical state/information  Coordination of patient's care was performed in conjunction with primary service  Time invested included review of patient's labs, vitals, and management of their comorbidities with continued monitoring  In addition, this patient was discussed with medical team including physician and advanced extenders  The care of the patient was extensively discussed and appropriate treatment plan was formulated unique for this patient  Medical decision making for the day was made by supervising physician unless otherwise noted in their attestation statement  ** Please Note:  voice to text software may have been used in the creation of this document   Although proof errors in transcription or interpretation are a potential of such software**

## 2023-05-21 NOTE — QUICK NOTE
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

## 2023-05-21 NOTE — PLAN OF CARE
Reviewed    Problem: PAIN - ADULT  Goal: Verbalizes/displays adequate comfort level or baseline comfort level  Description: Interventions:  - Encourage patient to monitor pain and request assistance  - Assess pain using appropriate pain scale  - Administer analgesics based on type and severity of pain and evaluate response  - Implement non-pharmacological measures as appropriate and evaluate response  - Consider cultural and social influences on pain and pain management  - Notify physician/advanced practitioner if interventions unsuccessful or patient reports new pain  Outcome: Progressing     Problem: INFECTION - ADULT  Goal: Absence or prevention of progression during hospitalization  Description: INTERVENTIONS:  - Assess and monitor for signs and symptoms of infection  - Monitor lab/diagnostic results  - Monitor all insertion sites, i e  indwelling lines, tubes, and drains  - Monitor endotracheal if appropriate and nasal secretions for changes in amount and color  - [place] appropriate cooling/warming therapies per order  - Administer medications as ordered  - Instruct and encourage patient and family to use good hand hygiene technique  - Identify and instruct in appropriate isolation precautions for identified infection/condition  Outcome: Progressing     Problem: SAFETY ADULT  Goal: Patient will remain free of falls  Description: INTERVENTIONS:  - Educate patient/family on patient safety including physical limitations  - Instruct patient to call for assistance with activity   - Consult OT/PT to assist with strengthening/mobility   - Keep Call bell within reach  - Keep bed low and locked with side rails adjusted as appropriate  - Keep care items and personal belongings within reach  - Initiate and maintain comfort rounds  - Make Fall Risk Sign visible to staff  - Offer Toileting every 4 Hours, in advance of need  - Apply yellow socks and bracelet for high fall risk patients  - Consider moving patient to room near nurses station  Outcome: Progressing  Goal: Maintain or return to baseline ADL function  Description: INTERVENTIONS:  -  Assess patient's ability to carry out ADLs; assess patient's baseline for ADL function and identify physical deficits which impact ability to perform ADLs (bathing, care of mouth/teeth, toileting, grooming, dressing, etc )  - Assess/evaluate cause of self-care deficits   - Assess range of motion  - Assess patient's mobility; develop plan if impaired  - Assess patient's need for assistive devices and provide as appropriate  - Encourage maximum independence but intervene and supervise when necessary  - Involve family in performance of ADLs  - Assess for home care needs following discharge   - Consider OT consult to assist with ADL evaluation and planning for discharge  - Provide patient education as appropriate  Outcome: Progressing  Goal: Maintains/Returns to pre admission functional level  Description: INTERVENTIONS:  - Set and communicate daily mobility goal to care team and patient/family/caregiver     - Collaborate with rehabilitation services on mobility goals if consulted  - Out of bed for toileting  - Record patient progress and toleration of activity level   Outcome: Progressing     Problem: DISCHARGE PLANNING  Goal: Discharge to home or other facility with appropriate resources  Description: INTERVENTIONS:  - Identify barriers to discharge w/patient and caregiver  - Arrange for needed discharge resources and transportation as appropriate  - Identify discharge learning needs (meds, wound care, etc )  - Arrange for interpretive services to assist at discharge as needed  - Refer to Case Management Department for coordinating discharge planning if the patient needs post-hospital services based on physician/advanced practitioner order or complex needs related to functional status, cognitive ability, or social support system  Outcome: Progressing     Problem: Nutrition/Hydration-ADULT  Goal: Nutrient/Hydration intake appropriate for improving, restoring or maintaining nutritional needs  Description: Monitor and assess patient's nutrition/hydration status for malnutrition  Collaborate with interdisciplinary team and initiate plan and interventions as ordered  Monitor patient's weight and dietary intake as ordered or per policy  Utilize nutrition screening tool and intervene as necessary  Determine patient's food preferences and provide high-protein, high-caloric foods as appropriate       INTERVENTIONS:  - Monitor oral intake, urinary output, labs, and treatment plans  - Assess nutrition and hydration status and recommend course of action  - Evaluate amount of meals eaten  - Assist patient with eating if necessary   - Allow adequate time for meals  - Recommend/ encourage appropriate diets, oral nutritional supplements, and vitamin/mineral supplements  - Order, calculate, and assess calorie counts as needed  - Recommend, monitor, and adjust tube feedings and TPN/PPN based on assessed needs  - Assess need for intravenous fluids  - Provide specific nutrition/hydration education as appropriate  - Include patient/family/caregiver in decisions related to nutrition  Outcome: Progressing     Problem: MOBILITY - ADULT  Goal: Maintain or return to baseline ADL function  Description: INTERVENTIONS:  -  Assess patient's ability to carry out ADLs; assess patient's baseline for ADL function and identify physical deficits which impact ability to perform ADLs (bathing, care of mouth/teeth, toileting, grooming, dressing, etc )  - Assess/evaluate cause of self-care deficits   - Assess range of motion  - Assess patient's mobility; develop plan if impaired  - Assess patient's need for assistive devices and provide as appropriate  - Encourage maximum independence but intervene and supervise when necessary  - Involve family in performance of ADLs  - Assess for home care needs following discharge   - Consider OT consult to assist with ADL evaluation and planning for discharge  - Provide patient education as appropriate  Outcome: Progressing  Goal: Maintains/Returns to pre admission functional level  Description: INTERVENTIONS:  - Set and communicate daily mobility goal to care team and patient/family/caregiver     - Collaborate with rehabilitation services on mobility goals if consulted  - Out of bed for toileting  - Record patient progress and toleration of activity level   Outcome: Progressing     Problem: Prexisting or High Potential for Compromised Skin Integrity  Goal: Skin integrity is maintained or improved  Description: INTERVENTIONS:  - Identify patients at risk for skin breakdown  - Assess and monitor skin integrity  - Assess and monitor nutrition and hydration status  - Monitor labs   - Assess for incontinence   - Turn and reposition patient  - Assist with mobility/ambulation  - Relieve pressure over bony prominences  - Avoid friction and shearing  - Provide appropriate hygiene as needed including keeping skin clean and dry  - Evaluate need for skin moisturizer/barrier cream  - Collaborate with interdisciplinary team   - Patient/family teaching  - Consider wound care consult   Outcome: Progressing

## 2023-05-21 NOTE — PROGRESS NOTES
05/21/23 1230   Pain Assessment   Pain Assessment Tool 0-10   Pain Score No Pain   Restrictions/Precautions   Precautions Fall Risk;Spinal precautions   ROM Restrictions Yes  (c-spine precautions)   Braces or Orthoses   (B/L TEDs)   Sit to Stand   Type of Assistance Needed Incidental touching; Adaptive equipment   Physical Assistance Level No physical assistance   Comment CGA w/RW   Sit to Stand CARE Score 4   Bed-Chair Transfer   Type of Assistance Needed Incidental touching;Verbal cues; Adaptive equipment   Physical Assistance Level No physical assistance   Comment CGA SPT w/RW, vc's to stay inside RW before sitting, as pt tends to abandon RW before fully backing up to chair before sitting   Chair/Bed-to-Chair Transfer CARE Score 4   Meal Prep   Meal Prep Level Walker   Meal Prep Level of Assistance Contact guard   Meal Preparation Pt engaged in simple hot meal prep of cooking scrambled eggs on stovetop, with focus on increasing standing tolerance/endurance and IADL retraining  Pt tolerated well, completing fxl mob around OT kitchen w/RW at [facility] level, no LOB  Pt did fatigue, requiring several seated rest breaks after approx 10min in stance x3  Pt transported items t/o kitchen by sliding method on countertop, which pt states he utilized at home PTA  Pt able to retrieve from  cabinets and below-waist drawers all equipment, and fxl reaching into refridgerator to retrieve ingredients w/o LHAE or assist  Pt demo G safety awareness around hot stovetop w/o cueing  Pt reiterates using a wheeled cart at home to transport items across kitchen with a handle to push like a grocery cart, and pt plans to use this method post D/C  Pt washed dishes after meal prep for continued standing tolerance work, completing for approx 4min with no LOB or rest breaks required  Therapeutic Exercise - ROM   UE-ROM Yes   ROM- Right Upper Extremities   R Wrist AROM;AAROM;Prolonged stretch; Wrist flexion;Wrist extension   R Hand AROM;AAROM;Prolonged stretch; Thumb; Index finger; Long finger;Ring finger;Little finger   R Position Seated   R Weight/Reps/Sets 1x10   RUE ROM Comment Seated, pt engaged in prolonged stretching and AROM work for R wrist/digits to decrease spasticity/ hypertonicity in R distal UE  Min improvement in tone reduction noted post-stretching  Pt tolerated well, no reports of pain  Exercise Tools   Exercise Tools Yes   UE Ergometer Seated w/Sup, tabletop UEB bilaterally for 5min prograde against min resistance, for endurance work for increased activity tolerance during fxl ADLs/IADLs  Pt tolerated well, no rest breaks required, pt did become SOB with exertion, SPO2 and HR monitored and remained WNL  Cognition   Overall Cognitive Status Impaired   Arousal/Participation Alert; Cooperative   Attention Attends with cues to redirect   Orientation Level Oriented X4   Memory Decreased recall of precautions   Following Commands Follows multistep commands with increased time or repetition   Activity Tolerance   Activity Tolerance Patient tolerated treatment well   Assessment   Treatment Assessment Pt seen for 90min skilled OT session focused on fxl transfers w/RW, endurance work, simple hot meal prep at stovetop, kitchen mobility w/RW and item transport, and R wrist/digit AROM/stretching for tone reduction  See detailed descriptions of fxl performance above  Pt tolerated session well, but does cont to be limited by decreased endurance, SOB upon exertion, and decreased standing tolerance, requiring frequent rest breaks seated to manage  Pt demo G safety awareness during cooking task, and able to complete at [facility] level w/o assist  Pt would benefit from continued skilled OT focused on ADL retraining, IADL retraining (med mgmt), endurance work, R neuro re-ed, RUE tone reduction, standing balance/tolerance, and D/C planning  Prognosis Good   Problem List Decreased strength;Decreased range of motion;Decreased endurance; Impaired balance;Decreased mobility; Decreased coordination;Decreased cognition; Impaired sensation; Impaired tone;Obesity; Decreased skin integrity;Pain;Orthopedic restrictions   Barriers to Discharge Inaccessible home environment;Decreased caregiver support   Plan   Treatment/Interventions ADL retraining;Functional transfer training; Therapeutic exercise; Endurance training;Cognitive reorientation;Equipment eval/education;Patient/family training;Bed mobility; Compensatory technique education   Progress Progressing toward goals   Recommendation   OT Discharge Recommendation   (pending)   OT Therapy Minutes   OT Time In 1230   OT Time Out 1400   OT Total Time (minutes) 90   OT Mode of treatment - Individual (minutes) 90   OT Mode of treatment - Concurrent (minutes) 0   OT Mode of treatment - Group (minutes) 0   OT Mode of treatment - Co-treat (minutes) 0   OT Mode of Treatment - Total time(minutes) 90 minutes   OT Cumulative Minutes 515   Therapy Time missed   Time missed?  No

## 2023-05-21 NOTE — PROGRESS NOTES
05/21/23 1000   Pain Assessment   Pain Assessment Tool 0-10   Pain Score 3   Pain Location/Orientation Location: Neck   Pain Onset/Description Onset: Ongoing   Effect of Pain on Daily Activities tolerates   Patient's Stated Pain Goal No pain   Restrictions/Precautions   Precautions Fall Risk;Spinal precautions   ROM Restrictions Yes  (C spine precautions)   Braces or Orthoses   (brian TEDs)   Cognition   Overall Cognitive Status Impaired   Arousal/Participation Alert; Cooperative   Attention Attends with cues to redirect   Orientation Level Disoriented X4   Following Commands Follows multistep commands with increased time or repetition   Subjective   Subjective Patient seated in chair, report of lack of sleep but agreeable to PT   Roll Left and Right   Type of Assistance Needed Physical assistance;Verbal cues   Physical Assistance Level 26%-50%   Comment sup rolling to R, mod rolling to L   Roll Left and Right CARE Score 3   Sit to Lying   Type of Assistance Needed Physical assistance;Verbal cues   Physical Assistance Level 25% or less   Comment cueing for log rolling and assist R LE   Sit to Lying CARE Score 3   Lying to Sitting on Side of Bed   Type of Assistance Needed Supervision;Verbal cues   Physical Assistance Level No physical assistance   Comment log rolling technique   Lying to Sitting on Side of Bed CARE Score 4   Sit to Stand   Type of Assistance Needed Incidental touching;Verbal cues; Adaptive equipment   Physical Assistance Level No physical assistance   Comment CGA with RW   Sit to Stand CARE Score 4   Bed-Chair Transfer   Type of Assistance Needed Incidental touching;Verbal cues; Adaptive equipment   Physical Assistance Level No physical assistance   Comment CGA with RW   Chair/Bed-to-Chair Transfer CARE Score 4   Transfer Bed/Chair/Wheelchair   Findings occasional cueing to maintain C spine precautions   Walk 10 Feet   Type of Assistance Needed Incidental touching;Verbal cues; Adaptive equipment Physical Assistance Level No physical assistance   Comment CGA with RW   Walk 10 Feet CARE Score 4   Walk 50 Feet with Two Turns   Type of Assistance Needed Incidental touching;Verbal cues; Adaptive equipment   Physical Assistance Level No physical assistance   Comment RW   Walk 50 Feet with Two Turns CARE Score 4   Walk 150 Feet   Type of Assistance Needed Incidental touching;Verbal cues; Adaptive equipment   Physical Assistance Level No physical assistance   Comment RW   Walk 150 Feet CARE Score 4   Ambulation   Primary Mode of Locomotion Prior to Admission Walk   Distance Walked (feet) 150 ft  (x2, 100ft)   Assist Device Roller Walker   Gait Pattern Antalgic;Trendelenburg; Improper weight shift; Inconsistant Tonya;Decreased foot clearance   Limitations Noted In Balance; Heel Strike;Speed;Strength   Provided Assistance with: Balance   Walk Assist Level Contact Guard   Findings Trial with SPC x 100ft, CGA/min assist and cueing for sequence   Does the patient walk? 2  Yes   Wheel 50 Feet with Two Turns   Reason if not Attempted Activity not applicable   Wheel 50 Feet with Two Turns CARE Score 9   Wheel 150 Feet   Reason if not Attempted Activity not applicable   Wheel 835 Feet CARE Score 9   Curb or Single Stair   Style negotiated Single stair   Type of Assistance Needed Incidental touching;Verbal cues; Adaptive equipment   Physical Assistance Level No physical assistance   Comment CGA with BHR   1 Step (Curb) CARE Score 4   4 Steps   Type of Assistance Needed Incidental touching;Verbal cues; Adaptive equipment   Physical Assistance Level No physical assistance   Comment CGA with RW   4 Steps CARE Score 4   12 Steps   Reason if not Attempted Safety concerns   12 Steps CARE Score 88   Stairs   Type Stairs   # of Steps 6   Weight Bearing Precautions Fall Risk;Cervical   Assist Devices Bilateral Rail   Picking Up Object   Type of Assistance Needed Incidental touching;Verbal cues; Adaptive equipment   Physical Assistance Level No physical assistance   Comment CGA, RW, reacher, marker and cueing to maintain C spine precautions   Picking Up Object CARE Score 4   Therapeutic Interventions   Strengthening Standing with RW support: hip flex, ext, abd; heel raises, mini squat x 10 reps x 2 sets   Equipment Use   NuStep L 1 x 10 mins B LE only   Assessment   Treatment Assessment Patient participated well in PT, showing progress with functional mobility  He need occasional vc to maintain C spine precautions during mobility  Patient completed transfers with RW, CGA and minimal cueing for safe technique, especially during SPT to completely turn and reach back for chair prior to sit  Pt report of his goal is to amb with SPC or without  He amb with RW x 150ft and 100ft, CGA present with trendeleburg gait  Trial with SPC x 100ft, CG/min assist, demonstrate good sequence, however at this time patient is safest with RW  Cont PT as per POC to maximized functional mobility recovery, dec risk of fall and burden of care  Will also cont to assess for most appropriate AD  Problem List Decreased strength;Decreased range of motion;Decreased endurance;Decreased mobility; Impaired balance;Decreased coordination;Decreased cognition; Impaired sensation; Impaired tone;Obesity;Pain;Orthopedic restrictions   Barriers to Discharge Inaccessible home environment;Decreased caregiver support   PT Barriers   Functional Limitation Stair negotiation;Standing;Transfers; Walking   Plan   Treatment/Interventions Functional transfer training;LE strengthening/ROM; Elevations; Therapeutic exercise; Bed mobility;Gait training   Progress Progressing toward goals   Recommendation   PT Discharge Recommendation Home with outpatient rehabilitation  (pending progress)   Equipment Recommended [facility] Recommended Wheeled walker   PT Therapy Minutes   PT Time In 1000   PT Time Out 1130   PT Total Time (minutes) 90   PT Mode of treatment - Individual (minutes) 90   PT Mode of treatment - Concurrent (minutes) 0   PT Mode of treatment - Group (minutes) 0   PT Mode of treatment - Co-treat (minutes) 0   PT Mode of Treatment - Total time(minutes) 90 minutes   PT Cumulative Minutes 570   Therapy Time missed   Time missed?  No

## 2023-05-22 ENCOUNTER — RA CDI HCC (OUTPATIENT)
Dept: OTHER | Facility: HOSPITAL | Age: 68
End: 2023-05-22

## 2023-05-22 LAB
GLUCOSE SERPL-MCNC: 127 MG/DL (ref 65–140)
GLUCOSE SERPL-MCNC: 140 MG/DL (ref 65–140)
GLUCOSE SERPL-MCNC: 147 MG/DL (ref 65–140)
GLUCOSE SERPL-MCNC: 150 MG/DL (ref 65–140)

## 2023-05-22 RX ORDER — ACETAMINOPHEN 325 MG/1
650 TABLET ORAL EVERY 8 HOURS SCHEDULED
Status: DISCONTINUED | OUTPATIENT
Start: 2023-05-22 | End: 2023-05-25 | Stop reason: HOSPADM

## 2023-05-22 RX ORDER — LANOLIN ALCOHOL/MO/W.PET/CERES
6 CREAM (GRAM) TOPICAL
Status: DISCONTINUED | OUTPATIENT
Start: 2023-05-22 | End: 2023-05-25 | Stop reason: HOSPADM

## 2023-05-22 RX ADMIN — LISINOPRIL 10 MG: 10 TABLET ORAL at 08:08

## 2023-05-22 RX ADMIN — METFORMIN HYDROCHLORIDE 500 MG: 500 TABLET ORAL at 17:04

## 2023-05-22 RX ADMIN — POLYETHYLENE GLYCOL 3350 17 G: 17 POWDER, FOR SOLUTION ORAL at 08:08

## 2023-05-22 RX ADMIN — HEPARIN SODIUM 5000 UNITS: 5000 INJECTION INTRAVENOUS; SUBCUTANEOUS at 05:43

## 2023-05-22 RX ADMIN — HEPARIN SODIUM 5000 UNITS: 5000 INJECTION INTRAVENOUS; SUBCUTANEOUS at 14:08

## 2023-05-22 RX ADMIN — DOCUSATE SODIUM 100 MG: 100 CAPSULE, LIQUID FILLED ORAL at 08:08

## 2023-05-22 RX ADMIN — MELATONIN 6 MG: at 21:03

## 2023-05-22 RX ADMIN — SENNOSIDES 17.2 MG: 8.6 TABLET, FILM COATED ORAL at 21:03

## 2023-05-22 RX ADMIN — METFORMIN HYDROCHLORIDE 500 MG: 500 TABLET ORAL at 08:08

## 2023-05-22 RX ADMIN — INSULIN LISPRO 1 UNITS: 100 INJECTION, SOLUTION INTRAVENOUS; SUBCUTANEOUS at 08:08

## 2023-05-22 RX ADMIN — ACETAMINOPHEN 650 MG: 325 TABLET ORAL at 21:03

## 2023-05-22 RX ADMIN — PIOGLITAZONE HYDROCHLORIDE 15 MG: 15 TABLET ORAL at 08:08

## 2023-05-22 RX ADMIN — CALCIUM CARBONATE (ANTACID) CHEW TAB 500 MG 750 MG: 500 CHEW TAB at 21:03

## 2023-05-22 RX ADMIN — HEPARIN SODIUM 5000 UNITS: 5000 INJECTION INTRAVENOUS; SUBCUTANEOUS at 21:03

## 2023-05-22 RX ADMIN — DOCUSATE SODIUM 100 MG: 100 CAPSULE, LIQUID FILLED ORAL at 17:04

## 2023-05-22 RX ADMIN — ACETAMINOPHEN 975 MG: 325 TABLET ORAL at 05:43

## 2023-05-22 RX ADMIN — ACETAMINOPHEN 975 MG: 325 TABLET ORAL at 14:07

## 2023-05-22 RX ADMIN — AMLODIPINE BESYLATE 5 MG: 5 TABLET ORAL at 08:08

## 2023-05-22 NOTE — PROGRESS NOTES
05/22/23 0830   Pain Assessment   Pain Assessment Tool 0-10   Pain Score No Pain   Restrictions/Precautions   Precautions Fall Risk;Spinal precautions   ROM Restrictions Yes  (C' spinal percaution)   Braces or Orthoses   (B/L TEDS)   Subjective   Subjective pt agreeable to perform skilled PT   Sit to Stand   Type of Assistance Needed Incidental touching   Sit to Stand CARE Score 4   Bed-Chair Transfer   Type of Assistance Needed Incidental touching; Adaptive equipment   Comment    Chair/Bed-to-Chair Transfer CARE Score 4   Transfer Bed/Chair/Wheelchair   Adaptive Equipment Roller Walker   Car Transfer   Type of Assistance Needed Incidental touching; Adaptive equipment   Comment    Car Transfer CARE Score 4   Walk 10 Feet   Type of Assistance Needed Incidental touching; Adaptive equipment   Comment    Walk 10 Feet CARE Score 4   Walk 50 Feet with Two Turns   Type of Assistance Needed Incidental touching;Set-up / clean-up   Comment    Walk 50 Feet with Two Turns CARE Score 4   Walk 150 Feet   Type of Assistance Needed Incidental touching; Adaptive equipment   Comment    Walk 150 Feet CARE Score 4   Walking 10 Feet on Uneven Surfaces   Type of Assistance Needed Physical assistance; Adaptive equipment   Physical Assistance Level 25% or less   Comment w  floor mat   Walking 10 Feet on Uneven Surfaces CARE Score 3   Ambulation   Primary Mode of Locomotion Prior to Admission Walk   Distance Walked (feet) 155 ft   Assist Device Roller Walker   Does the patient walk? 2  Yes   Wheel 50 Feet with Two Turns   Reason if not Attempted Activity not applicable   Wheel 50 Feet with Two Turns CARE Score 9   Wheel 150 Feet   Reason if not Attempted Activity not applicable   Wheel 251 Feet CARE Score 9   Wheelchair mobility   Does the patient use a wheelchair? 0   No   Curb or Single Stair   Style negotiated Single stair   Type of Assistance Needed Incidental touching   Comment R 8 step    1 Step (Curb) CARE Score 4 4 Steps   Type of Assistance Needed Incidental touching   Comment BHR  STEPS   4 Steps CARE Score 4   12 Steps   Reason if not Attempted Safety concerns   12 Steps CARE Score 88   Stairs   Type Stairs   # of Steps 8   Weight Bearing Precautions Fall Risk   Assist Devices Bilateral Rail   Picking Up Object   Type of Assistance Needed Incidental touching; Independent   Comment CgA RW   Picking Up Object CARE Score -   Toilet Transfer   Type of Assistance Needed Incidental touching; Adaptive equipment   Comment CgA RW reacher steps a little diffuclty gasping reacher and management   Toilet Transfer CARE Score 4   Therapeutic Interventions   Strengthening Seated LAQ AP marching 20 reps ball adduction and t-band red abduction  20 reps and STS x5   Flexibility HS and calfs manual stretch   Balance standing balance   Assessment   Treatment Assessment Pt focus on ambulation with RW and functional mobility to [facility]  home DC   Pt also perform TE LE and progressing toward functional goals   Pt also reports fatigue legs durign walking with RW but able to finish 150 feet and more  Cont working with pt to inc LE strengthening and overall dec fall risk and to maintain C percaution   Barriers to Discharge Inaccessible home environment;Decreased caregiver support   Plan   Progress Progressing toward goals   Recommendation   PT Discharge Recommendation Home with outpatient rehabilitation   PT Therapy Minutes   PT Time In 0830   PT Time Out 0930   PT Total Time (minutes) 60   PT Mode of treatment - Individual (minutes) 45   PT Mode of treatment - Concurrent (minutes) 15   PT Mode of treatment - Group (minutes) 0   PT Mode of treatment - Co-treat (minutes) 0   PT Mode of Treatment - Total time(minutes) 60 minutes   PT Cumulative Minutes 630   Therapy Time missed   Time missed?  No

## 2023-05-22 NOTE — PROGRESS NOTES
05/22/23 1230   Pain Assessment   Pain Assessment Tool 0-10   Pain Score No Pain   Restrictions/Precautions   Precautions Fall Risk;Spinal precautions   ROM Restrictions Yes  (cervical spinal precautions)   Braces or Orthoses Other (Comment)  (B/L TEDS)   Cognition   Overall Cognitive Status Impaired   Arousal/Participation Alert; Cooperative   Attention Attends with cues to redirect   Orientation Level Oriented X4   Memory Decreased recall of precautions   Following Commands Follows multistep commands with increased time or repetition   Sit to Stand   Type of Assistance Needed Supervision; Adaptive equipment   Comment CS with RW   Sit to Stand CARE Score 4   Bed-Chair Transfer   Type of Assistance Needed Incidental touching;Supervision; Adaptive equipment   Comment CG/CS with RW   Chair/Bed-to-Chair Transfer CARE Score 4   Transfer Bed/Chair/Wheelchair   Adaptive Equipment Roller Walker   Walk 10 Feet   Type of Assistance Needed Supervision; Incidental touching; Adaptive equipment   Comment CS/CGA with RW   Walk 10 Feet CARE Score 4   Walk 50 Feet with Two Turns   Type of Assistance Needed Incidental touching; Adaptive equipment   Comment CGA with RW   Walk 50 Feet with Two Turns CARE Score 4   Ambulation   Primary Mode of Locomotion Prior to Admission Walk   Distance Walked (feet) 100 ft  (x2)   Assist Device Roller Walker   Gait Pattern Ataxic; Slow Tonya; Forward Flexion;Narrow MICHAEL; Improper weight shift   Limitations Noted In Balance; Coordination;Posture; Safety;Speed;Strength   Provided Assistance with: Balance   Walk Assist Level Close Supervision;Contact Guard   Does the patient walk? 2  Yes   Wheel 50 Feet with Two Turns   Reason if not Attempted Activity not applicable   Wheel 50 Feet with Two Turns CARE Score 9   Wheel 150 Feet   Reason if not Attempted Activity not applicable   Wheel 544 Feet CARE Score 9   Wheelchair mobility   Does the patient use a wheelchair? 0   No   12 Steps   Reason if not Attempted "Safety concerns   12 Steps CARE Score 88   Toilet Transfer   Type of Assistance Needed Incidental touching;Supervision; Adaptive equipment   Comment CS/CGA with RW   Toilet Transfer CARE Score 4   Therapeutic Interventions   Balance alt toe taps to 6\" step with BUE support 2 x10 reps  Neuromuscular Re-Education x150' LHHA for NPP, noted increased R lateral lean  Amb x15' with SPC in L hand, which was not recommended as he had x2 mild LOB requiring MODA to correct  Equipment Use   NuStep L2 x10 min BLE only  Assessment   Treatment Assessment Pt participated in skilled PT session with increased focus on gait, NPP gait, increased balance and safety awareness  Pt cont to be educated on cont use of RW at home as his balance is not good and risk of fall can cause serious injury to pt  Pt states he understands recommendation but his long term goal is to get to amb with SPC or no AD  Pt will cont POC as tolerated with cont functional transfers, increased balance, increased NPP gait and improved righting reactions to decrease risk of falls  Problem List Decreased strength;Decreased range of motion;Decreased endurance; Impaired balance;Decreased mobility; Decreased coordination;Decreased cognition; Impaired sensation; Impaired tone;Obesity; Decreased skin integrity;Pain;Orthopedic restrictions   Barriers to Discharge Inaccessible home environment;Decreased caregiver support   PT Barriers   Functional Limitation Stair negotiation;Standing;Transfers; Walking   Plan   Treatment/Interventions Functional transfer training;LE strengthening/ROM; Therapeutic exercise;Cognitive reorientation; Endurance training;Gait training;Bed mobility   Progress Progressing toward goals   Recommendation   PT Discharge Recommendation Home with outpatient rehabilitation   Equipment Recommended Walker   PT Therapy Minutes   PT Time In 1230   PT Time Out 1330   PT Total Time (minutes) 60   PT Mode of treatment - Individual (minutes) 60   PT Mode of " treatment - Concurrent (minutes) 0   PT Mode of treatment - Group (minutes) 0   PT Mode of treatment - Co-treat (minutes) 0   PT Mode of Treatment - Total time(minutes) 60 minutes   PT Cumulative Minutes 690   Therapy Time missed   Time missed?  No

## 2023-05-22 NOTE — PROGRESS NOTES
" Pastoral Care Progress Note    2023  Patient: Aileen Police : 1955  Admission Date & Time: 2023 1249  MRN: [id number] CSN: [id number]         made brief check in visit with Mary Yan, who when asked how he was doing said \"not bad\" with a tone that implied he was not feeling positive about how he was doing  I probed a bit, but he didn't offer any more thoughts   remains available    "

## 2023-05-22 NOTE — PROGRESS NOTES
PM&R PROGRESS NOTE:  Albert Brown 69 y o  male MRN: [id number]  Unit/Bed#: -01 Encounter: [id number]        Rehabilitation Diagnosis: Impairment of mobility, safety and Activities of Daily Living (ADLs) due to Spinal Cord Dysfunction:  Non-Traumatic:  04 1211  Quadriplegia, Incomplete C1-4  Examines C1 AIS D  Has some pinprick impairment at C2 and C3 bilaterally - but not clear to me that this is related to his radiographic findings  HPI: Albert Brown is a 69 y o  male with a history of T2DM, HTN, HLD, obesity, lumbar spinal stenosis, history of smoking (47 pack years in remission since 2021) who presented to [address] [place] [address] on 5/10 for elective posterior cervical decompression and fusion with Dr Hiwot Rodriguez for cervical spinal cord compression and myelopathy secondary to severe spinal stenosis resulting in gait dysfunction  A posterior cervical decompression laminectomy with instrumented fusion from C4-T1  No significant alterations in somatosensory and motor evoked potentials throughout the methadone taper from 5/11-5/13  IV pain medications were discontinued on 5/12  He has comfortable outside of achy neck pain and was started on Lidoderm patch  The patient was evaluated by the Rehabilitation team and deemed an appropriate candidate for comprehensive inpatient rehabilitation and admitted to the [facility] on 5/14/2023 12:49 PM    SUBJECTIVE: Patient seen face to face  No acute issues  Patient reports inability to sleep, hospital bed uncomfortable, home mattress is very firm  He can fall asleep but does not stay asleep d/t mattress being uncomfortable  Denies pain at this time, does report intermittent posterior neck pain with adequate pain control  Good appetite, voiding, patient was refusing bowel medications, restarted 3 days ago with a BM 05/21  Denies chest pain, shortness of breath, fever, chills,nausea, abdominal pain      ASSESSMENT: Stable, progressing    PLAN:  - pain controlled with current regimen   - b/l lower extremity edema- continue with elevation and javan stockings; monitor skin with placement and removal of stockings    Rehabilitation  • Functional deficits:  Impaired balance, impaired self-care,   • Continue current rehabilitation plan of care to maximize function  • Functional update:   o PT: mobility- min A, transfers- supervision, ambulation- incidental touching ', stairs- incidental touching BHR  o OT: ADL: bathing- min - mod A, dressing- UB supervision LB mod-max A, footwear- min-mod A, toileting- min A  • Estimated Discharge: anticipated 5/25    Pain  • Posterior neck pain  • Acetaminophen 975 mg every 8 hours  • Methocarbamol 250 mg every 6 hours prn  • Oxycodone 2 5 mg-5 mg every 4 hours prn    DVT prophylaxis  • Heparin sq every 8 hours    Bladder plan  • Continent    Bowel plan  • Continent  • Colace 100 mg BID  • Miralax daily  • Senokot 17 2 mg daily at bedtime  • Bisacodyl suppository prn      * Incomplete spinal cord injury at C1-C4 level without bone injury [facility])  Assessment & Plan  Patient actually examines on ISNCSCI with C1 AIS D with profound loss of pinprick sensation starting at C2    - LT impairment begins at C5 bilaterally - so if C2/C3 impairment is not related to cord, he is at best C4    - However, given no other potential cause at this time - would label him C1 AIS D   - Maybe developing some neurogenic bladder/NDO prior to surgery which has improved   - Checking PVRs x3 to ensure no retention - PVRs low   - Has some constipation post-op, but at risk for neurogenic bowel - monitor bowel function   - Monitor autonomic function and check orthostatics given his complaints of occasional dizziness when standing     - ACE and/or JAVAN stockings as needed  - Has spasticity in his R hand which has improved since surgery  - Monitor  May benefit from resting hand splint, potentially stim in therapies      - Monitor tone, which is located primarily in his FDS, maybe some FDP    - PT/OT 3-5 hours/day, 5-7 days/week  Spondylosis of cervical spine with myelopathy  Assessment & Plan  MRI showed Multilevel cervical spondylosis, Multifactorial disease results in overall severe canal stenosis with underlying cord compression at C5-C6 and C6-C7  Associated cord edema and/or myelomalacia is present at these levels  No abnormal enhancement identified within the spinal canal   Symptoms: possibly neurogenic bladder leading up to surgery, R hand spasticity (clenched fist), R hand weakness and sensory impairment, Gait impairment/balance issues  - Examines with bilateral UE sensory impairment and R > L weakness, with some mild RLE > LLE weakness    - Also examines with diffuse hyperreflexia  - See SCI for more details  Admitted on 5/10 for planned Posterior cervical decompressive laminectomy with instrumented fusion from C4-T1 with Dr Archana Barbosa  No need for cervical collar per Neurosurgery  C-Spine precautions  Drain removed  Staples/Drain suture in place  Pain management as detailed below  Two week f/u with Neurosurgery on 5/24  PT/OT - 3-5 hours/day, 5-7 days/week in comprehensive acute inpatient rehab    Spinal stenosis of lumbar region at multiple levels  Assessment & Plan  Symptoms and weakness he reports improved with physical therapy  Pain management as detailed below  Acute pain  Assessment & Plan  Improving Neuropathic pain  Nociceptive post op pain in his neck: Managed on Tylenol scheduled and PRN low dose Oxycodone IR  Robaxin changed to PRN  Type 2 diabetes mellitus without complication, without long-term current use of insulin (HCC)  Assessment & Plan  Lab Results   Component Value Date    HGBA1C 6 0 (H) 05/02/2023       Home: Glimepiride 2mg with breakfast, Pioglitazone-Metformin 15-500mg BID  Here: Metformin 500mg BID; pioglitazone 15 mg daily, CDI/Accuchecks  Will try to get to home regimen  Diabetic Diet    Nutrition "consult  IM consulted to assist with management  Primary hypertension  Assessment & Plan  Home: Amlodipine- Benzapril 5-20mg tablet once daily  Here: Amlodipine 5mg daily, Lisinopril 10mg daily  Monitor and adjust as appropriate   - Has reported some dizziness when standing too quickly   - Check Orthostatics  IM consulted to assist with management  Tobacco dependence syndrome  Assessment & Plan  In remission  47 pack year history  Quit in 2021  Obesity, morbid ([facility] [address] )  Assessment & Plan  Counseling, Nutrition  Appreciate IM consultants medical co-management  Personally reviewed labs, medications, and imaging  ROS:  Review of Systems   A 10 point review of systems was negative except for what is noted in the HPI  OBJECTIVE:   /55   Pulse 71   Temp 98 2 °F (36 8 °C) (Oral)   Resp 18   Ht 5' 6\" (1 676 m)   Wt 110 kg (242 lb 12 8 oz)   SpO2 94%   BMI 39 19 kg/m²     Physical Exam  Constitutional:       Appearance: Normal appearance  He is obese  HENT:      Head: Normocephalic and atraumatic  Mouth/Throat:      Mouth: Mucous membranes are moist    Cardiovascular:      Rate and Rhythm: Normal rate and regular rhythm  Pulses: Normal pulses  Heart sounds: Normal heart sounds  Pulmonary:      Effort: Pulmonary effort is normal       Breath sounds: Normal breath sounds  Abdominal:      General: Bowel sounds are normal       Palpations: Abdomen is soft  Musculoskeletal:         General: Normal range of motion  Cervical back: Normal range of motion  Right lower leg: Edema present  Left lower leg: Edema present  Skin:     General: Skin is warm and dry  Capillary Refill: Capillary refill takes less than 2 seconds  Comments: -posterior cervical incision with staples, edges approximated  Drain site with suture   Neurological:      Mental Status: He is alert and oriented to person, place, and time  Sensory: Sensory deficit present        " Motor: Weakness present        Coordination: Coordination abnormal    Psychiatric:         Mood and Affect: Mood normal          Judgment: Judgment normal           Lab Results   Component Value Date    WBC 9 62 05/19/2023    HGB 13 0 05/19/2023    HCT 39 4 05/19/2023    MCV 90 05/19/2023     05/19/2023     Lab Results   Component Value Date    SODIUM 136 05/19/2023    K 4 6 05/19/2023     05/19/2023    CO2 25 05/19/2023    BUN 21 05/19/2023    CREATININE 1 03 05/19/2023    GLUC 144 (H) 05/19/2023    CALCIUM 9 9 05/19/2023     Lab Results   Component Value Date    INR 1 11 05/11/2023    INR 0 99 05/10/2023    INR 1 07 05/02/2023    PROTIME 14 5 05/11/2023    PROTIME 13 3 05/10/2023    PROTIME 14 2 05/02/2023           Current Facility-Administered Medications:   •  acetaminophen (TYLENOL) tablet 975 mg, 975 mg, Oral, Q8H [address], Andrew Cat MD, 975 mg at 05/22/23 0543  •  amLODIPine (NORVASC) tablet 5 mg, 5 mg, Oral, Daily, 5 mg at 05/22/23 0808 **AND** lisinopril (ZESTRIL) tablet 10 mg, 10 mg, Oral, Daily, KELVIN Griffith, 10 mg at 05/22/23 9605  •  bisacodyl (DULCOLAX) rectal suppository 10 mg, 10 mg, Rectal, Daily PRN, Andrew Cat MD  •  bisacodyl (DULCOLAX) rectal suppository 10 mg, 10 mg, Rectal, Once, Winston Egan MD  •  calcium carbonate (TUMS) chewable tablet 750 mg, 750 mg, Oral, TID PRN, Andrew Cat MD, 750 mg at 05/21/23 2128  •  docusate sodium (COLACE) capsule 100 mg, 100 mg, Oral, BID, Andrew Cat MD, 100 mg at 05/22/23 1664  •  heparin (porcine) subcutaneous injection 5,000 Units, 5,000 Units, Subcutaneous, Q8H [address], Andrew Cat MD, 5,000 Units at 05/22/23 0543  •  insulin lispro (HumaLOG) 100 units/mL subcutaneous injection 1-5 Units, 1-5 Units, Subcutaneous, 4x Daily (AC & HS), 1 Units at 05/22/23 0808 **AND** Fingerstick Glucose (POCT), , , TID AC, Areli Woods PA-C  •  melatonin tablet 6 mg, 6 mg, Oral, HS, KELVIN Gonzales  •  metFORMIN (GLUCOPHAGE) tablet 500 mg, 500 mg, Oral, BID With Meals, Areli Woods PA-C, 500 mg at 05/22/23 0808  •  methocarbamol (ROBAXIN) tablet 250 mg, 250 mg, Oral, Q6H PRN, Tho Luis MD, 250 mg at 05/21/23 7049  •  naloxone (NARCAN) 0 04 mg/mL syringe 0 04 mg, 0 04 mg, Intravenous, Q1MIN PRN, Artie Ring MD  •  ondansetron (ZOFRAN-ODT) dispersible tablet 4 mg, 4 mg, Oral, Q6H PRN, Artie Ring MD  •  oxyCODONE (ROXICODONE) split tablet 2 5 mg, 2 5 mg, Oral, Q4H PRN **OR** oxyCODONE (ROXICODONE) IR tablet 5 mg, 5 mg, Oral, Q4H PRN, Artie Ring MD  •  pioglitazone (ACTOS) tablet 15 mg, 15 mg, Oral, Daily, KELVIN Araiza, 15 mg at 05/22/23 0836  •  polyethylene glycol (MIRALAX) packet 17 g, 17 g, Oral, Daily, Artie Ring MD, 17 g at 05/22/23 0808  •  senna (SENOKOT) tablet 17 2 mg, 2 tablet, Oral, HS, Artie Ring MD, 17 2 mg at 05/21/23 2129    Past Medical History:   Diagnosis Date   • Diabetes mellitus [facility])    • ED (erectile dysfunction)    • Hyperlipidemia    • Hypertension    • Obesity    • Spinal stenosis of lumbar region at multiple levels 12/22/2022       Patient Active Problem List    Diagnosis Date Noted   • Incomplete spinal cord injury at C1-C4 level without bone injury ([facility] [address] ) 04/27/2023   • Spondylosis of cervical spine with myelopathy 03/01/2023   • Spinal stenosis of lumbar region at multiple levels 12/22/2022   • Acute pain 05/18/2023   • Type 2 diabetes mellitus without complication, without long-term current use of insulin ([facility] [address] ) 07/14/2022   • Primary hypertension 07/14/2022   • Preoperative clearance 05/04/2023   • Neurogenic claudication 04/27/2023   • Mixed hyperlipidemia 08/17/2022   • Tobacco dependence syndrome 08/17/2022   • Chronic bilateral low back pain without sciatica 07/14/2022   • Carpal tunnel syndrome of right wrist 07/06/2021   • Atrophy of muscle of right hand 07/06/2021   • Obesity, morbid ([facility] [address] ) 05/24/2021   • Cubital tunnel syndrome on right    • Carpal tunnel syndrome, left           KELVIN Payne  Physical Medicine and [place]

## 2023-05-22 NOTE — PROGRESS NOTES
Internal Medicine Progress Note  Patient: Claudeen Herring  Age/sex: 69 y o  male  Medical Record #: [id number]      ASSESSMENT/PLAN: (Interval History)  Claudeen Herring is seen and examined and management for following issues:    S/p posterior C4-T1 decompressive laminectomy and instrumented fusion  • No collar needed  • Incision healing well     HTN  • Home: Lotrel 5-20 daily  • Here: Amlopdipine 5mg daily/Lisinopril 10mg daily  • stable     DM type 2   • HA1C 6   • Home: Amaryl 2mg daily/Pioglitazone-Metformin  2x daily  • Here: Metformin 500 mg BID/Actose 15mg qd(5/20)  • Continue QID Accuchecks/SSI and DM diet  • BS improved with Actos 5/20 but may try to increase to BID so he can go home on the same dose of Actos-Metformin as PTA     Obesity  • BMI 39 5  • Recommended ongoing attempts at weight loss      LE edema  • Venous doppler 5/16 negative for DVT  • continue TEDS        Discharge date:  This week    The above assessment and plan was reviewed and updated as determined by my evaluation of the patient on 5/22/2023      Labs:   Results from last 7 days   Lab Units 05/19/23  0605 05/16/23  0544   WBC Thousand/uL 9 62 9 59   HEMOGLOBIN g/dL 13 0 13 1   HEMATOCRIT % 39 4 40 7   PLATELETS Thousands/uL 370 313     Results from last 7 days   Lab Units 05/19/23  0605 05/16/23  0544   SODIUM mmol/L 136 135   POTASSIUM mmol/L 4 6 4 5   CHLORIDE mmol/L 105 107   CO2 mmol/L 25 27   BUN mg/dL 21 26*   CREATININE mg/dL 1 03 1 04   CALCIUM mg/dL 9 9 9 6             Results from last 7 days   Lab Units 05/22/23  1043 05/22/23  0606 05/21/23  2115   POC GLUCOSE mg/dl 140 150* 157*       Review of Scheduled Meds:  Current Facility-Administered Medications   Medication Dose Route Frequency Provider Last Rate   • acetaminophen  975 mg Oral [facility] Ambrose Ulrich MD     • amLODIPine  5 mg Oral Daily Cathlean Quant, CRNP      And   • lisinopril  10 mg Oral Daily Cathlean Quant, CRNP     • bisacodyl  10 mg Rectal Daily PRN Nora Rincon MD     • bisacodyl  10 mg Rectal Once Black Ramos MD     • calcium carbonate  750 mg Oral TID PRN Nora Rincon MD     • docusate sodium  100 mg Oral BID Nora Rincon MD     • heparin (porcine)  5,000 Units Subcutaneous [facility] Nora Rincon MD     • insulin lispro  1-5 Units Subcutaneous 4x Daily (AC & HS) Areli Woods PA-C     • melatonin  6 mg Oral HS KELVIN Gonzales     • metFORMIN  500 mg Oral BID With Meals Areli Woods PA-C     • methocarbamol  250 mg Oral Q6H PRN Dallas Love MD     • naloxone  0 04 mg Intravenous Q1MIN PRN Nora Rincon MD     • ondansetron  4 mg Oral Q6H PRN Nora Rincon MD     • oxyCODONE  2 5 mg Oral Q4H PRN oNra Rincon MD      Or   • oxyCODONE  5 mg Oral Q4H PRN Nora Rincon MD     • pioglitazone  15 mg Oral Daily KELVIN Herr     • polyethylene glycol  17 g Oral Daily Nora Rincon MD     • senna  2 tablet Oral HS Nora Rincon MD         Subjective/ HPI: Patient seen and examined  Patients overnight issues or events were reviewed with nursing or staff during rounds or morning huddle session  New or overnight issues include the following:     No new or overnight issues  Offers no complaints    ROS:   A 10 point ROS was performed; negative except as noted above         Imaging:     VAS lower limb venous duplex study, complete bilateral   Final Result by Dahlia Santacruz MD (05/17 2379)          *Labs /Radiology studies reviewed  *Medications reviewed and reconciled as needed  *Please refer to order section for additional ordered labs studies  *Case discussed with primary attending during morning huddle case rounds    Physical Examination:  Vitals:   Vitals:    05/21/23 1356 05/21/23 2131 05/22/23 0601 05/22/23 0808   BP: 131/65 105/75 111/59 139/55   BP Location: Left arm Left arm Right arm    Pulse: 70 77 71    Resp: 18 16 18    Temp: 98 2 °F (36 8 °C) 98 2 °F (36 8 °C) 98 2 °F (36 8 °C)    TempSrc: Oral Oral Oral    SpO2: 95% 95% 94%    Weight:       Height:           General Appearance: no distress, conversive  HEENT:  External ear normal   Nose normal w/o drainage  Mucous membranes are moist  Oropharynx is clear  Conjunctiva clear w/o icterus or redness  Neck:  Supple, normal ROM  Lungs: BBS without crackles/wheeze/rhonchi; respirations unlabored with normal inspiratory/expiratory effort  No retractions noted  On RA  CV: regular rate and rhythm; no rubs/murmurs/gallops, PMI normal   ABD: Abdomen is soft  Bowel sounds all quadrants  Nontender with no distention  EXT: mild LE edema  Skin: normal turgor, normal texture, no rashes  Psych: affect normal, mood normal  Neuro: AAO     The above physical exam was reviewed and updated as determined by my evaluation of the patient on 5/22/2023  Invasive Devices     Drain  Duration           Open Drain Left Groin 613 days                   VTE Pharmacologic Prophylaxis: Heparin  Code Status: Level 1 - Full Code  Current Length of Stay: 8 day(s)      Total time spent:  30 minutes with more than 50% spent counseling/coordinating care  Counseling includes discussion with patient re: progress  and discussion with patient of his/her current medical state/information  Coordination of patient's care was performed in conjunction with primary service  Time invested included review of patient's labs, vitals, and management of their comorbidities with continued monitoring  In addition, this patient was discussed with medical team including physician and advanced extenders  The care of the patient was extensively discussed and appropriate treatment plan was formulated unique for this patient  Medical decision making for the day was made by supervising physician unless otherwise noted in their attestation statement  ** Please Note:  voice to text software may have been used in the creation of this document   Although proof errors in transcription or interpretation are a potential of such software**

## 2023-05-22 NOTE — PROGRESS NOTES
"OT Daily Treatment Note       05/22/23 5155   Pain Assessment   Pain Assessment Tool 0-10   Pain Score No Pain   Restrictions/Precautions   Precautions Fall Risk;Spinal precautions   ROM Restrictions Yes  (cervical spinal precautions)   Lifestyle   Autonomy \"I've been used to living this way for years  it's so much better now  I'm ready to go home\"   Oral Hygiene   Type of Assistance Needed Set-up / clean-up   Physical Assistance Level No physical assistance   Comment standing at sink   Oral Hygiene CARE Score 5   Shower/Bathe Self   Type of Assistance Needed Supervision   Physical Assistance Level No physical assistance   Comment completed full shower seated; pt required assistance for distal LE bathing 2* spinal precautions but pt already has a long handled loofah at home and can simulate the ability to use it for distal LE bathing so will not hold pt accountable that he req assistance with that during this session  supv for safety during posterior bathing with anterior weight shift  Shower/Bathe Self CARE Score 4   Bathing   Assessed Bath Style Shower   Tub/Shower Transfer   Findings CGA/CS lateral step in to walk in shower  Please assess tub transfer with tub transfer bench at next session  pt already has a TTB at home  Upper Body Dressing   Type of Assistance Needed Set-up / clean-up   Physical Assistance Level No physical assistance   Comment seated in recliner; please trial pt ambulating to gather own clothes at next session  Upper Body Dressing CARE Score 5   Lower Body Dressing   Type of Assistance Needed Supervision;Verbal cues   Physical Assistance Level No physical assistance   Comment completed LB dressing with LHAE - pt req supv for safety with min vc on correct technique of equipment  pt already has LHAE at home so please continue to practice use at next session for carryover     Lower Body Dressing CARE Score 4   Putting On/Taking Off Footwear   Type of Assistance Needed Physical assistance " Physical Assistance Level 25% or less   Comment min A with use of wide sock aide which pt has at home - pt demo inc difficulty with donning sock over sock aide due to R fine motor deficits  please continue to practice donning sock on sock aide at next session   Putting On/Taking Off Footwear CARE Score 3   Sit to Stand   Type of Assistance Needed Incidental touching   Physical Assistance Level No physical assistance   Comment CG/CS with RW   Sit to Stand CARE Score 4   Bed-Chair Transfer   Type of Assistance Needed Incidental touching   Physical Assistance Level No physical assistance   Comment CG/CS with RW   Chair/Bed-to-Chair Transfer CARE Score 4   Toileting Hygiene   Comment pt declined need to toilet during this session  please assess toileting at next session  Toilet Transfer   Comment pt declined need to toilet during this session  please assess toileting at next session  Cognition   Overall Cognitive Status Impaired   Arousal/Participation Alert; Cooperative   Attention Attends with cues to redirect   Orientation Level Oriented X4   Memory Decreased recall of precautions   Following Commands Follows multistep commands with increased time or repetition   Activity Tolerance   Activity Tolerance Patient tolerated treatment well   Assessment   Treatment Assessment Pt participated in skilled OT session with focus on ADL retraining, functional transfer training, compensatory technique education and continued education  See flowsheet for details of session and current functional status  Pt is limited by impaired balance, decreased endurance, increased fall risk, decreased ADLS, decreased activity tolerance, decreased safety awareness, impaired judgement, decreased cognition and decreased strength and requires skilled OT services to increase independence and safety with ADL completion in prep for DC home   Plan to review tub transfers with tub bench, LHAE training for LB dressing, DME needs (BSC?), med management, folding laundry and toilet transfers  Please also assess for IRP if appropriate  Recc OP hand therapy, if time allow, please initiate R hand ROM HEP  Prognosis Good   Problem List Decreased strength;Decreased range of motion;Decreased endurance; Impaired balance;Decreased mobility; Decreased coordination;Decreased cognition; Impaired sensation; Impaired tone;Obesity; Decreased skin integrity;Pain;Orthopedic restrictions   Barriers to Discharge Inaccessible home environment;Decreased caregiver support   Plan   Treatment/Interventions ADL retraining;Functional transfer training; Therapeutic exercise;Patient/family training; Compensatory technique education; Endurance training   Progress Progressing toward goals   Recommendation   OT Discharge Recommendation Home with outpatient rehabilitation  (hand therapy)   Equipment Recommended   (please confirm if pt wants BSC for use at home overnight as pt does not currently have one)   OT Therapy Minutes   OT Time In 0930   OT Time Out 1030   OT Total Time (minutes) 60   OT Mode of treatment - Individual (minutes) 60   OT Mode of treatment - Concurrent (minutes) 0   OT Mode of treatment - Group (minutes) 0   OT Mode of treatment - Co-treat (minutes) 0   OT Mode of Treatment - Total time(minutes) 60 minutes   OT Cumulative Minutes 575   Therapy Time missed   Time missed?  No

## 2023-05-23 LAB
GLUCOSE SERPL-MCNC: 126 MG/DL (ref 65–140)
GLUCOSE SERPL-MCNC: 135 MG/DL (ref 65–140)
GLUCOSE SERPL-MCNC: 143 MG/DL (ref 65–140)
GLUCOSE SERPL-MCNC: 188 MG/DL (ref 65–140)

## 2023-05-23 RX ORDER — PIOGLITAZONEHYDROCHLORIDE 15 MG/1
15 TABLET ORAL 2 TIMES DAILY
Status: DISCONTINUED | OUTPATIENT
Start: 2023-05-23 | End: 2023-05-25 | Stop reason: HOSPADM

## 2023-05-23 RX ADMIN — METFORMIN HYDROCHLORIDE 500 MG: 500 TABLET ORAL at 09:32

## 2023-05-23 RX ADMIN — ACETAMINOPHEN 650 MG: 325 TABLET ORAL at 05:12

## 2023-05-23 RX ADMIN — PIOGLITAZONE HYDROCHLORIDE 15 MG: 15 TABLET ORAL at 17:48

## 2023-05-23 RX ADMIN — LISINOPRIL 10 MG: 10 TABLET ORAL at 09:32

## 2023-05-23 RX ADMIN — AMLODIPINE BESYLATE 5 MG: 5 TABLET ORAL at 09:32

## 2023-05-23 RX ADMIN — HEPARIN SODIUM 5000 UNITS: 5000 INJECTION INTRAVENOUS; SUBCUTANEOUS at 14:08

## 2023-05-23 RX ADMIN — METFORMIN HYDROCHLORIDE 500 MG: 500 TABLET ORAL at 17:48

## 2023-05-23 RX ADMIN — ACETAMINOPHEN 650 MG: 325 TABLET ORAL at 21:22

## 2023-05-23 RX ADMIN — CALCIUM CARBONATE (ANTACID) CHEW TAB 500 MG 750 MG: 500 CHEW TAB at 21:24

## 2023-05-23 RX ADMIN — MELATONIN 6 MG: at 21:21

## 2023-05-23 RX ADMIN — HEPARIN SODIUM 5000 UNITS: 5000 INJECTION INTRAVENOUS; SUBCUTANEOUS at 05:12

## 2023-05-23 RX ADMIN — ACETAMINOPHEN 650 MG: 325 TABLET ORAL at 14:07

## 2023-05-23 RX ADMIN — PIOGLITAZONE HYDROCHLORIDE 15 MG: 15 TABLET ORAL at 09:37

## 2023-05-23 RX ADMIN — INSULIN LISPRO 1 UNITS: 100 INJECTION, SOLUTION INTRAVENOUS; SUBCUTANEOUS at 11:42

## 2023-05-23 NOTE — PLAN OF CARE
Problem: PAIN - ADULT  Goal: Verbalizes/displays adequate comfort level or baseline comfort level  Description: Interventions:  - Encourage patient to monitor pain and request assistance  - Assess pain using appropriate pain scale  - Administer analgesics based on type and severity of pain and evaluate response  - Implement non-pharmacological measures as appropriate and evaluate response  - Consider cultural and social influences on pain and pain management  - Notify physician/advanced practitioner if interventions unsuccessful or patient reports new pain  Outcome: Progressing     Problem: INFECTION - ADULT  Goal: Absence or prevention of progression during hospitalization  Description: INTERVENTIONS:  - Assess and monitor for signs and symptoms of infection  - Monitor lab/diagnostic results  - Monitor all insertion sites, i e  indwelling lines, tubes, and drains  - Monitor endotracheal if appropriate and nasal secretions for changes in amount and color  - [place] appropriate cooling/warming therapies per order  - Administer medications as ordered  - Instruct and encourage patient and family to use good hand hygiene technique  - Identify and instruct in appropriate isolation precautions for identified infection/condition  Outcome: Progressing     Problem: SAFETY ADULT  Goal: Patient will remain free of falls  Description: INTERVENTIONS:  - Educate patient/family on patient safety including physical limitations  - Instruct patient to call for assistance with activity   - Consult OT/PT to assist with strengthening/mobility   - Keep Call bell within reach  - Keep bed low and locked with side rails adjusted as appropriate  - Keep care items and personal belongings within reach  - Initiate and maintain comfort rounds  - Make Fall Risk Sign visible to staff  - Offer Toileting every 2 Hours, in advance of need  - Initiate/Maintain bed/chair alarm  - Obtain necessary fall risk management equipment: non skid footwear  - Apply yellow socks and bracelet for high fall risk patients  - Consider moving patient to room near nurses station  Outcome: Progressing  Goal: Maintain or return to baseline ADL function  Description: INTERVENTIONS:  -  Assess patient's ability to carry out ADLs; assess patient's baseline for ADL function and identify physical deficits which impact ability to perform ADLs (bathing, care of mouth/teeth, toileting, grooming, dressing, etc )  - Assess/evaluate cause of self-care deficits   - Assess range of motion  - Assess patient's mobility; develop plan if impaired  - Assess patient's need for assistive devices and provide as appropriate  - Encourage maximum independence but intervene and supervise when necessary  - Involve family in performance of ADLs  - Assess for home care needs following discharge   - Consider OT consult to assist with ADL evaluation and planning for discharge  - Provide patient education as appropriate  Outcome: Progressing  Goal: Maintains/Returns to pre admission functional level  Description: INTERVENTIONS:  - Perform BMAT or MOVE assessment daily    - Set and communicate daily mobility goal to care team and patient/family/caregiver  - Collaborate with rehabilitation services on mobility goals if consulted  - Perform Range of Motion 3 times a day  - Reposition patient every 2 hours    - Dangle patient 3 times a day  - Stand patient 3 times a day  - Ambulate patient 3 times a day  - Out of bed to chair 3 times a day   - Out of bed for meals 3 times a day  - Out of bed for toileting  - Record patient progress and toleration of activity level   Outcome: Progressing     Problem: DISCHARGE PLANNING  Goal: Discharge to home or other facility with appropriate resources  Description: INTERVENTIONS:  - Identify barriers to discharge w/patient and caregiver  - Arrange for needed discharge resources and transportation as appropriate  - Identify discharge learning needs (meds, wound care, etc )  - Arrange for interpretive services to assist at discharge as needed  - Refer to Case Management Department for coordinating discharge planning if the patient needs post-hospital services based on physician/advanced practitioner order or complex needs related to functional status, cognitive ability, or social support system  Outcome: Progressing     Problem: Nutrition/Hydration-ADULT  Goal: Nutrient/Hydration intake appropriate for improving, restoring or maintaining nutritional needs  Description: Monitor and assess patient's nutrition/hydration status for malnutrition  Collaborate with interdisciplinary team and initiate plan and interventions as ordered  Monitor patient's weight and dietary intake as ordered or per policy  Utilize nutrition screening tool and intervene as necessary  Determine patient's food preferences and provide high-protein, high-caloric foods as appropriate       INTERVENTIONS:  - Monitor oral intake, urinary output, labs, and treatment plans  - Assess nutrition and hydration status and recommend course of action  - Evaluate amount of meals eaten  - Assist patient with eating if necessary   - Allow adequate time for meals  - Recommend/ encourage appropriate diets, oral nutritional supplements, and vitamin/mineral supplements  - Order, calculate, and assess calorie counts as needed  - Recommend, monitor, and adjust tube feedings and TPN/PPN based on assessed needs  - Assess need for intravenous fluids  - Provide specific nutrition/hydration education as appropriate  - Include patient/family/caregiver in decisions related to nutrition  Outcome: Progressing     Problem: MOBILITY - ADULT  Goal: Maintain or return to baseline ADL function  Description: INTERVENTIONS:  -  Assess patient's ability to carry out ADLs; assess patient's baseline for ADL function and identify physical deficits which impact ability to perform ADLs (bathing, care of mouth/teeth, toileting, grooming, dressing, etc )  - Assess/evaluate cause of self-care deficits   - Assess range of motion  - Assess patient's mobility; develop plan if impaired  - Assess patient's need for assistive devices and provide as appropriate  - Encourage maximum independence but intervene and supervise when necessary  - Involve family in performance of ADLs  - Assess for home care needs following discharge   - Consider OT consult to assist with ADL evaluation and planning for discharge  - Provide patient education as appropriate  Outcome: Progressing  Goal: Maintains/Returns to pre admission functional level  Description: INTERVENTIONS:  - Perform BMAT or MOVE assessment daily    - Set and communicate daily mobility goal to care team and patient/family/caregiver  - Collaborate with rehabilitation services on mobility goals if consulted  - Perform Range of Motion 3 times a day  - Reposition patient every 2 hours    - Dangle patient 3 times a day  - Stand patient 3 times a day  - Ambulate patient 3 times a day  - Out of bed to chair 3 times a day   - Out of bed for meals 3 times a day  - Out of bed for toileting  - Record patient progress and toleration of activity level   Outcome: Progressing     Problem: Prexisting or High Potential for Compromised Skin Integrity  Goal: Skin integrity is maintained or improved  Description: INTERVENTIONS:  - Identify patients at risk for skin breakdown  - Assess and monitor skin integrity  - Assess and monitor nutrition and hydration status  - Monitor labs   - Assess for incontinence   - Turn and reposition patient  - Assist with mobility/ambulation  - Relieve pressure over bony prominences  - Avoid friction and shearing  - Provide appropriate hygiene as needed including keeping skin clean and dry  - Evaluate need for skin moisturizer/barrier cream  - Collaborate with interdisciplinary team   - Patient/family teaching  - Consider wound care consult   Outcome: Progressing

## 2023-05-23 NOTE — PROGRESS NOTES
"   05/23/23 1030   Pain Assessment   Pain Assessment Tool 0-10   Pain Score No Pain   Restrictions/Precautions   Precautions Fall Risk;Spinal precautions   General   Change In Medical/Functional Status (S)  Progressed to IND in his room with RW   Subjective   Subjective Reports he is \"exhausted form not sleeping, feels he would be performing better if he had sleep\" Looking fwd to going home  Sit to Lying   Type of Assistance Needed Independent   Sit to Lying CARE Score 6   Lying to Sitting on Side of Bed   Type of Assistance Needed Independent   Lying to Sitting on Side of Bed CARE Score 6   Sit to Stand   Type of Assistance Needed Independent; Adaptive equipment   Comment RW   Sit to Stand CARE Score 6   Bed-Chair Transfer   Type of Assistance Needed Independent; Adaptive equipment   Comment RW   Chair/Bed-to-Chair Transfer CARE Score 6   Car Transfer   Type of Assistance Needed Unique [place] / [place]ms; Adaptive equipment   Comment RW   Car Transfer CARE Score 5   Walk 10 Feet   Type of Assistance Needed Independent; Adaptive equipment   Comment Primarily using RW, will plan to use SPC on top floor for 10ft walk from stairs to bedroom/bathroom   Walk 10 Feet CARE Score 6   Walk 50 Feet with Two Turns   Type of Assistance Needed Independent; Adaptive equipment   Comment RW   Walk 50 Feet with Two Turns CARE Score 6   Walk 150 Feet   Type of Assistance Needed Supervision; Adaptive equipment   Comment RW   Walk 150 Feet CARE Score 4   Ambulation   Primary Mode of Locomotion Prior to Admission Walk   Distance Walked (feet) 150 ft   Assist Device Roller Walker   Does the patient walk? 2  Yes   Wheelchair mobility   Does the patient use a wheelchair? 0   No   Curb or Single Stair   Style negotiated Single stair   Type of Assistance Needed Supervision   Comment B HR   1 Step (Curb) CARE Score 4   4 Steps   Type of Assistance Needed Supervision   Comment B HR   4 Steps CARE Score 4   12 Steps   Comment fatigues after 8, has split " level at home, so actually only needs to complete 6 at a time   Reason if not Attempted Safety concerns   12 Steps CARE Score 88   Stairs   Findings practiced walking to stairs, up/down 6 steps then walked with SPC 10ft to chair as he would at hime when going up to his bedroom  Pt unsateday with just cane, but reports he has the wall/door knobs to hold onto as well and his brother will be with him for first two weeks  Picking Up Object   Type of Assistance Needed Independent; Adaptive equipment   Comment Using reacher, RW for balance   Picking Up Object CARE Score 6   Therapeutic Interventions   Strengthening Seated LAQ R LE, Towel roll under right thigh  Performed 2 sets to fatigue with 5 sec hold at top and slow controlled eccentric lowering  Other Practiced in room mobility x 20 min with RW  Pt managing recliner, all but Black bottom foot rest  He will need to ask for assist from staff  Safely moves about room to use bathroom and get in/out of bed  Assessment   Treatment Assessment Pt participting well  Plan for d/c home now Thursday 5/25 with out patient therapy services  Right LE remains weak, requires RW for balance and safe gait  He has RW for main floor and lower level of home/community, SPC for upstairs for shortest distances, 10ft or less  Demo ability to safely progress to Ind in his room with RW  Will benefit from continued strengthening and gait training to maximize his funtional safety overall  Plan   Progress Progressing toward goals   PT Therapy Minutes   PT Time In 1030   PT Time Out 1130   PT Total Time (minutes) 60   PT Mode of treatment - Individual (minutes) 60   PT Mode of treatment - Concurrent (minutes) 0   PT Mode of treatment - Group (minutes) 0   PT Mode of treatment - Co-treat (minutes) 0   PT Mode of Treatment - Total time(minutes) 60 minutes   PT Cumulative Minutes 750   Therapy Time missed   Time missed?  No

## 2023-05-23 NOTE — PROGRESS NOTES
Internal Medicine Progress Note  Patient: Maricarmen Aguilar  Age/sex: 69 y o  male  Medical Record #: [id number]      ASSESSMENT/PLAN: (Interval History)  Maricarmen Aguilar is seen and examined and management for following issues:    S/p posterior C4-T1 decompressive laminectomy and instrumented fusion  • No collar needed  • Incision healing well     HTN  • Home: Lotrel 5-20 daily  • Here: Amlopdipine 5mg daily/Lisinopril 10mg daily  • stable     DM type 2   • HA1C 6   • Home: Amaryl 2mg daily/Pioglitazone-Metformin  2x daily  • Here: Metformin 500 mg BID/Actose 15mg qd(5/20)  • Continue QID Accuchecks/SSI and DM diet  • Will increase Actos to BID so he can go home on the same dose of Actos-Metformin as PTA     Obesity  • BMI 39 5  • Recommended ongoing attempts at weight loss      LE edema  • Venous doppler 5/16 negative for DVT  • continue TEDS        Discharge date:  This week    The above assessment and plan was reviewed and updated as determined by my evaluation of the patient on 5/23/2023      Labs:   Results from last 7 days   Lab Units 05/19/23  0605   WBC Thousand/uL 9 62   HEMOGLOBIN g/dL 13 0   HEMATOCRIT % 39 4   PLATELETS Thousands/uL 370     Results from last 7 days   Lab Units 05/19/23  0605   SODIUM mmol/L 136   POTASSIUM mmol/L 4 6   CHLORIDE mmol/L 105   CO2 mmol/L 25   BUN mg/dL 21   CREATININE mg/dL 1 03   CALCIUM mg/dL 9 9             Results from last 7 days   Lab Units 05/23/23  1038 05/23/23  0604 05/22/23  2105   POC GLUCOSE mg/dl 188* 143* 147*       Review of Scheduled Meds:  Current Facility-Administered Medications   Medication Dose Route Frequency Provider Last Rate   • acetaminophen  650 mg Oral [facility] Dayna Paula MD     • amLODIPine  5 mg Oral Daily KELVIN Murillo      And   • lisinopril  10 mg Oral Daily KELVIN Murillo     • bisacodyl  10 mg Rectal Daily PRN Shanel Rios MD     • bisacodyl  10 mg Rectal Once Chichi Acosta MD     • calcium carbonate  750 mg Oral TID PRN Ambrose Ulrich MD     • docusate sodium  100 mg Oral BID Ambrose Ulrich MD     • heparin (porcine)  5,000 Units Subcutaneous [facility] Ambrose Ulrich MD     • insulin lispro  1-5 Units Subcutaneous 4x Daily (AC & HS) Areli Woods PA-C     • melatonin  6 mg Oral HS KELVIN Gonzales     • metFORMIN  500 mg Oral BID With Meals Areli Woods PA-C     • methocarbamol  250 mg Oral Q6H PRN Melissa Hernández MD     • naloxone  0 04 mg Intravenous Q1MIN PRN Ambrose Ulrich MD     • ondansetron  4 mg Oral Q6H PRN Ambrose Ulrich MD     • oxyCODONE  2 5 mg Oral Q4H PRN Ambrose Ulrich MD      Or   • oxyCODONE  5 mg Oral Q4H PRN Ambrose Ulrich MD     • pioglitazone  15 mg Oral Daily KELVIN Philip     • polyethylene glycol  17 g Oral Daily Ambrose Ulrich MD     • senna  2 tablet Oral HS Ambrose Ulrich MD         Subjective/ HPI: Patient seen and examined  Patients overnight issues or events were reviewed with nursing or staff during rounds or morning huddle session  New or overnight issues include the following:     No new or overnight issues  Offers no complaints    ROS:   A 10 point ROS was performed; negative except as noted above       *Labs /Radiology studies reviewed  *Medications reviewed and reconciled as needed  *Please refer to order section for additional ordered labs studies  *Case discussed with primary attending during morning huddle case rounds    Physical Examination:  Vitals:   Vitals:    05/22/23 1421 05/22/23 2023 05/23/23 0524 05/23/23 0932   BP: 153/70 119/53 126/72 133/59   BP Location: Right arm Right arm Left arm Left arm   Pulse: 75 75 74 75   Resp: 18 16 16    Temp: 98 2 °F (36 8 °C) 97 9 °F (36 6 °C) 98 1 °F (36 7 °C)    TempSrc: Oral Oral Oral    SpO2: 97% 97% 94%    Weight:       Height:           General Appearance: no distress, conversive  HEENT: PERRLA, conjuctiva normal; oropharynx clear; mucous membranes moist   Neck: Supple, normal ROM  Lungs: CTA, normal respiratory effort, no retractions, expiratory effort normal  CV: regular rate and rhythm; no rubs/murmurs/gallops, PMI normal   ABD: soft; ND/NT; +BS  EXT: mild LE edema  Skin: normal turgor, normal texture, no rashes  Psych: affect normal, mood normal  Neuro: AAO      The above physical exam was reviewed and updated as determined by my evaluation of the patient on 5/23/2023  Invasive Devices     Drain  Duration           Open Drain Left Groin 614 days                   VTE Pharmacologic Prophylaxis: Heparin  Code Status: Level 1 - Full Code  Current Length of Stay: 9 day(s)      Total time spent:  30 minutes with more than 50% spent counseling/coordinating care  Counseling includes discussion with patient re: progress  and discussion with patient of his/her current medical state/information  Coordination of patient's care was performed in conjunction with primary service  Time invested included review of patient's labs, vitals, and management of their comorbidities with continued monitoring  In addition, this patient was discussed with medical team including physician and advanced extenders  The care of the patient was extensively discussed and appropriate treatment plan was formulated unique for this patient  Medical decision making for the day was made by supervising physician unless otherwise noted in their attestation statement  ** Please Note:  voice to text software may have been used in the creation of this document   Although proof errors in transcription or interpretation are a potential of such software**

## 2023-05-23 NOTE — PROGRESS NOTES
PM&R PROGRESS NOTE:  Raysa Teague 69 y o  male MRN: [id number]  Unit/Bed#: -01 Encounter: [id number]        Rehab Diagnosis: Impairment of mobility, safety and Activities of Daily Living (ADLs) due to Spinal Cord Dysfunction:  Non-Traumatic:  04 1211  Quadriplegia, Incomplete C1-4 - Examines C1 AIS D  Has some pinprick impairment at C2 and C3 bilaterally - but not clear to me that this is related to his radiographic findings      SUBJECTIVE: Patient seen face-to-face today in his room  Preparing for discharge this Thursday  No other acute issues reported overnight  ASSESSMENT: Stable, progressing      PLAN:    Rehabilitation  • Functional deficits:  C1 AIS D, impaired sensation, neurogenic bladder, impaired mobility, impaired self care  • Continue current rehabilitation plan of care to maximize function  • Expected Discharge: Discharge Thursday, 5/25/2023 with outpatient in-home versus outpatient therapies  DVT prophylaxis  • Discontinue subcutaneous heparin as patient ambulating    Bladder plan  • Continent    Bowel plan  • Continent      * Incomplete spinal cord injury at C1-C4 level without bone injury [facility])  Assessment & Plan  Patient actually examines on [facility] with C1 AIS D with profound loss of pinprick sensation starting at C2    - LT impairment begins at C5 bilaterally - so if C2/C3 impairment is not related to cord, he is at best C4    - However, given no other potential cause at this time - would label him C1 AIS D   - Maybe developing some neurogenic bladder/NDO prior to surgery which has improved   - Checking PVRs x3 to ensure no retention - PVRs low   - Has some constipation post-op, but at risk for neurogenic bowel - monitor bowel function   - Monitor autonomic function and check orthostatics given his complaints of occasional dizziness when standing     - ACE and/or JAVAN stockings as needed  - Has spasticity in his R hand which has improved since surgery  - Monitor   May benefit from resting hand splint, potentially stim in therapies  - Monitor tone, which is located primarily in his FDS, maybe some FDP    - PT/OT 3-5 hours/day, 5-7 days/week  Acute pain  Assessment & Plan  Improving Neuropathic pain  Nociceptive post op pain in his neck: Managed on Tylenol scheduled and PRN low dose Oxycodone IR  Robaxin changed to PRN  Spondylosis of cervical spine with myelopathy  Assessment & Plan  MRI showed Multilevel cervical spondylosis, Multifactorial disease results in overall severe canal stenosis with underlying cord compression at C5-C6 and C6-C7  Associated cord edema and/or myelomalacia is present at these levels  No abnormal enhancement identified within the spinal canal   Symptoms: possibly neurogenic bladder leading up to surgery, R hand spasticity (clenched fist), R hand weakness and sensory impairment, Gait impairment/balance issues  - Examines with bilateral UE sensory impairment and R > L weakness, with some mild RLE > LLE weakness    - Also examines with diffuse hyperreflexia  - See SCI for more details  Admitted on 5/10 for planned Posterior cervical decompressive laminectomy with instrumented fusion from C4-T1 with Dr Aleksandr Sheehan  No need for cervical collar per Neurosurgery  C-Spine precautions  Drain removed  Staples/Drain suture in place  Two week f/u with Neurosurgery on 5/24  PT/OT - 3-5 hours/day, 5-7 days/week in comprehensive acute inpatient rehab    Spinal stenosis of lumbar region at multiple levels  Assessment & Plan  Symptoms and weakness he reports improved with physical therapy  Pain management as detailed below  Tobacco dependence syndrome  Assessment & Plan  In remission  47 pack year history  Quit in 2021       Type 2 diabetes mellitus without complication, without long-term current use of insulin [facility])  Assessment & Plan  Lab Results   Component Value Date    HGBA1C 6 0 (H) 05/02/2023       Home: Glimepiride 2mg with breakfast, "Pioglitazone-Metformin 15-500mg BID  Here: Metformin 500mg BID; pioglitazone 15 mg BID, CDI/Accuchecks  Will try to get to home regimen  Diabetic Diet  Nutrition consult  IM consulted to assist with management  Primary hypertension  Assessment & Plan  Home: Amlodipine- Benzapril 5-20mg tablet once daily  Here: Amlodipine 5mg daily, Lisinopril 10mg daily  Monitor and adjust as appropriate   - Has reported some dizziness when standing too quickly   - Check Orthostatics  IM consulted to assist with management  Obesity, morbid ([address] )  Assessment & Plan  Counseling, Nutrition  Appreciate IM consultants medical co-management  Labs, medications, and imaging personally reviewed  ROS:  A ten point review of systems was completed on 05/23/23 and pertinent positives are listed in subjective section  All other systems reviewed were negative  OBJECTIVE:   /59 (BP Location: Left arm)   Pulse 75   Temp 98 1 °F (36 7 °C) (Oral)   Resp 16   Ht 5' 6\" (1 676 m)   Wt 110 kg (242 lb 12 8 oz)   SpO2 94%   BMI 39 19 kg/m²       Physical Exam  Vitals and nursing note reviewed  Constitutional:       General: He is not in acute distress  HENT:      Head: Normocephalic and atraumatic  Nose: Nose normal       Mouth/Throat:      Mouth: Mucous membranes are moist    Eyes:      Conjunctiva/sclera: Conjunctivae normal    Cardiovascular:      Rate and Rhythm: Normal rate and regular rhythm  Pulses: Normal pulses  Pulmonary:      Effort: Pulmonary effort is normal       Breath sounds: Normal breath sounds  No wheezing or rales  Abdominal:      General: Bowel sounds are normal  There is no distension  Palpations: Abdomen is soft  Tenderness: There is no abdominal tenderness  Musculoskeletal:         General: No swelling  Cervical back: Neck supple  Skin:     General: Skin is warm        Comments: Incision clean dry and intact   Neurological:      Mental Status: He is alert " and oriented to person, place, and time  Motor: Weakness (RUE finger extensors) present     Psychiatric:         Mood and Affect: Mood normal           Lab Results   Component Value Date    WBC 9 62 05/19/2023    HGB 13 0 05/19/2023    HCT 39 4 05/19/2023    MCV 90 05/19/2023     05/19/2023     Lab Results   Component Value Date    SODIUM 136 05/19/2023    K 4 6 05/19/2023     05/19/2023    CO2 25 05/19/2023    BUN 21 05/19/2023    CREATININE 1 03 05/19/2023    GLUC 144 (H) 05/19/2023    CALCIUM 9 9 05/19/2023     Lab Results   Component Value Date    INR 1 11 05/11/2023    INR 0 99 05/10/2023    INR 1 07 05/02/2023    PROTIME 14 5 05/11/2023    PROTIME 13 3 05/10/2023    PROTIME 14 2 05/02/2023           Current Facility-Administered Medications:   •  acetaminophen (TYLENOL) tablet 650 mg, 650 mg, Oral, Q8H [address], Rut Osman MD, 650 mg at 05/23/23 1407  •  amLODIPine (NORVASC) tablet 5 mg, 5 mg, Oral, Daily, 5 mg at 05/23/23 0932 **AND** lisinopril (ZESTRIL) tablet 10 mg, 10 mg, Oral, Daily, KELVIN Chauhan, 10 mg at 05/23/23 0932  •  bisacodyl (DULCOLAX) rectal suppository 10 mg, 10 mg, Rectal, Daily PRN, Nathan Cage MD  •  bisacodyl (DULCOLAX) rectal suppository 10 mg, 10 mg, Rectal, Once, Reginald Hunter MD  •  calcium carbonate (TUMS) chewable tablet 750 mg, 750 mg, Oral, TID PRN, Nathan Cage MD, 750 mg at 05/22/23 2103  •  docusate sodium (COLACE) capsule 100 mg, 100 mg, Oral, BID, Nathan Cage MD, 100 mg at 05/22/23 1704  •  insulin lispro (HumaLOG) 100 units/mL subcutaneous injection 1-5 Units, 1-5 Units, Subcutaneous, 4x Daily (AC & HS), 1 Units at 05/23/23 1142 **AND** Fingerstick Glucose (POCT), , , TID AC, Areli Woods PA-C  •  melatonin tablet 6 mg, 6 mg, Oral, HS, KELVIN Gonzales, 6 mg at 05/22/23 2103  •  metFORMIN (GLUCOPHAGE) tablet 500 mg, 500 mg, Oral, BID With Meals, Areli Woods PA-C, 500 mg at 05/23/23 0932  • methocarbamol (ROBAXIN) tablet 250 mg, 250 mg, Oral, Q6H PRN, Peter Guadarrama MD, 250 mg at 05/21/23 6895  •  naloxone (NARCAN) 0 04 mg/mL syringe 0 04 mg, 0 04 mg, Intravenous, Q1MIN PRN, Padmaja Azar MD  •  ondansetron (ZOFRAN-ODT) dispersible tablet 4 mg, 4 mg, Oral, Q6H PRN, Padmaja Azar MD  •  oxyCODONE (ROXICODONE) split tablet 2 5 mg, 2 5 mg, Oral, Q4H PRN **OR** oxyCODONE (ROXICODONE) IR tablet 5 mg, 5 mg, Oral, Q4H PRN, Padmaja Azar MD  •  pioglitazone (ACTOS) tablet 15 mg, 15 mg, Oral, BID, KELVIN Mills  •  polyethylene glycol (MIRALAX) packet 17 g, 17 g, Oral, Daily, Padmaja Azra MD, 17 g at 05/22/23 0808  •  senna (SENOKOT) tablet 17 2 mg, 2 tablet, Oral, HS, Padmaja Azar MD, 17 2 mg at 05/22/23 2103    Past Medical History:   Diagnosis Date   • Diabetes mellitus [facility])    • ED (erectile dysfunction)    • Hyperlipidemia    • Hypertension    • Obesity    • Spinal stenosis of lumbar region at multiple levels 12/22/2022       Patient Active Problem List    Diagnosis Date Noted   • Incomplete spinal cord injury at C1-C4 level without bone injury ([facility] ) 04/27/2023   • Acute pain 05/18/2023   • Preoperative clearance 05/04/2023   • Neurogenic claudication 04/27/2023   • Spondylosis of cervical spine with myelopathy 03/01/2023   • Spinal stenosis of lumbar region at multiple levels 12/22/2022   • Mixed hyperlipidemia 08/17/2022   • Tobacco dependence syndrome 08/17/2022   • Primary hypertension 07/14/2022   • Type 2 diabetes mellitus without complication, without long-term current use of insulin ([facility] ) 07/14/2022   • Chronic bilateral low back pain without sciatica 07/14/2022   • Carpal tunnel syndrome of right wrist 07/06/2021   • Atrophy of muscle of right hand 07/06/2021   • Obesity, morbid ([address] ) 05/24/2021   • Cubital tunnel syndrome on right    • Carpal tunnel syndrome, left           Peter Guadarrama MD    I have spent a total time of 35 minutes on 05/23/23 in caring for this patient including Impressions, Counseling / Coordination of care and Documenting in the medical record  ** Please Note:  voice to text software may have been used in the creation of this document   Although proof errors in transcription or interpretation are a potential of such software**

## 2023-05-23 NOTE — PROGRESS NOTES
"OT Treatment Note       05/23/23 0700   Pain Assessment   Pain Assessment Tool 0-10   Pain Score No Pain   Restrictions/Precautions   Precautions Fall Risk;Spinal precautions   ROM Restrictions Yes  (cervical spine precautions)   Braces or Orthoses   (B/L TEDs)   Lifestyle   Autonomy \"It's taking a toll on me, I need to go home,\" pt reporting ongoing difficulty sleeping  Eating   Type of Assistance Needed Set-up / clean-up   Physical Assistance Level No physical assistance   Eating CARE Score 5   Oral Hygiene   Type of Assistance Needed Independent   Physical Assistance Level No physical assistance   Comment in stance at sink   Oral Hygiene CARE Score 6   Grooming   Able To Shave;Wash/Dry Face;Brush/Clean Teeth;Wash/Dry Hands   Findings completes majority of tasks standing at sink; seated in [facility] for washing face  PTA pt reports using chair/stool to sit on in bathroom to manage fatigue   Shower/Bathe Self   Type of Assistance Needed Set-up / clean-up   Physical Assistance Level No physical assistance   Comment Declining shower or LB bathing today, reports showering yesterday  However pt uses long-handled loofah for LB bathing tasks at home, anticipate can complete with Setup   Shower/Bathe Self CARE Score 5   Tub/Shower Transfer   Findings Dry tub transfer 2x using RW<->TTB using grab bar with distant SUP   Upper Body Dressing   Type of Assistance Needed Independent; Adaptive equipment   Physical Assistance Level No physical assistance   Comment including item retrieval using RW   Upper Body Dressing CARE Score 6   Lower Body Dressing   Type of Assistance Needed Independent; Adaptive equipment   Physical Assistance Level No physical assistance   Comment Item retrieval with mod(I) using RW  Pt able to don shorts with mod(I) use of reacher as needed     Lower Body Dressing CARE Score 6   Putting On/Taking Off Footwear   Type of Assistance Needed Physical assistance   Physical Assistance Level 51%-75%   Comment TA with " "Freddie Erin to don socks  [place] socks using dressing stick independently  Pt trialing donning socks seated on side of bed, turning hips and bringing legs up onto bed like he did PTA, however with increased difficulty  Able to don sock onto sock aide with SUP and vc's for technique, however requires Erin to fully pull socks up once on foot as pt had difficulty using reacher for this   Putting On/Taking Off Footwear CARE Score 2   Sit to Lying   Type of Assistance Needed Independent   Physical Assistance Level No physical assistance   Sit to Lying CARE Score 6   Lying to Sitting on Side of Bed   Type of Assistance Needed Independent   Physical Assistance Level No physical assistance   Lying to Sitting on Side of Bed CARE Score 6   Sit to Stand   Type of Assistance Needed Independent   Physical Assistance Level No physical assistance   Comment using RW   Sit to Stand CARE Score 6   Bed-Chair Transfer   Type of Assistance Needed Independent; Adaptive equipment   Physical Assistance Level No physical assistance   Comment using RW   Chair/Bed-to-Chair Transfer CARE Score 6   Toileting Hygiene   Type of Assistance Needed Independent; Adaptive equipment   Physical Assistance Level No physical assistance   Comment urinated seated on toilet, mod(I) for CM using RW for support   Toileting Hygiene CARE Score 6   Toilet Transfer   Type of Assistance Needed Independent; Adaptive equipment   Physical Assistance Level No physical assistance   Comment functionally mobilizing to toilet 2x during session using RW with mod(I)   Toilet Transfer CARE Score 6   Health Management   Health Management Reviewing pt's med list, pt able to recall all meds taken PTA and appropriate frequency  Pt reporting 2 meds as being new meds to him and stating, \"I won't be taking them at home  \" Edu re: following up with nursing and MD re: recommendations for continued admin of these meds, pt verbalized understanding   Recommend further med management tasks " tomorrow with pt sorting meds into 2x/day pill organizer in prep for d/c home  Cognition   Overall Cognitive Status Impaired   Arousal/Participation Alert; Cooperative   Attention Attends with cues to redirect   Orientation Level Oriented X4   Memory Decreased recall of precautions   Following Commands Follows multistep commands with increased time or repetition   Additional Activities   Additional Activities Comments Functional mobility in room->ADL suite with SUP using RW   Activity Tolerance   Activity Tolerance Patient tolerated treatment well   Assessment   Treatment Assessment Pt seen for 120 min OT session focusing on ADL routine, functional transfers/mobility, and initiation of med management tasks  Pt reporting frustrations at start of session re: ongoing difficulty sleeping and limited mobility when not in therapy, having to call for assistance with toileting, etc  OT edu pt that he can request nursing staff walk in hallway with him as their time allows, pt stating he was unaware he could request this before  Pt demo'ing increased (I) with functional transfers/mobility within room today completing with mod(I) using RW  Discussing possibly clearing pt for IRP pending how pt performs during PT session later this morning  Pt hopeful this would improve his mood and assist in managing stiffness with long periods of sitting throughout the day  Pt demo'ing ability to complete bed mobility I'ly and was safely able to manage LEs up/down in recliner, and move tray table out of way in prep for mobility  Pt edu re: always wearing non-skid socks with mobility within room, pt verbalized understanding and reports always leaving them on while sleeping and if not, would call for assistance with task  OT following up with PT re: IRP pending performance in PT session   OT to continue POC to focus on IADLs i e  med management, kitchen mobility/meal prep, and folding laundry, tub transfers, LBD tasks using LHAE (specifically socks/sock aide), as well as maximizing standing balance/tolerance and functional strength prior to d/c  Prognosis Good   Problem List Decreased strength;Decreased range of motion;Decreased endurance; Impaired balance;Decreased mobility; Decreased coordination;Decreased cognition; Impaired sensation; Impaired tone;Obesity;Orthopedic restrictions;Pain   Barriers to Discharge Inaccessible home environment;Decreased caregiver support   Plan   Treatment/Interventions ADL retraining;Functional transfer training; Therapeutic exercise; Endurance training;Patient/family training;Equipment eval/education; Bed mobility;Gait training; Compensatory technique education   Progress Progressing toward goals   Recommendation   OT Discharge Recommendation Home with outpatient rehabilitation  (hand therapy)   Additional Comments  pt declining need for bedside BSC for nighttime use   OT Therapy Minutes   OT Time In 0700   OT Time Out 0900   OT Total Time (minutes) 120   OT Mode of treatment - Individual (minutes) 120   OT Mode of treatment - Concurrent (minutes) 0   OT Mode of treatment - Group (minutes) 0   OT Mode of treatment - Co-treat (minutes) 0   OT Mode of Treatment - Total time(minutes) 120 minutes   OT Cumulative Minutes 695   Therapy Time missed   Time missed?  No

## 2023-05-24 LAB
GLUCOSE SERPL-MCNC: 110 MG/DL (ref 65–140)
GLUCOSE SERPL-MCNC: 136 MG/DL (ref 65–140)
GLUCOSE SERPL-MCNC: 138 MG/DL (ref 65–140)
GLUCOSE SERPL-MCNC: 139 MG/DL (ref 65–140)

## 2023-05-24 RX ADMIN — ACETAMINOPHEN 650 MG: 325 TABLET ORAL at 05:18

## 2023-05-24 RX ADMIN — METFORMIN HYDROCHLORIDE 500 MG: 500 TABLET ORAL at 07:37

## 2023-05-24 RX ADMIN — MELATONIN 6 MG: at 21:55

## 2023-05-24 RX ADMIN — PIOGLITAZONE HYDROCHLORIDE 15 MG: 15 TABLET ORAL at 08:00

## 2023-05-24 RX ADMIN — ACETAMINOPHEN 650 MG: 325 TABLET ORAL at 21:55

## 2023-05-24 RX ADMIN — LISINOPRIL 10 MG: 10 TABLET ORAL at 08:00

## 2023-05-24 RX ADMIN — ACETAMINOPHEN 650 MG: 325 TABLET ORAL at 14:33

## 2023-05-24 RX ADMIN — METFORMIN HYDROCHLORIDE 500 MG: 500 TABLET ORAL at 16:14

## 2023-05-24 RX ADMIN — AMLODIPINE BESYLATE 5 MG: 5 TABLET ORAL at 08:00

## 2023-05-24 RX ADMIN — PIOGLITAZONE HYDROCHLORIDE 15 MG: 15 TABLET ORAL at 16:14

## 2023-05-24 NOTE — PROGRESS NOTES
"[address] [facility]  Progress Note  Name: Noemy Forrester  MRN: [id number]  Unit/Bed#: -01 I Date of Admission: 5/14/2023   Date of Service: 5/24/2023 I Hospital Day: 10    Assessment/Plan   Spondylosis of cervical spine with myelopathy  Assessment & Plan  S/p PCDF C4-T1  · Seen today for 2 week incision check    Plan:  • Continue to monitor neuro exam  • Incision healing well, sutures and staples removed without issue  • Continue to mobilize per rehab recommendations, plan for dc to home tomorrow  • Medical management and pain control per primary team    Neurosurgery will sign off  Plan for follow up in 4 weeks with MD and upright XR cervical spine  Please call with questions or concerns  Subjective/Objective   Chief Complaint: \"I am tired\"    Subjective:  Patient states he is tired as he does not sleep well here  He is eager to go home and finally sleep through the night  Neck is sore at times  Strength is slowly improving  Doing well at rehab  No other complaints  Objective:  Sitting in chair, NAD    I/O       05/22 0701  05/23 0700 05/23 0701  05/24 0700 05/24 0701  05/25 0700    P  O  600 700 180    Total Intake(mL/kg) 600 (5 5) 700 (6 4) 180 (1 7)    Net +600 +700 +180           Unmeasured Urine Occurrence 3 x      Unmeasured Stool Occurrence 1 x 3 x           Invasive Devices     Drain  Duration           Open Drain Left Groin 615 days                Physical Exam:  Vitals: Blood pressure 152/74, pulse 70, temperature 98 1 °F (36 7 °C), temperature source Oral, resp  rate 18, height 5' 6\" (1 676 m), weight 109 kg (240 lb 3 2 oz), SpO2 96 %  ,Body mass index is 38 77 kg/m²        General appearance: alert, appears stated age, cooperative and no distress  Head: Normocephalic, without obvious abnormality, atraumatic  Eyes: conjugate gaze  Neck: supple, symmetrical, trachea midline, incision healing well, staples and sutures removed, some slight kyphosis " noted  Lungs: non labored breathing  Heart: regular heart rate  Neurologic:   Mental status: Alert, oriented x3, follows commands, thought content appropriate  Cranial nerves: grossly intact (Cranial nerves II-XII)  Motor: moving all extremities with ongoing weakness in RUE mostly with  and bicep strength  Coordination: finger to nose normal bilaterally, no drift bilaterally      Lab Results:  Results from last 7 days   Lab Units 05/19/23  0605   EOS PCT % 3   HEMATOCRIT % 39 4   HEMOGLOBIN g/dL 13 0   MONOS PCT % 9   NEUTROS PCT % 74   PLATELETS Thousands/uL 370   WBC Thousand/uL 9 62     Results from last 7 days   Lab Units 05/19/23 0605   BUN mg/dL 21   CALCIUM mg/dL 9 9   CHLORIDE mmol/L 105   CO2 mmol/L 25   CREATININE mg/dL 1 03   POTASSIUM mmol/L 4 6     Imaging Studies: I have personally reviewed pertinent reports  and I have personally reviewed pertinent films in PACS    No results found  EKG, Pathology, and Other Studies: I have personally reviewed pertinent reports        VTE Pharmacologic Prophylaxis: none ordered    VTE Mechanical Prophylaxis: sequential compression device

## 2023-05-24 NOTE — PROGRESS NOTES
PM&R PROGRESS NOTE:  Danielle Sharpe 69 y o  male MRN: [id number]  Unit/Bed#: -01 Encounter: [id number]        Rehab Diagnosis: Impairment of mobility, safety and Activities of Daily Living (ADLs) due to Spinal Cord Dysfunction:  Non-Traumatic:  04 1211  Quadriplegia, Incomplete C1-4 - Examines C1 AIS D  Has some pinprick impairment at C2 and C3 bilaterally - but not clear to me that this is related to his radiographic findings      SUBJECTIVE: Patient seen face to face  Patient anxious to discharge tomorrow  Happy that he got his staples removed  Denies increased pain  ASSESSMENT: Stable, progressing      PLAN:    Rehabilitation  • Functional deficits:  C1 AIS D, impaired sensation, neurogenic bladder, impaired mobility, impaired self care  • Continue current rehabilitation plan of care to maximize function  I personally attended, reviewed, and discussed medical and functional updates in team conference today  Please refer to advance care planning note for details  • Expected Discharge: Discharge Thursday, 5/25/2023 with outpatient in-home versus outpatient therapies  o IPR today    Current Function:  Physical Therapy Occupational Therapy Speech Therapy   Weight Bearing Status: Full Weight Bearing  Transfers: Independent  Bed Mobility: Independent  Amulation Distance (ft): 150 feet  Ambulation: Independent  Assistive Device for Ambulation: Roller Walker  Number of Stairs: 4  Assistive Device for Stairs: Bilateral [employer]  Stair Assistance:  Moderate Assistance  Discharge Recommendations: Home with:  [address] with[de-identified] Outpatient Physical Therapy   Eating: Supervision  Grooming: Independent  Bathing: Supervision (setup)  Bathing: Supervision (setup)  Upper Body Dressing: Independent  Lower Body Dressing: Minimal Assistance  Toileting: Independent  Tub/Shower Transfer: Supervision (dry tub transfer RW<->TTB with d(s))  Toilet Transfer: Independent  Cognition: Exceptions to WNL  Cognition: Decreased Memory  Orientation: Person, Place, Situation, Time                   DVT prophylaxis  • Discontinue subcutaneous heparin as patient ambulating    Bladder plan  • Continent    Bowel plan  • Continent      * Incomplete spinal cord injury at C1-C4 level without bone injury [facility])  Assessment & Plan  Patient actually examines on [facility] with C1 AIS D with profound loss of pinprick sensation starting at C2    - LT impairment begins at C5 bilaterally - so if C2/C3 impairment is not related to cord, he is at best C4    - However, given no other potential cause at this time - would label him C1 AIS D   - Maybe developing some neurogenic bladder/NDO prior to surgery which has improved   - Checking PVRs x3 to ensure no retention - PVRs low   - Has some constipation post-op, but at risk for neurogenic bowel - monitor bowel function   - Monitor autonomic function and check orthostatics given his complaints of occasional dizziness when standing     - ACE and/or JAVAN stockings as needed  - Has spasticity in his R hand which has improved since surgery  - Monitor  May benefit from resting hand splint, potentially stim in therapies  - Monitor tone, which is located primarily in his FDS, maybe some FDP    - PT/OT 3-5 hours/day, 5-7 days/week  Acute pain  Assessment & Plan  Improving Neuropathic pain  Nociceptive post op pain in his neck: Managed on Tylenol scheduled and PRN low dose Oxycodone IR  Robaxin changed to PRN  Spondylosis of cervical spine with myelopathy  Assessment & Plan  MRI showed Multilevel cervical spondylosis, Multifactorial disease results in overall severe canal stenosis with underlying cord compression at C5-C6 and C6-C7  Associated cord edema and/or myelomalacia is present at these levels   No abnormal enhancement identified within the spinal canal   Symptoms: possibly neurogenic bladder leading up to surgery, R hand spasticity (clenched fist), R hand weakness and sensory impairment, Gait "impairment/balance issues  - Examines with bilateral UE sensory impairment and R > L weakness, with some mild RLE > LLE weakness    - Also examines with diffuse hyperreflexia  - See SCI for more details  Admitted on 5/10 for planned Posterior cervical decompressive laminectomy with instrumented fusion from C4-T1 with Dr Archana Barbosa  No need for cervical collar per Neurosurgery  C-Spine precautions  Drain removed  2 week follow-up with NSGY today      Spinal stenosis of lumbar region at multiple levels  Assessment & Plan  Symptoms and weakness he reports improved with physical therapy  Pain management as detailed below  Tobacco dependence syndrome  Assessment & Plan  In remission  47 pack year history  Quit in 2021  Type 2 diabetes mellitus without complication, without long-term current use of insulin (HCC)  Assessment & Plan  Lab Results   Component Value Date    HGBA1C 6 0 (H) 05/02/2023       Home: Glimepiride 2mg with breakfast, Pioglitazone-Metformin 15-500mg BID  Here: Metformin 500mg BID; pioglitazone 15 mg BID, CDI/Accuchecks  Will try to get to home regimen  Diabetic Diet  Nutrition consult  IM consulted to assist with management  Primary hypertension  Assessment & Plan  Home: Amlodipine- Benzapril 5-20mg tablet once daily  Here: Amlodipine 5mg daily, Lisinopril 10 mg daily      Obesity, morbid (HCC)  Assessment & Plan  Counseling, Nutrition  Appreciate IM consultants medical co-management  Labs, medications, and imaging personally reviewed  ROS:  A ten point review of systems was completed on 05/24/23 and pertinent positives are listed in subjective section  All other systems reviewed were negative  OBJECTIVE:   /69 (BP Location: Right arm)   Pulse 99   Temp 98 °F (36 7 °C) (Oral)   Resp 18   Ht 5' 6\" (1 676 m)   Wt 109 kg (240 lb 3 2 oz)   SpO2 95%   BMI 38 77 kg/m²       Physical Exam  Vitals and nursing note reviewed     Constitutional:       " General: He is not in acute distress  HENT:      Head: Normocephalic and atraumatic  Nose: Nose normal       Mouth/Throat:      Mouth: Mucous membranes are moist    Eyes:      Conjunctiva/sclera: Conjunctivae normal    Cardiovascular:      Rate and Rhythm: Normal rate and regular rhythm  Pulses: Normal pulses  Pulmonary:      Effort: Pulmonary effort is normal       Breath sounds: Normal breath sounds  No wheezing or rales  Abdominal:      General: Bowel sounds are normal  There is no distension  Palpations: Abdomen is soft  Tenderness: There is no abdominal tenderness  Musculoskeletal:         General: No swelling  Cervical back: Neck supple  Skin:     General: Skin is warm  Comments: Incision clean dry and intact   Neurological:      Mental Status: He is alert and oriented to person, place, and time  Motor: Weakness (RUE finger extensors) present        Comments: Balance improved   Psychiatric:         Mood and Affect: Mood normal           Lab Results   Component Value Date    HCT 39 4 05/19/2023    HGB 13 0 05/19/2023    MCV 90 05/19/2023     05/19/2023    WBC 9 62 05/19/2023     Lab Results   Component Value Date    BUN 21 05/19/2023    CALCIUM 9 9 05/19/2023     05/19/2023    CO2 25 05/19/2023    CREATININE 1 03 05/19/2023    GLUC 144 (H) 05/19/2023    K 4 6 05/19/2023    SODIUM 136 05/19/2023     Lab Results   Component Value Date    INR 1 11 05/11/2023    INR 0 99 05/10/2023    INR 1 07 05/02/2023    PROTIME 14 5 05/11/2023    PROTIME 13 3 05/10/2023    PROTIME 14 2 05/02/2023           Current Facility-Administered Medications:   •  acetaminophen (TYLENOL) tablet 650 mg, 650 mg, Oral, Q8H [address], Samira Jefferson MD, 650 mg at 05/24/23 0518  •  amLODIPine (NORVASC) tablet 5 mg, 5 mg, Oral, Daily, 5 mg at 05/24/23 0800 **AND** lisinopril (ZESTRIL) tablet 10 mg, 10 mg, Oral, Daily, KELVIN Allison, 10 mg at 05/24/23 0800  •  bisacodyl (DULCOLAX) rectal suppository 10 mg, 10 mg, Rectal, Daily PRN, Parviz Anderson MD  •  bisacodyl (DULCOLAX) rectal suppository 10 mg, 10 mg, Rectal, Once, Zane Farrell MD  •  calcium carbonate (TUMS) chewable tablet 750 mg, 750 mg, Oral, TID PRN, Parviz Anderson MD, 750 mg at 05/23/23 2124  •  docusate sodium (COLACE) capsule 100 mg, 100 mg, Oral, BID, Parviz Anderson MD, 100 mg at 05/22/23 1704  •  insulin lispro (HumaLOG) 100 units/mL subcutaneous injection 1-5 Units, 1-5 Units, Subcutaneous, 4x Daily (AC & HS), 1 Units at 05/23/23 1142 **AND** Fingerstick Glucose (POCT), , , TID AC, Areli Woods PA-C  •  melatonin tablet 6 mg, 6 mg, Oral, HS, KELVIN Gonzales, 6 mg at 05/23/23 2121  •  metFORMIN (GLUCOPHAGE) tablet 500 mg, 500 mg, Oral, BID With Meals, Areli Woods PA-C, 500 mg at 05/24/23 4079  •  methocarbamol (ROBAXIN) tablet 250 mg, 250 mg, Oral, Q6H PRN, Juliette Arreola MD, 250 mg at 05/21/23 9199  •  naloxone (NARCAN) 0 04 mg/mL syringe 0 04 mg, 0 04 mg, Intravenous, Q1MIN PRN, Parviz Anderson MD  •  ondansetron (ZOFRAN-ODT) dispersible tablet 4 mg, 4 mg, Oral, Q6H PRN, Parviz Anderson MD  •  oxyCODONE (ROXICODONE) split tablet 2 5 mg, 2 5 mg, Oral, Q4H PRN **OR** oxyCODONE (ROXICODONE) IR tablet 5 mg, 5 mg, Oral, Q4H PRN, Parviz Anderson MD  •  pioglitazone (ACTOS) tablet 15 mg, 15 mg, Oral, BID, KEVLIN Us, 15 mg at 05/24/23 0800  •  polyethylene glycol (MIRALAX) packet 17 g, 17 g, Oral, Daily, Parviz Anderson MD, 17 g at 05/22/23 0808  •  senna (SENOKOT) tablet 17 2 mg, 2 tablet, Oral, HS, Parviz Anderson MD, 17 2 mg at 05/22/23 2103    Past Medical History:   Diagnosis Date   • Diabetes mellitus [facility])    • ED (erectile dysfunction)    • Hyperlipidemia    • Hypertension    • Obesity    • Spinal stenosis of lumbar region at multiple levels 12/22/2022       Patient Active Problem List    Diagnosis Date Noted   • Incomplete spinal cord injury at C1-C4 level without bone injury ([facility] [address] ) 04/27/2023   • Acute pain 05/18/2023   • Preoperative clearance 05/04/2023   • Neurogenic claudication 04/27/2023   • Spondylosis of cervical spine with myelopathy 03/01/2023   • Spinal stenosis of lumbar region at multiple levels 12/22/2022   • Mixed hyperlipidemia 08/17/2022   • Tobacco dependence syndrome 08/17/2022   • Primary hypertension 07/14/2022   • Type 2 diabetes mellitus without complication, without long-term current use of insulin ([facility] ) 07/14/2022   • Chronic bilateral low back pain without sciatica 07/14/2022   • Carpal tunnel syndrome of right wrist 07/06/2021   • Atrophy of muscle of right hand 07/06/2021   • Obesity, morbid ([facility] ) 05/24/2021   • Cubital tunnel syndrome on right    • Carpal tunnel syndrome, left           Lalit Hou MD    I have spent a total time of 55 minutes on 05/24/23 in caring for this patient including Impressions, Counseling / Coordination of care, Documenting in the medical record and weekly team conference  ** Please Note:  voice to text software may have been used in the creation of this document   Although proof errors in transcription or interpretation are a potential of such software**

## 2023-05-24 NOTE — PROGRESS NOTES
Internal Medicine Progress Note  Patient: Latesha Norton  Age/sex: 69 y o  male  Medical Record #: [id number]      ASSESSMENT/PLAN: (Interval History)  Latesha Norton is seen and examined and management for following issues:    S/p posterior C4-T1 decompressive laminectomy and instrumented fusion  • No collar needed  • Incision healing well     HTN  • Home: Lotrel 5-20 daily  • Here: Amlodipine 5mg daily/Lisinopril 10mg daily  • stable     DM type 2   • HA1C 6   • Home: Amaryl 2mg daily/Pioglitazone-Metformin  2x daily  • Here: Metformin 500 mg BID/Actos 15mg BID  • Continue QID Accuchecks/SSI and DM diet     Obesity  • BMI 39 5  • Recommended ongoing attempts at weight loss      LE edema  • Venous doppler 5/16 negative for DVT  • continue TEDS        Discharge date:  5/25/23    The above assessment and plan was reviewed and updated as determined by my evaluation of the patient on 5/24/2023      Labs:   Results from last 7 days   Lab Units 05/19/23  0605   HEMATOCRIT % 39 4   HEMOGLOBIN g/dL 13 0   PLATELETS Thousands/uL 370   WBC Thousand/uL 9 62     Results from last 7 days   Lab Units 05/19/23  0605   BUN mg/dL 21   CALCIUM mg/dL 9 9   CHLORIDE mmol/L 105   CO2 mmol/L 25   CREATININE mg/dL 1 03   POTASSIUM mmol/L 4 6   SODIUM mmol/L 136             Results from last 7 days   Lab Units 05/24/23  1049 05/24/23  0610 05/23/23  2053   POC GLUCOSE mg/dl 138 136 135       Review of Scheduled Meds:  Current Facility-Administered Medications   Medication Dose Route Frequency Provider Last Rate   • acetaminophen  650 mg Oral [facility] Dar Taylor MD     • amLODIPine  5 mg Oral Daily KELVIN Downing      And   • lisinopril  10 mg Oral Daily KELVIN Downing     • bisacodyl  10 mg Rectal Daily PRN Flip Bhakta MD     • bisacodyl  10 mg Rectal Once Verlean Goldberg, MD     • calcium carbonate  750 mg Oral TID PRN lFip Bhakta MD     • docusate sodium  100 mg Oral BID [facility] Depadua, MD     • insulin lispro  1-5 Units Subcutaneous 4x Daily (AC & HS) Areli Woods PA-C     • melatonin  6 mg Oral HS KELVIN Gonzales     • metFORMIN  500 mg Oral BID With Meals Areli Woods PA-C     • methocarbamol  250 mg Oral Q6H PRN Josie Leahy MD     • naloxone  0 04 mg Intravenous Q1MIN PRN Mariela Roque MD     • ondansetron  4 mg Oral Q6H PRN Mariela Roque MD     • oxyCODONE  2 5 mg Oral Q4H PRN Mariela Roque MD      Or   • oxyCODONE  5 mg Oral Q4H PRN Mariela Roque MD     • pioglitazone  15 mg Oral BID KELVIN Caballero     • polyethylene glycol  17 g Oral Daily Mariela Roque MD     • senna  2 tablet Oral HS Mariela Roque MD         Subjective/ HPI: Patient seen and examined  Patients overnight issues or events were reviewed with nursing or staff during rounds or morning huddle session  New or overnight issues include the following:     No new or overnight issues  Offers no complaints    ROS:   A 10 point ROS was performed; negative except as noted above  *Labs /Radiology studies reviewed  *Medications reviewed and reconciled as needed  *Please refer to order section for additional ordered labs studies  *Case discussed with primary attending during morning huddle case rounds    Physical Examination:  Vitals:   Vitals:    05/23/23 1452 05/23/23 2032 05/24/23 0557 05/24/23 0800   BP: 126/60 144/87 137/71 152/74   BP Location: Right arm  Right arm Left arm   Pulse: 74 76 70    Resp: 18 18 18    Temp: 97 9 °F (36 6 °C) 98 2 °F (36 8 °C) 98 1 °F (36 7 °C)    TempSrc: Oral Oral Oral    SpO2: 98% 94% 96%    Weight:   109 kg (240 lb 3 2 oz)    Height:           General Appearance: no distress, conversive  HEENT:  External ear normal   Nose normal w/o drainage  Mucous membranes are moist  Oropharynx is clear  Conjunctiva clear w/o icterus or redness    Neck:  Supple, normal ROM  Lungs: BBS without crackles/wheeze/rhonchi; respirations unlabored with normal inspiratory/expiratory effort  No retractions noted  On RA  CV: regular rate and rhythm; no rubs/murmurs/gallops, PMI normal   ABD: Abdomen is soft  Bowel sounds all quadrants  Nontender with no distention  EXT: mild LE edema  Skin: normal turgor, normal texture, no rashes  Psych: affect normal, mood normal  Neuro: AAO     The above physical exam was reviewed and updated as determined by my evaluation of the patient on 5/24/2023  Invasive Devices     Drain  Duration           Open Drain Left Groin 615 days                   VTE Pharmacologic Prophylaxis: ambulatory  Code Status: Level 1 - Full Code  Current Length of Stay: 10 day(s)      Total time spent:  30 minutes with more than 50% spent counseling/coordinating care  Counseling includes discussion with patient re: progress  and discussion with patient of his/her current medical state/information  Coordination of patient's care was performed in conjunction with primary service  Time invested included review of patient's labs, vitals, and management of their comorbidities with continued monitoring  In addition, this patient was discussed with medical team including physician and advanced extenders  The care of the patient was extensively discussed and appropriate treatment plan was formulated unique for this patient  Medical decision making for the day was made by supervising physician unless otherwise noted in their attestation statement  ** Please Note:  voice to text software may have been used in the creation of this document   Although proof errors in transcription or interpretation are a potential of such software**

## 2023-05-24 NOTE — PROGRESS NOTES
Pastoral Care Progress Note    2023  Patient: Marti Byrne : 1955  Admission Date & Time: 2023 1249  MRN: [id number] CSN: [id number]          Check in visit with Pt, who is always friendly but does not seem to desire much conversation  I encourage continue hard work in therapy   remains available

## 2023-05-24 NOTE — CASE MANAGEMENT
Cm phoned [facility] and learned there is a wait list for [facility] to provide rides for therapy at their location  They are not being told how long the wait list is  Cm followed up with pt and made aware, discussed options of [facility] and explained the above  Pt in agreement  Referral made via aidin

## 2023-05-24 NOTE — PROGRESS NOTES
OT Treatment Note       05/24/23 1230   Pain Assessment   Pain Assessment Tool 0-10   Pain Score No Pain   Restrictions/Precautions   Precautions Fall Risk;Spinal precautions   ROM Restrictions Yes  (cervical spine precautions)   Braces or Orthoses   (B/L TEDs)   Lifestyle   Autonomy Pt reports fatigue 2* ongoing difficulty with sleeping, but agreeable to OT session   Tub/Shower Transfer   Findings Dry tub transfer 2x with mod(I) RW<->TTB   Sit to Stand   Type of Assistance Needed Independent; Adaptive equipment   Physical Assistance Level No physical assistance   Comment using RW   Sit to Stand CARE Score 6   Bed-Chair Transfer   Type of Assistance Needed Independent; Adaptive equipment   Physical Assistance Level No physical assistance   Comment using RW   Chair/Bed-to-Chair Transfer CARE Score 6   Cognition   Overall Cognitive Status Impaired   Arousal/Participation Alert; Cooperative   Attention Attends with cues to redirect   Orientation Level Oriented X4   Memory Decreased recall of precautions   Following Commands Follows multistep commands with increased time or repetition   Additional Activities   Additional Activities Comments Functional mobility room<->ADL suite with mod(I) using RW   Activity Tolerance   Activity Tolerance Patient tolerated treatment well   Assessment   Treatment Assessment Pt seen for brief 30 min OT session focusing on functional transfers including dry tub transfers and functional mobility  Pt tolerating session well despite report of fatigue 2* ongoing difficulty sleeping at night  Pt has made good progress towards OT goals and will continue with outpatient hand therapy to address RUE function  Pt reports he is ready to return home, no further questions or concerns  Prognosis Good   Problem List Decreased strength;Decreased range of motion;Decreased endurance; Impaired balance;Decreased mobility; Decreased coordination;Decreased cognition; Impaired sensation; Impaired tone;Orthopedic restrictions;Pain   Barriers to Discharge Decreased caregiver support   Plan   Treatment/Interventions   (d/c tomorrow 5/25 with outpatient therapy)   Progress Progressing toward goals   Recommendation   OT Discharge Recommendation Home with outpatient rehabilitation  (hand therapy)   OT Therapy Minutes   OT Time In 1230   OT Time Out 1300   OT Total Time (minutes) 30   OT Mode of treatment - Individual (minutes) 30   OT Mode of treatment - Concurrent (minutes) 0   OT Mode of treatment - Group (minutes) 0   OT Mode of treatment - Co-treat (minutes) 0   OT Mode of Treatment - Total time(minutes) 30 minutes   OT Cumulative Minutes 815   Therapy Time missed   Time missed?  No

## 2023-05-24 NOTE — PLAN OF CARE
Problem: PAIN - ADULT  Goal: Verbalizes/displays adequate comfort level or baseline comfort level  Description: Interventions:  - Encourage patient to monitor pain and request assistance  - Assess pain using appropriate pain scale  - Administer analgesics based on type and severity of pain and evaluate response  - Implement non-pharmacological measures as appropriate and evaluate response  - Consider cultural and social influences on pain and pain management  - Notify physician/advanced practitioner if interventions unsuccessful or patient reports new pain  Outcome: Progressing     Problem: INFECTION - ADULT  Goal: Absence or prevention of progression during hospitalization  Description: INTERVENTIONS:  - Assess and monitor for signs and symptoms of infection  - Monitor lab/diagnostic results  - Monitor all insertion sites, i e  indwelling lines, tubes, and drains  - Monitor endotracheal if appropriate and nasal secretions for changes in amount and color  - [place] appropriate cooling/warming therapies per order  - Administer medications as ordered  - Instruct and encourage patient and family to use good hand hygiene technique  - Identify and instruct in appropriate isolation precautions for identified infection/condition  Outcome: Progressing     Problem: SAFETY ADULT  Goal: Patient will remain free of falls  Description: INTERVENTIONS:  - Educate patient/family on patient safety including physical limitations  - Instruct patient to call for assistance with activity   - Consult OT/PT to assist with strengthening/mobility   - Keep Call bell within reach  - Keep bed low and locked with side rails adjusted as appropriate  - Keep care items and personal belongings within reach  - Initiate and maintain comfort rounds  - Make Fall Risk Sign visible to staff  - Offer Toileting every  Hours, in advance of need  - Initiate/Maintain alarm  - Obtain necessary fall risk management equipment:   - Apply yellow socks and bracelet for high fall risk patients  - Consider moving patient to room near nurses station  Outcome: Progressing  Goal: Maintain or return to baseline ADL function  Description: INTERVENTIONS:  -  Assess patient's ability to carry out ADLs; assess patient's baseline for ADL function and identify physical deficits which impact ability to perform ADLs (bathing, care of mouth/teeth, toileting, grooming, dressing, etc )  - Assess/evaluate cause of self-care deficits   - Assess range of motion  - Assess patient's mobility; develop plan if impaired  - Assess patient's need for assistive devices and provide as appropriate  - Encourage maximum independence but intervene and supervise when necessary  - Involve family in performance of ADLs  - Assess for home care needs following discharge   - Consider OT consult to assist with ADL evaluation and planning for discharge  - Provide patient education as appropriate  Outcome: Progressing  Goal: Maintains/Returns to pre admission functional level  Description: INTERVENTIONS:  - Perform BMAT or MOVE assessment daily    - Set and communicate daily mobility goal to care team and patient/family/caregiver  - Collaborate with rehabilitation services on mobility goals if consulted  - Perform Range of Motion  times a day  - Reposition patient every hours    - Dangle patient  times a day  - Stand patient times a day  - Ambulate patient  times a day  - Out of bed to chair  times a day   - Out of bed for meals  times a day  - Out of bed for toileting  - Record patient progress and toleration of activity level   Outcome: Progressing     Problem: DISCHARGE PLANNING  Goal: Discharge to home or other facility with appropriate resources  Description: INTERVENTIONS:  - Identify barriers to discharge w/patient and caregiver  - Arrange for needed discharge resources and transportation as appropriate  - Identify discharge learning needs (meds, wound care, etc )  - Arrange for interpretive services to assist at discharge as needed  - Refer to Case Management Department for coordinating discharge planning if the patient needs post-hospital services based on physician/advanced practitioner order or complex needs related to functional status, cognitive ability, or social support system  Outcome: Progressing     Problem: Nutrition/Hydration-ADULT  Goal: Nutrient/Hydration intake appropriate for improving, restoring or maintaining nutritional needs  Description: Monitor and assess patient's nutrition/hydration status for malnutrition  Collaborate with interdisciplinary team and initiate plan and interventions as ordered  Monitor patient's weight and dietary intake as ordered or per policy  Utilize nutrition screening tool and intervene as necessary  Determine patient's food preferences and provide high-protein, high-caloric foods as appropriate       INTERVENTIONS:  - Monitor oral intake, urinary output, labs, and treatment plans  - Assess nutrition and hydration status and recommend course of action  - Evaluate amount of meals eaten  - Assist patient with eating if necessary   - Allow adequate time for meals  - Recommend/ encourage appropriate diets, oral nutritional supplements, and vitamin/mineral supplements  - Order, calculate, and assess calorie counts as needed  - Recommend, monitor, and adjust tube feedings and TPN/PPN based on assessed needs  - Assess need for intravenous fluids  - Provide specific nutrition/hydration education as appropriate  - Include patient/family/caregiver in decisions related to nutrition  Outcome: Progressing     Problem: MOBILITY - ADULT  Goal: Maintain or return to baseline ADL function  Description: INTERVENTIONS:  -  Assess patient's ability to carry out ADLs; assess patient's baseline for ADL function and identify physical deficits which impact ability to perform ADLs (bathing, care of mouth/teeth, toileting, grooming, dressing, etc )  - Assess/evaluate cause of self-care deficits   - Assess range of motion  - Assess patient's mobility; develop plan if impaired  - Assess patient's need for assistive devices and provide as appropriate  - Encourage maximum independence but intervene and supervise when necessary  - Involve family in performance of ADLs  - Assess for home care needs following discharge   - Consider OT consult to assist with ADL evaluation and planning for discharge  - Provide patient education as appropriate  Outcome: Progressing  Goal: Maintains/Returns to pre admission functional level  Description: INTERVENTIONS:  - Perform BMAT or MOVE assessment daily    - Set and communicate daily mobility goal to care team and patient/family/caregiver  - Collaborate with rehabilitation services on mobility goals if consulted  - Perform Range of Motion 3 times a day  - Reposition patient every 2 hours    - Dangle patient 3 times a day  - Stand patient 3 times a day  - Ambulate patient 3 times a day  - Out of bed to chair 3 times a day   - Out of bed for meals 3 times a day  - Out of bed for toileting  - Record patient progress and toleration of activity level   Outcome: Progressing     Problem: Prexisting or High Potential for Compromised Skin Integrity  Goal: Skin integrity is maintained or improved  Description: INTERVENTIONS:  - Identify patients at risk for skin breakdown  - Assess and monitor skin integrity  - Assess and monitor nutrition and hydration status  - Monitor labs   - Assess for incontinence   - Turn and reposition patient  - Assist with mobility/ambulation  - Relieve pressure over bony prominences  - Avoid friction and shearing  - Provide appropriate hygiene as needed including keeping skin clean and dry  - Evaluate need for skin moisturizer/barrier cream  - Collaborate with interdisciplinary team   - Patient/family teaching  - Consider wound care consult   Outcome: Progressing

## 2023-05-24 NOTE — PLAN OF CARE
Problem: PAIN - ADULT  Goal: Verbalizes/displays adequate comfort level or baseline comfort level  Description: Interventions:  - Encourage patient to monitor pain and request assistance  - Assess pain using appropriate pain scale  - Administer analgesics based on type and severity of pain and evaluate response  - Implement non-pharmacological measures as appropriate and evaluate response  - Consider cultural and social influences on pain and pain management  - Notify physician/advanced practitioner if interventions unsuccessful or patient reports new pain  Outcome: Progressing     Problem: INFECTION - ADULT  Goal: Absence or prevention of progression during hospitalization  Description: INTERVENTIONS:  - Assess and monitor for signs and symptoms of infection  - Monitor lab/diagnostic results  - Monitor all insertion sites, i e  indwelling lines, tubes, and drains  - Monitor endotracheal if appropriate and nasal secretions for changes in amount and color  - [employer] appropriate cooling/warming therapies per order  - Administer medications as ordered  - Instruct and encourage patient and family to use good hand hygiene technique  - Identify and instruct in appropriate isolation precautions for identified infection/condition  Outcome: Progressing     Problem: SAFETY ADULT  Goal: Patient will remain free of falls  Description: INTERVENTIONS:  - Educate patient/family on patient safety including physical limitations  - Instruct patient to call for assistance with activity   - Consult OT/PT to assist with strengthening/mobility   - Keep Call bell within reach  - Keep bed low and locked with side rails adjusted as appropriate  - Keep care items and personal belongings within reach  - Initiate and maintain comfort rounds  - Make Fall Risk Sign visible to staff  - Offer Toileting every 2 Hours, in advance of need  - Initiate/Maintain bed/chair alarm  - Obtain necessary fall risk management equipment: non skid footwear  - Apply yellow socks and bracelet for high fall risk patients  - Consider moving patient to room near nurses station  Outcome: Progressing  Goal: Maintain or return to baseline ADL function  Description: INTERVENTIONS:  -  Assess patient's ability to carry out ADLs; assess patient's baseline for ADL function and identify physical deficits which impact ability to perform ADLs (bathing, care of mouth/teeth, toileting, grooming, dressing, etc )  - Assess/evaluate cause of self-care deficits   - Assess range of motion  - Assess patient's mobility; develop plan if impaired  - Assess patient's need for assistive devices and provide as appropriate  - Encourage maximum independence but intervene and supervise when necessary  - Involve family in performance of ADLs  - Assess for home care needs following discharge   - Consider OT consult to assist with ADL evaluation and planning for discharge  - Provide patient education as appropriate  Outcome: Progressing  Goal: Maintains/Returns to pre admission functional level  Description: INTERVENTIONS:  - Perform BMAT or MOVE assessment daily    - Set and communicate daily mobility goal to care team and patient/family/caregiver  - Collaborate with rehabilitation services on mobility goals if consulted  - Perform Range of Motion 3 times a day  - Reposition patient every 2 hours    - Dangle patient 3 times a day  - Stand patient 3 times a day  - Ambulate patient 3 times a day  - Out of bed to chair 3 times a day   - Out of bed for meals 3 times a day  - Out of bed for toileting  - Record patient progress and toleration of activity level   Outcome: Progressing     Problem: DISCHARGE PLANNING  Goal: Discharge to home or other facility with appropriate resources  Description: INTERVENTIONS:  - Identify barriers to discharge w/patient and caregiver  - Arrange for needed discharge resources and transportation as appropriate  - Identify discharge learning needs (meds, wound care, etc )  - Arrange for interpretive services to assist at discharge as needed  - Refer to Case Management Department for coordinating discharge planning if the patient needs post-hospital services based on physician/advanced practitioner order or complex needs related to functional status, cognitive ability, or social support system  Outcome: Progressing     Problem: Nutrition/Hydration-ADULT  Goal: Nutrient/Hydration intake appropriate for improving, restoring or maintaining nutritional needs  Description: Monitor and assess patient's nutrition/hydration status for malnutrition  Collaborate with interdisciplinary team and initiate plan and interventions as ordered  Monitor patient's weight and dietary intake as ordered or per policy  Utilize nutrition screening tool and intervene as necessary  Determine patient's food preferences and provide high-protein, high-caloric foods as appropriate       INTERVENTIONS:  - Monitor oral intake, urinary output, labs, and treatment plans  - Assess nutrition and hydration status and recommend course of action  - Evaluate amount of meals eaten  - Assist patient with eating if necessary   - Allow adequate time for meals  - Recommend/ encourage appropriate diets, oral nutritional supplements, and vitamin/mineral supplements  - Order, calculate, and assess calorie counts as needed  - Recommend, monitor, and adjust tube feedings and TPN/PPN based on assessed needs  - Assess need for intravenous fluids  - Provide specific nutrition/hydration education as appropriate  - Include patient/family/caregiver in decisions related to nutrition  Outcome: Progressing     Problem: MOBILITY - ADULT  Goal: Maintain or return to baseline ADL function  Description: INTERVENTIONS:  -  Assess patient's ability to carry out ADLs; assess patient's baseline for ADL function and identify physical deficits which impact ability to perform ADLs (bathing, care of mouth/teeth, toileting, grooming, dressing, etc )  - Assess/evaluate cause of self-care deficits   - Assess range of motion  - Assess patient's mobility; develop plan if impaired  - Assess patient's need for assistive devices and provide as appropriate  - Encourage maximum independence but intervene and supervise when necessary  - Involve family in performance of ADLs  - Assess for home care needs following discharge   - Consider OT consult to assist with ADL evaluation and planning for discharge  - Provide patient education as appropriate  Outcome: Progressing  Goal: Maintains/Returns to pre admission functional level  Description: INTERVENTIONS:  - Perform BMAT or MOVE assessment daily    - Set and communicate daily mobility goal to care team and patient/family/caregiver  - Collaborate with rehabilitation services on mobility goals if consulted  - Perform Range of Motion 3 times a day  - Reposition patient every 2 hours    - Dangle patient 3 times a day  - Stand patient 3 times a day  - Ambulate patient 3 times a day  - Out of bed to chair 3 times a day   - Out of bed for meals 3 times a day  - Out of bed for toileting  - Record patient progress and toleration of activity level   Outcome: Progressing     Problem: Prexisting or High Potential for Compromised Skin Integrity  Goal: Skin integrity is maintained or improved  Description: INTERVENTIONS:  - Identify patients at risk for skin breakdown  - Assess and monitor skin integrity  - Assess and monitor nutrition and hydration status  - Monitor labs   - Assess for incontinence   - Turn and reposition patient  - Assist with mobility/ambulation  - Relieve pressure over bony prominences  - Avoid friction and shearing  - Provide appropriate hygiene as needed including keeping skin clean and dry  - Evaluate need for skin moisturizer/barrier cream  - Collaborate with interdisciplinary team   - Patient/family teaching  - Consider wound care consult   Outcome: Progressing

## 2023-05-24 NOTE — TEAM CONFERENCE
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 )  Principal Problem: Incomplete spinal cord injury at C1-C4 level without bone injury [facility])    Patient Active Problem List    Diagnosis Date Noted   • Acute pain 05/18/2023   • Preoperative clearance 05/04/2023   • Incomplete spinal cord injury at C1-C4 level without bone injury ([facility] ) 04/27/2023   • Neurogenic claudication 04/27/2023   • Spondylosis of cervical spine with myelopathy 03/01/2023   • Spinal stenosis of lumbar region at multiple levels 12/22/2022   • Mixed hyperlipidemia 08/17/2022   • Tobacco dependence syndrome 08/17/2022   • Primary hypertension 07/14/2022   • Type 2 diabetes mellitus without complication, without long-term current use of insulin ([facility] ) 07/14/2022   • Chronic bilateral low back pain without sciatica 07/14/2022   • Carpal tunnel syndrome of right wrist 07/06/2021   • Atrophy of muscle of right hand 07/06/2021   • Obesity, morbid ([facility] ) 05/24/2021   • Cubital tunnel syndrome on right    • Carpal tunnel syndrome, left        Physical Therapy:    Weight Bearing Status: Full Weight Bearing  Transfers: Independent  Bed Mobility: Independent  Amulation Distance (ft): 150 feet  Ambulation: Independent  Assistive Device for Ambulation: Roller Walker  Number of Stairs: 4  Assistive Device for Stairs: Bilateral [place] Assistance:  Moderate Assistance  Discharge Recommendations: Home with:  74 [address] Flores with[de-identified] Outpatient Physical Therapy    Pt participting well and is making good progress with skilled PT intervention thusfar  Plan for d/c home now Thursday 5/25 with out patient therapy services  Right LE remains weak, requires RW for balance and safe gait  He has RW for main floor and lower level of home/community, SPC for upstairs for shortest distances, 10ft or less  Demo ability to safely progress to Ind in his room with RW  Will benefit from continued strengthening and gait training to maximize his funtional safety overall  Occupational Therapy:  Eating: Supervision  Grooming: Independent  Bathing: Supervision (setup)  Bathing: Supervision (setup)  Upper Body Dressing: Independent  Lower Body Dressing: Minimal Assistance  Toileting: Independent  Tub/Shower Transfer: Supervision (dry tub transfer RW<->TTB with d(s))  Toilet Transfer: Independent  Cognition: Exceptions to WNL  Cognition: Decreased Memory  Orientation: Person, Place, Situation, Time  Discharge Recommendations: Home with:  74 [address] Flores with[de-identified] Family Support, Outpatient Occupational Therapy (reports occasional assist with IADLs from family; recommend outpatient hand therapy)       5/23/23  Pt has been making good progress towards OT goals  Pt was updated to have IRP today and now completes bed mobility and functional transfers within room with mod(I) using RW  Pt also able to complete toileting with mod(I) using RW/grab bars  Pt completing dry tub transfer today RW<->TTB with d(s)  Pt still requires assistance for LBD tasks to don TEDs and Erin to don socks using sock aide and LHR  OT to continue POC to focus on maximize (I) with LBD, IADLs I e  simple meal prep, laundry, and medication management tasks prior to anticipated d/c Thursday 5/25 with outpatient OT hand therapy  Pt owns necessary DME        Speech Therapy:           No notes on file    Nursing Notes:  Appetite: Good  Diet Type: Diabetic                      Diet Patient/Family Education Complete: No    Type of Wound (LDA): Wound                    Type of Wound Patient/Family Education: No  Bladder: Continent     Bladder Patient/Family Education: No  Bowel: Continent     Bowel Patient/Family Education: No  Pain Location/Orientation: Location: Neck  Pain Score: 0                       Hospital Pain Intervention(s): Medication (See MAR), Repositioned, Rest  Pain Patient/Family Education: No  Medication Management/Safety  Injectable: Insulin (coverage only and heparin--will not need either for d/c)  Safe Administration: No  Reason for non-safe administration: needs assessment/education  Medication Patient/Family Education Complete: No (ongoing)    Pt admitted S/p posterior C4-T1 decompressive laminectomy and instrumented fusion No collar needed  Incision w staples and suture  Pain control with scheduled tylenol/ robaxin and oxycodone prn  HTN- Home: Lotrel 5-20 daily Here: Amlopdipine 5mg dailyLlisinopril 20mg daily  DM type 2 - HA1C 6  Home: Amaryl 2mg daily/Pioglitazone-Metformin  2x daily  Here: Metformin 500 mg BID  Continue QID Accuchecks/SSI and DM diet  Pt is continent of bowel and bladder  This week we will encourage independence with ADLs  We will monitor labs and vital signs  WE will educate pt/family about repositioning to prevent skin breakdown  We will assist w repositioning and perform routine skin checks  We will monitor incision for healing and s/s of infection  We will provide pt/family with DM education as needed  We will monitor for adequate pain control  We will monitor for constipation and medicate pt as ordered  We will increase safety awareness and keep pt free from falls  5/24- Pt reports adequate pain controll  VSS  Will continue with assessment , skin checks  Post  Neck  incision w/staples CDI ISAC  Pt Voiding w/o difficulty and continently  His LBM was 5/23  He reports adequate sleep with current regimen   Rings appropriately to make needs known, Demonstrates safe transfers  Will continue to maintain and reinforce safety and fall precautions  Case Management:     Discharge Planning  Living Arrangements: Lives Alone  Support Systems: Self, Family members  Assistance Needed: n/a  Type of Current Residence: Private residence  Current Home Care Services: No  Pt is making good progress with therapy and is scheduled to return home later this week  Recommendations are for contd outpt pt and ot and pt would like to go to [facility] to follow up with transportation assist by [facility] if pt meets the radius requirements  Is the patient actively participating in therapies? yes  List any modifications to the treatment plan:     Barriers Interventions   All functional barriers resolved                      Is the patient making expected progress toward goals? yes  List any update or changes to goals:     Medical Goals: Patient will be medically stable for discharge to [facility] upon completion of rehab program and Patient will be able to manage medical conditions and comorbid conditions with medications and follow up upon completion of rehab program    Weekly Team Goals:   Rehab Team Goals  ADL Team Goal: Patient will be independent with ADLs with least restrictive device upon completion of rehab program  Bowel/Bladder Team Goal: Patient will return to premorbid level for bladder/bowel management upon completion of rehab program    Discussion: pt has made good progress and is functioning at mod I for all functional mobility and adls  pts brother is scheduled to pick him up at dc  Pt is recommended to continue with outpt pt and ot and pt prefers [facility] location  Anticipated Discharge Date:  5/25/23  [facility] Team Members Present: The following team members are supervising care for this patient and were present during this Weekly Team Conference      Physician: Dr Helen Baer MD  Case Manager: Pieter Goldstein MSW  Registered Nurse: Pedro San RN  Physical Therapist: Ayesha Saavedra DPT  Occupational Therapist: Valorie Herrera MS, OTR/L  Speech Therapist:

## 2023-05-24 NOTE — PROGRESS NOTES
"OT Treatment Note       05/24/23 0900   Pain Assessment   Pain Assessment Tool 0-10   Pain Score No Pain   Restrictions/Precautions   Precautions Fall Risk;Spinal precautions   ROM Restrictions Yes  (cervical spine precautions)   Braces or Orthoses   (B/L TEDs)   Lifestyle   Autonomy \"Terrible,\" referring to sleeping last night stating, \"If I don't go home within the next day, I'm gonna have to sign myself out  \"   Grooming   Findings washing hands standing at sink with mod(I)   Putting On/Taking Off Footwear   Type of Assistance Needed Physical assistance   Physical Assistance Level 26%-50%   Comment TA with TEDs, able to doff/don socks with SUP using sock aide and dressing stick   Putting On/Taking Off Footwear CARE Score 3   Sit to Stand   Type of Assistance Needed Independent; Adaptive equipment   Physical Assistance Level No physical assistance   Comment using RW   Sit to Stand CARE Score 6   Bed-Chair Transfer   Type of Assistance Needed Independent; Adaptive equipment   Physical Assistance Level No physical assistance   Comment using RW   Chair/Bed-to-Chair Transfer CARE Score 6   Toileting Hygiene   Type of Assistance Needed Independent; Adaptive equipment   Physical Assistance Level No physical assistance   Toileting Hygiene CARE Score 6   Toilet Transfer   Type of Assistance Needed Independent; Adaptive equipment   Physical Assistance Level No physical assistance   Comment functionally mobilizing to toilet with mod(I) using RW   Toilet Transfer CARE Score 6   Meal Prep   Meal Prep Level Walker   Meal Prep Level of Assistance Modified independent   Meal Preparation pt prepping pot of coffee in OT kitchen with mod(I) including item retrieval  Pt primarily uses countertop for support during tasks, and slides coffee pot along countertop to transport   Pt able to tolerate standing for entirety of task, ~5-7minutes   Kitchen Mobility   Kitchen-Mobility Level Walker   Kitchen Activity Retrieve items;Transport items " "  Kitchen Mobility Comments mod(I) using RW/countertop for support   Light Housekeeping   Light Housekeeping pt folding clothes independently with majority of task completed in sitting, as this is method he typically uses at home  Health Management   Health Management Pt sorting meds into AM/PM pill organizer independently  Pt reports not taking lisinopril PTA and stating, \"I'm not going to take this when I go home,\" recommending following up with MD re: recommendations upon d/c  Cognition   Overall Cognitive Status Impaired   Arousal/Participation Alert; Cooperative   Attention Attends with cues to redirect   Orientation Level Oriented X4  (Simultaneous filing  User may not have seen previous data )   Memory Decreased recall of precautions   Following Commands Follows multistep commands with increased time or repetition   Additional Activities   Additional Activities Comments Functional mobility household distances with mod(I) using RW  For increased standing balance for household/community mobility pt alternating tapping ~4' step with BUE support on RW with close SUP and no LOB noted, increased difficulty with RLE 2* impaired strength   Activity Tolerance   Activity Tolerance Patient tolerated treatment well   Assessment   Treatment Assessment Pt seen for 90 min OT session focusing on functional transfers/mobility, LBD, IADLs and standing balance tasks  Beginning of session spent actively listening and providing emotional support re: pt's c/o ongoing difficulty sleeping at night; info passed along to tx team  Pt confirming with  d/c date set for tomorrow, and pt made aware Angel Kawasaki to see him following team meeting today to confirm d/c details and OP therapies  Pt tolerating session well otherwise, completing simple laundry tasks, meal prep, and med management tasks with mod(I)   Pt stating he did not take lisinopril PTA and will not take upon d/c - recommend following up with MD re: recommendations at " d/c  OT to continue POC to focus on balance, activity tolerance, functional strength, and dry tub transfers in prep for anticipated d/c home tomorrow  Prognosis Good   Problem List Decreased strength;Decreased range of motion;Decreased endurance; Impaired balance;Decreased mobility; Decreased coordination;Decreased cognition; Impaired sensation; Impaired tone;Orthopedic restrictions;Pain   Barriers to Discharge Inaccessible home environment;Decreased caregiver support   Plan   Treatment/Interventions ADL retraining;Functional transfer training; Therapeutic exercise; Endurance training;Patient/family training;Gait training; Compensatory technique education;Spoke to nursing   Progress Progressing toward goals   Recommendation   OT Discharge Recommendation Home with outpatient rehabilitation  (hand therapy)   OT Therapy Minutes   OT Time In 0900   OT Time Out 1030   OT Total Time (minutes) 90   OT Mode of treatment - Individual (minutes) 90   OT Mode of treatment - Concurrent (minutes) 0   OT Mode of treatment - Group (minutes) 0   OT Mode of treatment - Co-treat (minutes) 0   OT Mode of Treatment - Total time(minutes) 90 minutes   OT Cumulative Minutes 785   Therapy Time missed   Time missed?  No

## 2023-05-24 NOTE — ASSESSMENT & PLAN NOTE
S/p PCDF C4-T1  · Seen today for 2 week incision check    Plan:  • Continue to monitor neuro exam  • Incision healing well, sutures and staples removed without issue  • Continue to mobilize per rehab recommendations, plan for dc to home tomorrow  • Medical management and pain control per primary team    Neurosurgery will sign off  Plan for follow up in 4 weeks with MD and upright XR cervical spine  Please call with questions or concerns

## 2023-05-24 NOTE — PROGRESS NOTES
05/24/23 1330   Pain Assessment   Pain Assessment Tool 0-10   Restrictions/Precautions   Precautions Fall Risk;Spinal precautions   Subjective   Subjective still not sleeping well   Roll Left and Right   Type of Assistance Needed Independent   Roll Left and Right CARE Score 6   Sit to Lying   Type of Assistance Needed Independent   Sit to Lying CARE Score 6   Lying to Sitting on Side of Bed   Type of Assistance Needed Independent   Lying to Sitting on Side of Bed CARE Score 6   Sit to Stand   Type of Assistance Needed Independent; Adaptive equipment   Comment RW   Sit to Stand CARE Score 6   Bed-Chair Transfer   Type of Assistance Needed Independent; Adaptive equipment   Comment RW   Chair/Bed-to-Chair Transfer CARE Score 6   Car Transfer   Type of Assistance Needed Catalina Bones / Chares Shed; Adaptive equipment   Physical Assistance Level No physical assistance   Comment RW   Car Transfer CARE Score 5   Walk 10 Feet   Type of Assistance Needed Independent; Adaptive equipment   Comment RW   Walk 10 Feet CARE Score 6   Walk 50 Feet with Two Turns   Type of Assistance Needed Independent; Adaptive equipment   Comment RW   Walk 50 Feet with Two Turns CARE Score 6   Walk 150 Feet   Type of Assistance Needed Independent; Adaptive equipment   Comment RW   Walk 150 Feet CARE Score 6   Walking 10 Feet on Uneven Surfaces   Type of Assistance Needed Supervision; Adaptive equipment   Comment RW across mat on floor, cueing for walker placement getting on/off mat   Walking 10 Feet on Uneven Surfaces CARE Score 4   Ambulation   Primary Mode of Locomotion Prior to Admission Walk   Distance Walked (feet) 150 ft  (x2)   Assist Device Roller Walker   Gait Pattern Trendelenburg; Improper weight shift   Findings Right Le weakness, slight adduction with trendelnberg and lateral trunk shortening   Does the patient walk? 2  Yes   Wheelchair mobility   Does the patient use a wheelchair? 0   No   Curb or Single Stair   Style negotiated Single stair   Type of Assistance Needed Supervision   Comment B HR, step to   1 Step (Curb) CARE Score 4   4 Steps   Type of Assistance Needed Supervision   Comment B HR, step to   4 Steps CARE Score 4   12 Steps   Type of Assistance Needed Supervision   Comment B HR, step to   12 Steps CARE Score 4   Picking Up Object   Type of Assistance Needed Adaptive equipment; Independent   Comment using reacher   Picking Up Object CARE Score 6   Therapeutic Interventions   Strengthening Seated LAQ and hip flex 3 x 12 reps with 2# on left 1# on right, STS 2 x 5 no UE   Flexibility passive stretch B HS & calves   Assessment   Treatment Assessment Pt dischrging home tomorrow with goals met using RW for mobility  Still with noted UE deficits and R LE weakness  Will plan to continued with skilled therapy services as an out patient  Noted muscle tightness Right piriformis, QL with Gluetal weakness with gait deviations requiring use of RW of for balance  Will aslo benefit form activitie sto improve overall endurance  Recommendation   PT Discharge Recommendation Home with outpatient rehabilitation   PT Therapy Minutes   PT Time In 1330   PT Time Out 1430   PT Total Time (minutes) 60   PT Mode of treatment - Individual (minutes) 60   PT Mode of treatment - Concurrent (minutes) 0   PT Mode of treatment - Group (minutes) 0   PT Mode of treatment - Co-treat (minutes) 0   PT Mode of Treatment - Total time(minutes) 60 minutes   PT Cumulative Minutes 810   Therapy Time missed   Time missed?  No

## 2023-05-24 NOTE — DISCHARGE INSTR - AVS FIRST PAGE
Discharge Instructions from [facility]:    ACTIVITY: Please follow mobility, self care instructions as your were taught by inpatient rehabilitation therapists  Please continue using rolling walker and adaptive equipment  You will continue your rehabilitation with [facility] PT and OT  You should transition to outpatient therapies once [facility] you  RESTRICTIONS:  No Driving until cleared by NSGY  Please continue cervical precautions      INCISIONAL CARE:  You can shower with soap and water, pat dry  Do not rub  Do not submerge in water - avoid tub baths, hot tubs, pools, oceans until cleared by NSGY  Incision can be open to air    MEDICATION CHANGES:  Lotrel dose was reduced  Please use new prescription  Discontinue Amaryl   PLEASE SEE YOUR PCP FOR ALL MEDICATION REFILLS            Discharge Instructions from Neurosurgery  Posterior cervical decompression and fixation/fusion      Surgical incisional care:  Keep incision clean and dry  Avoid applying creams, lotion or antiseptic to incision area  Check the wound daily  If the incision becomes red, swollen, tender, warm, or has increased drainage please notify physician immediately  Okay to apply a padded dressing over incision while wearing VISTA collar in order to reduce risk of irritation  May shower 3 days after surgery, but do not soak in a tub and no swimming  Use mild antimicrobial soap and water  Pat incision dry after showering  Cervical VISTA collar to be worn at all times except for showering  Change from VISTA (grey) collar to the [facility] [place] (peach) collar prior to showering  Incision may be cleaned with water and a mild antimicrobial soap using a clean washcloth  Incision is to be gently patted dry with a clean towel  Once dry, collar should be changed back to a VISTA (grey) collar with clean pads in place  Hand wash collar pads with mild soap and water  They are to be laid flat to dry on a clean towel   Recommend changing every 1-2 days  Please refer to VISTA collar instructions for further details  Activity Restrictions:  No heavy lifting greater than 5 - 10lbs  No strenuous activities  May walk as tolerated  Encourage at least 4 short walks per day  No driving while requiring cervical collar, anticipated six weeks  No significant neck movement  Postoperative medication:  Please take pain medications to relieve incision pain, and muscle relaxants to prevent spasms as directed  Please see after visit summary (AVS) for details  Please take over the counter stool softeners such as colace or senna-s to avoid constipation while on narcotics  Do not take ibuprofen, Naproxen/Aleve or any NSAID until cleared by surgeon  May take Tylenol instead  If taking Coumadin, Aspirin, or Plavix, you may resume these medications when cleared by Neurosurgery  Follow-up as scheduled for a 2 week incision check  Follow-up 6 weeks after surgery with a repeat cervical spine upright x-rays to be completed prior to visit  **Please notify MD immediately if you experience a fever of 101  F, have increased neck or arm pain, new numbness and/or weakness in your arms, difficulty swallowing or breathing especially while lying down, numbness or weakness in arms or legs  **

## 2023-05-25 VITALS
OXYGEN SATURATION: 96 % | TEMPERATURE: 98.2 F | SYSTOLIC BLOOD PRESSURE: 117 MMHG | BODY MASS INDEX: 38.6 KG/M2 | HEART RATE: 70 BPM | HEIGHT: 66 IN | WEIGHT: 240.2 LBS | RESPIRATION RATE: 19 BRPM | DIASTOLIC BLOOD PRESSURE: 55 MMHG

## 2023-05-25 LAB
GLUCOSE SERPL-MCNC: 131 MG/DL (ref 65–140)
GLUCOSE SERPL-MCNC: 145 MG/DL (ref 65–140)

## 2023-05-25 RX ORDER — PIOGLITAZONE HCL AND METFORMIN HCL 500; 15 MG/1; MG/1
1 TABLET ORAL 2 TIMES DAILY WITH MEALS
Refills: 0
Start: 2023-05-25

## 2023-05-25 RX ORDER — AMLODIPINE BESYLATE AND BENAZEPRIL HYDROCHLORIDE 5; 10 MG/1; MG/1
1 CAPSULE ORAL DAILY
Qty: 30 CAPSULE | Refills: 0 | Status: SHIPPED | OUTPATIENT
Start: 2023-05-25 | End: 2023-06-02 | Stop reason: SDUPTHER

## 2023-05-25 RX ORDER — GLIMEPIRIDE 2 MG/1
2 TABLET ORAL
COMMUNITY
End: 2023-05-25

## 2023-05-25 RX ORDER — ACETAMINOPHEN 325 MG/1
650 TABLET ORAL EVERY 6 HOURS PRN
Refills: 0
Start: 2023-05-25

## 2023-05-25 RX ADMIN — LISINOPRIL 10 MG: 10 TABLET ORAL at 08:04

## 2023-05-25 RX ADMIN — ACETAMINOPHEN 650 MG: 325 TABLET ORAL at 06:24

## 2023-05-25 RX ADMIN — PIOGLITAZONE HYDROCHLORIDE 15 MG: 15 TABLET ORAL at 08:04

## 2023-05-25 RX ADMIN — METFORMIN HYDROCHLORIDE 500 MG: 500 TABLET ORAL at 08:04

## 2023-05-25 RX ADMIN — AMLODIPINE BESYLATE 5 MG: 5 TABLET ORAL at 08:04

## 2023-05-25 NOTE — NURSING NOTE
AVS reviewed with pt  All questions answered  Pt's prescriptions sent to [facility]   Pt's pain was well controlled  No falls while on ARC  Pt was continent of both bowel and bladder  Pt's skin was intact  Pt left via wheelchair with PCA and RN

## 2023-05-25 NOTE — DISCHARGE SUMMARY
Discharge Summary - PMR   Vincent George 69 y o  male MRN: [id number]  Unit/Bed#: -01 Encounter: [id number]    Admission Date: 5/14/2023     Discharge Date:  5/25/23    Rehab Diagnosis: Impairment of mobility, safety and Activities of Daily Living (ADLs) due to Spinal Cord Dysfunction:  Non-Traumatic:  04 1211  Quadriplegia, Incomplete C1-4 - Examines C1 AIS D  Has some pinprick impairment at C2 and C3 bilaterally - but not clear to me that this is related to his radiographic findings      History of Present Illness:   From Dr Simba Osborne:  Vincent George is a 69 y o  male with a medical history of T2DM, HTN, HLD, Obesity and lumbar spinal stenosis, history of smoking (47 pack years in remission since 2021) who presented to the [facility] on 5/10 for planned posterior cervical decompression and fusion for cervical spinal cord compression and myelopathy 2/2 severe spinal stenosis resulting in gait dysfunction  He performed a posterior cervical decompressive laminectomy with instrumented fusion from C4-T1  No significant alterations in somatosensory and motor evoked potentials throughout the case  Post-op with C-spine precautions, but no need for collar  Post-op APS was consulted for pain who started him a on a methadone taper from 5/11-5/13  IV pain meds were discontinued on 5/12  He has been comfortable outside of achy neck pain  Started on a lido patch yesterday  Admitted to the [facility] on 5/13/2023  Acute Rehabilitation Center Course:   Functional deficits:  C1 AIS D, right upper extremity weakness, impaired sensation, neurogenic bladder, impaired mobility, impaired self care  Adherent to cervical precautions  NO C-Colar per NSGY  Patient participated in a comprehensive interdisciplinary inpatient rehabilitation program which included involvment of MD, therapies (PT, OT, and/or SLP), RN, CM, SW, dietary, and psychology services   He was able to be advanced to a modified independent level of assist with mobility using RW (transfers, ambulation 300 feet), supervision for stair negotiation; Mod I with UB ADLs, toileting, meal prep, and supervision with LB dressing with adaptive equipment  Patient remained medically stable and considered safe for discharge home  Patient to continue his rehabilitation with [facility] PT and OT  When he is able to drive again and off cervical precautions, recommendations is to transition to outpatient PT and OT - OT specifically neuro and hand therapy  Medical Issues Addressed While on ARC:       Spondylosis of cervical spine with myelopathy  Assessment & Plan  MRI showed Multilevel cervical spondylosis, Multifactorial disease results in overall severe canal stenosis with underlying cord compression at C5-C6 and C6-C7  Associated cord edema and/or myelomalacia is present at these levels  No abnormal enhancement identified within the spinal canal   Symptoms: possibly neurogenic bladder leading up to surgery, R hand spasticity (clenched fist), R hand weakness and sensory impairment, Gait impairment/balance issues  - Examines with bilateral UE sensory impairment and R > L weakness, with some mild RLE > LLE weakness    - Also examines with diffuse hyperreflexia  - See SCI for more details  Admitted on 5/10 for planned Posterior cervical decompressive laminectomy with instrumented fusion from C4-T1 with Dr Konstantin Porter  No need for cervical collar per Neurosurgery  C-Spine precautions  Drain removed    2 week follow-up with NSGY completed at [facility]  Follow-up now in 4 weeks around 6/25/23 as outpatient     * Incomplete spinal cord injury at C1-C4 level without bone injury [facility])  Assessment & Plan  On admission to ARC: Patient actually examines on ISNCSCI with C1 AIS D with profound loss of pinprick sensation starting at C2    - LT impairment begins at C5 bilaterally - so if C2/C3 impairment is not related to cord, he is at best C4    - However, given no other potential cause at this time - would label him C1 AIS D   -Bowel and bladder function intact and continent    - Has spasticity in his R hand which has improved since surgery  - Monitor     - Tone is FDP/FDS but mild    - Follow-up with PM&R as outpatient    Acute pain  Assessment & Plan  Improving Neuropathic pain  Nociceptive post op pain in his neck: Managed on Tylenol scheduled and PRN low dose Oxycodone IR  Robaxin changed to PRN while at [facility]  By discharge will only need Tylenol    Spinal stenosis of lumbar region at multiple levels  Assessment & Plan  Symptoms and weakness he reports improved with physical therapy  Pain management as detailed below  Tobacco dependence syndrome  Assessment & Plan  In remission  47 pack year history  Quit in 2021  Type 2 diabetes mellitus without complication, without long-term current use of insulin ([facility])  Assessment & Plan  Lab Results   Component Value Date    HGBA1C 6 0 (H) 05/02/2023     Home: Glimepiride 2mg with breakfast, Pioglitazone-Metformin 15-500mg BID  Here: Metformin 500mg BID; pioglitazone 15 mg BID  Diabetic Diet  Nutrition consult  Primary hypertension  Assessment & Plan  Home: Amlodipine- Benzapril 5-20mg tablet once daily  Here: Amlodipine 5mg daily, Lisinopril 10 mg daily  For discharge new Lotrel prescribed to reflect reduced Lisinopril dose  PCP to follow    Obesity, morbid ([facility] [address] )  Assessment & Plan  Counseling, Nutrition  Discharge Physical Examination:  Physical Exam  Vitals and nursing note reviewed  Constitutional:       General: He is not in acute distress  HENT:      Head: Normocephalic and atraumatic  Nose: Nose normal       Mouth/Throat:      Mouth: Mucous membranes are moist    Eyes:      Conjunctiva/sclera: Conjunctivae normal    Neck:      Comments: Incision clean and intact - all staples removed  Cardiovascular:      Rate and Rhythm: Normal rate and regular rhythm  Pulses: Normal pulses  Pulmonary:      Effort: Pulmonary effort is normal       Breath sounds: Normal breath sounds  No wheezing or rales  Abdominal:      General: Bowel sounds are normal  There is no distension  Palpations: Abdomen is soft  Tenderness: There is no abdominal tenderness  Musculoskeletal:         General: No swelling  Cervical back: Neck supple  Skin:     General: Skin is warm  Comments: Incision clean and intact - all staples removed   Neurological:      Mental Status: He is alert and oriented to person, place, and time  Comments: Sensation to light touch much improved now throughout neck and arms  Balance much improved   Right UE Strength:  4+/5 SAB, EE, EF, 4-/5 WE, WF, 3/5 FF, 1/5 FA, 1/5 Finger extension  Mild tone in finger flexors    Psychiatric:         Mood and Affect: Mood normal             Discharge Medications:   See after visit summary for reconciled discharge medications provided to patient and family  Condition at Discharge: stable     Discharge instructions/Information to patient and family:   See after visit summary for information provided to patient and family  Provisions for Follow-Up Care:  See after visit summary for information related to follow-up care and any pertinent home health orders  Future Appointments   Date Time Provider Cindy Kuo   6/1/2023 11:30 AM MD JR Masterson RD  [facility]   6/27/2023  2:15 PM Papito Carrasco MD NSURG [facility]   7/7/2023 12:30 PM Tamiko Yañez MD NEURO [facility]   8/4/2023  8:45 AM Killian Sepulveda DPM POD  [facility]   8/8/2023  8:45 AM BE NEURO [facility]   12/1/2023 11:50 AM MD JR Masterson RD  [facility]       Disposition: Home      Planned Readmission: No    Discharge Statement   I spent more than 30 minutes discharging the patient  This time was spent on the day of discharge   I had direct contact with the patient on the day of discharge  Greater than 50% of the total time was spent examining patient, answering all patient questions, arranging and discussing plan of care with patient as well as directly providing post-discharge instructions  Additional time then spent on discharge activities  Discharge Medications:  See after visit summary for reconciled discharge medications provided to patient and family

## 2023-05-26 NOTE — PROGRESS NOTES
05/26/23 1505   Hello, [Guardian’s Name / Patient’s Anu Ramey, this is [Caller Anu Ramey from [facility], and our clinical care team wanted to check on you / your child after your recent visit to the hospital  It will only take 3-5 minutes  Is this a good time? Discharge Call Type/ Specific Diagnosis: General Call   Call Complete   Attempted Number of Calls 1   Discharge phone call complete?  Left Message

## 2023-05-26 NOTE — CASE MANAGEMENT
Team dc summary - pt made good progress and returned home w/contd physical and occupational therapy services through [facility]  Referral made to meals on wheels at pt request  pts brother present for dc instructions and provided transport home

## 2023-05-30 NOTE — PROGRESS NOTES
05/30/23 1552   Hello, [Guardian’s Name / Patient’s Sima Holcomb, this is [Caller Sima Holcomb from [facility], and our clinical care team wanted to check on you / your child after your recent visit to the hospital  It will only take 3-5 minutes  Is this a good time? Discharge Call Type/ Specific Diagnosis: General Call   General Discharge Phone Call   Were your/your child's discharge instructions clear and understandable? Please tell me in your own words how to care for yourself/your child now that you're home Yes;Patient understood instructions   Have you filled your/your child's new prescriptions yet? Yes   What questions do you have about those medications? No questions   Are you/your child having any unusual symptoms or problems? (Specific to problem- i e , dressing, pain, bruising or swelling, procedure, etc ) No reported symptoms/problems   Do you have follow up appointment with your/your child's physician? Yes   Is there anything preventing you from keeping that appointment? No;Patient able to keep appointment   Are there any physicians, nurses, or hospital staff you would like us to recognize for doing a very good job? Nurse;PCA/Tech;PT/OT/RT/SLP   Thank you for taking the time to share with me about your care and recovery  Do you have any suggestions for us? No   This call resulted in: No interventions needed   Call Complete   Attempted Number of Calls 2   Discharge phone call complete?  Complete

## 2023-05-30 NOTE — PHYSICAL THERAPY NOTE
ARC PT DISCHARGE SUMMARY  Pt admitted to [facility] AND [facility] with dx of incomplete spinal cord injury at C1-C4 level without bone injury s/p posterior cervical decompressive laminectomy and instrumented fusion, cervical spinal precautions in place, no bracing  During pt's rehab stay he has made good progress towards LTGs  Although pt goals were to use SPC or no AD upon d/c, recommending RW for safety upon d/c  Pt is independent with transfers and ambulation with use of RW, recommend S for stairs as well as S for uneven surfaces for pt safety upon d/c  No DME needs at this time as pt has [facility] and RW at home  Recommend pt cont with [facility] PT upon d/c to further improve his balance and allow pt to return to mobilities with LRAD

## 2023-06-01 ENCOUNTER — OFFICE VISIT (OUTPATIENT)
Dept: FAMILY MEDICINE CLINIC | Facility: CLINIC | Age: 68
End: 2023-06-01

## 2023-06-01 VITALS
BODY MASS INDEX: 39.05 KG/M2 | OXYGEN SATURATION: 97 % | DIASTOLIC BLOOD PRESSURE: 77 MMHG | HEART RATE: 78 BPM | HEIGHT: 66 IN | SYSTOLIC BLOOD PRESSURE: 126 MMHG | TEMPERATURE: 96.7 F | WEIGHT: 243 LBS | RESPIRATION RATE: 16 BRPM

## 2023-06-01 DIAGNOSIS — E66.01 OBESITY, MORBID (HCC): ICD-10-CM

## 2023-06-01 DIAGNOSIS — I10 PRIMARY HYPERTENSION: ICD-10-CM

## 2023-06-01 DIAGNOSIS — E11.9 TYPE 2 DIABETES MELLITUS WITHOUT COMPLICATION, WITHOUT LONG-TERM CURRENT USE OF INSULIN (HCC): Primary | ICD-10-CM

## 2023-06-01 DIAGNOSIS — E78.2 MIXED HYPERLIPIDEMIA: ICD-10-CM

## 2023-06-01 PROBLEM — R52 ACUTE PAIN: Status: RESOLVED | Noted: 2023-05-18 | Resolved: 2023-06-01

## 2023-06-01 PROBLEM — Z01.818 PREOPERATIVE CLEARANCE: Status: RESOLVED | Noted: 2023-05-04 | Resolved: 2023-06-01

## 2023-06-01 NOTE — PATIENT INSTRUCTIONS
Medicare Preventive Visit Patient Instructions  Thank you for completing your Welcome to Medicare Visit or Medicare Annual Wellness Visit today  Your next wellness visit will be due in one year (6/1/2024)  The screening/preventive services that you may require over the next 5-10 years are detailed below  Some tests may not apply to you based off risk factors and/or age  Screening tests ordered at today's visit but not completed yet may show as past due  Also, please note that scanned in results may not display below  Preventive Screenings:  Service Recommendations Previous Testing/Comments   Colorectal Cancer Screening  · Colonoscopy    · Fecal Occult Blood Test (FOBT)/Fecal Immunochemical Test (FIT)  · Fecal DNA/Cologuard Test  · Flexible Sigmoidoscopy Age: 39-70 years old   Colonoscopy: every 10 years (May be performed more frequently if at higher risk)  OR  FOBT/FIT: every 1 year  OR  Cologuard: every 3 years  OR  Sigmoidoscopy: every 5 years  Screening may be recommended earlier than age 39 if at higher risk for colorectal cancer  Also, an individualized decision between you and your healthcare provider will decide whether screening between the ages of 74-80 would be appropriate  Colonoscopy: Not on file  FOBT/FIT: Not on file  Cologuard: Not on file  Sigmoidoscopy: Not on file          Prostate Cancer Screening Individualized decision between patient and health care provider in men between ages of 53-78   Medicare will cover every 12 months beginning on the day after your 50th birthday PSA: 1 3 ng/mL           Hepatitis C Screening Once for adults born between 1945 and 1965  More frequently in patients at high risk for Hepatitis C Hep C Antibody: 02/21/2023        Diabetes Screening 1-2 times per year if you're at risk for diabetes or have pre-diabetes Fasting glucose: 144 mg/dL (5/19/2023)  A1C: 6 0 % (5/2/2023)      Cholesterol Screening Once every 5 years if you don't have a lipid disorder   May order more often based on risk factors  Lipid panel: 08/08/2022         Other Preventive Screenings Covered by Medicare:  1  Abdominal Aortic Aneurysm (AAA) Screening: covered once if your at risk  You're considered to be at risk if you have a family history of AAA or a male between the age of 73-68 who smoking at least 100 cigarettes in your lifetime  2  Lung Cancer Screening: covers low dose CT scan once per year if you meet all of the following conditions: (1) Age 50-69; (2) No signs or symptoms of lung cancer; (3) Current smoker or have quit smoking within the last 15 years; (4) You have a tobacco smoking history of at least 20 pack years (packs per day x number of years you smoked); (5) You get a written order from a healthcare provider  3  Glaucoma Screening: covered annually if you're considered high risk: (1) You have diabetes OR (2) Family history of glaucoma OR (3)  aged 48 and older OR (3)  American aged 72 and older  3  Osteoporosis Screening: covered every 2 years if you meet one of the following conditions: (1) Have a vertebral abnormality; (2) On glucocorticoid therapy for more than 3 months; (3) Have primary hyperparathyroidism; (4) On osteoporosis medications and need to assess response to drug therapy  5  HIV Screening: covered annually if you're between the age of 12-76  Also covered annually if you are younger than 13 and older than 72 with risk factors for HIV infection  For pregnant patients, it is covered up to 3 times per pregnancy      Immunizations:  Immunization Recommendations   Influenza Vaccine Annual influenza vaccination during flu season is recommended for all persons aged >= 6 months who do not have contraindications   Pneumococcal Vaccine   * Pneumococcal conjugate vaccine = PCV13 (Prevnar 13), PCV15 (Vaxneuvance), PCV20 (Prevnar 20)  * Pneumococcal polysaccharide vaccine = PPSV23 (Pneumovax) Adults 25-60 years old: 1-3 doses may be recommended based on certain risk factors  Adults 72 years old: 1-2 doses may be recommended based off what pneumonia vaccine you previously received   Hepatitis B Vaccine 3 dose series if at intermediate or high risk (ex: diabetes, end stage renal disease, liver disease)   Tetanus (Td) Vaccine - COST NOT COVERED BY MEDICARE PART B Following completion of primary series, a booster dose should be given every 10 years to maintain immunity against tetanus  Td may also be given as tetanus wound prophylaxis  Tdap Vaccine - COST NOT COVERED BY MEDICARE PART B Recommended at least once for all adults  For pregnant patients, recommended with each pregnancy  Shingles Vaccine (Shingrix) - COST NOT COVERED BY MEDICARE PART B  2 shot series recommended in those aged 48 and above     Health Maintenance Due:      Topic Date Due   • Lung Cancer Screening  08/26/2023 (Originally 9/26/2003)   • Colorectal Cancer Screening  08/26/2023 (Originally 9/26/1998)   • Hepatitis C Screening  Completed     Immunizations Due:      Topic Date Due   • COVID-19 Vaccine (4 - Erinn Rain series) 01/14/2022     Advance Directives   What are advance directives? Advance directives are legal documents that state your wishes and plans for medical care  These plans are made ahead of time in case you lose your ability to make decisions for yourself  Advance directives can apply to any medical decision, such as the treatments you want, and if you want to donate organs  What are the types of advance directives? There are many types of advance directives, and each state has rules about how to use them  You may choose a combination of any of the following:  · Living will: This is a written record of the treatment you want  You can also choose which treatments you do not want, which to limit, and which to stop at a certain time  This includes surgery, medicine, IV fluid, and tube feedings  · Durable power of  for healthcare [facility]):   This is a written record that states who you want to make healthcare choices for you when you are unable to make them for yourself  This person, called a proxy, is usually a family member or a friend  You may choose more than 1 proxy  · Do not resuscitate (DNR) order:  A DNR order is used in case your heart stops beating or you stop breathing  It is a request not to have certain forms of treatment, such as CPR  A DNR order may be included in other types of advance directives  · Medical directive: This covers the care that you want if you are in a coma, near death, or unable to make decisions for yourself  You can list the treatments you want for each condition  Treatment may include pain medicine, surgery, blood transfusions, dialysis, IV or tube feedings, and a ventilator (breathing machine)  · Values history: This document has questions about your views, beliefs, and how you feel and think about life  This information can help others choose the care that you would choose  Why are advance directives important? An advance directive helps you control your care  Although spoken wishes may be used, it is better to have your wishes written down  Spoken wishes can be misunderstood, or not followed  Treatments may be given even if you do not want them  An advance directive may make it easier for your family to make difficult choices about your care  Weight Management   Why it is important to manage your weight:  Being overweight increases your risk of health conditions such as heart disease, high blood pressure, type 2 diabetes, and certain types of cancer  It can also increase your risk for osteoarthritis, sleep apnea, and other respiratory problems  Aim for a slow, steady weight loss  Even a small amount of weight loss can lower your risk of health problems  How to lose weight safely:  A safe and healthy way to lose weight is to eat fewer calories and get regular exercise   You can lose up about 1 pound a week by decreasing the number of calories you eat by 500 calories each day  Healthy meal plan for weight management:  A healthy meal plan includes a variety of foods, contains fewer calories, and helps you stay healthy  A healthy meal plan includes the following:  · Eat whole-grain foods more often  A healthy meal plan should contain fiber  Fiber is the part of grains, fruits, and vegetables that is not broken down by your body  Whole-grain foods are healthy and provide extra fiber in your diet  Some examples of whole-grain foods are whole-wheat breads and pastas, oatmeal, brown rice, and bulgur  · Eat a variety of vegetables every day  Include dark, leafy greens such as spinach, kale, sarah beth greens, and mustard greens  Eat yellow and orange vegetables such as carrots, sweet potatoes, and winter squash  · Eat a variety of fruits every day  Choose fresh or canned fruit (canned in its own juice or light syrup) instead of juice  Fruit juice has very little or no fiber  · Eat low-fat dairy foods  Drink fat-free (skim) milk or 1% milk  Eat fat-free yogurt and low-fat cottage cheese  Try low-fat cheeses such as mozzarella and other reduced-fat cheeses  · Choose meat and other protein foods that are low in fat  Choose beans or other legumes such as split peas or lentils  Choose fish, skinless poultry (chicken or turkey), or lean cuts of red meat (beef or pork)  Before you cook meat or poultry, cut off any visible fat  · Use less fat and oil  Try baking foods instead of frying them  Add less fat, such as margarine, sour cream, regular salad dressing and mayonnaise to foods  Eat fewer high-fat foods  Some examples of high-fat foods include french fries, doughnuts, ice cream, and cakes  · Eat fewer sweets  Limit foods and drinks that are high in sugar  This includes candy, cookies, regular soda, and sweetened drinks  Exercise:  Exercise at least 30 minutes per day on most days of the week   Some examples of exercise include walking, biking, dancing, and swimming  You can also fit in more physical activity by taking the stairs instead of the elevator or parking farther away from stores  Ask your healthcare provider about the best exercise plan for you  © Copyright [address] 2018 Information is for End User's use only and may not be sold, redistributed or otherwise used for commercial purposes   All illustrations and images included in CareNotes® are the copyrighted property of [employer]  or [address] [place] [address]

## 2023-06-01 NOTE — ASSESSMENT & PLAN NOTE
Patient was advised to lose weight  Potential consequences of obesity discussed with patient    Advised to have portion control no more than 60% of meal or may consider intermittent fasting  We will continue to monitor

## 2023-06-01 NOTE — PROGRESS NOTES
Assessment and Plan:     Problem List Items Addressed This Visit        Endocrine    Type 2 diabetes mellitus without complication, without long-term current use of insulin ([address] ) - Primary   Other Visit Diagnoses     Medicare annual wellness visit, subsequent               Preventive health issues were discussed with patient, and age appropriate screening tests were ordered as noted in patient's After Visit Summary  Personalized health advice and appropriate referrals for health education or preventive services given if needed, as noted in patient's After Visit Summary       History of Present Illness:     Patient presents for a Medicare Wellness Visit    HPI   Patient Care Team:  Tami Sheehan MD as PCP - General (Internal Medicine)     Review of Systems:     Review of Systems     Problem List:     Patient Active Problem List   Diagnosis   • Cubital tunnel syndrome on right   • Carpal tunnel syndrome, left   • Obesity, morbid (HCC)   • Carpal tunnel syndrome of right wrist   • Atrophy of muscle of right hand   • Primary hypertension   • Type 2 diabetes mellitus without complication, without long-term current use of insulin ([facility])   • Chronic bilateral low back pain without sciatica   • Mixed hyperlipidemia   • Tobacco dependence syndrome   • Spinal stenosis of lumbar region at multiple levels   • Spondylosis of cervical spine with myelopathy   • Incomplete spinal cord injury at C1-C4 level without bone injury [facility])   • Neurogenic claudication   • Preoperative clearance   • Acute pain      Past Medical and Surgical History:     Past Medical History:   Diagnosis Date   • Diabetes mellitus ([address] )    • ED (erectile dysfunction)    • Hyperlipidemia    • Hypertension    • Obesity    • Spinal stenosis of lumbar region at multiple levels 12/22/2022     Past Surgical History:   Procedure Laterality Date   • CARPAL TUNNEL RELEASE Right    • EYE SURGERY      strabibusmus   • HAND SURGERY      left index finger   • OH ARTHEVELINA PST/PSTLAT TQ 1NTRSPC CRV BELW C2 SEGMENT N/A 5/10/2023    Procedure: Posterior cervical decompressive laminectomy and instrumented fusion C4-T1; Surgeon: Amy Bowen MD;  [facility];  Service: Neurosurgery   • [place] EXCISION SPERMATOCELE W/WO EPIDIDYMECTOMY Left 2021    Procedure: Kevan Cortez;  Surgeon: Milagros Zaman MD;  Location: [facility];  Service: Urology   • [place] NEUROPLASTY &/TRANSPOS MEDIAN NRV Sherie Bleak Right 2021    Procedure: RELEASE CARPAL TUNNEL;  Surgeon: Doroteo Evans MD;  [facility];  Service: Orthopedics   • [place] NEUROPLASTY &/TRANSPOSITION ULNAR NERVE ELBOW Right 2021    Procedure: Raul Sniff;  Surgeon: Doroteo Evans MD;  [facility];  Service: Orthopedics      Family History:     Family History   Problem Relation Age of Onset   • Hypertension Mother    • Diabetes Mother    • Hypertension Father    • Diabetes Father       Social History:     Social History     Socioeconomic History   • Marital status:       Spouse name: Not on file   • Number of children: Not on file   • Years of education: Not on file   • Highest education level: Not on file   Occupational History   • Not on file   Tobacco Use   • Smoking status: Former     Packs/day: 1 00     Years: 47 00     Total pack years: 47 00     Types: Cigarettes     Quit date: 2021     Years since quittin 3   • Smokeless tobacco: Never   • Tobacco comments:     advised not to smoke   Vaping Use   • Vaping Use: Never used   Substance and Sexual Activity   • Alcohol use: Not Currently     Comment: social   • Drug use: Not Currently   • Sexual activity: Not Currently   Other Topics Concern   • Not on file   Social History Narrative   • Not on file     Social Determinants of Health     Financial Resource Strain: Low Risk  (3/1/2023)    Overall Financial Resource Strain (CARDIA)    • Difficulty of Paying Living Expenses: Not very hard   Food Insecurity: No Food Insecurity (5/11/2023)    Hunger Vital Sign    • Worried About Running Out of Food in the Last Year: Never true    • Ran Out of Food in the Last Year: Never true   Transportation Needs: No Transportation Needs (5/11/2023)    PRAPARE - Transportation    • Lack of Transportation (Medical): No    • Lack of Transportation (Non-Medical): No   Physical Activity: Not on file   Stress: Not on file   Social Connections: Not on file   Intimate Partner Violence: Not on file   Housing Stability: Unknown (5/11/2023)    Housing Stability Vital Sign    • Unable to Pay for Housing in the Last Year: No    • Number of Places Lived in the Last Year: Not on file    • Unstable Housing in the Last Year: No      Medications and Allergies:     Current Outpatient Medications   Medication Sig Dispense Refill   • acetaminophen (TYLENOL) 325 mg tablet Take 2 tablets (650 mg total) by mouth every 6 (six) hours as needed for mild pain  0   • amLODIPine-benazepril (LOTREL 5-10) 5-10 MG per capsule Take 1 capsule by mouth daily 30 capsule 0   • pioglitazone-metFORMIN (ACTOPLUS MET)  MG per tablet Take 1 tablet by mouth 2 (two) times a day with meals  0     No current facility-administered medications for this visit       No Known Allergies   Immunizations:     Immunization History   Administered Date(s) Administered   • COVID-19 MODERNA VACC 0 5 ML IM 03/24/2021, 04/23/2021, 11/19/2021   • INFLUENZA 10/10/2020   • Influenza, high dose seasonal 0 7 mL 10/14/2022   • Pneumococcal Conjugate 13-Valent 03/05/2018   • Pneumococcal Polysaccharide PPV23 04/24/2019      Health Maintenance:         Topic Date Due   • Lung Cancer Screening  08/26/2023 (Originally 9/26/2003)   • Colorectal Cancer Screening  08/26/2023 (Originally 9/26/1998)   • Hepatitis C Screening  Completed         Topic Date Due   • COVID-19 Vaccine (4 - Moderna series) 01/14/2022      Medicare Screening Tests and Risk Assessments:     Annual Wellness Visit  No results found  Physical Exam:     There were no vitals taken for this visit      Physical Exam     Lizz Reyes MD

## 2023-06-01 NOTE — PROGRESS NOTES
Assessment/Plan:         Problem List Items Addressed This Visit        Endocrine    Type 2 diabetes mellitus without complication, without long-term current use of insulin ([facility] ) - Primary       Cardiovascular and Mediastinum    Primary hypertension     Under control  Continue current medication  We will re-evaluate at next office visit  Other    Obesity, morbid ([facility] )     Patient was advised to lose weight  Potential consequences of obesity discussed with patient  Advised to have portion control no more than 60% of meal or may consider intermittent fasting  We will continue to monitor           Mixed hyperlipidemia     Under control  Continue current medication  We will re-evaluate at next office visit  Subjective:      Patient ID: Shamar Barnes is a 69 y o  male  Patient here for review of chronic medical problems and  the labs and imaging if it is applicable  Currently has no specific complaints other than mentioned in the review of systems  Denies chest pain, SOB, cough, abdominal pain, nausea, vomiting, fever, chills, lightheadedness, dizziness,headache, tingling or numbness  No bowel or bladder problem  Patient had a cervical spine surgery since then his back pain is gone  His hand strength is improving since the physical therapy initiated no other new problem  The following portions of the patient's history were reviewed and updated as appropriate:   Past Medical History:  He has a past medical history of Diabetes mellitus ([facility] ), ED (erectile dysfunction), Hyperlipidemia, Hypertension, Obesity, and Spinal stenosis of lumbar region at multiple levels (12/22/2022)  ,  _______________________________________________________________________  Medical Problems:  does not have any pertinent problems on file ,  _______________________________________________________________________  Past Surgical History:   has a past surgical history that includes Hand surgery;  Eye surgery; pr neuroplasty &/transposition ulnar nerve elbow (Right, 07/06/2021); pr neuroplasty &/transpos median nrv carpal tunne (Right, 07/06/2021); Carpal tunnel release (Right); pr excision spermatocele w/wo epididymectomy (Left, 09/16/2021); and pr arthrd pst/pstlat tq 1ntrspc crv belw c2 segment (N/A, 5/10/2023)  ,  _______________________________________________________________________  Family History:  family history includes Diabetes in his father and mother; Hypertension in his father and mother ,  _______________________________________________________________________  Social History:   reports that he quit smoking about 2 years ago  His smoking use included cigarettes  He has a 47 00 pack-year smoking history  He has never used smokeless tobacco  He reports that he does not currently use alcohol  He reports that he does not currently use drugs  ,  _______________________________________________________________________  Allergies:  has No Known Allergies     _______________________________________________________________________  Current Outpatient Medications   Medication Sig Dispense Refill   • acetaminophen (TYLENOL) 325 mg tablet Take 2 tablets (650 mg total) by mouth every 6 (six) hours as needed for mild pain  0   • amLODIPine-benazepril (LOTREL 5-10) 5-10 MG per capsule Take 1 capsule by mouth daily 30 capsule 0   • pioglitazone-metFORMIN (ACTOPLUS MET)  MG per tablet Take 1 tablet by mouth 2 (two) times a day with meals  0     No current facility-administered medications for this visit      _______________________________________________________________________  Review of Systems   Constitutional: Negative for chills, fatigue and fever  HENT: Negative for congestion, ear pain, rhinorrhea, sneezing and sore throat  Eyes: Negative for redness, itching and visual disturbance  Respiratory: Negative for cough, chest tightness and shortness of breath      Cardiovascular: Negative for chest pain, "palpitations and leg swelling  Gastrointestinal: Negative for abdominal pain, blood in stool, diarrhea, nausea and vomiting  Endocrine: Negative for cold intolerance and heat intolerance  Genitourinary: Negative for dysuria, frequency and urgency  Musculoskeletal: Positive for arthralgias, gait problem and myalgias  Negative for back pain and neck stiffness  Skin: Negative for color change and rash  Neurological: Positive for weakness (Both hands)  Negative for dizziness, tremors, light-headedness, numbness and headaches  Hematological: Does not bruise/bleed easily  Psychiatric/Behavioral: Negative for agitation, behavioral problems and confusion  Objective:  Vitals:    06/01/23 1144   BP: 126/77   BP Location: Left arm   Patient Position: Sitting   Cuff Size: Standard   Pulse: 78   Resp: 16   Temp: (!) 96 7 °F (35 9 °C)   SpO2: 97%   Weight: 110 kg (243 lb)   Height: 5' 6\" (1 676 m)     Body mass index is 39 22 kg/m²  Physical Exam  Vitals and nursing note reviewed  Constitutional:       General: He is not in acute distress  Appearance: He is obese  He is not ill-appearing, toxic-appearing or diaphoretic  HENT:      Head: Normocephalic and atraumatic  Right Ear: Tympanic membrane normal       Left Ear: Tympanic membrane normal       Nose: Nose normal       Mouth/Throat:      Mouth: Mucous membranes are moist       Pharynx: No oropharyngeal exudate or posterior oropharyngeal erythema  Eyes:      General: No scleral icterus  Right eye: No discharge  Left eye: No discharge  Conjunctiva/sclera: Conjunctivae normal       Pupils: Pupils are equal, round, and reactive to light  Cardiovascular:      Rate and Rhythm: Normal rate and regular rhythm  Heart sounds: Normal heart sounds  No murmur heard  Pulmonary:      Effort: Pulmonary effort is normal  No respiratory distress  Breath sounds: Normal breath sounds  No wheezing     Abdominal:      " General: Abdomen is flat  Bowel sounds are normal       Palpations: Abdomen is soft  Tenderness: There is no abdominal tenderness  Musculoskeletal:      Cervical back: Normal range of motion and neck supple  Comments: Paraspinal muscular tightness   Skin:     General: Skin is warm and dry  Capillary Refill: Capillary refill takes 2 to 3 seconds  Findings: No erythema or rash  Neurological:      General: No focal deficit present  Mental Status: He is alert and oriented to person, place, and time  Mental status is at baseline  Motor: Weakness (Both upper extremity) and atrophy (Right hand and right forearm) present  Gait: Gait abnormal (ambulates with walker)     Psychiatric:         Mood and Affect: Mood normal          Behavior: Behavior normal

## 2023-06-02 ENCOUNTER — TELEPHONE (OUTPATIENT)
Dept: FAMILY MEDICINE CLINIC | Facility: CLINIC | Age: 68
End: 2023-06-02

## 2023-06-02 DIAGNOSIS — I10 PRIMARY HYPERTENSION: ICD-10-CM

## 2023-06-02 RX ORDER — AMLODIPINE BESYLATE AND BENAZEPRIL HYDROCHLORIDE 5; 10 MG/1; MG/1
1 CAPSULE ORAL DAILY
Qty: 30 CAPSULE | Refills: 0 | Status: SHIPPED | OUTPATIENT
Start: 2023-06-02

## 2023-06-02 NOTE — TELEPHONE ENCOUNTER
I need a 90 day supply through the mail order that I've been getting it from doctor Chitra Horne, I wanted  to just verify the milligrams for the blood pressure medicine  It's 5-10  milligram amlodipine benazaperil  If you have any questions call me [phone]  Thank you  You received a voice mail from [address] [place] [address] Sreedhar Zapata

## 2023-06-07 NOTE — OCCUPATIONAL THERAPY NOTE
OT Discharge Summary    Pt has made good  progress during time of stay at the [facility]  Pt is overall functioning at Independent  for ADLs, Independent  for functional transfers and Independent  for functional mobility  Prior to [address] recommendations include walker  DME was ordered to maximize functional independence and decrease fall risk  Based on pts functional performance, pt is safe to [address] w/ recommendations noted above  Pt would benefit from continued OT services in outpatient therapies to maximize functional abilities

## 2023-06-20 ENCOUNTER — HOSPITAL ENCOUNTER (OUTPATIENT)
Dept: RADIOLOGY | Facility: HOSPITAL | Age: 68
Discharge: HOME | End: 2023-06-20
Payer: MEDICARE

## 2023-06-20 DIAGNOSIS — Z98.890 POST-OPERATIVE STATE: ICD-10-CM

## 2023-06-20 PROCEDURE — 72040 X-RAY EXAM NECK SPINE 2-3 VW: CPT

## 2023-06-27 ENCOUNTER — TELEPHONE (OUTPATIENT)
Dept: NEUROLOGY | Facility: CLINIC | Age: 68
End: 2023-06-27

## 2023-06-27 ENCOUNTER — OFFICE VISIT (OUTPATIENT)
Dept: NEUROSURGERY | Facility: CLINIC | Age: 68
End: 2023-06-27

## 2023-06-27 VITALS
SYSTOLIC BLOOD PRESSURE: 160 MMHG | TEMPERATURE: 97.4 F | OXYGEN SATURATION: 96 % | HEIGHT: 66 IN | HEART RATE: 78 BPM | BODY MASS INDEX: 39.53 KG/M2 | WEIGHT: 246 LBS | DIASTOLIC BLOOD PRESSURE: 90 MMHG

## 2023-06-27 DIAGNOSIS — Z98.1 ARTHRODESIS STATUS: Primary | ICD-10-CM

## 2023-06-27 DIAGNOSIS — Z09 POSTOPERATIVE EXAMINATION: ICD-10-CM

## 2023-06-27 PROCEDURE — 99024 POSTOP FOLLOW-UP VISIT: CPT | Performed by: NEUROLOGICAL SURGERY

## 2023-06-27 NOTE — PROGRESS NOTES
DISCUSSION SUMMARY   This is a 69 y o  male status post a cervical thoracic fusion  He is doing very well  Physical therapy and Occupational Therapy will be essential for him to continue his recovery    Return in 3 months (on 9/27/2023)  Diagnosis ICD-10-CM Associated Orders   1  Arthrodesis status  Z98 1 XR spine cervical complete 6+ vw flex/ext/obl     Ambulatory referral to Physical Therapy      2  Postoperative examination  Z09 Ambulatory referral to Physical Therapy           Chief Complaint   Patient presents with   • Post-op     (PEND XR READ [id number])---5 WK POV XR CSPINE 6/20/23 SL S/P PCDF C4-T1         History of Present Illness   This patient presented to our office with neurogenic claudication and increasing difficulty with ambulation and severe spinal stenosis at the cervical thoracic junction   He nearly fell in the office even though was using a walker   He was unsteady but was much improved after some instructions in using a walker properly and being careful with turnaround's  Surgery was recommended non-urgently  5/10/23 DKO - Posterior cervical decompressive laminectomy and instrumented fusion C4-T1  Review of Systems   Constitutional: Positive for activity change (restricted)  HENT: Negative  Negative for trouble swallowing  Eyes: Negative  Respiratory: Negative  Cardiovascular: Negative  Gastrointestinal: Negative  Endocrine: Negative  Genitourinary: Negative  Musculoskeletal: Positive for back pain (improving, once in a while a little ache), gait problem (using a walker) and neck pain (once in while i feel pain on the left base of the neck)  Negative for neck stiffness  Skin: Negative  Allergic/Immunologic: Negative  Neurological: Positive for weakness (R>L hands, and R and L A LITTLE UNSTEADY) and numbness (R>L hands and R hands a little  )  Negative for headaches  Hematological: Negative  Psychiatric/Behavioral: Negative    Negative for "sleep disturbance  All other systems reviewed and are negative  I reviewed the ROS    Vitals:    /90 (BP Location: Right arm, Patient Position: Sitting, Cuff Size: Standard)   Pulse 78   Temp (!) 97 4 °F (36 3 °C) (Temporal)   Ht 5' 6\" (1 676 m)   Wt 112 kg (246 lb)   SpO2 96%   BMI 39 71 kg/m²     MEDICAL HISTORY  Past Medical History:   Diagnosis Date   • Diabetes mellitus ([address] )    • ED (erectile dysfunction)    • Hyperlipidemia    • Hypertension    • Obesity    • Spinal stenosis of lumbar region at multiple levels 2022     Past Surgical History:   Procedure Laterality Date   • CARPAL TUNNEL RELEASE Right    • EYE SURGERY      strabibusmus   • HAND SURGERY      left index finger   • [place] ARTHRD PST/PSTLAT TQ 1NTRSPC CRV BELW C2 SEGMENT N/A 5/10/2023    Procedure: Posterior cervical decompressive laminectomy and instrumented fusion C4-T1;   Surgeon: Graciela Schwartz MD;  Location:  MAIN OR;  Service: Neurosurgery   • [place] EXCISION SPERMATOCELE W/WO EPIDIDYMECTOMY Left 2021    Procedure: HYDROCELECTOMY, SCROTAL DEBRIDEMENT;  Surgeon: Mauri Nunez MD;  [facility];  Service: Urology   • [place] NEUROPLASTY &/TRANSPOS MEDIAN NRV Bobbye Rey Right 2021    Procedure: RELEASE CARPAL TUNNEL;  Surgeon: Moni Guerra MD;  [facility];  Service: Orthopedics   • [place] NEUROPLASTY &/TRANSPOSITION ULNAR NERVE ELBOW Right 2021    Procedure: Bobbi Toledo;  Surgeon: Moni Guerar MD;  Location: [facility] OR;  Service: Orthopedics     Social History     Tobacco Use   • Smoking status: Former     Packs/day: 1 00     Years: 47 00     Total pack years: 47 00     Types: Cigarettes     Quit date: 2021     Years since quittin 4   • Smokeless tobacco: Never   • Tobacco comments:     advised not to smoke   Vaping Use   • Vaping Use: Never used   Substance Use Topics   • Alcohol use: Not Currently     Comment: social   • Drug use: Not Currently    " Family History   Problem Relation Age of Onset   • Hypertension Mother    • Diabetes Mother    • Hypertension Father    • Diabetes Father         Current Outpatient Medications:   •  acetaminophen (TYLENOL) 325 mg tablet, Take 2 tablets (650 mg total) by mouth every 6 (six) hours as needed for mild pain, Disp: , Rfl: 0  •  amLODIPine-benazepril (LOTREL 5-10) 5-10 MG per capsule, Take 1 capsule by mouth daily, Disp: 30 capsule, Rfl: 0  •  pioglitazone-metFORMIN (ACTOPLUS MET)  MG per tablet, Take 1 tablet by mouth 2 (two) times a day with meals, Disp: , Rfl: 0     No Known Allergies     The following portions of the patient's history were updated by MA and reviewed by MD: allergies, current medications, past family history, past medical history, past social history, past surgical history and problem list     Physical Exam  Ambulation Station and gait are abnormal however he has no foot crossing and his stance is relatively wide  Transitions from sitting to standing are slow but without risk of falling he is using a walker  His hands are improving with more improvement on the left than on the right but both are improved in comparison to the previous state  RESULTS/DATA  I have personally reviewed pertinent films in PACS     X-rays of the cervical spine demonstrate the instrumentation to be in good position without signs of loosening or breakage

## 2023-07-07 ENCOUNTER — OFFICE VISIT (OUTPATIENT)
Dept: NEUROLOGY | Facility: CLINIC | Age: 68
End: 2023-07-07
Payer: MEDICARE

## 2023-07-07 VITALS
HEART RATE: 81 BPM | HEIGHT: 66 IN | BODY MASS INDEX: 39.21 KG/M2 | WEIGHT: 244 LBS | SYSTOLIC BLOOD PRESSURE: 120 MMHG | DIASTOLIC BLOOD PRESSURE: 71 MMHG

## 2023-07-07 DIAGNOSIS — M47.812 CERVICAL SPONDYLOSIS: Primary | ICD-10-CM

## 2023-07-07 PROCEDURE — 99213 OFFICE O/P EST LOW 20 MIN: CPT | Performed by: STUDENT IN AN ORGANIZED HEALTH CARE EDUCATION/TRAINING PROGRAM

## 2023-07-07 NOTE — PATIENT INSTRUCTIONS
Patient Instructions:  -continue physical therapy  -continue follow up with neurosurgery  -follow up with neurology for any new or worsening symptoms

## 2023-07-07 NOTE — PROGRESS NOTES
[facility] Consult  PATIENT:  Bar Perez  MRN:  [id number]  :  1955  DATE OF SERVICE:  2023  REFERRED BY: No ref. provider found  PMD: Muriel Herrera MD    Assessment/Plan:     Bar Perez is a very pleasant 69 y.o. male with a past medical history that includes lumbar spinal stenosis at multiple levels, T2DM, HLD, HTN, carpal tunnel syndrome, and obesity who is presenting for f/u of RUE atrophy. Cervical spondylosis  -MRI cervical spine ordered after last visit which was significant for severe canal stenosis with underlying cord compression at C5-C6 and C6-C7 with associated cord edema/myelomalacia  -now s/p PCDF C4-T1 with significant improvement in his symptoms  -continue physical therapy/occupational therapy  -follow up with neurosurgery as recommended    CC:   R hand atrophy    History of Present Illness:     Interval Hx:  MRI cervical spine ordered after last visit which was significant for severe canal stenosis with underlying cord compression at C5-C6 and C6-C7 with associated cord edema/myelomalacia. He was evaluated by neurosurgery for these findings and is now s/p PCDF C4-T1. States that his overall strength/balance has been improving significantly with PT post-surgery, and he is optimistic regarding future improvements. He has no new neurologic complaints today. Previous Hx  69 y.o. male with a past medical history significant for lumbar spinal stenosis at multiple levels, T2DM, HLD, HTN, carpal tunnel syndrome, and obesity who is presenting for a consult regarding atrophy of his right hand. This started 7 years ago when he initially noticed weakness in his 4th and 5th digits that progressed to difficulty extending/flexing and using those fingers. This has now progressed to his entire hand over the last several years. He notices the pattern of weakness is occurring in a stepwise fashion, with an increase in weakness and loss of function each winter.  Last year, his thumb and 2nd digit were working well, but this past winter, they have lost function and he is having difficulty extending and gripping objects with those fingers as well. He also notes atrophy of his right forearm progressing slowly over a similar time frame. He denies any pain, tingling, or twitching in his right arm and hand. He denies any pain in his neck. EMG performed 4/20/21:    1. There is evidence of a bilateral median nerve entrapment neuropathy at the wrist. It is of a moderate degree on the left and a mild to moderate degree on the right. There is evidence of reinnervation changes suggestive of a chronic process. This is consistent with a diagnosis of carpal tunnel syndrome   2. There is evidence of an ulnar neuropathy on the right at or below the elbow predominantly axonal with secondary demyelination across the elbow. There is evidence of denervation as well as reinnervation changes in ulnar innervated muscles consistent with a severe with on ongoing process of a 4 to 6 week duration. This is consistent with a diagnosis of cubital tunnel syndrome   3. There is evidence of an ulnar neuropathy on the left at or below the elbow predominantly axonal with denervation noted in ulnar innervated muscles below the elbow and reinnervation consistent with a chronic process but now with acute denervation suggestive of duration of 4 to 6 week duration . But this is consistent with a diagnosis of cubital tunnel syndrome   There is no evidence of a cervical radiculopathy or a brachial given the lack of other muscle involvement as well as normal paraspinal testing     Throughout 2021, he did see occupational therapy, but stopped because he was told it was not helping his strength. In July of 2021, during the time he was seeing OT, he had a carpal and cubital tunnel release on his right, which improved function for several months until it became cold and he regressed.      Over the last several months, he has now noticed the same pattern beginning in his left hand, beginning with weakness and loss of some functionality of the 4th and 5th digits. He denies any trauma to the head or neck. Past Medical History:     Past Medical History:   Diagnosis Date   • Diabetes mellitus ([address])    • ED (erectile dysfunction)    • Hyperlipidemia    • Hypertension    • Obesity    • S/P cervical spinal fusion 05/10/2023   • Spinal stenosis of lumbar region at multiple levels 12/22/2022       Patient Active Problem List   Diagnosis   • Cubital tunnel syndrome on right   • Carpal tunnel syndrome, left   • Obesity, morbid (HCC)   • Carpal tunnel syndrome of right wrist   • Atrophy of muscle of right hand   • Primary hypertension   • Type 2 diabetes mellitus without complication, without long-term current use of insulin ([facility])   • Chronic bilateral low back pain without sciatica   • Mixed hyperlipidemia   • Tobacco dependence syndrome   • Spinal stenosis of lumbar region at multiple levels   • Spondylosis of cervical spine with myelopathy   • Incomplete spinal cord injury at C1-C4 level without bone injury [facility])   • Neurogenic claudication       Medications:      Current Outpatient Medications   Medication Sig Dispense Refill   • acetaminophen (TYLENOL) 325 mg tablet Take 2 tablets (650 mg total) by mouth every 6 (six) hours as needed for mild pain  0   • amLODIPine-benazepril (LOTREL 5-10) 5-10 MG per capsule Take 1 capsule by mouth daily 30 capsule 0   • pioglitazone-metFORMIN (ACTOPLUS MET)  MG per tablet Take 1 tablet by mouth 2 (two) times a day with meals  0     No current facility-administered medications for this visit. Allergies:    No Known Allergies    Family History:     Family History   Problem Relation Age of Onset   • Hypertension Mother    • Diabetes Mother    • Hypertension Father    • Diabetes Father        Social History:       Social History     Socioeconomic History   • Marital status:       Spouse name: Not on file   • Number of children: Not on file   • Years of education: Not on file   • Highest education level: Not on file   Occupational History   • Not on file   Tobacco Use   • Smoking status: Former     Packs/day: 1.00     Years: 47.00     Total pack years: 47.00     Types: Cigarettes     Quit date: 2021     Years since quittin.4   • Smokeless tobacco: Never   • Tobacco comments:     advised not to smoke   Vaping Use   • Vaping Use: Never used   Substance and Sexual Activity   • Alcohol use: Not Currently     Comment: social   • Drug use: Not Currently   • Sexual activity: Not Currently   Other Topics Concern   • Not on file   Social History Narrative   • Not on file     Social Determinants of Health     Financial Resource Strain: Low Risk  (3/1/2023)    Overall Financial Resource Strain (CARDIA)    • Difficulty of Paying Living Expenses: Not very hard   Food Insecurity: No Food Insecurity (2023)    Hunger Vital Sign    • Worried About Running Out of Food in the Last Year: Never true    • Ran Out of Food in the Last Year: Never true   Transportation Needs: No Transportation Needs (2023)    PRAPARE - Transportation    • Lack of Transportation (Medical): No    • Lack of Transportation (Non-Medical): No   Physical Activity: Not on file   Stress: Not on file   Social Connections: Not on file   Intimate Partner Violence: Not on file   Housing Stability: Unknown (2023)    Housing Stability Vital Sign    • Unable to Pay for Housing in the Last Year: No    • Number of Places Lived in the Last Year: Not on file    • Unstable Housing in the Last Year: No         Objective:   /71 (BP Location: Right arm, Patient Position: Sitting, Cuff Size: Large)   Pulse 81   Ht 5' 6" (1.676 m)   Wt 111 kg (244 lb)   BMI 39.38 kg/m²     General: Patient is not in any acute/apparent distress, well nourished, well developed and cooperative.    HEENT: normocephalic, atraumatic, moist membranes  Neck: No TTP  Extremities: no edema noted   Skin: no lesions or rash  Musculosketal: no bony abnormalities    Neurologic Examination:   Mental status: alert, awake, oriented X 3 and following commands. Speech/Language: Speech is fluent without any dysarthria, no aphasia noted, comprehension intact    Cranial Nerves:   CN I: smell not tested  CN II: Visual fields full to confrontation  CN III, IV, VI: Extraocular movements intact bilaterally. Pupils equal round and reactive to light bilaterally. CN V: Facial sensation is normal.  CN VII: Full and symmetric facial movement. CN VIII: Hearing is normal.  CN IX, X: Palate elevates symmetrically. CN XI: Shoulder shrug strength is normal.  CN XII: Tongue midline without atrophy or fasciculations. Motor:   4/5 RUE extension, 5/5 LUE extension. 4+/5 BUE flexion.  4/5 bilaterally. Decreased bulk in bilateral UE. Bilateral thenar atrophy R>L. RLE hip flexion 4/5. Otherwise strength 5/5. RUE claw hand. Fasiculations - none    Sensory:   Sensation intact to soft touch, vibration and pinprick in all 4 extremities. Cerebellar:   Finger-to-nose intact, normal heel to shin. Reflexes: 3+ in bilateral UE 4 extremities. 2+ throughout BLE  Pathologic reflexes - Hoffmans reflex - positive    Gait:   Antalgic gait. Ambulating with cane. Review of Systems:     ROS:  Review of Systems   Constitutional: Negative for appetite change, fatigue and fever. HENT: Negative. Negative for hearing loss, tinnitus, trouble swallowing and voice change. Eyes: Negative. Negative for photophobia, pain and visual disturbance. Respiratory: Negative. Negative for shortness of breath. Cardiovascular: Negative. Negative for palpitations. Gastrointestinal: Negative. Negative for nausea and vomiting. Endocrine: Negative. Negative for cold intolerance. Genitourinary: Negative. Negative for dysuria, frequency and urgency.    Musculoskeletal: Positive for gait problem (use walker) and neck pain. Negative for back pain and myalgias. Skin: Negative. Negative for rash. Allergic/Immunologic: Negative. Neurological: Positive for weakness (a little). Negative for dizziness, tremors, seizures, syncope, facial asymmetry, speech difficulty, light-headedness, numbness and headaches. Hematological: Negative. Does not bruise/bleed easily. Psychiatric/Behavioral: Negative. Negative for confusion, hallucinations and sleep disturbance. I have spent 22 minutes with Patient today in which greater than 50% of this time was spent in counseling/coordination of care regarding Risks and benefits of tx options, Intructions for management, Patient and family education, Risk factor reductions and Impressions. I also spent 10 minutes non face to face for this patient the same day.

## 2023-07-18 ENCOUNTER — TELEPHONE (OUTPATIENT)
Dept: FAMILY MEDICINE CLINIC | Facility: CLINIC | Age: 68
End: 2023-07-18

## 2023-07-18 DIAGNOSIS — I10 PRIMARY HYPERTENSION: ICD-10-CM

## 2023-07-18 RX ORDER — AMLODIPINE BESYLATE AND BENAZEPRIL HYDROCHLORIDE 5; 10 MG/1; MG/1
1 CAPSULE ORAL DAILY
Qty: 90 CAPSULE | Refills: 0 | Status: SHIPPED | OUTPATIENT
Start: 2023-07-18

## 2023-07-18 NOTE — TELEPHONE ENCOUNTER
This is [employer]. I need a refill on my blood pressure medicine. It's called Amlodipine-benazepril, 5-10 mg They normally they send me 90 days but for some reason they only sent me 30 days last time. So here we are again and it's the [facility] mail order. We've done it before, so I'm sure you got all the information. If not, call me. You received a voice mail from [facility].

## 2023-07-24 DIAGNOSIS — E11.9 TYPE 2 DIABETES MELLITUS WITHOUT COMPLICATION, WITHOUT LONG-TERM CURRENT USE OF INSULIN (HCC): ICD-10-CM

## 2023-07-24 RX ORDER — PIOGLITAZONE HCL AND METFORMIN HCL 500; 15 MG/1; MG/1
1 TABLET ORAL 2 TIMES DAILY WITH MEALS
Qty: 180 TABLET | Refills: 0 | Status: SHIPPED | OUTPATIENT
Start: 2023-07-24

## 2023-08-02 LAB
LEFT EYE DIABETIC RETINOPATHY: POSITIVE
RIGHT EYE DIABETIC RETINOPATHY: POSITIVE

## 2023-08-04 ENCOUNTER — OFFICE VISIT (OUTPATIENT)
Dept: PODIATRY | Facility: CLINIC | Age: 68
End: 2023-08-04
Payer: MEDICARE

## 2023-08-04 VITALS
SYSTOLIC BLOOD PRESSURE: 131 MMHG | OXYGEN SATURATION: 96 % | DIASTOLIC BLOOD PRESSURE: 76 MMHG | BODY MASS INDEX: 39.38 KG/M2 | HEART RATE: 80 BPM | HEIGHT: 66 IN

## 2023-08-04 DIAGNOSIS — E11.9 TYPE 2 DIABETES MELLITUS WITHOUT COMPLICATION, WITHOUT LONG-TERM CURRENT USE OF INSULIN (HCC): ICD-10-CM

## 2023-08-04 DIAGNOSIS — B35.1 ONYCHOMYCOSIS: Primary | ICD-10-CM

## 2023-08-04 PROCEDURE — 11721 DEBRIDE NAIL 6 OR MORE: CPT | Performed by: PODIATRIST

## 2023-08-05 NOTE — PROGRESS NOTES
Assessment/Plan:     The patient's clinical examination today is consistent with onychomycosis of the pedal nail plates bilaterally. The skin is dry and scaling consistent with diabetic anhidrosis. There are no open lesions nor callosities noted to either foot. The interdigital spaces are clear without maceration.      The pedal nail plates are sharply debrided with a sterile nail clipper Q95 without complication. The pedal nails were then sharply reduced in thickness and girth utilizing a rotary bur. There are no other acute pedal issues noted today. Recommend follow-up in 3 to 4 months for repeat at risk diabetic foot care.        Diagnoses and all orders for this visit:    Onychomycosis    Type 2 diabetes mellitus without complication, without long-term current use of insulin ([facility])          Subjective:     Patient ID: Joel Titus is a 69 y.o. male. The patient presents today for at risk foot care. The patient is unable to trim his own nails secondary to the thickness and girth. He also has lost function of his right hand secondary to a chronic and progressive contracture deformity. PAST MEDICAL HISTORY:  Past Medical History:   Diagnosis Date   • Diabetes mellitus ([address])    • ED (erectile dysfunction)    • Hyperlipidemia    • Hypertension    • Obesity    • S/P cervical spinal fusion 05/10/2023   • Spinal stenosis of lumbar region at multiple levels 12/22/2022       PAST SURGICAL HISTORY:  Past Surgical History:   Procedure Laterality Date   • CARPAL TUNNEL RELEASE Right    • EYE SURGERY      strabibusmus   • HAND SURGERY      left index finger   • [place] ARTHRD PST/PSTLAT TQ 1NTRSPC CRV BELW C2 SEGMENT N/A 5/10/2023    Procedure: Posterior cervical decompressive laminectomy and instrumented fusion C4-T1;   Surgeon: Rachel Bhardwaj MD;  [facility];  Service: Neurosurgery   • [place] EXCISION SPERMATOCELE W/WO EPIDIDYMECTOMY Left 09/16/2021    Procedure: Leisa العراقي DEBRIDEMENT;  Surgeon: Edgardo Barone MD;  [facility]  Service: Urology   • [place] NEUROPLASTY &/TRANSPOS MEDIAN NRV CARPAL Dasha Ward Right 2021    Procedure: RELEASE CARPAL TUNNEL;  Surgeon: Lizz Sotelo MD;  [facility];  Service: Orthopedics   • [place] NEUROPLASTY &/TRANSPOSITION ULNAR NERVE ELBOW Right 2021    Procedure: RELEASE CUBITAL TUNNEL;  Surgeon: Lizz Sotelo MD;  [facility];  Service: Orthopedics        ALLERGIES:  Patient has no known allergies. MEDICATIONS:  Current Outpatient Medications   Medication Sig Dispense Refill   • acetaminophen (TYLENOL) 325 mg tablet Take 2 tablets (650 mg total) by mouth every 6 (six) hours as needed for mild pain  0   • amLODIPine-benazepril (LOTREL 5-10) 5-10 MG per capsule Take 1 capsule by mouth daily 90 capsule 0   • pioglitazone-metFORMIN (ACTOPLUS MET)  MG per tablet Take 1 tablet by mouth 2 (two) times a day with meals 180 tablet 0     No current facility-administered medications for this visit. SOCIAL HISTORY:  Social History     Socioeconomic History   • Marital status:       Spouse name: None   • Number of children: None   • Years of education: None   • Highest education level: None   Occupational History   • None   Tobacco Use   • Smoking status: Former     Packs/day: 1.00     Years: 47.00     Total pack years: 47.00     Types: Cigarettes     Quit date: 2021     Years since quittin.5   • Smokeless tobacco: Never   • Tobacco comments:     advised not to smoke   Vaping Use   • Vaping Use: Never used   Substance and Sexual Activity   • Alcohol use: Not Currently     Comment: social   • Drug use: Not Currently   • Sexual activity: Not Currently   Other Topics Concern   • None   Social History Narrative   • None     Social Determinants of Health     Financial Resource Strain: Low Risk  (3/1/2023)    Overall Financial Resource Strain (CARDIA)    • Difficulty of Paying Living Expenses: Not very hard   Food Insecurity: No Food Insecurity (5/11/2023)    Hunger Vital Sign    • Worried About Running Out of Food in the Last Year: Never true    • Ran Out of Food in the Last Year: Never true   Transportation Needs: No Transportation Needs (5/11/2023)    PRAPARE - Transportation    • Lack of Transportation (Medical): No    • Lack of Transportation (Non-Medical): No   Physical Activity: Not on file   Stress: Not on file   Social Connections: Not on file   Intimate Partner Violence: Not on file   Housing Stability: Unknown (5/11/2023)    Housing Stability Vital Sign    • Unable to Pay for Housing in the Last Year: No    • Number of Places Lived in the Last Year: Not on file    • Unstable Housing in the Last Year: No        Review of Systems   Constitutional: Negative. HENT: Negative. Eyes: Negative. Respiratory: Negative. Cardiovascular: Negative. Endocrine: Negative. Musculoskeletal: Negative. Neurological: Negative. Hematological: Negative. Psychiatric/Behavioral: Negative. Objective:     Physical Exam  Vitals reviewed. Constitutional:       Appearance: Normal appearance. HENT:      Head: Normocephalic and atraumatic. Nose: Nose normal.   Eyes:      Conjunctiva/sclera: Conjunctivae normal.      Pupils: Pupils are equal, round, and reactive to light. Cardiovascular:      Pulses:           Dorsalis pedis pulses are 1+ on the right side and 1+ on the left side. Posterior tibial pulses are 1+ on the right side and 1+ on the left side. Pulmonary:      Effort: Pulmonary effort is normal.   Feet:      Right foot:      Skin integrity: Skin integrity normal.      Toenail Condition: Right toenails are abnormally thick and long. Fungal disease present. Left foot:      Skin integrity: Skin integrity normal.      Toenail Condition: Left toenails are abnormally thick and long. Fungal disease present. Comments:  The patient's clinical examination today is consistent with onychomycosis of the pedal nail plates bilaterally. The skin is dry and scaling consistent with diabetic anhidrosis. There are no open lesions nor callosities noted to either foot. The interdigital spaces are clear without maceration. There is decreased turgor and absence of hair growth to both lower extremities. Skin:     General: Skin is warm. Capillary Refill: Capillary refill takes 2 to 3 seconds. Neurological:      General: No focal deficit present. Mental Status: He is alert and oriented to person, place, and time. Psychiatric:         Mood and Affect: Mood normal.         Behavior: Behavior normal.         Thought Content:  Thought content normal.

## 2023-08-17 ENCOUNTER — TELEPHONE (OUTPATIENT)
Dept: ADMINISTRATIVE | Facility: OTHER | Age: 68
End: 2023-08-17

## 2023-08-17 NOTE — TELEPHONE ENCOUNTER
Upon review of the In Basket request we were able to locate, review, and update the patient chart as requested for Diabetic Eye Exam.    Any additional questions or concerns should be emailed to the Practice Liaisons via the appropriate education email address, please do not reply via In Basket.     Thank you  Iva Jimenez

## 2023-08-17 NOTE — TELEPHONE ENCOUNTER
----- Message from Jesusita Lovell sent at 8/17/2023 10:47 AM EDT -----  Regarding: Care Gap Request  08/17/23 10:47 AM    Hello, our patient attached above has had Diabetic Eye Exam completed/performed. Please assist in updating the patient chart by pulling the document from the Media Tab. The date of service is 8/2/23.      Thank you,  WADE Lovell PG, RD PRIMARY CARE

## 2023-08-22 ENCOUNTER — EVALUATION (OUTPATIENT)
Dept: OCCUPATIONAL THERAPY | Facility: CLINIC | Age: 68
End: 2023-08-22
Payer: MEDICARE

## 2023-08-22 ENCOUNTER — EVALUATION (OUTPATIENT)
Dept: PHYSICAL THERAPY | Facility: CLINIC | Age: 68
End: 2023-08-22
Payer: MEDICARE

## 2023-08-22 DIAGNOSIS — Z98.1 ARTHRODESIS STATUS: ICD-10-CM

## 2023-08-22 DIAGNOSIS — R53.81 PHYSICAL DECONDITIONING: Primary | ICD-10-CM

## 2023-08-22 DIAGNOSIS — Z09 POSTOPERATIVE EXAMINATION: ICD-10-CM

## 2023-08-22 PROCEDURE — 97162 PT EVAL MOD COMPLEX 30 MIN: CPT | Performed by: PHYSICAL THERAPIST

## 2023-08-22 PROCEDURE — 97162 PT EVAL MOD COMPLEX 30 MIN: CPT

## 2023-08-22 NOTE — LETTER
2023    Piper Adair, [address] [facility] [place]    Patient: Lelo Santos   YOB: 1955   Date of Visit: 2023     Encounter Diagnosis     ICD-10-CM    1. Arthrodesis status  Z98.1 Ambulatory referral to Physical Therapy      2. Postoperative examination  Z09 Ambulatory referral to Physical Therapy          Dear Dr. Liseth Martinez:    Thank you for your recent referral of Lelo Santos. Please review the attached evaluation summary from Addison's recent visit. Please verify that you agree with the plan of care by signing the attached order. If you have any questions or concerns, please do not hesitate to call. I sincerely appreciate the opportunity to share in the care of one of your patients and hope to have another opportunity to work with you in the near future. Sincerely,    Leigh Thomas, OT      Referring Provider:     I certify that I have read the below Plan of Care and certify the need for these services furnished under this plan of treatment while under my care. Piper Adair MD  [address] [place] [address]  [facility] [place]        OT Evaluation     Today's date: 2023  Patient name: Lelo Santos  : 1955  MRN: [id number]  Referring provider: Piper Adair MD  Dx:   Encounter Diagnosis     ICD-10-CM    1. Arthrodesis status  Z98.1 Ambulatory referral to Physical Therapy      2.  Postoperative examination  Z09 Ambulatory referral to Physical Therapy                     Assessment  Assessment details:  Patient is a 69 y.o. male who presents for OT IE and treatment for cervical stenosis with medical history consisting of T2DM, HTN, HLD, Obesity and lumbar spinal stenosis, history of smoking (47 pack years in remission since ) who presented to the [address] [place] [address] on 5/10 for planned posterior cervical decompression and fusion for cervical spinal cord compression and myelopathy  severe spinal stenosis resulting in gait dysfunction. He performed a posterior cervical decompressive laminectomy with instrumented fusion from C4-T1 . Patient reports discomfort with R hand and some tingling feeling with L hand, has concerns that it will progress like the R hand. Patient referred by Dr. Samantha Land neurosurgeon to initiate treatment including hand therapy. Impairments: abnormal or restricted ROM, impaired physical strength and lacks appropriate home exercise program    Symptom irritability: lowUnderstanding of Dx/Px/POC: good   Prognosis: fair    Goals  Short Term Goals by 4 weeks:    1. Establish HEP to increase performance with daily activities. 2. Patient will increase ROM of UE by at least 5 degrees to complete ADLs such as grooming with decreased discomfort. Long Term Goals by discharge:    1. Establish final home exercise program to enhance maximal functional level with ADLs. 2. Achieve functional active range of motion of RUE for full return to household chores. 3. Achieve functional strength of RUE for full return to high level ADLs. Plan  Plan details: Focus on HEP, A/AA/PROM, strengthening, TA, TE, progressive resistive exercises, manual therapy, modalities PRN  to improve ability to complete daily activites with ease. POC 08/22/2023 - 10/22/2023.   Patient would benefit from: OT eval, skilled occupational therapy and orthotics  Planned modality interventions: thermotherapy: hydrocollator packs  Planned therapy interventions: neuromuscular re-education, manual therapy, IASTM, joint mobilization, activity modification, ADL retraining, ADL training, behavior modification, patient education, orthotic management and training, self care, sensory integrative techniques, strengthening, stretching, therapeutic activities, therapeutic exercise, therapeutic training, IADL retraining, home exercise program, graded motor, graded activity, functional ROM exercises, flexibility, fine motor coordination training, coordination, compression and graded exercise  Frequency: 2x week  Duration in visits: 16  Duration in weeks: 8  Plan of Care beginning date: 8/22/2023  Plan of Care expiration date: 10/22/2023  Treatment plan discussed with: patient        Subjective Evaluation    History of Present Illness  Date of surgery: 5/10/2023  Mechanism of injury: surgery  Mechanism of injury:  Patient is a 69 y.o. male who presents for OT IE and treatment for cervical stenosis with medical history consisting of T2DM, HTN, HLD, Obesity and lumbar spinal stenosis, history of smoking (47 pack years in remission since 2021) who presented to the [address] [place] [address] on 5/10 for planned posterior cervical decompression and fusion for cervical spinal cord compression and myelopathy 2/2 severe spinal stenosis resulting in gait dysfunction. He performed a posterior cervical decompressive laminectomy with instrumented fusion from C4-T1 . Patient reports discomfort with R hand and some tingling feeling with L hand, has concerns that it will progress like the R hand. Patient referred by Dr. Martinez [facility] neurosurgeon to initiate treatment including hand therapy.     Quality of life: good    Patient Goals  Patient goals for therapy: increased strength, independence with ADLs/IADLs and increased motion    Pain  No pain reported  Progression: no change    Social Support  Lives with: alone    Employment status: not working  Hand dominance: right    Treatments  Previous treatment: physical therapy, occupational therapy and home therapy  Current treatment: physical therapy and occupational therapy  Discharged from (in last 30 days): home health care        Objective     Active Range of Motion     Left Wrist   Normal active range of motion  Radial deviation: 15 degrees     Right Wrist   Wrist flexion: 30 degrees   Wrist extension: 34 degrees   Radial deviation: 8 degrees     Additional Active Range of Motion Details  Pt. Unable to perform UD with both wrists, patient unable to actively open up R hand digits due to increased tightness. Passive Range of Motion     Additional Passive Range of Motion Details  With PROM of R hand digits, patient able to [facility] extension                Auth Tracker  Auth Status Total   Visits  Expiration date Co-Insurance   pending                                  Visit Tracker  Date 8/22  IE                                                                                    PMHx:   has a past medical history of Diabetes mellitus ([address]), ED (erectile dysfunction), Hyperlipidemia, Hypertension, Obesity, S/P cervical spinal fusion (05/10/2023), and Spinal stenosis of lumbar region at multiple levels (12/22/2022). Precautions:      Manuals HEP 8/22/2023                       Ther Ex     Education on HEP and dx  X 10min                                           Ther Act                     Modalities     MHP            Assessment:   Patient tolerated session well. Patient demonstrates decreased AROM with extension upon assessment today. Patient session focused on patient education on anatomy and physiology concerning current dx, techniques for decreasing deficits through HEP, and appropriate use of modalities. Patient educated on HEP to include stretches and PROM with verbal instructions for patient reference. Patient educated on treatment plan at this time. Patient benefiting from skilled hand therapy OT to reduce deficits to improve independence with daily activities      Plan:   Focus on HEP, A/AA/PROM, strengthening, TA, TE, progressive resistive exercises, manual therapy, modalities PRN  to improve ability to complete daily activites with ease. POC 08/22/2023 - 10/22/2023.

## 2023-08-22 NOTE — PROGRESS NOTES
OT Evaluation     Today's date: 2023  Patient name: Chase Nguyen  : 1955  MRN: [id number]  Referring provider: Kadeem Rogers MD  Dx:   Encounter Diagnosis     ICD-10-CM    1. Arthrodesis status  Z98.1 Ambulatory referral to Physical Therapy      2. Postoperative examination  Z09 Ambulatory referral to Physical Therapy                     Assessment  Assessment details:  Patient is a 69 y.o. male who presents for OT IE and treatment for cervical stenosis with medical history consisting of T2DM, HTN, HLD, Obesity and lumbar spinal stenosis, history of smoking (47 pack years in remission since ) who presented to the [address] [place] [address] on 5/10 for planned posterior cervical decompression and fusion for cervical spinal cord compression and myelopathy 2/2 severe spinal stenosis resulting in gait dysfunction. He performed a posterior cervical decompressive laminectomy with instrumented fusion from C4-T1 . Patient reports discomfort with R hand and some tingling feeling with L hand, has concerns that it will progress like the R hand. Patient referred by Dr. Martinez [facility] neurosurgeon to initiate treatment including hand therapy. Impairments: abnormal or restricted ROM, impaired physical strength and lacks appropriate home exercise program    Symptom irritability: lowUnderstanding of Dx/Px/POC: good   Prognosis: fair    Goals  Short Term Goals by 4 weeks:    1. Establish HEP to increase performance with daily activities. 2. Patient will increase ROM of UE by at least 5 degrees to complete ADLs such as grooming with decreased discomfort. Long Term Goals by discharge:    1. Establish final home exercise program to enhance maximal functional level with ADLs. 2. Achieve functional active range of motion of RUE for full return to household chores. 3. Achieve functional strength of RUE for full return to high level ADLs.          Plan  Plan details: Focus on HEP, A/AA/PROM, strengthening, TA, TE, progressive resistive exercises, manual therapy, modalities PRN  to improve ability to complete daily activites with ease. POC 08/22/2023 - 10/22/2023. Patient would benefit from: OT eval, skilled occupational therapy and orthotics  Planned modality interventions: thermotherapy: hydrocollator packs  Planned therapy interventions: neuromuscular re-education, manual therapy, IASTM, joint mobilization, activity modification, ADL retraining, ADL training, behavior modification, patient education, orthotic management and training, self care, sensory integrative techniques, strengthening, stretching, therapeutic activities, therapeutic exercise, therapeutic training, IADL retraining, home exercise program, graded motor, graded activity, functional ROM exercises, flexibility, fine motor coordination training, coordination, compression and graded exercise  Frequency: 2x week  Duration in visits: 16  Duration in weeks: 8  Plan of Care beginning date: 8/22/2023  Plan of Care expiration date: 10/22/2023  Treatment plan discussed with: patient        Subjective Evaluation    History of Present Illness  Date of surgery: 5/10/2023  Mechanism of injury: surgery  Mechanism of injury:  Patient is a 69 y.o. male who presents for OT IE and treatment for cervical stenosis with medical history consisting of T2DM, HTN, HLD, Obesity and lumbar spinal stenosis, history of smoking (47 pack years in remission since 2021) who presented to the [address] [place] [address] on 5/10 for planned posterior cervical decompression and fusion for cervical spinal cord compression and myelopathy 2/2 severe spinal stenosis resulting in gait dysfunction. He performed a posterior cervical decompressive laminectomy with instrumented fusion from C4-T1 . Patient reports discomfort with R hand and some tingling feeling with L hand, has concerns that it will progress like the R hand.  Patient referred by Dr. Samantha Land neurosurgeon to initiate treatment including hand therapy. Quality of life: good    Patient Goals  Patient goals for therapy: increased strength, independence with ADLs/IADLs and increased motion    Pain  No pain reported  Progression: no change    Social Support  Lives with: alone    Employment status: not working  Hand dominance: right    Treatments  Previous treatment: physical therapy, occupational therapy and home therapy  Current treatment: physical therapy and occupational therapy  Discharged from (in last 30 days): home health care        Objective     Active Range of Motion     Left Wrist   Normal active range of motion  Radial deviation: 15 degrees     Right Wrist   Wrist flexion: 30 degrees   Wrist extension: 34 degrees   Radial deviation: 8 degrees     Additional Active Range of Motion Details  Pt. Unable to perform UD with both wrists, patient unable to actively open up R hand digits due to increased tightness. Passive Range of Motion     Additional Passive Range of Motion Details  With PROM of R hand digits, patient able to achieve [facility] of digital extension                 Auth Tracker  Auth Status Total   Visits  Expiration date Co-Insurance   pending                                  Visit Tracker  Date 8/22  IE                                                                                    PMHx:   has a past medical history of Diabetes mellitus ([address]), ED (erectile dysfunction), Hyperlipidemia, Hypertension, Obesity, S/P cervical spinal fusion (05/10/2023), and Spinal stenosis of lumbar region at multiple levels (12/22/2022). Precautions:      Manuals HEP 8/22/2023                       Ther Ex     Education on HEP and dx  X 10min                                           Ther Act                     Modalities     MHP            Assessment:   Patient tolerated session well. Patient demonstrates decreased AROM with extension upon assessment today.  Patient session focused on patient education on anatomy and physiology concerning current dx, techniques for decreasing deficits through HEP, and appropriate use of modalities. Patient educated on HEP to include stretches and PROM with verbal instructions for patient reference. Patient educated on treatment plan at this time. Patient benefiting from skilled hand therapy OT to reduce deficits to improve independence with daily activities      Plan:   Focus on HEP, A/AA/PROM, strengthening, TA, TE, progressive resistive exercises, manual therapy, modalities PRN  to improve ability to complete daily activites with ease. POC 08/22/2023 - 10/22/2023.

## 2023-08-23 ENCOUNTER — EVALUATION (OUTPATIENT)
Facility: CLINIC | Age: 68
End: 2023-08-23
Payer: MEDICARE

## 2023-08-23 DIAGNOSIS — Z98.1 ARTHRODESIS STATUS: ICD-10-CM

## 2023-08-23 DIAGNOSIS — Z98.890 H/O EXCISION OF LAMINA OF CERVICAL VERTEBRA FOR DECOMPRESSION OF SPINAL CORD: ICD-10-CM

## 2023-08-23 DIAGNOSIS — S14.151A INCOMPLETE SPINAL CORD INJURY AT C1-C4 LEVEL WITHOUT BONE INJURY (HCC): Primary | ICD-10-CM

## 2023-08-23 PROCEDURE — 97530 THERAPEUTIC ACTIVITIES: CPT

## 2023-08-23 NOTE — PROGRESS NOTES
OCCUPATIONAL THERAPY INITIAL EVALUATION    Today's Date: 2023  Patient Name: Ishan Rizvi  : 1955  MRN: [id number]  Referring Provider: Red Cisneros MD  Dx: Incomplete spinal cord injury at C1-C4 level without bone injury ([address]) [S14.151A]    PLAN OF CARE START: 23  PLAN OF CARE END: 23  FREQUENCY: 2x/wk  PRECAUTIONS: R sided impairments UE>LE, ambulates with rolling walker    Subjective Pt reports that he currently uses L hand as his functional hand because his R sided dominant hand is "very tight" and not functionally useable. Pt reports that although he is able to adapt his everyday activities to be completed with his L hand that he still notices some functional deficits. Pt purchases pants and shoes with no laces and fasteners in order to be able to complete dressing independently. Pt reports that he owns a sock aid, dressing stick, and long shoe horn, but does not frequently use them. Pt additionally reports that he owns a resting hand splint for his R UE but that he does not use it because it is "too difficult to put on alone." Pt reports that his goal for coming to therapy is to increase the use of his R Hand, he reports that he is a typical  and reports no deficits in driving with R sided tightness. Objective  TA: Pt performed the following assessments    Upper Extremities:  Pt is R hand dominant                VINCENZO: RUE: 25lb LUE: 45lb  The age norm is approximately 91/1lbs R UE and 76.8lbs L UE, indicating decreased b/l  strength.                 Range of Motion:  AROM:   R UE   - Finger extension- 10%  - Finger flexion- 10%  PROM:   - Finger extension- 85%  - Finger flexion- 85%    L UE:  AROM:  - Finger extension: 85%  - Finger flexion: 95%      NINE-HOLE PEG TEST: assesses dexterity/fine motor coordination   R hand: 1:36.71 seconds   L hand: 40.98 seconds    Pt demonstrates decreased b/l FMC compared to norms for age/sex (21.23 seconds R UE and 22.29 seconds L UE)     Functional Dexterity Test: assesses patient's ability to use the hand for daily tasks requiring a 3-jaw ignacio prehension between the fingers and the thumb   R UE: 53.74 seconds with 100% compensation of board  L UE: 55.72 seconds with 100% compensation of board  Pt demonstrates decreased b/l dexterity compared to norms for age/sex (40.0 seconds R UE and 31.7 seconds L UE)    Hand writing sample: Pt instructed to write the following sentence: "[place]". Pt handwriting ~20% legible with power grasp observed to be utilized. Pt unable to complete functional tripod grasp d/t R UE spasticity and limited available movement. Modified Nick Scale  R wrist flexors was 1+   L wrist flexors 1  R digits flexors 2   L digit flexors 1    Assessment: Pt tolerated treatment well. Pt demonstrated the following deficits: b/l [place] (R>L), b/l dexterity (R>L), b/l  strength (R>L), R UE digit and wrist AROM, and handwriting. Pt deficits noted based on the following assessments: VINCENZO, Nine-Hole Peg Test, Functional Dexterity Test, Modified Nick Scale AROM screening, PROM screening, handwriting sample and skilled clinical observation. Pt would benefit from continued skilled OT services to address aforementioned deficits. Plan: Continued skilled OT per POC    Goals added 8/23/23  -Pt will demonstrate an increase in R UE  strength by increasing R UE VINCENZO score from 25lbs to at least 30lbs  - Pt will demonstrate an increase in L UE  strength by increasing L UE VINCENZO score from 45lbs to at least 50lbs  - Pt will demonstrate an increase in R UE [place] by decreasing score on Nine-Hole Peg Test from 1:36 seconds to under 1:30 seconds for carry over into every day activities. - Pt will demonstrate an increase in L UE [place] by decreasing score on Nine-Hole Peg Test from 40.98 seconds to under 35 seconds for carry over into everyday activities.    - Pt will demonstrate an increase in b/l UE dexterity by decreasing score on Functional Dexterity Test to be under 1 min with no more than 50% compensation of board. - Pt will demonstrate an increase in R digit AROM from 10% to at least 30% for carry over into everyday activities.   - Pt will increase R UE to refined assist with <20% cuing for tabletop tasks 4 weeks

## 2023-08-25 ENCOUNTER — RA CDI HCC (OUTPATIENT)
Dept: OTHER | Facility: HOSPITAL | Age: 68
End: 2023-08-25

## 2023-08-31 ENCOUNTER — TELEPHONE (OUTPATIENT)
Dept: NEUROSURGERY | Facility: CLINIC | Age: 68
End: 2023-08-31

## 2023-08-31 NOTE — TELEPHONE ENCOUNTER
I called patient at [phone] and lvm x1 asking for call back to discuss a change on upcoming appt scheduled 9/7/23. I would need to change appt to AP (Natalee at 11:30am) for the 3 month f/u as there is an urgent patient that needs to be seen for possible surgery. I asked him to call me back to see how he is doing and discuss this change with him. I provided main # but advised him to ask for me.

## 2023-09-01 ENCOUNTER — OFFICE VISIT (OUTPATIENT)
Dept: FAMILY MEDICINE CLINIC | Facility: CLINIC | Age: 68
End: 2023-09-01
Payer: MEDICARE

## 2023-09-01 VITALS
TEMPERATURE: 97.8 F | OXYGEN SATURATION: 97 % | SYSTOLIC BLOOD PRESSURE: 138 MMHG | DIASTOLIC BLOOD PRESSURE: 76 MMHG | RESPIRATION RATE: 16 BRPM | WEIGHT: 242.4 LBS | HEART RATE: 69 BPM | HEIGHT: 66 IN | BODY MASS INDEX: 38.96 KG/M2

## 2023-09-01 DIAGNOSIS — E11.9 TYPE 2 DIABETES MELLITUS WITHOUT COMPLICATION, WITHOUT LONG-TERM CURRENT USE OF INSULIN (HCC): Primary | ICD-10-CM

## 2023-09-01 DIAGNOSIS — G89.29 CHRONIC BILATERAL LOW BACK PAIN WITHOUT SCIATICA: ICD-10-CM

## 2023-09-01 DIAGNOSIS — Z53.20 LUNG CANCER SCREENING DECLINED BY PATIENT: ICD-10-CM

## 2023-09-01 DIAGNOSIS — Z12.5 SCREENING FOR PROSTATE CANCER: ICD-10-CM

## 2023-09-01 DIAGNOSIS — I10 PRIMARY HYPERTENSION: ICD-10-CM

## 2023-09-01 DIAGNOSIS — E78.2 MIXED HYPERLIPIDEMIA: ICD-10-CM

## 2023-09-01 DIAGNOSIS — Z12.11 SCREEN FOR COLON CANCER: ICD-10-CM

## 2023-09-01 DIAGNOSIS — E66.01 OBESITY, MORBID (HCC): ICD-10-CM

## 2023-09-01 DIAGNOSIS — M54.50 CHRONIC BILATERAL LOW BACK PAIN WITHOUT SCIATICA: ICD-10-CM

## 2023-09-01 PROCEDURE — 99214 OFFICE O/P EST MOD 30 MIN: CPT

## 2023-09-01 NOTE — ASSESSMENT & PLAN NOTE
Under control. Continue current medication. Continue physical therapy  We will re-evaluate at next office visit.

## 2023-09-01 NOTE — PROGRESS NOTES
Assessment/Plan:         Problem List Items Addressed This Visit        Endocrine    Type 2 diabetes mellitus without complication, without long-term current use of insulin ([address]) - Primary       Lab Results   Component Value Date    HGBA1C 6.0 (H) 05/02/2023   Under control. Continue current medication. We will re-evaluate at next office visit. Relevant Orders    Albumin / creatinine urine ratio    Comprehensive metabolic panel    Hemoglobin A1C    CBC and differential       Cardiovascular and Mediastinum    Primary hypertension     Under control. Continue current medication. We will re-evaluate at next office visit. Relevant Orders    Comprehensive metabolic panel    CBC and differential       Other    Obesity, morbid ([address])     Patient was advised to lose weight. Potential consequences of obesity discussed with patient. Advised to have portion control no more than 60% of meal or may consider intermittent fasting  We will continue to monitor           Chronic bilateral low back pain without sciatica     Under control. Continue current medication. Continue physical therapy  We will re-evaluate at next office visit. Mixed hyperlipidemia     Under control. Continue current medication. We will re-evaluate at next office visit. Relevant Orders    Lipid Panel with Direct LDL reflex    Screen for colon cancer     Patient refused for colonoscopy or Cologuard understood well about consequences         Lung cancer screening declined by patient   Other Visit Diagnoses     Screening for prostate cancer        Relevant Orders    PSA, Total Screen            Subjective:      Patient ID: Abby Officer is a 69 y.o. male. Patient here for review of chronic medical problems and  the labs and imaging if it is applicable.   Currently has no specific complaints other than mentioned in the review of systems  Denies chest pain, SOB, cough, abdominal pain, nausea, vomiting, fever, chills, lightheadedness, dizziness,headache, tingling or numbness. No bowel or bladder problem. The following portions of the patient's history were reviewed and updated as appropriate:   Past Medical History:  He has a past medical history of Diabetes mellitus ([address]), ED (erectile dysfunction), Hyperlipidemia, Hypertension, Obesity, S/P cervical spinal fusion (05/10/2023), and Spinal stenosis of lumbar region at multiple levels (12/22/2022). ,  _______________________________________________________________________  Medical Problems:  does not have any pertinent problems on file.,  _______________________________________________________________________  Past Surgical History:   has a past surgical history that includes Hand surgery; Eye surgery; pr neuroplasty &/transposition ulnar nerve elbow (Right, 07/06/2021); pr neuroplasty &/transpos median nrv carpal tunne (Right, 07/06/2021); Carpal tunnel release (Right); pr excision spermatocele w/wo epididymectomy (Left, 09/16/2021); and pr arthrd pst/pstlat tq 1ntrspc crv belw c2 segment (N/A, 5/10/2023). ,  _______________________________________________________________________  Family History:  family history includes Diabetes in his father and mother; Hypertension in his father and mother.,  _______________________________________________________________________  Social History:   reports that he quit smoking about 2 years ago. His smoking use included cigarettes. He has a 47.00 pack-year smoking history. He has never used smokeless tobacco. He reports that he does not currently use alcohol. He reports that he does not currently use drugs. ,  _______________________________________________________________________  Allergies:  has No Known Allergies. .  _______________________________________________________________________  Current Outpatient Medications   Medication Sig Dispense Refill   • acetaminophen (TYLENOL) 325 mg tablet Take 2 tablets (650 mg total) by mouth every 6 (six) hours as needed for mild pain  0   • amLODIPine-benazepril (LOTREL 5-10) 5-10 MG per capsule Take 1 capsule by mouth daily 90 capsule 0   • pioglitazone-metFORMIN (ACTOPLUS MET)  MG per tablet Take 1 tablet by mouth 2 (two) times a day with meals 180 tablet 0     No current facility-administered medications for this visit.     _______________________________________________________________________  Review of Systems   Constitutional: Negative for chills, fatigue and fever. HENT: Negative for congestion, ear pain, rhinorrhea, sneezing and sore throat. Eyes: Negative for redness, itching and visual disturbance. Respiratory: Negative for cough, chest tightness and shortness of breath. Cardiovascular: Negative for chest pain, palpitations and leg swelling. Gastrointestinal: Negative for abdominal pain, blood in stool, diarrhea, nausea and vomiting. Endocrine: Negative for cold intolerance and heat intolerance. Genitourinary: Negative for dysuria, frequency and urgency. Musculoskeletal: Positive for arthralgias, gait problem and myalgias. Negative for back pain and neck stiffness. Skin: Negative for color change and rash. Neurological: Positive for weakness (Both hands). Negative for dizziness, tremors, light-headedness, numbness and headaches. Hematological: Does not bruise/bleed easily. Psychiatric/Behavioral: Negative for agitation, behavioral problems and confusion. Objective:  Vitals:    09/01/23 1202   BP: 138/76   BP Location: Left arm   Patient Position: Sitting   Cuff Size: Standard   Pulse: 69   Resp: 16   Temp: 97.8 °F (36.6 °C)   TempSrc: Temporal   SpO2: 97%   Weight: 110 kg (242 lb 6.4 oz)   Height: 5' 6" (1.676 m)     Body mass index is 39.12 kg/m². Physical Exam  Vitals and nursing note reviewed. Constitutional:       General: He is not in acute distress. Appearance: He is obese.  He is not ill-appearing, toxic-appearing or diaphoretic. HENT:      Head: Normocephalic and atraumatic. Right Ear: Tympanic membrane normal.      Left Ear: Tympanic membrane normal.      Nose: Nose normal.      Mouth/Throat:      Mouth: Mucous membranes are moist.      Pharynx: No oropharyngeal exudate or posterior oropharyngeal erythema. Eyes:      General: No scleral icterus. Right eye: No discharge. Left eye: No discharge. Conjunctiva/sclera: Conjunctivae normal.      Pupils: Pupils are equal, round, and reactive to light. Cardiovascular:      Rate and Rhythm: Normal rate and regular rhythm. Heart sounds: Normal heart sounds. No murmur heard. Pulmonary:      Effort: Pulmonary effort is normal. No respiratory distress. Breath sounds: Normal breath sounds. No wheezing. Abdominal:      General: Abdomen is flat. Bowel sounds are normal.      Palpations: Abdomen is soft. Tenderness: There is no abdominal tenderness. Musculoskeletal:      Cervical back: Normal range of motion and neck supple. Comments: Paraspinal muscular tightness   Skin:     General: Skin is warm and dry. Capillary Refill: Capillary refill takes 2 to 3 seconds. Findings: No erythema or rash. Neurological:      General: No focal deficit present. Mental Status: He is alert and oriented to person, place, and time. Mental status is at baseline. Motor: Weakness (Both upper extremity) and atrophy (Right hand and right forearm) present. Gait: Gait abnormal (ambulates with walker).    Psychiatric:         Mood and Affect: Mood normal.         Behavior: Behavior normal.

## 2023-09-01 NOTE — ASSESSMENT & PLAN NOTE
Lab Results   Component Value Date    HGBA1C 6.0 (H) 05/02/2023   Under control. Continue current medication. We will re-evaluate at next office visit.

## 2023-09-05 ENCOUNTER — OFFICE VISIT (OUTPATIENT)
Facility: CLINIC | Age: 68
End: 2023-09-05
Payer: MEDICARE

## 2023-09-05 ENCOUNTER — OFFICE VISIT (OUTPATIENT)
Dept: PHYSICAL THERAPY | Facility: CLINIC | Age: 68
End: 2023-09-05
Payer: MEDICARE

## 2023-09-05 DIAGNOSIS — R53.81 PHYSICAL DECONDITIONING: Primary | ICD-10-CM

## 2023-09-05 DIAGNOSIS — S14.151A INCOMPLETE SPINAL CORD INJURY AT C1-C4 LEVEL WITHOUT BONE INJURY (HCC): Primary | ICD-10-CM

## 2023-09-05 PROCEDURE — 97112 NEUROMUSCULAR REEDUCATION: CPT

## 2023-09-05 PROCEDURE — 97110 THERAPEUTIC EXERCISES: CPT | Performed by: PHYSICAL THERAPIST

## 2023-09-05 PROCEDURE — 97112 NEUROMUSCULAR REEDUCATION: CPT | Performed by: PHYSICAL THERAPIST

## 2023-09-05 NOTE — PROGRESS NOTES
Daily Note     Today's date: 2023  Patient name: Joe Blanca  : 1955  MRN: [id number]  Referring provider: Shon Villegas MD  Dx:   Encounter Diagnosis     ICD-10-CM    1. Physical deconditioning  R53.81                      Subjective: Pt reports he is overall doing well, he has a bit of R hip soreness today however it is very mild per description. Pt is hopeful to return to a cane, used to use it in his R UE. Objective: See treatment diary below. Therex: Active Warm Up NuStep 5' L2  STS (from 22" table) 2x10 without UEs  Hip 3 way Standing 2 x10 ea      Neuro Re-Ed:  NBOS 20s x 5 (nil UE support)  Semi tandem    L foot rear 4x 20s no UE support    R foot rear 5x 20s no UE support            Assessment: Tolerated treatment well. Patient demo min sway with semi tandem balance challenge, unable to take full step into step stance or accommodate tandem stance. Intermittently needs brief tactile intervention for balance. Pt demo major difficulty with standing march in 3 way hip therex specifically with raising on R LE and lowering on L LE indicating ongoing weakness. Pt does require seated rest break between activities. Plan: Continue per plan of care.       NO AUTH REQ  Precautions: Cervical spine PCDF with B UE atrophy and dec  strength  "Yolanda Angeles"

## 2023-09-05 NOTE — PROGRESS NOTES
Daily Note     Today's date: 2023  Patient name: Lady Herrera  : 1955  MRN: [id number]  Referring provider: Jaime Urban MD  Dx:   Encounter Diagnosis   Name Primary? • Incomplete spinal cord injury at C1-C4 level without bone injury ([address]) Yes                      Subjective: pt reports he feels saebo glove use went well. Objective: See treatment below. NMR:  Performed with Saebo glove for increase in digit extension and use of FES with pt to complete 1" block grasp with shoulder add/abudction x 25 reps with RUE  Performed [place] manipulation task of placing into slots with saebo glove and FES 2x 30 reps focusing onshould add/abduction and [place] with RUE  Performed multimatrix matching task focusing on gross pincer grasp using saebo glove on RUE focusing on [place] and UE coordination. TA  Educated and provided with HEP for theraputty and provided with yellow theraputty. Pt performed each exercise with good carryover and feels confident with newly learned HEP. Focus on LUE          Assessment: Tolerated treatment well. Pt demonstrating in session decreased digit extension. Pt would benefit from continued OT services to address UE stregnth, coordination, [place] and dexterity      Plan: Continued skilled OT per POC.

## 2023-09-07 ENCOUNTER — HOSPITAL ENCOUNTER (OUTPATIENT)
Dept: RADIOLOGY | Facility: HOSPITAL | Age: 68
Discharge: HOME | End: 2023-09-07
Payer: MEDICARE

## 2023-09-07 DIAGNOSIS — Z98.1 ARTHRODESIS STATUS: ICD-10-CM

## 2023-09-07 PROCEDURE — 72052 X-RAY EXAM NECK SPINE 6/>VWS: CPT

## 2023-09-11 ENCOUNTER — OFFICE VISIT (OUTPATIENT)
Dept: PHYSICAL THERAPY | Facility: CLINIC | Age: 68
End: 2023-09-11
Payer: MEDICARE

## 2023-09-11 ENCOUNTER — OFFICE VISIT (OUTPATIENT)
Facility: CLINIC | Age: 68
End: 2023-09-11
Payer: MEDICARE

## 2023-09-11 DIAGNOSIS — S14.151A INCOMPLETE SPINAL CORD INJURY AT C1-C4 LEVEL WITHOUT BONE INJURY (HCC): Primary | ICD-10-CM

## 2023-09-11 DIAGNOSIS — Z09 POSTOPERATIVE EXAMINATION: ICD-10-CM

## 2023-09-11 DIAGNOSIS — Z98.890 H/O EXCISION OF LAMINA OF CERVICAL VERTEBRA FOR DECOMPRESSION OF SPINAL CORD: ICD-10-CM

## 2023-09-11 DIAGNOSIS — R53.81 PHYSICAL DECONDITIONING: Primary | ICD-10-CM

## 2023-09-11 PROCEDURE — 97112 NEUROMUSCULAR REEDUCATION: CPT

## 2023-09-11 PROCEDURE — 97110 THERAPEUTIC EXERCISES: CPT

## 2023-09-11 NOTE — PROGRESS NOTES
Daily Note     Today's date: 2023  Patient name: Izzy Chávez  : 1955  MRN: [id number]  Referring provider: Lennie Overton MD  Dx:   Encounter Diagnosis     ICD-10-CM    1. Physical deconditioning  R53.81           Start Time: 3455  Stop Time: 1138  Total time in clinic (min): 41 minutes    Subjective: Pt reports he felt ok after last visit, just a little sore. Today he felt pretty good before but he had to walk far because parking was limited and now he is hurting. Objective: See treatment diary below. Therex: Active Warm Up NuStep 7' L2  STS (from 22" table) 2x10 without UEs  Hip 3 way Standing 2 x10 ea      Neuro Re-Ed:  NBOS 20s x 5 (nil UE support)  Semi tandem    L foot rear 5x 20s no UE support    R foot rear 5x 20s no UE support      Assessment: Tolerated treatment well. Maintained previous program as patient is responding well overall and was fatigued when entering clinic. Pt does require seated rest break between activities due to fatigue. Increased sway with L foot rear. Plan: Continue per plan of care.       NO AUTH REQ  Precautions: Cervical spine PCDF with B UE atrophy and dec  strength  "Ely Banks"

## 2023-09-11 NOTE — PROGRESS NOTES
Daily Note     Today's date: 2023  Patient name: Kristel Malcolm  : 1955  MRN: [id number]  Referring provider: German Inman MD  Dx:   Encounter Diagnoses   Name Primary? • Incomplete spinal cord injury at C1-C4 level without bone injury ([address]) Yes   • H/O excision of lamina of cervical vertebra for decompression of spinal cord    • Postoperative examination                       Subjective: "My hand feels tighter today"    Objective: See treatment below. NMR:  -Instrument assisted soft tissue management with use of graston for spasticity mgmt and sensory re-education R hand x5 min  -Massed practice grasp and release with saebo glove donned R UE retrieving 1 inch color cubes from table top and placement into container 45* to R.  FES wrist/digit extensors. ~20x. Transitioned to squigs on table top to promote cylindrical and spherical grasps. ~20x  -Retrieval and stacking of multi matrix cubes with matching letters. Saebo glove R UE with focus on pincer and 3 point grasp/release, dexterity. Requires use of table to compensatory rotation of items and inc time for all aspects    Assessment: Tolerated treatment well. Pt demonstrating improved tone following IASTM and improved release with saebo glove. Pt would benefit from continued OT services to address UE stregnth, coordination, [facility] and dexterity      Plan: Continued skilled OT per POC.

## 2023-09-13 ENCOUNTER — OFFICE VISIT (OUTPATIENT)
Facility: CLINIC | Age: 68
End: 2023-09-13
Payer: MEDICARE

## 2023-09-13 DIAGNOSIS — R53.81 PHYSICAL DECONDITIONING: Primary | ICD-10-CM

## 2023-09-13 DIAGNOSIS — S14.151A INCOMPLETE SPINAL CORD INJURY AT C1-C4 LEVEL WITHOUT BONE INJURY (HCC): Primary | ICD-10-CM

## 2023-09-13 PROCEDURE — 97112 NEUROMUSCULAR REEDUCATION: CPT

## 2023-09-13 PROCEDURE — 97110 THERAPEUTIC EXERCISES: CPT

## 2023-09-13 NOTE — PROGRESS NOTES
Daily Note     Today's date: 2023  Patient name: Izzy Chávez  : 1955  MRN: [id number]  Referring provider: Lennie Overton MD  Dx:   Encounter Diagnosis   Name Primary? • Incomplete spinal cord injury at C1-C4 level without bone injury ([address]) Yes       Start Time: 1102  Stop Time: 1145  Total time in clinic (min): 43 minutes       Subjective: "since starting my pointer finger feels like its cooperating more"  Educated on use of saebo stretch    Objective: See treatment below. NMR:  Performed x 20 reps of modified pushups standing at table and anterior weight shfitin on RUE for proprioceptive feedback with good effect  Performed using dycem and saebo glove of shoulder abduction/adduction of large pegs placing in/out with FES initially on wrist extensors for UE coordination, [place], and dexterity x 30 reps of each  Performed  Connect 4 task of copying pattern focusing on [place], UE coordination using dycem with RUE       Assessment: Tolerated treatment well. Pt demonstrating improved tone following IASTM and improved release with saebo glove. Pt would benefit from continued OT services to address UE stregnth, coordination, [place] and dexterity      Plan: Continued skilled OT per POC.

## 2023-09-13 NOTE — PROGRESS NOTES
Daily Note     Today's date: 2023  Patient name: Ibrahima Healy  : 1955  MRN: [id number]  Referring provider: Austin Pink MD  Dx:   Encounter Diagnosis     ICD-10-CM    1. Physical deconditioning  R53.81           Start Time: 1147  Stop Time: 1235  Total time in clinic (min): 48 minutes    Subjective: Pt reports he had to walk in far from his parking spot, but not as far as last time. Mild pain across his lower back was provoked as he walked in,     Objective: See treatment diary below. Therex: Active Warm Up NuStep 7' L3   STS (from 22" table) 2x12 without UEs  Hip 3 way Standing 2 x10 ea 2 sec hold      Neuro Re-Ed:  - Semi tandem    L foot rear 1x 20s no UE support    L foot rear 3x 20s no UE support with HT/HN   R foot rear 1x 20s no UE support   R foot rear 3x 20s no UE support with HT/HN  -step taps to 4" step with 1 UE support 10 reps      Assessment: Tolerated treatment well. Progressed previous program as patient is responding well overall. Patient demonstrated more instability with horizontal head turns vs nods in narrow stance. Pt does require seated rest break between activities due to fatigue. Increased sway with L foot rear. Plan: Continue per plan of care.       NO AUTH REQ  Precautions: Cervical spine PCDF with B UE atrophy and dec  strength  "Rose Bud Orchard"

## 2023-09-14 ENCOUNTER — OFFICE VISIT (OUTPATIENT)
Dept: NEUROSURGERY | Facility: CLINIC | Age: 68
End: 2023-09-14
Payer: MEDICARE

## 2023-09-14 VITALS
BODY MASS INDEX: 38.35 KG/M2 | SYSTOLIC BLOOD PRESSURE: 130 MMHG | TEMPERATURE: 97.9 F | HEIGHT: 66 IN | WEIGHT: 238.6 LBS | OXYGEN SATURATION: 94 % | DIASTOLIC BLOOD PRESSURE: 76 MMHG | HEART RATE: 71 BPM

## 2023-09-14 DIAGNOSIS — M24.531 CONTRACTURE, RIGHT WRIST: ICD-10-CM

## 2023-09-14 DIAGNOSIS — Z98.1 STATUS POST CERVICAL SPINAL ARTHRODESIS: ICD-10-CM

## 2023-09-14 DIAGNOSIS — S14.151A INCOMPLETE SPINAL CORD INJURY AT C1-C4 LEVEL WITHOUT BONE INJURY (HCC): ICD-10-CM

## 2023-09-14 DIAGNOSIS — M62.441 CONTRACTURE OF MUSCLE, RIGHT HAND: ICD-10-CM

## 2023-09-14 DIAGNOSIS — M47.12 SPONDYLOSIS OF CERVICAL SPINE WITH MYELOPATHY: Primary | ICD-10-CM

## 2023-09-14 PROCEDURE — 99214 OFFICE O/P EST MOD 30 MIN: CPT | Performed by: NURSE PRACTITIONER

## 2023-09-14 NOTE — ASSESSMENT & PLAN NOTE
As addressed in HPI  He presents today 4 months post surgery for3 month post op visit with imagining 6 V flex ex for review and reassessment    Today He reports the following    · Right arm can open and close now Open right fist --  · Left upper extremity was a little weak but is feeling better , has intermittent numbness in left hand  · Pain in low back improved   · Walking little better balance better , can stand longer  , Legs do not feel as heavy are not as numb as they were . · Continues to use walker  But is improving to sometimes walk on his own  · Attending outpatient PT and OT at [facility]  2 x per week  , HEP   For arms and legs  Do almost every day. · Reports he is pleased with his improvement  · Admits he is using Bone Stimulator     · Ambulated using walker, gait instability but patient reports is improved. .  · He denies bowel or bladder dysfunction. Imagining  · 9/7/23XR spine cervical complete 6+ vw flex/ext/obl Straightened cervical lordosis. Status post C4-T1 posterior instrumented fusion. Hardware is intact and in stable positioning. Plan  · Reviewed imagining with patient   · F/U in 6 months post op due November 10 on or shortly after , solo w/ me on a day Dr. Jeremie Jhaveri in office --severity of patient cervical myelopathy, slow progressive symptoms improvement. No imagining required   · Will. plan next visit for 1 years post op May 2024 w/ Dr Jeremie Jhaveri with imagining 6 v flex ext  · Advised to call if Red flag symptoms or start to decliene again   · Advised if he has additional questions or concerns call the office.

## 2023-09-14 NOTE — ASSESSMENT & PLAN NOTE
· DME Occupational  therapy request patient receive order for right UE splint ---provided and print out description , SaeboStretch Dynamic Resting Hand Splint    Cervical myelopathy  , weakness in Right upper extremity , diminished motor function , wrist and hand contracture  · Contracture right wrist , fingers and diminished motor function       PLAN  · Ordered splint , patient given copy of order, he vernon notify OT of order.

## 2023-09-14 NOTE — PROGRESS NOTES
Assessment/Plan:    Spondylosis of cervical spine with myelopathy  · As addressed in HPI  · As addressed in status post cervical arthrodesis    Contracture of muscle, right hand  · DME Occupational  therapy request patient receive order for right UE splint ---provided and print out description , SaeboStretch Dynamic Resting Hand Splint    Cervical myelopathy  , weakness in Right upper extremity , diminished motor function , wrist and hand contracture  · Contracture right wrist , fingers and diminished motor function       PLAN  · Ordered splint , patient given copy of order, he vernon notify OT of order. Status post cervical spinal arthrodesis  As addressed in HPI  He presents today 4 months post surgery for3 month post op visit with imagining 6 V flex ex for review and reassessment    Today He reports the following    · Right arm can open and close now Open right fist --  · Left upper extremity was a little weak but is feeling better , has intermittent numbness in left hand  · Pain in low back improved   · Walking little better balance better , can stand longer  , Legs do not feel as heavy are not as numb as they were . · Continues to use walker  But is improving to sometimes walk on his own  · Attending outpatient PT and OT at [facility]  2 x per week  , HEP   For arms and legs  Do almost every day. · Reports he is pleased with his improvement  · Admits he is using Bone Stimulator     · Ambulated using walker, gait instability but patient reports is improved. .  · He denies bowel or bladder dysfunction. Imagining  · 9/7/23XR spine cervical complete 6+ vw flex/ext/obl Straightened cervical lordosis. Status post C4-T1 posterior instrumented fusion. Hardware is intact and in stable positioning.        Plan  · Reviewed imagining with patient   · F/U in 6 months post op due November 10 on or shortly after , solo w/ me on a day Dr. Sherlyn Thao in office --severity of patient cervical myelopathy, slow progressive symptoms improvement. No imagining required   · Will. plan next visit for 1 years post op May 2024 w/ Dr Jd Leiva with imagining 6 v flex ext  · Advised to call if Red flag symptoms or start to decliene again   · Advised if he has additional questions or concerns call the office. Contracture, right wrist  As addressed in contracture right hand          Subjective: 3 month post op visit status post cervical fusion    Patient ID: Easton Daniels is a 69 y.o. male PM/SH DM2 HTN HLD    HPI   4/27/23 consult visit with Dr. Jd Leiva   Reported as per provider note neurogenic claudication and increasing difficulty with ambulation and severe spinal stenosis at the cervical thoracic junction. He nearly fell in the office. He is using a walker. He is unsteady but was much improved after some instruction in using a walker properly and being careful with turnaround's    Diagnosis   · Cervical spinal cord compression  · Spinal cord injury at C5-C7 level without injury of spinal bone   · Neurogenic claudication     5/10/23 Procedure(s):  Posterior cervical decompressive laminectomy and instrumented fusion C4-T1      Social retired on disability .  . Drove to appointment     REVIEW OF SYSTEMS  Review of Systems   Constitutional: Positive for activity change (restricted). HENT: Negative. Negative for trouble swallowing. Eyes: Negative. Respiratory: Negative. Cardiovascular: Negative. Gastrointestinal: Negative. Endocrine: Negative. Genitourinary: Negative. Musculoskeletal: Positive for back pain (constant- pain scale varries- recently increase level of pain baseline 4-5/10) and gait problem (using a walker- unsteady and weakness in b/l legs). Negative for neck pain (improved since last visit) and neck stiffness. Skin: Negative. Allergic/Immunologic: Negative. Neurological: Positive for weakness (R>L hands, and R and L A LITTLE UNSTEADY) and numbness (b/l hands). Negative for headaches.    Hematological: Negative. Psychiatric/Behavioral: Negative. Negative for sleep disturbance. All other systems reviewed and are negative. Meds/Allergies     Current Outpatient Medications   Medication Sig Dispense Refill   • acetaminophen (TYLENOL) 325 mg tablet Take 2 tablets (650 mg total) by mouth every 6 (six) hours as needed for mild pain  0   • amLODIPine-benazepril (LOTREL 5-10) 5-10 MG per capsule Take 1 capsule by mouth daily 90 capsule 0   • pioglitazone-metFORMIN (ACTOPLUS MET)  MG per tablet Take 1 tablet by mouth 2 (two) times a day with meals 180 tablet 0     No current facility-administered medications for this visit. No Known Allergies    PAST HISTORY    Past Medical History:   Diagnosis Date   • Diabetes mellitus ([address])    • ED (erectile dysfunction)    • Hyperlipidemia    • Hypertension    • Low back pain March 2022   • Obesity    • S/P cervical spinal fusion 05/10/2023   • Spinal stenosis of lumbar region at multiple levels 12/22/2022       Past Surgical History:   Procedure Laterality Date   • CARPAL TUNNEL RELEASE Right    • EYE SURGERY      strabibusmus   • HAND SURGERY      left index finger   • IA ARTHRD PST/PSTLAT TQ 1NTRSPC CRV BELW C2 SEGMENT N/A 5/10/2023    Procedure: Posterior cervical decompressive laminectomy and instrumented fusion C4-T1;   Surgeon: Stefania Leigh MD;  Location:  MAIN OR;  Service: Neurosurgery   • IA EXCISION SPERMATOCELE W/WO EPIDIDYMECTOMY Left 09/16/2021    Procedure: HYDROCELECTOMY, SCROTAL DEBRIDEMENT;  Surgeon: Carmen Pena MD;  Location: [facility];  Service: Urology   • IA NEUROPLASTY &/TRANSPOS MEDIAN NRV CARPAL Jacqui Pagoda Right 07/06/2021    Procedure: RELEASE CARPAL TUNNEL;  Surgeon: Mary Longoria MD;  [facility]: [facility] MAIN OR;  Service: Orthopedics   • IA NEUROPLASTY &/TRANSPOSITION ULNAR NERVE ELBOW Right 07/06/2021    Procedure: RELEASE CUBITAL TUNNEL;  Surgeon: Mary Longoria MD;  Location: [facility] MAIN OR;  Service: Orthopedics       Social History     Tobacco Use   • Smoking status: Former     Packs/day: 1.00     Years: 47.00     Total pack years: 47.00     Types: Cigarettes     Quit date: 2021     Years since quittin.6   • Smokeless tobacco: Never   • Tobacco comments:     advised not to smoke   Vaping Use   • Vaping Use: Never used   Substance Use Topics   • Alcohol use: Not Currently     Comment: aprox 4 drinks a month  when out to dinner   • Drug use: Not Currently     Types: Marijuana       Family History   Problem Relation Age of Onset   • Hypertension Mother    • Diabetes Mother    • Hypertension Father    • Diabetes Father        The following portions of the patient's history were reviewed and updated as appropriate: allergies, current medications, past family history, past medical history, past social history, past surgical history and problem list.      EXAM    Vitals:Blood pressure 130/76, pulse 71, temperature 97.9 °F (36.6 °C), temperature source Temporal, height 5' 6" (1.676 m), weight 108 kg (238 lb 9.6 oz), SpO2 94 %. ,Body mass index is 38.51 kg/m². Physical Exam  Vitals and nursing note reviewed. Constitutional:       General: He is not in acute distress. Appearance: Normal appearance. He is not ill-appearing, toxic-appearing or diaphoretic. Pulmonary:      Effort: Pulmonary effort is normal. No respiratory distress. Musculoskeletal:      Right lower leg: Edema present. Left lower leg: Edema present. Skin:     General: Skin is warm and dry. Neurological:      Mental Status: He is alert and oriented to person, place, and time. Sensory: No sensory deficit. Motor: Weakness present.       Coordination: Coordination abnormal. Finger-Nose-Finger Test abnormal and Heel to Burnette Test abnormal.      Gait: Gait abnormal.   Psychiatric:         Mood and Affect: Mood normal.         Behavior: Behavior normal.         Neurologic Exam     Mental Status   Oriented to person, place, and time. Level of consciousness: alert    Motor Exam   Muscle bulk: decreased  Right arm tone: increased  Left arm tone: normal    Strength   Right deltoid: 4/5  Left deltoid: 5/5  Right biceps: 4/5  Left biceps: 5/5  Right triceps: 4/5  Left triceps: 5/5  Right wrist flexion: 4/5  Left wrist flexion: 5/5  Right wrist extension: 4/5  Left wrist extension: 5/5  Right interossei: 4/5  Left interossei: 4/5  Right iliopsoas: 5/5  Left iliopsoas: 5/5  Right quadriceps: 5/5  Left quadriceps: 5/5  Right hamstrin/5  Left hamstrin/5  Right anterior tibial: 5/5  Left anterior tibial: 5/5  Right gastroc: 5/5  Left gastroc: 5/5    Sensory Exam   Light touch normal.     Gait, Coordination, and Reflexes     Gait  Gait: (instability use walker )    Coordination   Finger to nose coordination: abnormal  Heel to shin coordination: abnormal      Imaging Studies  XR spine cervical complete 6+ vw flex/ext/obl    Result Date: 2023  Narrative: CERVICAL SPINE INDICATION:   Z98.1: Arthrodesis status. COMPARISON: Cervical spine radiographs 2023 VIEWS:  XR SPINE CERVICAL COMPLETE 6+ VW FLEX /EXT /OBL FINDINGS: Straightened cervical lordosis. Status post C4-T1 posterior instrumented fusion. Hardware is intact and in stable positioning. Advanced disc degenerative disease with severe disc space narrowing throughout the cervical spine starting at C3. Prominent endplate osteophytes are present. No evidence of dynamic instability seen with flexion or extension. The prevertebral soft tissues are within normal limits. The lung apices are clear. Impression: Uncomplicated cervical fusion.  Workstation performed: [id number]           2023 MRI CERVICAL SPINE WITH AND WITHOUT CONTRAST     INDICATION: M62.541: Muscle wasting and atrophy, not elsewhere classified, right hand  R29.2: Abnormal reflex.     COMPARISON:  None.     TECHNIQUE:  Multiplanar, multisequence imaging of the cervical spine was performed before and after gadolinium administration. .          IV Contrast:  11 mL of Gadobutrol injection (SINGLE-DOSE)      IMAGE QUALITY:  Diagnostic.     FINDINGS:     ALIGNMENT:  There is trace anterolisthesis of C2-C3. There is focal reversal of the normal cervical lordosis centered at C4-C5.     MARROW SIGNAL:  There is no suspicious marrow signal abnormality identified. There is Modic type I endplate degenerative change at C5-C6 and C6-C7.     CERVICAL AND VISUALIZED UPPER THORACIC CORD:  There is T2 cord signal hyperintensity from C5 through C7. There may be mild dorsal retroareolar degeneration extending to C4-C5.     PREVERTEBRAL AND PARASPINAL SOFT TISSUES:  Normal.     VISUALIZED POSTERIOR FOSSA:  The visualized posterior fossa demonstrates no abnormal signal.     CERVICAL DISC SPACES:     C2-C3:  There is left-sided facet arthrosis. There is left-sided uncovertebral spurring. There is severe left foraminal narrowing. There is mild mass effect on the left aspect of the thecal sac. There is no right foraminal narrowing.     C3-C4:  There is disc space narrowing. There is disc osteophyte complex with uncovertebral spurring. There is facet arthrosis. There is mild canal stenosis. There is mild cord flattening. There is moderate to severe bilateral foraminal narrowing.     C4-C5:  There is disc space narrowing. There is a disc osteophyte complex with uncovertebral spurring. There is facet arthrosis. There is severe right and moderate left foraminal narrowing. There is mild canal stenosis.     C5-C6:  There is a disc osteophyte complex with uncovertebral spurring. There is facet arthrosis. There is severe canal stenosis with underlying cord compression. There is underlying cord edema and/or myelomalacia. There appears to be cord volume   loss at this level. There is severe bilateral foraminal narrowing.     C6-C7:  There is disc space narrowing. There is disc osteophyte complex with uncovertebral spurring. There is severe canal stenosis with underlying cord compression. There is underlying cord edema and/or myelomalacia. There is severe bilateral   foraminal narrowing. There appears to be cord volume loss.     C7-T1:  Normal.     UPPER THORACIC DISC SPACES:  Normal.     POSTCONTRAST IMAGING:  Normal.     OTHER FINDINGS:  None.     IMPRESSION:     Multilevel cervical spondylosis, as described above.     Multifactorial disease results in overall severe canal stenosis with underlying cord compression at C5-C6 and C6-C7. Associated cord edema and/or myelomalacia is present at these levels.       No abnormal enhancement identified within the spinal canal.     The study was marked in EPIC for immediate notification. I have personally reviewed pertinent reports. and I have personally reviewed pertinent films in PACS    I have spent a total time of 35 minutes on 09/14/23 in caring for this patient including Diagnostic results, Risks and benefits of tx options, Instructions for management, Patient and family education, Importance of tx compliance, Risk factor reductions, Impressions, Counseling / Coordination of care, Documenting in the medical record, Reviewing / ordering tests, medicine, procedures  , Obtaining or reviewing history   and Communicating with other healthcare professionals .

## 2023-09-18 ENCOUNTER — OFFICE VISIT (OUTPATIENT)
Facility: CLINIC | Age: 68
End: 2023-09-18
Payer: MEDICARE

## 2023-09-18 DIAGNOSIS — Z09 POSTOPERATIVE EXAMINATION: ICD-10-CM

## 2023-09-18 DIAGNOSIS — R53.81 PHYSICAL DECONDITIONING: Primary | ICD-10-CM

## 2023-09-18 DIAGNOSIS — S14.151A INCOMPLETE SPINAL CORD INJURY AT C1-C4 LEVEL WITHOUT BONE INJURY (HCC): Primary | ICD-10-CM

## 2023-09-18 DIAGNOSIS — Z98.1 ARTHRODESIS STATUS: ICD-10-CM

## 2023-09-18 DIAGNOSIS — Z98.890 H/O EXCISION OF LAMINA OF CERVICAL VERTEBRA FOR DECOMPRESSION OF SPINAL CORD: ICD-10-CM

## 2023-09-18 PROCEDURE — 97110 THERAPEUTIC EXERCISES: CPT

## 2023-09-18 PROCEDURE — 97112 NEUROMUSCULAR REEDUCATION: CPT

## 2023-09-18 NOTE — PROGRESS NOTES
Daily Note     Today's date: 2023  Patient name: Kristel Malcolm  : 1955  MRN: [id number]  Referring provider: German Inman MD  Dx:   Encounter Diagnosis     ICD-10-CM    1. Physical deconditioning  R53.81                       Subjective: Patient reports to PT session stating he has been experiencing increased tone today and feels very stiff. Objective: See treatment diary below. Therex:  - Active Warm Up NuStep 8' L 3    Neuro Re-Ed: (2.5# R ankle weight)  - STS (from 22" table) x 20 reps, 30 reps without UEs  - Sidestepping along ortho bar 6 laps x 10 ft  - Ascending/descending  steps x 5 cycles up/over; 2 UE; step-to pattern  - Ambulation in SoloStep NO AD 6 x 20 ft      Assessment: Patient tolerated treatment session well today with focus on strength and balance training. NuStep utilized as neurologic primer for increased reciprocity of gait, particularly due to patient's reports of increased tone this date. Addition of ankle weight this date to further challenge patient's degree of neuromuscular control. Patient would likely benefit from further training with SoloStep for both overground and treadmill ambulation, as his primary goal at this time is to return to walking with a cane. Plan: Continue per plan of care. Patient's primary goal is to return to walking with SPC. Integrate gait training.      NO AUTH REQ  Precautions: Cervical spine PCDF with B UE atrophy and dec  strength  "Johann"    Functional outcomes   5x sit to stand: 23.50 (seconds)  TU.9 (seconds)

## 2023-09-18 NOTE — PROGRESS NOTES
Daily Note     Today's date: 2023  Patient name: Samina Dominguez  : 1955  MRN: [id number]  Referring provider: Rosabla Mccarty MD  Dx:   Encounter Diagnoses   Name Primary? • Incomplete spinal cord injury at C1-C4 level without bone injury ([address]) Yes   • H/O excision of lamina of cervical vertebra for decompression of spinal cord    • Postoperative examination    • Arthrodesis status                      Subjective: "My hand is especially tight the past 2 days"  Objective: See treatment below. NMR:  Performed x 30 reps of modified pushups standing at table and anterior weight shifting on RUE for proprioceptive feedback with good effect    -IASTM with use of graston R digit flexors for sensory retraining and spasticity mgmt completed with good affect.    -Performed large peg board activity with saebo glove donned. Focus on massed practice grasp/release, [place], dexterity. Also attempted marble placement on top, however unable to complete    Assessment: Tolerated treatment fairly. Session limited by increase in R UE tone, requiring inc assist from saebo glove, however in order to compensate for tone too much assist to then attain an effective grasp. Tone improves with IASTM, but still very limiting this session. Pt would benefit from continued OT services to address UE stregnth, coordination, [place] and dexterity      Plan: Continued skilled OT per POC.

## 2023-09-26 ENCOUNTER — OFFICE VISIT (OUTPATIENT)
Facility: CLINIC | Age: 68
End: 2023-09-26
Payer: MEDICARE

## 2023-09-26 DIAGNOSIS — R53.81 PHYSICAL DECONDITIONING: Primary | ICD-10-CM

## 2023-09-26 DIAGNOSIS — S14.151A INCOMPLETE SPINAL CORD INJURY AT C1-C4 LEVEL WITHOUT BONE INJURY (HCC): Primary | ICD-10-CM

## 2023-09-26 DIAGNOSIS — Z98.890 H/O EXCISION OF LAMINA OF CERVICAL VERTEBRA FOR DECOMPRESSION OF SPINAL CORD: ICD-10-CM

## 2023-09-26 PROCEDURE — 97112 NEUROMUSCULAR REEDUCATION: CPT

## 2023-09-26 NOTE — PROGRESS NOTES
Daily Note     Today's date: 2023  Patient name: Jorge Gramajo  : 1955  MRN: [id number]  Referring provider: Sue Urrutia MD  Dx:   Encounter Diagnoses   Name Primary? • Incomplete spinal cord injury at C1-C4 level without bone injury ([address]) Yes   • H/O excision of lamina of cervical vertebra for decompression of spinal cord                      Subjective: "My hand is especially tight the past 2 days"  Objective: See treatment below. NMR:  Performed x 20 reps of placing large mushroom pegs on/off peg board with saebo glove in PNF D2 pattern using 2 lb wrist weight for proprioceptive feedback and dycem  Performed 2 x 5 reps of 1 inch block stack focusing on [place], dexterity UE coordiantion using saebo glove  using 2 lb wrist weight for proprioceptive feedback and dycem. -IASTM with use of graston R digit flexors for sensory retraining and spasticity mgmt completed with good affect.    -Performed brainiometry so with saebo glove donned using dycem. Focus on massed practice grasp/release, [place], dexterity. Assessment: Tolerated treatment fairly. Demonstrating increased spasticity today and reports stiffness in LUE as well. Discussed UE exercise with LUE of digit extension and stretches. Notified MDs. Tone improves with IASTM, but still very limiting this session. Pt would benefit from continued OT services to address UE stregnth, coordination, [place] and dexterity      Plan: Continued skilled OT per POC.

## 2023-09-26 NOTE — PROGRESS NOTES
Daily Note     Today's date: 2023  Patient name: Kristal Perera  : 1955  MRN: [id number]  Referring provider: Diogenes Perez MD  Dx:   Encounter Diagnosis     ICD-10-CM    1. Physical deconditioning  R53.81           Start Time: 1332  Stop Time: 8030  Total time in clinic (min): 43 minutes     Subjective: Pt missed  PT 23 due to exacerbation of low back pain. He had difficutly getting OOB. Yesterday he became able to get OOB and walk enough to come here. Arrived with 2-3/10 low back pain     Objective: See treatment diary below. Therex:  - Active Warm Up NuStep 8' L 3    Neuro Re-Ed: (2.5# R ankle weight)  - STS (from 22" table) x 20 reps, 30 reps without UEs  - Sidestepping along ortho bar 6 laps x 10 ft  - Ascending/descending  steps x 4 cycles up/over; 2 UE; step-to pattern, except for alternating gait pattern ascending/descending the 4" steps. - Ambulation in SoloStep with SPC NO AD 4 x 20 ft, then a seated rest, 97% PO2 and 91 bpm HR. Then 4 x 20ft      Assessment: Patient tolerated treatment session well today with focus on strength and balance training. SPC used for ambulation in Solo Step, vs no AD, due to asymmetry of gait pattern, in hope to avoid exacerbation of low back pain. Ankle weight use continued to further challenge patient's degree of neuromuscular control. Patient would likely benefit from further training with SoloStep for both overground and treadmill ambulation, as his primary goal at this time is to return to walking with a cane. Plan: Continue per plan of care. Patient's primary goal is to return to walking with SPC. Integrate gait training.      NO AUTH REQ  Precautions: Cervical spine PCDF with B UE atrophy and dec  strength  "Johann"    Functional outcomes   5x sit to stand: 23.50 (seconds)  TU.9 (seconds)

## 2023-09-29 ENCOUNTER — APPOINTMENT (OUTPATIENT)
Facility: CLINIC | Age: 68
End: 2023-09-29
Payer: MEDICARE

## 2023-10-04 ENCOUNTER — EVALUATION (OUTPATIENT)
Facility: CLINIC | Age: 68
End: 2023-10-04
Payer: MEDICARE

## 2023-10-04 ENCOUNTER — TELEPHONE (OUTPATIENT)
Dept: FAMILY MEDICINE CLINIC | Facility: CLINIC | Age: 68
End: 2023-10-04

## 2023-10-04 DIAGNOSIS — R53.81 PHYSICAL DECONDITIONING: Primary | ICD-10-CM

## 2023-10-04 DIAGNOSIS — I10 PRIMARY HYPERTENSION: ICD-10-CM

## 2023-10-04 DIAGNOSIS — S14.151A INCOMPLETE SPINAL CORD INJURY AT C1-C4 LEVEL WITHOUT BONE INJURY (HCC): Primary | ICD-10-CM

## 2023-10-04 PROCEDURE — 97530 THERAPEUTIC ACTIVITIES: CPT

## 2023-10-04 RX ORDER — AMLODIPINE BESYLATE AND BENAZEPRIL HYDROCHLORIDE 5; 10 MG/1; MG/1
1 CAPSULE ORAL DAILY
Qty: 90 CAPSULE | Refills: 1 | Status: SHIPPED | OUTPATIENT
Start: 2023-10-04

## 2023-10-04 NOTE — PROGRESS NOTES
PT Re-Evaluation          POC expires Auth Status Total   Visits  Start date  Expiration date PT/OT + Visit Limit? Co-Insurance   23 NA NA NA NA PT                                                   Today's date: 10/4/2023  Patient name: Kip Jackson  : 1955  MRN: [id number]  Referring provider: Kb Fischer MD  Dx:   Encounter Diagnosis     ICD-10-CM    1. Physical deconditioning  R53.81             Assessment  Assessment details: Patient is a 77 y.o. Male who presents to skilled outpatient PT with who presents with ambulatory dysfunction. Patient has been presenting to skilled PT for about a month with subjective improvements in functional mobility and endurance. Completed functional outcome measures today and demonstrated deficits in safety with ambulation, LE muscular strength, static and dynamic balance as per TUG, 5x STS, mCTSIB and ELDER respectively. Patient categorized at a HIGH risk for falls as per APTA and Rehab Measures for aforementioned outcome measures. Furthermore, patient with increased difficulty performing 2nd and 4th condition of mCTSIB, suggesting increased visual reliance for balance. Patient falls below age normative values for gait speed as per 10 meter walk test; he is categorizes as a limited community ambulator. Slight regression in 5xSTS and TUG this date as compared to previous evaluations likely due to inconsistency with compliance to therapy schedule. Plan to assess 6 MWT next session to evaluate patient's cardiovascular endurance, unable to perform today secondary to time constraints and patient fatigue. Updated patients goals today. He will continue to benefit from skilled outpatient PT services to improve functional mobility, safety and reduce his risk for falls.       Impairments: Abnormal coordination, Abnormal gait, Abnormal muscle tone, Abnormal or restricted ROM, Activity intolerance, Impaired balance, Impaired physical strength, Lacks appropriate HEP, Poor posture, Poor body mechanics, Pain with function, Safety issue, Weight-bearing intolerance, Abnormal movement, Difficulty understanding, Abnormal muscle firing  Understanding of Dx/Px/POC: Good  Prognosis: Good    Patient verbalized understanding of POC. Please contact me if you have any questions or recommendations. Thank you for the referral and the opportunity to share in [place]'s care. Plan  Plan details: Balance training, LE strengthening, endurance training   Patient would benefit from: Skilled PT  Planned modality interventions: Biofeedback, Cryotherapy, TENS, Thermotherapy  Planned therapy interventions: Abdominal trunk stabilization, ADL training, Balance, Balance/WB training, Breathing training, Body mechanics training, Coordination, Functional ROM exercises, Gait training, HEP, Joint Mobilization, Manual Therapy, Elias taping, Motor coordination training, Neuromuscular re-education, Patient education, Postural training, Strengthening, Stretching, Therapeutic activities, Therapeutic exercises, Therapeutic training, Transfer training, Activity modification, Work reintegration  Frequency: 2x/wk  Duration in weeks: 12 weeks  Plan of Care beginning date: 10-4-23  Plan of Care expiration date: 3 months - 1-4-23  Treatment plan discussed with: Patient       Goals  Short Term Goals (4 weeks):    - Patient will improve time on TUG by 2.9 seconds to facilitate improved safety in all ambulation - added today  - Patient will be independent in basic HEP 2-3 weeks - added today  - Patient will improve 5xSTS score by 2.3 seconds to promote improved LE functional strength needed for ADLs - added today  - Patient will compete 6 MWT to evaluate cardiovascular endurance and further assess patient's tolerance to activity.  - added today    Long Term Goals (12 weeks):  - Patient will be independent in a comprehensive home exercise program - added today  - Patient will improve gait speed by 0.18 m/s to improve safety with community ambulation- added today  - Patient will improve ELDER by 6 points in order to improve static balance and reduce risk for falls - added today  - Patient will improve 6 Minute Walk Test score by 190 feet to promote improved cardiovascular endurance - added today  - Patient will report 50% reduction in near falls in order to improve safety with functional tasks and reduce his risk for falls - added today  - Patient will report going on walks at least 3 days per week to promote independence and improved cardiovascular endurance - added today  - Patient will be able to ascend/descend stairs reciprocally with 1 UE assist to promote independence and safety with ADLs - added today  - Patient will report 50% reduction in near falls when ambulating on uneven terrain- added today      Cut off score    All date taken from APTA Neuro Section or Rehab Measures      Elder/58  MDC: 6 pts  Age Norms:  57-79: M - 54   F - 55  70-79: M - 47   F - 53  80-89: M - 48   F - 50 5xSTS: Musa et al 2010  MDC: 2.3 sec  Age Norms:  60-69: 11.1 sec  70-79: 12.6 sec  80-89: 14.8 sec   TUG  MDC: 4.14 sec  Cut off score:  >13.5 sec community dwelling adults  >32.2 frail elderly  <20 I for basic transfers  >30 dependent on transfers 10 Meter Walk Test: Yobany vasquez 2011  MDC: 0.18 m/s  20-29: M - 1.35 m   F - 1.34 m  30-39: M - 1.43 m   F - 1.34 m  40-49: M - 1.43 m   F - 1.39 m  50-59: M - 1.43 m   F - 1.31 m  60-69: M - 1.34 m   F - 1.24 m  70-79: M - 1.26 m   F - 1.13 m  80-89: M - 0.97 m   F - 0.94 m    Household Ambulator < 0.4 m/s  Limited Community Ambulator 0.4 - 0.8 m/s  [employer] Ambulator 0.8 - 1.2 m/s  Safely cross the street > 1.2 m/s   FGA  MCID: 4 pts  Geriatrics/community < 22/30 fall risk  Geriatrics/community < 20/30 unexplained falls    DGI  MDC: vestibular - 4 pts  MDC: geriatric/community - 3 pts  Falls risk <19/24 mCTSIB  Norm: 20-60 yrs  Eyes open firm: norm sway 0.21-0.48  Eyes closed firm: norm sway 0.48-0.99  Eyes open foam: norm sway 0.38-0.71  Eyes closed foam: norm sway 0.70-2.22   6 Minute Walk Test  MDC: 190.98 ft  MCID: 164 ft    Age Norms  57-79: M - 1876 ft (571.80 m)  F - 1765 ft (537.98 m)  70-79: M - 1729 ft (527.00 m)  F - 1545 ft (470.92 m)  80-89: M - 1368 ft (416.97 m)  F - 1286 ft (391.97 m) ABC: Loc Kahn, 2003  <67% increased risk for falls         Subjective    History of Present Illness  - Mechanism of injury: Pt has been referred to PT following cervical spine surgical PCDF C1-4 due to incomplete spinal cord dysfunction earlier this year. He is currently receiving OT for his atrophy and weakness in UEs. At this time pt is amb with RW and would like to return to use of SPC. Pt lives alone and is responsible for household duties and community errands. Pt has a long history of low back pain. Pt denies balance issues at this time. Updated (10/4/23): Patient presents to therapy with RW and B/L forearm and hand abrasions which he states was from trying to reach for his phone in his recliner chair for nearly 3 hours before receiving help from his brother in-law. He would like to continue to work towards his goal of walking without his RW with future skilled therapy sessions. Quality of life: good  - Primary AD: RW, cruising on counter or furniture at home.    - Assist level at home: independent, needs help bringing out the trash form neighbor or brother in-law    Pain  - Current pain ratin-2/10    Treatments  - Previous treatment: None  - Current treatment: None  - Diagnostic Testing: None      Objective     Objective     Active Range of Motion   Cervical/Thoracic Spine       Cervical    Flexion:  WFL  Extension:  Restriction level: moderate  Left lateral flexion:  WFL  Right lateral flexion:  WFL  Left rotation:  WFL  Right rotation:  [facility]    Lumbar   Flexion: WFL  Extension:  Restriction level: minimal  Left lateral flexion:  WFL  Right lateral flexion:  WFL  Neuro Exam:     Functional outcomes   5x sit to stand: 23.50 (seconds)  TU.9 (seconds)            Gait  - Abnormalities: antalgic gait, decreased L stance time, decreased L foot clearance, forward flexed posture           Outcome Measures Initial Eval  23 Re-Eval  10-4-23        5xSTS 23.50 sec 25.69 sec        TUG  - Regular   33.9 sec   24.97 sec          10 meter  0.63 m/s        JAEL          FGA  defer        DGI          mCTSIB  - FTEO (firm)  - FTEC (firm)  - FTEO (foam)  - FTEC (foam)    30 sec  1.9 sec  26.81 sec  1.69 sec        6MWT  defer                                       Precautions:   Past Medical History:   Diagnosis Date   • Diabetes mellitus ([address])    • ED (erectile dysfunction)    • Hyperlipidemia    • Hypertension    • Low back pain 2022   • Obesity    • S/P cervical spinal fusion 05/10/2023   • Spinal stenosis of lumbar region at multiple levels 2022

## 2023-10-04 NOTE — PROGRESS NOTES
OCCUPATIONAL THERAPY RE-EVALUATION    Today's Date: 10/4/2023  Patient Name: Abby Officer  : 1955  MRN: [id number]  Referring Provider: Jennifer Darling MD  Dx: Incomplete spinal cord injury at C1-C4 level without bone injury ([address]) [S14.151A]    PLAN OF CARE START: 23  PLAN OF CARE END: 23  FREQUENCY: 2x/wk  PRECAUTIONS: R sided impairments UE>LE, ambulates with rolling walker      Pt presents to re-evaluation on 10/4/23 status post presenting to [facility] on 5/10 for planned posterior cervical decompression and fusion for cervical spinal cord compression and myelopathy 2/2 severe spinal stenosis resulting in gait dysfunction. He performed a posterior cervical decompressive laminectomy with instrumented fusion from C4-T1 . Patient referred by Dr. Cherene Libman, neurosurgeon. As per interview, pt reports mild improvements at times with hand function however reports these improvements vary. Pt reports good and bad days are 50/50. Pt reports difficulty with overall hand function, FMC/dexterity,  strength, and R hand coordination. Poor nail hygeine noted, right ring finger nail beginning to curve into the skin (likely due to digits being in flexed position for prolonged period of time) pt strongly advised to cut to avoid skin break down. Post assessments, pt demonstrated mild improvements in dexterity in L UE as well as spasticity within R UE. Pt made mild strides towards STGs/LTGs however has not achieved any at this time. Pt would benefit from continued OT services focusing on impairments for 8-12 more weeks. Pt educated on progress/therapy process and pt in understanding and agreement of recommendations. Recommending to continue HEP        Subjective Pt reports that he currently uses L hand as his functional hand because his R sided dominant hand is "very tight" and not functionally useable.  Pt reports that although he is able to adapt his everyday activities to be completed with his L hand that he still notices some functional deficits. Pt purchases pants and shoes with no laces and fasteners in order to be able to complete dressing independently. Pt reports that he owns a sock aid, dressing stick, and long shoe horn, but does not frequently use them. Pt additionally reports that he owns a resting hand splint for his R UE but that he does not use it because it is "too difficult to put on alone." Pt reports that his goal for coming to therapy is to increase the use of his R Hand, he reports that he is a typical  and reports no deficits in driving with R sided tightness. Objective    Upper Extremities:  Pt is R hand dominant. VINCENZO: RUE: 25lbs (previous: 25lb) LUE:  35lbs (previous: 45lb)  The age norm is approximately 91.1lbs R UE and 76.8lbs L UE, indicating decreased b/l  strength.                 Range of Motion:  AROM:   R UE   - Finger extension- 10%  - Finger flexion- 10%  PROM:   - Finger extension- 85%  - Finger flexion- 85%    L UE:  AROM:  - Finger extension: 90%  - Finger flexion: 95%      NINE-HOLE PEG TEST: assesses dexterity/fine motor coordination   R hand: 1 minute and 43.30 seconds (previous: 1:36.71 seconds)  L hand: 44.02 seconds (previous: 40.98 seconds)  Pt demonstrates decreased b/l FMC compared to norms for age/sex (21.23 seconds R UE and 22.29 seconds L UE)     Functional Dexterity Test: assesses patient's ability to use the hand for daily tasks requiring a 3-jaw ignacio prehension between the fingers and the thumb   R UE: 1 minute and 39 seconds with 100% compensation of board (previous: 53.74 seconds with 100% compensation of board)  L UE: 45 seconds with 100% compensation of board (previous: 55.72 seconds with 100% compensation of board)  Pt demonstrates decreased b/l dexterity compared to norms for age/sex (40.0 seconds R UE and 31.7 seconds L UE)      Modified Nick Scale (right)  Elbow flexors: 1/4  Elbow extensors: 1/4  Wrist flexors: 1/4 (previous: 1+)  Wrist extensors 1+/4  Finger flexors: 1+/4 (previous: 2)  Finger extensors: 1/4    0: No increase in muscle tone  1: Slight increase in muscle tone, manifested by a catch and release or by minimal resistance at the end of the range of motion when the affected part(s) is moved in flexion or extension  1+: Slight increase in muscle tone, manifested by a catch, followed by minimal resistance throughout the remainder (less than half) of the ROM  2: More marked increase in muscle tone through most of the ROM, but affected part(s) easily moved  3: Considerable increase in muscle tone, passive movement difficult  4: Affected part(s) rigid in flexion or extension      TA following re-evaluation:   -Therapist donned estim to wrist extensors however due to high tone/spasticity in wrist flexors, stim yield min results. Stim terminated and pt educated on tasks to complete at home to reduce spasticity ant to promote WB into R UE. Assessment: Pt tolerated treatment well. Pt demonstrated the following deficits: b/l [place] (R>L), b/l dexterity (R>L), b/l  strength (R>L), R UE digit and wrist AROM. Pt would benefit from continued skilled OT services to address aforementioned deficits. Plan: Continued skilled OT per POC    Goals added 8/23/23  -Pt will demonstrate an increase in R UE  strength by increasing R UE VINCENZO score from 25lbs to at least 30lbs  - Pt will demonstrate an increase in L UE  strength by increasing L UE VINCENZO score from 45lbs to at least 50lbs  - Pt will demonstrate an increase in R UE [place] by decreasing score on Nine-Hole Peg Test from 1:36 seconds to under 1:30 seconds for carry over into every day activities. - Pt will demonstrate an increase in L UE [place] by decreasing score on Nine-Hole Peg Test from 40.98 seconds to under 35 seconds for carry over into everyday activities.    - Pt will demonstrate an increase in b/l UE dexterity by decreasing score on Functional Dexterity Test to be under 1 min with no more than 50% compensation of board. - Pt will demonstrate an increase in R digit AROM from 10% to at least 30% for carry over into everyday activities.   - Pt will increase R UE to refined assist with <20% cuing for tabletop tasks 4 weeks

## 2023-10-05 ENCOUNTER — IMMUNIZATIONS (OUTPATIENT)
Dept: FAMILY MEDICINE CLINIC | Facility: CLINIC | Age: 68
End: 2023-10-05
Payer: MEDICARE

## 2023-10-05 DIAGNOSIS — Z23 NEED FOR INFLUENZA VACCINATION: Primary | ICD-10-CM

## 2023-10-05 PROCEDURE — 90662 IIV NO PRSV INCREASED AG IM: CPT

## 2023-10-05 PROCEDURE — G0008 ADMIN INFLUENZA VIRUS VAC: HCPCS

## 2023-10-09 ENCOUNTER — OFFICE VISIT (OUTPATIENT)
Facility: CLINIC | Age: 68
End: 2023-10-09
Payer: MEDICARE

## 2023-10-09 DIAGNOSIS — S14.151A INCOMPLETE SPINAL CORD INJURY AT C1-C4 LEVEL WITHOUT BONE INJURY (HCC): Primary | ICD-10-CM

## 2023-10-09 DIAGNOSIS — R53.81 PHYSICAL DECONDITIONING: Primary | ICD-10-CM

## 2023-10-09 PROCEDURE — 97530 THERAPEUTIC ACTIVITIES: CPT

## 2023-10-09 PROCEDURE — 97112 NEUROMUSCULAR REEDUCATION: CPT

## 2023-10-09 NOTE — PROGRESS NOTES
Daily Note     Today's date: 10/9/2023  Patient name: Renee Beavers  : 1955  MRN: [id number]  Referring provider: Nii El MD  Dx:   Encounter Diagnosis     ICD-10-CM    1. Physical deconditioning  R53.81                       Subjective: Patient reports to PT session with no new issues or complaints. Objective: See treatment diary below. NMR:  - STS from standard chair x 25 reps, 20 reps without UEs  - Treadmill training; 2 UE (SOLOSTEP)   1st trial: 1.5 minutes @ 0.5 mph   2nd trial: 2.5 minutes @ 0.4 mph   3rd trial: 2 minutes @ 0.4 mph  - Sidestepping in // bars x 3 laps down/back      Assessment: Patient tolerated treatment session well today with focus on strength, balance, and gait training. Incorporated treadmill training this date in order to increase degree of error augmentation in order to maximize neuroplastic changes as well as increase amount of repetition. Patient appears to have increased RLE tone as he fatigues with ambulation. He expressed that he feels more "loose" and feels he performs better when warming up at start of session in NuStep, which may suggest it assists as neural gait primer. Patient would likely benefit from further training with SoloStep for both overground and treadmill ambulation, as his primary goal at this time is to return to walking with a cane. Plan: Continue per plan of care. Patient's primary goal is to return to walking with SPC. Integrate gait training. NuStep as gait primer.      NO AUTH REQ  Precautions: Cervical spine PCDF with B UE atrophy and dec  strength  "Johann"    Outcome Measures Initial Eval  23 Re-Eval  10-4-23        5xSTS 23.50 sec 25.69 sec        TUG  - Regular   33.9 sec   24.97 sec          10 meter  0.63 m/s        JAEL          FGA  defer        DGI          mCTSIB  - FTEO (firm)  - FTEC (firm)  - FTEO (foam)  - FTEC (foam)    30 sec  1.9 sec  26.81 sec  1.69 sec        6MWT  defer

## 2023-10-09 NOTE — PROGRESS NOTES
Daily Note     Today's date: 10/9/2023  Patient name: Shama Kirkpatrick  : 1955  MRN: [id number]  Referring provider: Jerrod Rapp MD  Dx:   Encounter Diagnosis   Name Primary? • Incomplete spinal cord injury at C1-C4 level without bone injury ([address]) Yes       Start Time: 1415  Stop Time: 1500  Total time in clinic (min): 45 minutes       Subjective: "I'm feeling stiff today."     Objective: See treatment below. NMR:   -FES applied to wrist/digit extensors. Pt high tone in wrist flexors preventing pt and stim from achieving wrist/digit extension. WB completed x10 with 3 second hold. Saebo Glove then donned. Pt completed grasp and place of pegs x20; pt then grasped and placed in container. Pt demo increased success with utilizing Saebo Glove with digit extension. Task completed to improve functional use of hand as well as UE coordination. TA:   -Pt completed MultiMatrix task in which pt was required to don numbers in ascending order with letter in alphabet in ascending order using red resisted clip. Pt demo difficulty with coordinating UE movement and demo difficulty with red clip therefore it was downgraded to yellow clip. Pt demo independence with sequencing task however demo pinch weakness. Task completed to improve UE strength and UE coordination.   -Pt performed stacking 1 inch foam block stack with gripper (1st notch setting) focusing on [facility], dexterity UE coordination. Assessment: Tolerated treatment well. Demonstrating increased spasticity today and reports stiffness in LUE as well. Pt would benefit from continued OT services to address UE stregnth, coordination, [facility] and dexterity      Plan: Continued skilled OT per POC.

## 2023-10-11 ENCOUNTER — OFFICE VISIT (OUTPATIENT)
Facility: CLINIC | Age: 68
End: 2023-10-11
Payer: MEDICARE

## 2023-10-11 DIAGNOSIS — Z98.890 H/O EXCISION OF LAMINA OF CERVICAL VERTEBRA FOR DECOMPRESSION OF SPINAL CORD: ICD-10-CM

## 2023-10-11 DIAGNOSIS — S14.151A INCOMPLETE SPINAL CORD INJURY AT C1-C4 LEVEL WITHOUT BONE INJURY (HCC): Primary | ICD-10-CM

## 2023-10-11 DIAGNOSIS — R53.81 PHYSICAL DECONDITIONING: Primary | ICD-10-CM

## 2023-10-11 PROCEDURE — 97112 NEUROMUSCULAR REEDUCATION: CPT | Performed by: OCCUPATIONAL THERAPIST

## 2023-10-11 PROCEDURE — 97112 NEUROMUSCULAR REEDUCATION: CPT

## 2023-10-11 NOTE — PROGRESS NOTES
Daily Note     Today's date: 10/11/2023  Patient name: Quillian Aase  : 1955  MRN: [id number]  Referring provider: Belle Johnson MD  Dx:   Encounter Diagnoses   Name Primary? Incomplete spinal cord injury at C1-C4 level without bone injury ([address]) Yes    H/O excision of lamina of cervical vertebra for decompression of spinal cord        Start Time: 1332  Stop Time: 1415  Total time in clinic (min): 43 minutes       Subjective: Pt reports no changes since last session    Objective: See treatment below. NMR:   -Saebo glove applied to R hand d/t digit flexor spasticity. Pt completed pixy cubes x1 round with R focusing on [facility], then x1 round with L holding a few cubes at a time to address FMC/dexterity.  -Pt placed and removed wooden clothes pins around wooden intelligence puzzle board using R hand with Saebo glove donned to address R [facility] and motor control. Assessment: Tolerated treatment well. Demonstrating spasticity in RUE limiting item release. Pt would benefit from continued OT services to address UE stregnth, coordination, [facility] and dexterity      Plan: Continued skilled OT per POC.

## 2023-10-11 NOTE — PROGRESS NOTES
Daily Note     Today's date: 10/11/2023  Patient name: Tae January  : 1955  MRN: [id number]  Referring provider: Yanni Clifton MD  Dx:   Encounter Diagnosis     ICD-10-CM    1. Physical deconditioning  R53.81                         Subjective: Patient reports to PT session with no new issues or complaints. Objective: See treatment diary below. NMR:  - NuStep (lvl 1) x 10 minutes (as neural gait primer & for tone management)  - Ambulation to neuro gym from NuStep w/ RW x 50 ft (for carryover from NuStep)  - STS from standard chair (to fatigue) 2 sets x 25 reps; no UE  - Treadmill training; 2 UE (SOLOSTEP)   1st trial: 2 minutes @ 0.5 mph   2nd trial: 2.5 minutes @ 0.5 mph   3rd trial: 2 minutes @ 0.5 mph      Assessment: Patient tolerated treatment session well today with focus on strength, balance, and gait training. Utilized NuStep this date both as neural primer and for tone management as patient reported increased (R) sided stiffness upon arrival to session. Noted improvements in gait reciprocity immediately following NuStep. Challenged patient in ambulating at increased speed relative to his comfort in order to encourage increased step length B/L. Consider addition of ankle weights in future session for further error augmentation. Patient would likely benefit from further training with SoloStep for both overground and treadmill ambulation, as his primary goal at this time is to return to walking with a cane. Plan: Continue per plan of care. Patient's primary goal is to return to walking with SPC. Integrate gait training. NuStep as gait primer.      NO AUTH REQ  Precautions: Cervical spine PCDF with B UE atrophy and dec  strength  "Johann"    Outcome Measures Initial Eval  23 Re-Eval  10-4-23        5xSTS 23.50 sec 25.69 sec        TUG  - Regular   33.9 sec   24.97 sec          10 meter  0.63 m/s        JAEL          FGA  defer        DGI          mCTSIB  - FTEO (firm)  - FTEC (firm)  - FTEO (foam)  - FTEC (foam)    30 sec  1.9 sec  26.81 sec  1.69 sec        6MWT  defer

## 2023-10-16 ENCOUNTER — OFFICE VISIT (OUTPATIENT)
Facility: CLINIC | Age: 68
End: 2023-10-16
Payer: MEDICARE

## 2023-10-16 DIAGNOSIS — S14.151A INCOMPLETE SPINAL CORD INJURY AT C1-C4 LEVEL WITHOUT BONE INJURY (HCC): Primary | ICD-10-CM

## 2023-10-16 DIAGNOSIS — R53.81 PHYSICAL DECONDITIONING: Primary | ICD-10-CM

## 2023-10-16 DIAGNOSIS — Z98.890 H/O EXCISION OF LAMINA OF CERVICAL VERTEBRA FOR DECOMPRESSION OF SPINAL CORD: ICD-10-CM

## 2023-10-16 PROCEDURE — 97110 THERAPEUTIC EXERCISES: CPT

## 2023-10-16 PROCEDURE — 97112 NEUROMUSCULAR REEDUCATION: CPT

## 2023-10-16 NOTE — PROGRESS NOTES
Daily Note     Today's date: 10/16/2023  Patient name: Nereida Isaacs  : 1955  MRN: [id number]  Referring provider: Sulma Proctor MD  Dx:   Encounter Diagnoses   Name Primary? Incomplete spinal cord injury at C1-C4 level without bone injury ([address]) Yes    H/O excision of lamina of cervical vertebra for decompression of spinal cord                      Subjective:continued ot educate pt to notify MD regarding spasticity for physiatry and to get splint. Objective: See treatment below. NMR:   Performed weightbearing tasks of anterior weight shifting with 5 second holds x 20 reps, performed modified pushups x 20 reps focusing on proprioceptive feedback for motor recovery. hu  -Saebo glove applied to R hand d/t digit flexor spasticity. Completed x 40 reps of [place] placement task with shoulder add/abduction for [facility] and UE coordination. Saebo lgove donned to [facility] for [facility] task of gross pincer grasp completing multimatrix alphabet match. Assessment: Tolerated treatment well. Demonstrating spasticity in RUE limiting item release. Pt would benefit from continued OT services to address UE stregnth, coordination, [facility] and dexterity      Plan: Continued skilled OT per POC.

## 2023-10-16 NOTE — PROGRESS NOTES
Daily Note     Today's date: 10/16/2023  Patient name: Kristel Malcolm  : 1955  MRN: [id number]  Referring provider: German Inman MD  Dx:   Encounter Diagnosis     ICD-10-CM    1. Physical deconditioning  R53.81                         Subjective: Patient reports he is feeling stiff today. Notes he felt sore for two days following treadmill training last visit. Objective: See treatment diary below. NMR:  - NuStep (lvl 1) x 10 minutes (as neural gait primer & for tone management)  - STS from standard chair (to fatigue): 10 reps, 5 reps, 6 reps   - Ambulation to neuro gym from NuStep w/ RW x 50 ft (for carryover from NuStep)  - Ambulation with RW: 175 feet, focus on equal step length     TE:   - Physioball rollouts: 10 second holds, 10 reps each fwd/R/L    Assessment: Patient tolerated treatment session well today with focus on strength, balance, and gait training. Continued to utilize NuStep both as neural primer and for tone management as patient reported increased (R) sided stiffness upon arrival to session. Reduced repetitions of STS today compared to prior session, likely in relation to overall increase in stiffness today. Held treadmill training at patient's request; however he was able to complete 175 feet of continuous overground walking with only one brief standing rest break. Patient would likely benefit from further training with SoloStep for both overground and treadmill ambulation, as his primary goal at this time is to return to walking with a cane. Plan: Continue per plan of care. Patient's primary goal is to return to walking with SPC. Integrate gait training. NuStep as gait primer.      NO AUTH REQ  Precautions: Cervical spine PCDF with B UE atrophy and dec  strength  "Johann"    Outcome Measures Initial Eval  23 Re-Eval  10-4-23        5xSTS 23.50 sec 25.69 sec        TUG  - Regular   33.9 sec   24.97 sec          10 meter  0.63 m/s        JAEL          NAOMY defer        DGI          mCTSIB  - FTEO (firm)  - FTEC (firm)  - FTEO (foam)  - FTEC (foam)    30 sec  1.9 sec  26.81 sec  1.69 sec        6MWT  defer

## 2023-10-17 DIAGNOSIS — M24.531 CONTRACTURE, RIGHT WRIST: Primary | ICD-10-CM

## 2023-10-18 ENCOUNTER — OFFICE VISIT (OUTPATIENT)
Facility: CLINIC | Age: 68
End: 2023-10-18
Payer: MEDICARE

## 2023-10-18 DIAGNOSIS — S14.151A INCOMPLETE SPINAL CORD INJURY AT C1-C4 LEVEL WITHOUT BONE INJURY (HCC): Primary | ICD-10-CM

## 2023-10-18 DIAGNOSIS — Z98.890 H/O EXCISION OF LAMINA OF CERVICAL VERTEBRA FOR DECOMPRESSION OF SPINAL CORD: ICD-10-CM

## 2023-10-18 DIAGNOSIS — R53.81 PHYSICAL DECONDITIONING: Primary | ICD-10-CM

## 2023-10-18 PROCEDURE — 97112 NEUROMUSCULAR REEDUCATION: CPT

## 2023-10-18 PROCEDURE — 97112 NEUROMUSCULAR REEDUCATION: CPT | Performed by: OCCUPATIONAL THERAPIST

## 2023-10-18 PROCEDURE — 97110 THERAPEUTIC EXERCISES: CPT

## 2023-10-18 NOTE — PROGRESS NOTES
Daily Note     Today's date: 10/18/2023  Patient name: Tico Porter  : 1955  MRN: [id number]  Referring provider: Aida Murcia MD  Dx:   Encounter Diagnosis     ICD-10-CM    1. Physical deconditioning  R53.81                         Subjective: Patient reports he is feeling stiff today, especially in his low back. Objective: See treatment diary below. NMR:  - Treadmill training; 2 UE (SOLOSTEP)   1st trial: 1 minute @ 0.5 mph   2nd trial: 2 minutes @ 0.5 mph   3rd trial: 2.5 minutes @ 0.5 mph  4th trial: 3 minutes @ 0.5 mph    - STS from standard chair (to fatigue): 10 reps, 12 reps    TE:   - Physioball rollouts: 10 second holds, 10 reps each fwd/R/L    Assessment: Patient tolerated treatment session well today with focus on strength, balance, and gait training. He responded well to physioball rollouts with reports of reduced stiffness following. He improved tolerance to ambulation on treadmill with successive repetitions, illustrating small gains in endurance. Overall fair ability to achieve heel strike, though he did illustrate foot scuffing intermittently. Patient would likely benefit from further training with SoloStep for both overground and treadmill ambulation, as his primary goal at this time is to return to walking with a cane. Plan: Continue per plan of care. Patient's primary goal is to return to walking with SPC. Integrate gait training. NuStep as gait primer.      NO AUTH REQ  Precautions: Cervical spine PCDF with B UE atrophy and dec  strength  "Johann"    Outcome Measures Initial Eval  23 Re-Eval  10-4-23        5xSTS 23.50 sec 25.69 sec        TUG  - Regular   33.9 sec   24.97 sec          10 meter  0.63 m/s        JAEL          FGA  defer        DGI          mCTSIB  - FTEO (firm)  - FTEC (firm)  - FTEO (foam)  - FTEC (foam)    30 sec  1.9 sec  26.81 sec  1.69 sec        6MWT  defer

## 2023-10-18 NOTE — PROGRESS NOTES
Daily Note     Today's date: 10/18/2023  Patient name: Rachael Man  : 1955  MRN: [id number]  Referring provider: Stacey Hoffman MD  Dx:   Encounter Diagnoses   Name Primary? Incomplete spinal cord injury at C1-C4 level without bone injury ([address]) Yes    H/O excision of lamina of cervical vertebra for decompression of spinal cord          Start Time: 4465  Stop Time: 1500  Total time in clinic (min): 43 minutes       Subjective: Pt reports that his back is bothering him today    Objective: See treatment below. NMR:   -Performed weightbearing tasks of anterior weight shifting at raised EOM with 5 second holds x4 minutes with vibration to biceps and digit flexors for spasticity management.   -Performed lateral WB with RUE on dynadisk with vibration x2 minutes for spasticity management.  -Completed large peg placement using Saebo glove to facilitate digit extension focusing on R hand [facility] and motor control. Pegs placed to R to incorporate ER AROM. Required extra time, min drops. Assessment: Tolerated treatment well. Demonstrating spasticity in RUE limiting item release. Pt would benefit from continued OT services to address UE stregnth, coordination, [facility] and dexterity      Plan: Continued skilled OT per POC. Pt to have custom RHO made in upcoming session for R hand.

## 2023-10-22 DIAGNOSIS — E11.9 TYPE 2 DIABETES MELLITUS WITHOUT COMPLICATION, WITHOUT LONG-TERM CURRENT USE OF INSULIN (HCC): ICD-10-CM

## 2023-10-23 ENCOUNTER — OFFICE VISIT (OUTPATIENT)
Facility: CLINIC | Age: 68
End: 2023-10-23
Payer: MEDICARE

## 2023-10-23 DIAGNOSIS — S14.151A INCOMPLETE SPINAL CORD INJURY AT C1-C4 LEVEL WITHOUT BONE INJURY (HCC): Primary | ICD-10-CM

## 2023-10-23 DIAGNOSIS — Z98.890 H/O EXCISION OF LAMINA OF CERVICAL VERTEBRA FOR DECOMPRESSION OF SPINAL CORD: ICD-10-CM

## 2023-10-23 DIAGNOSIS — R53.81 PHYSICAL DECONDITIONING: Primary | ICD-10-CM

## 2023-10-23 PROBLEM — R25.2 SPASTICITY: Status: ACTIVE | Noted: 2023-10-23

## 2023-10-23 PROBLEM — R29.898 WEAKNESS OF HAND: Status: ACTIVE | Noted: 2023-10-23

## 2023-10-23 PROCEDURE — 97112 NEUROMUSCULAR REEDUCATION: CPT

## 2023-10-23 PROCEDURE — L3808 WHFO, RIGID W/O JOINTS: HCPCS

## 2023-10-23 RX ORDER — PIOGLITAZONE HCL AND METFORMIN HCL 500; 15 MG/1; MG/1
1 TABLET ORAL 2 TIMES DAILY WITH MEALS
Qty: 180 TABLET | Refills: 0 | Status: SHIPPED | OUTPATIENT
Start: 2023-10-23

## 2023-10-23 NOTE — PROGRESS NOTES
Daily Note     Today's date: 10/23/2023  Patient name: Dillon Tobias  : 1955  MRN: [id number]  Referring provider: Jeannie Timmons MD  Dx:   Encounter Diagnoses   Name Primary? Incomplete spinal cord injury at C1-C4 level without bone injury ([address]) Yes    H/O excision of lamina of cervical vertebra for decompression of spinal cord            Start Time: 1410  Stop Time: 1500  Total time in clinic (min): 50 minutes       Subjective: Pt report she understands all instructions for wear and care of his custom orthosis. Objective: See treatment below. Fabricated R night time resting hand orthosis today including all digits I-V to about mid forearm. Wrist was positioned in neutral to slight extension. Digits II-V were placed in slight flexion, with the thumb in radial abduction, slight palmar abduction, and slight MP/IP flexion. Straps were placed at the forearm, wrist, hand, and metacarpals. Foam was placed the the pads of all digits to protect skin integrity. Pt was educated to gradually increase wear of his orthosis, beginning with 1-2 hours, then increasing wear time as tolerated, eventually progressing to overnight wear. Pt was educated on don/doff procedures and showed ability to don/doff the orthosis in the clinic independently. Pt was educated on skin care precautions regarding the orthosis and verbalized a good understanding. Assessment: Pt tolerated wear of his orthosis well, and reported a good fit. Recommended to bring it back in for adjustments as needed. Plan: Continued skilled OT per POC.

## 2023-10-23 NOTE — PROGRESS NOTES
Daily Note     Today's date: 10/23/2023  Patient name: Torie De La Cruz  : 1955  MRN: [id number]  Referring provider: Archana Adams MD  Dx:   Encounter Diagnosis     ICD-10-CM    1. Physical deconditioning  R53.81                           Subjective: Patient reports from hand PT, he reports tripping on a decline outside where he was guarded by OT and control lowered to the ground. He denies hitting his head or any other injuries. Objective: See treatment diary below. NMR:  - Vitals: HR: 76 bpm, SpO2: 97%   - Treadmill training; 2 UE (SOLOSTEP)   1st trial: 1.5 minute @ 0.4 mph   2nd trial: 2.5 minutes @ 0.4 mph   3rd trial: 3 minutes @ 0.4 mph    TE:   - Physioball rollouts: 10 second holds, 10 reps each fwd/R/L    TA:  - Car transfer from [facility] to car seat. - Therapeutic rest breaks throughout session     Assessment: Patient tolerated treatment session well today with focus on strength, balance, and gait training. Vitals assessed before commencement of session with HR and SpO2 WNL. Increased duration of treadmill ambulation today, with improvements in tolerance to activity as demonstrated by decreased duration of seated rest periods. Educated patient on performing seated HEP program: seated marching, seated LAQ, and seated HR to further emphasize activity tolerance. Patient would likely benefit from further training with SoloStep for both overground and treadmill ambulation, as his primary goal at this time is to return to walking with a cane. Plan: Continue per plan of care. Patient's primary goal is to return to walking with SPC. Integrate gait training. NuStep as gait primer.      NO AUTH REQ  Precautions: Cervical spine PCDF with B UE atrophy and dec  strength  "Johann"    Outcome Measures Initial Eval  23 Re-Eval  10-4-23        5xSTS 23.50 sec 25.69 sec        TUG  - Regular   33.9 sec   24.97 sec          10 meter  0.63 m/s        JAEL          FGA  defer        DGI mCTSIB  - FTEO (firm)  - FTEC (firm)  - FTEO (foam)  - FTEC (foam)    30 sec  1.9 sec  26.81 sec  1.69 sec        6MWT  defer

## 2023-10-25 ENCOUNTER — OFFICE VISIT (OUTPATIENT)
Facility: CLINIC | Age: 68
End: 2023-10-25
Payer: MEDICARE

## 2023-10-25 DIAGNOSIS — S14.151A INCOMPLETE SPINAL CORD INJURY AT C1-C4 LEVEL WITHOUT BONE INJURY (HCC): Primary | ICD-10-CM

## 2023-10-25 DIAGNOSIS — Z98.890 H/O EXCISION OF LAMINA OF CERVICAL VERTEBRA FOR DECOMPRESSION OF SPINAL CORD: ICD-10-CM

## 2023-10-25 DIAGNOSIS — R53.81 PHYSICAL DECONDITIONING: Primary | ICD-10-CM

## 2023-10-25 PROCEDURE — 97112 NEUROMUSCULAR REEDUCATION: CPT | Performed by: OCCUPATIONAL THERAPIST

## 2023-10-25 PROCEDURE — 97112 NEUROMUSCULAR REEDUCATION: CPT

## 2023-10-25 NOTE — PROGRESS NOTES
Daily Note     Today's date: 10/25/2023  Patient name: Ishan Rizvi  : 1955  MRN: [id number]  Referring provider: Red Cisneros MD  Dx:   Encounter Diagnoses   Name Primary? Incomplete spinal cord injury at C1-C4 level without bone injury ([address]) Yes    H/O excision of lamina of cervical vertebra for decompression of spinal cord            Start Time: 1332  Stop Time: 1415  Total time in clinic (min): 43 minutes       Subjective: Pt reports that he has not had any issues with wearing his R RHO. He has worn it for 15-20 minutes x3. Objective: See treatment below. NMRE:  -IASTM to wrist and digit flexors x3 minutes total for spasticity management in preparation for reaching/FMC activity.  -Saebo glove donned on R hand and pt completed velcro  retrieval and placement with 2lb wrist weight to RUE to address proximal strength and [place]. -Wooden sudoku setup with L hand focusing on palm-finger translation to address dexterity and [place].  -Pt educated on how to gradually increase wear time for [facility] (2 minutes)    Assessment: Pt tolerated session well. Continues to demonstrate b/l [place] and dexterity impairments, R worse than L, and spasticity affecting R digit extension. Plan: Continued skilled OT per POC.

## 2023-10-25 NOTE — PROGRESS NOTES
Daily Note     Today's date: 10/25/2023  Patient name: Ishan Rizvi  : 1955  MRN: [id number]  Referring provider: Red Cisneros MD  Dx:   Encounter Diagnosis     ICD-10-CM    1. Physical deconditioning  R53.81               Start Time:   Stop Time: 145  Total time in clinic (min): 40 minutes     Subjective: 4/10 low back pain. He arrived from hand OT with RW. He states his legs feel heavy and stiff. Objective: See treatment diary below. NMR:  - Vitals: HR: 74 bpm, SpO2: 96%    - warm up march in place at walker: 10 reps    -NuStep 5 min   -50M timed walks: 1.  32 sec     2. 27 sec. 3.  22 sec       4.  23 sec  - seated knee hip flexion with thigh loop with foot on 6" step, 5x each side 5 sec each  - not today due to LE stiffness/fatigue:Treadmill training; 2 UE (SOLOSTEP)   1st trial: 1.5 minute @ 0.4 mph   2nd trial: 2.5 minutes @ 0.4 mph   3rd trial: 3 minutes @ 0.4 mph    TE:   - Physioball rollouts: 10 second holds, 10 reps each fwd/R/L    TA:  -  not today: Car transfer from [facility] to car seat. - Therapeutic rest breaks throughout session     Assessment: Patient tolerated treatment session fair  today with complaints of LE stiffness. Low back stiffness improved with  exercising 5 min on NuStep, but complaints of LE stiffness did not reduce. Treadmill ambulation was held today due to LE stiffness and generalized fatigue. Pt was instructed in hip/knee flexion stretch in sitting with use of thigh loop due to R  weakness. Patient would likely benefit from further training with SoloStep for both overground and treadmill ambulation, as his primary goal at this time is to return to walking with a cane. Plan: Continue per plan of care. Patient's primary goal is to return to walking with SPC. Resume gait training. NuStep as gait primer.      NO AUTH REQ  Precautions: Cervical spine PCDF with B UE atrophy and dec  strength  "Johann"    Outcome Measures Initial Eval  8-22-23 Re-Eval  10-4-23        5xSTS 23.50 sec 25.69 sec        TUG  - Regular   33.9 sec   24.97 sec          10 meter  0.63 m/s        ELDER  29/56        FGA  defer        DGI          mCTSIB  - FTEO (firm)  - FTEC (firm)  - FTEO (foam)  - FTEC (foam)    30 sec  1.9 sec  26.81 sec  1.69 sec        6MWT  defer

## 2023-10-31 PROBLEM — Z12.11 SCREEN FOR COLON CANCER: Status: RESOLVED | Noted: 2023-09-01 | Resolved: 2023-10-31

## 2023-11-01 ENCOUNTER — EVALUATION (OUTPATIENT)
Facility: CLINIC | Age: 68
End: 2023-11-01
Payer: MEDICARE

## 2023-11-01 ENCOUNTER — OFFICE VISIT (OUTPATIENT)
Facility: CLINIC | Age: 68
End: 2023-11-01
Payer: MEDICARE

## 2023-11-01 DIAGNOSIS — R53.81 PHYSICAL DECONDITIONING: Primary | ICD-10-CM

## 2023-11-01 DIAGNOSIS — Z98.890 H/O EXCISION OF LAMINA OF CERVICAL VERTEBRA FOR DECOMPRESSION OF SPINAL CORD: Primary | ICD-10-CM

## 2023-11-01 DIAGNOSIS — S14.151A INCOMPLETE SPINAL CORD INJURY AT C1-C4 LEVEL WITHOUT BONE INJURY (HCC): ICD-10-CM

## 2023-11-01 PROCEDURE — 97110 THERAPEUTIC EXERCISES: CPT

## 2023-11-01 PROCEDURE — 97530 THERAPEUTIC ACTIVITIES: CPT

## 2023-11-01 PROCEDURE — 97112 NEUROMUSCULAR REEDUCATION: CPT

## 2023-11-01 NOTE — PROGRESS NOTES
PT Re-Evaluation          POC expires Auth Status Total   Visits  Start date  Expiration date PT/OT + Visit Limit? Co-Insurance   23 NA NA NA NA PT                                                   Today's date: 2023  Patient name: Chase Nguyen  : 1955  MRN: [id number]  Referring provider: Kadeem Rogers MD  Dx:   Encounter Diagnosis     ICD-10-CM    1. Physical deconditioning  R53.81               Assessment  Assessment details: Patient is a 77 y.o. Male who presents to skilled outpatient PT with who presents with ambulatory dysfunction. Patient has been presenting to skilled PT for approximately 2 months with improvements in static balance as demonstrated by ELDER and mCTSIB. Of importance, he was able to complete the second condition of mCTSIB today suggesting improvements in somatosensory awareness and vestibular system. However, he demonstrated slight regression in lower extremity strength, functional mobility, and gait speed as per 5x STS, TUG and 10 meter walk test. He remains at HIGH risk for falls for aformentioned outcome measures as per APTA and Rehab Measure cuttoff scores. Patient continues to fall below age normative values for gait speed as per 10 meter walk test; he is categorized as a limited community ambulator and unable to safely cross the street. Regression on evaluation today could be attributed to increased back pain and complaints of leg stiffness. He continues to demonstrate decreased tolerance to activity as he required frequent seated rest periods following activity. Patient has not met any goals this date but is progressing towards achieving them. Provided him with comprehensive HEP this date to facilitate improvements in lower extremity strength and tolerance to activity.  Verbalized appropriate exercise parameters and safety as well as importance of compliance with HEP, he verbalized understanding. He will continue to benefit from skilled outpatient PT services to improve functional mobility, safety and reduce his risk for falls as he live alone. Impairments: Abnormal coordination, Abnormal gait, Abnormal muscle tone, Abnormal or restricted ROM, Activity intolerance, Impaired balance, Impaired physical strength, Lacks appropriate HEP, Poor posture, Poor body mechanics, Pain with function, Safety issue, Weight-bearing intolerance, Abnormal movement, Difficulty understanding, Abnormal muscle firing  Understanding of Dx/Px/POC: Good  Prognosis: Good    Patient verbalized understanding of POC. Please contact me if you have any questions or recommendations. Thank you for the referral and the opportunity to share in [place] [facility] care.         Plan  Plan details: Balance training, LE strengthening, endurance training   Patient would benefit from: Skilled PT  Planned modality interventions: Biofeedback, Cryotherapy, TENS, Thermotherapy  Planned therapy interventions: Abdominal trunk stabilization, ADL training, Balance, Balance/WB training, Breathing training, Body mechanics training, Coordination, Functional ROM exercises, Gait training, HEP, Joint Mobilization, Manual Therapy, Elias taping, Motor coordination training, Neuromuscular re-education, Patient education, Postural training, Strengthening, Stretching, Therapeutic activities, Therapeutic exercises, Therapeutic training, Transfer training, Activity modification, Work reintegration  Frequency: 2x/wk  Duration in weeks: 12 weeks  Plan of Care beginning date: 10-4-23  Plan of Care expiration date: 3 months - 1-4-23  Treatment plan discussed with: Patient       Goals  Short Term Goals (4 weeks):    - Patient will improve time on TUG by 2.9 seconds to facilitate improved safety in all ambulation - not met  - Patient will be independent in basic HEP 2-3 weeks - progressing towards  - Patient will improve 5xSTS score by 2.3 seconds to promote improved LE functional strength needed for ADLs - not met  - Patient will complete 6 MWT to evaluate cardiovascular endurance and further assess patient's tolerance to activity.  - not met    Long Term Goals (12 weeks):  - Patient will be independent in a comprehensive home exercise program - progressing towards  - Patient will improve gait speed by 0.18 m/s to improve safety with community ambulation- not met  - Patient will improve ELDER by 6 points in order to improve static balance and reduce risk for falls - progressing towards  - Patient will improve 6 Minute Walk Test score by 190 feet to promote improved cardiovascular endurance - not met  - Patient will report 50% reduction in near falls in order to improve safety with functional tasks and reduce his risk for falls - progressing towards  - Patient will report going on walks at least 3 days per week to promote independence and improved cardiovascular endurance - not met  - Patient will be able to ascend/descend stairs reciprocally with 1 UE assist to promote independence and safety with ADLs - not met  - Patient will report 50% reduction in near falls when ambulating on uneven terrain- progressing towards      Cut off score    All date taken from APTA Neuro Section or Rehab Measures      Elder/58  MDC: 6 pts  Age Norms:  57-79: M - 54   F - 55  70-79: M - 47   F - 53  80-89: M - 48   F - 50 5xSTS: Musa et al 2010  MDC: 2.3 sec  Age Norms:  60-69: 11.1 sec  70-79: 12.6 sec  80-89: 14.8 sec   TUG  MDC: 4.14 sec  Cut off score:  >13.5 sec community dwelling adults  >32.2 frail elderly  <20 I for basic transfers  >30 dependent on transfers 10 Meter Walk Test: Ibrahima Renner and Silvia vasquez 2011  MDC: 0.18 m/s  20-29: M - 1.35 m   F - 1.34 m  30-39: M - 1.43 m   F - 1.34 m  40-49: M - 1.43 m   F - 1.39 m  50-59: M - 1.43 m   F - 1.31 m  60-69: M - 1.34 m   F - 1.24 m  70-79: M - 1.26 m   F - 1.13 m  80-89: M - 0.97 m   F - 0.94 m    Household Ambulator < 0.4 m/s  Limited Community Ambulator 0.4 - 0.8 m/s  Community Ambulator 0.8 - 1.2 m/s  Safely cross the street > 1.2 m/s   FGA  MCID: 4 pts  Geriatrics/community < 22/30 fall risk  Geriatrics/community < 20/30 unexplained falls    DGI  MDC: vestibular - 4 pts  MDC: geriatric/community - 3 pts  Falls risk <19/24 mCTSIB  Norm: 20-60 yrs  Eyes open firm: norm sway 0.21-0.48  Eyes closed firm: norm sway 0.48-0.99  Eyes open foam: norm sway 0.38-0.71  Eyes closed foam: norm sway 0.70-2.22   6 Minute Walk Test  MDC: 190.98 ft  MCID: 164 ft    Age Norms  57-79: M - 1876 ft (571.80 m)  F - 1765 ft (537.98 m)  70-79: M - 1729 ft (527.00 m)  F - 1545 ft (470.92 m)  80-89: M - 1368 ft (416.97 m)  F - 1286 ft (391.97 m) ABC: [address], [facility]  <67% increased risk for falls         Subjective    History of Present Illness  - Mechanism of injury: Pt has been referred to PT following cervical spine surgical PCDF C1-4 due to incomplete spinal cord dysfunction earlier this year. He is currently receiving OT for his atrophy and weakness in UEs. At this time pt is amb with RW and would like to return to use of SPC. Pt lives alone and is responsible for household duties and community errands. Pt has a long history of low back pain. Pt denies balance issues at this time. Updated (10/4/23): Patient presents to therapy with RW and B/L forearm and hand abrasions which he states was from trying to reach for his phone in his recliner chair for nearly 3 hours before receiving help from his brother in-law. He would like to continue to work towards his goal of walking without his RW with future skilled therapy sessions. Updated (11/1/23): Patient presents to therapy with RW from OT. He reports he feels he has been improving since starting therapy although he feels his back pain is a limiting factor. His goal is to be more confident with walking and improve overall tolerance to activity.      Quality of life: good  - Primary AD: RW, cruising on counter or furniture at home. - Assist level at home: independent, needs help bringing out the trash form neighbor or brother in-law    Pain  - Current pain ratin-2/10    Treatments  - Previous treatment: None  - Current treatment: None  - Diagnostic Testing: None      Objective     Objective     Active Range of Motion   Cervical/Thoracic Spine       Cervical    Flexion:  WFL  Extension:  Restriction level: moderate  Left lateral flexion:  WFL  Right lateral flexion:  WFL  Left rotation:  WFL  Right rotation:  [facility]    Lumbar   Flexion:  WFL  Extension:  Restriction level: minimal  Left lateral flexion:  WFL  Right lateral flexion:  WFL  Neuro Exam:     Functional outcomes   5x sit to stand: 23.50 (seconds)  TU.9 (seconds)      Gait  - Abnormalities: antalgic gait, decreased L stance time, decreased L foot clearance, forward flexed posture           Outcome Measures Initial Eval  23 Re-Eval  10-4-23 PN  2023       5xSTS 23.50 sec 25.69 sec 35.35 sec, 0 UE       TUG  - Regular   33.9 sec   24.97 sec     26.38 w/ RW       10 meter  0.63 m/s 0.54 m/s w/ RW       ELDER  29/56 31/56       FGA  defer        DGI          mCTSIB  - FTEO (firm)  - FTEC (firm)  - FTEO (foam)  - FTEC (foam)    30 sec  1.9 sec  26.81 sec  1.69 sec   30 Sec  30 sec  30 sec  1 sec       6MWT  defer                               Access Code: [email or web address]  URL: [email or web address]  Date: 2023  Prepared by: Rodriguez Benavidez    Exercises  - Seated Long Arc Quad  - 1 x daily - 5 x weekly - 2 sets - 10 reps - 3 seconds hold  - Seated March  - 1 x daily - 5 x weekly - 2 sets - 10 reps  - Seated Hip Abduction  - 1 x daily - 5 x weekly - 2 sets - 10 reps  - Seated Heel Raise  - 1 x daily - 5 x weekly - 3 sets - 10 reps  - Seated Hamstring Stretch  - 1 x daily - 5 x weekly - 3 sets - 30 sec hold          Precautions:   Past Medical History:   Diagnosis Date    Diabetes mellitus ([address])     ED (erectile dysfunction)     Hyperlipidemia     Hypertension     Low back pain March 2022    Obesity     S/P cervical spinal fusion 05/10/2023    Spinal stenosis of lumbar region at multiple levels 12/22/2022

## 2023-11-01 NOTE — PROGRESS NOTES
Daily Note     Today's date: 2023  Patient name: Nereida Isaacs  : 1955  MRN: [id number]  Referring provider: Sulma Proctor MD  Dx:   Encounter Diagnoses   Name Primary? H/O excision of lamina of cervical vertebra for decompression of spinal cord Yes    Incomplete spinal cord injury at C1-C4 level without bone injury ([address])                          Subjective: Pt reports he may have had redness on forearm- recommenidn he brings into therapy to assess RHS    Objective: See treatment below. NMRE:  -IASTM to wrist and digit flexors x3 minutes total for spasticity management in preparation for reaching/FMC activity. Performed [place] with FES on wrist extensors of large mushorom peg placement focusing on [place] and dexterity  Performed weight bearing task of shoulder flexion on stool x 40 reps   Performed [place] of gross pincer with large marbles 1-26 in digit web focusing on [place]   Performed in hand manipulation task with LUE and RUE of connext task focusing on [place] and dexterity. Assessment: Pt tolerated session well. Continues to demonstrate b/l [place] and dexterity impairments, R worse than L, and spasticity affecting R digit extension. Plan: Continued skilled OT per POC.

## 2023-11-06 ENCOUNTER — OFFICE VISIT (OUTPATIENT)
Facility: CLINIC | Age: 68
End: 2023-11-06
Payer: MEDICARE

## 2023-11-06 ENCOUNTER — EVALUATION (OUTPATIENT)
Facility: CLINIC | Age: 68
End: 2023-11-06
Payer: MEDICARE

## 2023-11-06 DIAGNOSIS — S14.151A INCOMPLETE SPINAL CORD INJURY AT C1-C4 LEVEL WITHOUT BONE INJURY (HCC): ICD-10-CM

## 2023-11-06 DIAGNOSIS — R53.81 PHYSICAL DECONDITIONING: Primary | ICD-10-CM

## 2023-11-06 DIAGNOSIS — Z98.890 H/O EXCISION OF LAMINA OF CERVICAL VERTEBRA FOR DECOMPRESSION OF SPINAL CORD: Primary | ICD-10-CM

## 2023-11-06 PROCEDURE — 97110 THERAPEUTIC EXERCISES: CPT

## 2023-11-06 PROCEDURE — 97112 NEUROMUSCULAR REEDUCATION: CPT

## 2023-11-06 PROCEDURE — 97530 THERAPEUTIC ACTIVITIES: CPT

## 2023-11-06 NOTE — PROGRESS NOTES
Daily Note     Today's date: 2023  Patient name: Izzy Chávez  : 1955  MRN: [id number]  Referring provider: Lennie Overton MD  Dx:   Encounter Diagnosis     ICD-10-CM    1. Physical deconditioning  R53.81                           Subjective: 4-5/10 low back pain. He reports his stiffness is improving slightly, but he is exhausted from the long walk into the clinic. Objective: See treatment diary below. NMR:    - Treadmill training; 2 UE (SOLOSTEP)   1st trial: 3 minutes @ 0.5 mph   2nd trial: 4 minutes 12 seconds @ 0.5 mph   3rd trial: 5 minutes 30 seconds @ 0.5 mph    TE:   - Physioball rollouts: 10 second holds, 10 reps each fwd/R/L  - STS from standard chair, no UE: 10 reps, 2 sets       Assessment: Patient tolerated treatment session fair today with complaints of LE stiffness. He continues to require frequent and extended rest breaks secondary to fatigue; however he was able to ambulate at higher speed and for longer duration than previous sessions. This indicates improving tolerance to exercise. Patient would likely benefit from further training with SoloStep for both overground and treadmill ambulation, as his primary goal at this time is to return to walking with a cane. Plan: Continue per plan of care. Patient's primary goal is to return to walking with SPC. Resume gait training. NuStep as gait primer.      NO AUTH REQ  Precautions: Cervical spine PCDF with B UE atrophy and dec  strength  "Johann"    Outcome Measures Initial Eval  23 Re-Eval  10-4-23 PN  2023       5xSTS 23.50 sec 25.69 sec 35.35 sec, 0 UE       TUG  - Regular   33.9 sec   24.97 sec     26.38 w/ RW       10 meter  0.63 m/s 0.54 m/s w/ RW       ELDER         FGA  defer        DGI          mCTSIB  - FTEO (firm)  - FTEC (firm)  - FTEO (foam)  - FTEC (foam)    30 sec  1.9 sec  26.81 sec  1.69 sec   30 Sec  30 sec  30 sec  1 sec       6MWT  defer

## 2023-11-06 NOTE — PROGRESS NOTES
OCCUPATIONAL THERAPY RE-EVALUATION    Today's Date: 2023  Patient Name: Portillo Sanders  : 1955  MRN: [id number]  Referring Provider: Jean Marie Schwab MD  Dx: H/O excision of lamina of cervical vertebra for decompression of spinal cord [Z98.890]    PLAN OF CARE START: 23  PLAN OF CARE END: 24 (extended today)  FREQUENCY: 2x/wk  PRECAUTIONS: R sided impairments UE>LE, ambulates with rolling walker      SKILLED ANALYSIS:  Pt presents to OP neuro OT re-evaluation 23 after ~2.5 months of skilled services s/p posterior cervical decompressive laminectomy with instrumented fusion from C4-T1. Pt reports improvements with functional use of b/l UE and notes improved digit extension R UE s/p splint fabrication. Reports inc in spasticity as the temperature decreases. Based on assessments, pt demonstrating maintenance of b/l grasp strength, decline in [place] R UE, improved AROM and PROM R UE. Considering lack of progress made so far during POC despite compliance, plan to transition to maintenance program at this time to prevent further decline. Pt has been trying to get in touch with physician for botox evaluation for a month however reports no one has returned his calls. Pt continues to demonstrate deficits in the following domains: b/l UE AROM and PROM, dexterity, FMC, grasp/release, spasticity, sensory deficits,  strength. Discussed transition to maintenance and recommendations for continued participation with HEP and pt in agreement. Subjective Pt reports splint is comfortable and feels that it is helping. Objective    Upper Extremities:  Pt is R hand dominant. VINCENZO: RUE: 25lbs (previous: 25lb) LUE:  35lbs (previous: 35lb)  The age norm is approximately 91.1lbs R UE and 76.8lbs L UE, indicating decreased b/l  strength.                 Range of Motion:  AROM:   R UE   - Finger extension- 15% (previously 10%)  - Finger flexion- 100% (previously 10%)  PROM:   - Finger extension- 95% (previously 85%)  - Finger flexion- 100% (previously 85%)    L UE:  AROM:  - Finger extension: 95% (previously 90%)  - Finger flexion: 100% (previously 95%)      NINE-HOLE PEG TEST: assesses dexterity/fine motor coordination   R hand: 4:43 with 4 pegs dropped (previously 1 minute and 43.30 seconds)  L hand: 53 seconds with 2 pegs dropped (previously 44.02 seconds)  Pt demonstrates decreased b/l FMC compared to norms for age/sex (21.23 seconds R UE and 22.29 seconds L UE)     Functional Dexterity Test: assesses patient's ability to use the hand for daily tasks requiring a 3-jaw ignacio prehension between the fingers and the thumb   R UE: 1:59 with 1 peg dropped and 100% compensatory use of board (previously 1 minute and 39 seconds with 100% compensation of board)  L UE: 46 seconds with 100% compensatory use of board (previously 45 seconds with 100% compensation of board)  Pt demonstrates decreased b/l dexterity compared to norms for age/sex (40.0 seconds R UE and 31.7 seconds L UE)      Modified Nick Scale (right)  Elbow flexors: 0/4 (previously 1/4)  Elbow extensors: 0/4 (previously 1/4)  Wrist flexors: 1/4 (previously 1/4)  Wrist extensors 1+/4 (previously 1+/4)  Finger flexors: 1+/4 (previously 1+/4)  Finger extensors: 1/4 (previously 1/4)    0: No increase in muscle tone  1: Slight increase in muscle tone, manifested by a catch and release or by minimal resistance at the end of the range of motion when the affected part(s) is moved in flexion or extension  1+: Slight increase in muscle tone, manifested by a catch, followed by minimal resistance throughout the remainder (less than half) of the ROM  2: More marked increase in muscle tone through most of the ROM, but affected part(s) easily moved  3: Considerable increase in muscle tone, passive movement difficult  4: Affected part(s) rigid in flexion or extension  NMR following re-evaluation:    -Large peg insertion into board anteriorly with use of R UE with focus on [facility], dexterity, dynamic reaching. Compensatory use of L % of pegs      Goals added 8/23/23  -Pt will demonstrate an increase in R UE  strength by increasing R UE VINCENZO score from 25lbs to at least 30lbs  NOT ACHIEVED  - Pt will demonstrate an increase in L UE  strength by increasing L UE VINCENZO score from 45lbs to at least 50lbs ACHIEVED  - Pt will demonstrate an increase in R UE [facility] by decreasing score on Nine-Hole Peg Test from 1:36 seconds to under 1:30 seconds for carry over into every day activities. NOT ACHIEVED  - Pt will demonstrate an increase in L UE [facility] by decreasing score on Nine-Hole Peg Test from 40.98 seconds to under 35 seconds for carry over into everyday activities. NOT ACHIEVED  - Pt will demonstrate an increase in b/l UE dexterity by decreasing score on Functional Dexterity Test to be under 1 min with no more than 50% compensation of board. ACHIEVED L UE, NOT ACHIEVED R UE  - Pt will demonstrate an increase in R digit AROM from 10% to at least 30% for carry over into everyday activities. PROGRESSED  - Pt will increase R UE to refined assist with <20% cuing for tabletop tasks 4 weeks PROGRESSED    New maintenance goals as of 11/6:  Pt will demonstrate maintenance of [facility] b/l UE by completing 9HPT within 10 seconds of evaluation 11/6 (R: 4:43 with 4 dropped, L: 53 seconds with 2 drops)  Pt will demonstrate maintenance of dexterity b/l UE on Functional Dexterity Test within 10 seconds of evaluation 11/6 (R: 1:59 with 1 drop and 100% compensation, L: 46 seconds with 100% compensation)  Pt will demonstrate maintenance of b/l grasp strength by scoring no lower than 3lb less than during evaluation 11/6 (R: 25lb, L: 35lb)  Pt will demonstrate maintenance of AROM b/l UE within 5* of evaluation 11/6 (see above)  Pt will demonstrate maintenance of spasticity by scoring the same or better on MAS.

## 2023-11-08 ENCOUNTER — OFFICE VISIT (OUTPATIENT)
Facility: CLINIC | Age: 68
End: 2023-11-08
Payer: MEDICARE

## 2023-11-08 DIAGNOSIS — R53.81 PHYSICAL DECONDITIONING: Primary | ICD-10-CM

## 2023-11-08 DIAGNOSIS — S14.151A INCOMPLETE SPINAL CORD INJURY AT C1-C4 LEVEL WITHOUT BONE INJURY (HCC): ICD-10-CM

## 2023-11-08 DIAGNOSIS — Z98.890 H/O EXCISION OF LAMINA OF CERVICAL VERTEBRA FOR DECOMPRESSION OF SPINAL CORD: Primary | ICD-10-CM

## 2023-11-08 PROCEDURE — 97110 THERAPEUTIC EXERCISES: CPT

## 2023-11-08 PROCEDURE — 97112 NEUROMUSCULAR REEDUCATION: CPT

## 2023-11-08 NOTE — PROGRESS NOTES
Daily Note     Today's date: 2023  Patient name: Renay Wright  : 1955  MRN: [id number]  Referring provider: Salazar Verdin MD  Dx:   Encounter Diagnosis     ICD-10-CM    1. Physical deconditioning  R53.81                           Subjective: 4-5/10 hip/back pain. He reports his stiffness is improving slightly, but he is exhausted from the long walk into the clinic. Objective: See treatment diary below. NMR:    - Treadmill training; 2 UE (SOLOSTEP)   1st trial: 2 mins, 43 sec @ 0.6 mph   2nd trial: 4 minutes 12 seconds @ 0.6-0.7 mph, 1% grade   3rd trial: 5 minutes 30 seconds @ 0.5 mph    TE:   - Physioball rollouts: 10 second holds, 10 reps each fwd/R/L  - STS from standard chair, no UE: 5 reps, 1 sets       Assessment: Patient tolerated treatment session fair today with complaints of LE stiffness. He continues to require frequent and extended rest breaks secondary to fatigue; however he was able to ambulate at higher speeds and addition of grade. Likely indicating gains in LE strength and endurance. Patient would likely benefit from further training with SoloStep for both overground and treadmill ambulation, as his primary goal at this time is to return to walking with a cane. Plan: Continue per plan of care. Patient's primary goal is to return to walking with SPC. Resume gait training. NuStep as gait primer.      NO AUTH REQ  Precautions: Cervical spine PCDF with B UE atrophy and dec  strength  "Johann"    Outcome Measures Initial Eval  23 Re-Eval  10-4-23 PN  2023       5xSTS 23.50 sec 25.69 sec 35.35 sec, 0 UE       TUG  - Regular   33.9 sec   24.97 sec     26.38 w/ RW       10 meter  0.63 m/s 0.54 m/s w/ RW       ELDER         FGA  defer        DGI          mCTSIB  - FTEO (firm)  - FTEC (firm)  - FTEO (foam)  - FTEC (foam)    30 sec  1.9 sec  26.81 sec  1.69 sec   30 Sec  30 sec  30 sec  1 sec       6MWT  defer

## 2023-11-08 NOTE — PROGRESS NOTES
Daily Note     Today's date: 2023  Patient name: Kristel Malcolm  : 1955  MRN: [id number]  Referring provider: German Inman MD  Dx:   Encounter Diagnoses   Name Primary? H/O excision of lamina of cervical vertebra for decompression of spinal cord Yes    Incomplete spinal cord injury at C1-C4 level without bone injury ([address])      Start Time: 1200  Stop Time: 1230  Total time in clinic (min): 30 minutes   Subjective: Pt reports he has been wearing his orthosis for about 15-30 minutes 3x/day. Therapist recommended to increase up to 1-2 hours as tolerated. Objective: See treatment below. NMRE:  -STM to wrist and digit flexors x5 minutes total for spasticity management in preparation for reaching/FMC activity.  -Performed [place] with FES on wrist extensors of 1" blocks placement focusing on [place] and dexterity  -Performed intrinsic strengthening on FES, intrinsic plus position to to improve hand strength required for FM tasks. TE:   -Performed PROM of the R hand/finger to increased ROM to improve aperture of hand required for reaching/grasping. 8 minutes total.    Assessment: Pt tolerated session well. Continues to demonstrate b/l [place] and dexterity impairments, R worse than L, and spasticity affecting R digit extension. Plan: Continued skilled OT per POC.

## 2023-11-13 ENCOUNTER — OFFICE VISIT (OUTPATIENT)
Dept: PODIATRY | Facility: CLINIC | Age: 68
End: 2023-11-13
Payer: MEDICARE

## 2023-11-13 ENCOUNTER — OFFICE VISIT (OUTPATIENT)
Facility: CLINIC | Age: 68
End: 2023-11-13
Payer: MEDICARE

## 2023-11-13 VITALS
BODY MASS INDEX: 38.51 KG/M2 | SYSTOLIC BLOOD PRESSURE: 128 MMHG | HEART RATE: 72 BPM | DIASTOLIC BLOOD PRESSURE: 74 MMHG | OXYGEN SATURATION: 96 % | HEIGHT: 66 IN

## 2023-11-13 DIAGNOSIS — B35.1 ONYCHOMYCOSIS: Primary | ICD-10-CM

## 2023-11-13 DIAGNOSIS — R53.81 PHYSICAL DECONDITIONING: Primary | ICD-10-CM

## 2023-11-13 DIAGNOSIS — S14.151A INCOMPLETE SPINAL CORD INJURY AT C1-C4 LEVEL WITHOUT BONE INJURY (HCC): ICD-10-CM

## 2023-11-13 DIAGNOSIS — Z98.890 H/O EXCISION OF LAMINA OF CERVICAL VERTEBRA FOR DECOMPRESSION OF SPINAL CORD: Primary | ICD-10-CM

## 2023-11-13 DIAGNOSIS — E11.9 TYPE 2 DIABETES MELLITUS WITHOUT COMPLICATION, WITHOUT LONG-TERM CURRENT USE OF INSULIN (HCC): ICD-10-CM

## 2023-11-13 PROCEDURE — 97112 NEUROMUSCULAR REEDUCATION: CPT

## 2023-11-13 PROCEDURE — 97110 THERAPEUTIC EXERCISES: CPT

## 2023-11-13 PROCEDURE — 11721 DEBRIDE NAIL 6 OR MORE: CPT | Performed by: PODIATRIST

## 2023-11-13 NOTE — PROGRESS NOTES
Daily Note     Today's date: 2023  Patient name: Godwin Barrientos  : 1955  MRN: [id number]  Referring provider: Andrey Chauhan MD  Dx:   Encounter Diagnoses   Name Primary? H/O excision of lamina of cervical vertebra for decompression of spinal cord Yes    Incomplete spinal cord injury at C1-C4 level without bone injury ([address])      Start Time: 1330  Stop Time: 1415  Total time in clinic (min): 45 minutes   Subjective: Pt states he feels his hand is looser after wearing his orthosis. Objective: See treatment below. NMRE:  -STM to wrist and digit flexors x8 minutes total for spasticity management in preparation for reaching/FMC activity.  -Wbing exercises in seated on blue jerry disc with min support from therapist, 2x10 with 5 second hold  -Performed FES on wrist extensors with support from therapist working on full hand extension, better index finger active extension following this  -Performed intrinsic strengthening on FES, intrinsic plus position to to improve hand strength required for FM tasks. TE:   -Performed PROM of the R hand/finger to increased ROM to improve aperture of hand required for reaching/grasping. 8 minutes total.  -Yellow bar twists for wrist flexion/extension, 5 minutes. Assessment: Pt tolerated session well. Pt showing improved active extension following exercises today, and responds well to FES. Continues to demonstrate b/l [facility] and dexterity impairments, R worse than L, and spasticity affecting R digit extension. Plan: Continued skilled OT per POC.

## 2023-11-13 NOTE — PROGRESS NOTES
Daily Note     Today's date: 2023  Patient name: Kristel Malcolm  : 1955  MRN: [id number]  Referring provider: German Inman MD  Dx:   Encounter Diagnosis     ICD-10-CM    1. Physical deconditioning  R53.81              Subjective: Patient reports that he had a doctor's appointment today so he had to walk around a lot to get to his office. His back does not hurt as much as usually. Objective: See treatment diary below. NMR:  - Treadmill training; 2 UE (SOLOSTEP)   1st trial: 3 mins, 30 sec @ 0.6 mph   2nd trial: 3 minutes 30 seconds @ 0.6-0.7 mph, 1% grade   3rd trial: 6 minutes 0 seconds @ 0.5 mph    TE:   - Physioball rollouts: 10 second holds, 10 reps each fwd/R/L  - STS from standard chair, no UE: 15 reps, 1 sets     Assessment: Patient tolerated treatment session well today. Patient demonstrated increased distance walked indicating improvement with overall endurance. This can also be attributed to decreased back pain today. He illustrates decreased B/L stance time, medial and lateral instability, decreased swing and propulsion with treadmill training likely due to overall decreased LE strength and endurance. Patient would likely benefit from continued training with SoloStep for both overground and treadmill ambulation, as his primary goal at this time is to return to walking with a cane. Plan: Continue per plan of care. Patient's primary goal is to return to walking with SPC. Circuit training to improve overall endurance.      NO AUTH REQ  Precautions: Cervical spine PCDF with B UE atrophy and dec  strength  "Johann"    Outcome Measures Initial Eval  23 Re-Eval  10-4-23 PN  2023       5xSTS 23.50 sec 25.69 sec 35.35 sec, 0 UE       TUG  - Regular   33.9 sec   24.97 sec     26.38 w/ RW       10 meter  0.63 m/s 0.54 m/s w/ RW       ELDER         FGA  defer        DGI          mCTSIB  - FTEO (firm)  - FTEC (firm)  - FTEO (foam)  - FTEC (foam)    30 sec  1.9 sec  26.81 sec  1.69 sec   30 Sec  30 sec  30 sec  1 sec       6MWT  defer

## 2023-11-13 NOTE — PROGRESS NOTES
Assessment/Plan:     The patient's clinical examination today is consistent with onychomycosis of the pedal nail plates bilaterally. The skin is dry and scaling consistent with diabetic anhidrosis. There are no open lesions nor callosities noted to either foot. The interdigital spaces are clear without maceration. Pedal pulses are palpable but diminished, right more so than the left. Capillary fill time is within normal limits bilaterally. Temperature gradient is within normal limits bilaterally. Skin is thin to the bilateral lower extremities with decreased turgor. There is absence of hair growth in bilateral lower extremities. Epicritic sensation is diminished in the forefoot bilaterally consistent with peripheral neuropathy. The pedal nail plates are sharply debrided with a sterile nail clipper L38 without complication. The pedal nails were then sharply reduced in thickness and girth utilizing a rotary bur. There are no other acute pedal issues noted today. Recommend follow-up in 3 to 4 months for repeat at risk diabetic foot care. Diagnoses and all orders for this visit:    Onychomycosis    Type 2 diabetes mellitus without complication, without long-term current use of insulin ([facility], located within [facility])          Subjective:     Patient ID: Edson Taylor is a 77 y.o. male. The patient presents today for at risk foot care. The patient is unable to trim his own nails secondary to the thickness and girth. He has a history of chronic low back pain for which he is currently going to PT and OT. He also has lost function of his right hand secondary to a chronic and progressive contracture deformity.               PAST MEDICAL HISTORY:  Past Medical History:   Diagnosis Date    Diabetes mellitus [facility])     ED (erectile dysfunction)     Hyperlipidemia     Hypertension     Low back pain March 2022    Obesity     S/P cervical spinal fusion 05/10/2023    Spinal stenosis of lumbar region at multiple levels 12/22/2022       PAST SURGICAL HISTORY:  Past Surgical History:   Procedure Laterality Date    CARPAL TUNNEL RELEASE Right     EYE SURGERY      strabibusmus    HAND SURGERY      left index finger    NC ARTHRD PST/PSTLAT TQ 1NTRSPC CRV BELW C2 SEGMENT N/A 5/10/2023    Procedure: Posterior cervical decompressive laminectomy and instrumented fusion C4-T1; Surgeon: Patt James MD;  Location:  MAIN OR;  Service: Neurosurgery    NC EXCISION SPERMATOCELE W/WO EPIDIDYMECTOMY Left 09/16/2021    Procedure: HYDROCELECTOMY, SCROTAL DEBRIDEMENT;  Surgeon: Peter Palmer MD;  Location: [facility];  Service: Urology    NC NEUROPLASTY &/TRANSPOS MEDIAN NRV CARPAL [place] Right 07/06/2021    Procedure: RELEASE CARPAL TUNNEL;  Surgeon: Ridge Matthew MD;  Location: [facility];  Service: Orthopedics    NC NEUROPLASTY &/TRANSPOSITION ULNAR NERVE ELBOW Right 07/06/2021    Procedure: RELEASE CUBITAL TUNNEL;  Surgeon: Ridge Matthew MD;  Location: [facility] OR;  Service: Orthopedics        ALLERGIES:  Patient has no known allergies. MEDICATIONS:  Current Outpatient Medications   Medication Sig Dispense Refill    acetaminophen (TYLENOL) 325 mg tablet Take 2 tablets (650 mg total) by mouth every 6 (six) hours as needed for mild pain  0    amLODIPine-benazepril (LOTREL 5-10) 5-10 MG per capsule Take 1 capsule by mouth daily 90 capsule 1    pioglitazone-metFORMIN (ACTOPLUS MET)  MG per tablet TAKE 1 TABLET TWICE DAILY  WITH MEALS 180 tablet 0     No current facility-administered medications for this visit. SOCIAL HISTORY:  Social History     Socioeconomic History    Marital status:       Spouse name: None    Number of children: None    Years of education: None    Highest education level: None   Occupational History    None   Tobacco Use    Smoking status: Former     Packs/day: 1.00     Years: 47.00     Total pack years: 47.00     Types: Cigarettes     Quit date: 1/13/2021     Years since quittin.8    Smokeless tobacco: Never    Tobacco comments:     advised not to smoke   Vaping Use    Vaping Use: Never used   Substance and Sexual Activity    Alcohol use: Not Currently     Comment: aprox 4 drinks a month  when out to dinner    Drug use: Not Currently     Types: Marijuana    Sexual activity: Not Currently   Other Topics Concern    None   Social History Narrative    None     Social Determinants of Health     Financial Resource Strain: Low Risk  (3/1/2023)    Overall Financial Resource Strain (CARDIA)     Difficulty of Paying Living Expenses: Not very hard   Food Insecurity: No Food Insecurity (2023)    Hunger Vital Sign     Worried About Running Out of Food in the Last Year: Never true     Ran Out of Food in the Last Year: Never true   Transportation Needs: No Transportation Needs (2023)    PRAPARE - Transportation     Lack of Transportation (Medical): No     Lack of Transportation (Non-Medical): No   Physical Activity: Not on file   Stress: Not on file   Social Connections: Not on file   Intimate Partner Violence: Not on file   Housing Stability: Unknown (2023)    Housing Stability Vital Sign     Unable to Pay for Housing in the Last Year: No     Number of Places Lived in the Last Year: Not on file     Unstable Housing in the Last Year: No        Review of Systems   Constitutional: Negative. HENT: Negative. Eyes: Negative. Respiratory: Negative. Cardiovascular: Negative. Endocrine: Negative. Musculoskeletal: Negative. Skin: Negative. Neurological: Negative. Hematological: Negative. Psychiatric/Behavioral: Negative. Objective:     Physical Exam  Vitals reviewed. Constitutional:       Appearance: Normal appearance. HENT:      Head: Normocephalic and atraumatic. Nose: Nose normal.   Eyes:      Conjunctiva/sclera: Conjunctivae normal.      Pupils: Pupils are equal, round, and reactive to light.    Cardiovascular:      Pulses: Dorsalis pedis pulses are 0 on the right side and 2+ on the left side. Posterior tibial pulses are 0 on the right side and 1+ on the left side. Pulmonary:      Effort: Pulmonary effort is normal.   Feet:      Right foot:      Skin integrity: Skin integrity normal.      Toenail Condition: Right toenails are abnormally thick and long. Fungal disease present. Left foot:      Skin integrity: Skin integrity normal.      Toenail Condition: Left toenails are abnormally thick and long. Fungal disease present. Comments: The patient's clinical examination today is consistent with onychomycosis of the pedal nail plates bilaterally. The skin is dry and scaling consistent with diabetic anhidrosis. There are no open lesions nor callosities noted to either foot. The interdigital spaces are clear without maceration. Pedal pulses are palpable but diminished, right more so than the left. Capillary fill time is within normal limits bilaterally. Temperature gradient is within normal limits bilaterally. Skin is thin to the bilateral lower extremities with decreased turgor. There is absence of hair growth in bilateral lower extremities. Epicritic sensation is diminished in the forefoot bilaterally consistent with peripheral neuropathy. Skin:     General: Skin is warm. Capillary Refill: Capillary refill takes 2 to 3 seconds. Neurological:      General: No focal deficit present. Mental Status: He is alert and oriented to person, place, and time. Psychiatric:         Mood and Affect: Mood normal.         Behavior: Behavior normal.         Thought Content:  Thought content normal.

## 2023-11-20 ENCOUNTER — OFFICE VISIT (OUTPATIENT)
Facility: CLINIC | Age: 68
End: 2023-11-20
Payer: MEDICARE

## 2023-11-20 DIAGNOSIS — Z98.890 H/O EXCISION OF LAMINA OF CERVICAL VERTEBRA FOR DECOMPRESSION OF SPINAL CORD: Primary | ICD-10-CM

## 2023-11-20 DIAGNOSIS — S14.151A INCOMPLETE SPINAL CORD INJURY AT C1-C4 LEVEL WITHOUT BONE INJURY (HCC): ICD-10-CM

## 2023-11-20 DIAGNOSIS — R53.81 PHYSICAL DECONDITIONING: Primary | ICD-10-CM

## 2023-11-20 PROCEDURE — 97112 NEUROMUSCULAR REEDUCATION: CPT

## 2023-11-20 PROCEDURE — 97110 THERAPEUTIC EXERCISES: CPT

## 2023-11-20 NOTE — PROGRESS NOTES
Daily Note     Today's date: 2023  Patient name: Nereida Isaacs  : 1955  MRN: [id number]  Referring provider: Sulma Proctor MD  Dx:   Encounter Diagnosis     ICD-10-CM    1. Physical deconditioning  R53.81              Subjective: Patient reports he is going to try to make an appt with Dr. Thi Dubon to figure out a solution for his pain and stiffness; he is frustrated with his situation. Patient reports he typically feels good in PT sessions, but he feels incapacitated the next day. Reports stiffness beginning of session. Objective: See treatment diary below. NMR:  - Treadmill training; 2 UE (SOLOSTEP)   1st trial: 3 mins, 30 sec @ 0.6 mph   2nd trial: 4 minutes @ 0.7 mph, 2% grade   3rd trial: 2 minutes 45 seconds @ 0.8 mph    TE:   - STS from standard chair, no UE: 15 reps, 2 sets   - Physioball rollouts: 10 second holds, 10 reps each fwd/R/L (PERFORMED END OF SESSION)     Assessment: Patient tolerated treatment session fairly well today. Increased highest speed on treadmill with good performance; however decreased duration. Completed physioball rollouts end of session rather than prior to determine potential improvements in body soreness that patient has been experiencing following sessions. Patient to report any change in how he feels following today's session when he presents next visit. Patient would likely benefit from continued training with SoloStep for both overground and treadmill ambulation, as his primary goal at this time is to return to walking with a cane. Plan: Continue per plan of care. Patient's primary goal is to return to walking with SPC. Circuit training to improve overall endurance.      NO AUTH REQ  Precautions: Cervical spine PCDF with B UE atrophy and dec  strength  "Johann"    Outcome Measures Initial Eval  23 Re-Eval  10-4-23 PN  2023       5xSTS 23.50 sec 25.69 sec 35.35 sec, 0 UE       TUG  - Regular   33.9 sec   24.97 sec     26.38 w/ RW 10 meter  0.63 m/s 0.54 m/s w/ LAUREN ELDER  29/56 31/56       FGA  defer        DGI          mCTSIB  - FTEO (firm)  - FTEC (firm)  - FTEO (foam)  - FTEC (foam)    30 sec  1.9 sec  26.81 sec  1.69 sec   30 Sec  30 sec  30 sec  1 sec       6MWT  defer

## 2023-11-20 NOTE — PROGRESS NOTES
Daily Note     Today's date: 2023  Patient name: Easton Daniels  : 1955  MRN: [id number]  Referring provider: Janna Bolivar MD  Dx:   Encounter Diagnoses   Name Primary? H/O excision of lamina of cervical vertebra for decompression of spinal cord Yes    Incomplete spinal cord injury at C1-C4 level without bone injury ([address])                  Subjective: Pt states he is doing well     Objective: See treatment below. NMRE:  -IASTM  to wrist and digit flexors x4-5 minutes total for spasticity management in preparation for reaching/FMC activity.  -Wbing exercises in seated on mat of x 30 reps of tricep extension and needle threads weight bearing through RUE while using red theraband with LUE in PNF D2 patterns  Performed 4 x 7 reps of 1" block stack with shoulder abduction/adduction using 1lb wrist weight for proprioceptive feedback  Performed [facility] cube task with B/L UE focusing on [facility], dexterity using 1lb wrist weight for proprioceptive feedback. Performed with LUE of [facility] peg board place in, then performed taking out with digit opposition focusing on [facility] and dexterity. TE:       Assessment: Pt tolerated session well. Pt decreased spasticity post weight bearing and provided with theraband to complete needle threads with band. Continues to demonstrate b/l [facility] and dexterity impairments, R worse than L, and spasticity affecting R digit extension. Plan: Continued skilled OT per POC.

## 2023-11-22 ENCOUNTER — RA CDI HCC (OUTPATIENT)
Dept: OTHER | Facility: HOSPITAL | Age: 68
End: 2023-11-22

## 2023-11-27 ENCOUNTER — APPOINTMENT (OUTPATIENT)
Dept: LAB | Facility: HOSPITAL | Age: 68
End: 2023-11-27
Payer: MEDICARE

## 2023-11-27 ENCOUNTER — OFFICE VISIT (OUTPATIENT)
Facility: CLINIC | Age: 68
End: 2023-11-27
Payer: MEDICARE

## 2023-11-27 DIAGNOSIS — Z12.5 SCREENING FOR PROSTATE CANCER: ICD-10-CM

## 2023-11-27 DIAGNOSIS — E78.2 MIXED HYPERLIPIDEMIA: ICD-10-CM

## 2023-11-27 DIAGNOSIS — Z98.890 H/O EXCISION OF LAMINA OF CERVICAL VERTEBRA FOR DECOMPRESSION OF SPINAL CORD: Primary | ICD-10-CM

## 2023-11-27 DIAGNOSIS — I10 PRIMARY HYPERTENSION: ICD-10-CM

## 2023-11-27 DIAGNOSIS — R53.81 PHYSICAL DECONDITIONING: Primary | ICD-10-CM

## 2023-11-27 DIAGNOSIS — S14.151A INCOMPLETE SPINAL CORD INJURY AT C1-C4 LEVEL WITHOUT BONE INJURY (HCC): ICD-10-CM

## 2023-11-27 DIAGNOSIS — E11.9 TYPE 2 DIABETES MELLITUS WITHOUT COMPLICATION, WITHOUT LONG-TERM CURRENT USE OF INSULIN (HCC): ICD-10-CM

## 2023-11-27 LAB
ALBUMIN SERPL BCP-MCNC: 4 G/DL (ref 3.5–5)
ALP SERPL-CCNC: 71 U/L (ref 34–104)
ALT SERPL W P-5'-P-CCNC: 17 U/L (ref 7–52)
ANION GAP SERPL CALCULATED.3IONS-SCNC: 7 MMOL/L
AST SERPL W P-5'-P-CCNC: 13 U/L (ref 13–39)
BASOPHILS # BLD AUTO: 0.04 THOUSANDS/ÂΜL (ref 0–0.1)
BASOPHILS NFR BLD AUTO: 0 % (ref 0–1)
BILIRUB SERPL-MCNC: 0.46 MG/DL (ref 0.2–1)
BUN SERPL-MCNC: 27 MG/DL (ref 5–25)
CALCIUM SERPL-MCNC: 9.9 MG/DL (ref 8.4–10.2)
CHLORIDE SERPL-SCNC: 105 MMOL/L (ref 96–108)
CHOLEST SERPL-MCNC: 146 MG/DL
CO2 SERPL-SCNC: 28 MMOL/L (ref 21–32)
CREAT SERPL-MCNC: 1.03 MG/DL (ref 0.6–1.3)
CREAT UR-MCNC: 114.9 MG/DL
EOSINOPHIL # BLD AUTO: 0.21 THOUSAND/ÂΜL (ref 0–0.61)
EOSINOPHIL NFR BLD AUTO: 2 % (ref 0–6)
ERYTHROCYTE [DISTWIDTH] IN BLOOD BY AUTOMATED COUNT: 14.6 % (ref 11.6–15.1)
EST. AVERAGE GLUCOSE BLD GHB EST-MCNC: 232 MG/DL
GFR SERPL CREATININE-BSD FRML MDRD: 73 ML/MIN/1.73SQ M
GLUCOSE P FAST SERPL-MCNC: 180 MG/DL (ref 65–99)
HBA1C MFR BLD: 9.7 %
HCT VFR BLD AUTO: 45.5 % (ref 36.5–49.3)
HDLC SERPL-MCNC: 46 MG/DL
HGB BLD-MCNC: 14.5 G/DL (ref 12–17)
IMM GRANULOCYTES # BLD AUTO: 0.05 THOUSAND/UL (ref 0–0.2)
IMM GRANULOCYTES NFR BLD AUTO: 1 % (ref 0–2)
LDLC SERPL CALC-MCNC: 77 MG/DL (ref 0–100)
LYMPHOCYTES # BLD AUTO: 1.27 THOUSANDS/ÂΜL (ref 0.6–4.47)
LYMPHOCYTES NFR BLD AUTO: 14 % (ref 14–44)
MCH RBC QN AUTO: 28.5 PG (ref 26.8–34.3)
MCHC RBC AUTO-ENTMCNC: 31.9 G/DL (ref 31.4–37.4)
MCV RBC AUTO: 89 FL (ref 82–98)
MICROALBUMIN UR-MCNC: 761.4 MG/L
MICROALBUMIN/CREAT 24H UR: 663 MG/G CREATININE (ref 0–30)
MONOCYTES # BLD AUTO: 0.73 THOUSAND/ÂΜL (ref 0.17–1.22)
MONOCYTES NFR BLD AUTO: 8 % (ref 4–12)
NEUTROPHILS # BLD AUTO: 6.69 THOUSANDS/ÂΜL (ref 1.85–7.62)
NEUTS SEG NFR BLD AUTO: 75 % (ref 43–75)
NRBC BLD AUTO-RTO: 0 /100 WBCS
PLATELET # BLD AUTO: 276 THOUSANDS/UL (ref 149–390)
PMV BLD AUTO: 11.2 FL (ref 8.9–12.7)
POTASSIUM SERPL-SCNC: 4.8 MMOL/L (ref 3.5–5.3)
PROT SERPL-MCNC: 6.6 G/DL (ref 6.4–8.4)
PSA SERPL-MCNC: 1.18 NG/ML (ref 0–4)
RBC # BLD AUTO: 5.09 MILLION/UL (ref 3.88–5.62)
SODIUM SERPL-SCNC: 140 MMOL/L (ref 135–147)
TRIGL SERPL-MCNC: 117 MG/DL
WBC # BLD AUTO: 8.99 THOUSAND/UL (ref 4.31–10.16)

## 2023-11-27 PROCEDURE — 80053 COMPREHEN METABOLIC PANEL: CPT

## 2023-11-27 PROCEDURE — 82570 ASSAY OF URINE CREATININE: CPT

## 2023-11-27 PROCEDURE — 80061 LIPID PANEL: CPT

## 2023-11-27 PROCEDURE — G0103 PSA SCREENING: HCPCS

## 2023-11-27 PROCEDURE — 83036 HEMOGLOBIN GLYCOSYLATED A1C: CPT

## 2023-11-27 PROCEDURE — 82043 UR ALBUMIN QUANTITATIVE: CPT

## 2023-11-27 PROCEDURE — 36415 COLL VENOUS BLD VENIPUNCTURE: CPT

## 2023-11-27 PROCEDURE — 97112 NEUROMUSCULAR REEDUCATION: CPT

## 2023-11-27 PROCEDURE — 85025 COMPLETE CBC W/AUTO DIFF WBC: CPT

## 2023-11-27 PROCEDURE — 97110 THERAPEUTIC EXERCISES: CPT

## 2023-11-27 NOTE — PROGRESS NOTES
Daily Note     Today's date: 2023  Patient name: Dillon Tobias  : 1955  MRN: [id number]  Referring provider: Jeannie Timmons MD  Dx:   Encounter Diagnosis     ICD-10-CM    1. Physical deconditioning  R53.81              Subjective: Patient reports his back pain and stiffness has continued to be worse. He called to move up his appointment with Dr. Shaggy Alexander and was placed on a cancellation/waiting list. He also has a consult scheduled with Dr. Ady Marinelli for potential Botox injections on 2024. Objective: See treatment diary below. NMR:  - STS from standard chair, no UE: 20 reps x 2 sets  - Ambulation in SoloStep no AD 2 x 20 ft  - Ambulation in SoloStep w/ WBQC 2 x 20 ft (2 sets)  - Ambulation in SoloStep w/ walking stick 2 x 20 ft (1 set)    TE:   - Physioball rollouts: 10 second holds, 10 reps each fwd/R/L (performed at start of session)    NOT TODAY:  - Treadmill training; 2 UE (SOLOSTEP)   1st trial: 3 mins, 30 sec @ 0.6 mph   2nd trial: 4 minutes @ 0.7 mph, 2% grade   3rd trial: 2 minutes 45 seconds @ 0.8 mph    Assessment: Patient tolerated treatment session fairly well today. Deferred treadmill training this date due to overall increased baseline back pain and stiffness. Performed all ambulation training overground this date due to maximize performance and allot for frequent seated rest breaks. Educated patient on Botox injections and likelihood for need of several injections to notice functional difference. Patient would likely benefit from continued training with SoloStep for both overground and treadmill ambulation, as his primary goal at this time is to return to walking with a cane. Plan: Continue per plan of care. Patient's primary goal is to return to walking with SPC. Circuit training to improve overall endurance.      NO AUTH REQ  Precautions: Cervical spine PCDF with B UE atrophy and dec  strength  "Johann"    Outcome Measures Initial Eval  23 Re-Eval  10-4-23 PN  11/1/2023       5xSTS 23.50 sec 25.69 sec 35.35 sec, 0 UE       TUG  - Regular   33.9 sec   24.97 sec     26.38 w/ RW       10 meter  0.63 m/s 0.54 m/s w/ RW       ELDER  29/56 31/56       FGA  defer        DGI          mCTSIB  - FTEO (firm)  - FTEC (firm)  - FTEO (foam)  - FTEC (foam)    30 sec  1.9 sec  26.81 sec  1.69 sec   30 Sec  30 sec  30 sec  1 sec       6MWT  defer

## 2023-11-27 NOTE — PROGRESS NOTES
Daily Note     Today's date: 2023  Patient name: Edson Taylor  : 1955  MRN: [id number]  Referring provider: Roro Hendricks MD  Dx:   Encounter Diagnoses   Name Primary? H/O excision of lamina of cervical vertebra for decompression of spinal cord Yes    Incomplete spinal cord injury at C1-C4 level without bone injury ([address])                    Subjective: Pt reports getting botox in January- discussed with     Objective: See treatment below. NMRE:  -IASTM  to wrist and digit flexors x4-5 minutes total for spasticity management in preparation for reaching/FMC activity.  -Wbing exercises in standing of x 30 reps of pushups for proprioceptive feedback  Performed [place] of RUE of connect 4 with FES focusing on [place] with shoulder abduction/adduction. Performed trialed with in hand manipulation and discontinued to one nut at a time of screwing nuts focusing on [place]     Assessment: Pt tolerated session well. Pt decreased spasticity post weight bearing and provided with theraband to complete needle threads with band. Continues to demonstrate b/l [place] and dexterity impairments, R worse than L, and spasticity affecting R digit extension. Plan: Continued skilled OT per POC.

## 2023-11-29 ENCOUNTER — TELEPHONE (OUTPATIENT)
Dept: NEUROLOGY | Facility: CLINIC | Age: 68
End: 2023-11-29

## 2023-11-29 ENCOUNTER — OFFICE VISIT (OUTPATIENT)
Facility: CLINIC | Age: 68
End: 2023-11-29
Payer: MEDICARE

## 2023-11-29 DIAGNOSIS — Z98.890 H/O EXCISION OF LAMINA OF CERVICAL VERTEBRA FOR DECOMPRESSION OF SPINAL CORD: Primary | ICD-10-CM

## 2023-11-29 DIAGNOSIS — R53.81 PHYSICAL DECONDITIONING: Primary | ICD-10-CM

## 2023-11-29 DIAGNOSIS — S14.151A INCOMPLETE SPINAL CORD INJURY AT C1-C4 LEVEL WITHOUT BONE INJURY (HCC): ICD-10-CM

## 2023-11-29 PROCEDURE — 97112 NEUROMUSCULAR REEDUCATION: CPT

## 2023-11-29 PROCEDURE — 97110 THERAPEUTIC EXERCISES: CPT

## 2023-11-29 NOTE — TELEPHONE ENCOUNTER
Per Stephanie Reeves the appointment offered for 1/11/24 at 4pm is taken. Called patient back the appointment for 1/8/24 at 3:15 or 4pm is still open. Please offer this to patient and let Stephanie Reeves know so she can put him in the schedule.

## 2023-11-29 NOTE — PROGRESS NOTES
Daily Note     Today's date: 2023  Patient name: Joe Blanca  : 1955  MRN: [id number]  Referring provider: Shon Villegas MD  Dx:   Encounter Diagnoses   Name Primary? H/O excision of lamina of cervical vertebra for decompression of spinal cord Yes    Incomplete spinal cord injury at C1-C4 level without bone injury ([address])        Start Time: 1148  Stop Time: 1230  Total time in clinic (min): 42 minutes   Subjective: Pt reports he has tolerated wearing his custom orthosis better, however sometimes needs to take it off at night. Objective: See treatment below. NMRE:  -STM to wrist and digit flexors x8 minutes total for spasticity management in preparation for reaching/FMC activity.  -Wbing exercises in seated on blue jerry disc with min support from therapist, 2x10 with 5 second hold  -Performed FES on wrist extensors with support from therapist working on full hand extension, better index finger active extension following.   -Performed intrinsic strengthening on FES, intrinsic plus position to to improve hand strength required for FM tasks, stacking Jenga blocks during exercise. TE:   -Performed PROM of the R hand/finger to increased ROM to improve aperture of hand required for reaching/grasping. 10 minutes total.      Assessment: Pt tolerated session well. Pt showing improved active extension following exercises today, and responds well to FES. Continues to demonstrate b/l [facility] and dexterity impairments, R worse than L, and spasticity affecting R digit extension. Plan: Continued skilled OT per POC.

## 2023-11-29 NOTE — TELEPHONE ENCOUNTER
LMOM for patient to call back and reschedule appointment for 1/4/24 with Dr. Dannie Pepe as she will not be in office that day.  Offered patient appointments on 1/11/24 at 4 pm or 1/18/24 at 3:15 pm or 4 pm.

## 2023-11-29 NOTE — PROGRESS NOTES
Daily Note     Today's date: 2023  Patient name: Heaven Conde  : 1955  MRN: [id number]  Referring provider: Antonia Allen MD  Dx:   Encounter Diagnosis     ICD-10-CM    1. Physical deconditioning  R53.81              Subjective: Patient reports his back pain persists at  7/10 pain. States he took advil and a prescription pain medicine before PT today. He called to move up his appointment with Dr. BUI and was placed on a cancellation/waiting list.   He also has a consult scheduled with Dr. Baron Cisneros for potential Botox injections on 2024, but this appointment will be changed. Objective: See treatment diary below. NMR:  - STS from standard chair, no UE: 20 reps x 2 sets. SPO2= 98% and HR 75 bpm.  - Ambulation in SoloStep no AD 2 x 20 ft- deferred today   - Ambulation in SoloStep w/ WBQC 2 x 40 ft  NV try a taller NBQC with two holes showing above peg.   - Ambulation in SoloStep w/ walking stick 1 x 40 ft (1 set) with walking stick in L hand     NOT TODAY:  TE:   - Physioball rollouts: 10 second holds, 10 reps each fwd/R/L (performed at start of session)  - Treadmill training; 2 UE (SOLOSTEP)   1st trial: 3 mins, 30 sec @ 0.6 mph   2nd trial: 4 minutes @ 0.7 mph, 2% grade   3rd trial: 2 minutes 45 seconds @ 0.8 mph    Assessment: Patient tolerated treatment session fairly well today. Deferred treadmill training this date due to overall increased baseline back pain and stiffness. Performed all ambulation training overground this date due to maximize performance and allot for frequent seated rest breaks. Pt feels he is reaching too far for [facility], so height will be increased next visit. Patient would likely benefit from continued training with SoloStep for both overground and treadmill ambulation, as his primary goal at this time is to return to walking with a cane. Plan: Continue per plan of care. Patient's primary goal is to return to walking with SPC.    Circuit training to improve overall endurance.      NO AUTH REQ  Precautions: Cervical spine PCDF with B UE atrophy and dec  strength  "Johann"    Outcome Measures Initial Eval  8-22-23 Re-Eval  10-4-23 PN  11/1/2023       5xSTS 23.50 sec 25.69 sec 35.35 sec, 0 UE       TUG  - Regular   33.9 sec   24.97 sec     26.38 w/ RW       10 meter  0.63 m/s 0.54 m/s w/ RW       ELDER  29/56 31/56       FGA  defer        DGI          mCTSIB  - FTEO (firm)  - FTEC (firm)  - FTEO (foam)  - FTEC (foam)    30 sec  1.9 sec  26.81 sec  1.69 sec   30 Sec  30 sec  30 sec  1 sec       6MWT  defer

## 2023-12-01 ENCOUNTER — OFFICE VISIT (OUTPATIENT)
Dept: FAMILY MEDICINE CLINIC | Facility: CLINIC | Age: 68
End: 2023-12-01
Payer: MEDICARE

## 2023-12-01 VITALS
TEMPERATURE: 98.2 F | RESPIRATION RATE: 18 BRPM | OXYGEN SATURATION: 98 % | SYSTOLIC BLOOD PRESSURE: 110 MMHG | DIASTOLIC BLOOD PRESSURE: 70 MMHG | BODY MASS INDEX: 39.37 KG/M2 | HEIGHT: 66 IN | WEIGHT: 245 LBS | HEART RATE: 76 BPM

## 2023-12-01 DIAGNOSIS — Z00.00 MEDICARE ANNUAL WELLNESS VISIT, SUBSEQUENT: Primary | ICD-10-CM

## 2023-12-01 DIAGNOSIS — I10 PRIMARY HYPERTENSION: ICD-10-CM

## 2023-12-01 DIAGNOSIS — E11.9 TYPE 2 DIABETES MELLITUS WITHOUT COMPLICATION, WITHOUT LONG-TERM CURRENT USE OF INSULIN (HCC): ICD-10-CM

## 2023-12-01 DIAGNOSIS — M54.50 CHRONIC BILATERAL LOW BACK PAIN WITHOUT SCIATICA: ICD-10-CM

## 2023-12-01 DIAGNOSIS — G89.29 CHRONIC BILATERAL LOW BACK PAIN WITHOUT SCIATICA: ICD-10-CM

## 2023-12-01 DIAGNOSIS — E66.01 OBESITY, MORBID (HCC): ICD-10-CM

## 2023-12-01 DIAGNOSIS — E78.2 MIXED HYPERLIPIDEMIA: ICD-10-CM

## 2023-12-01 PROBLEM — R25.2 SPASTICITY: Status: RESOLVED | Noted: 2023-10-23 | Resolved: 2023-12-01

## 2023-12-01 PROBLEM — F17.200 TOBACCO DEPENDENCE SYNDROME: Status: RESOLVED | Noted: 2022-08-17 | Resolved: 2023-12-01

## 2023-12-01 PROBLEM — Z98.1 STATUS POST CERVICAL SPINAL ARTHRODESIS: Status: RESOLVED | Noted: 2023-09-14 | Resolved: 2023-12-01

## 2023-12-01 PROCEDURE — G0439 PPPS, SUBSEQ VISIT: HCPCS

## 2023-12-01 PROCEDURE — 99214 OFFICE O/P EST MOD 30 MIN: CPT

## 2023-12-01 RX ORDER — METHOCARBAMOL 500 MG/1
500 TABLET, FILM COATED ORAL 2 TIMES DAILY
COMMUNITY

## 2023-12-01 RX ORDER — GLIMEPIRIDE 1 MG/1
1 TABLET ORAL
COMMUNITY
End: 2023-12-01 | Stop reason: SDUPTHER

## 2023-12-01 RX ORDER — GLIMEPIRIDE 1 MG/1
1 TABLET ORAL
Qty: 90 TABLET | Refills: 0 | Status: SHIPPED | OUTPATIENT
Start: 2023-12-01

## 2023-12-01 NOTE — ASSESSMENT & PLAN NOTE
Pain under reasonable control with Tylenol. Patient has been followed by orthopedic and spine physician.   We will continue to monitor

## 2023-12-01 NOTE — PATIENT INSTRUCTIONS
Medicare Preventive Visit Patient Instructions  Thank you for completing your Welcome to Medicare Visit or Medicare Annual Wellness Visit today. Your next wellness visit will be due in one year (12/1/2024). The screening/preventive services that you may require over the next 5-10 years are detailed below. Some tests may not apply to you based off risk factors and/or age. Screening tests ordered at today's visit but not completed yet may show as past due. Also, please note that scanned in results may not display below. Preventive Screenings:  Service Recommendations Previous Testing/Comments   Colorectal Cancer Screening  Colonoscopy    Fecal Occult Blood Test (FOBT)/Fecal Immunochemical Test (FIT)  Fecal DNA/Cologuard Test  Flexible Sigmoidoscopy Age: 43-73 years old   Colonoscopy: every 10 years (May be performed more frequently if at higher risk)  OR  FOBT/FIT: every 1 year  OR  Cologuard: every 3 years  OR  Sigmoidoscopy: every 5 years  Screening may be recommended earlier than age 39 if at higher risk for colorectal cancer. Also, an individualized decision between you and your healthcare provider will decide whether screening between the ages of 77-80 would be appropriate. Colonoscopy: Not on file  FOBT/FIT: Not on file  Cologuard: Not on file  Sigmoidoscopy: Not on file          Prostate Cancer Screening Individualized decision between patient and health care provider in men between ages of 53-66   Medicare will cover every 12 months beginning on the day after your 50th birthday PSA: 1.18 ng/mL           Hepatitis C Screening Once for adults born between 1945 and 1965  More frequently in patients at high risk for Hepatitis C Hep C Antibody: 02/21/2023        Diabetes Screening 1-2 times per year if you're at risk for diabetes or have pre-diabetes Fasting glucose: 180 mg/dL (11/27/2023)  A1C: 9.7 % (11/27/2023)      Cholesterol Screening Once every 5 years if you don't have a lipid disorder.  May order more often based on risk factors. Lipid panel: 11/27/2023         Other Preventive Screenings Covered by Medicare:  Abdominal Aortic Aneurysm (AAA) Screening: covered once if your at risk. You're considered to be at risk if you have a family history of AAA or a male between the age of 70-76 who smoking at least 100 cigarettes in your lifetime. Lung Cancer Screening: covers low dose CT scan once per year if you meet all of the following conditions: (1) Age 48-67; (2) No signs or symptoms of lung cancer; (3) Current smoker or have quit smoking within the last 15 years; (4) You have a tobacco smoking history of at least 20 pack years (packs per day x number of years you smoked); (5) You get a written order from a healthcare provider. Glaucoma Screening: covered annually if you're considered high risk: (1) You have diabetes OR (2) Family history of glaucoma OR (3)  aged 48 and older OR (3)  American aged 72 and older  Osteoporosis Screening: covered every 2 years if you meet one of the following conditions: (1) Have a vertebral abnormality; (2) On glucocorticoid therapy for more than 3 months; (3) Have primary hyperparathyroidism; (4) On osteoporosis medications and need to assess response to drug therapy. HIV Screening: covered annually if you're between the age of 14-79. Also covered annually if you are younger than 13 and older than 72 with risk factors for HIV infection. For pregnant patients, it is covered up to 3 times per pregnancy.     Immunizations:  Immunization Recommendations   Influenza Vaccine Annual influenza vaccination during flu season is recommended for all persons aged >= 6 months who do not have contraindications   Pneumococcal Vaccine   * Pneumococcal conjugate vaccine = PCV13 (Prevnar 13), PCV15 (Vaxneuvance), PCV20 (Prevnar 20)  * Pneumococcal polysaccharide vaccine = PPSV23 (Pneumovax) Adults 14-27 yo with certain risk factors or if 69+ yo  If never received any pneumonia vaccine: recommend Prevnar 21 (PCV20)  Give PCV20 if previously received 1 dose of PCV13 or PPSV23   Hepatitis B Vaccine 3 dose series if at intermediate or high risk (ex: diabetes, end stage renal disease, liver disease)   Respiratory syncytial virus (RSV) Vaccine - COVERED BY MEDICARE PART D  * RSVPreF3 (Arexvy) CDC recommends that adults 61years of age and older may receive a single dose of RSV vaccine using shared clinical decision-making (SCDM)   Tetanus (Td) Vaccine - COST NOT COVERED BY MEDICARE PART B Following completion of primary series, a booster dose should be given every 10 years to maintain immunity against tetanus. Td may also be given as tetanus wound prophylaxis. Tdap Vaccine - COST NOT COVERED BY MEDICARE PART B Recommended at least once for all adults. For pregnant patients, recommended with each pregnancy. Shingles Vaccine (Shingrix) - COST NOT COVERED BY MEDICARE PART B  2 shot series recommended in those 19 years and older who have or will have weakened immune systems or those 50 years and older     Health Maintenance Due:      Topic Date Due   • Lung Cancer Screening  12/01/2024 (Originally 9/26/2003)   • Colorectal Cancer Screening  12/01/2024 (Originally 9/26/1998)   • Hepatitis C Screening  Completed     Immunizations Due:      Topic Date Due   • Hepatitis A Vaccine (1 of 2 - Risk 2-dose series) Never done   • Hepatitis B Vaccine (1 of 3 - Risk 3-dose series) Never done   • COVID-19 Vaccine (4 - Moderna series) 01/14/2022     Advance Directives   What are advance directives? Advance directives are legal documents that state your wishes and plans for medical care. These plans are made ahead of time in case you lose your ability to make decisions for yourself. Advance directives can apply to any medical decision, such as the treatments you want, and if you want to donate organs. What are the types of advance directives?   There are many types of advance directives, and each state has rules about how to use them. You may choose a combination of any of the following:  Living will: This is a written record of the treatment you want. You can also choose which treatments you do not want, which to limit, and which to stop at a certain time. This includes surgery, medicine, IV fluid, and tube feedings. Durable power of  for [facility]): This is a written record that states who you want to make healthcare choices for you when you are unable to make them for yourself. This person, called a proxy, is usually a family member or a friend. You may choose more than 1 proxy. Do not resuscitate (DNR) order:  A DNR order is used in case your heart stops beating or you stop breathing. It is a request not to have certain forms of treatment, such as CPR. A DNR order may be included in other types of advance directives. Medical directive: This covers the care that you want if you are in a coma, near death, or unable to make decisions for yourself. You can list the treatments you want for each condition. Treatment may include pain medicine, surgery, blood transfusions, dialysis, IV or tube feedings, and a ventilator (breathing machine). Values history: This document has questions about your views, beliefs, and how you feel and think about life. This information can help others choose the care that you would choose. Why are advance directives important? An advance directive helps you control your care. Although spoken wishes may be used, it is better to have your wishes written down. Spoken wishes can be misunderstood, or not followed. Treatments may be given even if you do not want them. An advance directive may make it easier for your family to make difficult choices about your care.    Weight Management   Why it is important to manage your weight:  Being overweight increases your risk of health conditions such as heart disease, high blood pressure, type 2 diabetes, and certain types of cancer. It can also increase your risk for osteoarthritis, sleep apnea, and other respiratory problems. Aim for a slow, steady weight loss. Even a small amount of weight loss can lower your risk of health problems. How to lose weight safely:  A safe and healthy way to lose weight is to eat fewer calories and get regular exercise. You can lose up about 1 pound a week by decreasing the number of calories you eat by 500 calories each day. Healthy meal plan for weight management:  A healthy meal plan includes a variety of foods, contains fewer calories, and helps you stay healthy. A healthy meal plan includes the following:  Eat whole-grain foods more often. A healthy meal plan should contain fiber. Fiber is the part of grains, fruits, and vegetables that is not broken down by your body. Whole-grain foods are healthy and provide extra fiber in your diet. Some examples of whole-grain foods are whole-wheat breads and pastas, oatmeal, brown rice, and bulgur. Eat a variety of vegetables every day. Include dark, leafy greens such as spinach, kale, sarah beth greens, and mustard greens. Eat yellow and orange vegetables such as carrots, sweet potatoes, and winter squash. Eat a variety of fruits every day. Choose fresh or canned fruit (canned in its own juice or light syrup) instead of juice. Fruit juice has very little or no fiber. Eat low-fat dairy foods. Drink fat-free (skim) milk or 1% milk. Eat fat-free yogurt and low-fat cottage cheese. Try low-fat cheeses such as mozzarella and other reduced-fat cheeses. Choose meat and other protein foods that are low in fat. Choose beans or other legumes such as split peas or lentils. Choose fish, skinless poultry (chicken or turkey), or lean cuts of red meat (beef or pork). Before you cook meat or poultry, cut off any visible fat. Use less fat and oil. Try baking foods instead of frying them.  Add less fat, such as margarine, sour cream, regular salad dressing and mayonnaise to foods. Eat fewer high-fat foods. Some examples of high-fat foods include french fries, doughnuts, ice cream, and cakes. Eat fewer sweets. Limit foods and drinks that are high in sugar. This includes candy, cookies, regular soda, and sweetened drinks. Exercise:  Exercise at least 30 minutes per day on most days of the week. Some examples of exercise include walking, biking, dancing, and swimming. You can also fit in more physical activity by taking the stairs instead of the elevator or parking farther away from stores. Ask your healthcare provider about the best exercise plan for you. © Copyright [employer] 2018 Information is for End User's use only and may not be sold, redistributed or otherwise used for commercial purposes.  All illustrations and images included in CareNotes® are the copyrighted property of [employer] Inc[employer] or [address] [place] [address]

## 2023-12-01 NOTE — ASSESSMENT & PLAN NOTE
Lab Results   Component Value Date    HGBA1C 9.7 (H) 11/27/2023   Not under control since the glimepiride was stopped when he was in the hospital.  We will restart glimepiride at lower dosage to 1 mg with breakfast every day. Repeat fasting blood sugar and hemoglobin A1c before next visit. We will continue to monitor.

## 2023-12-01 NOTE — PROGRESS NOTES
Assessment and Plan:     Problem List Items Addressed This Visit     Obesity, morbid ([address])     Patient was advised to lose weight. Potential consequences of obesity discussed with patient. Advised to have portion control no more than 60% of meal or may consider intermittent fasting  We will continue to monitor           Primary hypertension     Under control. Continue Lotrel. We will continue to monitor         Type 2 diabetes mellitus without complication, without long-term current use of insulin ([facility])       Lab Results   Component Value Date    HGBA1C 9.7 (H) 11/27/2023   Not under control since the glimepiride was stopped when he was in the hospital.  We will restart glimepiride at lower dosage to 1 mg with breakfast every day. Repeat fasting blood sugar and hemoglobin A1c before next visit. We will continue to monitor. Relevant Medications    glimepiride (AMARYL) 1 mg tablet    Other Relevant Orders    Hemoglobin A1C    Glucose, fasting    Chronic bilateral low back pain without sciatica     Pain under reasonable control with Tylenol. Patient has been followed by orthopedic and spine physician. We will continue to monitor         Mixed hyperlipidemia     Under reasonable control with diet. We will continue to monitor. Other Visit Diagnoses     Medicare annual wellness visit, subsequent    -  Primary        BMI Counseling: Body mass index is 39.54 kg/m². The BMI is above normal. Nutrition recommendations include decreasing portion sizes, decreasing fast food intake, limiting drinks that contain sugar, moderation in carbohydrate intake, increasing intake of lean protein and reducing intake of saturated and trans fat. Exercise recommendations include vigorous physical activity 75 minutes/week. No pharmacotherapy was ordered. Rationale for BMI follow-up plan is due to patient being overweight or obese.        Preventive health issues were discussed with patient, and age appropriate screening tests were ordered as noted in patient's After Visit Summary. Personalized health advice and appropriate referrals for health education or preventive services given if needed, as noted in patient's After Visit Summary. History of Present Illness:     Patient presents for a Medicare Wellness Visit    Patient here for review of chronic medical problems and  the labs and imaging if it is applicable. Currently has no specific complaints other than mentioned in the review of systems  Denies chest pain, SOB, cough, abdominal pain, nausea, vomiting, fever, chills, lightheadedness, dizziness,headache, tingling or numbness. No bowel or bladder problem. Patient Care Team:  Emil Cook MD as PCP - General (Internal Medicine)     Review of Systems:     Review of Systems   Constitutional:  Negative for chills, fatigue and fever. HENT:  Negative for congestion, ear pain, rhinorrhea, sneezing and sore throat. Eyes:  Negative for redness, itching and visual disturbance. Respiratory:  Negative for cough, chest tightness and shortness of breath. Cardiovascular:  Negative for chest pain, palpitations and leg swelling. Gastrointestinal:  Negative for abdominal pain, blood in stool, diarrhea, nausea and vomiting. Endocrine: Negative for cold intolerance and heat intolerance. Genitourinary:  Negative for dysuria, frequency and urgency. Musculoskeletal:  Positive for arthralgias, gait problem and myalgias. Negative for back pain and neck stiffness. Skin:  Negative for color change and rash. Neurological:  Positive for weakness (Both hands). Negative for dizziness, tremors, light-headedness, numbness and headaches. Hematological:  Does not bruise/bleed easily. Psychiatric/Behavioral:  Negative for agitation, behavioral problems and confusion.          Problem List:     Patient Active Problem List   Diagnosis   • Cubital tunnel syndrome on right   • Carpal tunnel syndrome, left   • Obesity, morbid [facility])   • Carpal tunnel syndrome of right wrist   • Atrophy of muscle of right hand   • Primary hypertension   • Type 2 diabetes mellitus without complication, without long-term current use of insulin (HCC)   • Chronic bilateral low back pain without sciatica   • Mixed hyperlipidemia   • Spinal stenosis of lumbar region at multiple levels   • Spondylosis of cervical spine with myelopathy   • Incomplete spinal cord injury at C1-C4 level without bone injury [facility])   • Neurogenic claudication   • Lung cancer screening declined by patient   • Contracture, right wrist   • Contracture of muscle, right hand   • Weakness of hand      Past Medical and Surgical History:     Past Medical History:   Diagnosis Date   • Arthritis 2002   • Diabetes mellitus ([address])    • ED (erectile dysfunction)    • Hyperlipidemia    • Hypertension    • Low back pain March 2022   • Obesity    • S/P cervical spinal fusion 05/10/2023   • Spinal stenosis of lumbar region at multiple levels 12/22/2022     Past Surgical History:   Procedure Laterality Date   • CARPAL TUNNEL RELEASE Right    • EYE SURGERY      strabibusmus   • HAND SURGERY      left index finger   • MS ARTHRD PST/PSTLAT TQ 1NTRSPC CRV BELW C2 SEGMENT N/A 05/10/2023    Procedure: Posterior cervical decompressive laminectomy and instrumented fusion C4-T1;   Surgeon: Too Lazcano MD;  Location:  MAIN OR;  Service: Neurosurgery   • MS EXCISION SPERMATOCELE W/WO EPIDIDYMECTOMY Left 09/16/2021    Procedure: HYDROCELECTOMY, SCROTAL DEBRIDEMENT;  Surgeon: Jeannine Gomez MD;  Location: [facility];  Service: Urology   • MS NEUROPLASTY &/TRANSPOS MEDIAN NRV CARPAL Mallmaxx Shack Right 07/06/2021    Procedure: RELEASE CARPAL TUNNEL;  Surgeon: Rabia Salazar MD;  [facility];  Service: Orthopedics   • MS NEUROPLASTY &/TRANSPOSITION ULNAR NERVE ELBOW Right 07/06/2021    Procedure: RELEASE CUBITAL TUNNEL;  Surgeon: Rabia Salazar MD;  [facility];  Service: Orthopedics   • SPINE SURGERY  05/10/223      Family History:     Family History   Problem Relation Age of Onset   • Hypertension Mother    • Diabetes Mother    • Hypertension Father    • Diabetes Father       Social History:     Social History     Socioeconomic History   • Marital status:      Spouse name: None   • Number of children: None   • Years of education: None   • Highest education level: None   Occupational History   • None   Tobacco Use   • Smoking status: Former     Packs/day: 1.00     Years: 47.00     Total pack years: 47.00     Types: Cigarettes     Quit date: 2021     Years since quittin.8   • Smokeless tobacco: Never   • Tobacco comments:     advised not to smoke   Vaping Use   • Vaping Use: Never used   Substance and Sexual Activity   • Alcohol use: Not Currently     Comment: aprox 4 drinks a month  when out to dinner   • Drug use: Not Currently     Types: Marijuana   • Sexual activity: Not Currently   Other Topics Concern   • None   Social History Narrative   • None     Social Determinants of Health     Financial Resource Strain: Medium Risk (2023)    Overall Financial Resource Strain (CARDIA)    • Difficulty of Paying Living Expenses: Somewhat hard   Food Insecurity: No Food Insecurity (2023)    Hunger Vital Sign    • Worried About Running Out of Food in the Last Year: Never true    • Ran Out of Food in the Last Year: Never true   Transportation Needs: No Transportation Needs (2023)    PRAPARE - Transportation    • Lack of Transportation (Medical): No    • Lack of Transportation (Non-Medical):  No   Physical Activity: Not on file   Stress: Not on file   Social Connections: Not on file   Intimate Partner Violence: Not on file   Housing Stability: Unknown (2023)    Housing Stability Vital Sign    • Unable to Pay for Housing in the Last Year: No    • Number of Places Lived in the Last Year: Not on file    • Unstable Housing in the Last Year: No      Medications and Allergies:     Current Outpatient Medications   Medication Sig Dispense Refill   • acetaminophen (TYLENOL) 325 mg tablet Take 2 tablets (650 mg total) by mouth every 6 (six) hours as needed for mild pain  0   • amLODIPine-benazepril (LOTREL 5-10) 5-10 MG per capsule Take 1 capsule by mouth daily 90 capsule 1   • glimepiride (AMARYL) 1 mg tablet Take 1 tablet (1 mg total) by mouth daily with breakfast 90 tablet 0   • methocarbamol (ROBAXIN) 500 mg tablet Take 500 mg by mouth 2 (two) times a day     • pioglitazone-metFORMIN (ACTOPLUS MET)  MG per tablet TAKE 1 TABLET TWICE DAILY  WITH MEALS 180 tablet 0     No current facility-administered medications for this visit. No Known Allergies   Immunizations:     Immunization History   Administered Date(s) Administered   • COVID-19 MODERNA VACC 0.5 ML IM 03/24/2021, 04/23/2021, 11/19/2021   • INFLUENZA 10/10/2020   • Influenza, high dose seasonal 0.7 mL 10/14/2022, 10/05/2023   • Pneumococcal Conjugate 13-Valent 03/05/2018   • Pneumococcal Polysaccharide PPV23 04/24/2019      Health Maintenance:         Topic Date Due   • Lung Cancer Screening  12/01/2024 (Originally 9/26/2003)   • Colorectal Cancer Screening  12/01/2024 (Originally 9/26/1998)   • Hepatitis C Screening  Completed         Topic Date Due   • Hepatitis A Vaccine (1 of 2 - Risk 2-dose series) Never done   • Hepatitis B Vaccine (1 of 3 - Risk 3-dose series) Never done   • COVID-19 Vaccine (4 - Moderna series) 01/14/2022      Medicare Screening Tests and Risk Assessments:     Chaparrita Doctor is here for his Subsequent Wellness visit. Health Risk Assessment:   Patient rates overall health as good. Patient feels that their physical health rating is slightly worse. Patient is satisfied with their life. Eyesight was rated as same. Hearing was rated as same. Patient feels that their emotional and mental health rating is same. Patients states they are never, rarely angry.  Patient states they are sometimes unusually tired/fatigued. Pain experienced in the last 7 days has been some. Patient's pain rating has been 7/10. Patient states that he has experienced no weight loss or gain in last 6 months. Fall Risk Screening: In the past year, patient has experienced: history of falling in past year      Home Safety:  Patient does not have trouble with stairs inside or outside of their home. Patient has working smoke alarms and has working carbon monoxide detector. Home safety hazards include: none. Nutrition:   Current diet is Diabetic, No Added Salt and Limited junk food. Medications:   Patient is currently taking over-the-counter supplements. OTC medications include: see medication list. Patient is able to manage medications. need help for back pain    Activities of Daily Living (ADLs)/Instrumental Activities of Daily Living (IADLs):   Walk and transfer into and out of bed and chair?: Yes  Dress and groom yourself?: Yes    Bathe or shower yourself?: Yes    Feed yourself?  Yes  Do your laundry/housekeeping?: Yes  Manage your money, pay your bills and track your expenses?: Yes  Make your own meals?: Yes    Do your own shopping?: Yes    Previous Hospitalizations:   Any hospitalizations or ED visits within the last 12 months?: Yes    How many hospitalizations have you had in the last year?: 1-2    Advance Care Planning:   Living will: No    Durable POA for healthcare: No    Advanced directive: No      Cognitive Screening:   Provider or family/friend/caregiver concerned regarding cognition?: No    PREVENTIVE SCREENINGS      Cardiovascular Screening:    General: Screening Not Indicated and History Lipid Disorder      Diabetes Screening:     General: Screening Not Indicated and History Diabetes      Prostate Cancer Screening:    General: Screening Current      Abdominal Aortic Aneurysm (AAA) Screening:    Risk factors include: age between 70-77 yo and tobacco use        Hepatitis C Screening:    General: Screening Current    Screening, Brief Intervention, and Referral to Treatment (SBIRT)    Screening  Typical number of drinks in a day: 0  Typical number of drinks in a week: 0  Interpretation: Low risk drinking behavior. AUDIT-C Screenin) How often did you have a drink containing alcohol in the past year? monthly or less  2) How many drinks did you have on a typical day when you were drinking in the past year? 1 to 2  3) How often did you have 6 or more drinks on one occasion in the past year? never    AUDIT-C Score: 1  Interpretation: Score 0-3 (male): Negative screen for alcohol misuse    Single Item Drug Screening:  How often have you used an illegal drug (including marijuana) or a prescription medication for non-medical reasons in the past year? never    Single Item Drug Screen Score: 0  Interpretation: Negative screen for possible drug use disorder    No results found. Physical Exam:     /70 (BP Location: Right arm, Patient Position: Sitting, Cuff Size: Standard)   Pulse 76   Temp 98.2 °F (36.8 °C) (Tympanic)   Resp 18   Ht 5' 6" (1.676 m)   Wt 111 kg (245 lb)   SpO2 98%   BMI 39.54 kg/m²     Physical Exam  Vitals and nursing note reviewed. Constitutional:       General: He is not in acute distress. Appearance: Normal appearance. He is well-developed. He is obese. He is not ill-appearing, toxic-appearing or diaphoretic. HENT:      Head: Normocephalic and atraumatic. Right Ear: Tympanic membrane, ear canal and external ear normal. There is no impacted cerumen (Has a lot of wax but not impacted). Left Ear: Tympanic membrane, ear canal and external ear normal. There is no impacted cerumen. Nose: Nose normal. No congestion or rhinorrhea. Mouth/Throat:      Mouth: Mucous membranes are moist.      Pharynx: Oropharynx is clear. No posterior oropharyngeal erythema. Eyes:      General: No scleral icterus. Right eye: No discharge. Left eye: No discharge. Extraocular Movements: Extraocular movements intact. Conjunctiva/sclera: Conjunctivae normal.      Pupils: Pupils are equal, round, and reactive to light. Neck:      Vascular: No carotid bruit. Cardiovascular:      Rate and Rhythm: Normal rate and regular rhythm. Pulses: Normal pulses. Heart sounds: Normal heart sounds. No murmur heard. No gallop. Pulmonary:      Effort: Pulmonary effort is normal. No respiratory distress. Breath sounds: Normal breath sounds. No wheezing, rhonchi or rales. Abdominal:      General: Abdomen is flat. Bowel sounds are normal. There is no distension. Palpations: Abdomen is soft. Tenderness: There is no abdominal tenderness. There is no right CVA tenderness, left CVA tenderness or guarding. Musculoskeletal:      Cervical back: Normal range of motion and neck supple. No rigidity or tenderness. Right lower leg: No edema. Left lower leg: No edema. Comments: Atrophy both hands  Bilateral paraspinal muscular spasms lower back   Lymphadenopathy:      Cervical: No cervical adenopathy. Skin:     General: Skin is warm and dry. Capillary Refill: Capillary refill takes 2 to 3 seconds. Coloration: Skin is not jaundiced. Neurological:      General: No focal deficit present. Mental Status: He is alert and oriented to person, place, and time. Mental status is at baseline. Sensory: Sensory deficit ( both hands) present. Motor: Weakness (both hands) present. Gait: Gait abnormal (ambulates with walker).    Psychiatric:         Mood and Affect: Mood normal.          Nba Bright MD

## 2023-12-06 ENCOUNTER — EVALUATION (OUTPATIENT)
Facility: CLINIC | Age: 68
End: 2023-12-06
Payer: MEDICARE

## 2023-12-06 DIAGNOSIS — R53.81 PHYSICAL DECONDITIONING: Primary | ICD-10-CM

## 2023-12-06 DIAGNOSIS — S14.151A INCOMPLETE SPINAL CORD INJURY AT C1-C4 LEVEL WITHOUT BONE INJURY (HCC): Primary | ICD-10-CM

## 2023-12-06 DIAGNOSIS — Z98.890 H/O EXCISION OF LAMINA OF CERVICAL VERTEBRA FOR DECOMPRESSION OF SPINAL CORD: ICD-10-CM

## 2023-12-06 PROCEDURE — 97112 NEUROMUSCULAR REEDUCATION: CPT

## 2023-12-06 PROCEDURE — 97530 THERAPEUTIC ACTIVITIES: CPT

## 2023-12-06 NOTE — PROGRESS NOTES
OCCUPATIONAL THERAPY RE-EVALUATION/DISCHARGE    Today's Date: 2023  Patient Name: Jovita Halsted  : 1955  MRN: [id number]  Referring Provider: Heather Carrillo MD  Dx: Incomplete spinal cord injury at C1-C4 level without bone injury ([address]) [S14.151A]    PLAN OF CARE START: 23  PLAN OF CARE END: 24 (extended today)  FREQUENCY: 2x/wk  PRECAUTIONS: R sided impairments UE>LE, ambulates with rolling walker      SKILLED ANALYSIS:  Pt presents to re-evaluation on  status post incomplete SCI at C1-C4. As per interview, pt reports improvements in increase in RUE shoulder and slight improvement in hand writing. Pt reports slight decreased spasticity in the morning with wearing the resting hand splint. Pt reports impairments in spasticity  and coordination of RUE. Post assessments, pt demonstrating improvements in [facility] and hand strength. Pt continues to demonstrate impairments in spasticity. Pt will be seeing neurologist for f/u for botox. Pt achieved 1-2 STGs however demosntrating slight platue. . Pt would benefit from discharging from OT services and recommending to come for therapy post botox. Pt educated on progress/therapy process and pt in understanding and agreement of recommendations. Recommending to continue HEP       Subjective Pt reports splint is comfortable and feels that it is helping. Objective    Upper Extremities:  Pt is R hand dominant. VINCENZO: RUE: 30lbs (previous: 25lb) LUE:  37lbs (previous: 35lb)  The age norm is approximately 91.1lbs R UE and 76.8lbs L UE, indicating decreased b/l  strength.                 Range of Motion:  AROM:   R UE   - Finger extension- 15% (previously 10%)  - Finger flexion- 100% (previously 10%)  PROM:   - Finger extension- 95% (previously 85%)  - Finger flexion- 100% (previously 85%)    L UE:  AROM:  - Finger extension: 95% (previously 90%)  - Finger flexion: 100% (previously 95%)      NINE-HOLE PEG TEST: assesses dexterity/fine motor coordination   R hand: 2 minutes 4 seconds with 3 drops (previously 4:43 with 4 pegs dropped)  L hand: 54.61 seconds with 2 pegs dropped (previously 53 seconds with 2 pegs dropped  Pt demonstrates decreased b/l FMC compared to norms for age/sex (21.23 seconds R UE and 22.29 seconds L UE)     Functional Dexterity Test: assesses patient's ability to use the hand for daily tasks requiring a 3-jaw ignacio prehension between the fingers and the thumb   R UE: 1 min 26.79 seconds with 100% compensation with 3 drops and 1 time L hand assisted when dropped  (previously 1:59 with 1 peg dropped and 100% compensatory use of board)  L UE: 46.94 seconds with 100% compensatory use of board (previously 46 seconds with 100% compensation of board)  Pt demonstrates decreased b/l dexterity compared to norms for age/sex (40.0 seconds R UE and 31.7 seconds L UE)      Modified Nick Scale (right)  Elbow flexors: 0/4 (previously 1/4)  Elbow extensors: 0/4 (previously 1/4)  Wrist flexors: 0/4 (previously 1/4)  Wrist extensors 1/4 (previously 1/4)  Finger flexors: 1+/4 (previously 1+/4)  Finger extensors: 1/4 (previously 1/4)    0: No increase in muscle tone  1: Slight increase in muscle tone, manifested by a catch and release or by minimal resistance at the end of the range of motion when the affected part(s) is moved in flexion or extension  1+: Slight increase in muscle tone, manifested by a catch, followed by minimal resistance throughout the remainder (less than half) of the ROM  2: More marked increase in muscle tone through most of the ROM, but affected part(s) easily moved  3: Considerable increase in muscle tone, passive movement difficult  4: Affected part(s) rigid in flexion or extension  NMR following re-evaluation:  [facility] dexterity task of placement of large beads focusing on [facility]       Goals added 8/23/23  -Pt will demonstrate an increase in R UE  strength by increasing R UE VINCENZO score from 25lbs to at least 30lbs ACHIEVED  - Pt will demonstrate an increase in L UE  strength by increasing L UE VINCENZO score from 45lbs to at least 50lbs ACHIEVED  - Pt will demonstrate an increase in R UE [place] by decreasing score on Nine-Hole Peg Test from 1:36 seconds to under 1:30 seconds for carry over into every day activities. NOT ACHIEVED  - Pt will demonstrate an increase in L UE [place] by decreasing score on Nine-Hole Peg Test from 40.98 seconds to under 35 seconds for carry over into everyday activities. NOT ACHIEVED  - Pt will demonstrate an increase in b/l UE dexterity by decreasing score on Functional Dexterity Test to be under 1 min with no more than 50% compensation of board. ACHIEVED L UE, NOT ACHIEVED R UE  - Pt will demonstrate an increase in R digit AROM from 10% to at least 30% for carry over into everyday activities. PROGRESSED  - Pt will increase R UE to refined assist with <20% cuing for tabletop tasks 4 weeks PROGRESSED    New maintenance goals as of 11/6:  Pt will demonstrate maintenance of [place] b/l UE by completing 9HPT within 10 seconds of evaluation 11/6 (R: 4:43 with 4 dropped, L: 53 seconds with 2 drops)  Pt will demonstrate maintenance of dexterity b/l UE on Functional Dexterity Test within 10 seconds of evaluation 11/6 (R: 1:59 with 1 drop and 100% compensation, L: 46 seconds with 100% compensation)  Pt will demonstrate maintenance of b/l grasp strength by scoring no lower than 3lb less than during evaluation 11/6 (R: 25lb, L: 35lb)  Pt will demonstrate maintenance of AROM b/l UE within 5* of evaluation 11/6 (see above)  Pt will demonstrate maintenance of spasticity by scoring the same or better on MAS.

## 2023-12-06 NOTE — PROGRESS NOTES
PT Re-Evaluation / Discharge          POC expires Auth Status Total   Visits  Start date  Expiration date PT/OT + Visit Limit? Co-Insurance   23 NA NA NA NA PT                                                   Today's date: 2023  Patient name: Abby Officer  : 1955  MRN: [id number]  Referring provider: Jennifer Darling MD  Dx:   Encounter Diagnosis     ICD-10-CM    1. Physical deconditioning  R53.81               Assessmen  Assessment details: Patient is a 77 y.o. Male who presents to skilled outpatient PT with who presents with ambulatory dysfunction following cervical spine surgical PCDF C1-4 due to incomplete spinal cord dysfunction earlier this year. Patient has been attending skilled OP PT for about 4 months and has shown some progress toward his goals; however he remains limited with regard to ongoing back pain and stiffness in his legs. Patient is scheduled to see physiatry in January for Botox consult. He has shown significant improvement since IE and since last assessment in 5x STS, indicating improvements in LE power. Patient has also shown improvements initially with TUG, although recently he has plateaued in this measure. Patient has shown small but clinically insignificant improvement with Hurtado. Patient has illustrated no significant changes with regard to gait speed. Due to overall plateau in PT recently and decreased exercise tolerance due to back pain and leg stiffness, plan to discharge at this time. Patient to return to skilled PT in 2 months following Botox consult and further evaluation/management of back pain to work further toward his goals. He has met 3/4 short term goals and 1 long term goal. Educated regarding patient's high risk of falls per Mahi Elena, TUG, and 5x STS and patient verbalized understanding and intent to continue using RW.  Patient in agreement with plan and has no further questions or concerns at this time. Impairments: Abnormal coordination, Abnormal gait, Abnormal muscle tone, Abnormal or restricted ROM, Activity intolerance, Impaired balance, Impaired physical strength, Lacks appropriate HEP, Poor posture, Poor body mechanics, Pain with function, Safety issue, Weight-bearing intolerance, Abnormal movement, Difficulty understanding, Abnormal muscle firing  Understanding of Dx/Px/POC: Good  Prognosis: Good    Patient verbalized understanding of POC. Please contact me if you have any questions or recommendations. Thank you for the referral and the opportunity to share in [place] Woodruff's care.     Plan  Plan details: Discharge from skilled PT at this time; patient to return following Botox injection in January   Treatment plan discussed with: Patient    Goals  Short Term Goals (4 weeks):    - Patient will improve time on TUG by 2.9 seconds to facilitate improved safety in all ambulation - MET  - Patient will be independent in basic HEP 2-3 weeks - MET  - Patient will improve 5xSTS score by 2.3 seconds to promote improved LE functional strength needed for ADLs - MET  - Patient will complete 6 MWT to evaluate cardiovascular endurance and further assess patient's tolerance to activity. - NOT MET     Long Term Goals (12 weeks):  - Patient will be independent in a comprehensive home exercise program - NOT MET   - Patient will improve gait speed by 0.18 m/s to improve safety with community ambulation- NOT MET  - Patient will improve ELDER by 6 points in order to improve static balance and reduce risk for falls - NEARLY MET   - Patient will improve 6 Minute Walk Test score by 190 feet to promote improved cardiovascular endurance - NOT MET  - Patient will report 50% reduction in near falls in order to improve safety with functional tasks and reduce his risk for falls - MET  - Patient will report going on walks at least 3 days per week to promote independence and improved cardiovascular endurance - NOT MET   - Patient will be able to ascend/descend stairs reciprocally with 1 UE assist to promote independence and safety with ADLs - NOT MET   - Patient will report 50% reduction in near falls when ambulating on uneven terrain- NOT MET      Cut off score    All date taken from [facility] Neuro Section or Rehab Measures      [id number]  MDC: 6 pts  Age Norms:  57-79: M - 54   F - 55  70-79: M - 47   F - 53  80-89: M - 48   F - 50 5xSTS: Musa et al 2010  MDC: 2.3 sec  Age Norms:  60-69: 11.1 sec  70-79: 12.6 sec  80-89: 14.8 sec   TUG  MDC: 4.14 sec  Cut off score:  >13.5 sec community dwelling adults  >32.2 frail elderly  <20 I for basic transfers  >30 dependent on transfers 10 Meter Walk Test: Cass Abbott and Bianka vasquez   MDC: 0.18 m/s  20-29: M - 1.35 m   F - 1.34 m  30-39: M - 1.43 m   F - 1.34 m  40-49: M - 1.43 m   F - 1.39 m  50-59: M - 1.43 m   F - 1.31 m  60-69: M - 1.34 m   F - 1.24 m  70-79: M - 1.26 m   F - 1.13 m  80-89: M - 0.97 m   F - 0.94 m    Household Ambulator < 0.4 m/s  Limited Community Ambulator 0.4 - 0.8 m/s  [employer] Ambulator 0.8 - 1.2 m/s  Safely cross the street > 1.2 m/s   FGA  MCID: 4 pts  Geriatrics/community < 22/30 fall risk  Geriatrics/community < 20/30 unexplained falls    DGI  MDC: vestibular - 4 pts  MDC: geriatric/community - 3 pts  Falls risk <19/24 mCTSIB  Norm: 20-60 yrs  Eyes open firm: norm sway 0.21-0.48  Eyes closed firm: norm sway 0.48-0.99  Eyes open foam: norm sway 0.38-0.71  Eyes closed foam: norm sway 0.70-2.22   6 Minute Walk Test  MDC: 190.98 ft  MCID: 164 ft    Age Norms  57-79: M - 1876 ft (571.80 m)  F - 1765 ft (537.98 m)  70-79: M - 1729 ft (527.00 m)  F - 1545 ft (470.92 m)  80-89: M - 1368 ft (416.97 m)  F - 1286 ft (391.97 m) ABC: Sharyn Kay & Praveen, 2003  <67% increased risk for falls         Subjective    History of Present Illness  - Mechanism of injury: Pt has been referred to PT following cervical spine surgical PCDF C1-4 due to incomplete spinal cord dysfunction earlier this year. He is currently receiving OT for his atrophy and weakness in UEs. At this time pt is amb with RW and would like to return to use of SPC. Pt lives alone and is responsible for household duties and community errands. Pt has a long history of low back pain. Pt denies balance issues at this time. Updated (10/4/23): Patient presents to therapy with RW and B/L forearm and hand abrasions which he states was from trying to reach for his phone in his recliner chair for nearly 3 hours before receiving help from his brother in-law. He would like to continue to work towards his goal of walking without his RW with future skilled therapy sessions. Updated (23): Patient presents to therapy with RW from OT. He reports he feels he has been improving since starting therapy although he feels his back pain is a limiting factor. His goal is to be more confident with walking and improve overall tolerance to activity. Update 23: Patient continues to experience stiffness in his legs and back pain which limit his activity tolerance. He is going to see physiatry in January for Botox. Quality of life: good  - Primary AD: RW, cruising on counter or furniture at home.    - Assist level at home: independent, needs help bringing out the trash form neighbor or brother in-law    Pain  - Current pain ratin-2/10    Treatments  - Previous treatment: None  - Current treatment: None  - Diagnostic Testing: None      Objective     Gait  - Abnormalities: antalgic gait, decreased L stance time, decreased L foot clearance, forward flexed posture         Outcome Measures Initial Eval  23 Re-Eval  10-4-23 PN  2023 RE  23      5xSTS 23.50 sec 25.69 sec 35.35 sec, 0 UE 18.89 sec, 0 UE       TUG  - Regular   33.9 sec   24.97 sec     26.38 sec w/ RW   25.18 sec w/ RW      10 meter  0.63 m/s 0.54 m/s w/ RW 0.61 m/s      JAEL        FGA  defer        DGI          mCTSIB  - FTEO (firm)  - FTEC (firm)  - FTEO (foam)  - FTEC (foam)    30 sec  1.9 sec  26.81 sec  1.69 sec   30 Sec  30 sec  30 sec  1 sec       6MWT  defer  2MWT: 160 feet with RW                             Access Code: [email or web address]  URL: [email or web address]  Date: 11/01/2023  Prepared by: Brandt Handing    Exercises  - Seated Long Arc Quad  - 1 x daily - 5 x weekly - 2 sets - 10 reps - 3 seconds hold  - Seated March  - 1 x daily - 5 x weekly - 2 sets - 10 reps  - Seated Hip Abduction  - 1 x daily - 5 x weekly - 2 sets - 10 reps  - Seated Heel Raise  - 1 x daily - 5 x weekly - 3 sets - 10 reps  - Seated Hamstring Stretch  - 1 x daily - 5 x weekly - 3 sets - 30 sec hold          Precautions:   Past Medical History:   Diagnosis Date    Arthritis 2002    Diabetes mellitus ([address])     ED (erectile dysfunction)     Hyperlipidemia     Hypertension     Low back pain March 2022    Obesity     S/P cervical spinal fusion 05/10/2023    Spinal stenosis of lumbar region at multiple levels 12/22/2022

## 2023-12-08 DIAGNOSIS — G89.29 CHRONIC BILATERAL LOW BACK PAIN WITHOUT SCIATICA: Primary | ICD-10-CM

## 2023-12-08 DIAGNOSIS — M54.50 CHRONIC BILATERAL LOW BACK PAIN WITHOUT SCIATICA: Primary | ICD-10-CM

## 2023-12-08 NOTE — TELEPHONE ENCOUNTER
Reason for call:   [x] Refill   [] Prior Auth  [] Other:     Office:   [x] PCP/Provider - Ayesha Dao MD  [] Specialty/Provider -     Medication:   Methocarbamol 500mg tablet- Take 1 tablet by mouth 2 times a day     Pharmacy: [facility]     Does the patient have enough for 3 days?    [x] Yes   [] No - Send as HP to POD

## 2023-12-11 ENCOUNTER — APPOINTMENT (OUTPATIENT)
Facility: CLINIC | Age: 68
End: 2023-12-11
Payer: MEDICARE

## 2023-12-11 DIAGNOSIS — M54.50 CHRONIC BILATERAL LOW BACK PAIN WITHOUT SCIATICA: ICD-10-CM

## 2023-12-11 DIAGNOSIS — G89.29 CHRONIC BILATERAL LOW BACK PAIN WITHOUT SCIATICA: ICD-10-CM

## 2023-12-11 RX ORDER — METHOCARBAMOL 500 MG/1
500 TABLET, FILM COATED ORAL 2 TIMES DAILY
Qty: 60 TABLET | Refills: 0 | Status: SHIPPED | OUTPATIENT
Start: 2023-12-11 | End: 2023-12-12

## 2023-12-12 RX ORDER — TIZANIDINE 2 MG/1
2 TABLET ORAL 2 TIMES DAILY
Qty: 60 TABLET | Refills: 0 | Status: SHIPPED | OUTPATIENT
Start: 2023-12-12

## 2023-12-13 ENCOUNTER — APPOINTMENT (OUTPATIENT)
Facility: CLINIC | Age: 68
End: 2023-12-13
Payer: MEDICARE

## 2023-12-18 ENCOUNTER — APPOINTMENT (OUTPATIENT)
Facility: CLINIC | Age: 68
End: 2023-12-18
Payer: MEDICARE

## 2023-12-20 ENCOUNTER — APPOINTMENT (OUTPATIENT)
Facility: CLINIC | Age: 68
End: 2023-12-20
Payer: MEDICARE

## 2023-12-27 ENCOUNTER — APPOINTMENT (OUTPATIENT)
Facility: CLINIC | Age: 68
End: 2023-12-27
Payer: MEDICARE

## 2024-01-01 ENCOUNTER — RESULTS FOLLOW-UP (OUTPATIENT)
Age: 69
End: 2024-01-01

## 2024-01-09 DIAGNOSIS — E11.9 TYPE 2 DIABETES MELLITUS WITHOUT COMPLICATION, WITHOUT LONG-TERM CURRENT USE OF INSULIN (HCC): ICD-10-CM

## 2024-01-09 DIAGNOSIS — M54.50 CHRONIC BILATERAL LOW BACK PAIN WITHOUT SCIATICA: ICD-10-CM

## 2024-01-09 DIAGNOSIS — G89.29 CHRONIC BILATERAL LOW BACK PAIN WITHOUT SCIATICA: ICD-10-CM

## 2024-01-10 RX ORDER — GLIMEPIRIDE 1 MG/1
1 TABLET ORAL
Qty: 90 TABLET | Refills: 1 | Status: SHIPPED | OUTPATIENT
Start: 2024-01-10

## 2024-01-10 RX ORDER — PIOGLITAZONE HCL AND METFORMIN HCL 500; 15 MG/1; MG/1
1 TABLET ORAL 2 TIMES DAILY WITH MEALS
Qty: 180 TABLET | Refills: 1 | Status: SHIPPED | OUTPATIENT
Start: 2024-01-10

## 2024-01-11 ENCOUNTER — CONSULT (OUTPATIENT)
Dept: NEUROLOGY | Facility: CLINIC | Age: 69
End: 2024-01-11
Payer: MEDICARE

## 2024-01-11 VITALS
WEIGHT: 245 LBS | DIASTOLIC BLOOD PRESSURE: 82 MMHG | SYSTOLIC BLOOD PRESSURE: 112 MMHG | HEIGHT: 66 IN | TEMPERATURE: 97.6 F | BODY MASS INDEX: 39.37 KG/M2

## 2024-01-11 DIAGNOSIS — S14.151A INCOMPLETE SPINAL CORD INJURY AT C1-C4 LEVEL WITHOUT BONE INJURY (HCC): ICD-10-CM

## 2024-01-11 DIAGNOSIS — G82.50 CHRONIC INCOMPLETE SPASTIC TETRAPLEGIA (HCC): Primary | ICD-10-CM

## 2024-01-11 PROCEDURE — 99215 OFFICE O/P EST HI 40 MIN: CPT | Performed by: PHYSICAL MEDICINE & REHABILITATION

## 2024-01-11 RX ORDER — TIZANIDINE 2 MG/1
2 TABLET ORAL 2 TIMES DAILY
Qty: 60 TABLET | Refills: 0 | Status: SHIPPED | OUTPATIENT
Start: 2024-01-11

## 2024-01-11 NOTE — PROGRESS NOTES
Physical Medicine & Rehabilitation New Patient Evaluation  Addison Woodruff 68 y.o. male      REFERRAL SOURCE: SELF  ?  FOLLOWED BY: Dr. Angulo   ?  REASON FOR CONSULTATION: Spasticity evaluation  ?  PRINCIPAL NEUROLOGIC DIAGNOSIS:   Cervical myelopathy now s/p posterior decompression and fusion at C4-T1.   ?  HISTORY OF ILLNESS:   Date of Onset: 3/2023  Date of Diagnosis: 4/2023    BRIEF NARRATIVE DESCRIBING HISTORY:  This 68 year old right handed male was referred by Neurosurgery for a spasticity consult. The patient was accompanied by no one, he drove here himself. Medical records from [facility] were reviewed.  ?  Symptoms began in March 2023 with numbness/tingling/weakness on the R > L side, and pain in his neck. No trouble swallowing, he has had urinary urgency though on occasion - not consistently . He started getting spasms on the R side > L side. He has had a carpal tunnel release on the R without improvement in his symptoms. He also had issues with multiple almost falls leading up to seeing Neurosurgery in April 2023. He eventually was found to have severe spinal stenosis at the cervical thoracic junction and ultimately underwent a C4-T1 posterior compression and fusion on 5/10/2023. He was at [facility] from 5/14-5/25/2023 and was able to progress to a modified Ind level of assistance with mobility and ADLs. He was transitioned to home therapies, and then transitioned to outpatient therapies.     In the last couple of weeks he has noticed increased tightness in his L forearms into his fingers. He also gets pain and tightness along his R thoracolumbar paraspinals that feels tight at times.  ?  Symptoms/Functional Limitations Related to Spasticity:   Stiffness/Spasm: and pain in his lower thoracic/upper lumbar paraspinals (mostly on the R side, occasionally stretches across). R fingers tend to flex and clench - this is he main issue. L hand recently has started to clench too.   Spasticity interferes with sleep:  no  Spasticity interferes with function: really the weakness is the bigger issue for his function   ?  ?  Treatments for Spasticity and Results of Treatments:   Stretching/exercise: About 1 month ago, therapies was discontinued.  Oral medications: Never  Botulinum toxin injections: Never  Intrathecal baclofen therapy: Never  Other: He does have a resting hand splint throughout the day.   ?  Evolution of disability:   RW prior to surgery and to this day.  ?  Current functional status:   Stair Climbing: He can independently do a full flight. Lives in a split level with 6 + 6 to bedroom. a  Ambulation: independent with rolling walker  Toilet/Chair/Bed Transfer: independent with transfers  Bowel Function: Continent. Occasionally takes a stool softener, goes a few times a week. Suspect neurogenic bowel. He adjusts the amount of his over the counter medication - he's not sure which one to address this issue.  Bladder Function: Intermittent urgency, but generally continent   Bathing: independent  Dressing: independent  Grooming: independent  Feeding: independent  Vision: No issues  Speech and Hearing: No issues  Medical Problems: HTN, Diabetes  Mood and Thought Disturbance: No issues  Mentation: No issues  Fatigability: No issues  Skin: no wounds  ?  Nutritional status: appetite is fine, weight is stable, swallowing is fine  Driving issues: able to drive  Safety concerns regarding living situations and safety at home: Is managing fine, has not had any falls since a year prior to surgery. None since surgery. Occasionally some almost falls.   Risk of falls: Yes, frequency not since the surgery, no injuries   ?  REVIEW OF SYSTEMS:  A 10 point review of systems was negative except for what is noted in the HPI.  ?  Review of Diagnostic Studies:  2/22/23 MRI C-Spine:  Multilevel cervical spondylosis, as described above.     Multifactorial disease results in overall severe canal stenosis with underlying cord compression at C5-C6  and C6-C7.  Associated cord edema and/or myelomalacia is present at these levels.       No abnormal enhancement identified within the spinal canal.    9/13/2022: MRI L-Spine  No omental spinal canal narrowing with superimposed degenerative changes throughout the lumbar spine resulting in moderate to severe central stenosis L1-2 through L4-5.     Severe facet degenerative change L4-5 with annular bulge eccentric to the right results in severe right foraminal narrowing.     ?  ?   Past Medical History:   Diagnosis Date    Arthritis 2002    Diabetes mellitus (HCC)     ED (erectile dysfunction)     Hyperlipidemia     Hypertension     Low back pain March 2022    Obesity     S/P cervical spinal fusion 05/10/2023    Spinal stenosis of lumbar region at multiple levels 12/22/2022     Past Surgical History:   Procedure Laterality Date    CARPAL TUNNEL RELEASE Right     EYE SURGERY      strabibusmus    HAND SURGERY      left index finger    CO ARTHRD PST/PSTLAT TQ 1NTRSPC CRV BELW C2 SEGMENT N/A 05/10/2023    Procedure: Posterior cervical decompressive laminectomy and instrumented fusion C4-T1;  Surgeon: Collins Angulo MD;  Location: [facility];  Service: Neurosurgery    CO EXCISION SPERMATOCELE W/WO EPIDIDYMECTOMY Left 09/16/2021    Procedure: HYDROCELECTOMY, SCROTAL DEBRIDEMENT;  Surgeon: Jose Guadalupe Christianson MD;  Location: [facility];  Service: Urology    CO NEUROPLASTY &/TRANSPOS MEDIAN NRV CARPAL TUNNE Right 07/06/2021    Procedure: RELEASE CARPAL TUNNEL;  Surgeon: Natty Cuevas MD;  [facility];  Service: Orthopedics    CO NEUROPLASTY &/TRANSPOSITION ULNAR NERVE ELBOW Right 07/06/2021    Procedure: RELEASE CUBITAL TUNNEL;  Surgeon: Natty Cuevas MD;  [facility];  Service: Orthopedics    SPINE SURGERY  05/10/223     Social History     Socioeconomic History    Marital status:      Spouse name: Not on file    Number of children: Not on file    Years of education: Not on file  "   Highest education level: Not on file   Occupational History    Not on file   Tobacco Use    Smoking status: Former     Current packs/day: 0.00     Average packs/day: 1 pack/day for 47.0 years (47.0 ttl pk-yrs)     Types: Cigarettes     Start date: 1974     Quit date: 2021     Years since quittin.9    Smokeless tobacco: Never    Tobacco comments:     advised not to smoke   Vaping Use    Vaping status: Never Used   Substance and Sexual Activity    Alcohol use: Not Currently     Comment: aprox 4 drinks a month  when out to dinner    Drug use: Not Currently     Types: Marijuana    Sexual activity: Not Currently   Other Topics Concern    Not on file   Social History Narrative    Not on file     Social Determinants of Health     Financial Resource Strain: Medium Risk (2023)    Overall Financial Resource Strain (CARDIA)     Difficulty of Paying Living Expenses: Somewhat hard   Food Insecurity: No Food Insecurity (2023)    Hunger Vital Sign     Worried About Running Out of Food in the Last Year: Never true     Ran Out of Food in the Last Year: Never true   Transportation Needs: No Transportation Needs (2023)    PRAPARE - Transportation     Lack of Transportation (Medical): No     Lack of Transportation (Non-Medical): No   Physical Activity: Not on file   Stress: Not on file   Social Connections: Not on file   Intimate Partner Violence: Not on file   Housing Stability: Unknown (2023)    Housing Stability Vital Sign     Unable to Pay for Housing in the Last Year: No     Number of Places Lived in the Last Year: Not on file     Unstable Housing in the Last Year: No     Family History   Problem Relation Age of Onset    Hypertension Mother     Diabetes Mother     Hypertension Father     Diabetes Father          PHYSICAL EXAMINATION:  /82 (BP Location: Right arm, Patient Position: Sitting, Cuff Size: Adult)   Temp 97.6 °F (36.4 °C) (Temporal)   Ht 5' 6\" (1.676 m)   Wt 111 kg (245 lb) "   BMI 39.54 kg/m²       Gen: No acute distress, Well-nourished, well-appearing.  HEENT: Moist mucus membranes, Normocephalic/Atraumatic  Cardiovascular: Regular rate, rhythm, S1/S2. Distal pulses palpable  Heme/Extr: No edema/clubbing/cyanosis  Pulmonary: Non-labored breathing. Lungs CTAB  : No maher  GI: Soft, non-tender, non-distended. BS+  Integumentary: Skin is warm, dry. No rashes or ulcers.  Neuro:    Musculoskeletal: Has full passive range of motion in all joints in his UE/LE. He has clear atrophy of thenar eminence but also in first interosseous space. Able to get ankles to neutral.   ?  Cognitive/Behavioral: The patient was alert and oriented with normal language, memory, and praxis. Formal neuropsychological testing was not performed today. The patient did not exhibit signs of pathological anxiety or depression during the interview and examination.  ?  Cranial Nerves:  Eye movements were full without nystagmus. Facial sensation was normal. Muscles of mastication and facial expression moved normally. Hearing was intact. Gag reflex and palatal movements were normal. Sternocleidomastoid and trapezius power were normal - except for decreased range of motion with rotation to the L, although strong. Tongue movements were normal. There was no dysarthria.  ?  Exam  MMT:   Strength:   Right  Left  Site  Right  Left  Site    5 5  S Ab: Shoulder Abductors  4- 5  HF: Hip Flexors    5 5  EF: Elbow Flexors  4  5 KF: Knee Flexors    3 5  EE: Elbow Extensors  5- 5  KE: Knee Extensors    5  5  WE: Wrist Extensors  4+ 5  DR: Dorsi Flexors    3  4  FF: Finger Flexors  5-  5  PF: Plantar Flexors    1  2  HI: Hand Intrinsics  NT NT  EHL: Extensor Hallucis Longus     MAS:  Right  Left  Site  Right  Left  Site    0 0  Shoulder 0 0  Hip Adductors   0 1  EF: Elbow Flexors  0 0 KF: Knee Flexors    0  0  EE: Elbow Extensors  0  1  KE: Knee Extensors    1+/0 1/0  WF/WE 0  0  DR: Dorsi Flexors    1+ 1  FF: Finger Flexors  1 0  PF:  Plantar Flexors    0  0  HI: Hand Intrinsics  0/0  0/0  Toe Flex/Ex   R finger flexors, worst is FDS, some FDP. Worst in 3rd digit.     MAS  0 - no increased tone  1 - slight increase in tone at the end of the ROM  1+ increase in tone at 1/2 the ROM  2 - increase in tone through most the ROM  3 - moderate increase in muscle tone - passive movement difficult  4 - affected parts ridid in flexion or extension    SPASMS observed:   RUE: no   LUE: no  RLE: no   LLE: no  ?  ?  Cerebellar: There was no dysmetria on finger-to-nose.   ?  Gait: Standard gait was with impaired advancement. Rolling walker was required for safe ambulation. Difficulty to go from sit to stand, but able to do so unassisted - just took more time. Ambulates with feet toed out, suspect to give him a wider base of support. Impaired clearance, worse on the R.     Reflexes:  DTRs:   Right Left   Biceps 3 3   Triceps 1 3   Patella 3 3   Achilles 2 2     Upper Tract Signs:   + [place]'s  No clonus   ?  ASSESSMENT:  Diagnoses and all orders for this visit:    Chronic incomplete spastic tetraplegia (HCC)    Incomplete spinal cord injury at C1-C4 level without bone injury (HCC)      Mr. Woodruff is a pleasant 69yo M with medical history of cervical myelopathy and severe stenosis s/p decompression and fusion with residual overall mild-mod spasticity. I suspect this impacts his thoracolumbar paraspinals on the R, his L wrist/finger filexors (very mild), and R finger flexors (extrinsics > intrinsics). His concerns are his back tightness and his R fingers. His L are able to be stretched out. Has been exercising, stretching, and using resting hand splint but still struggles. He has some R extensor weakness, and I reviewed that botulinum toxin can help an imbalance by calming down his flexor spasticity, but cannot improve strength. He expressed understanding.    I am not keen on starting him on systemic medications for overall mild-mod focal spasticity. We could  consider starting low dose Baclofen, but I have concerns about side effects given his age. I think he is a good candidate for botulinum toxin injections. I would target his R FDS and FDP (20-40 each), and potentially his R thoracolumbar paraspinals (50-75 units) for the pain he feels in that area.     I reviewed risks/benefits/alternatives, and he would like to proceed with toxin injections.     In addition, incidentally, I suspect he has some mild neurogenic bowel/bladder. However these are currently adequately managed.   ?  PLAN:  1. Oral antispasticity medications: None at this time.  2. Schedule 200 units. Transfers independently  3. Will schedule him for 2/21/2024 at 1:45pm for botulinum toxin injections, but can schedule him sooner if a slot opens up.   ?  ?  *I have spent 40 minutes with Patient  today in which greater than 50% of this time was spent in counseling/coordination of care regarding Risks and benefits of tx options, Instructions for management, Patient and family education, Impressions, Counseling / Coordination of care, Documenting in the medical record, Reviewing / ordering tests, medicine, procedures  , and Obtaining or reviewing history  .  ?  Ashley de Padua, MD  Physical Medicine and Rehabilitation  Spinal Cord Injury Medicine

## 2024-01-12 ENCOUNTER — TELEPHONE (OUTPATIENT)
Dept: NEUROLOGY | Facility: CLINIC | Age: 69
End: 2024-01-12

## 2024-01-12 NOTE — TELEPHONE ENCOUNTER
----- Message from Ashley Depadua, MD sent at 1/11/2024  9:23 PM EST -----  Botox- 100 units  Qty. 2 total for 200 units.   Diagnosis: Chronic incomplete spastic tetraplegia  Sig: Inject up to 200 UNITS I.M. into the R upper extremity and truncal muscles every 3 months  Refills: 1     Monitor as may need to increase dose or address L upper extremity in the future.     Appointment is 2/21.

## 2024-02-07 NOTE — TELEPHONE ENCOUNTER
Patient has active Insurance Plans of:  Primary Ins-Plan: Medicare A&B  Secondary-Plan:[facility]Supplement    It-Egxh-Gbotzbzy for:  Botox- 100 units. ()  Qty. 2 total for 200 units.   DX: G82.50, Chronic incomplete spastic tetraplegia.  *As long as clinical documentation notes show that patient meets the medical necessity criteria per the Medicare A&B guidelines.*    Please use our Stock.    Thank you!

## 2024-02-13 ENCOUNTER — TELEPHONE (OUTPATIENT)
Dept: FAMILY MEDICINE CLINIC | Facility: CLINIC | Age: 69
End: 2024-02-13

## 2024-02-13 NOTE — TELEPHONE ENCOUNTER
Patient called Rx Refill line requesting this medication that is inactive in his chart. He stated he cannot take Tizanidine due to it making him to tired and his legs numb. Please contact patient.     methocarbamol (ROBAXIN) 500 mg tablet [[id number]]  DISCONTINUED    Order Details  Dose: 500 mg Route: Oral Frequency: 2 times daily   Dispense Quantity: 60 tablet Refills: 0          Sig: Take 1 tablet (500 mg total) by mouth 2 (two) times a day       [facility]

## 2024-02-14 DIAGNOSIS — M54.50 CHRONIC BILATERAL LOW BACK PAIN WITHOUT SCIATICA: Primary | ICD-10-CM

## 2024-02-14 DIAGNOSIS — G89.29 CHRONIC BILATERAL LOW BACK PAIN WITHOUT SCIATICA: Primary | ICD-10-CM

## 2024-02-14 RX ORDER — METHOCARBAMOL 500 MG/1
500 TABLET, FILM COATED ORAL 2 TIMES DAILY PRN
Qty: 14 TABLET | Refills: 0 | Status: SHIPPED | OUTPATIENT
Start: 2024-02-14

## 2024-02-14 NOTE — TELEPHONE ENCOUNTER
Patient returned call and states mail order no longer carries Methocarbamol and [facility] [address] does. Would like ONLY [facility]ocar[facility]ol [facility] to [facility] on [address]. States only got a 7 day supply. Would like a call back from DR Howard to discuss medication and if he is to remain on it. [phone]

## 2024-02-14 NOTE — TELEPHONE ENCOUNTER
Just an FYI     Spoke with pt and stated the robaxin has been helping him instead of the tizanidine. Patient wants to continue this medication if its ok ?

## 2024-02-14 NOTE — TELEPHONE ENCOUNTER
Called pt and went straight to voicemail, left a detailed message, stating to follow up with pcp on medication.

## 2024-02-21 ENCOUNTER — PROCEDURE VISIT (OUTPATIENT)
Dept: NEUROLOGY | Facility: CLINIC | Age: 69
End: 2024-02-21
Payer: MEDICARE

## 2024-02-21 VITALS
BODY MASS INDEX: 39.38 KG/M2 | WEIGHT: 244 LBS | SYSTOLIC BLOOD PRESSURE: 126 MMHG | DIASTOLIC BLOOD PRESSURE: 70 MMHG | OXYGEN SATURATION: 95 % | TEMPERATURE: 98.2 F | HEART RATE: 85 BPM

## 2024-02-21 DIAGNOSIS — M48.061 SPINAL STENOSIS OF LUMBAR REGION AT MULTIPLE LEVELS: ICD-10-CM

## 2024-02-21 DIAGNOSIS — S81.801A NON-HEALING WOUND OF RIGHT LOWER EXTREMITY: ICD-10-CM

## 2024-02-21 DIAGNOSIS — G82.50 CHRONIC INCOMPLETE SPASTIC TETRAPLEGIA (HCC): Primary | ICD-10-CM

## 2024-02-21 PROCEDURE — 99214 OFFICE O/P EST MOD 30 MIN: CPT | Performed by: PHYSICAL MEDICINE & REHABILITATION

## 2024-02-21 PROCEDURE — 64642 CHEMODENERV 1 EXTREMITY 1-4: CPT | Performed by: PHYSICAL MEDICINE & REHABILITATION

## 2024-02-21 PROCEDURE — 95874 GUIDE NERV DESTR NEEDLE EMG: CPT | Performed by: PHYSICAL MEDICINE & REHABILITATION

## 2024-02-21 NOTE — PROGRESS NOTES
Physical Medicine & Rehabilitation Spasticity Follow-up/Procedure  Addison Woodruff 68 y.o. male    ASSESSMENT/PLAN:   Diagnoses and all orders for this visit:    Chronic incomplete spastic tetraplegia (HCC)  -     onabotulinumtoxin A (BOTOX) injection 200 Units    Non-healing wound of right lower extremity  -     Ambulatory Referral to Wound Care; Future    Spinal stenosis of lumbar region at multiple levels      Mr. Woodruff is a pleasant 69yo M with medical history of cervical myelopathy and severe stenosis s/p decompression and fusion with residual overall mild-mod spasticity. Today he is here for toxin injections to address his R finger flexion (extrinsic > intrinsic) tone. He has some R extensor weakness, and I reviewed that botulinum toxin can help an imbalance by calming down his flexor spasticity, but cannot improve strength. He expressed understanding. I did check his lumbar paraspinals and quadratus lumborum, but had difficulty given length of my needle and habitus. I did not hear inappropriate activity, so did not proceed with toxin. I may need a longer needle in the future if we were to try again. Without and adjustable height table, transfers are very difficult for him. We may try it in the future with him seated backward in a chair, leaning against the table.     That being said, I have some concerns about his description of pain and weakness in his LE. He sounds like he has developed some radicular pain going down the outside of his leg, and previous MRI in 2022 did not moderate-severe central stenosis L1-2 through L4-5. He was seen by Dr. Smallwood who appropriately referred to Neurosurgery given some weakness. At that point, patient wanted to avoid surgery and exhaust conservative options. He was referred to PT, and re-referred back to pain management for possible injections. He did not follow-up with Pain/Spine.    I will reach out to Dr. Smallwood to see if he can re-evaluate the patient based on what sounds  like increasing radicular pain. His weakness for me on exam today is stable.    I reviewed risks/benefits/alternatives, and he would like to proceed with toxin injections.     ?  PLAN:  1. Oral antispasticity medications: None at this time.  2. Schedule 200 units. Transfers independently  3. Referral back to Dr. Smallwood for lower back pain and increased radiating pain. He is not yet 3 years out from his last visit at this time.  4. He also is noted to have R shin wound that is recurrent, non-healing and quite large. Suspect related to venous stasis, T2DM. Provided referral to Wound Care  5. Reasonable to refer to OT after botox of R finger extrinsics, However, given issues with tolerance of therapies previously, would hold off on referring to PT until evaluated by Pain/Spine.   6. Follow-up on 4/17/2024 at 2:30pm for mid point visit, and 5/23/2024 at 1:00pm for repeat botulinum toxin injections.   ?  *I have spent 38 minutes with Patient and family today in which greater than 50% of this time was spent in counseling/coordination of care regarding Risks and benefits of tx options, Instructions for management, Patient and family education, Impressions, Counseling / Coordination of care, Documenting in the medical record, Obtaining or reviewing history  , and Communicating with other healthcare professionals .        HPI/SUBJECTIVE:  Here with Jorge A DUARTE. Is here today for botulinum toxin injections for his tendency for R finger flexion. Has developed increasing lumbosacral back pain that has lately been radiating down his legs. It made it difficulty to perform a car transfer the other day. No new bowel/bladder symptoms.     Last seen for chemodenervation: First time  Results of chemodenervation: NA  How long did effects last: NA  Side affect/Adverse Effects: NA     Any issues with driving, swallowing, appetite: No issues with swallowing/appetite. He is able to drive. Did not drive today just in case.    Stretching/Exercise Program: Stopped in December as they were limited by back and leg and arm stiffness and pain.   Currently in therapy: Not currently. Is interested in resuming after trying toxin.  Any falls with significant injuries: None  Any new weakness: R leg potentially with some weakness or tightness. The outside of his leg is tight.   Any changes to care since last seen: None  Safe at home: Yes  Any oral meds: Robaxin rarely as needed for low back pain. It made his stiffness feel worse.   On anticoagulation/blood thinners: None  Current functional status:  Independent with stairs, ambulation with RW, transfers, and bathing, dressing, grooming, feeding, continent of bowel, and intermittent urgency of bladder is stable.    ROS: A 10 point review of systems was negative except for what is noted in the HPI.    Imaging: I have personally reviewed imaging with results as follows:  9/13/2022 MRI Lumbar Spine:  No omental spinal canal narrowing with superimposed degenerative changes throughout the lumbar spine resulting in moderate to severe central stenosis L1-2 through L4-5.     Severe facet degenerative change L4-5 with annular bulge eccentric to the right results in severe right foraminal narrowing.    9/13/2022 XR L-Spine:  Advanced multilevel degenerative disc disease of the lumbar spine and mild grade 1 anterolisthesis at L4-L5     No compression fracture    OBJECTIVE:   /70 (BP Location: Left arm, Patient Position: Sitting, Cuff Size: Adult)   Pulse 85   Temp 98.2 °F (36.8 °C) (Temporal)   Wt 111 kg (244 lb)   SpO2 95%   BMI 39.38 kg/m²     Gen: No acute distress  HEENT: Moist mucus membranes, Normocephalic/Atraumatic  Heme/Extr: BL LE venous stasis changes.  Pulmonary: Non-labored breathing.   : No maher  GI: Soft, non-tender, non-distended.   MSK: Has full PROM in all joints in UE/LE. Able to get ankles to neutral  Integumentary: Skin is warm, dry. Erythema bilateral shins. Very large,  scabbed wound surrounded by erythema on R shin, and smaller scab with surrounding erythema on L shin. No drainage. No warmth or tenderness.  Psych: Normal mood and affect.   Neuro: AAOx3,  Speech is intact. Appropriate to questioning. Struggles to transfer and step up with R, does a bit better with the L. Struggles to sit himself up from prone, required 2 person assist to transfer off table.   MMT:   Strength:   Right  Left  Site  Right  Left  Site    5 5  S Ab: Shoulder Abductors  4- 4  HF: Hip Flexors    5 5  EF: Elbow Flexors  4+  5 KF: Knee Flexors    3  3  EE: Elbow Extensors  4 5  KE: Knee Extensors    5  5  WE: Wrist Extensors  5-  5  DR: Dorsi Flexors    2 3  FF: Finger Flexors  5- 5  PF: Plantar Flexors    1 2  HI: Hand Intrinsics  NT  NT  EHL: Extensor Hallucis Longus     MAS:  Right  Left  Site  Right  Left  Site    0 0  Shoulder 0 0  Hip Adductors   0 0  EF: Elbow Flexors  1 0 KF: Knee Flexors    0  0  EE: Elbow Extensors  0  1+  KE: Knee Extensors    1/0 1/0  WF/WE 0  0  DR: Dorsi Flexors    1+ 1  FF: Finger Flexors  1  0  PF: Plantar Flexors    0  0  HI: Hand Intrinsics  0/0  0/0  Toe Flex/Ex   1+ - FDS 3rd and 4th digits are worse than 2nd and 5th.     MAS  0 - no increased tone  1 - slight increase in tone at the end of the ROM  1+ increase in tone at 1/2 the ROM  2 - increase in tone through most the ROM  3 - moderate increase in muscle tone - passive movement difficult  4 - affected parts ridid in flexion or extension    SPASMS observed:   RUE: no   LUE: no  RLE: no   LLE: no      Botulinum Toxin Injection Procedure    Pre-operative diagnosis: Spasticity    Post-operative diagnosis: Same    Procedure: Chemodenervation    After risks and benefits were explained including bleeding, infection, worsening of pain, damage to the areas being injected, weakness of muscles, loss of muscle control, dysphagia if injecting the head or neck, facial droop if injecting the facial area, painful injection, allergic  or other reaction to the medications being injected, and the failure of the procedure to help the problem, a signed consent was obtained on 2/21/2024     The patient was placed in a seated and then prone position and the sites to be treated were identified. A time out was called and performed. The area to be treated was prepped three times with alcohol and the alcohol allowed to dry. Next, a 25 gauge, 50mm disposable electrode needle was used to inject the medication in the area to be treated.    Guidance: EMG  Area(s) injected:    Muscle Dose (units) # Sites Technique   R FDS 30 2 EMG   R FDP 30 2 EMG       EMG       EMG       EMG       EMG       EMG       EMG         EMG         EMG         EMG    Waste 140   EMG         EMG        Medications used: 200 units of botox diluted in 2 mL of preservative free saline    See MAR for Lot/Exp    The patient did tolerate the procedure well. There were no complications.    The following portions of the patient's history were reviewed and updated as appropriate: allergies, current medications, past family history, past medical history, past social history, past surgical history and problem list.      Current Outpatient Medications:     acetaminophen (TYLENOL) 325 mg tablet, Take 2 tablets (650 mg total) by mouth every 6 (six) hours as needed for mild pain, Disp: , Rfl: 0    amLODIPine-benazepril (LOTREL 5-10) 5-10 MG per capsule, Take 1 capsule by mouth daily, Disp: 90 capsule, Rfl: 1    glimepiride (AMARYL) 1 mg tablet, TAKE 1 TABLET DAILY WITH   BREAKFAST, Disp: 90 tablet, Rfl: 1    methocarbamol (ROBAXIN) 500 mg tablet, Take 1 tablet (500 mg total) by mouth 2 (two) times a day as needed for muscle spasms, Disp: 14 tablet, Rfl: 0    pioglitazone-metFORMIN (ACTOPLUS MET)  MG per tablet, TAKE 1 TABLET TWICE DAILY  WITH MEALS, Disp: 180 tablet, Rfl: 1    Past Surgical History:   Procedure Laterality Date    CARPAL TUNNEL RELEASE Right     EYE SURGERY      strabibusmus     HAND SURGERY      left index finger    NM ARTHRD PST/PSTLAT TQ 1NTRSPC CRV BELW C2 SEGMENT N/A 05/10/2023    Procedure: Posterior cervical decompressive laminectomy and instrumented fusion C4-T1;  Surgeon: Collins Angulo MD;  Location: [facility];  Service: Neurosurgery    NM EXCISION SPERMATOCELE W/WO EPIDIDYMECTOMY Left 09/16/2021    Procedure: HYDROCELECTOMY, SCROTAL DEBRIDEMENT;  Surgeon: Jose Guadalupe Christianson MD;  Location: [facility];  Service: Urology    NM NEUROPLASTY &/TRANSPOS MEDIAN NRV CARPAL TUNNE Right 07/06/2021    Procedure: RELEASE CARPAL TUNNEL;  Surgeon: Natty Cuevas MD;  [facility];  Service: Orthopedics    NM NEUROPLASTY &/TRANSPOSITION ULNAR NERVE ELBOW Right 07/06/2021    Procedure: RELEASE CUBITAL TUNNEL;  Surgeon: Natty Cuevas MD;  [facility];  Service: Orthopedics    SPINE SURGERY  05/10/223       Patient Active Problem List    Diagnosis Date Noted    Incomplete spinal cord injury at C1-C4 level without bone injury (HCC) 04/27/2023    Spondylosis of cervical spine with myelopathy 03/01/2023    Chronic incomplete spastic tetraplegia (HCC) 01/11/2024    Weakness of hand 10/23/2023    Contracture, right wrist 09/14/2023    Contracture of muscle, right hand 09/14/2023    Lung cancer screening declined by patient 09/01/2023    Neurogenic claudication 04/27/2023    Spinal stenosis of lumbar region at multiple levels 12/22/2022    Mixed hyperlipidemia 08/17/2022    Primary hypertension 07/14/2022    Type 2 diabetes mellitus without complication, without long-term current use of insulin (HCC) 07/14/2022    Chronic bilateral low back pain without sciatica 07/14/2022    Carpal tunnel syndrome of right wrist 07/06/2021    Atrophy of muscle of right hand 07/06/2021    Obesity, morbid (HCC) 05/24/2021    Cubital tunnel syndrome on right     Carpal tunnel syndrome, left

## 2024-02-21 NOTE — PATIENT INSTRUCTIONS
You have received botulinum toxin injections today.     The skin around the site of injections should be monitored for a couple of days. If redness or swelling occur, the skin should be examined by a health care professional to rule out infection. You can call my office if this occurs.    Please contact our office via [employer] in 2 weeks to report on the effects of the injections or any time with any questions or concerns.     Please note that your next injections should not be scheduled less than 90 days from today.    If your health insurance changes before the next injections, please contact our office as soon as possible so we can submit for a new prior authorization if needed. Please note that we may not be able to perform the injections if your insurance changes and the treatment is not pre-authorized.

## 2024-02-22 NOTE — PROGRESS NOTES
Absolutely  Clerical, please schedule follow-up and reevaluation with me next available opening  Thank you

## 2024-03-13 DIAGNOSIS — G89.29 CHRONIC BILATERAL LOW BACK PAIN WITHOUT SCIATICA: ICD-10-CM

## 2024-03-13 DIAGNOSIS — M54.50 CHRONIC BILATERAL LOW BACK PAIN WITHOUT SCIATICA: ICD-10-CM

## 2024-03-14 RX ORDER — METHOCARBAMOL 500 MG/1
500 TABLET, FILM COATED ORAL 2 TIMES DAILY PRN
Qty: 30 TABLET | Refills: 0 | Status: SHIPPED | OUTPATIENT
Start: 2024-03-14 | End: 2024-03-20 | Stop reason: SDUPTHER

## 2024-03-18 ENCOUNTER — APPOINTMENT (OUTPATIENT)
Dept: LAB | Facility: HOSPITAL | Age: 69
End: 2024-03-18
Payer: MEDICARE

## 2024-03-18 DIAGNOSIS — E11.9 TYPE 2 DIABETES MELLITUS WITHOUT COMPLICATION, WITHOUT LONG-TERM CURRENT USE OF INSULIN (HCC): ICD-10-CM

## 2024-03-18 LAB
EST. AVERAGE GLUCOSE BLD GHB EST-MCNC: 151 MG/DL
GLUCOSE P FAST SERPL-MCNC: 111 MG/DL (ref 65–99)
HBA1C MFR BLD: 6.9 %

## 2024-03-18 PROCEDURE — 36415 COLL VENOUS BLD VENIPUNCTURE: CPT

## 2024-03-18 PROCEDURE — 82947 ASSAY GLUCOSE BLOOD QUANT: CPT

## 2024-03-18 PROCEDURE — 83036 HEMOGLOBIN GLYCOSYLATED A1C: CPT

## 2024-03-20 ENCOUNTER — OFFICE VISIT (OUTPATIENT)
Dept: FAMILY MEDICINE CLINIC | Facility: CLINIC | Age: 69
End: 2024-03-20
Payer: MEDICARE

## 2024-03-20 VITALS
WEIGHT: 246.8 LBS | HEIGHT: 66 IN | SYSTOLIC BLOOD PRESSURE: 116 MMHG | DIASTOLIC BLOOD PRESSURE: 70 MMHG | TEMPERATURE: 98 F | BODY MASS INDEX: 39.66 KG/M2

## 2024-03-20 DIAGNOSIS — E78.2 MIXED HYPERLIPIDEMIA: ICD-10-CM

## 2024-03-20 DIAGNOSIS — E66.01 OBESITY, MORBID (HCC): ICD-10-CM

## 2024-03-20 DIAGNOSIS — M54.50 CHRONIC BILATERAL LOW BACK PAIN WITHOUT SCIATICA: ICD-10-CM

## 2024-03-20 DIAGNOSIS — G89.29 CHRONIC BILATERAL LOW BACK PAIN WITHOUT SCIATICA: ICD-10-CM

## 2024-03-20 DIAGNOSIS — I10 PRIMARY HYPERTENSION: ICD-10-CM

## 2024-03-20 DIAGNOSIS — E11.9 TYPE 2 DIABETES MELLITUS WITHOUT COMPLICATION, WITHOUT LONG-TERM CURRENT USE OF INSULIN (HCC): Primary | ICD-10-CM

## 2024-03-20 PROCEDURE — 99214 OFFICE O/P EST MOD 30 MIN: CPT

## 2024-03-20 PROCEDURE — G2211 COMPLEX E/M VISIT ADD ON: HCPCS

## 2024-03-20 RX ORDER — METHOCARBAMOL 500 MG/1
500 TABLET, FILM COATED ORAL 2 TIMES DAILY PRN
Qty: 60 TABLET | Refills: 0 | Status: SHIPPED | OUTPATIENT
Start: 2024-03-20

## 2024-03-20 NOTE — ASSESSMENT & PLAN NOTE
Lab Results   Component Value Date    HGBA1C 6.9 (H) 03/18/2024   Under control.    Continue current medication.    We will re-evaluate at next office visit.

## 2024-03-20 NOTE — PROGRESS NOTES
Assessment/Plan:         Problem List Items Addressed This Visit     Obesity, morbid (HCC)     Patient was advised to lose weight.  Potential consequences of obesity discussed with patient.  Advised to have portion control no more than 60% of meal or may consider intermittent fasting  We will continue to monitor           Primary hypertension     Under control.  Continue Lotrel.  We will continue to monitor          Type 2 diabetes mellitus without complication, without long-term current use of insulin (HCC) - Primary       Lab Results   Component Value Date    HGBA1C 6.9 (H) 03/18/2024   Under control.    Continue current medication.    We will re-evaluate at next office visit.           Chronic bilateral low back pain without sciatica     Under control.    Continue current medication.    Continue follow-up with pain management  We will re-evaluate at next office visit.           Relevant Medications    methocarbamol (ROBAXIN) 500 mg tablet    Mixed hyperlipidemia     Under reasonable control with diet.  We will continue to monitor.               Subjective:      Patient ID: Addison Woodruff is a 68 y.o. male.    Patient here for review of chronic medical problems and  the labs and imaging if it is applicable.  Currently has no specific complaints other than mentioned in the review of systems  Denies chest pain, SOB, cough, abdominal pain, nausea, vomiting, fever, chills, lightheadedness, dizziness,headache, tingling or numbness.No bowel or bladder problem.          The following portions of the patient's history were reviewed and updated as appropriate:   Past Medical History:  He has a past medical history of Arthritis (2002), Diabetes mellitus (HCC), ED (erectile dysfunction), Hyperlipidemia, Hypertension, Low back pain (March 2022), Obesity, S/P cervical spinal fusion (05/10/2023), and Spinal stenosis of lumbar region at multiple levels  (12/22/2022).,  _______________________________________________________________________  Medical Problems:  does not have any pertinent problems on file.,  _______________________________________________________________________  Past Surgical History:   has a past surgical history that includes Hand surgery; Eye surgery; pr neuroplasty &/transposition ulnar nerve elbow (Right, 07/06/2021); pr neuroplasty &/transpos median nrv carpal tunne (Right, 07/06/2021); Carpal tunnel release (Right); pr excision spermatocele w/wo epididymectomy (Left, 09/16/2021); pr arthrd pst/pstlat tq 1ntrspc crv belw c2 segment (N/A, 05/10/2023); and Spine surgery (05/10/223).,  _______________________________________________________________________  Family History:  family history includes Diabetes in his father and mother; Hypertension in his father and mother.,  _______________________________________________________________________  Social History:   reports that he quit smoking about 3 years ago. His smoking use included cigarettes. He started smoking about 50 years ago. He has a 47 pack-year smoking history. He has never used smokeless tobacco. He reports that he does not currently use alcohol. He reports that he does not currently use drugs after having used the following drugs: Marijuana.,  _______________________________________________________________________  Allergies:  has No Known Allergies..  _______________________________________________________________________  Current Outpatient Medications   Medication Sig Dispense Refill   • acetaminophen (TYLENOL) 325 mg tablet Take 2 tablets (650 mg total) by mouth every 6 (six) hours as needed for mild pain  0   • amLODIPine-benazepril (LOTREL 5-10) 5-10 MG per capsule Take 1 capsule by mouth daily 90 capsule 1   • glimepiride (AMARYL) 1 mg tablet TAKE 1 TABLET DAILY WITH   BREAKFAST 90 tablet 1   • methocarbamol (ROBAXIN) 500 mg tablet Take 1 tablet (500 mg total) by mouth 2 (two)  "times a day as needed for muscle spasms 60 tablet 0   • pioglitazone-metFORMIN (ACTOPLUS MET)  MG per tablet TAKE 1 TABLET TWICE DAILY  WITH MEALS 180 tablet 1     No current facility-administered medications for this visit.     _______________________________________________________________________  Review of Systems   Constitutional:  Negative for chills, fatigue and fever.   HENT:  Negative for congestion, ear pain, rhinorrhea, sneezing and sore throat.    Eyes:  Negative for redness, itching and visual disturbance.   Respiratory:  Negative for cough, chest tightness and shortness of breath.    Cardiovascular:  Negative for chest pain, palpitations and leg swelling.   Gastrointestinal:  Negative for abdominal pain, blood in stool, diarrhea, nausea and vomiting.   Endocrine: Negative for cold intolerance and heat intolerance.   Genitourinary:  Negative for dysuria, frequency and urgency.   Musculoskeletal:  Positive for arthralgias, gait problem and myalgias. Negative for back pain and neck stiffness.   Skin:  Negative for color change and rash.   Neurological:  Positive for weakness (Both hands). Negative for dizziness, tremors, light-headedness, numbness and headaches.   Hematological:  Does not bruise/bleed easily.   Psychiatric/Behavioral:  Negative for agitation, behavioral problems and confusion.          Objective:  Vitals:    03/20/24 1108   BP: 116/70   BP Location: Right arm   Patient Position: Sitting   Cuff Size: Standard   Temp: 98 °F (36.7 °C)   TempSrc: Temporal   Weight: 112 kg (246 lb 12.8 oz)   Height: 5' 6\" (1.676 m)     Body mass index is 39.83 kg/m².     Physical Exam  Vitals and nursing note reviewed.   Constitutional:       General: He is not in acute distress.     Appearance: He is obese. He is not ill-appearing, toxic-appearing or diaphoretic.   HENT:      Head: Normocephalic and atraumatic.      Nose: Nose normal.      Mouth/Throat:      Mouth: Mucous membranes are moist.      " Pharynx: No oropharyngeal exudate or posterior oropharyngeal erythema.   Eyes:      General: No scleral icterus.        Right eye: No discharge.         Left eye: No discharge.      Conjunctiva/sclera: Conjunctivae normal.      Pupils: Pupils are equal, round, and reactive to light.   Cardiovascular:      Rate and Rhythm: Normal rate and regular rhythm.      Heart sounds: Normal heart sounds. No murmur heard.     No gallop.   Pulmonary:      Effort: Pulmonary effort is normal. No respiratory distress.      Breath sounds: Normal breath sounds. No wheezing or rales.   Abdominal:      General: Abdomen is flat. Bowel sounds are normal. There is no distension.      Palpations: Abdomen is soft.      Tenderness: There is no abdominal tenderness. There is no guarding.   Musculoskeletal:      Cervical back: Normal range of motion and neck supple. No rigidity.      Right lower leg: No edema.      Left lower leg: No edema.      Comments: Paraspinal muscular tightness   Lymphadenopathy:      Cervical: No cervical adenopathy.   Skin:     General: Skin is warm and dry.      Capillary Refill: Capillary refill takes 2 to 3 seconds.      Findings: No erythema or rash.   Neurological:      General: No focal deficit present.      Mental Status: He is alert and oriented to person, place, and time. Mental status is at baseline.      Motor: Weakness (Both upper extremity) and atrophy (Right hand and right forearm) present.      Gait: Gait abnormal (ambulates with walker).   Psychiatric:         Mood and Affect: Mood normal.         Behavior: Behavior normal.

## 2024-03-20 NOTE — ASSESSMENT & PLAN NOTE
Under control.    Continue current medication.    Continue follow-up with pain management  We will re-evaluate at next office visit.

## 2024-03-21 ENCOUNTER — OFFICE VISIT (OUTPATIENT)
Dept: PODIATRY | Facility: CLINIC | Age: 69
End: 2024-03-21
Payer: MEDICARE

## 2024-03-21 VITALS
DIASTOLIC BLOOD PRESSURE: 72 MMHG | BODY MASS INDEX: 39.83 KG/M2 | SYSTOLIC BLOOD PRESSURE: 120 MMHG | HEART RATE: 83 BPM | HEIGHT: 66 IN

## 2024-03-21 DIAGNOSIS — B35.1 ONYCHOMYCOSIS: Primary | ICD-10-CM

## 2024-03-21 DIAGNOSIS — I73.9 PAD (PERIPHERAL ARTERY DISEASE) (HCC): ICD-10-CM

## 2024-03-21 DIAGNOSIS — E11.9 TYPE 2 DIABETES MELLITUS WITHOUT COMPLICATION, WITHOUT LONG-TERM CURRENT USE OF INSULIN (HCC): ICD-10-CM

## 2024-03-21 PROCEDURE — 11721 DEBRIDE NAIL 6 OR MORE: CPT | Performed by: PODIATRIST

## 2024-03-21 NOTE — PROGRESS NOTES
Assessment/Plan:     The patient's clinical examination today is consistent with onychomycosis of the pedal nail plates bilaterally.  The skin is dry and scaling consistent with diabetic anhidrosis.  There are no open lesions nor callosities noted to either foot.  The interdigital spaces are clear without maceration.  Pedal pulses on the right are nonpalpable.  Pedal pulses on the left are diminished. Temperature gradient is increased on the right.  There is dependent rubor of the right lower extremity.  Skin is thin to the bilateral lower extremities with decreased turgor.  There is absence of hair growth in bilateral lower extremities.  Epicritic sensation is diminished in the forefoot bilaterally consistent with peripheral neuropathy.     The pedal nail plates are sharply debrided with a sterile nail clipper x10 without complication.  The pedal nails were then sharply reduced in thickness and girth utilizing a rotary bur.  There are no other acute pedal issues noted today.      Recommend follow-up in 3 to 4 months for repeat at risk diabetic foot care.        Diagnoses and all orders for this visit:    Onychomycosis    Type 2 diabetes mellitus without complication, without long-term current use of insulin ([facility])    PAD (peripheral artery disease) ([facility])          Subjective:     Patient ID: Addison Woodruff is a 68 y.o. male.    The patient presents today for at risk foot care.  The patient is unable to trim his own nails secondary to the thickness and girth.  He has a history of chronic low back pain.  He has lost function of his right hand secondary to a chronic and progressive contracture deformity.              PAST MEDICAL HISTORY:  Past Medical History:   Diagnosis Date    Arthritis 2002    Diabetes mellitus (HCC)     ED (erectile dysfunction)     Hyperlipidemia     Hypertension     Low back pain March 2022    Obesity     S/P cervical spinal fusion 05/10/2023    Spinal stenosis of lumbar region at multiple  levels 12/22/2022       PAST SURGICAL HISTORY:  Past Surgical History:   Procedure Laterality Date    CARPAL TUNNEL RELEASE Right     EYE SURGERY      strabibusmus    HAND SURGERY      left index finger    DE ARTHRD PST/PSTLAT TQ 1NTRSPC CRV BELW C2 SEGMENT N/A 05/10/2023    Procedure: Posterior cervical decompressive laminectomy and instrumented fusion C4-T1;  Surgeon: Collins Angulo MD;  [facility];  Service: Neurosurgery    DE EXCISION SPERMATOCELE W/WO EPIDIDYMECTOMY Left 09/16/2021    Procedure: HYDROCELECTOMY, SCROTAL DEBRIDEMENT;  Surgeon: Jose Guadalupe Christianson MD;  [facility];  Service: Urology    DE NEUROPLASTY &/TRANSPOS MEDIAN NRV CARPAL TUNNE Right 07/06/2021    Procedure: RELEASE CARPAL TUNNEL;  Surgeon: Natty Cuevas MD;  [facility];  Service: Orthopedics    DE NEUROPLASTY &/TRANSPOSITION ULNAR NERVE ELBOW Right 07/06/2021    Procedure: RELEASE CUBITAL TUNNEL;  Surgeon: Natty Cuevas MD;  [facility];  Service: Orthopedics    SPINE SURGERY  05/10/223        ALLERGIES:  Patient has no known allergies.    MEDICATIONS:  Current Outpatient Medications   Medication Sig Dispense Refill    acetaminophen (TYLENOL) 325 mg tablet Take 2 tablets (650 mg total) by mouth every 6 (six) hours as needed for mild pain  0    amLODIPine-benazepril (LOTREL 5-10) 5-10 MG per capsule Take 1 capsule by mouth daily 90 capsule 1    glimepiride (AMARYL) 1 mg tablet TAKE 1 TABLET DAILY WITH   BREAKFAST 90 tablet 1    methocarbamol (ROBAXIN) 500 mg tablet Take 1 tablet (500 mg total) by mouth 2 (two) times a day as needed for muscle spasms 60 tablet 0    pioglitazone-metFORMIN (ACTOPLUS MET)  MG per tablet TAKE 1 TABLET TWICE DAILY  WITH MEALS 180 tablet 1     No current facility-administered medications for this visit.       SOCIAL HISTORY:  Social History     Socioeconomic History    Marital status:      Spouse name: None    Number of children: None     Years of education: None    Highest education level: None   Occupational History    None   Tobacco Use    Smoking status: Former     Current packs/day: 0.00     Average packs/day: 1 pack/day for 47.0 years (47.0 ttl pk-yrs)     Types: Cigarettes     Start date: 1/13/1974     Quit date: 1/13/2021     Years since quitting: 3.1    Smokeless tobacco: Never    Tobacco comments:     advised not to smoke   Vaping Use    Vaping status: Never Used   Substance and Sexual Activity    Alcohol use: Not Currently     Comment: aprox 4 drinks a month  when out to dinner    Drug use: Not Currently     Types: Marijuana    Sexual activity: Not Currently   Other Topics Concern    None   Social History Narrative    None     Social Determinants of Health     Financial Resource Strain: Medium Risk (11/30/2023)    Overall Financial Resource Strain (CARDIA)     Difficulty of Paying Living Expenses: Somewhat hard   Food Insecurity: No Food Insecurity (5/11/2023)    Hunger Vital Sign     Worried About Running Out of Food in the Last Year: Never true     Ran Out of Food in the Last Year: Never true   Transportation Needs: No Transportation Needs (11/30/2023)    PRAPARE - Transportation     Lack of Transportation (Medical): No     Lack of Transportation (Non-Medical): No   Physical Activity: Not on file   Stress: Not on file   Social Connections: Not on file   Intimate Partner Violence: Not on file   Housing Stability: Unknown (5/11/2023)    Housing Stability Vital Sign     Unable to Pay for Housing in the Last Year: No     Number of Places Lived in the Last Year: Not on file     Unstable Housing in the Last Year: No        Review of Systems   Constitutional: Negative.    HENT: Negative.     Eyes: Negative.    Respiratory: Negative.     Cardiovascular: Negative.    Endocrine: Negative.    Musculoskeletal: Negative.    Neurological: Negative.    Hematological: Negative.    Psychiatric/Behavioral: Negative.           Objective:     Physical  Exam  Vitals reviewed.   Constitutional:       Appearance: Normal appearance.   HENT:      Head: Normocephalic and atraumatic.      Nose: Nose normal.   Eyes:      Conjunctiva/sclera: Conjunctivae normal.      Pupils: Pupils are equal, round, and reactive to light.   Cardiovascular:      Pulses:           Dorsalis pedis pulses are 0 on the right side and 1+ on the left side.        Posterior tibial pulses are 0 on the right side and 0 on the left side.   Pulmonary:      Effort: Pulmonary effort is normal.   Feet:      Right foot:      Skin integrity: Skin integrity normal.      Toenail Condition: Right toenails are abnormally thick and long. Fungal disease present.     Left foot:      Skin integrity: Skin integrity normal.      Toenail Condition: Left toenails are abnormally thick and long. Fungal disease present.  Skin:     General: Skin is warm.      Capillary Refill: Capillary refill takes more than 3 seconds.   Neurological:      General: No focal deficit present.      Mental Status: He is alert and oriented to person, place, and time.   Psychiatric:         Mood and Affect: Mood normal.         Behavior: Behavior normal.         Thought Content: Thought content normal.

## 2024-03-30 DIAGNOSIS — I10 PRIMARY HYPERTENSION: ICD-10-CM

## 2024-03-30 RX ORDER — AMLODIPINE BESYLATE AND BENAZEPRIL HYDROCHLORIDE 5; 10 MG/1; MG/1
1 CAPSULE ORAL DAILY
Qty: 90 CAPSULE | Refills: 1 | Status: SHIPPED | OUTPATIENT
Start: 2024-03-30

## 2024-04-16 DIAGNOSIS — M54.50 CHRONIC BILATERAL LOW BACK PAIN WITHOUT SCIATICA: ICD-10-CM

## 2024-04-16 DIAGNOSIS — G89.29 CHRONIC BILATERAL LOW BACK PAIN WITHOUT SCIATICA: ICD-10-CM

## 2024-04-17 ENCOUNTER — TELEPHONE (OUTPATIENT)
Dept: OBGYN CLINIC | Facility: CLINIC | Age: 69
End: 2024-04-17

## 2024-04-17 ENCOUNTER — OFFICE VISIT (OUTPATIENT)
Dept: NEUROLOGY | Facility: CLINIC | Age: 69
End: 2024-04-17
Payer: MEDICARE

## 2024-04-17 VITALS
HEART RATE: 74 BPM | TEMPERATURE: 97.9 F | WEIGHT: 246 LBS | SYSTOLIC BLOOD PRESSURE: 121 MMHG | HEIGHT: 67 IN | OXYGEN SATURATION: 97 % | BODY MASS INDEX: 38.61 KG/M2 | DIASTOLIC BLOOD PRESSURE: 89 MMHG

## 2024-04-17 DIAGNOSIS — G56.22 CUBITAL TUNNEL SYNDROME ON LEFT: ICD-10-CM

## 2024-04-17 DIAGNOSIS — G82.50 CHRONIC INCOMPLETE SPASTIC TETRAPLEGIA (HCC): Primary | ICD-10-CM

## 2024-04-17 PROCEDURE — 99214 OFFICE O/P EST MOD 30 MIN: CPT | Performed by: PHYSICAL MEDICINE & REHABILITATION

## 2024-04-17 RX ORDER — METHOCARBAMOL 500 MG/1
500 TABLET, FILM COATED ORAL 2 TIMES DAILY PRN
Qty: 60 TABLET | Refills: 0 | Status: SHIPPED | OUTPATIENT
Start: 2024-04-17

## 2024-04-17 NOTE — PROGRESS NOTES
"Physical Medicine & Rehabilitation Spasticity Follow-up/Procedure  Addison Woodruff 68 y.o. male    ASSESSMENT/PLAN:   Diagnoses and all orders for this visit:    Chronic incomplete spastic tetraplegia (HCC)    Cubital tunnel syndrome on left  -     Ambulatory Referral to Orthopedic Surgery; Future      Mr. Woodruff is a pleasant 69yo M with medical history of cervical myelopathy and severe stenosis s/p decompression and fusion with residual overall mild-mod spasticity. He also has a history of R cubital/carpal tunnel s/p release. He is here today for a midpoint visit for his tendency to R finger flexion. He has weakness in both extension and finger flexion, and knows that the goal is not necessarily to regain function in that hand, but he wants to be able to stop them from clenching which is what bothers him. He is happy with the result of his toxin so far.     He is planning on seeing Dr. Smallwood tomorrow to evaluate his chronic low back pain, due to some concerns for increasing radicular symptoms, although he has started seeing a chiropractor this week, which he thinks has been helpful. He continues with LE spasticity, but we are not treating that with toxin currently. The L KE tone helps him stand. We could consider his R KF/PF if it becomes an issue in the future. Would hold off on PT until he sees Dr. Smallwood given his limitations previously.     Today he also reported 1 month of tingling/numbness from his elbow to his 4th/5th digits, with increased clumsiness in his \"good\" hand on the L - dropping things. He denies new bowel/bladder dysfunction. Previous EMG showed L ulnar neuropathy with active denervation, axonal involvement, but L hand symptoms were not as significant at that time. AT that time he had decompression of both median and ulnar nerve on the R with Dr. Cuevas. I reached out to her to re-establish given his reliance in that hand and I also showed him some elbow splints he can wear in the interim. He has " no new bowel/bladder dysfunction at this time and ambulation is overall stable currently.     Unless his R finger clenching resumes sooner than 12 weeks (in which case I would increase the dose), my plan will be to keep his regimen the same at his next visit on 5/23/2024.      ?  *I have spent 35 minutes with Patient and family today in which greater than 50% of this time was spent in counseling/coordination of care regarding Risks and benefits of tx options, Instructions for management, Patient and family education, Impressions, Counseling / Coordination of care, Documenting in the medical record, and Communicating with other healthcare professionals .      HPI/SUBJECTIVE:  Here with Jorge A DUARTE.    Patient presents today in the office for his midpoint visit. He is still unable to open his fingers on his own as expected, but has noticed that his fingers don't clench shut when he tries to rest his open palm on surfaces. No new bowel/bladder symptoms. In the L hand he has noticed increased numbness recently - in the past month. It is more constant. He has noted he has been dropping things more often since this began. It mostly affects his 4th and 5th digits. It's about the same all the time now. Sometimes when he wakes up it's a bit worse. He notices it starts from his elbow to his hand.     Of note had an EMG in 2021 that showed bilateral cubital/carpal tunnel. He only ever had the R side done    Last seen for chemodenervation: 2/21/2024   Results of chemodenervation: Improved finger flexion tendency  How long did effects last: Here for midpoint visit   Side affect/Adverse Effects: None     Any issues with driving, swallowing, appetite: Does drive.   Stretching/Exercise Program: Not consistently.   Currently in therapy: None  Any falls with significant injuries: None  Any new weakness: Possibly L hand   Any changes to care since last seen: Started seeing a chiropractor, was sore after, but not too bad today. He felt  "like it may be helpful. He is seeing Dr. Smallwood for his back tomorrow  Safe at home: None   Any oral meds: Robaxin rarely as needed for low back pain. Made his stiffness feel worse.   On anticoagulation/blood thinners: No  Current functional status:  Independent with stairs, ambulation with RW, transfers, bathing, dressing, grooming, feeding, continent of bowel, and intermittent urgency of bladder is stable.    ROS: A 10 point review of systems was negative except for what is noted in the HPI.    Imaging: I have personally reviewed imaging with results as follows:  No new pertinent imaging since previously seen.    2021 EMG personally reviewed.  Showed evidence of bilateral median nerve entrapment neuropathy at the wrist with mod degree on the L and mild-mod on the R, with evidence of reinnervation. He has bilateral ulnar neuropathy at or below the elbow, predominantly axonal with secondary demyelination across the elbow, with evidence of denervation as well as reinnervation changes in ulnar innervated muscles consistent with a severe ongoing process on the R worse than the L. There was no evidence at that time of a cervical radic or brachial plexopathy.     OBJECTIVE:   /89 (BP Location: Right arm, Patient Position: Sitting, Cuff Size: Adult)   Pulse 74   Temp 97.9 °F (36.6 °C) (Temporal)   Ht 5' 7\" (1.702 m)   Wt 112 kg (246 lb)   SpO2 97%   BMI 38.53 kg/m²     Gen: No acute distress,  HEENT: Moist mucus membranes, Normocephalic/Atraumatic  Cardiovascular: Regular rate, rhythm, S1/S2. Distal pulses palpable  Heme/Extr: BL LE edema   Pulmonary: Non-labored breathing.   : No maher  GI: Soft, non-tender, non-distended.   MSK: Tinel's positive at the L elbow.   Integumentary: Skin is warm, dry.   Psych: Normal mood and affect.   Neuro: AAOx3, Speech is intact. Appropriate to questioning. Sensation impaired, but worse in L ulnar distribution compared to median/radial with splitting of his ring finger. "   MMT:   Strength: (flexes mostly at MCP today on the R)  Right  Left  Site  Right  Left  Site    5 5  S Ab: Shoulder Abductors  4 4  HF: Hip Flexors    5 5  EF: Elbow Flexors   4 - 5 KF: Knee Flexors    3  3+  EE: Elbow Extensors  4+  5  KE: Knee Extensors    5  5  WE: Wrist Extensors  5-  5  DR: Dorsi Flexors    2 4  FF: Finger Flexors  5-  5  PF: Plantar Flexors    1-2  2  HI: Hand Intrinsics  NT NT EHL: Extensor Hallucis Longus   Not much extension in fingers in R hand which is stable.     MAS:  Right  Left  Site  Right  Left  Site    0 0  Shoulder 0 0  Hip Adductors   0 0  EF: Elbow Flexors  1 0 KF: Knee Flexors    0  0  EE: Elbow Extensors  0  1+  KE: Knee Extensors    0/0 1/0  WF/WE 0  0  DR: Dorsi Flexors    1 1 FF: Finger Flexors  1  0  PF: Plantar Flexors    1  0  HI: Hand Intrinsics  0/0  0/0  Toe Flex/Ex       MAS  0 - no increased tone  1 - slight increase in tone at the end of the ROM  1+ increase in tone at 1/2 the ROM  2 - increase in tone through most the ROM  3 - moderate increase in muscle tone - passive movement difficult  4 - affected parts ridid in flexion or extension    SPASMS observed:   RUE: no   LUE: no  RLE: no   LLE: no      The following portions of the patient's history were reviewed and updated as appropriate: allergies, current medications, past family history, past medical history, past social history, past surgical history and problem list.      Current Outpatient Medications:     acetaminophen (TYLENOL) 325 mg tablet, Take 2 tablets (650 mg total) by mouth every 6 (six) hours as needed for mild pain, Disp: , Rfl: 0    amLODIPine-benazepril (LOTREL 5-10) 5-10 MG per capsule, TAKE 1 CAPSULE DAILY, Disp: 90 capsule, Rfl: 1    glimepiride (AMARYL) 1 mg tablet, TAKE 1 TABLET DAILY WITH   BREAKFAST, Disp: 90 tablet, Rfl: 1    methocarbamol (ROBAXIN) 500 mg tablet, TAKE 1 TABLET (500 MG TOTAL) BY MOUTH 2 (TWO) TIMES A DAY AS NEEDED FOR MUSCLE SPASMS, Disp: 60 tablet, Rfl: 0     pioglitazone-metFORMIN (ACTOPLUS MET)  MG per tablet, TAKE 1 TABLET TWICE DAILY  WITH MEALS, Disp: 180 tablet, Rfl: 1    Past Surgical History:   Procedure Laterality Date    CARPAL TUNNEL RELEASE Right     EYE SURGERY      strabibusmus    HAND SURGERY      left index finger    MA ARTHRD PST/PSTLAT TQ 1NTRSPC CRV BELW C2 SEGMENT N/A 05/10/2023    Procedure: Posterior cervical decompressive laminectomy and instrumented fusion C4-T1;  Surgeon: Collins Angulo MD;  [facility];  Service: Neurosurgery    MA EXCISION SPERMATOCELE W/WO EPIDIDYMECTOMY Left 09/16/2021    Procedure: HYDROCELECTOMY, SCROTAL DEBRIDEMENT;  Surgeon: Jose Guadalupe Christianson MD;  [facility];  Service: Urology    MA NEUROPLASTY &/TRANSPOS MEDIAN NRV CARPAL TUNNE Right 07/06/2021    Procedure: RELEASE CARPAL TUNNEL;  Surgeon: Natyt Cuevas MD;  [facility];  Service: Orthopedics    MA NEUROPLASTY &/TRANSPOSITION ULNAR NERVE ELBOW Right 07/06/2021    Procedure: RELEASE CUBITAL TUNNEL;  Surgeon: Natty Cuevas MD;  [facility];  Service: Orthopedics    SPINE SURGERY  05/10/223       Patient Active Problem List    Diagnosis Date Noted    Incomplete spinal cord injury at C1-C4 level without bone injury (HCC) 04/27/2023    Spondylosis of cervical spine with myelopathy 03/01/2023    Non-healing wound of right lower extremity 02/21/2024    Chronic incomplete spastic tetraplegia (HCC) 01/11/2024    Weakness of hand 10/23/2023    Contracture, right wrist 09/14/2023    Contracture of muscle, right hand 09/14/2023    Lung cancer screening declined by patient 09/01/2023    Neurogenic claudication 04/27/2023    Spinal stenosis of lumbar region at multiple levels 12/22/2022    Mixed hyperlipidemia 08/17/2022    Primary hypertension 07/14/2022    Type 2 diabetes mellitus without complication, without long-term current use of insulin (HCC) 07/14/2022    Chronic bilateral low back pain without sciatica  07/14/2022    Carpal tunnel syndrome of right wrist 07/06/2021    Atrophy of muscle of right hand 07/06/2021    Obesity, morbid (HCC) 05/24/2021    Cubital tunnel syndrome on right     Carpal tunnel syndrome, left

## 2024-04-17 NOTE — PATIENT INSTRUCTIONS
Follow-up with Hand Surgery to evaluate your L hand weakness/numbness. Worried about an ulnar neuropathy in your left arm.   Follow-up with Dr. Smallwood for your back tomorrow.

## 2024-04-18 ENCOUNTER — HOSPITAL ENCOUNTER (OUTPATIENT)
Dept: RADIOLOGY | Facility: HOSPITAL | Age: 69
Discharge: HOME | End: 2024-04-18
Payer: MEDICARE

## 2024-04-18 ENCOUNTER — OFFICE VISIT (OUTPATIENT)
Dept: PAIN MEDICINE | Facility: CLINIC | Age: 69
End: 2024-04-18
Payer: MEDICARE

## 2024-04-18 VITALS — HEART RATE: 75 BPM | DIASTOLIC BLOOD PRESSURE: 76 MMHG | SYSTOLIC BLOOD PRESSURE: 131 MMHG

## 2024-04-18 DIAGNOSIS — M46.1 SACROILIITIS (HCC): ICD-10-CM

## 2024-04-18 DIAGNOSIS — M48.061 SPINAL STENOSIS OF LUMBAR REGION WITHOUT NEUROGENIC CLAUDICATION: ICD-10-CM

## 2024-04-18 DIAGNOSIS — M51.36 LUMBAR DEGENERATIVE DISC DISEASE: ICD-10-CM

## 2024-04-18 DIAGNOSIS — M46.1 SACROILIITIS (HCC): Primary | ICD-10-CM

## 2024-04-18 PROCEDURE — 72110 X-RAY EXAM L-2 SPINE 4/>VWS: CPT

## 2024-04-18 PROCEDURE — 99214 OFFICE O/P EST MOD 30 MIN: CPT | Performed by: PHYSICAL MEDICINE & REHABILITATION

## 2024-05-07 DIAGNOSIS — E11.9 TYPE 2 DIABETES MELLITUS WITHOUT COMPLICATION, WITHOUT LONG-TERM CURRENT USE OF INSULIN (HCC): ICD-10-CM

## 2024-05-07 RX ORDER — PIOGLITAZONE HCL AND METFORMIN HCL 500; 15 MG/1; MG/1
1 TABLET ORAL 2 TIMES DAILY WITH MEALS
Qty: 180 TABLET | Refills: 1 | Status: SHIPPED | OUTPATIENT
Start: 2024-05-07

## 2024-05-09 ENCOUNTER — HOSPITAL ENCOUNTER (OUTPATIENT)
Dept: RADIOLOGY | Facility: CLINIC | Age: 69
Discharge: HOME | End: 2024-05-09

## 2024-05-13 ENCOUNTER — HOSPITAL ENCOUNTER (OUTPATIENT)
Dept: RADIOLOGY | Facility: CLINIC | Age: 69
Discharge: HOME | End: 2024-05-13
Payer: MEDICARE

## 2024-05-13 VITALS
OXYGEN SATURATION: 94 % | DIASTOLIC BLOOD PRESSURE: 84 MMHG | RESPIRATION RATE: 18 BRPM | HEART RATE: 79 BPM | SYSTOLIC BLOOD PRESSURE: 132 MMHG | TEMPERATURE: 98 F

## 2024-05-13 DIAGNOSIS — M51.36 LUMBAR DEGENERATIVE DISC DISEASE: ICD-10-CM

## 2024-05-13 DIAGNOSIS — M46.1 SACROILIITIS (HCC): ICD-10-CM

## 2024-05-13 DIAGNOSIS — M48.061 SPINAL STENOSIS OF LUMBAR REGION WITHOUT NEUROGENIC CLAUDICATION: ICD-10-CM

## 2024-05-13 PROCEDURE — 27096 INJECT SACROILIAC JOINT: CPT | Performed by: PHYSICAL MEDICINE & REHABILITATION

## 2024-05-13 RX ORDER — METHYLPREDNISOLONE ACETATE 80 MG/ML
80 INJECTION, SUSPENSION INTRA-ARTICULAR; INTRALESIONAL; INTRAMUSCULAR; PARENTERAL; SOFT TISSUE ONCE
Status: COMPLETED | OUTPATIENT
Start: 2024-05-13 | End: 2024-05-13

## 2024-05-13 RX ORDER — ROPIVACAINE HYDROCHLORIDE 2 MG/ML
5 INJECTION, SOLUTION EPIDURAL; INFILTRATION; PERINEURAL ONCE
Status: COMPLETED | OUTPATIENT
Start: 2024-05-13 | End: 2024-05-13

## 2024-05-13 RX ORDER — LIDOCAINE HYDROCHLORIDE 10 MG/ML
2 INJECTION, SOLUTION EPIDURAL; INFILTRATION; INTRACAUDAL; PERINEURAL ONCE
Status: COMPLETED | OUTPATIENT
Start: 2024-05-13 | End: 2024-05-13

## 2024-05-13 RX ADMIN — LIDOCAINE HYDROCHLORIDE 4 ML: 10 INJECTION, SOLUTION EPIDURAL; INFILTRATION; INTRACAUDAL; PERINEURAL at 11:44

## 2024-05-13 RX ADMIN — IOHEXOL 2 ML: 300 INJECTION, SOLUTION INTRAVENOUS at 11:45

## 2024-05-13 RX ADMIN — METHYLPREDNISOLONE ACETATE 80 MG: 80 INJECTION, SUSPENSION INTRA-ARTICULAR; INTRALESIONAL; INTRAMUSCULAR; PARENTERAL; SOFT TISSUE at 11:46

## 2024-05-13 RX ADMIN — ROPIVACAINE HYDROCHLORIDE 5 ML: 2 INJECTION, SOLUTION EPIDURAL; INFILTRATION; PERINEURAL at 11:46

## 2024-05-13 NOTE — DISCHARGE INSTR - LAB

## 2024-05-13 NOTE — H&P
History of Present Illness: The patient is a 68 y.o. male who presents with complaints of bilateral buttock pain    Past Medical History:   Diagnosis Date    Arthritis 2002    Diabetes mellitus (HCC)     ED (erectile dysfunction)     Hyperlipidemia     Hypertension     Low back pain March 2022    Obesity     S/P cervical spinal fusion 05/10/2023    Spinal stenosis of lumbar region at multiple levels 12/22/2022       Past Surgical History:   Procedure Laterality Date    CARPAL TUNNEL RELEASE Right     EYE SURGERY      strabibusmus    HAND SURGERY      left index finger    MI ARTHRD PST/PSTLAT TQ 1NTRSPC CRV BELW C2 SEGMENT N/A 05/10/2023    Procedure: Posterior cervical decompressive laminectomy and instrumented fusion C4-T1;  Surgeon: Collins Angulo MD;  [facility];  Service: Neurosurgery    MI EXCISION SPERMATOCELE W/WO EPIDIDYMECTOMY Left 09/16/2021    Procedure: HYDROCELECTOMY, SCROTAL DEBRIDEMENT;  Surgeon: Jose Guadalupe Christianson MD;  [facility];  Service: Urology    MI NEUROPLASTY &/TRANSPOS MEDIAN NRV CARPAL TUNNE Right 07/06/2021    Procedure: RELEASE CARPAL TUNNEL;  Surgeon: Natty Cuevas MD;  [facility];  Service: Orthopedics    MI NEUROPLASTY &/TRANSPOSITION ULNAR NERVE ELBOW Right 07/06/2021    Procedure: RELEASE CUBITAL TUNNEL;  Surgeon: Natty Cuevas MD;  [facility];  Service: Orthopedics    SPINE SURGERY  05/10/223         Current Outpatient Medications:     acetaminophen (TYLENOL) 325 mg tablet, Take 2 tablets (650 mg total) by mouth every 6 (six) hours as needed for mild pain, Disp: , Rfl: 0    amLODIPine-benazepril (LOTREL 5-10) 5-10 MG per capsule, TAKE 1 CAPSULE DAILY, Disp: 90 capsule, Rfl: 1    glimepiride (AMARYL) 1 mg tablet, TAKE 1 TABLET DAILY WITH   BREAKFAST, Disp: 90 tablet, Rfl: 1    methocarbamol (ROBAXIN) 500 mg tablet, TAKE 1 TABLET (500 MG TOTAL) BY MOUTH 2 (TWO) TIMES A DAY AS NEEDED FOR MUSCLE SPASMS, Disp: 60 tablet,  Rfl: 0    pioglitazone-metFORMIN (ACTOPLUS MET)  MG per tablet, TAKE 1 TABLET TWICE DAILY  WITH MEALS, Disp: 180 tablet, Rfl: 1    Current Facility-Administered Medications:     iohexol (OMNIPAQUE) 300 mg/mL injection 1 mL, 1 mL, Intra-articular, Once, Hugo Smallwood DO    lidocaine (PF) (XYLOCAINE-MPF) 1 % injection 2 mL, 2 mL, Infiltration, Once, Hugo Smallwood DO    methylPREDNISolone acetate (DEPO-MEDROL) injection 80 mg, 80 mg, Intra-articular, Once, Hugo Smallwood DO    ropivacaine (NAROPIN) injection 5 mL, 5 mL, Intra-articular, Once, Hugo Smallwood, DO    No Known Allergies    Physical Exam:   Vitals:    05/13/24 1134   BP: 128/84   Pulse: 84   Resp: 18   Temp: 98 °F (36.7 °C)   SpO2: 96%     General: Awake, Alert, Oriented x 3, Mood and affect appropriate  Respiratory: Respirations even and unlabored  Cardiovascular: Peripheral pulses intact; no edema  Musculoskeletal Exam: Tenderness palpation bilateral glutes    ASA Score: 2    Patient/Chart Verification  Patient ID Verified: Verbal  ID Band Applied: No  Consents Confirmed: Procedural, To be obtained in the Pre-Procedure area  H&P( within 30 days) Verified: To be obtained in the Pre-Procedure area  Allergies Reviewed: Yes  Anticoag/NSAID held?: No  Currently on antibiotics?: No    Assessment:   1. Sacroiliitis (HCC)    2. Spinal stenosis of lumbar region without neurogenic claudication    3. Lumbar degenerative disc disease        Plan: Bilateral SIJ inj

## 2024-05-18 ENCOUNTER — HOSPITAL ENCOUNTER (OUTPATIENT)
Dept: MRI IMAGING | Facility: HOSPITAL | Age: 69
Discharge: HOME | End: 2024-05-18
Attending: PHYSICAL MEDICINE & REHABILITATION
Payer: MEDICARE

## 2024-05-18 DIAGNOSIS — M51.36 LUMBAR DEGENERATIVE DISC DISEASE: ICD-10-CM

## 2024-05-18 DIAGNOSIS — M48.061 SPINAL STENOSIS OF LUMBAR REGION WITHOUT NEUROGENIC CLAUDICATION: ICD-10-CM

## 2024-05-18 DIAGNOSIS — M46.1 SACROILIITIS (HCC): ICD-10-CM

## 2024-05-18 PROCEDURE — 72148 MRI LUMBAR SPINE W/O DYE: CPT

## 2024-05-20 ENCOUNTER — TELEPHONE (OUTPATIENT)
Dept: PAIN MEDICINE | Facility: CLINIC | Age: 69
End: 2024-05-20

## 2024-05-20 NOTE — TELEPHONE ENCOUNTER
Caller: vik Jaime   Doctor/office: Selin LOUISE#: [phone]    % of improvement: left side 90% right side 20%    Pain Scale (1-10): 7 or 8

## 2024-05-23 ENCOUNTER — PROCEDURE VISIT (OUTPATIENT)
Dept: NEUROLOGY | Facility: CLINIC | Age: 69
End: 2024-05-23
Payer: MEDICARE

## 2024-05-23 VITALS
TEMPERATURE: 97.3 F | HEIGHT: 67 IN | SYSTOLIC BLOOD PRESSURE: 130 MMHG | OXYGEN SATURATION: 94 % | DIASTOLIC BLOOD PRESSURE: 84 MMHG | BODY MASS INDEX: 38.53 KG/M2 | HEART RATE: 66 BPM

## 2024-05-23 DIAGNOSIS — G82.50 CHRONIC INCOMPLETE SPASTIC TETRAPLEGIA (HCC): Primary | ICD-10-CM

## 2024-05-23 DIAGNOSIS — G89.29 CHRONIC BILATERAL LOW BACK PAIN WITHOUT SCIATICA: ICD-10-CM

## 2024-05-23 DIAGNOSIS — M54.50 CHRONIC BILATERAL LOW BACK PAIN WITHOUT SCIATICA: ICD-10-CM

## 2024-05-23 PROCEDURE — 99214 OFFICE O/P EST MOD 30 MIN: CPT | Performed by: PHYSICAL MEDICINE & REHABILITATION

## 2024-05-23 PROCEDURE — 95874 GUIDE NERV DESTR NEEDLE EMG: CPT | Performed by: PHYSICAL MEDICINE & REHABILITATION

## 2024-05-23 PROCEDURE — 64642 CHEMODENERV 1 EXTREMITY 1-4: CPT | Performed by: PHYSICAL MEDICINE & REHABILITATION

## 2024-05-23 RX ORDER — METHOCARBAMOL 500 MG/1
500 TABLET, FILM COATED ORAL 2 TIMES DAILY PRN
Qty: 60 TABLET | Refills: 0 | Status: SHIPPED | OUTPATIENT
Start: 2024-05-23

## 2024-05-23 NOTE — PROGRESS NOTES
Physical Medicine & Rehabilitation Spasticity Follow-up/Procedure  Addison Woodruff 68 y.o. male    ASSESSMENT/PLAN:   Diagnoses and all orders for this visit:    Chronic incomplete spastic tetraplegia (HCC)    Other orders  -     Chemodenervation        Mr. Woodruff is a pleasant 67yo M with medical history of cervical myelopathy and severe stenosis s/p decompression and fusion with residual overall mild-mod spasticity. He also has a history of R cubital/carpal tunnel s/p release. He is here today for a midpoint visit for his tendency to R finger flexion. He has weakness in both extension and finger flexion, and knows that the goal is not necessarily to regain function in that hand, but he wants to be able to stop them from clenching which is what bothers him. He had a good result from toxin, and I did not change his regimen today. May be able to decrease dose in the future. He does feel it has started wearing off with more difficulty opening his fingers leading into this appointment. The longevity of effect is acceptable. I am hesitant to be too aggressive given he does use that hand for some , and I don't want to decrease his  strength any further.     He had a good response form his SI joint injection on the L, but continues to have pain in his R lower back that radiates down his leg. He is undergoing workup with MRI and XR L-spine with Dr. Smallwood and will follow-up with him to determine next steps.     1. Oral antispasticity medications: None at this time given focality of his symptoms/goals.  2. Schedule 100. Performed in chair  3. Physical/occupational therapy once able to tolerate. Right now limited by back pain. Once treated would get him into PT/OT.  4. Will f/u 9/3/2024 for repeat toxin injections at 1:00pm.    HPI/SUBJECTIVE:  Here with Jorge A DUARTE    Since last seen, he had SI joint injections and noticed significant improvement on the L side, but not on the R side. He had his MRI done, and is going to  follow-up with Dr. Smallwood to review results and next steps. He still feels like he has had some benefit for the toxin, and better from baseline, he thinks maybe around last week it started wear off. Range of motion is starting to be difficult again, but he noticed that he was able to extend his fingers more easily when the toxin was working.     Last seen for chemodenervation: 2/21/2024  Results of chemodenervation: Improved tendency toward finger flexion  How long did effects last: 12 weeks   Side affect/Adverse Effects: None     Any issues with driving, swallowing, appetite: Does drive  Stretching/Exercise Program: Not consistently  Currently in therapy: None  Any falls with significant injuries: None  Any new weakness: None  Any changes to care since last seen: Since last seen he was seen by Dr. Smallwood who ordered an MRI of his L-spine and XR L-Spine, and a plan for bilateral SI joint injections which were done on 5/13.   Safe at home: Yes  Any oral meds: Robaxin rarely as needed for low back pain.   On anticoagulation/blood thinners: No  Current functional status:  Independent with stairs, ambulation with RW, transfers, bathing, dressing, grooming, feeding.  Continent of bowel with intermittent urgency of bladder which is chronic   He can walk household distances, but community/longer distances limited by his back pain.   He does use the R hand to  things, has not noticed increased clumsiness/dropping things.     ROS: A 10 point review of systems was negative except for what is noted in the HPI.    Imaging: I have personally reviewed imaging with results as follows:  Final read MRI L-Spine pending     4/18 XR L-Spine:  1. Slight interval progression of advanced multilevel degenerative facet hypertrophy and disc space narrowing throughout the lumbar spine with changes most prominent at L2-L3 and L5-S1.      2. Redemonstrated mild anterior spondylolisthesis of L4 on L5. Slight retrolisthesis of L1 on L2 and L2  "on L3.    OBJECTIVE:   Temp (!) 97.3 °F (36.3 °C) (Temporal)   Ht 5' 7\" (1.702 m)   BMI 38.53 kg/m²     Gen: No acute distress, Well-nourished, well-appearing.  HEENT: Moist mucus membranes, Normocephalic/Atraumatic  Cardiovascular: Regular rate, rhythm, S1/S2. Distal pulses palpable  Heme/Extr:  BL LE edema   Pulmonary: Non-labored breathing.   : No maher  GI: Soft, non-tender, non-distended.   MSK:   Full PROM in UE joints  Integumentary: Skin is warm, dry.   Psych: Normal mood and affect.   Neuro: AAOx3, Speech is intact. Appropriate to questioning.   MMT:   Strength:   Right  Left  Site  Right  Left  Site    5 5  S Ab: Shoulder Abductors  3  4  HF: Hip Flexors    5 5  EF: Elbow Flexors  4  5 KF: Knee Flexors    3 3+  EE: Elbow Extensors  4+  5  KE: Knee Extensors    5 5  WE: Wrist Extensors  5  5  DR: Dorsi Flexors    2  4  FF: Finger Flexors  5  5  PF: Plantar Flexors    1  2  HI: Hand Intrinsics  NT NT EHL: Extensor Hallucis Longus     MAS:  Right  Left  Site  Right  Left  Site    0 0  Shoulder 0 0  Hip Adductors   0 0  EF: Elbow Flexors  0 0 KF: Knee Flexors    0  0  EE: Elbow Extensors  1+ 1+  KE: Knee Extensors    0/0 0/0  WF/WE 0  0  DR: Dorsi Flexors    1 1  FF: Finger Flexors  0  0  PF: Plantar Flexors    1  0  HI: Hand Intrinsics  0/0  0/0  Toe Flex/Ex       MAS  0 - no increased tone  1 - slight increase in tone at the end of the ROM  1+ increase in tone at 1/2 the ROM  2 - increase in tone through most the ROM  3 - moderate increase in muscle tone - passive movement difficult  4 - affected parts ridid in flexion or extension    SPASMS observed:   RUE: no   LUE: no  RLE: no   LLE: no      Botulinum Toxin Injection Procedure    Pre-operative diagnosis: Spasticity    Post-operative diagnosis: Same    Procedure: Chemodenervation    After risks and benefits were explained including bleeding, infection, worsening of pain, damage to the areas being injected, weakness of muscles, loss of muscle control, " dysphagia if injecting the head or neck, facial droop if injecting the facial area, painful injection, allergic or other reaction to the medications being injected, and the failure of the procedure to help the problem, a signed consent was obtained on 05/23/2024     The patient was placed in a seated position and the sites to be treated were identified. A time out was called and performed. The area to be treated was prepped three times with alcohol and the alcohol allowed to dry. Next, a 26 gauge, 37mm disposable electrode needle was used to inject the medication in the area to be treated.    Guidance: EMG  Area(s) injected:    Muscle Dose (units) # Sites Technique   R FDS 30  2 EMG   R FDP 30  1 EMG      EMG       EMG       EMG       EMG       EMG       EMG         EMG         EMG         EMG    Waste  40   EMG         EMG        Medications used: 100 units of botox diluted in 1 mL of preservative free saline    See MAR for Lot/Exp    The patient did tolerate the procedure well. There were no complications.    The following portions of the patient's history were reviewed and updated as appropriate: allergies, current medications, past family history, past medical history, past social history, past surgical history and problem list.      Current Outpatient Medications:     acetaminophen (TYLENOL) 325 mg tablet, Take 2 tablets (650 mg total) by mouth every 6 (six) hours as needed for mild pain, Disp: , Rfl: 0    amLODIPine-benazepril (LOTREL 5-10) 5-10 MG per capsule, TAKE 1 CAPSULE DAILY, Disp: 90 capsule, Rfl: 1    glimepiride (AMARYL) 1 mg tablet, TAKE 1 TABLET DAILY WITH   BREAKFAST, Disp: 90 tablet, Rfl: 1    methocarbamol (ROBAXIN) 500 mg tablet, TAKE 1 TABLET (500 MG TOTAL) BY MOUTH 2 (TWO) TIMES A DAY AS NEEDED FOR MUSCLE SPASMS, Disp: 60 tablet, Rfl: 0    pioglitazone-metFORMIN (ACTOPLUS MET)  MG per tablet, TAKE 1 TABLET TWICE DAILY  WITH MEALS, Disp: 180 tablet, Rfl: 1    Past Surgical History:    Procedure Laterality Date    CARPAL TUNNEL RELEASE Right     EYE SURGERY      strabibusmus    HAND SURGERY      left index finger    CO ARTHRD PST/PSTLAT TQ 1NTRSPC CRV BELW C2 SEGMENT N/A 05/10/2023    Procedure: Posterior cervical decompressive laminectomy and instrumented fusion C4-T1;  Surgeon: Collins Angulo MD;  [facility];  Service: Neurosurgery    CO EXCISION SPERMATOCELE W/WO EPIDIDYMECTOMY Left 09/16/2021    Procedure: HYDROCELECTOMY, SCROTAL DEBRIDEMENT;  Surgeon: Jose Guadalupe Christianson MD;  Location: [facility];  Service: Urology    CO NEUROPLASTY &/TRANSPOS MEDIAN NRV CARPAL TUNNE Right 07/06/2021    Procedure: RELEASE CARPAL TUNNEL;  Surgeon: Natty Cuevas MD;  [facility];  Service: Orthopedics    CO NEUROPLASTY &/TRANSPOSITION ULNAR NERVE ELBOW Right 07/06/2021    Procedure: RELEASE CUBITAL TUNNEL;  Surgeon: Natty Cuevas MD;  [facility];  Service: Orthopedics    SPINE SURGERY  05/10/223       Patient Active Problem List    Diagnosis Date Noted    Incomplete spinal cord injury at C1-C4 level without bone injury (HCC) 04/27/2023    Spondylosis of cervical spine with myelopathy 03/01/2023    Sacroiliitis (HCC) 05/13/2024    Cubital tunnel syndrome on left 04/17/2024    Non-healing wound of right lower extremity 02/21/2024    Chronic incomplete spastic tetraplegia (HCC) 01/11/2024    Weakness of hand 10/23/2023    Contracture, right wrist 09/14/2023    Contracture of muscle, right hand 09/14/2023    Lung cancer screening declined by patient 09/01/2023    Neurogenic claudication 04/27/2023    Spinal stenosis of lumbar region at multiple levels 12/22/2022    Mixed hyperlipidemia 08/17/2022    Primary hypertension 07/14/2022    Type 2 diabetes mellitus without complication, without long-term current use of insulin (HCC) 07/14/2022    Chronic bilateral low back pain without sciatica 07/14/2022    Carpal tunnel syndrome of right wrist 07/06/2021    Atrophy  of muscle of right hand 07/06/2021    Obesity, morbid (HCC) 05/24/2021    Cubital tunnel syndrome on right     Carpal tunnel syndrome, left           Chemodenervation     Date/Time  5/23/2024 1:00 PM     Performed by  Ashley Depadua, MD   Authorized by  Ashley Depadua, MD     Procedure details      Guidance: EMG

## 2024-05-23 NOTE — PATIENT INSTRUCTIONS
You have received botulinum toxin injections today.     The skin around the site of injections should be monitored for a couple of days. If redness or swelling occur, the skin should be examined by a health care professional to rule out infection. You can call my office if this occurs.    Please contact our office via [employer] in 2 weeks to report on the effects of the injections or any time with any questions or concerns.     Please note that your next injections should not be scheduled less than 90 days from today.    If your health insurance changes before the next injections, please contact our office as soon as possible so we can submit for a new prior authorization if needed. Please note that we may not be able to perform the injections if your insurance changes and the treatment is not pre-authorized.

## 2024-05-23 NOTE — TELEPHONE ENCOUNTER
Refill request fax sheet from [facility], stating for a refill on methocarbamol. Medication sent to provider for approval.

## 2024-05-30 ENCOUNTER — TELEPHONE (OUTPATIENT)
Dept: PAIN MEDICINE | Facility: CLINIC | Age: 69
End: 2024-05-30

## 2024-05-30 NOTE — TELEPHONE ENCOUNTER
S/w pt and advised of the same, nurse advised pt nurse to discuss with KW if Dr. Angulo would like to see pt in office.  Pt does not know what to do at this point and feels like giving up. Nurse advised pt not to give up, that hopefully there are options, and our office to CB with recommendations.  Pt verbalized understanding and appreciative of call.

## 2024-05-30 NOTE — TELEPHONE ENCOUNTER
Nurse advised pt Dr. Angulo's office will be in contact to schedule an OVS, pt verbalized understanding and appreciative of call.

## 2024-05-30 NOTE — TELEPHONE ENCOUNTER
Nurse advised pt Dr. Angulo's office will be in contact to schedule an OVS, pt verbalized understanding and appreciative of call.    What is the condition that needs to be treated so that I can add a diagnosis to the order?

## 2024-05-30 NOTE — TELEPHONE ENCOUNTER
Caller: Patient     Doctor: Selin     Reason for call: Returning patient call     Call back#: [phone]

## 2024-05-30 NOTE — TELEPHONE ENCOUNTER
----- Message from Collins Angulo MD sent at 5/30/2024  3:34 PM EDT -----  Thanks for the head's up Elana Galvez:  Please schedule for the patient to be seen with me  ----- Message -----  From: Hugo Smallwood DO  Sent: 5/30/2024  12:06 PM EDT  To: Collins Angulo MD; #    Clinical: Please notify patient of MRI lumbar spine results demonstrating multilevel severe mass effect on the thecal sac L2-L3 through L4-L5 and moderate to severe mass effect at L1-L2.  Also noting epidural lipomatosis at L5-S1 and multilevel degenerative changes that is gotten progressively worse again most notable at L2-L3    Dr. Angulo I wanted to include you on this message given the progressive findings on MRI lumbar spine.  Would you like to see him in office?  Thank you  ----- Message -----  From: Interface, Radiology Results In  Sent: 5/28/2024   7:10 PM EDT  To: Hugo Smallwood DO

## 2024-05-30 NOTE — TELEPHONE ENCOUNTER
----- Message from Hugo Smallwood DO sent at 5/30/2024 12:04 PM EDT -----  Clinical: Please notify patient of MRI lumbar spine results demonstrating multilevel severe mass effect on the thecal sac L2-L3 through L4-L5 and moderate to severe mass effect at L1-L2.  Also noting epidural lipomatosis at L5-S1 and multilevel degenerative changes that is gotten progressively worse again most notable at L2-L3    Dr. Angulo I wanted to include you on this message given the progressive findings on MRI lumbar spine.  Would you like to see him in office?  Thank you  ----- Message -----  From: Interface, Radiology Results In  Sent: 5/28/2024   7:10 PM EDT  To: Hugo Smallwood DO

## 2024-06-03 ENCOUNTER — TELEPHONE (OUTPATIENT)
Dept: FAMILY MEDICINE CLINIC | Facility: CLINIC | Age: 69
End: 2024-06-03

## 2024-06-03 DIAGNOSIS — G89.29 CHRONIC BILATERAL LOW BACK PAIN WITHOUT SCIATICA: ICD-10-CM

## 2024-06-03 DIAGNOSIS — M54.50 CHRONIC BILATERAL LOW BACK PAIN WITHOUT SCIATICA: ICD-10-CM

## 2024-06-03 RX ORDER — METHOCARBAMOL 500 MG/1
500 TABLET, FILM COATED ORAL 2 TIMES DAILY PRN
Qty: 60 TABLET | Refills: 0 | Status: SHIPPED | OUTPATIENT
Start: 2024-06-03

## 2024-06-13 ENCOUNTER — TELEPHONE (OUTPATIENT)
Dept: NEUROSURGERY | Facility: CLINIC | Age: 69
End: 2024-06-13

## 2024-06-13 NOTE — TELEPHONE ENCOUNTER
Called patient LMOM for him to call back to schedule a solo visit with DKO within a week or 2. (Per message from STIVEN)

## 2024-06-18 ENCOUNTER — OFFICE VISIT (OUTPATIENT)
Dept: PODIATRY | Facility: CLINIC | Age: 69
End: 2024-06-18
Payer: MEDICARE

## 2024-06-18 VITALS
OXYGEN SATURATION: 97 % | BODY MASS INDEX: 38.53 KG/M2 | DIASTOLIC BLOOD PRESSURE: 90 MMHG | SYSTOLIC BLOOD PRESSURE: 128 MMHG | HEIGHT: 67 IN | HEART RATE: 76 BPM

## 2024-06-18 DIAGNOSIS — E11.9 TYPE 2 DIABETES MELLITUS WITHOUT COMPLICATION, WITHOUT LONG-TERM CURRENT USE OF INSULIN (HCC): ICD-10-CM

## 2024-06-18 DIAGNOSIS — I73.9 PAD (PERIPHERAL ARTERY DISEASE) (HCC): ICD-10-CM

## 2024-06-18 DIAGNOSIS — B35.1 ONYCHOMYCOSIS: Primary | ICD-10-CM

## 2024-06-18 PROCEDURE — 11721 DEBRIDE NAIL 6 OR MORE: CPT | Performed by: PODIATRIST

## 2024-06-18 NOTE — PROGRESS NOTES
Assessment/Plan:     The patient's clinical examination today is consistent with brittle, thickened and dystrophic pedal nail plates x 10 consistent with onychomycosis bilaterally.  The skin is dry and scaling consistent with diabetic anhidrosis.  There are no open lesions nor callosities noted to either foot.  The interdigital spaces are clear without maceration.  Dependent rubor is noted today to both lower extremities, right worse than left.  Pedal pulses on the right are nonpalpable.  DP pulse on the left foot is palpable but diminished the PT pulses nonpalpable.  Temperature gradient is increased on the right.  Skin is thin to the bilateral lower extremities with decreased turgor.  There is absence of hair growth in bilateral lower extremities.  Epicritic sensation is diminished in the forefoot bilaterally consistent with peripheral neuropathy.     The pedal nail plates are sharply debrided with a sterile nail clipper x10 without complication.  The pedal nails were then sharply reduced in thickness and girth utilizing a rotary bur.  There are no other acute pedal issues noted today.      Recommend follow-up in 3 to 4 months for repeat at risk diabetic foot care.           Diagnoses and all orders for this visit:    Onychomycosis    Type 2 diabetes mellitus without complication, without long-term current use of insulin ([facility])    PAD (peripheral artery disease) ([facility])          Subjective:     Patient ID: Addison Woodruff is a 68 y.o. male.    The patient presents today for at risk foot care.  The patient is unable to trim his own nails secondary to the thickness and girth.  He has a history of chronic low back pain ambulates with the assistance of a walker.  He has lost function of his right hand secondary to a chronic and progressive contracture deformity.           PAST MEDICAL HISTORY:  Past Medical History:   Diagnosis Date    Arthritis 2002    Diabetes mellitus (HCC)     ED (erectile dysfunction)      Hyperlipidemia     Hypertension     Low back pain March 2022    Obesity     S/P cervical spinal fusion 05/10/2023    Spinal stenosis of lumbar region at multiple levels 12/22/2022       PAST SURGICAL HISTORY:  Past Surgical History:   Procedure Laterality Date    CARPAL TUNNEL RELEASE Right     EYE SURGERY      strabibusmus    HAND SURGERY      left index finger    OR ARTHRD PST/PSTLAT TQ 1NTRSPC CRV BELW C2 SEGMENT N/A 05/10/2023    Procedure: Posterior cervical decompressive laminectomy and instrumented fusion C4-T1;  Surgeon: Collins Angulo MD;  [facility];  Service: Neurosurgery    OR EXCISION SPERMATOCELE W/WO EPIDIDYMECTOMY Left 09/16/2021    Procedure: HYDROCELECTOMY, SCROTAL DEBRIDEMENT;  Surgeon: Jose Guadalupe Christianson MD;  [facility];  Service: Urology    OR NEUROPLASTY &/TRANSPOS MEDIAN NRV CARPAL TUNNE Right 07/06/2021    Procedure: RELEASE CARPAL TUNNEL;  Surgeon: Natty Cuevas MD;  [facility];  Service: Orthopedics    OR NEUROPLASTY &/TRANSPOSITION ULNAR NERVE ELBOW Right 07/06/2021    Procedure: RELEASE CUBITAL TUNNEL;  Surgeon: Natty Cuevas MD;  [facility];  Service: Orthopedics    SPINE SURGERY  05/10/223        ALLERGIES:  Patient has no known allergies.    MEDICATIONS:  Current Outpatient Medications   Medication Sig Dispense Refill    acetaminophen (TYLENOL) 325 mg tablet Take 2 tablets (650 mg total) by mouth every 6 (six) hours as needed for mild pain  0    amLODIPine-benazepril (LOTREL 5-10) 5-10 MG per capsule TAKE 1 CAPSULE DAILY 90 capsule 1    glimepiride (AMARYL) 1 mg tablet TAKE 1 TABLET DAILY WITH   BREAKFAST 90 tablet 1    methocarbamol (ROBAXIN) 500 mg tablet Take 1 tablet (500 mg total) by mouth 2 (two) times a day as needed for muscle spasms 60 tablet 0    pioglitazone-metFORMIN (ACTOPLUS MET)  MG per tablet TAKE 1 TABLET TWICE DAILY  WITH MEALS 180 tablet 1     No current facility-administered medications for this  visit.       SOCIAL HISTORY:  Social History     Socioeconomic History    Marital status:      Spouse name: None    Number of children: None    Years of education: None    Highest education level: None   Occupational History    None   Tobacco Use    Smoking status: Former     Current packs/day: 0.00     Average packs/day: 1 pack/day for 47.0 years (47.0 ttl pk-yrs)     Types: Cigarettes     Start date: 1/13/1974     Quit date: 1/13/2021     Years since quitting: 3.4    Smokeless tobacco: Never    Tobacco comments:     advised not to smoke   Vaping Use    Vaping status: Never Used   Substance and Sexual Activity    Alcohol use: Not Currently     Comment: aprox 4 drinks a month  when out to dinner    Drug use: Not Currently     Types: Marijuana    Sexual activity: Not Currently   Other Topics Concern    None   Social History Narrative    None     Social Determinants of Health     Financial Resource Strain: Medium Risk (11/30/2023)    Overall Financial Resource Strain (CARDIA)     Difficulty of Paying Living Expenses: Somewhat hard   Food Insecurity: No Food Insecurity (5/11/2023)    Hunger Vital Sign     Worried About Running Out of Food in the Last Year: Never true     Ran Out of Food in the Last Year: Never true   Transportation Needs: No Transportation Needs (11/30/2023)    PRAPARE - Transportation     Lack of Transportation (Medical): No     Lack of Transportation (Non-Medical): No   Physical Activity: Not on file   Stress: Not on file   Social Connections: Not on file   Intimate Partner Violence: Not on file   Housing Stability: Unknown (5/11/2023)    Housing Stability Vital Sign     Unable to Pay for Housing in the Last Year: No     Number of Places Lived in the Last Year: Not on file     Unstable Housing in the Last Year: No        Review of Systems   Constitutional: Negative.    HENT: Negative.     Eyes: Negative.    Respiratory: Negative.     Cardiovascular: Negative.    Endocrine: Negative.     Musculoskeletal: Negative.    Neurological: Negative.    Hematological: Negative.    Psychiatric/Behavioral: Negative.           Objective:     Physical Exam  Vitals reviewed.   Constitutional:       Appearance: Normal appearance.   HENT:      Head: Normocephalic and atraumatic.      Nose: Nose normal.   Eyes:      Conjunctiva/sclera: Conjunctivae normal.      Pupils: Pupils are equal, round, and reactive to light.   Cardiovascular:      Pulses:           Dorsalis pedis pulses are 0 on the right side and 1+ on the left side.        Posterior tibial pulses are 0 on the right side and 0 on the left side.   Pulmonary:      Effort: Pulmonary effort is normal.   Feet:      Right foot:      Toenail Condition: Right toenails are abnormally thick and long. Fungal disease present.     Left foot:      Toenail Condition: Left toenails are abnormally thick and long. Fungal disease present.     Comments: The patient's clinical examination today is consistent with brittle, thickened and dystrophic pedal nail plates x 10 consistent with onychomycosis bilaterally.  The skin is dry and scaling consistent with diabetic anhidrosis.  There are no open lesions nor callosities noted to either foot.  The interdigital spaces are clear without maceration.  Dependent rubor is noted today to both lower extremities, right worse than left.  Pedal pulses on the right are nonpalpable.  DP pulse on the left foot is palpable but diminished the PT pulses nonpalpable.  Temperature gradient is increased on the right.  Skin is thin to the bilateral lower extremities with decreased turgor.  There is absence of hair growth in bilateral lower extremities.  Epicritic sensation is diminished in the forefoot bilaterally consistent with peripheral neuropathy.     Skin:     General: Skin is warm.      Capillary Refill: Capillary refill takes less than 2 seconds.   Neurological:      General: No focal deficit present.      Mental Status: He is alert and  oriented to person, place, and time.   Psychiatric:         Mood and Affect: Mood normal.         Behavior: Behavior normal.         Thought Content: Thought content normal.

## 2024-06-20 ENCOUNTER — OFFICE VISIT (OUTPATIENT)
Dept: NEUROSURGERY | Facility: CLINIC | Age: 69
End: 2024-06-20
Payer: MEDICARE

## 2024-06-20 VITALS
WEIGHT: 246 LBS | BODY MASS INDEX: 38.61 KG/M2 | OXYGEN SATURATION: 97 % | HEART RATE: 69 BPM | RESPIRATION RATE: 18 BRPM | TEMPERATURE: 97.3 F | DIASTOLIC BLOOD PRESSURE: 72 MMHG | HEIGHT: 67 IN | SYSTOLIC BLOOD PRESSURE: 116 MMHG

## 2024-06-20 DIAGNOSIS — Z98.1 ARTHRODESIS STATUS: ICD-10-CM

## 2024-06-20 DIAGNOSIS — M62.549: Primary | ICD-10-CM

## 2024-06-20 DIAGNOSIS — R29.818 NEUROGENIC CLAUDICATION: ICD-10-CM

## 2024-06-20 PROCEDURE — 99213 OFFICE O/P EST LOW 20 MIN: CPT | Performed by: NEUROLOGICAL SURGERY

## 2024-06-20 NOTE — PROGRESS NOTES
DISCUSSION SUMMARY   This is a 68 y.o. male who is status post a posterior cervical decompression and fixation fusion who has worsened spontaneously over the last 2 months.  He does not disclose any falls or provocative maneuvers which cause this to occur.  He has obvious muscle atrophy and weakness bilaterally in the upper extremities with the left being affected to a much greater extent than the right.  In addition to this he has neurogenic claudication which has progressed.    I have recommended an MRI of the cervical spine with a follow-up visit.    Return for f/u virtual after MRI cervical.      Diagnosis ICD-10-CM Associated Orders   1. Hand muscle atrophy  M62.549 MRI cervical spine without contrast           Chief Complaint   Patient presents with    Follow-up     per DKO per NB's note        HPI status post a posterior cervical decompressive laminectomy and cervical fusion in May 2023  Approximately 2 months ago he developed tightness and numbness in his left hand without provocative incident.  His legs have become progressively weaker and now he can ambulate approximately 5 minutes before he can ambulate no further.  He has developed increased numbness and tingling in his left hand  All his symptoms have worsened  He uses a wheelchair after approximately 5 minutes of ambulation because of the severity of his weakness.    Review of Systems   Constitutional: Negative.    HENT: Negative.     Eyes: Negative.    Respiratory: Negative.     Cardiovascular: Negative.    Gastrointestinal: Negative.    Endocrine: Negative.    Genitourinary: Negative.    Musculoskeletal:  Positive for back pain (lower back pain radiates to right hip (very little inthe buttocks) then travels to the front of both legs), gait problem (can walk a short distance without the walker b/c he feels wobbly, no recent falls) and myalgias (in the back and mucles seem tight in the front of thighs).        SI joint injection 5/13/24 and has had  "relief right away on the left side and the right side has improved.    Skin: Negative.    Allergic/Immunologic: Negative.    Neurological:  Positive for weakness (after walking for 5 minutes, legs become weak and has to use a wheelchair and/or take breaks) and numbness (in the left hand).        About 2 months ago right arm feels tight and has numbness in the left hand.  Has had previous neck sx in the past with DKO   5/10/23 posterior cervical decompressive laminectomy and fusion C4-T11   Hematological: Negative.    Psychiatric/Behavioral: Negative.     I reviewed the ROS    Vitals:    /72 (BP Location: Left arm, Patient Position: Sitting, Cuff Size: Adult)   Pulse 69   Temp (!) 97.3 °F (36.3 °C) (Temporal)   Resp 18   Ht 5' 7\" (1.702 m)   Wt 112 kg (246 lb)   SpO2 97%   BMI 38.53 kg/m²     MEDICAL HISTORY  Past Medical History:   Diagnosis Date    Arthritis 2002    Diabetes mellitus (HCC)     ED (erectile dysfunction)     Hyperlipidemia     Hypertension     Low back pain March 2022    Obesity     S/P cervical spinal fusion 05/10/2023    Spinal stenosis of lumbar region at multiple levels 12/22/2022     Past Surgical History:   Procedure Laterality Date    CARPAL TUNNEL RELEASE Right     EYE SURGERY      strabibusmus    HAND SURGERY      left index finger    MI ARTHRD PST/PSTLAT TQ 1NTRSPC CRV BELW C2 SEGMENT N/A 05/10/2023    Procedure: Posterior cervical decompressive laminectomy and instrumented fusion C4-T1;  Surgeon: Collins Angulo MD;  Location:  MAIN OR;  Service: Neurosurgery    MI EXCISION SPERMATOCELE W/WO EPIDIDYMECTOMY Left 09/16/2021    Procedure: HYDROCELECTOMY, SCROTAL DEBRIDEMENT;  Surgeon: Jose Guadalupe Christianson MD;  Location: [facility]IN OR;  Service: Urology    MI NEUROPLASTY &/TRANSPOS MEDIAN NRV CARPAL TUNNE Right 07/06/2021    Procedure: RELEASE CARPAL TUNNEL;  Surgeon: Natty Cuevas MD;  [facility];  Service: Orthopedics    MI NEUROPLASTY &/TRANSPOSITION ULNAR " NERVE ELBOW Right 07/06/2021    Procedure: RELEASE CUBITAL TUNNEL;  Surgeon: Natty Cuevas MD;  [facility] MA[facility] OR;  Service: Orthopedics    SPINE SURGERY  05/10/223     Social History     Tobacco Use    Smoking status: Former     Current packs/day: 0.00     Average packs/day: 1 pack/day for 47.0 years (47.0 ttl pk-yrs)     Types: Cigarettes     Start date: 1/13/1974     Quit date: 1/13/2021     Years since quitting: 3.4     Passive exposure: Past    Smokeless tobacco: Never    Tobacco comments:     advised not to smoke   Vaping Use    Vaping status: Never Used   Substance Use Topics    Alcohol use: Not Currently     Comment: aprox 4 drinks a month  when out to dinner    Drug use: Not Currently     Types: Marijuana      Family History   Problem Relation Age of Onset    Hypertension Mother     Diabetes Mother     Hypertension Father     Diabetes Father         Current Outpatient Medications:     acetaminophen (TYLENOL) 325 mg tablet, Take 2 tablets (650 mg total) by mouth every 6 (six) hours as needed for mild pain, Disp: , Rfl: 0    amLODIPine-benazepril (LOTREL 5-10) 5-10 MG per capsule, TAKE 1 CAPSULE DAILY, Disp: 90 capsule, Rfl: 1    glimepiride (AMARYL) 1 mg tablet, TAKE 1 TABLET DAILY WITH   BREAKFAST, Disp: 90 tablet, Rfl: 1    pioglitazone-metFORMIN (ACTOPLUS MET)  MG per tablet, TAKE 1 TABLET TWICE DAILY  WITH MEALS, Disp: 180 tablet, Rfl: 1    meloxicam (MOBIC) 15 mg tablet, Take 1 tablet (15 mg total) by mouth daily as needed for moderate pain, Disp: 90 tablet, Rfl: 0    methocarbamol (ROBAXIN) 500 mg tablet, TAKE 1 TABLET (500 MG TOTAL) BY MOUTH 2 (TWO) TIMES A DAY AS NEEDED FOR MUSCLE SPASMS, Disp: 60 tablet, Rfl: 0     No Known Allergies     The following portions of the patient's history were updated by MA and reviewed by MD: allergies, current medications, past family history, past medical history, past social history, past surgical history, and problem list.    Physical  Exam  Vitals and nursing note reviewed.   Constitutional:       General: He is not in acute distress.     Appearance: Normal appearance. He is normal weight. He is not ill-appearing, toxic-appearing or diaphoretic.   HENT:      Head: Normocephalic and atraumatic.      Nose: Nose normal.   Eyes:      Extraocular Movements: Extraocular movements intact.      Pupils: Pupils are equal, round, and reactive to light.   Musculoskeletal:         General: Tenderness (Tenderness in the neck) present. No swelling, deformity or signs of injury. Normal range of motion.      Cervical back: Rigidity and tenderness present.      Right lower leg: No edema.      Left lower leg: No edema.   Skin:     General: Skin is warm and dry.      Comments: Well-healed incision in the posterior cervical spine   Neurological:      General: No focal deficit present.      Mental Status: He is alert and oriented to person, place, and time. Mental status is at baseline.      Cranial Nerves: No cranial nerve deficit.      Sensory: Sensory deficit (Patchy nondermatomal sensory changes) present.      Motor: Weakness (Atrophy greatest in the left hand involving the intrinsics of the hand) present.      Coordination: Coordination normal.      Gait: Gait abnormal ( ).      Deep Tendon Reflexes: Reflexes abnormal (Tricep from 0 on the left and 1 out of 4 on the right patellar reflexes are absent bilaterally).      Comments: He can touch his fifth and first digit on his left hand but has no power to oppose these.   Psychiatric:         Mood and Affect: Mood normal.         Behavior: Behavior normal.         Thought Content: Thought content normal.         Judgment: Judgment normal.         RESULTS/DATA  I have personally reviewed pertinent reports.    Nothing recent

## 2024-06-24 ENCOUNTER — OFFICE VISIT (OUTPATIENT)
Dept: FAMILY MEDICINE CLINIC | Facility: CLINIC | Age: 69
End: 2024-06-24
Payer: MEDICARE

## 2024-06-24 VITALS
TEMPERATURE: 98.2 F | BODY MASS INDEX: 38.53 KG/M2 | HEIGHT: 67 IN | RESPIRATION RATE: 18 BRPM | OXYGEN SATURATION: 98 % | HEART RATE: 78 BPM | DIASTOLIC BLOOD PRESSURE: 74 MMHG | SYSTOLIC BLOOD PRESSURE: 124 MMHG

## 2024-06-24 DIAGNOSIS — E66.01 OBESITY, MORBID (HCC): ICD-10-CM

## 2024-06-24 DIAGNOSIS — E11.9 TYPE 2 DIABETES MELLITUS WITHOUT COMPLICATION, WITHOUT LONG-TERM CURRENT USE OF INSULIN (HCC): Primary | ICD-10-CM

## 2024-06-24 DIAGNOSIS — G89.29 CHRONIC BILATERAL LOW BACK PAIN WITHOUT SCIATICA: ICD-10-CM

## 2024-06-24 DIAGNOSIS — I10 PRIMARY HYPERTENSION: ICD-10-CM

## 2024-06-24 DIAGNOSIS — M54.50 CHRONIC BILATERAL LOW BACK PAIN WITHOUT SCIATICA: ICD-10-CM

## 2024-06-24 DIAGNOSIS — E78.2 MIXED HYPERLIPIDEMIA: ICD-10-CM

## 2024-06-24 PROCEDURE — G2211 COMPLEX E/M VISIT ADD ON: HCPCS | Performed by: INTERNAL MEDICINE

## 2024-06-24 PROCEDURE — 99214 OFFICE O/P EST MOD 30 MIN: CPT | Performed by: INTERNAL MEDICINE

## 2024-06-24 RX ORDER — MELOXICAM 15 MG/1
15 TABLET ORAL DAILY PRN
Qty: 90 TABLET | Refills: 0 | Status: SHIPPED | OUTPATIENT
Start: 2024-06-24

## 2024-06-24 NOTE — PROGRESS NOTES
Assessment/Plan:       Problem List Items Addressed This Visit       Obesity, morbid (HCC)     Discussed diet changes  Encouraged activity as tolerated         Primary hypertension     Blood pressure is great today  Continue same medications          Type 2 diabetes mellitus without complication, without long-term current use of insulin (HCC) - Primary       Lab Results   Component Value Date    HGBA1C 6.9 (H) 03/18/2024   Controlled with current regimen  We can check A1c in office at next visit  Discussed diet changes  Suggested looking into exercising in a pool- might help the back too  Refused foot exam- we can do it at next visit          Chronic bilateral low back pain without sciatica     Follows with pain management  We can try Meloxicam prn- avoid other NSAIDs while taking- pt understood and will use Tylenol if something additional needed           Relevant Medications    meloxicam (MOBIC) 15 mg tablet    Mixed hyperlipidemia     Check lipids yearly  Ideally should be on statin- will address after repeat lipids              Subjective:     Chief Complaint   Patient presents with    Follow-up     3 month follow up, refused DM foot exam          Patient ID: Addison Woodruff is a 68 y.o. male who is here for a follow up. Continues to have back issues/leg pain which is the main problem but otherwise feels ok. Feels like his breathing is at baseline. No headaches, chest pain. Slight swelling in legs at the end of the day. Denies any other symptoms. Hard to be active due to his legs/back but tries to watch diet.         Patient's past medical history, surgical history, family history, medications, allergies and social history reviewed and updated    Review of Systems   Constitutional:  Negative for chills and fever.   HENT:  Negative for congestion and sore throat.    Respiratory:  Negative for cough and shortness of breath.    Cardiovascular:  Negative for chest pain.   Gastrointestinal:  Negative for abdominal  "pain, blood in stool and diarrhea.   Genitourinary:  Negative for dysuria and frequency.   Musculoskeletal:  Positive for back pain and myalgias.   Neurological:  Negative for headaches.         All other ROS negative.     Objective:    Vitals:    06/24/24 1045   BP: 124/74   Pulse: 78   Resp: 18   Temp: 98.2 °F (36.8 °C)   SpO2: 98%          Physical Exam  Vitals reviewed.   Constitutional:       Appearance: He is obese.   HENT:      Right Ear: External ear normal.      Left Ear: External ear normal.      Mouth/Throat:      Pharynx: Oropharynx is clear.   Eyes:      Conjunctiva/sclera: Conjunctivae normal.   Cardiovascular:      Rate and Rhythm: Normal rate and regular rhythm.      Heart sounds: No murmur heard.  Pulmonary:      Effort: Pulmonary effort is normal. No respiratory distress.      Breath sounds: No wheezing.   Abdominal:      Palpations: Abdomen is soft.      Tenderness: There is no abdominal tenderness.   Musculoskeletal:      Cervical back: Normal range of motion and neck supple.      Right lower leg: Edema (trace pitting) present.      Left lower leg: Edema (trace pitting) present.   Lymphadenopathy:      Cervical: No cervical adenopathy.   Neurological:      Mental Status: He is alert and oriented to person, place, and time. Mental status is at baseline.      Gait: Gait abnormal (uses walker to ambulate).               Portions of the record may have been created with voice recognition software.  Occasional wrong word or \"sound a like\" substitutions may have occurred due to the inherent limitations of voice recognition software.  Read the chart carefully and recognize, using context, where substitutions have occurred.     Maritza Howard MD  Internal Medicine and Pediatrics  "

## 2024-06-24 NOTE — ASSESSMENT & PLAN NOTE
Lab Results   Component Value Date    HGBA1C 6.9 (H) 03/18/2024   Controlled with current regimen  We can check A1c in office at next visit  Discussed diet changes  Suggested looking into exercising in a pool- might help the back too  Refused foot exam- we can do it at next visit

## 2024-06-24 NOTE — ASSESSMENT & PLAN NOTE
Follows with pain management  We can try Meloxicam prn- avoid other NSAIDs while taking- pt understood and will use Tylenol if something additional needed

## 2024-06-29 DIAGNOSIS — G89.29 CHRONIC BILATERAL LOW BACK PAIN WITHOUT SCIATICA: ICD-10-CM

## 2024-06-29 DIAGNOSIS — M54.50 CHRONIC BILATERAL LOW BACK PAIN WITHOUT SCIATICA: ICD-10-CM

## 2024-06-30 RX ORDER — METHOCARBAMOL 500 MG/1
500 TABLET, FILM COATED ORAL 2 TIMES DAILY PRN
Qty: 60 TABLET | Refills: 0 | Status: SHIPPED | OUTPATIENT
Start: 2024-06-30

## 2024-07-01 PROBLEM — M62.549: Status: ACTIVE | Noted: 2021-07-06

## 2024-07-23 ENCOUNTER — TELEPHONE (OUTPATIENT)
Age: 69
End: 2024-07-23

## 2024-07-23 NOTE — TELEPHONE ENCOUNTER
7/23/24 CALLED PT AND LMOM FOR HIM TO CB TO SEE IF HE WILL BE RESCHEDULING MRI CSPINE SO F/U WITH DKO CAN BE RESCHEDULED.    Addison Woodruff  P Neurosurgery Pod Clerical4 days ago     Appointment canceled for Addison Woodruff ([id number])  Visit type: VIRTUAL VISIT [facility]  7/30/2024 2:45 PM (30 minutes) with Collins Angulo MD in [facility] [place]     Reason for cancellation: Patient

## 2024-07-23 NOTE — TELEPHONE ENCOUNTER
Pt called and states he will not be doing the MRI ordered by DKO , he does not feel like he can lay in the machine. Can he do an xray instead?   Advised pt that we will ask that question and return his call.

## 2024-07-24 NOTE — TELEPHONE ENCOUNTER
This RN phoned patient to investigate message received stating he does not want to get an MRI.      Patient was in to see Dr. Angulo on 6/20/24 for Hand muscle atrophy, neurogenic claudication, and Arthrodesis status.  Patient to follow up with MRI cervical spine wo contrast.     Patient stated that he thought provider stated X-ray during his appointment on 6/20/24.  Review of documentation states MRI of the cervical spine wo contrast.      This RN inquired if patient had ever had oral sedation for his MRI's in the past.  The patient stated he did not.  This was discussed as a potential solution.  Patient stated that part of the issue is then he would need someone to take him and that is a problem he reports. Patient listened and thought about potential options and again came to the same conclusion, that he does not want to get an MRI.    He wants to ask the provider if he can just get an x-ray instead.

## 2024-07-27 DIAGNOSIS — M54.50 CHRONIC BILATERAL LOW BACK PAIN WITHOUT SCIATICA: ICD-10-CM

## 2024-07-27 DIAGNOSIS — G89.29 CHRONIC BILATERAL LOW BACK PAIN WITHOUT SCIATICA: ICD-10-CM

## 2024-07-27 DIAGNOSIS — E11.9 TYPE 2 DIABETES MELLITUS WITHOUT COMPLICATION, WITHOUT LONG-TERM CURRENT USE OF INSULIN (HCC): ICD-10-CM

## 2024-07-28 RX ORDER — MELOXICAM 15 MG/1
TABLET ORAL
Qty: 100 TABLET | Refills: 1 | Status: SHIPPED | OUTPATIENT
Start: 2024-07-28

## 2024-07-28 RX ORDER — GLIMEPIRIDE 1 MG/1
1 TABLET ORAL
Qty: 100 TABLET | Refills: 1 | Status: SHIPPED | OUTPATIENT
Start: 2024-07-28

## 2024-08-06 ENCOUNTER — TELEPHONE (OUTPATIENT)
Age: 69
End: 2024-08-06

## 2024-08-06 NOTE — TELEPHONE ENCOUNTER
Patient called inquiring about GOLO dietary weight loss medication.Patient wanted to know will this med be safe to take along with his other prescribed medications.Please advise.

## 2024-08-06 NOTE — TELEPHONE ENCOUNTER
Called and left voice message regarding GOLO and as per PCP possibly safe but he does not know much about this weight loss supplement, advised to return call if any other concerns

## 2024-09-03 ENCOUNTER — PROCEDURE VISIT (OUTPATIENT)
Dept: NEUROLOGY | Facility: CLINIC | Age: 69
End: 2024-09-03
Payer: MEDICARE

## 2024-09-03 VITALS
SYSTOLIC BLOOD PRESSURE: 134 MMHG | DIASTOLIC BLOOD PRESSURE: 76 MMHG | TEMPERATURE: 98.2 F | OXYGEN SATURATION: 99 % | HEART RATE: 76 BPM

## 2024-09-03 DIAGNOSIS — G82.50 CHRONIC INCOMPLETE SPASTIC TETRAPLEGIA (HCC): Primary | ICD-10-CM

## 2024-09-03 PROCEDURE — 64642 CHEMODENERV 1 EXTREMITY 1-4: CPT | Performed by: PHYSICAL MEDICINE & REHABILITATION

## 2024-09-03 PROCEDURE — 95874 GUIDE NERV DESTR NEEDLE EMG: CPT | Performed by: PHYSICAL MEDICINE & REHABILITATION

## 2024-09-03 NOTE — PATIENT INSTRUCTIONS
Will get you back into OT. I will reach out to Bria Delcid at [facility] to let her know to monitor your  strength and evaluate your shoulder/overhead motion.    You have received botulinum toxin injections today.     The skin around the site of injections should be monitored for a couple of days. If redness or swelling occur, the skin should be examined by a health care professional to rule out infection. You can call my office if this occurs.    Please contact our office via [employer] in 2 weeks to report on the effects of the injections or any time with any questions or concerns.     Please note that your next injections should not be scheduled less than 90 days from today.    If your health insurance changes before the next injections, please contact our office as soon as possible so we can submit for a new prior authorization if needed. Please note that we may not be able to perform the injections if your insurance changes and the treatment is not pre-authorized.

## 2024-09-03 NOTE — PROGRESS NOTES
Ambulatory Visit  Name: Addison Woodruff      : 1955      MRN: [id number]  Encounter Provider: Ashley L De Padua, MD  Encounter Date: 9/3/2024   Encounter department: [facility] [place]    Assessment & Plan   1. Chronic incomplete spastic tetraplegia (HCC)  Assessment & Plan:  He overall has mild spasticity.   Has weakness in extension/finger flexion, and goal with toxin is to decrease clenching.   Has had good results and his fingers are more flexible, it is easier for him to passively extend them so he can place his fingers on items and  onto things, but would monitor closely for decreased  strength  Lasts 11-12 weeks.  Would like to get him in with OT to work on hand dexterity and monitor his hand function.   I am avoiding being too aggressive to avoid increasing weakness.     Will also be getting him back into PT, now that his back pain has improved on mobic from his PCP. He wants to work on increasing his ambulation.   He also follows with Dr. Smallwood for this issue.    Has repeat MRI C-Spine ordered with Dr. Angulo.   Orders:  -     Ambulatory Referral to Occupational Therapy; Future  -     onabotulinumtoxin A (BOTOX) injection 100 Units  -     Ambulatory Referral to Physical Therapy; Future       1. Oral antispasticity medications: None at this time given focality of his symptoms/goals.  2. Schedule 100. Performed in chair  3.  Referral provided for PT/OT.   4. Will f/u 2024 at 1:45pm for repeat toxin injections.       History of Present Illness       Addison Woodruff is a 68 y.o. male who presents today for repeat botulinum toxin injections. Overall doing well. The main issue we address is finger flexion tightness. He recently was started on mobic for his back pain with marked improvement with his back pain.    In regards to his finger flexion tightness/clenching, he feels his  is somewhat similar, he has to manually extend his fingers, but he continues to be  able to  without increased weakness. He does use a tenodesis  at times. He is interested in resuming some therapies to work on his RUE function, and is open to doing physical therapy.    Last seen for chemodenervation: 5/23/2024   Results of chemodenervation: Improved tendency to clench. Easier to do passive range of motion.   How long did effects last: 11 weeks.   Side affect/Adverse Effects: None    Any issues with driving, swallowing, appetite:  Is able to drive. No issues with swallowing/appetite  Stretching/Exercise Program: Not consistently  Currently in therapy: None currently -but interested now that his back is doing better in getting back in with physical therapy to work on gait some more, and to work with OT on hand function and dexterity.   Any falls with significant injuries: None   Any new weakness: No new weakness.   Any changes to care since last seen: None. He saw Dr. Angulo with Neurosurgery who noted progression of his neurogenic claudication and also recommended MRI C-Spine, which hasn't been done yet. He is not interested in pursuing surgery at this time.  Safe at home: Yes  Any oral meds: Robaxin rarely as needed for low back pain.   On anticoagulation/blood thinners:  Current functional status:  Independent with stairs, ambulation with RW, transfers, bathing, dressing, grooming, feeding.  Continent of bowel with intermittent urgency of bladder which is chronic   He can walk household distances, but community/longer distances limited by his back pain.   He does use the R hand to  things, has not noticed increased clumsiness/dropping things.     ROS: A 10 point review of systems was negative except for what is noted in the HPI.    Imaging: I have personally reviewed imaging with results as follows:  No new pertinent imaging since previously seen.  5/18/2024 Mri L-Spine:  Congenital canal stenosis aggravated by superimposed multilevel degenerative disc disease, as described above.      Multilevel severe mass effect on the thecal sac is present from L2-L3 through L4-L5 and there is moderate to severe mass effect on the thecal sac at L1-L2.     Severe attenuation of the thecal sac at L5-S1 is secondary to epidural lipomatosis.     New Modic type I endplate degenerative changes at L2-L3. Moderate right foraminal narrowing at this level is progressed since the prior exam.     Objective     /76 (BP Location: Right arm, Patient Position: Sitting, Cuff Size: Standard)   Pulse 76   Temp 98.2 °F (36.8 °C) (Temporal)   SpO2 99%     Gen: No acute distress, Well-nourished, well-appearing.  HEENT: Moist mucus membranes, Normocephalic/Atraumatic  Cardiovascular: Distal pulses palpable  Heme/Extr: BL LE lymphedema   Pulmonary: Non-labored breathing.   : No maher  GI: Soft, non-tender, non-distended.   MSK: PROM is WFL in all extremities.   Able to fully extend fingers at MCP, DIP, and PIP with minimal end resistance.   Integumentary: Skin is warm, dry.   Psych: Normal mood and affect.   Neuro: AAOx3,  Speech is intact. Appropriate to questioning.   MMT:   Strength:   Right  Left  Site  Right  Left  Site    5 5  S Ab: Shoulder Abductors  3-  4 HF: Hip Flexors    5 5  EF: Elbow Flexors  4 5 KF: Knee Flexors    3 3+  EE: Elbow Extensors  4  5  KE: Knee Extensors    5  5  WE: Wrist Extensors  4 5  DR: Dorsi Flexors    3  4  FF: Finger Flexors  4+  4+  PF: Plantar Flexors    1  2  HI: Hand Intrinsics  NT  NT EHL: Extensor Hallucis Longus     MAS:  Right  Left  Site  Right  Left  Site    0 0  Shoulder 0 0  Hip Adductors   0 0  EF: Elbow Flexors  1 1 KF: Knee Flexors    0  0  EE: Elbow Extensors  1  1+  KE: Knee Extensors    0/0 0/0  WF/WE 0  0  DR: Dorsi Flexors    1 1 FF: Finger Flexors  0  0  PF: Plantar Flexors    1  0  HI: Hand Intrinsics  0/0  0/0  Toe Flex/Ex       MAS  0 - no increased tone  1 - slight increase in tone at the end of the ROM  1+ increase in tone at 1/2 the ROM  2 - increase in tone  through most the ROM  3 - moderate increase in muscle tone - passive movement difficult  4 - affected parts ridid in flexion or extension    SPASMS observed:   RUE: no   LUE: no  RLE: no   LLE: no    Administrative Statements           Botulinum Toxin Injection Procedure    Pre-operative diagnosis: Spasticity    Post-operative diagnosis: Same    Procedure: Chemodenervation    After risks and benefits were explained including bleeding, infection, worsening of pain, damage to the areas being injected, weakness of muscles, loss of muscle control, dysphagia if injecting the head or neck, facial droop if injecting the facial area, painful injection, allergic or other reaction to the medications being injected, and the failure of the procedure to help the problem, a signed consent was obtained on 09/03/2024     The patient was placed in a seated position and the sites to be treated were identified. A time out was called and performed. The area to be treated was prepped three times with alcohol and the alcohol allowed to dry. Next, a 26 gauge, 37mm disposable electrode needle was used to inject the medication in the area to be treated.    Guidance: EMG  Area(s) injected:    Muscle Dose (units) # Sites Technique   R FDS 30  2 EMG   R FDP 30  1 EMG       EMG       EMG       EMG       EMG       EMG       EMG         EMG         EMG         EMG    Waste 40   EMG         EMG        Medications used: 100 units of botox diluted in 1 mL of preservative free saline    See MAR for Lot/Exp    The patient did tolerate the procedure well. There were no complications.       Chemodenervation     Date/Time  9/3/2024 1:00 PM     Performed by  Ashley Depadua, MD   Authorized by  Ashley Depadua, MD     Procedure details      Guidance: EMG

## 2024-09-03 NOTE — PROGRESS NOTES
Review of Systems   Constitutional:  Negative for chills and fatigue.   HENT:  Negative for congestion and sinus pain.    Eyes: Negative.    Respiratory:  Negative for chest tightness, shortness of breath and stridor.    Cardiovascular:  Negative for chest pain and leg swelling.   Gastrointestinal: Negative.    Genitourinary:  Negative for genital sores and urgency.   Musculoskeletal:  Positive for gait problem. Negative for back pain and joint swelling.   Neurological:  Negative for dizziness, syncope and light-headedness.

## 2024-09-03 NOTE — ASSESSMENT & PLAN NOTE
He overall has mild spasticity.   Has weakness in extension/finger flexion, and goal with toxin is to decrease clenching.   Has had good results and his fingers are more flexible, it is easier for him to passively extend them so he can place his fingers on items and  onto things, but would monitor closely for decreased  strength  Lasts 11-12 weeks.  Would like to get him in with OT to work on hand dexterity and monitor his hand function.    - He's thinking [facility]. I will reach out to those therapists.   I am avoiding being too aggressive to avoid increasing weakness.     Will also be getting him back into PT, now that his back pain has improved on mobic from his PCP. He wants to work on increasing his ambulation.   He also follows with Dr. Smallwood for this issue.    Has repeat MRI C-Spine ordered with Dr. Angulo.

## 2024-09-16 LAB
LEFT EYE DIABETIC RETINOPATHY: POSITIVE
RIGHT EYE DIABETIC RETINOPATHY: POSITIVE

## 2024-09-18 ENCOUNTER — OFFICE VISIT (OUTPATIENT)
Dept: PODIATRY | Facility: CLINIC | Age: 69
End: 2024-09-18
Payer: MEDICARE

## 2024-09-18 VITALS
BODY MASS INDEX: 38.53 KG/M2 | HEIGHT: 67 IN | DIASTOLIC BLOOD PRESSURE: 74 MMHG | HEART RATE: 73 BPM | OXYGEN SATURATION: 98 % | SYSTOLIC BLOOD PRESSURE: 130 MMHG

## 2024-09-18 DIAGNOSIS — I73.9 PAD (PERIPHERAL ARTERY DISEASE) (HCC): ICD-10-CM

## 2024-09-18 DIAGNOSIS — E11.9 TYPE 2 DIABETES MELLITUS WITHOUT COMPLICATION, WITHOUT LONG-TERM CURRENT USE OF INSULIN (HCC): ICD-10-CM

## 2024-09-18 DIAGNOSIS — R60.0 BILATERAL LOWER EXTREMITY EDEMA: ICD-10-CM

## 2024-09-18 DIAGNOSIS — B35.1 ONYCHOMYCOSIS: Primary | ICD-10-CM

## 2024-09-18 PROCEDURE — 11721 DEBRIDE NAIL 6 OR MORE: CPT | Performed by: PODIATRIST

## 2024-09-18 PROCEDURE — 99213 OFFICE O/P EST LOW 20 MIN: CPT | Performed by: PODIATRIST

## 2024-09-18 NOTE — PROGRESS NOTES
Assessment/Plan:     The patient's clinical examination today is consistent with brittle, thickened and dystrophic pedal nail plates x 10 consistent with onychomycosis.  The skin is dry and scaly, consistent with diabetic anhidrosis.  There are no open lesions nor callosities noted to either foot.  The interdigital spaces are clear without maceration.  There is some chronic venous stasis changes to the skin to his right lower extremity.  There are several small superficial serous filled blisters secondary to his bilateral extremity edema.  Pedal pulses on the right are nonpalpable.  DP pulse on the left foot is palpable but diminished. PT pulses are nonpalpable B/L.  Temperature gradient is increased on the right.  Skin is thin to the bilateral lower extremities with decreased turgor.  There is absence of hair growth in bilateral lower extremities.  Epicritic sensation is diminished in the forefoot bilaterally consistent with peripheral neuropathy.     The pedal nail plates are sharply debrided with a sterile nail clipper x10 without complication.  The pedal nails were then sharply reduced in thickness and girth utilizing a rotary bur.  There are no other acute pedal issues noted today.    Compression wraps were applied today to assist with edema management.  Recommend elevation of his lower legs when at rest.  He may benefit from use of the support stockings as well.  If there are no contraindications, he can potentially benefit from a short course of a diuretic to assist with his lower limb edema.      Recommend follow-up in 3 months for repeat at risk diabetic foot care.     Diagnoses and all orders for this visit:    Onychomycosis    Type 2 diabetes mellitus without complication, without long-term current use of insulin (HCC)    PAD (peripheral artery disease) ([facility])    Bilateral lower extremity edema          Subjective:     Patient ID: Addison Woodruff is a 68 y.o. male.    The patient presents today for at risk  foot care.  The patient is unable to trim his own nails secondary to the thickness and girth.  He has a history of chronic low back pain ambulates with the assistance of a walker.  He has limited function of his right hand secondary to a chronic and progressive contracture deformity.  He does note some increased swelling to his lower legs today.              PAST MEDICAL HISTORY:  Past Medical History:   Diagnosis Date    Arthritis 2002    Diabetes mellitus (HCC)     ED (erectile dysfunction)     Hyperlipidemia     Hypertension     Low back pain March 2022    Obesity     S/P cervical spinal fusion 05/10/2023    Spinal stenosis of lumbar region at multiple levels 12/22/2022       PAST SURGICAL HISTORY:  Past Surgical History:   Procedure Laterality Date    CARPAL TUNNEL RELEASE Right     EYE SURGERY      strabibusmus    HAND SURGERY      left index finger    OK ARTHRD PST/PSTLAT TQ 1NTRSPC CRV BELW C2 SEGMENT N/A 05/10/2023    Procedure: Posterior cervical decompressive laminectomy and instrumented fusion C4-T1;  Surgeon: Collins Angulo MD;  Location: [facility];  Service: Neurosurgery    OK EXCISION SPERMATOCELE W/WO EPIDIDYMECTOMY Left 09/16/2021    Procedure: HYDROCELECTOMY, SCROTAL DEBRIDEMENT;  Surgeon: Jose Guadalupe Christianson MD;  Location: [facility];  Service: Urology    OK NEUROPLASTY &/TRANSPOS MEDIAN NRV CARPAL TUNNE Right 07/06/2021    Procedure: RELEASE CARPAL TUNNEL;  Surgeon: Natty Cuevas MD;  [facility];  Service: Orthopedics    OK NEUROPLASTY &/TRANSPOSITION ULNAR NERVE ELBOW Right 07/06/2021    Procedure: RELEASE CUBITAL TUNNEL;  Surgeon: Natty Cuevas MD;  Location: [facility];  Service: Orthopedics    SPINE SURGERY  05/10/223        ALLERGIES:  Patient has no known allergies.    MEDICATIONS:  Current Outpatient Medications   Medication Sig Dispense Refill    acetaminophen (TYLENOL) 325 mg tablet Take 2 tablets (650 mg total) by mouth every 6 (six) hours as needed for  mild pain  0    amLODIPine-benazepril (LOTREL 5-10) 5-10 MG per capsule TAKE 1 CAPSULE DAILY 90 capsule 1    glimepiride (AMARYL) 1 mg tablet TAKE 1 TABLET DAILY WITH   BREAKFAST 100 tablet 1    meloxicam (MOBIC) 15 mg tablet TAKE 1 TABLET DAILY AS     NEEDED FOR MODERATE PAIN 100 tablet 1    methocarbamol (ROBAXIN) 500 mg tablet TAKE 1 TABLET (500 MG TOTAL) BY MOUTH 2 (TWO) TIMES A DAY AS NEEDED FOR MUSCLE SPASMS 60 tablet 0    pioglitazone-metFORMIN (ACTOPLUS MET)  MG per tablet TAKE 1 TABLET TWICE DAILY  WITH MEALS 180 tablet 1     No current facility-administered medications for this visit.       SOCIAL HISTORY:  Social History     Socioeconomic History    Marital status:      Spouse name: None    Number of children: None    Years of education: None    Highest education level: None   Occupational History    None   Tobacco Use    Smoking status: Former     Current packs/day: 0.00     Average packs/day: 1 pack/day for 47.0 years (47.0 ttl pk-yrs)     Types: Cigarettes     Start date: 1/13/1974     Quit date: 1/13/2021     Years since quitting: 3.6     Passive exposure: Past    Smokeless tobacco: Never    Tobacco comments:     advised not to smoke   Vaping Use    Vaping status: Never Used   Substance and Sexual Activity    Alcohol use: Not Currently     Comment: aprox 4 drinks a month  when out to dinner    Drug use: Not Currently     Types: Marijuana    Sexual activity: Not Currently   Other Topics Concern    None   Social History Narrative    None     Social Determinants of Health     Financial Resource Strain: Medium Risk (11/30/2023)    Overall Financial Resource Strain (CARDIA)     Difficulty of Paying Living Expenses: Somewhat hard   Food Insecurity: No Food Insecurity (5/11/2023)    Hunger Vital Sign     Worried About Running Out of Food in the Last Year: Never true     Ran Out of Food in the Last Year: Never true   Transportation Needs: No Transportation Needs (11/30/2023)    PRAPARE -  Transportation     Lack of Transportation (Medical): No     Lack of Transportation (Non-Medical): No   Physical Activity: Not on file   Stress: Not on file   Social Connections: Not on file   Intimate Partner Violence: Not on file   Housing Stability: Unknown (5/11/2023)    Housing Stability Vital Sign     Unable to Pay for Housing in the Last Year: No     Number of Places Lived in the Last Year: Not on file     Unstable Housing in the Last Year: No        Review of Systems   Constitutional: Negative.    HENT: Negative.     Eyes: Negative.    Respiratory: Negative.     Cardiovascular: Negative.    Endocrine: Negative.    Musculoskeletal: Negative.    Neurological: Negative.    Hematological: Negative.    Psychiatric/Behavioral: Negative.           Objective:     Physical Exam  Vitals reviewed.   Constitutional:       Appearance: Normal appearance.   HENT:      Head: Normocephalic and atraumatic.      Nose: Nose normal.   Eyes:      Conjunctiva/sclera: Conjunctivae normal.      Pupils: Pupils are equal, round, and reactive to light.   Cardiovascular:      Pulses:           Dorsalis pedis pulses are 0 on the right side and 1+ on the left side.        Posterior tibial pulses are 0 on the right side and 0 on the left side.   Pulmonary:      Effort: Pulmonary effort is normal.   Feet:      Right foot:      Skin integrity: Blister present.      Toenail Condition: Right toenails are abnormally thick and long. Fungal disease present.     Left foot:      Skin integrity: Blister present.      Toenail Condition: Left toenails are abnormally thick and long. Fungal disease present.     Comments: The patient's clinical examination today is consistent with brittle, thickened and dystrophic pedal nail plates x 10 consistent with onychomycosis.  The skin is dry and scaly, consistent with diabetic anhidrosis.  There are no open lesions nor callosities noted to either foot.  The interdigital spaces are clear without maceration.  There  is some chronic venous stasis changes to the skin to his right lower extremity.  There are several small superficial serous filled blisters secondary to his bilateral extremity edema.  Pedal pulses on the right are nonpalpable.  DP pulse on the left foot is palpable but diminished. PT pulses are nonpalpable B/L.  Temperature gradient is increased on the right.  Skin is thin to the bilateral lower extremities with decreased turgor.  There is absence of hair growth in bilateral lower extremities.  Epicritic sensation is diminished in the forefoot bilaterally consistent with peripheral neuropathy.  Skin:     General: Skin is warm.      Capillary Refill: Capillary refill takes less than 2 seconds.   Neurological:      General: No focal deficit present.      Mental Status: He is alert and oriented to person, place, and time.   Psychiatric:         Mood and Affect: Mood normal.         Behavior: Behavior normal.         Thought Content: Thought content normal.

## 2024-09-27 DIAGNOSIS — I10 PRIMARY HYPERTENSION: ICD-10-CM

## 2024-09-27 RX ORDER — AMLODIPINE AND BENAZEPRIL HYDROCHLORIDE 5; 10 MG/1; MG/1
1 CAPSULE ORAL DAILY
Qty: 90 CAPSULE | Refills: 1 | Status: SHIPPED | OUTPATIENT
Start: 2024-09-27

## 2024-09-30 ENCOUNTER — RA CDI HCC (OUTPATIENT)
Dept: OTHER | Facility: HOSPITAL | Age: 69
End: 2024-09-30

## 2024-09-30 NOTE — PROGRESS NOTES
E11.51  HCC coding opportunities          Chart Reviewed number of suggestions sent to Provider: 1     Patients Insurance     Medicare Insurance: Medicare

## 2024-10-02 ENCOUNTER — NURSE TRIAGE (OUTPATIENT)
Age: 69
End: 2024-10-02

## 2024-10-02 ENCOUNTER — APPOINTMENT (EMERGENCY)
Dept: VASCULAR ULTRASOUND | Facility: HOSPITAL | Age: 69
End: 2024-10-02
Payer: MEDICARE

## 2024-10-02 ENCOUNTER — APPOINTMENT (EMERGENCY)
Dept: RADIOLOGY | Facility: HOSPITAL | Age: 69
End: 2024-10-02
Payer: MEDICARE

## 2024-10-02 ENCOUNTER — HOSPITAL ENCOUNTER (EMERGENCY)
Facility: HOSPITAL | Age: 69
Discharge: HOME | End: 2024-10-02
Attending: EMERGENCY MEDICINE
Payer: MEDICARE

## 2024-10-02 VITALS
TEMPERATURE: 98.3 F | OXYGEN SATURATION: 98 % | RESPIRATION RATE: 18 BRPM | DIASTOLIC BLOOD PRESSURE: 63 MMHG | HEART RATE: 86 BPM | SYSTOLIC BLOOD PRESSURE: 131 MMHG

## 2024-10-02 DIAGNOSIS — S81.809A WOUND OF LOWER EXTREMITY: ICD-10-CM

## 2024-10-02 DIAGNOSIS — R60.0 BILATERAL LOWER EXTREMITY EDEMA: Primary | ICD-10-CM

## 2024-10-02 DIAGNOSIS — J81.1 PULMONARY EDEMA: ICD-10-CM

## 2024-10-02 LAB
ALBUMIN SERPL BCG-MCNC: 3.7 G/DL (ref 3.5–5)
ALP SERPL-CCNC: 81 U/L (ref 34–104)
ALT SERPL W P-5'-P-CCNC: 11 U/L (ref 7–52)
ANION GAP SERPL CALCULATED.3IONS-SCNC: 10 MMOL/L (ref 4–13)
AST SERPL W P-5'-P-CCNC: 15 U/L (ref 13–39)
BASOPHILS # BLD AUTO: 0.02 THOUSANDS/ÂΜL (ref 0–0.1)
BASOPHILS NFR BLD AUTO: 0 % (ref 0–1)
BILIRUB SERPL-MCNC: 0.46 MG/DL (ref 0.2–1)
BNP SERPL-MCNC: 94 PG/ML (ref 0–100)
BUN SERPL-MCNC: 28 MG/DL (ref 5–25)
CALCIUM SERPL-MCNC: 9.3 MG/DL (ref 8.4–10.2)
CHLORIDE SERPL-SCNC: 107 MMOL/L (ref 96–108)
CO2 SERPL-SCNC: 24 MMOL/L (ref 21–32)
CREAT SERPL-MCNC: 0.96 MG/DL (ref 0.6–1.3)
EOSINOPHIL # BLD AUTO: 0.23 THOUSAND/ÂΜL (ref 0–0.61)
EOSINOPHIL NFR BLD AUTO: 3 % (ref 0–6)
ERYTHROCYTE [DISTWIDTH] IN BLOOD BY AUTOMATED COUNT: 14.8 % (ref 11.6–15.1)
GFR SERPL CREATININE-BSD FRML MDRD: 79 ML/MIN/1.73SQ M
GLUCOSE SERPL-MCNC: 108 MG/DL (ref 65–140)
HCT VFR BLD AUTO: 38.6 % (ref 36.5–49.3)
HGB BLD-MCNC: 12.3 G/DL (ref 12–17)
IMM GRANULOCYTES # BLD AUTO: 0.02 THOUSAND/UL (ref 0–0.2)
IMM GRANULOCYTES NFR BLD AUTO: 0 % (ref 0–2)
LYMPHOCYTES # BLD AUTO: 0.82 THOUSANDS/ÂΜL (ref 0.6–4.47)
LYMPHOCYTES NFR BLD AUTO: 9 % (ref 14–44)
MCH RBC QN AUTO: 28.5 PG (ref 26.8–34.3)
MCHC RBC AUTO-ENTMCNC: 31.9 G/DL (ref 31.4–37.4)
MCV RBC AUTO: 89 FL (ref 82–98)
MONOCYTES # BLD AUTO: 0.93 THOUSAND/ÂΜL (ref 0.17–1.22)
MONOCYTES NFR BLD AUTO: 10 % (ref 4–12)
NEUTROPHILS # BLD AUTO: 6.88 THOUSANDS/ÂΜL (ref 1.85–7.62)
NEUTS SEG NFR BLD AUTO: 78 % (ref 43–75)
NRBC BLD AUTO-RTO: 0 /100 WBCS
PLATELET # BLD AUTO: 330 THOUSANDS/UL (ref 149–390)
PMV BLD AUTO: 10.5 FL (ref 8.9–12.7)
POTASSIUM SERPL-SCNC: 4.6 MMOL/L (ref 3.5–5.3)
PROT SERPL-MCNC: 6.7 G/DL (ref 6.4–8.4)
RBC # BLD AUTO: 4.32 MILLION/UL (ref 3.88–5.62)
SODIUM SERPL-SCNC: 141 MMOL/L (ref 135–147)
WBC # BLD AUTO: 8.9 THOUSAND/UL (ref 4.31–10.16)

## 2024-10-02 PROCEDURE — 99285 EMERGENCY DEPT VISIT HI MDM: CPT

## 2024-10-02 PROCEDURE — 71045 X-RAY EXAM CHEST 1 VIEW: CPT

## 2024-10-02 PROCEDURE — 99285 EMERGENCY DEPT VISIT HI MDM: CPT | Performed by: EMERGENCY MEDICINE

## 2024-10-02 PROCEDURE — 80053 COMPREHEN METABOLIC PANEL: CPT

## 2024-10-02 PROCEDURE — 36415 COLL VENOUS BLD VENIPUNCTURE: CPT

## 2024-10-02 PROCEDURE — 83880 ASSAY OF NATRIURETIC PEPTIDE: CPT

## 2024-10-02 PROCEDURE — 85025 COMPLETE CBC W/AUTO DIFF WBC: CPT

## 2024-10-02 PROCEDURE — 93005 ELECTROCARDIOGRAM TRACING: CPT

## 2024-10-02 PROCEDURE — 93970 EXTREMITY STUDY: CPT

## 2024-10-02 RX ORDER — SULFAMETHOXAZOLE/TRIMETHOPRIM 800-160 MG
1 TABLET ORAL EVERY 12 HOURS SCHEDULED
Qty: 14 TABLET | Refills: 0 | Status: SHIPPED | OUTPATIENT
Start: 2024-10-02 | End: 2024-10-04

## 2024-10-02 RX ORDER — SULFAMETHOXAZOLE/TRIMETHOPRIM 800-160 MG
1 TABLET ORAL ONCE
Status: COMPLETED | OUTPATIENT
Start: 2024-10-02 | End: 2024-10-02

## 2024-10-02 RX ADMIN — SULFAMETHOXAZOLE AND TRIMETHOPRIM 1 TABLET: 800; 160 TABLET ORAL at 18:30

## 2024-10-02 NOTE — TELEPHONE ENCOUNTER
"Pt called in stating that about a week ago he started to notice clear drainage coming from both ankles. Pt states that he is a diabetic and follows with a foot doctor who he saw this past week. Pt was advised to apply Vaseline and keep it covered with an ace bandage. Pt states that there are two tiny wounds where the drainage if coming from and his ankles do seem more swollen than usual. Pt states that there is redness but he believes it could be from the ace wrap. Pt denies fever, pain, chest pain, sob. Pt has office appt on 10/7/24. Please advise.       Reason for Disposition   MILD swelling of both ankles (i.e., pedal edema) AND new-onset or worsening    Answer Assessment - Initial Assessment Questions  1. ONSET: \"When did the swelling start?\" (e.g., minutes, hours, days)      A week ago  2. LOCATION: \"What part of the leg is swollen?\"  \"Are both legs swollen or just one leg?\"      Right more than left  3. SEVERITY: \"How bad is the swelling?\" (e.g., localized; mild, moderate, severe)   - Localized - small area of swelling localized to one leg   - MILD pedal edema - swelling limited to foot and ankle, pitting edema < 1/4 inch (6 mm) deep, rest and elevation eliminate most or all swelling   - MODERATE edema - swelling of lower leg to knee, pitting edema > 1/4 inch (6 mm) deep, rest and elevation only partially reduce swelling   - SEVERE edema - swelling extends above knee, facial or hand swelling present       Mild  4. REDNESS: \"Does the swelling look red or infected?\"      Redness  5. PAIN: \"Is the swelling painful to touch?\" If Yes, ask: \"How painful is it?\"   (Scale 1-10; mild, moderate or severe)      Denies  6. FEVER: \"Do you have a fever?\" If Yes, ask: \"What is it, how was it measured, and when did it start?\"       Denies  7. CAUSE: \"What do you think is causing the leg swelling?\"      Unsure  8. MEDICAL HISTORY: \"Do you have a history of heart failure, kidney disease, liver failure, or cancer?\"      " "diabetes  9. RECURRENT SYMPTOM: \"Have you had leg swelling before?\" If Yes, ask: \"When was the last time?\" \"What happened that time?\"      Denies  10. OTHER SYMPTOMS: \"Do you have any other symptoms?\" (e.g., chest pain, difficulty breathing)        Denies    Protocols used: Leg Swelling and Edema-ADULT-[place]    "

## 2024-10-02 NOTE — TELEPHONE ENCOUNTER
Read note and called patient. I let patient know dr Howard was done for the day and advised him to go to the ER to get his legs checked. The redness could be from wrapping his legs just to make sure it is nothing more serious.  Patient said the foot doctor saw it the other day but it could get worse if not examined  Patient was agreeable to go get it checked.

## 2024-10-02 NOTE — ED PROVIDER NOTES
"Final diagnoses:   Bilateral lower extremity edema   Wound of lower extremity   Pulmonary edema     ED Disposition       ED Disposition   Discharge    Condition   Stable    Date/Time   Wed Oct 2, 2024  6:18 PM    Comment   Addison Woodruff discharge to home/self care.                   Assessment & Plan       Medical Decision Making  69 y.o. M with PMH of HTN, T2DM, HLD presents with bilateral lower extremity swelling. The patient has a history of lower extremity swelling and wounds and follows with Podiatry outpatient. The patient presents today because he noticed more lower extremity swelling in the last few days. He states he has had lower extremity swelling for approximately the past year, but the quantity has increased in the last 4-5 weeks and further increased in the last few days. Additionally, he notes there has been more \"water\" leaking from the wounds on his bilateral lower extremities. He was recently seen by his podiatrist on 9/18 at which time ace bandages were applied to the bilateral legs. These were removed by the patient 3-4 days ago and he attributes some of the presenting erythema to the bandages. Obtained CBC, CMP, BNP for evaluation of possible infection vs. Cardiogenic cause of increased LE edema with CBC, CMP, BNP unremarkable. Low suspicion of ongoing infection at this time as the patient is not currently febrile, denies recent fever or illness. Advised admission as patient is volume overloaded with open wounds, bilateral LE swelling and would benefit from wound care and optimization of fluid status with workup of possible cardiac etiology of worsening fluid status. Patient is not agreeable and understands the risk of infection, deterioration of respiratory status if not admitted. However, the patient insists on continued outpatient management of his conditions. Therefore, patient to start bactrim x 7 days and follow up with Podiatry and his PCP outpatient for evaluation of fluid status and " "potential need to start diuretic regimen.     Amount and/or Complexity of Data Reviewed  Labs: ordered.  Radiology: ordered and independent interpretation performed.    Risk  Prescription drug management.             Medications   sulfamethoxazole-trimethoprim (BACTRIM DS) 800-160 mg per tablet 1 tablet (has no administration in time range)       ED Risk Strat Scores                                               History of Present Illness       Chief Complaint   Patient presents with    Edema     Reports worsening bilateral LE swelling and weeping. Pt states \"the wounds are getting worse.\" Pt saw Dr. Sepulveda last week and he recommended to go to the ER.        Past Medical History:   Diagnosis Date    Arthritis 2002    Diabetes mellitus (HCC)     ED (erectile dysfunction)     Hyperlipidemia     Hypertension     Low back pain March 2022    Obesity     S/P cervical spinal fusion 05/10/2023    Spinal stenosis of lumbar region at multiple levels 12/22/2022      Past Surgical History:   Procedure Laterality Date    CARPAL TUNNEL RELEASE Right     EYE SURGERY      strabibusmus    HAND SURGERY      left index finger    WA ARTHRD PST/PSTLAT TQ 1NTRSPC CRV BELW C2 SEGMENT N/A 05/10/2023    Procedure: Posterior cervical decompressive laminectomy and instrumented fusion C4-T1;  Surgeon: Collins Angulo MD;  Location: [facility];  Service: Neurosurgery    WA EXCISION SPERMATOCELE W/WO EPIDIDYMECTOMY Left 09/16/2021    Procedure: HYDROCELECTOMY, SCROTAL DEBRIDEMENT;  Surgeon: Jose Guadalupe Christianson MD;  Location: [facility];  Service: Urology    WA NEUROPLASTY &/TRANSPOS MEDIAN NRV CARPAL TUNNE Right 07/06/2021    Procedure: RELEASE CARPAL TUNNEL;  Surgeon: Natty Cuevas MD;  Location[facility];  Service: Orthopedics    WA NEUROPLASTY &/TRANSPOSITION ULNAR NERVE ELBOW Right 07/06/2021    Procedure: RELEASE CUBITAL TUNNEL;  Surgeon: Ntaty Cuevas MD;  [facility];  Service: Orthopedics    SPINE SURGERY  " "05/10/223      Family History   Problem Relation Age of Onset    Hypertension Mother     Diabetes Mother     Hypertension Father     Diabetes Father       Social History     Tobacco Use    Smoking status: Former     Current packs/day: 0.00     Average packs/day: 1 pack/day for 47.0 years (47.0 ttl pk-yrs)     Types: Cigarettes     Start date: 1/13/1974     Quit date: 1/13/2021     Years since quitting: 3.7     Passive exposure: Past    Smokeless tobacco: Never    Tobacco comments:     advised not to smoke   Vaping Use    Vaping status: Never Used   Substance Use Topics    Alcohol use: Not Currently     Comment: aprox 4 drinks a month  when out to dinner    Drug use: Not Currently     Types: Marijuana      E-Cigarette/Vaping    E-Cigarette Use Never User       E-Cigarette/Vaping Substances    Nicotine No     THC No     CBD No     Flavoring No     Other No     Unknown No       I have reviewed and agree with the history as documented.     69 y.o. M with PMH of HTN, T2DM, HLD presents with bilateral lower extremity swelling. The patient has a history of lower extremity swelling and wounds and follows with Podiatry outpatient. The patient presents today because he noticed more lower extremity swelling in the last few days. He states he has had lower extremity swelling for approximately the past year, but the quantity has increased in the last 4-5 weeks and further increased in the last few days. Additionally, he notes there has been more \"water\" leaking from the wounds on his bilateral lower extremities. He was recently seen by his podiatrist on 9/18 at which time ace bandages were applied to the bilateral legs. These were removed by the patient 3-4 days ago and he attributes some of the presenting erythema to the bandages. He denies fever, diaphoresis, chills, chest pain, shortness of breath at rest, abdominal pain, N/V/D.     RLE cellulitis vs. Chronic venous stasis vs. CHF with volume overload    Patient would benefit " from outpatient cardiology evaluation, no echo noted in the chart since 2021.             Review of Systems   Constitutional:  Negative for chills, diaphoresis, fatigue and fever.   HENT: Negative.     Eyes: Negative.    Respiratory:  Negative for cough, chest tightness, shortness of breath and stridor.    Cardiovascular:  Positive for leg swelling. Negative for chest pain and palpitations.   Gastrointestinal:  Negative for abdominal pain, constipation, diarrhea, nausea and vomiting.   Endocrine: Negative.    Genitourinary: Negative.    Musculoskeletal:  Positive for back pain.   Neurological:  Negative for dizziness, weakness, light-headedness and headaches.       Objective       ED Triage Vitals [10/02/24 1513]   Temperature Pulse Blood Pressure Respirations SpO2 Patient Position - Orthostatic VS   98.3 °F (36.8 °C) 86 131/63 18 98 % --      Temp src Heart Rate Source BP Location FiO2 (%) Pain Score    -- -- -- -- --      Vitals      Date and Time Temp Pulse SpO2 Resp BP Pain Score FACES Pain Rating User   10/02/24 1513 98.3 °F (36.8 °C) 86 98 % 18 131/63 -- -- CH            Physical Exam  Constitutional:       General: He is not in acute distress.     Appearance: He is obese. He is not ill-appearing, toxic-appearing or diaphoretic.   HENT:      Head: Normocephalic and atraumatic.      Mouth/Throat:      Mouth: Mucous membranes are moist.      Pharynx: Oropharynx is clear. No oropharyngeal exudate.   Eyes:      Extraocular Movements: Extraocular movements intact.      Conjunctiva/sclera: Conjunctivae normal.      Pupils: Pupils are equal, round, and reactive to light.   Cardiovascular:      Rate and Rhythm: Normal rate and regular rhythm.      Heart sounds: Normal heart sounds.      Comments: Dorsalis pedis and posterior tibial pulses not palpable bilaterally  Pulmonary:      Effort: Pulmonary effort is normal. No respiratory distress.      Breath sounds: Normal breath sounds. No stridor. No rales.   Abdominal:       General: Abdomen is flat. Bowel sounds are normal.      Palpations: Abdomen is soft.   Musculoskeletal:         General: Swelling present.      Right lower leg: Edema present.      Left lower leg: Edema present.      Comments: Bilateral 2+ pitting edema of bilateral lower extremities with chronic venous stasis skin changes, additional wounds which produce non-purulent fluid upon palpation   Skin:     General: Skin is warm and dry.      Findings: Erythema present.   Neurological:      Mental Status: He is alert and oriented to person, place, and time. Mental status is at baseline.         Results Reviewed       Procedure Component Value Units Date/Time    HS Troponin 0hr (reflex protocol) [[id number]]     Lab Status: No result Specimen: Blood     B-Type Natriuretic Peptide(BNP) [[id number]]  (Normal) Collected: 10/02/24 1543    Lab Status: Final result Specimen: Blood from Arm, Right Updated: 10/02/24 1609     BNP 94 pg/mL     Comprehensive metabolic panel [[id number]]  (Abnormal) Collected: 10/02/24 1543    Lab Status: Final result Specimen: Blood from Arm, Right Updated: 10/02/24 1604     Sodium 141 mmol/L      Potassium 4.6 mmol/L      Chloride 107 mmol/L      CO2 24 mmol/L      ANION GAP 10 mmol/L      BUN 28 mg/dL      Creatinine 0.96 mg/dL      Glucose 108 mg/dL      Calcium 9.3 mg/dL      AST 15 U/L      ALT 11 U/L      Alkaline Phosphatase 81 U/L      Total Protein 6.7 g/dL      Albumin 3.7 g/dL      Total Bilirubin 0.46 mg/dL      eGFR 79 ml/min/1.73sq m     Narrative:      National Kid[employer] guidelines for Chronic Kidney Disease (CKD):     Stage 1 with normal or high GFR (GFR > 90 mL/min/1.73 square meters)    Stage 2 Mild CKD (GFR = 60-89 mL/min/1.73 square meters)    Stage 3A Moderate CKD (GFR = 45-59 mL/min/1.73 square meters)    Stage 3B Moderate CKD (GFR = 30-44 mL/min/1.73 square meters)    Stage 4 Severe CKD (GFR = 15-29 mL/min/1.73 square meters)    Stage 5 End Stage CKD (GFR <15  mL/min/1.73 square meters)  Note: GFR calculation is accurate only with a steady state creatinine    CBC and differential [[id number]]  (Abnormal) Collected: 10/02/24 1543    Lab Status: Final result Specimen: Blood from Arm, Right Updated: 10/02/24 1549     WBC 8.90 Thousand/uL      RBC 4.32 Million/uL      Hemoglobin 12.3 g/dL      Hematocrit 38.6 %      MCV 89 fL      MCH 28.5 pg      MCHC 31.9 g/dL      RDW 14.8 %      MPV 10.5 fL      Platelets 330 Thousands/uL      nRBC 0 /100 WBCs      Segmented % 78 %      Immature Grans % 0 %      Lymphocytes % 9 %      Monocytes % 10 %      Eosinophils Relative 3 %      Basophils Relative 0 %      Absolute Neutrophils 6.88 Thousands/µL      Absolute Immature Grans 0.02 Thousand/uL      Absolute Lymphocytes 0.82 Thousands/µL      Absolute Monocytes 0.93 Thousand/µL      Eosinophils Absolute 0.23 Thousand/µL      Basophils Absolute 0.02 Thousands/µL             XR chest 1 view portable   Final Interpretation by Lavell Watt MD (10/02 1641)      Mild pulmonary vascular congestion.            Workstation performed: [id number]          VAS VENOUS DUPLEX - LOWER LIMB BILATERAL    (Results Pending)       ECG 12 Lead Documentation Only    Date/Time: 10/2/2024 6:26 PM    Performed by: Arnold Smith MD  Authorized by: Arnold Smith MD    ECG reviewed by me, the ED Provider: yes    Patient location:  ED  Comments:      Patient in NSR with HR of 80. Now with PVCs compared to EKG on 5/2/23. No signs of chronic or acute infarction when compared to previous EKG on my impression.       ED Medication and Procedure Management   Prior to Admission Medications   Prescriptions Last Dose Informant Patient Reported? Taking?   acetaminophen (TYLENOL) 325 mg tablet  Self No No   Sig: Take 2 tablets (650 mg total) by mouth every 6 (six) hours as needed for mild pain   amLODIPine-benazepril (LOTREL 5-10) 5-10 MG per capsule   No No   Sig: TAKE 1 CAPSULE DAILY   glimepiride (AMARYL) 1 mg tablet  Self  No No   Sig: TAKE 1 TABLET DAILY WITH   BREAKFAST   meloxicam (MOBIC) 15 mg tablet  Self No No   Sig: TAKE 1 TABLET DAILY AS     NEEDED FOR MODERATE PAIN   methocarbamol (ROBAXIN) 500 mg tablet  Self No No   Sig: TAKE 1 TABLET (500 MG TOTAL) BY MOUTH 2 (TWO) TIMES A DAY AS NEEDED FOR MUSCLE SPASMS   pioglitazone-metFORMIN (ACTOPLUS MET)  MG per tablet  Self No No   Sig: TAKE 1 TABLET TWICE DAILY  WITH MEALS      Facility-Administered Medications: None     Patient's Medications   Discharge Prescriptions    SULFAMETHOXAZOLE-TRIMETHOPRIM (BACTRIM DS) 800-160 MG PER TABLET    Take 1 tablet by mouth every 12 (twelve) hours for 7 days smx-tmp DS (BACTRIM) 800-160 mg tabs (1tab q12 D10)       Start Date: 10/2/2024 End Date: 10/9/2024       Order Dose: 1 tablet       Quantity: 14 tablet    Refills: 0     No discharge procedures on file.  ED SEPSIS DOCUMENTATION   Time reflects when diagnosis was documented in both MDM as applicable and the Disposition within this note       Time User Action Codes Description Comment    10/2/2024  6:15 PM Arnold Smith [R60.0] Bilateral lower extremity edema     10/2/2024  6:16 PM Arnold Smith [S81.809A] Wound of lower extremity     10/2/2024  6:17 PM Arnold Smith [J81.1] Pulmonary edema                  Arnold Smith MD  10/02/24 4628

## 2024-10-03 LAB
ATRIAL RATE: 80 BPM
P AXIS: 59 DEGREES
PR INTERVAL: 170 MS
QRS AXIS: 29 DEGREES
QRSD INTERVAL: 96 MS
QT INTERVAL: 386 MS
QTC INTERVAL: 445 MS
T WAVE AXIS: 81 DEGREES
VENTRICULAR RATE: 80 BPM

## 2024-10-03 PROCEDURE — 93970 EXTREMITY STUDY: CPT | Performed by: STUDENT IN AN ORGANIZED HEALTH CARE EDUCATION/TRAINING PROGRAM

## 2024-10-03 PROCEDURE — 93010 ELECTROCARDIOGRAM REPORT: CPT | Performed by: INTERNAL MEDICINE

## 2024-10-03 NOTE — ED ATTENDING ATTESTATION
10/2/2024  I, Jose Guadalupe Muniz DO, saw and evaluated the patient. I have discussed the patient with the resident/non-physician practitioner and agree with the resident's/non-physician practitioner's findings, Plan of Care, and MDM as documented in the resident's/non-physician practitioner's note, except where noted. All available labs and Radiology studies were reviewed.  I was present for key portions of any procedure(s) performed by the resident/non-physician practitioner and I was immediately available to provide assistance.       At this point I agree with the current assessment done in the Emergency Department.  I have conducted an independent evaluation of this patient a history and physical is as follows:  69-year-old male presents to the emergency room with bilateral leg swelling, weeping wounds, and redness.  The patient has significant bilateral venous stasis on exam.  There is moderate erythema on exam.  There is concern for infection versus DVT versus CHF.  Patient did have a chest x-ray that was independently interpreted by me that shows bilateral fluffy infiltrates consistent with heart failure.   we have offered the patient admission to the hospital which she adamantly refused.  The patient will be discharged with follow-up to his primary care physician.  He was started on antibiotics  ED Course         Critical Care Time  ECG 12 Lead Documentation Only    Date/Time: 10/2/2024 9:09 PM    Performed by: Jose Guadalupe Muniz DO  Authorized by: Jose Guadalupe Muniz DO    ECG reviewed by me, the ED Provider: yes    Patient location:  ED  Comments:      Normal sinus rhythm, rate 80, normal MS, normal QTc, no STEMI, no significant change compared to EKG dated May 2, 2023, EKG independently interpreted by me

## 2024-10-04 RX ORDER — SULFAMETHOXAZOLE/TRIMETHOPRIM 800-160 MG
1 TABLET ORAL EVERY 12 HOURS SCHEDULED
Qty: 14 TABLET | Refills: 0 | Status: SHIPPED | OUTPATIENT
Start: 2024-10-04 | End: 2024-10-11

## 2024-10-08 ENCOUNTER — OFFICE VISIT (OUTPATIENT)
Age: 69
End: 2024-10-08
Payer: MEDICARE

## 2024-10-08 VITALS
WEIGHT: 248.2 LBS | HEIGHT: 67 IN | RESPIRATION RATE: 16 BRPM | BODY MASS INDEX: 38.96 KG/M2 | HEART RATE: 66 BPM | DIASTOLIC BLOOD PRESSURE: 66 MMHG | SYSTOLIC BLOOD PRESSURE: 120 MMHG | OXYGEN SATURATION: 96 % | TEMPERATURE: 97.7 F

## 2024-10-08 DIAGNOSIS — G89.29 CHRONIC BILATERAL LOW BACK PAIN WITHOUT SCIATICA: ICD-10-CM

## 2024-10-08 DIAGNOSIS — E78.2 MIXED HYPERLIPIDEMIA: ICD-10-CM

## 2024-10-08 DIAGNOSIS — E55.9 VITAMIN D DEFICIENCY: ICD-10-CM

## 2024-10-08 DIAGNOSIS — I10 PRIMARY HYPERTENSION: ICD-10-CM

## 2024-10-08 DIAGNOSIS — Z23 ENCOUNTER FOR IMMUNIZATION: Primary | ICD-10-CM

## 2024-10-08 DIAGNOSIS — E11.51 TYPE 2 DIABETES, CONTROLLED, WITH PERIPHERAL CIRCULATORY DISORDER (HCC): ICD-10-CM

## 2024-10-08 DIAGNOSIS — Z12.5 SCREENING FOR PROSTATE CANCER: ICD-10-CM

## 2024-10-08 DIAGNOSIS — M54.50 CHRONIC BILATERAL LOW BACK PAIN WITHOUT SCIATICA: ICD-10-CM

## 2024-10-08 DIAGNOSIS — E66.01 OBESITY, MORBID (HCC): ICD-10-CM

## 2024-10-08 PROBLEM — E11.9 TYPE 2 DIABETES MELLITUS WITHOUT COMPLICATION, WITHOUT LONG-TERM CURRENT USE OF INSULIN (HCC): Status: RESOLVED | Noted: 2022-07-14 | Resolved: 2024-10-08

## 2024-10-08 PROCEDURE — 90662 IIV NO PRSV INCREASED AG IM: CPT

## 2024-10-08 PROCEDURE — 99214 OFFICE O/P EST MOD 30 MIN: CPT

## 2024-10-08 PROCEDURE — G0008 ADMIN INFLUENZA VIRUS VAC: HCPCS

## 2024-10-08 NOTE — ASSESSMENT & PLAN NOTE
Lab Results   Component Value Date    HGBA1C 6.9 (H) 03/18/2024   Under control.    Continue current medication.    We will re-evaluate at next office visit.      Orders:    CBC and differential; Future    Comprehensive metabolic panel; Future    UA w Reflex to Microscopic w Reflex to Culture; Future    Hemoglobin A1C; Future    Albumin / creatinine urine ratio

## 2024-10-08 NOTE — ASSESSMENT & PLAN NOTE
Under control.  Continue Lotrel.  We will continue to monitor       Orders:    CBC and differential; Future    Comprehensive metabolic panel; Future    UA w Reflex to Microscopic w Reflex to Culture; Future

## 2024-10-08 NOTE — PROGRESS NOTES
Diabetic Foot Exam    Patient's shoes and socks removed.    Right Foot/Ankle   Right Foot Inspection  Skin Exam: skin normal and skin intact. No dry skin, no warmth, no callus, no erythema, no maceration, no abnormal color, no pre-ulcer, no ulcer and no callus.     Toe Exam: ROM and strength within normal limits and swelling.     Sensory   Monofilament testing: intact    Vascular  The right DP pulse is 2+. The right PT pulse is 2+.     Left Foot/Ankle  Left Foot Inspection  Skin Exam: skin normal and skin intact. No dry skin, no warmth, no erythema, no maceration, normal color, no pre-ulcer, no ulcer and no callus.     Toe Exam: ROM and strength within normal limits and swelling.     Sensory   Monofilament testing: intact    Vascular  The left DP pulse is 2+. The left PT pulse is 2+.     Assign Risk Category  No deformity present  No loss of protective sensation  No weak pulses  Risk: 0

## 2024-10-08 NOTE — ASSESSMENT & PLAN NOTE
Patient was advised to lose weight.  Potential consequences of obesity discussed with patient.  Advised to have portion control no more than 60% of meal or may consider intermittent fasting  We will continue to monitor

## 2024-10-08 NOTE — PROGRESS NOTES
Ambulatory Visit  Name: Addison Woodruff      : 1955      MRN: [id number]  Encounter Provider: Fazal Howard MD  Encounter Date: 10/8/2024   Encounter department: [facility]    Assessment & Plan  Type 2 diabetes, controlled, with peripheral circulatory disorder (HCC)    Lab Results   Component Value Date    HGBA1C 6.9 (H) 2024   Under control.    Continue current medication.    We will re-evaluate at next office visit.      Orders:    CBC and differential; Future    Comprehensive metabolic panel; Future    UA w Reflex to Microscopic w Reflex to Culture; Future    Hemoglobin A1C; Future    Albumin / creatinine urine ratio    Primary hypertension  Under control.  Continue Lotrel.  We will continue to monitor       Orders:    CBC and differential; Future    Comprehensive metabolic panel; Future    UA w Reflex to Microscopic w Reflex to Culture; Future    Mixed hyperlipidemia  Under reasonable control with diet.  We will continue to monitor.       Orders:    Lipid Panel with Direct LDL reflex; Future    Chronic bilateral low back pain without sciatica  Under control.    Continue current medication.    Continue follow-up with pain management  We will re-evaluate at next office visit.           Obesity, morbid (HCC)    Patient was advised to lose weight.  Potential consequences of obesity discussed with patient.  Advised to have portion control no more than 60% of meal or may consider intermittent fasting  We will continue to monitor           Encounter for immunization    Orders:    influenza vaccine, high-dose, PF 0.5 mL (Fluzone High Dose)    Vitamin D deficiency    Orders:    Vitamin D 25 hydroxy; Future    Screening for prostate cancer    Orders:    PSA, Total Screen; Future       History of Present Illness     Patient here for review of chronic medical problems and  the labs and imaging if it is applicable.  Currently has no specific complaints other than mentioned in the  "review of systems  Denies chest pain, SOB, cough, abdominal pain, nausea, vomiting, fever, chills, lightheadedness, dizziness,headache, tingling or numbness.No bowel or bladder problem.            Review of Systems   Constitutional:  Negative for chills, fatigue and fever.   HENT:  Negative for congestion, ear pain, rhinorrhea, sneezing and sore throat.    Eyes:  Negative for redness, itching and visual disturbance.   Respiratory:  Negative for cough, chest tightness and shortness of breath.    Cardiovascular:  Negative for chest pain, palpitations and leg swelling.   Gastrointestinal:  Negative for abdominal pain, blood in stool, diarrhea, nausea and vomiting.   Endocrine: Negative for cold intolerance and heat intolerance.   Genitourinary:  Negative for dysuria, frequency and urgency.   Musculoskeletal:  Positive for arthralgias, gait problem and myalgias. Negative for back pain and neck stiffness.   Skin:  Negative for color change and rash.   Neurological:  Positive for weakness (Both hands). Negative for dizziness, tremors, light-headedness, numbness and headaches.   Hematological:  Does not bruise/bleed easily.   Psychiatric/Behavioral:  Negative for agitation, behavioral problems and confusion.            Objective     /66 (BP Location: Left arm, Patient Position: Sitting, Cuff Size: Large)   Pulse 66   Temp 97.7 °F (36.5 °C) (Tympanic)   Resp 16   Ht 5' 7\" (1.702 m)   Wt 113 kg (248 lb 3.2 oz)   SpO2 96%   BMI 38.87 kg/m²     Physical Exam  Vitals and nursing note reviewed.   Constitutional:       General: He is not in acute distress.     Appearance: Normal appearance. He is well-developed. He is obese. He is not ill-appearing, toxic-appearing or diaphoretic.   HENT:      Head: Normocephalic and atraumatic.      Nose: Nose normal. No congestion or rhinorrhea.      Mouth/Throat:      Mouth: Mucous membranes are moist.      Pharynx: Oropharynx is clear. No posterior oropharyngeal erythema.   Eyes: "      General: No scleral icterus.        Right eye: No discharge.         Left eye: No discharge.      Extraocular Movements: Extraocular movements intact.      Conjunctiva/sclera: Conjunctivae normal.      Pupils: Pupils are equal, round, and reactive to light.   Neck:      Vascular: No carotid bruit.   Cardiovascular:      Rate and Rhythm: Normal rate and regular rhythm.      Pulses: Pulses are weak.           Dorsalis pedis pulses are 1+ on the right side and 1+ on the left side.        Posterior tibial pulses are 1+ on the right side and 1+ on the left side.      Heart sounds: Normal heart sounds. No murmur heard.     No gallop.   Pulmonary:      Effort: Pulmonary effort is normal. No respiratory distress.      Breath sounds: Normal breath sounds. No wheezing, rhonchi or rales.   Abdominal:      General: Abdomen is flat. Bowel sounds are normal. There is no distension.      Palpations: Abdomen is soft.      Tenderness: There is no abdominal tenderness. There is no right CVA tenderness, left CVA tenderness or guarding.   Musculoskeletal:      Cervical back: Normal range of motion and neck supple. No rigidity or tenderness.      Right lower leg: Edema (Minimal) present.      Left lower leg: Edema (Minimal) present.      Comments: Atrophy both hands  Bilateral paraspinal muscular spasms lower back   Feet:      Right foot:      Skin integrity: Dry skin present. No ulcer, skin breakdown, erythema, warmth or callus.      Left foot:      Skin integrity: Dry skin present. No ulcer, skin breakdown, erythema, warmth or callus.   Lymphadenopathy:      Cervical: No cervical adenopathy.   Skin:     General: Skin is warm and dry.      Capillary Refill: Capillary refill takes 2 to 3 seconds.      Coloration: Skin is not jaundiced.   Neurological:      General: No focal deficit present.      Mental Status: He is alert and oriented to person, place, and time. Mental status is at baseline.      Sensory: Sensory deficit ( both  hands) present.      Motor: Weakness (both hands) present.      Gait: Gait abnormal (ambulates with walker).   Psychiatric:         Mood and Affect: Mood normal.       Diabetic Foot Exam    Patient's shoes and socks removed.    Right Foot/Ankle   Right Foot Inspection  Skin Exam: skin normal, skin intact and dry skin. No warmth, no callus, no erythema, no maceration, no abnormal color, no pre-ulcer, no ulcer and no callus.     Toe Exam: ROM and strength within normal limits and swelling.     Sensory   Monofilament testing: intact    Vascular  Capillary refills: < 3 seconds  The right DP pulse is 1+. The right PT pulse is 1+.     Left Foot/Ankle  Left Foot Inspection  Skin Exam: skin normal, skin intact and dry skin. No warmth, no erythema, no maceration, normal color, no pre-ulcer, no ulcer and no callus.     Toe Exam: ROM and strength within normal limits and swelling.     Sensory   Monofilament testing: intact    Vascular  Capillary refills: < 3 seconds  The left DP pulse is 1+. The left PT pulse is 1+.     Assign Risk Category  No deformity present  No loss of protective sensation  Weak pulses  Risk: 1

## 2024-10-08 NOTE — ASSESSMENT & PLAN NOTE
Under reasonable control with diet.  We will continue to monitor.       Orders:    Lipid Panel with Direct LDL reflex; Future

## 2024-10-10 ENCOUNTER — PATIENT OUTREACH (OUTPATIENT)
Dept: CASE MANAGEMENT | Facility: OTHER | Age: 69
End: 2024-10-10

## 2024-10-10 NOTE — PROGRESS NOTES
Called and spoke to patient, only has SOB at times at end of day intermittently after climbing stairs all day

## 2024-10-10 NOTE — PROGRESS NOTES
If there is no shortness of breath then it is okay to wait and we will discuss at his December 4 appointment

## 2024-10-10 NOTE — PROGRESS NOTES
Outpatient Care Management Note:    New direct PCP referral received. Patient was in ER on 10/2/24 with bilateral leg edema and wounds.  He declined admission and was started on bactrim.  He followed up with his PCP on 10/8.     Care manager called Addison. He states that his legs are improving. They are still swollen right > left with some redness.  He feels the drainage has slowed down.. He denies a fever.  He knows to call podiatry/PCP if above symptoms worsen. CM did encouraged him to move his podiatry appt sooner. He is scheduled for 12/2.     Addison states that he is not able to wrap his legs due to hand issues. He has very little use of his right hand and now his left hand is developing numbness. He states that he had carpal tunnel surgery on right hand with no improvement.     He admits to difficulty walking. He uses a walker but can not walk far due to back and leg pain. He feels his legs are wearing out. He noted that he has difficulty getting in to [facility], because of the distance he needs to walk. He agreed to CM following up to see if [facility] can be approved.     Addison states that the ER wanted him to be admitted due to concern of him holding on to fluid in his lungs. CM will forward concern to PCP for follow up.  Addison admits to some shortness of breath.  He feels it worsens throughout the day.  He does not feel safe to get on a scale due to being a fall risk.     Addison is diabetic. He does not check blood sugars because of his hands. He is unable to manipulate the finger sticks. His last BrT4D=7.9.      He states he drives and does not have difficulty getting to appts, just difficulty getting in from the parking lot.     Med review completed. He denies any questions. He noted that his bactrim will finish tomorrow.     Addison will get my contact information from caller ID.  He knows that my phone goes through my computer and I do not get messages after hours or on weekends. He is  aware to call his PCP office directly with any immediate questions.

## 2024-10-24 ENCOUNTER — TELEPHONE (OUTPATIENT)
Age: 69
End: 2024-10-24

## 2024-10-24 ENCOUNTER — PATIENT OUTREACH (OUTPATIENT)
Dept: CASE MANAGEMENT | Facility: OTHER | Age: 69
End: 2024-10-24

## 2024-10-24 NOTE — PROGRESS NOTES
Outpatient Care Management Note:    Care manager called Addison.  He states that his leg swelling is about the same.  He has some days with a lot of drainage and other days with very little.  He does not think the drainage correlates to any increased swelling. He states his left leg looks almost normal with very minimal redness.  His right leg has a little more redness, but feels it is improving.  We discussed keeping his legs elevated. CM reinforced that he needs to follow up with his podiatrist.  His appt is not scheduled until 12/2. I gave him the contact information and asked him to call now. He denies any difficulty getting to appts.     Addison did not hear from [facility]. CM will attempt to follow up.  I reinforced that he should call me if he does not hear from them in next week. He has my contact information.    CM called [facility]. They can use therapy script from 9/3/24.  CM will fax it to them at .     PT order faxed via right fax.   
16-May-2023 13:10

## 2024-10-24 NOTE — TELEPHONE ENCOUNTER
Caller: Addison Woodruff    Doctor and/or Office: Dr. Sepulveda/Ba    #: [phone]    Escalation: Care Patient would like to speak to clinical staff about some concerns he is having. The call had a bad connection so it was hard to hear what the concerns are. Thank you

## 2024-10-30 ENCOUNTER — PATIENT OUTREACH (OUTPATIENT)
Dept: CASE MANAGEMENT | Facility: OTHER | Age: 69
End: 2024-10-30

## 2024-10-30 NOTE — PROGRESS NOTES
Outpatient Care Management Note:    NORMA called Addison. He states that he just heard from [facility]. He said he will receive a call in 1-2 weeks to set up therapy services.     Addison did call podiatry and they moved his appt to 11/6 from 12/2.  I congratulated him on getting this done.     He states his legs are about the same.  He feels the redness continues to improve. He denies any increased drainage or fevers.  We discussed a low salt diet. We reviewed how to read a food label.  Addison does occasionally eat [employer] meals or Healthy Choice. We talked about picking the meal items with the least salt to prevent leg swelling. Addison states he tries to cook one or two bigger meals and eat them all week. NORMA suggested meals on wheels, but he is not interested at this time.     NORMA will follow up in 2 weeks. He will call with any questions.

## 2024-10-31 DIAGNOSIS — G89.29 CHRONIC BILATERAL LOW BACK PAIN WITHOUT SCIATICA: ICD-10-CM

## 2024-10-31 DIAGNOSIS — M54.50 CHRONIC BILATERAL LOW BACK PAIN WITHOUT SCIATICA: ICD-10-CM

## 2024-10-31 RX ORDER — METHOCARBAMOL 500 MG/1
500 TABLET, FILM COATED ORAL 2 TIMES DAILY PRN
Qty: 60 TABLET | Refills: 0 | Status: SHIPPED | OUTPATIENT
Start: 2024-10-31

## 2024-10-31 NOTE — TELEPHONE ENCOUNTER
Received a rx fax sheet from [facility] pharmacy for medication refill on Robaxin. Medication sent to the refill for fill.

## 2024-11-01 ENCOUNTER — TELEPHONE (OUTPATIENT)
Age: 69
End: 2024-11-01

## 2024-11-01 NOTE — TELEPHONE ENCOUNTER
PA methocarbamol (ROBAXIN) 500 mg SUBMITTED     via    []CMM-KEY:    [x]Surescripts-Case ID # [id number]   []Availity-Auth ID #  NDC #    []Faxed to plan   []Other website    []Phone call Case ID #      Office notes sent, clinical questions answered. Awaiting determination    Turnaround time for your insurance to make a decision on your Prior Authorization can take 7-21 business days.

## 2024-11-01 NOTE — TELEPHONE ENCOUNTER
PA methocarbamol (ROBAXIN) 500 mg DENIED    Reason:(Screenshot if applicable)        Message sent to office clinical pool Yes    Denial letter scanned into Media Yes    Appeal started No (Provider will need to decide if appeal is warranted and send clinical documentation to Prior Authorization Team for initiation.)    **Please follow up with your patient regarding denial and next steps**

## 2024-11-01 NOTE — TELEPHONE ENCOUNTER
Spoke with patient in regards of his medication that insurance denied it. Patient is going to call pharmacy to see if they can help him lower the cost , he stated before he did this and gotten the medication at a lower cost.

## 2024-11-06 ENCOUNTER — OFFICE VISIT (OUTPATIENT)
Dept: PODIATRY | Facility: CLINIC | Age: 69
End: 2024-11-06
Payer: MEDICARE

## 2024-11-06 VITALS — HEIGHT: 67 IN | BODY MASS INDEX: 38.87 KG/M2 | OXYGEN SATURATION: 97 % | HEART RATE: 85 BPM

## 2024-11-06 DIAGNOSIS — E11.51 TYPE 2 DIABETES, CONTROLLED, WITH PERIPHERAL CIRCULATORY DISORDER (HCC): ICD-10-CM

## 2024-11-06 DIAGNOSIS — L03.115 CELLULITIS OF RIGHT LEG: ICD-10-CM

## 2024-11-06 DIAGNOSIS — L97.821 VENOUS STASIS ULCER OF OTHER PART OF LEFT LOWER LEG LIMITED TO BREAKDOWN OF SKIN WITH VARICOSE VEINS (HCC): Primary | ICD-10-CM

## 2024-11-06 DIAGNOSIS — I83.028 VENOUS STASIS ULCER OF OTHER PART OF LEFT LOWER LEG LIMITED TO BREAKDOWN OF SKIN WITH VARICOSE VEINS (HCC): Primary | ICD-10-CM

## 2024-11-06 PROCEDURE — 99213 OFFICE O/P EST LOW 20 MIN: CPT | Performed by: PODIATRIST

## 2024-11-06 RX ORDER — SULFAMETHOXAZOLE AND TRIMETHOPRIM 800; 160 MG/1; MG/1
1 TABLET ORAL EVERY 12 HOURS SCHEDULED
Qty: 14 TABLET | Refills: 0 | Status: SHIPPED | OUTPATIENT
Start: 2024-11-06 | End: 2024-11-13

## 2024-11-06 NOTE — PROGRESS NOTES
Assessment/Plan:     The patient's clinical examination today significant for superficial venous stasis ulceration to the anterolateral aspect of the left lower leg that is not infected.  Wound base is fully granular.  There is very scant serous drainage.  On the right lower extremity, the venous ulceration has essentially healed however there is persistent serous drainage noted from the right lower leg.  There is some very mild localized erythema and very mild calor concerning for low-grade cellulitis.    The patient did respond well recently to a course of Bactrim from the local ED.  I will start him on it at the 7-day course out of an abundance of caution.  The left leg was dressed with Adaptic and a dry sterile dressing with Ace compression for edema management.  Benefit from visiting nurse services to assist with dressing changes 2-3 times a week for management of his venous stasis wounds.    Recommend follow-up in 10 to 14 days.     Diagnoses and all orders for this visit:    Venous stasis ulcer of other part of left lower leg limited to breakdown of skin with varicose veins (HCC)  -     EXTERNAL Referral to Home Health; Future    Cellulitis of right leg  -     sulfamethoxazole-trimethoprim (BACTRIM DS) 800-160 mg per tablet; Take 1 tablet by mouth every 12 (twelve) hours for 7 days  -     EXTERNAL Referral to Home Health; Future    Type 2 diabetes, controlled, with peripheral circulatory disorder (HCC)  -     EXTERNAL Referral to Home Health; Future          Subjective:     Patient ID: Addison Woodruff is a 69 y.o. male.    The patient presents today for follow-up of bilateral lower extremity venous stasis ulcerations and a recent bout of lower extremity cellulitis.  The patient states that he was seen in the local ED a few weeks ago and started on a 7-day course of antibiotic therapy.  He states he tolerated it well and it did help alleviate redness and swelling to his lower legs.  He notes some very mild  discomfort to his lower extremities today.  There is a superficial wound to the lower aspect of the left lower extremity.  The left wound site is nontender.  He denies any fevers or chills or shortness of breath.      PAST MEDICAL HISTORY:  Past Medical History:   Diagnosis Date    Arthritis 2002    Diabetes mellitus (HCC)     ED (erectile dysfunction)     Hyperlipidemia     Hypertension     Low back pain March 2022    Obesity     S/P cervical spinal fusion 05/10/2023    Spinal stenosis of lumbar region at multiple levels 12/22/2022       PAST SURGICAL HISTORY:  Past Surgical History:   Procedure Laterality Date    CARPAL TUNNEL RELEASE Right     EYE SURGERY      strabibusmus    HAND SURGERY      left index finger    OK ARTHRD PST/PSTLAT TQ 1NTRSPC CRV BELW C2 SEGMENT N/A 05/10/2023    Procedure: Posterior cervical decompressive laminectomy and instrumented fusion C4-T1;  Surgeon: Collins Angulo MD;  [facility];  Service: Neurosurgery    OK EXCISION SPERMATOCELE W/WO EPIDIDYMECTOMY Left 09/16/2021    Procedure: HYDROCELECTOMY, SCROTAL DEBRIDEMENT;  Surgeon: Jose Guadalupe Christianson MD;  Location: [facility];  Service: Urology    OK NEUROPLASTY &/TRANSPOS MEDIAN NRV CARPAL TUNNE Right 07/06/2021    Procedure: RELEASE CARPAL TUNNEL;  Surgeon: Natty Cuevas MD;  [facility];  Service: Orthopedics    OK NEUROPLASTY &/TRANSPOSITION ULNAR NERVE ELBOW Right 07/06/2021    Procedure: RELEASE CUBITAL TUNNEL;  Surgeon: Natty Cuevas MD;  [facility];  Service: Orthopedics    SPINE SURGERY  05/10/223        ALLERGIES:  Patient has no known allergies.    MEDICATIONS:  Current Outpatient Medications   Medication Sig Dispense Refill    sulfamethoxazole-trimethoprim (BACTRIM DS) 800-160 mg per tablet Take 1 tablet by mouth every 12 (twelve) hours for 7 days 14 tablet 0    acetaminophen (TYLENOL) 325 mg tablet Take 2 tablets (650 mg total) by mouth every 6 (six) hours as needed for mild  pain  0    amLODIPine-benazepril (LOTREL 5-10) 5-10 MG per capsule TAKE 1 CAPSULE DAILY 90 capsule 1    glimepiride (AMARYL) 1 mg tablet TAKE 1 TABLET DAILY WITH   BREAKFAST 100 tablet 1    meloxicam (MOBIC) 15 mg tablet TAKE 1 TABLET DAILY AS     NEEDED FOR MODERATE PAIN 100 tablet 1    methocarbamol (ROBAXIN) 500 mg tablet Take 1 tablet (500 mg total) by mouth 2 (two) times a day as needed for muscle spasms 60 tablet 0    pioglitazone-metFORMIN (ACTOPLUS MET)  MG per tablet TAKE 1 TABLET TWICE DAILY  WITH MEALS 180 tablet 1     No current facility-administered medications for this visit.       SOCIAL HISTORY:  Social History     Socioeconomic History    Marital status:      Spouse name: Not on file    Number of children: Not on file    Years of education: Not on file    Highest education level: Not on file   Occupational History    Not on file   Tobacco Use    Smoking status: Former     Current packs/day: 0.00     Average packs/day: 1 pack/day for 47.0 years (47.0 ttl pk-yrs)     Types: Cigarettes     Start date: 1/13/1974     Quit date: 1/13/2021     Years since quitting: 3.8     Passive exposure: Past    Smokeless tobacco: Never    Tobacco comments:     advised not to smoke   Vaping Use    Vaping status: Never Used   Substance and Sexual Activity    Alcohol use: Not Currently     Comment: aprox 4 drinks a month  when out to dinner    Drug use: Not Currently     Types: Marijuana    Sexual activity: Not Currently   Other Topics Concern    Not on file   Social History Narrative    Not on file     Social Determinants of Health     Financial Resource Strain: Medium Risk (11/30/2023)    Overall Financial Resource Strain (CARDIA)     Difficulty of Paying Living Expenses: Somewhat hard   Food Insecurity: No Food Insecurity (5/11/2023)    Hunger Vital Sign     Worried About Running Out of Food in the Last Year: Never true     Ran Out of Food in the Last Year: Never true   Transportation Needs: No  Transportation Needs (11/30/2023)    PRAPARE - Transportation     Lack of Transportation (Medical): No     Lack of Transportation (Non-Medical): No   Physical Activity: Not on file   Stress: Not on file   Social Connections: Not on file   Intimate Partner Violence: Not on file   Housing Stability: Unknown (5/11/2023)    Housing Stability Vital Sign     Unable to Pay for Housing in the Last Year: No     Number of Places Lived in the Last Year: Not on file     Unstable Housing in the Last Year: No        Review of Systems   Constitutional: Negative.    HENT: Negative.     Eyes: Negative.    Respiratory: Negative.     Cardiovascular: Negative.    Endocrine: Negative.    Musculoskeletal: Negative.    Skin:  Positive for wound.   Neurological: Negative.    Hematological: Negative.    Psychiatric/Behavioral: Negative.           Objective:     Physical Exam  Vitals reviewed.   Constitutional:       Appearance: Normal appearance.   HENT:      Head: Normocephalic and atraumatic.      Nose: Nose normal.   Eyes:      Conjunctiva/sclera: Conjunctivae normal.      Pupils: Pupils are equal, round, and reactive to light.   Cardiovascular:      Pulses:           Dorsalis pedis pulses are 1+ on the right side and 1+ on the left side.        Posterior tibial pulses are 0 on the right side and 0 on the left side.   Pulmonary:      Effort: Pulmonary effort is normal.   Musculoskeletal:        Feet:    Feet:      Right foot:      Skin integrity: Erythema present.      Left foot:      Skin integrity: Skin breakdown present. No erythema.      Comments: The patient's clinical examination today significant for superficial venous stasis ulceration to the anterolateral aspect of the left lower leg that is not infected.  Wound base is fully granular.  There is very scant serous drainage.  On the right lower extremity, the venous ulceration has essentially healed however there is persistent serous drainage noted from the right lower leg.  There  is some very mild localized erythema and very mild calor concerning for low-grade cellulitis.  Skin:     General: Skin is warm.      Capillary Refill: Capillary refill takes 2 to 3 seconds.   Neurological:      General: No focal deficit present.      Mental Status: He is alert and oriented to person, place, and time.   Psychiatric:         Mood and Affect: Mood normal.         Behavior: Behavior normal.         Thought Content: Thought content normal.

## 2024-11-11 ENCOUNTER — TELEPHONE (OUTPATIENT)
Dept: PODIATRY | Facility: CLINIC | Age: 69
End: 2024-11-11

## 2024-11-13 ENCOUNTER — PATIENT OUTREACH (OUTPATIENT)
Dept: CASE MANAGEMENT | Facility: OTHER | Age: 69
End: 2024-11-13

## 2024-11-13 ENCOUNTER — HOME HEALTH ADMISSION (OUTPATIENT)
Dept: HOME HEALTH SERVICES | Facility: HOME HEALTHCARE | Age: 69
End: 2024-11-13
Payer: MEDICARE

## 2024-11-13 NOTE — PROGRESS NOTES
Outpatient Care Management Note:    CM called Addison. He saw his podiatrist on 11/6 and he ordered visiting nurses, but Addison was not contacted by anyone.  He thinks the order was supposed to go to  [facility].  CM will attempt to follow up.     Addison did start bactrim per podiatry.      He states he was never contacted by [facility].     CM called  [facility].  They initially declined the referral due to capacity. They can accept the referral now, but will not be able to start tomorrow. CM will contact [facility] to cancel the order and will request PCP place PT/OT orders to  [facility].     CM called Sylvester Ho, and updated her that patient will be having [facility] for wound care. She will place therapy orders on hold and see if patient requires ongoing therapy after [facility] is completed.

## 2024-11-14 ENCOUNTER — HOME CARE VISIT (OUTPATIENT)
Dept: HOME HEALTH SERVICES | Facility: HOME HEALTHCARE | Age: 69
End: 2024-11-14

## 2024-11-15 ENCOUNTER — HOME CARE VISIT (OUTPATIENT)
Dept: HOME HEALTH SERVICES | Facility: HOME HEALTHCARE | Age: 69
End: 2024-11-15

## 2024-11-16 ENCOUNTER — HOME CARE VISIT (OUTPATIENT)
Dept: HOME HEALTH SERVICES | Facility: HOME HEALTHCARE | Age: 69
End: 2024-11-16
Payer: MEDICARE

## 2024-11-16 VITALS
BODY MASS INDEX: 39.21 KG/M2 | HEIGHT: 66 IN | OXYGEN SATURATION: 95 % | TEMPERATURE: 98.2 F | SYSTOLIC BLOOD PRESSURE: 124 MMHG | RESPIRATION RATE: 16 BRPM | DIASTOLIC BLOOD PRESSURE: 72 MMHG | WEIGHT: 244 LBS | HEART RATE: 79 BPM

## 2024-11-16 PROCEDURE — 10330081 VN NO-PAY CLAIM PROCEDURE

## 2024-11-16 PROCEDURE — G0299 HHS/HOSPICE OF RN EA 15 MIN: HCPCS

## 2024-11-16 PROCEDURE — 400013 VN SOC

## 2024-11-18 ENCOUNTER — HOME CARE VISIT (OUTPATIENT)
Dept: HOME HEALTH SERVICES | Facility: HOME HEALTHCARE | Age: 69
End: 2024-11-18
Payer: MEDICARE

## 2024-11-18 ENCOUNTER — PATIENT OUTREACH (OUTPATIENT)
Dept: CASE MANAGEMENT | Facility: OTHER | Age: 69
End: 2024-11-18

## 2024-11-18 VITALS — OXYGEN SATURATION: 98 % | HEART RATE: 78 BPM | TEMPERATURE: 97.1 F | RESPIRATION RATE: 24 BRPM

## 2024-11-18 DIAGNOSIS — R53.1 GENERALIZED WEAKNESS: Primary | ICD-10-CM

## 2024-11-18 PROCEDURE — G0299 HHS/HOSPICE OF RN EA 15 MIN: HCPCS

## 2024-11-18 PROCEDURE — 10330064

## 2024-11-18 PROCEDURE — 10330064 DRESSING, EMULSION OIL 3X3 (50/BX)

## 2024-11-18 PROCEDURE — 10330064 PAD, ABD 5X9 STR LF (1/PK 20PK/BX) MGM1"

## 2024-11-18 PROCEDURE — 10330064 CLEANSER, WND SEA-CLEANS 6OZ  COLPLT

## 2024-11-18 PROCEDURE — 10330064 BANDAGE, ELAS SLF-CLSR PREM N/S LF 4X5YD

## 2024-11-18 NOTE — PROGRESS NOTES
Outpatient Care Management Note:    Care manager called Addison.  [facility] nursing started services and he is waiting to start therapy services.  Johann states his right leg is still very swollen and draining.  He is scheduled to see podiatry on 11/20.  He continues to watch his diet and maintain low salt diet.     Johann stated he would like help in the home to assist with laundry, light housekeeping etc.  CM used find help and sent or gave him contacts for [facility] home care and [facility]. Johann feels that he can pay privately for services. He is not a  and does not have long term care insurance.     CM will follow up in 2 weeks.

## 2024-11-19 ENCOUNTER — HOME CARE VISIT (OUTPATIENT)
Dept: HOME HEALTH SERVICES | Facility: HOME HEALTHCARE | Age: 69
End: 2024-11-19
Payer: MEDICARE

## 2024-11-20 ENCOUNTER — HOME CARE VISIT (OUTPATIENT)
Dept: HOME HEALTH SERVICES | Facility: HOME HEALTHCARE | Age: 69
End: 2024-11-20
Payer: MEDICARE

## 2024-11-20 ENCOUNTER — OFFICE VISIT (OUTPATIENT)
Dept: PODIATRY | Facility: CLINIC | Age: 69
End: 2024-11-20
Payer: MEDICARE

## 2024-11-20 VITALS
SYSTOLIC BLOOD PRESSURE: 145 MMHG | DIASTOLIC BLOOD PRESSURE: 87 MMHG | BODY MASS INDEX: 38.22 KG/M2 | HEIGHT: 67 IN | HEART RATE: 92 BPM | OXYGEN SATURATION: 97 %

## 2024-11-20 DIAGNOSIS — I83.028 VENOUS STASIS ULCER OF OTHER PART OF LEFT LOWER LEG LIMITED TO BREAKDOWN OF SKIN WITH VARICOSE VEINS (HCC): Primary | ICD-10-CM

## 2024-11-20 DIAGNOSIS — E11.51 TYPE 2 DIABETES, CONTROLLED, WITH PERIPHERAL CIRCULATORY DISORDER (HCC): ICD-10-CM

## 2024-11-20 DIAGNOSIS — I83.018 VENOUS STASIS ULCER OF OTHER PART OF RIGHT LOWER LEG LIMITED TO BREAKDOWN OF SKIN WITH VARICOSE VEINS (HCC): ICD-10-CM

## 2024-11-20 DIAGNOSIS — L97.811 VENOUS STASIS ULCER OF OTHER PART OF RIGHT LOWER LEG LIMITED TO BREAKDOWN OF SKIN WITH VARICOSE VEINS (HCC): ICD-10-CM

## 2024-11-20 DIAGNOSIS — L97.821 VENOUS STASIS ULCER OF OTHER PART OF LEFT LOWER LEG LIMITED TO BREAKDOWN OF SKIN WITH VARICOSE VEINS (HCC): Primary | ICD-10-CM

## 2024-11-20 PROCEDURE — 99213 OFFICE O/P EST LOW 20 MIN: CPT | Performed by: PODIATRIST

## 2024-11-20 NOTE — PROGRESS NOTES
Assessment/Plan:     The patient's clinical examination today significant for a nearly healed stasis ulceration to the anterolateral aspect of the right lower extremity.  There are no signs of infection.  There is small residual wound to the postero-lateral aspect of the left lower leg.  There are no signs of infection to the left lower extremity.    The patient is doing well from a clinical standpoint.  The wounds appear to be healing nicely.  I appreciate assistance with wound care by [facility].  Continue dressings with Adaptic, calcium alginate and dry sterile dressing with Ace compression every other day.    Recommend follow-up in 2 weeks.  There is no clinical need for any further antibiosis.     Diagnoses and all orders for this visit:    Venous stasis ulcer of other part of left lower leg limited to breakdown of skin with varicose veins (HCC)    Venous stasis ulcer of other part of right lower leg limited to breakdown of skin with varicose veins (HCC)    Type 2 diabetes, controlled, with peripheral circulatory disorder (HCC)          Subjective:     Patient ID: Addison Woodruff is a 69 y.o. male.    The patient resents today for follow-up of bilateral venous stasis ulcerations.  He feels that the wounds are improving and he has noticed less drainage to both of his legs.  He has completed his course of cephalexin and tolerated it well.  He notes no new issues today.      PAST MEDICAL HISTORY:  Past Medical History:   Diagnosis Date    Arthritis 2002    Diabetes mellitus (HCC)     ED (erectile dysfunction)     Hyperlipidemia     Hypertension     Low back pain March 2022    Obesity     S/P cervical spinal fusion 05/10/2023    Spinal stenosis of lumbar region at multiple levels 12/22/2022       PAST SURGICAL HISTORY:  Past Surgical History:   Procedure Laterality Date    CARPAL TUNNEL RELEASE Right     EYE SURGERY      strabibusmus    HAND SURGERY      left index finger    [place] ARTHRD PST/PSTLAT TQ 1NTRSPC CRV BELW C2  SEGMENT N/A 05/10/2023    Procedure: Posterior cervical decompressive laminectomy and instrumented fusion C4-T1;  Surgeon: Collins Angulo MD;  [facility];  Service: Neurosurgery    MI EXCISION SPERMATOCELE W/WO EPIDIDYMECTOMY Left 09/16/2021    Procedure: HYDROCELECTOMY, SCROTAL DEBRIDEMENT;  Surgeon: Jose Guadalupe Christianson MD;  [facility];  Service: Urology    MI NEUROPLASTY &/TRANSPOS MEDIAN NRV CARPAL TUNNE Right 07/06/2021    Procedure: RELEASE CARPAL TUNNEL;  Surgeon: Natty Cuevas MD;  [facility];  Service: Orthopedics    MI NEUROPLASTY &/TRANSPOSITION ULNAR NERVE ELBOW Right 07/06/2021    Procedure: RELEASE CUBITAL TUNNEL;  Surgeon: Natty Cuevas MD;  [facility];  Service: Orthopedics    SPINE SURGERY  05/10/223        ALLERGIES:  Patient has no known allergies.    MEDICATIONS:  Current Outpatient Medications   Medication Sig Dispense Refill    acetaminophen (TYLENOL) 325 mg tablet Take 2 tablets (650 mg total) by mouth every 6 (six) hours as needed for mild pain  0    amLODIPine-benazepril (LOTREL 5-10) 5-10 MG per capsule TAKE 1 CAPSULE DAILY 90 capsule 1    glimepiride (AMARYL) 1 mg tablet TAKE 1 TABLET DAILY WITH   BREAKFAST 100 tablet 1    meloxicam (MOBIC) 15 mg tablet TAKE 1 TABLET DAILY AS     NEEDED FOR MODERATE PAIN 100 tablet 1    methocarbamol (ROBAXIN) 500 mg tablet Take 1 tablet (500 mg total) by mouth 2 (two) times a day as needed for muscle spasms 60 tablet 0    pioglitazone-metFORMIN (ACTOPLUS MET)  MG per tablet TAKE 1 TABLET TWICE DAILY  WITH MEALS 180 tablet 1     No current facility-administered medications for this visit.       SOCIAL HISTORY:  Social History     Socioeconomic History    Marital status:      Spouse name: None    Number of children: None    Years of education: None    Highest education level: None   Occupational History    None   Tobacco Use    Smoking status: Former     Current packs/day: 0.00      Average packs/day: 1 pack/day for 47.0 years (47.0 ttl pk-yrs)     Types: Cigarettes     Start date: 1/13/1974     Quit date: 1/13/2021     Years since quitting: 3.8     Passive exposure: Past    Smokeless tobacco: Never    Tobacco comments:     advised not to smoke   Vaping Use    Vaping status: Never Used   Substance and Sexual Activity    Alcohol use: Not Currently     Comment: aprox 4 drinks a month  when out to dinner    Drug use: Not Currently     Types: Marijuana    Sexual activity: Not Currently   Other Topics Concern    None   Social History Narrative    None     Social Drivers of Health     Financial Resource Strain: Medium Risk (11/30/2023)    Overall Financial Resource Strain (CARDIA)     Difficulty of Paying Living Expenses: Somewhat hard   Food Insecurity: No Food Insecurity (5/11/2023)    Hunger Vital Sign     Worried About Running Out of Food in the Last Year: Never true     Ran Out of Food in the Last Year: Never true   Transportation Needs: No Transportation Needs (11/16/2024)    OASIS : Transportation     Lack of Transportation (Medical): No     Lack of Transportation (Non-Medical): No     Patient Unable or Declines to Respond: No   Physical Activity: Not on file   Stress: Not on file   Social Connections: Not on file   Intimate Partner Violence: Not on file   Housing Stability: Unknown (5/11/2023)    Housing Stability Vital Sign     Unable to Pay for Housing in the Last Year: No     Number of Places Lived in the Last Year: Not on file     Unstable Housing in the Last Year: No        Review of Systems   Constitutional: Negative.    HENT: Negative.     Eyes: Negative.    Respiratory: Negative.     Cardiovascular: Negative.    Endocrine: Negative.    Musculoskeletal: Negative.    Neurological: Negative.    Hematological: Negative.    Psychiatric/Behavioral: Negative.           Objective:     Physical Exam  Vitals reviewed.   Constitutional:       Appearance: Normal appearance.   HENT:       Head: Normocephalic and atraumatic.      Nose: Nose normal.   Eyes:      Conjunctiva/sclera: Conjunctivae normal.      Pupils: Pupils are equal, round, and reactive to light.   Cardiovascular:      Pulses:           Dorsalis pedis pulses are 1+ on the right side and 1+ on the left side.        Posterior tibial pulses are 0 on the right side and 0 on the left side.   Pulmonary:      Effort: Pulmonary effort is normal.   Feet:      Right foot:      Skin integrity: Ulcer present. No erythema or warmth.      Left foot:      Skin integrity: Ulcer present. No erythema or warmth.      Comments: The patient's clinical examination today significant for a nearly healed stasis ulceration to the anterolateral aspect of the right lower extremity.  There are no signs of infection.  There is small residual wound to the postero-lateral aspect of the left lower leg.  There are no signs of infection to the left lower extremity.  Skin:     General: Skin is warm.      Capillary Refill: Capillary refill takes 2 to 3 seconds.   Neurological:      General: No focal deficit present.      Mental Status: He is alert and oriented to person, place, and time.   Psychiatric:         Mood and Affect: Mood normal.         Behavior: Behavior normal.         Thought Content: Thought content normal.

## 2024-11-21 ENCOUNTER — HOME CARE VISIT (OUTPATIENT)
Dept: HOME HEALTH SERVICES | Facility: HOME HEALTHCARE | Age: 69
End: 2024-11-21
Payer: MEDICARE

## 2024-11-21 VITALS — DIASTOLIC BLOOD PRESSURE: 80 MMHG | SYSTOLIC BLOOD PRESSURE: 120 MMHG | OXYGEN SATURATION: 99 % | HEART RATE: 80 BPM

## 2024-11-21 VITALS — OXYGEN SATURATION: 95 % | HEART RATE: 86 BPM | SYSTOLIC BLOOD PRESSURE: 128 MMHG | DIASTOLIC BLOOD PRESSURE: 65 MMHG

## 2024-11-21 PROCEDURE — G0152 HHCP-SERV OF OT,EA 15 MIN: HCPCS

## 2024-11-21 PROCEDURE — G0151 HHCP-SERV OF PT,EA 15 MIN: HCPCS

## 2024-11-22 ENCOUNTER — HOME CARE VISIT (OUTPATIENT)
Dept: HOME HEALTH SERVICES | Facility: HOME HEALTHCARE | Age: 69
End: 2024-11-22
Payer: MEDICARE

## 2024-11-22 VITALS
RESPIRATION RATE: 22 BRPM | DIASTOLIC BLOOD PRESSURE: 70 MMHG | OXYGEN SATURATION: 98 % | TEMPERATURE: 97.7 F | HEART RATE: 78 BPM | SYSTOLIC BLOOD PRESSURE: 122 MMHG

## 2024-11-22 PROCEDURE — G0299 HHS/HOSPICE OF RN EA 15 MIN: HCPCS

## 2024-11-25 ENCOUNTER — HOME CARE VISIT (OUTPATIENT)
Dept: HOME HEALTH SERVICES | Facility: HOME HEALTHCARE | Age: 69
End: 2024-11-25
Payer: MEDICARE

## 2024-11-25 VITALS — DIASTOLIC BLOOD PRESSURE: 80 MMHG | SYSTOLIC BLOOD PRESSURE: 115 MMHG | OXYGEN SATURATION: 98 % | HEART RATE: 78 BPM

## 2024-11-25 VITALS
RESPIRATION RATE: 20 BRPM | OXYGEN SATURATION: 98 % | HEART RATE: 88 BPM | SYSTOLIC BLOOD PRESSURE: 140 MMHG | DIASTOLIC BLOOD PRESSURE: 74 MMHG | TEMPERATURE: 98.2 F

## 2024-11-25 PROCEDURE — G0299 HHS/HOSPICE OF RN EA 15 MIN: HCPCS

## 2024-11-25 PROCEDURE — G0180 MD CERTIFICATION HHA PATIENT: HCPCS | Performed by: PODIATRIST

## 2024-11-25 PROCEDURE — G0152 HHCP-SERV OF OT,EA 15 MIN: HCPCS

## 2024-11-26 ENCOUNTER — RA CDI HCC (OUTPATIENT)
Dept: OTHER | Facility: HOSPITAL | Age: 69
End: 2024-11-26

## 2024-11-26 ENCOUNTER — HOME CARE VISIT (OUTPATIENT)
Dept: HOME HEALTH SERVICES | Facility: HOME HEALTHCARE | Age: 69
End: 2024-11-26
Payer: MEDICARE

## 2024-11-26 NOTE — PROGRESS NOTES
Suggestion used  HCC coding opportunities       Chart reviewed, no opportunity found: CHART REVIEWED, NO OPPORTUNITY FOUND        Patients Insurance     Medicare Insurance: Medicare

## 2024-11-27 ENCOUNTER — TELEPHONE (OUTPATIENT)
Dept: LAB | Facility: HOSPITAL | Age: 69
End: 2024-11-27

## 2024-11-27 ENCOUNTER — HOME CARE VISIT (OUTPATIENT)
Dept: HOME HEALTH SERVICES | Facility: HOME HEALTHCARE | Age: 69
End: 2024-11-27
Payer: MEDICARE

## 2024-11-27 VITALS
HEART RATE: 70 BPM | DIASTOLIC BLOOD PRESSURE: 70 MMHG | OXYGEN SATURATION: 98 % | TEMPERATURE: 97.8 F | SYSTOLIC BLOOD PRESSURE: 122 MMHG | RESPIRATION RATE: 18 BRPM

## 2024-11-27 PROCEDURE — G0300 HHS/HOSPICE OF LPN EA 15 MIN: HCPCS

## 2024-11-29 ENCOUNTER — HOME CARE VISIT (OUTPATIENT)
Dept: HOME HEALTH SERVICES | Facility: HOME HEALTHCARE | Age: 69
End: 2024-11-29
Payer: MEDICARE

## 2024-11-29 VITALS — OXYGEN SATURATION: 96 % | HEART RATE: 83 BPM | SYSTOLIC BLOOD PRESSURE: 122 MMHG | DIASTOLIC BLOOD PRESSURE: 60 MMHG

## 2024-11-29 PROCEDURE — G0151 HHCP-SERV OF PT,EA 15 MIN: HCPCS

## 2024-11-30 ENCOUNTER — HOME CARE VISIT (OUTPATIENT)
Dept: HOME HEALTH SERVICES | Facility: HOME HEALTHCARE | Age: 69
End: 2024-11-30
Payer: MEDICARE

## 2024-11-30 VITALS
DIASTOLIC BLOOD PRESSURE: 78 MMHG | HEART RATE: 84 BPM | SYSTOLIC BLOOD PRESSURE: 138 MMHG | OXYGEN SATURATION: 96 % | RESPIRATION RATE: 20 BRPM | TEMPERATURE: 98.5 F

## 2024-11-30 PROCEDURE — G0299 HHS/HOSPICE OF RN EA 15 MIN: HCPCS

## 2024-12-02 ENCOUNTER — PATIENT OUTREACH (OUTPATIENT)
Dept: CASE MANAGEMENT | Facility: OTHER | Age: 69
End: 2024-12-02

## 2024-12-02 ENCOUNTER — TELEPHONE (OUTPATIENT)
Age: 69
End: 2024-12-02

## 2024-12-02 ENCOUNTER — HOME CARE VISIT (OUTPATIENT)
Dept: HOME HEALTH SERVICES | Facility: HOME HEALTHCARE | Age: 69
End: 2024-12-02
Payer: MEDICARE

## 2024-12-02 ENCOUNTER — OFFICE VISIT (OUTPATIENT)
Dept: PODIATRY | Facility: CLINIC | Age: 69
End: 2024-12-02

## 2024-12-02 VITALS
SYSTOLIC BLOOD PRESSURE: 110 MMHG | HEART RATE: 94 BPM | DIASTOLIC BLOOD PRESSURE: 71 MMHG | BODY MASS INDEX: 38.22 KG/M2 | OXYGEN SATURATION: 97 % | HEIGHT: 67 IN

## 2024-12-02 VITALS — HEART RATE: 91 BPM | OXYGEN SATURATION: 96 % | SYSTOLIC BLOOD PRESSURE: 110 MMHG | DIASTOLIC BLOOD PRESSURE: 65 MMHG

## 2024-12-02 DIAGNOSIS — B35.1 ONYCHOMYCOSIS: ICD-10-CM

## 2024-12-02 DIAGNOSIS — E11.9 TYPE 2 DIABETES MELLITUS WITHOUT COMPLICATION, WITHOUT LONG-TERM CURRENT USE OF INSULIN (HCC): ICD-10-CM

## 2024-12-02 DIAGNOSIS — I83.028 VENOUS STASIS ULCER OF OTHER PART OF LEFT LOWER LEG LIMITED TO BREAKDOWN OF SKIN WITH VARICOSE VEINS (HCC): Primary | ICD-10-CM

## 2024-12-02 DIAGNOSIS — I73.9 PAD (PERIPHERAL ARTERY DISEASE) (HCC): ICD-10-CM

## 2024-12-02 DIAGNOSIS — R60.0 BILATERAL LOWER EXTREMITY EDEMA: ICD-10-CM

## 2024-12-02 DIAGNOSIS — L97.821 VENOUS STASIS ULCER OF OTHER PART OF LEFT LOWER LEG LIMITED TO BREAKDOWN OF SKIN WITH VARICOSE VEINS (HCC): Primary | ICD-10-CM

## 2024-12-02 PROBLEM — L97.909 STASIS ULCER (HCC): Status: ACTIVE | Noted: 2024-12-02

## 2024-12-02 PROBLEM — I83.009 STASIS ULCER (HCC): Status: ACTIVE | Noted: 2024-12-02

## 2024-12-02 PROCEDURE — G0152 HHCP-SERV OF OT,EA 15 MIN: HCPCS

## 2024-12-02 PROCEDURE — 10330064 BANDAGE, CNFRM 4X4.1YDS N/S LF (12RL/BG"

## 2024-12-02 NOTE — PROGRESS NOTES
Outpatient Care Management Note:    Care manager called Addison. He states that his right leg swelling is improving, and his wounds are healing. He continues to follow up with podiatry and has [facility]. He did start physical therapy and feels it is helping.     Addison is still interested in hiring private caregivers. He mentioned [facility]. He will call for more information.     NORMA will follow up in 3 weeks.

## 2024-12-02 NOTE — TELEPHONE ENCOUNTER
Patient has appt [facility] 12/4 Not able to get appt lab work until  12/13. Should patient reschedule [facility]

## 2024-12-02 NOTE — PROGRESS NOTES
Podiatry Clinic Visit  Addison Woodruff 69 y.o. male MRN: [id number]  Encounter: [id number]    Assessment & Plan        Diagnoses and all orders for this visit:    Venous stasis ulcer of other part of left lower leg limited to breakdown of skin with varicose veins (HCC)    Onychomycosis    Type 2 diabetes mellitus without complication, without long-term current use of insulin (HCC)    PAD (peripheral artery disease) (HCC)    Bilateral lower extremity edema           Plan:  Patient was examined, evaluated, and treated with all questions and concerns addressed.  Venous stasis ulcerations remain clinically stable with no signs of infection.  Xerosis noted bilaterally.  Right lower extremity wound is healed, continue with rolled gauze and ACE.  Left lower extremity wound still with mild drainage, continue with Adaptic alginate DSD. Appreciate home nursing assistance with 3x weekly dressing changes  Mycotic nails x 10 debrided, see procedure note below  Patient was re-appointed for 2 weeks    - Dr. Sepulveda  was available/present for entirety of patient encounter and present for all procedures.    Procedures  Nail debridement: Elongated, dystrophic, thickened, discolored nail plates x 10 debrided using large nail nipper with removal of all devitalized tissue and subungual debris. This was performed without incident and to patient satisfaction    History of Present Illness     HPI: Addison Woodruff is a 69 y.o. male who presents with complaint of mycotic nails, venus stasis ulcerations. States that their nails are painful, elongated. They have difficulty applying their socks and shoes. The pressure within their shoe gear is painful and they have been unable to cut their nails adequately. Reports he believes lower extremity wounds have been improving in size.  He has been seen receiving 3 times weekly dressing changes seen.  He denies any fever, chills, chest pain, shortness of breath.   The patient has no further podiatric  complaints at this time.    Review of Systems   Constitutional: Negative.    HENT: Negative.    Eyes: Negative.    Respiratory: Negative.    Cardiovascular: Negative.    Gastrointestinal: Negative.    Musculoskeletal: Negative  Skin: Lymphedema, mycotic nails  Neurological: Negative.        Historical Information   Past Medical History:   Diagnosis Date    Arthritis 2002    Diabetes mellitus (HCC)     ED (erectile dysfunction)     Hyperlipidemia     Hypertension     Low back pain March 2022    Obesity     S/P cervical spinal fusion 05/10/2023    Spinal stenosis of lumbar region at multiple levels 12/22/2022     Past Surgical History:   Procedure Laterality Date    CARPAL TUNNEL RELEASE Right     EYE SURGERY      strabibusmus    HAND SURGERY      left index finger    MA ARTHRD PST/PSTLAT TQ 1NTRSPC CRV BELW C2 SEGMENT N/A 05/10/2023    Procedure: Posterior cervical decompressive laminectomy and instrumented fusion C4-T1;  Surgeon: Collins Angulo MD;  Location: [facility];  Service: Neurosurgery    MA EXCISION SPERMATOCELE W/WO EPIDIDYMECTOMY Left 09/16/2021    Procedure: HYDROCELECTOMY, SCROTAL DEBRIDEMENT;  Surgeon: Jose Guadalupe Christianson MD;  Location: [facility];  Service: Urology    MA NEUROPLASTY &/TRANSPOS MEDIAN NRV CARPAL TUNNE Right 07/06/2021    Procedure: RELEASE CARPAL TUNNEL;  Surgeon: Natty Cuevas MD;  Location[facility];  Service: Orthopedics    MA NEUROPLASTY &/TRANSPOSITION ULNAR NERVE ELBOW Right 07/06/2021    Procedure: RELEASE CUBITAL TUNNEL;  Surgeon: Natty Cuevas MD;  [facility];  Service: Orthopedics    SPINE SURGERY  05/10/223     Social History   Social History     Substance and Sexual Activity   Alcohol Use Not Currently    Comment: aprox 4 drinks a month  when out to dinner     Social History     Substance and Sexual Activity   Drug Use Not Currently    Types: Marijuana     Social History     Tobacco Use   Smoking Status Former    Current packs/day: 0.00  "   Average packs/day: 1 pack/day for 47.0 years (47.0 ttl pk-yrs)    Types: Cigarettes    Start date: 1/13/1974    Quit date: 1/13/2021    Years since quitting: 3.8    Passive exposure: Past   Smokeless Tobacco Never   Tobacco Comments    advised not to smoke     Family History:   Family History   Problem Relation Age of Onset    Hypertension Mother     Diabetes Mother     Hypertension Father     Diabetes Father        Meds/Allergies   Not in a hospital admission.  No Known Allergies    Objective     Current Vitals:   Blood Pressure: 110/71 (12/02/24 1110)  Pulse: 94 (12/02/24 1110)  Height: 5' 7\" (170.2 cm) (12/02/24 1110)  SpO2: 97 % (12/02/24 1110)        /71 (BP Location: Right arm, Patient Position: Sitting, Cuff Size: Standard)   Pulse 94   Ht 5' 7\" (1.702 m)   SpO2 97%   BMI 38.22 kg/m²       Lower Extremity Exam:    Foot Exam    Musculoskeletal:  MMT is 5/5 to all compartments of the LE B/L, +3/4 edema B/L,  Pain on  palpation of nail plates x 10, Equinus is present. No pain with passive ROM of pedal joints.     Vascular:   DP pulses are absent bilaterally and PT pulses are absent bilaterally., CFT< 3sec to all digits. Pedal hair is Absent. Skin temperature warm to warm B/L.     Dermatological:  No open Lesions. Skin of the LE is normal texture, temperature, turgor B/L. Interdigital maceration is not present.     Nail plates x 10 are elongated, dystrophic, thickened, discolored with presence of subungual debris.   Xerosis to right lower extremity.  Venous stasis ulceration is healed with no clinical signs of infection.  Small residual wound to posterior lateral aspect of left lower leg with no clinical signs of infection, mild serous drainage     Neurologic:  Gross sensation is intact. Monofilament sensation is Intact. Sharp sensation is present.                  "

## 2024-12-03 ENCOUNTER — HOME CARE VISIT (OUTPATIENT)
Dept: HOME HEALTH SERVICES | Facility: HOME HEALTHCARE | Age: 69
End: 2024-12-03
Payer: MEDICARE

## 2024-12-03 VITALS — DIASTOLIC BLOOD PRESSURE: 70 MMHG | SYSTOLIC BLOOD PRESSURE: 124 MMHG

## 2024-12-03 PROCEDURE — G0151 HHCP-SERV OF PT,EA 15 MIN: HCPCS

## 2024-12-04 ENCOUNTER — OFFICE VISIT (OUTPATIENT)
Age: 69
End: 2024-12-04
Payer: MEDICARE

## 2024-12-04 ENCOUNTER — HOME CARE VISIT (OUTPATIENT)
Dept: HOME HEALTH SERVICES | Facility: HOME HEALTHCARE | Age: 69
End: 2024-12-04
Payer: MEDICARE

## 2024-12-04 VITALS
OXYGEN SATURATION: 99 % | SYSTOLIC BLOOD PRESSURE: 110 MMHG | HEART RATE: 83 BPM | WEIGHT: 244 LBS | DIASTOLIC BLOOD PRESSURE: 60 MMHG | BODY MASS INDEX: 38.22 KG/M2 | RESPIRATION RATE: 18 BRPM

## 2024-12-04 VITALS
DIASTOLIC BLOOD PRESSURE: 70 MMHG | OXYGEN SATURATION: 98 % | RESPIRATION RATE: 18 BRPM | TEMPERATURE: 97.8 F | HEART RATE: 76 BPM | SYSTOLIC BLOOD PRESSURE: 126 MMHG

## 2024-12-04 DIAGNOSIS — G89.29 CHRONIC BILATERAL LOW BACK PAIN WITHOUT SCIATICA: ICD-10-CM

## 2024-12-04 DIAGNOSIS — M54.50 CHRONIC BILATERAL LOW BACK PAIN WITHOUT SCIATICA: ICD-10-CM

## 2024-12-04 DIAGNOSIS — I10 PRIMARY HYPERTENSION: ICD-10-CM

## 2024-12-04 DIAGNOSIS — I73.9 PAD (PERIPHERAL ARTERY DISEASE) (HCC): ICD-10-CM

## 2024-12-04 DIAGNOSIS — E78.2 MIXED HYPERLIPIDEMIA: ICD-10-CM

## 2024-12-04 DIAGNOSIS — E66.01 OBESITY, MORBID (HCC): ICD-10-CM

## 2024-12-04 DIAGNOSIS — R60.0 BILATERAL LOWER EXTREMITY EDEMA: ICD-10-CM

## 2024-12-04 DIAGNOSIS — E11.51 TYPE 2 DIABETES, CONTROLLED, WITH PERIPHERAL CIRCULATORY DISORDER (HCC): Primary | ICD-10-CM

## 2024-12-04 DIAGNOSIS — Z00.00 MEDICARE ANNUAL WELLNESS VISIT, SUBSEQUENT: ICD-10-CM

## 2024-12-04 PROBLEM — E11.9 TYPE 2 DIABETES MELLITUS WITHOUT COMPLICATION, WITHOUT LONG-TERM CURRENT USE OF INSULIN (HCC): Status: RESOLVED | Noted: 2022-07-14 | Resolved: 2024-12-04

## 2024-12-04 PROCEDURE — G0300 HHS/HOSPICE OF LPN EA 15 MIN: HCPCS

## 2024-12-04 PROCEDURE — 99214 OFFICE O/P EST MOD 30 MIN: CPT

## 2024-12-04 PROCEDURE — G0438 PPPS, INITIAL VISIT: HCPCS

## 2024-12-04 RX ORDER — MELOXICAM 15 MG/1
15 TABLET ORAL DAILY
Qty: 30 TABLET | Refills: 2 | Status: SHIPPED | OUTPATIENT
Start: 2024-12-04

## 2024-12-04 RX ORDER — FUROSEMIDE 20 MG/1
20 TABLET ORAL DAILY
Qty: 90 TABLET | Refills: 2 | Status: SHIPPED | OUTPATIENT
Start: 2024-12-04

## 2024-12-04 RX ORDER — METHOCARBAMOL 500 MG/1
500 TABLET, FILM COATED ORAL 2 TIMES DAILY PRN
Qty: 60 TABLET | Refills: 0 | Status: SHIPPED | OUTPATIENT
Start: 2024-12-04 | End: 2024-12-04 | Stop reason: SDUPTHER

## 2024-12-04 NOTE — PROGRESS NOTES
Name: Addison Woodruff      : 1955      MRN: [id number]  Encounter Provider: Fazal Howard MD  Encounter Date: 2024   Encounter department: [facility]IMARY [facility]    Assessment & Plan  Type 2 diabetes, controlled, with peripheral circulatory disorder (HCC)    Lab Results   Component Value Date    HGBA1C 6.9 (H) 2024   Under control.    Continue current medication.    We will re-evaluate at next office visit.             Primary hypertension  Under control.  Continue Lotrel.  We will continue to monitor              Mixed hyperlipidemia  Under reasonable control with diet.  We will continue to monitor.              PAD (peripheral artery disease) (HCC)  Under control.    Continue current medication.    Patient has been followed by cardiologist  We will re-evaluate at next office visit.           Obesity, morbid (HCC)    Patient was advised to lose weight.  Potential consequences of obesity discussed with patient.  Advised to have portion control no more than 60% of meal or may consider intermittent fasting  We will continue to monitor             Bilateral lower extremity edema  Start on furosemide 20 mg po QAM  Check blood work in near future    Orders:    furosemide (LASIX) 20 mg tablet; Take 1 tablet (20 mg total) by mouth daily    Chronic bilateral low back pain without sciatica    Orders:    methocarbamol (ROBAXIN) 500 mg tablet; Take 1 tablet (500 mg total) by mouth 2 (two) times a day as needed for muscle spasms    Medicare annual wellness visit, subsequent            Preventive health issues were discussed with patient, and age appropriate screening tests were ordered as noted in patient's After Visit Summary. Personalized health advice and appropriate referrals for health education or preventive services given if needed, as noted in patient's After Visit Summary.    History of Present Illness     Patient here for review of chronic medical problems and  the labs and imaging  if it is applicable.  Currently has no specific complaints other than mentioned in the review of systems  Denies chest pain, SOB, cough, abdominal pain, nausea, vomiting, fever, chills, lightheadedness, dizziness,headache, tingling or numbness.No bladder problem.         Patient Care Team:  Fazal Howard MD as PCP - General (Internal Medicine)  Geri Owens, RN as RN Care Manager (Care Coordination)    Review of Systems   Constitutional:  Negative for chills, fatigue and fever.   HENT:  Negative for congestion, ear pain, rhinorrhea, sneezing and sore throat.    Eyes:  Negative for redness, itching and visual disturbance.   Respiratory:  Negative for cough, chest tightness and shortness of breath.    Cardiovascular:  Negative for chest pain, palpitations and leg swelling.   Gastrointestinal:  Positive for constipation (Off and on). Negative for abdominal pain, blood in stool, diarrhea, nausea and vomiting.   Endocrine: Negative for cold intolerance and heat intolerance.   Genitourinary:  Negative for dysuria, frequency and urgency.   Musculoskeletal:  Positive for arthralgias, gait problem and myalgias. Negative for back pain and neck stiffness.   Skin:  Negative for color change and rash.   Neurological:  Positive for weakness (Both hands). Negative for dizziness, tremors, light-headedness, numbness and headaches.   Hematological:  Does not bruise/bleed easily.   Psychiatric/Behavioral:  Negative for agitation, behavioral problems and confusion.      Medical History Reviewed by provider this encounter:  Tobacco  Allergies  Meds  Problems  Med Hx  Surg Hx  Fam Hx       Annual Wellness Visit Questionnaire   Addison is here for his Subsequent Wellness visit.     Health Risk Assessment:   Patient rates overall health as good. Patient feels that their physical health rating is slightly worse. Patient is satisfied with their life. Eyesight was rated as same. Hearing was rated as same. Patient feels that their  emotional and mental health rating is same. Patients states they are never, rarely angry. Patient states they are sometimes unusually tired/fatigued. Pain experienced in the last 7 days has been some. Patient's pain rating has been 6/10. Patient states that he has experienced no weight loss or gain in last 6 months.     Fall Risk Screening:   In the past year, patient has experienced: history of falling in past year      Home Safety:  Patient has trouble with stairs inside or outside of their home. Patient has working smoke alarms and has working carbon monoxide detector. Home safety hazards include: none.     Nutrition:   Current diet is Diabetic and Limited junk food.     Medications:   Patient is currently taking over-the-counter supplements. OTC medications include: see medication list. Patient is able to manage medications.     Activities of Daily Living (ADLs)/Instrumental Activities of Daily Living (IADLs):   Walk and transfer into and out of bed and chair?: Yes  Dress and groom yourself?: Yes    Bathe or shower yourself?: Yes    Feed yourself? Yes  Do your laundry/housekeeping?: Yes  Manage your money, pay your bills and track your expenses?: Yes  Make your own meals?: Yes    Do your own shopping?: Yes    Previous Hospitalizations:   Any hospitalizations or ED visits within the last 12 months?: Yes    How many hospitalizations have you had in the last year?: 1-2    Advance Care Planning:   Living will: No    Durable POA for healthcare: No    Advanced directive: No      Cognitive Screening:   Provider or family/friend/caregiver concerned regarding cognition?: No    PREVENTIVE SCREENINGS      Cardiovascular Screening:    General: Screening Not Indicated and History Lipid Disorder      Diabetes Screening:     General: Screening Not Indicated and History Diabetes      Abdominal Aortic Aneurysm (AAA) Screening:    Risk factors include: age between 65-74 yo and tobacco use        Hepatitis C Screening:    General:  Screening Current    Screening, Brief Intervention, and Referral to Treatment (SBIRT)    Screening  Typical number of drinks in a day: 0  Typical number of drinks in a week: 0  Interpretation: Low risk drinking behavior.    AUDIT-C Screenin) How often did you have a drink containing alcohol in the past year? never  2) How many drinks did you have on a typical day when you were drinking in the past year? 0  3) How often did you have 6 or more drinks on one occasion in the past year? never    AUDIT-C Score: 0  Interpretation: Score 0-3 (male): Negative screen for alcohol misuse    Single Item Drug Screening:  How often have you used an illegal drug (including marijuana) or a prescription medication for non-medical reasons in the past year? never    Single Item Drug Screen Score: 0  Interpretation: Negative screen for possible drug use disorder    Social Drivers of Health     Financial Resource Strain: Medium Risk (2023)    Overall Financial Resource Strain (CARDIA)     Difficulty of Paying Living Expenses: Somewhat hard   Food Insecurity: No Food Insecurity (2024)    Hunger Vital Sign     Worried About Running Out of Food in the Last Year: Never true     Ran Out of Food in the Last Year: Never true   Transportation Needs: No Transportation Needs (2024)    PRAPARE - Transportation     Lack of Transportation (Medical): No     Lack of Transportation (Non-Medical): No   Housing Stability: Unknown (2024)    Housing Stability Vital Sign     Unable to Pay for Housing in the Last Year: No     Homeless in the Last Year: No   Utilities: Not At Risk (2024)    [facility] Utilities     Threatened with loss of utilities: No     No results found.    Objective   There were no vitals taken for this visit.    Physical Exam  Vitals and nursing note reviewed.   Constitutional:       General: He is not in acute distress.     Appearance: Normal appearance. He is well-developed. He is obese. He is not  ill-appearing, toxic-appearing or diaphoretic.   HENT:      Head: Normocephalic and atraumatic.      Right Ear: Tympanic membrane, ear canal and external ear normal. There is no impacted cerumen.      Left Ear: Tympanic membrane, ear canal and external ear normal. There is no impacted cerumen.      Nose: Nose normal. No congestion or rhinorrhea.      Mouth/Throat:      Mouth: Mucous membranes are moist.      Pharynx: Oropharynx is clear. No posterior oropharyngeal erythema.   Eyes:      General: No scleral icterus.        Right eye: No discharge.         Left eye: No discharge.      Extraocular Movements: Extraocular movements intact.      Conjunctiva/sclera: Conjunctivae normal.      Pupils: Pupils are equal, round, and reactive to light.   Neck:      Vascular: No carotid bruit.   Cardiovascular:      Rate and Rhythm: Normal rate and regular rhythm.      Pulses:           Dorsalis pedis pulses are 1+ on the right side and 1+ on the left side.        Posterior tibial pulses are 1+ on the right side and 1+ on the left side.      Heart sounds: Normal heart sounds. No murmur heard.     No gallop.   Pulmonary:      Effort: Pulmonary effort is normal. No respiratory distress.      Breath sounds: Normal breath sounds. No wheezing, rhonchi or rales.   Abdominal:      General: Abdomen is flat. Bowel sounds are normal. There is no distension.      Palpations: Abdomen is soft.      Tenderness: There is no abdominal tenderness. There is no right CVA tenderness, left CVA tenderness or guarding.   Musculoskeletal:      Cervical back: Normal range of motion and neck supple. No rigidity or tenderness.      Right lower leg: Edema (Minimal) present.      Left lower leg: Edema (Minimal) present.      Comments: Atrophy both hands  Bilateral paraspinal muscular spasms lower back   Feet:      Right foot:      Skin integrity: Dry skin present. No ulcer, skin breakdown, erythema, warmth or callus.      Left foot:      Skin integrity: Dry  skin present. No ulcer, skin breakdown, erythema, warmth or callus.   Lymphadenopathy:      Cervical: No cervical adenopathy.   Skin:     General: Skin is warm and dry.      Capillary Refill: Capillary refill takes 2 to 3 seconds.      Coloration: Skin is not jaundiced.   Neurological:      General: No focal deficit present.      Mental Status: He is alert and oriented to person, place, and time. Mental status is at baseline.      Sensory: Sensory deficit ( both hands) present.      Motor: Weakness (both hands) present.      Gait: Gait abnormal (ambulates with walker).   Psychiatric:         Mood and Affect: Mood normal.

## 2024-12-04 NOTE — ASSESSMENT & PLAN NOTE
Orders:    methocarbamol (ROBAXIN) 500 mg tablet; Take 1 tablet (500 mg total) by mouth 2 (two) times a day as needed for muscle spasms

## 2024-12-04 NOTE — PATIENT INSTRUCTIONS
Medicare Preventive Visit Patient Instructions  Thank you for completing your Welcome to Medicare Visit or Medicare Annual Wellness Visit today. Your next wellness visit will be due in one year (12/5/2025).  The screening/preventive services that you may require over the next 5-10 years are detailed below. Some tests may not apply to you based off risk factors and/or age. Screening tests ordered at today's visit but not completed yet may show as past due. Also, please note that scanned in results may not display below.  Preventive Screenings:  Service Recommendations Previous Testing/Comments   Colorectal Cancer Screening  Colonoscopy    Fecal Occult Blood Test (FOBT)/Fecal Immunochemical Test (FIT)  Fecal DNA/Cologuard Test  Flexible Sigmoidoscopy Age: 45-75 years old   Colonoscopy: every 10 years (May be performed more frequently if at higher risk)  OR  FOBT/FIT: every 1 year  OR  Cologuard: every 3 years  OR  Sigmoidoscopy: every 5 years  Screening may be recommended earlier than age 45 if at higher risk for colorectal cancer. Also, an individualized decision between you and your healthcare provider will decide whether screening between the ages of 76-85 would be appropriate. Colonoscopy: Not on file  FOBT/FIT: Not on file  Cologuard: Not on file  Sigmoidoscopy: Not on file          Prostate Cancer Screening Individualized decision between patient and health care provider in men between ages of 55-69   Medicare will cover every 12 months beginning on the day after your 50th birthday PSA: 1.18 ng/mL           Hepatitis C Screening Once for adults born between 1945 and 1965  More frequently in patients at high risk for Hepatitis C Hep C Antibody: 02/21/2023        Diabetes Screening 1-2 times per year if you're at risk for diabetes or have pre-diabetes Fasting glucose: 111 mg/dL (3/18/2024)  A1C: 6.9 % (3/18/2024)      Cholesterol Screening Once every 5 years if you don't have a lipid disorder. May order more often  based on risk factors. Lipid panel: 11/27/2023         Other Preventive Screenings Covered by Medicare:  Abdominal Aortic Aneurysm (AAA) Screening: covered once if your at risk. You're considered to be at risk if you have a family history of AAA or a male between the age of 65-75 who smoking at least 100 cigarettes in your lifetime.  Lung Cancer Screening: covers low dose CT scan once per year if you meet all of the following conditions: (1) Age 55-77; (2) No signs or symptoms of lung cancer; (3) Current smoker or have quit smoking within the last 15 years; (4) You have a tobacco smoking history of at least 20 pack years (packs per day x number of years you smoked); (5) You get a written order from a healthcare provider.  Glaucoma Screening: covered annually if you're considered high risk: (1) You have diabetes OR (2) Family history of glaucoma OR (3)  aged 50 and older OR (4)  American aged 65 and older  Osteoporosis Screening: covered every 2 years if you meet one of the following conditions: (1) Have a vertebral abnormality; (2) On glucocorticoid therapy for more than 3 months; (3) Have primary hyperparathyroidism; (4) On osteoporosis medications and need to assess response to drug therapy.  HIV Screening: covered annually if you're between the age of 15-65. Also covered annually if you are younger than 15 and older than 65 with risk factors for HIV infection. For pregnant patients, it is covered up to 3 times per pregnancy.    Immunizations:  Immunization Recommendations   Influenza Vaccine Annual influenza vaccination during flu season is recommended for all persons aged >= 6 months who do not have contraindications   Pneumococcal Vaccine   * Pneumococcal conjugate vaccine = PCV13 (Prevnar 13), PCV15 (Vaxneuvance), PCV20 (Prevnar 20)  * Pneumococcal polysaccharide vaccine = PPSV23 (Pneumovax) Adults 19-65 yo with certain risk factors or if 65+ yo  If never received any pneumonia vaccine:  recommend Prevnar 20 (PCV20)  Give PCV20 if previously received 1 dose of PCV13 or PPSV23   Hepatitis B Vaccine 3 dose series if at intermediate or high risk (ex: diabetes, end stage renal disease, liver disease)   Respiratory syncytial virus (RSV) Vaccine - COVERED BY MEDICARE PART D  * RSVPreF3 (Arexvy) CDC recommends that adults 60 years of age and older may receive a single dose of RSV vaccine using shared clinical decision-making (SCDM)   Tetanus (Td) Vaccine - COST NOT COVERED BY MEDICARE PART B Following completion of primary series, a booster dose should be given every 10 years to maintain immunity against tetanus. Td may also be given as tetanus wound prophylaxis.   Tdap Vaccine - COST NOT COVERED BY MEDICARE PART B Recommended at least once for all adults. For pregnant patients, recommended with each pregnancy.   Shingles Vaccine (Shingrix) - COST NOT COVERED BY MEDICARE PART B  2 shot series recommended in those 19 years and older who have or will have weakened immune systems or those 50 years and older     Health Maintenance Due:      Topic Date Due   • Lung Cancer Screening  12/04/2025 (Originally 9/26/2003)   • Colorectal Cancer Screening  12/04/2025 (Originally 9/26/1998)   • Hepatitis C Screening  Completed     Immunizations Due:      Topic Date Due   • COVID-19 Vaccine (4 - 2024-25 season) 09/01/2024     Advance Directives   What are advance directives?  Advance directives are legal documents that state your wishes and plans for medical care. These plans are made ahead of time in case you lose your ability to make decisions for yourself. Advance directives can apply to any medical decision, such as the treatments you want, and if you want to donate organs.   What are the types of advance directives?  There are many types of advance directives, and each state has rules about how to use them. You may choose a combination of any of the following:  Living will:  This is a written record of the treatment  you want. You can also choose which treatments you do not want, which to limit, and which to stop at a certain time. This includes surgery, medicine, IV fluid, and tube feedings.   Durable power of  for healthcare (DPAHC):  This is a written record that states who you want to make healthcare choices for you when you are unable to make them for yourself. This person, called a proxy, is usually a family member or a friend. You may choose more than 1 proxy.  Do not resuscitate (DNR) order:  A DNR order is used in case your heart stops beating or you stop breathing. It is a request not to have certain forms of treatment, such as CPR. A DNR order may be included in other types of advance directives.  Medical directive:  This covers the care that you want if you are in a coma, near death, or unable to make decisions for yourself. You can list the treatments you want for each condition. Treatment may include pain medicine, surgery, blood transfusions, dialysis, IV or tube feedings, and a ventilator (breathing machine).  Values history:  This document has questions about your views, beliefs, and how you feel and think about life. This information can help others choose the care that you would choose.  Why are advance directives important?  An advance directive helps you control your care. Although spoken wishes may be used, it is better to have your wishes written down. Spoken wishes can be misunderstood, or not followed. Treatments may be given even if you do not want them. An advance directive may make it easier for your family to make difficult choices about your care.   Weight Management   Why it is important to manage your weight:  Being overweight increases your risk of health conditions such as heart disease, high blood pressure, type 2 diabetes, and certain types of cancer. It can also increase your risk for osteoarthritis, sleep apnea, and other respiratory problems. Aim for a slow, steady weight loss. Even  a small amount of weight loss can lower your risk of health problems.  How to lose weight safely:  A safe and healthy way to lose weight is to eat fewer calories and get regular exercise. You can lose up about 1 pound a week by decreasing the number of calories you eat by 500 calories each day.   Healthy meal plan for weight management:  A healthy meal plan includes a variety of foods, contains fewer calories, and helps you stay healthy. A healthy meal plan includes the following:  Eat whole-grain foods more often.  A healthy meal plan should contain fiber. Fiber is the part of grains, fruits, and vegetables that is not broken down by your body. Whole-grain foods are healthy and provide extra fiber in your diet. Some examples of whole-grain foods are whole-wheat breads and pastas, oatmeal, brown rice, and bulgur.  Eat a variety of vegetables every day.  Include dark, leafy greens such as spinach, kale, sarah beth greens, and mustard greens. Eat yellow and orange vegetables such as carrots, sweet potatoes, and winter squash.   Eat a variety of fruits every day.  Choose fresh or canned fruit (canned in its own juice or light syrup) instead of juice. Fruit juice has very little or no fiber.  Eat low-fat dairy foods.  Drink fat-free (skim) milk or 1% milk. Eat fat-free yogurt and low-fat cottage cheese. Try low-fat cheeses such as mozzarella and other reduced-fat cheeses.  Choose meat and other protein foods that are low in fat.  Choose beans or other legumes such as split peas or lentils. Choose fish, skinless poultry (chicken or turkey), or lean cuts of red meat (beef or pork). Before you cook meat or poultry, cut off any visible fat.   Use less fat and oil.  Try baking foods instead of frying them. Add less fat, such as margarine, sour cream, regular salad dressing and mayonnaise to foods. Eat fewer high-fat foods. Some examples of high-fat foods include french fries, doughnuts, ice cream, and cakes.  Eat fewer sweets.   Limit foods and drinks that are high in sugar. This includes candy, cookies, regular soda, and sweetened drinks.  Exercise:  Exercise at least 30 minutes per day on most days of the week. Some examples of exercise include walking, biking, dancing, and swimming. You can also fit in more physical activity by taking the stairs instead of the elevator or parking farther away from stores. Ask your healthcare provider about the best exercise plan for you.      © Copyright [employer] 2018 Information is for End User's use only and may not be sold, redistributed or otherwise used for commercial purposes. All illustrations and images included in CareNotes® are the copyrighted property of [employer] Inc[employer] or [employer]

## 2024-12-04 NOTE — ASSESSMENT & PLAN NOTE
Start on furosemide 20 mg po QAM  Check blood work in near future    Orders:    furosemide (LASIX) 20 mg tablet; Take 1 tablet (20 mg total) by mouth daily

## 2024-12-05 ENCOUNTER — HOME CARE VISIT (OUTPATIENT)
Dept: HOME HEALTH SERVICES | Facility: HOME HEALTHCARE | Age: 69
End: 2024-12-05
Payer: MEDICARE

## 2024-12-05 ENCOUNTER — TELEPHONE (OUTPATIENT)
Age: 69
End: 2024-12-05

## 2024-12-05 PROCEDURE — G0152 HHCP-SERV OF OT,EA 15 MIN: HCPCS

## 2024-12-05 RX ORDER — METHOCARBAMOL 500 MG/1
500 TABLET, FILM COATED ORAL 2 TIMES DAILY PRN
Qty: 60 TABLET | Refills: 0 | Status: SHIPPED | OUTPATIENT
Start: 2024-12-05

## 2024-12-06 ENCOUNTER — HOME CARE VISIT (OUTPATIENT)
Dept: HOME HEALTH SERVICES | Facility: HOME HEALTHCARE | Age: 69
End: 2024-12-06
Payer: MEDICARE

## 2024-12-06 VITALS
SYSTOLIC BLOOD PRESSURE: 140 MMHG | RESPIRATION RATE: 18 BRPM | DIASTOLIC BLOOD PRESSURE: 82 MMHG | OXYGEN SATURATION: 95 % | TEMPERATURE: 98.7 F | HEART RATE: 76 BPM

## 2024-12-06 VITALS — OXYGEN SATURATION: 99 % | SYSTOLIC BLOOD PRESSURE: 130 MMHG | HEART RATE: 61 BPM | DIASTOLIC BLOOD PRESSURE: 80 MMHG

## 2024-12-06 PROCEDURE — G0300 HHS/HOSPICE OF LPN EA 15 MIN: HCPCS

## 2024-12-06 NOTE — TELEPHONE ENCOUNTER
Duplicate encounter created, please see telephone encounter from 11/1/24 regarding Robaxin PA status. Please review patient's chart to see if there is already an encounter regarding the medication in question and to document anything regarding this medication in regards to anything regarding the authorization process etc before creating another encounter Thank You.

## 2024-12-09 ENCOUNTER — HOME CARE VISIT (OUTPATIENT)
Dept: HOME HEALTH SERVICES | Facility: HOME HEALTHCARE | Age: 69
End: 2024-12-09
Payer: MEDICARE

## 2024-12-09 PROCEDURE — G0299 HHS/HOSPICE OF RN EA 15 MIN: HCPCS

## 2024-12-09 PROCEDURE — G0152 HHCP-SERV OF OT,EA 15 MIN: HCPCS

## 2024-12-10 ENCOUNTER — HOME CARE VISIT (OUTPATIENT)
Dept: HOME HEALTH SERVICES | Facility: HOME HEALTHCARE | Age: 69
End: 2024-12-10
Payer: MEDICARE

## 2024-12-10 VITALS
SYSTOLIC BLOOD PRESSURE: 132 MMHG | RESPIRATION RATE: 16 BRPM | OXYGEN SATURATION: 98 % | HEART RATE: 75 BPM | DIASTOLIC BLOOD PRESSURE: 72 MMHG | TEMPERATURE: 97.3 F

## 2024-12-10 VITALS — DIASTOLIC BLOOD PRESSURE: 72 MMHG | OXYGEN SATURATION: 98 % | HEART RATE: 78 BPM | SYSTOLIC BLOOD PRESSURE: 132 MMHG

## 2024-12-10 VITALS — SYSTOLIC BLOOD PRESSURE: 110 MMHG | DIASTOLIC BLOOD PRESSURE: 68 MMHG

## 2024-12-10 PROCEDURE — G0151 HHCP-SERV OF PT,EA 15 MIN: HCPCS

## 2024-12-11 ENCOUNTER — HOME CARE VISIT (OUTPATIENT)
Dept: HOME HEALTH SERVICES | Facility: HOME HEALTHCARE | Age: 69
End: 2024-12-11
Payer: MEDICARE

## 2024-12-11 VITALS
OXYGEN SATURATION: 99 % | SYSTOLIC BLOOD PRESSURE: 110 MMHG | DIASTOLIC BLOOD PRESSURE: 72 MMHG | TEMPERATURE: 97.6 F | RESPIRATION RATE: 18 BRPM | HEART RATE: 72 BPM

## 2024-12-11 PROCEDURE — G0299 HHS/HOSPICE OF RN EA 15 MIN: HCPCS

## 2024-12-13 ENCOUNTER — HOME CARE VISIT (OUTPATIENT)
Dept: HOME HEALTH SERVICES | Facility: HOME HEALTHCARE | Age: 69
End: 2024-12-13
Payer: MEDICARE

## 2024-12-13 ENCOUNTER — APPOINTMENT (OUTPATIENT)
Dept: LAB | Facility: HOSPITAL | Age: 69
End: 2024-12-13
Payer: MEDICARE

## 2024-12-13 VITALS — DIASTOLIC BLOOD PRESSURE: 70 MMHG | SYSTOLIC BLOOD PRESSURE: 138 MMHG

## 2024-12-13 VITALS
TEMPERATURE: 98.6 F | DIASTOLIC BLOOD PRESSURE: 80 MMHG | RESPIRATION RATE: 20 BRPM | HEART RATE: 70 BPM | OXYGEN SATURATION: 98 % | SYSTOLIC BLOOD PRESSURE: 146 MMHG

## 2024-12-13 DIAGNOSIS — E55.9 VITAMIN D DEFICIENCY: ICD-10-CM

## 2024-12-13 DIAGNOSIS — E11.51 TYPE 2 DIABETES, CONTROLLED, WITH PERIPHERAL CIRCULATORY DISORDER (HCC): ICD-10-CM

## 2024-12-13 DIAGNOSIS — E78.2 MIXED HYPERLIPIDEMIA: ICD-10-CM

## 2024-12-13 DIAGNOSIS — I10 PRIMARY HYPERTENSION: ICD-10-CM

## 2024-12-13 DIAGNOSIS — Z12.5 SCREENING FOR PROSTATE CANCER: ICD-10-CM

## 2024-12-13 LAB
25(OH)D3 SERPL-MCNC: 35.7 NG/ML (ref 30–100)
ALBUMIN SERPL BCG-MCNC: 3.6 G/DL (ref 3.5–5)
ALP SERPL-CCNC: 69 U/L (ref 34–104)
ALT SERPL W P-5'-P-CCNC: 15 U/L (ref 7–52)
ANION GAP SERPL CALCULATED.3IONS-SCNC: 5 MMOL/L (ref 4–13)
AST SERPL W P-5'-P-CCNC: 15 U/L (ref 13–39)
BASOPHILS # BLD AUTO: 0.05 THOUSANDS/ÂΜL (ref 0–0.1)
BASOPHILS NFR BLD AUTO: 0 % (ref 0–1)
BILIRUB SERPL-MCNC: 0.39 MG/DL (ref 0.2–1)
BUN SERPL-MCNC: 37 MG/DL (ref 5–25)
CALCIUM SERPL-MCNC: 9.8 MG/DL (ref 8.4–10.2)
CHLORIDE SERPL-SCNC: 107 MMOL/L (ref 96–108)
CHOLEST SERPL-MCNC: 119 MG/DL (ref ?–200)
CO2 SERPL-SCNC: 31 MMOL/L (ref 21–32)
CREAT SERPL-MCNC: 1.08 MG/DL (ref 0.6–1.3)
EOSINOPHIL # BLD AUTO: 0.63 THOUSAND/ÂΜL (ref 0–0.61)
EOSINOPHIL NFR BLD AUTO: 6 % (ref 0–6)
ERYTHROCYTE [DISTWIDTH] IN BLOOD BY AUTOMATED COUNT: 15.4 % (ref 11.6–15.1)
EST. AVERAGE GLUCOSE BLD GHB EST-MCNC: 137 MG/DL
GFR SERPL CREATININE-BSD FRML MDRD: 68 ML/MIN/1.73SQ M
GLUCOSE P FAST SERPL-MCNC: 105 MG/DL (ref 65–99)
HBA1C MFR BLD: 6.4 %
HCT VFR BLD AUTO: 35.5 % (ref 36.5–49.3)
HDLC SERPL-MCNC: 44 MG/DL
HGB BLD-MCNC: 10.9 G/DL (ref 12–17)
IMM GRANULOCYTES # BLD AUTO: 0.04 THOUSAND/UL (ref 0–0.2)
IMM GRANULOCYTES NFR BLD AUTO: 0 % (ref 0–2)
LDLC SERPL CALC-MCNC: 63 MG/DL (ref 0–100)
LYMPHOCYTES # BLD AUTO: 1.03 THOUSANDS/ÂΜL (ref 0.6–4.47)
LYMPHOCYTES NFR BLD AUTO: 9 % (ref 14–44)
MCH RBC QN AUTO: 26.3 PG (ref 26.8–34.3)
MCHC RBC AUTO-ENTMCNC: 30.7 G/DL (ref 31.4–37.4)
MCV RBC AUTO: 86 FL (ref 82–98)
MONOCYTES # BLD AUTO: 0.98 THOUSAND/ÂΜL (ref 0.17–1.22)
MONOCYTES NFR BLD AUTO: 9 % (ref 4–12)
NEUTROPHILS # BLD AUTO: 8.5 THOUSANDS/ÂΜL (ref 1.85–7.62)
NEUTS SEG NFR BLD AUTO: 76 % (ref 43–75)
NRBC BLD AUTO-RTO: 0 /100 WBCS
PLATELET # BLD AUTO: 327 THOUSANDS/UL (ref 149–390)
PMV BLD AUTO: 10.8 FL (ref 8.9–12.7)
POTASSIUM SERPL-SCNC: 4.3 MMOL/L (ref 3.5–5.3)
PROT SERPL-MCNC: 6.8 G/DL (ref 6.4–8.4)
PSA SERPL-MCNC: 1.44 NG/ML (ref 0–4)
RBC # BLD AUTO: 4.14 MILLION/UL (ref 3.88–5.62)
SODIUM SERPL-SCNC: 143 MMOL/L (ref 135–147)
TRIGL SERPL-MCNC: 62 MG/DL (ref ?–150)
WBC # BLD AUTO: 11.23 THOUSAND/UL (ref 4.31–10.16)

## 2024-12-13 PROCEDURE — 83036 HEMOGLOBIN GLYCOSYLATED A1C: CPT

## 2024-12-13 PROCEDURE — 85025 COMPLETE CBC W/AUTO DIFF WBC: CPT

## 2024-12-13 PROCEDURE — 36415 COLL VENOUS BLD VENIPUNCTURE: CPT

## 2024-12-13 PROCEDURE — 82306 VITAMIN D 25 HYDROXY: CPT

## 2024-12-13 PROCEDURE — G0299 HHS/HOSPICE OF RN EA 15 MIN: HCPCS

## 2024-12-13 PROCEDURE — G0151 HHCP-SERV OF PT,EA 15 MIN: HCPCS

## 2024-12-13 PROCEDURE — G0103 PSA SCREENING: HCPCS

## 2024-12-13 PROCEDURE — 80053 COMPREHEN METABOLIC PANEL: CPT

## 2024-12-13 PROCEDURE — 80061 LIPID PANEL: CPT

## 2024-12-13 PROCEDURE — G0152 HHCP-SERV OF OT,EA 15 MIN: HCPCS

## 2024-12-16 ENCOUNTER — TELEPHONE (OUTPATIENT)
Age: 69
End: 2024-12-16

## 2024-12-16 ENCOUNTER — HOME CARE VISIT (OUTPATIENT)
Dept: HOME HEALTH SERVICES | Facility: HOME HEALTHCARE | Age: 69
End: 2024-12-16
Payer: MEDICARE

## 2024-12-16 VITALS
OXYGEN SATURATION: 98 % | RESPIRATION RATE: 18 BRPM | TEMPERATURE: 97.6 F | DIASTOLIC BLOOD PRESSURE: 70 MMHG | HEART RATE: 85 BPM | SYSTOLIC BLOOD PRESSURE: 124 MMHG

## 2024-12-16 DIAGNOSIS — D50.9 MICROCYTIC ANEMIA: Primary | ICD-10-CM

## 2024-12-16 PROCEDURE — G0299 HHS/HOSPICE OF RN EA 15 MIN: HCPCS

## 2024-12-16 NOTE — TELEPHONE ENCOUNTER
"Incoming call for patient. Patient stated he has open wounds on his legs, \"Weeping Legs Syndrome.\" Patient wants to know if he needs to reschedule his PMR appointment scheduled for 12-18-24.     # [phone]      Dr. Depadua, please advise. Thank you!    "

## 2024-12-17 ENCOUNTER — HOME CARE VISIT (OUTPATIENT)
Dept: HOME HEALTH SERVICES | Facility: HOME HEALTHCARE | Age: 69
End: 2024-12-17
Payer: MEDICARE

## 2024-12-17 VITALS — DIASTOLIC BLOOD PRESSURE: 52 MMHG | SYSTOLIC BLOOD PRESSURE: 112 MMHG

## 2024-12-17 PROCEDURE — 10330064

## 2024-12-17 PROCEDURE — 10330064 TAPE, ADHSV TRANSPORE WHT 2 (6RL/BX 10B"

## 2024-12-17 PROCEDURE — 10330064 SPONGE, GAUZE 8PLY N/S 4X4" (200/PK 20P"

## 2024-12-17 PROCEDURE — G0151 HHCP-SERV OF PT,EA 15 MIN: HCPCS

## 2024-12-17 NOTE — TELEPHONE ENCOUNTER
Ashley Depadua, MD Michele Mathews, RN5 hours ago (10:12 AM)       We only inject his upper extremities. If concern for infection, I would hold off on botox injections for now.    AdP

## 2024-12-17 NOTE — TELEPHONE ENCOUNTER
Called pt. He stated that he called because the provider asks before the Botox if he has any open wounds. The leg drainage is contained. He stated that there are a couple of spots where the leg weeps, but there is a clear bandage on those areas, and it is also wrapped with gauze. Pt would like to keep the appointment for tomorrow, if it is okay with the provider. Made pt aware that the office would call him back only if the provider advises he reschedule. Otherwise, he will come to his OV.

## 2024-12-18 ENCOUNTER — HOME CARE VISIT (OUTPATIENT)
Dept: HOME HEALTH SERVICES | Facility: HOME HEALTHCARE | Age: 69
End: 2024-12-18
Payer: MEDICARE

## 2024-12-18 ENCOUNTER — OFFICE VISIT (OUTPATIENT)
Dept: NEUROLOGY | Facility: CLINIC | Age: 69
End: 2024-12-18
Payer: MEDICARE

## 2024-12-18 ENCOUNTER — TELEPHONE (OUTPATIENT)
Dept: LAB | Facility: HOSPITAL | Age: 69
End: 2024-12-18

## 2024-12-18 VITALS
RESPIRATION RATE: 20 BRPM | TEMPERATURE: 98.4 F | HEART RATE: 84 BPM | OXYGEN SATURATION: 100 % | DIASTOLIC BLOOD PRESSURE: 64 MMHG | SYSTOLIC BLOOD PRESSURE: 122 MMHG

## 2024-12-18 VITALS
OXYGEN SATURATION: 98 % | SYSTOLIC BLOOD PRESSURE: 128 MMHG | TEMPERATURE: 97.5 F | DIASTOLIC BLOOD PRESSURE: 84 MMHG | HEART RATE: 82 BPM

## 2024-12-18 DIAGNOSIS — G82.50 CHRONIC INCOMPLETE SPASTIC TETRAPLEGIA (HCC): Primary | ICD-10-CM

## 2024-12-18 PROCEDURE — 95874 GUIDE NERV DESTR NEEDLE EMG: CPT | Performed by: PHYSICAL MEDICINE & REHABILITATION

## 2024-12-18 PROCEDURE — 64642 CHEMODENERV 1 EXTREMITY 1-4: CPT | Performed by: PHYSICAL MEDICINE & REHABILITATION

## 2024-12-18 PROCEDURE — G0299 HHS/HOSPICE OF RN EA 15 MIN: HCPCS

## 2024-12-18 NOTE — PROGRESS NOTES
Name: Addison Woodruff      : 1955      MRN: [id number]  Encounter Provider: Ashley L De Padua, MD  Encounter Date: 2024   Encounter department: [facility]  :  Assessment & Plan  Chronic incomplete spastic tetraplegia (HCC)    Orders:    onabotulinumtoxin A (BOTOX) injection 100 Units  Mr. Woodruff is a pleasant 70yo M with history of cervical myelopathy with mild spasticity. His goals are to make it easier to open his R finger so he can passively extend his fingers and place his hand on items to them  things. He feels that this has not gotten weaker with toxin, but he notices more difficulty passively open his fingers as the toxin wears off.    He has less involuntary activity in his FDP in particular, and I slightly decreased the dose to this muscle today. Kept the modest dosing in his R FDS. He is to continue to work on his HEP from OT. I am avoiding being too aggressive with his injections, as he uses that tone to help him .     1. Oral antispasticity medications: None at this time given focality of his symptoms/goals.  2. Schedule 100 units Performed in chair  3.  Continue PT.  4. Will f/u 4/15/2025 at 1:45pm for repeat injection and 7/15/2025 at 1:00pm for repeat injections.           History of Present Illness   HPI/SUBJECTIVE:  Patient presents today in the office with chief complaint of fingers tendency to clench. Has been making some progrses with therapies. He has found therapy beneficial. He finds that as his fingers get tighter, it is harder for him to manually open his hand to then  things.    Last seen for chemodenervation: 9/3/2024  Results of chemodenervation: He does feel like the fingers are looser and more manageable, and denies any issues with  strength   How long did effects last: Started to wear off in the past couple of weaks.  Side affect/Adverse Effects: Denies    Any issues with driving, swallowing, appetite: is able to drive. No  issues with swallowing/appetite  Stretching/Exercise Program: He has a HEP  Currently in therapy: Just completed OT with some improvement in finger movement. He has a HEP. He is working with Physical Therapy currently - but that may be ending soon.  Any falls with significant injuries: Yes, had one fall about a month and a half ago. Related to slipping without wearing is socks and his feet were wet. No major injuries. Did call the emergency squad who evaluated him. He did not go to the hospital   Any new weakness: None   Any changes to care since last seen: Started taking Lasix for leg edema. Has a visiting nurse doing wound care.  Safe at home: Yes   Any oral meds: None currently   On anticoagulation/blood thinners: None  Current functional status:  Independent with stairs, ambulating with his rolling walker, transfers, bathing, dressing, grooming, feedign.  Continent of bowel   Has intermittent urgency of bladder -which is chronic. This is stable.  He can walk household distances, but is limited by his back pain for community/longer distances. He feels his endurance for longer distances has improved.  He does use the R hand to  things - he finds the clumsiness in his R hand has improved since I last saw him.     Review of Systems   Constitutional: Negative.    HENT: Negative.     Respiratory: Negative.     Cardiovascular:  Positive for leg swelling.   Gastrointestinal:  Positive for constipation.   Endocrine: Negative.    Genitourinary: Negative.    Musculoskeletal: Negative.    Neurological: Negative.     I have personally reviewed the MA's review of systems and made changes as necessary.         Objective   /84 (BP Location: Left arm, Patient Position: Sitting, Cuff Size: Large)   Pulse 82   Temp 97.5 °F (36.4 °C) (Temporal)   SpO2 98%     Gen: No acute distress, Well-nourished, well-appearing.  HEENT: Moist mucus membranes, Normocephalic/Atraumatic  Cardiovascular: Distal pulses  palpable  Heme/Extr: BL LE lymphedema.  Pulmonary: Non-labored breathing.  : No maher  GI: Soft, non-tender, non-distended.   MSK: ROM is WFL in all extremities.   Able to fully extend fingers at MCP, DIP, and PIP with minimal end resistance.   Has some atrophy of forearm musculature.   Integumentary: Skin is warm, dry.  Psych: Normal mood and affect.   Neuro: AAOx3, Speech is intact. Appropriate to questioning.  MMT:   Strength:   Right  Left  Site    5 5  S Ab: Shoulder Abductors    5 5  EF: Elbow Flexors    5  3+ EE: Elbow Extensors    5  5 WE: Wrist Extensors    5  4+  FF: Finger Flexors    5  2 HI: Hand Intrinsics      MAS:  Right  Left  Site    0 0  Shoulder   0 0  EF: Elbow Flexors    0  0  EE: Elbow Extensors    0/0 0/0  WF/WE   1 1 FF: Finger Flexors    1  0  HI: Hand Intrinsics    1+ in R pronator.     MAS  0 - no increased tone  1 - slight increase in tone at the end of the ROM  1+ increase in tone at 1/2 the ROM  2 - increase in tone through most the ROM  3 - moderate increase in muscle tone - passive movement difficult  4 - affected parts ridid in flexion or extension    SPASMS observed:   RUE: no   LUE: no  RLE: no   LLE: no        Radiology Results Review: I have reviewed the following images/report studies in PACS:   10/2/2024 LE Duplex:  RIGHT LOWER LIMB:  No evidence of acute or chronic deep vein thrombosis.  No evidence of superficial thrombophlebitis noted.  Doppler evaluation shows a normal response to augmentation maneuvers..  Popliteal, posterior tibial and anterior tibial arterial Doppler waveforms are  triphasic.     LEFT LOWER LIMB:  No evidence of acute or chronic deep vein thrombosis.  No evidence of superficial thrombophlebitis noted.  Doppler evaluation shows a normal response to augmentation maneuvers.  Popliteal, posterior tibial and anterior tibial arterial Doppler waveforms are  monophasic vs hyperemic.          Botulinum Toxin Injection Procedure    Pre-operative diagnosis:  Spasticity    Post-operative diagnosis: Same    Procedure: Chemodenervation    After risks and benefits were explained including bleeding, infection, worsening of pain, damage to the areas being injected, weakness of muscles, loss of muscle control, dysphagia if injecting the head or neck, facial droop if injecting the facial area, painful injection, allergic or other reaction to the medications being injected, and the failure of the procedure to help the problem, a signed consent was obtained on 12/18/2024     The patient was placed in a seated position and the sites to be treated were identified. A time out was called and performed. The area to be treated was prepped three times with alcohol and the alcohol allowed to dry. Next, a 26 gauge, 37mm disposable electrode needle was used to inject the medication in the area to be treated.    Guidance: EMG  Area(s) injected:    Muscle Dose (units) # Sites Technique   R FDS 30 2  EMG   R FDP 20 1 EMG       EMG       EMG       EMG       EMG       EMG       EMG         EMG         EMG         EMG    Waste 50   EMG         EMG        Medications used: 100 units of botox diluted in 1 mL of preservative free saline    See MAR for Lot/Exp    The patient did tolerate the procedure well. There were no complications.    The following portions of the patient's history were reviewed and updated as appropriate: allergies, current medications, past family history, past medical history, past social history, past surgical history and problem list.

## 2024-12-18 NOTE — PATIENT INSTRUCTIONS
You have received botulinum toxin injections today.     The skin around the site of injections should be monitored for a couple of days. If redness or swelling occur, the skin should be examined by a health care professional to rule out infection. You can call my office if this occurs.    Please contact our office via [employer] in 2 weeks to report on the effects of the injections or any time with any questions or concerns.     Please note that your next injections should not be scheduled less than 90 days from today.    If your health insurance changes before the next injections, please contact our office as soon as possible so we can submit for a new prior authorization if needed. Please note that we may not be able to perform the injections if your insurance changes and the treatment is not pre-authorized.

## 2024-12-18 NOTE — ASSESSMENT & PLAN NOTE
Orders:    onabotulinumtoxin A (BOTOX) injection 100 Units  Mr. Woodruff is a pleasant 68yo M with history of cervical myelopathy with mild spasticity. His goals are to make it easier to open his R finger so he can passively extend his fingers and place his hand on items to them  things. He feels that this has not gotten weaker with toxin, but he notices more difficulty passively open his fingers as the toxin wears off.    He has less involuntary activity in his FDP in particular, and I slightly decreased the dose to this muscle today. Kept the modest dosing in his R FDS. He is to continue to work on his HEP from OT. I am avoiding being too aggressive with his injections, as he uses that tone to help him .     1. Oral antispasticity medications: None at this time given focality of his symptoms/goals.  2. Schedule 100 units Performed in chair  3.  Continue PT.  4. Will f/u 4/15/2025 at 1:45pm for repeat injection and 7/15/2025 at 1:00pm for repeat injections.

## 2024-12-20 ENCOUNTER — OFFICE VISIT (OUTPATIENT)
Dept: PODIATRY | Facility: CLINIC | Age: 69
End: 2024-12-20
Payer: MEDICARE

## 2024-12-20 ENCOUNTER — HOME CARE VISIT (OUTPATIENT)
Dept: HOME HEALTH SERVICES | Facility: HOME HEALTHCARE | Age: 69
End: 2024-12-20
Payer: MEDICARE

## 2024-12-20 VITALS — DIASTOLIC BLOOD PRESSURE: 60 MMHG | SYSTOLIC BLOOD PRESSURE: 108 MMHG | OXYGEN SATURATION: 96 % | HEART RATE: 85 BPM

## 2024-12-20 VITALS
HEIGHT: 67 IN | SYSTOLIC BLOOD PRESSURE: 120 MMHG | HEART RATE: 84 BPM | DIASTOLIC BLOOD PRESSURE: 68 MMHG | BODY MASS INDEX: 38.22 KG/M2 | OXYGEN SATURATION: 100 %

## 2024-12-20 DIAGNOSIS — I83.028 VENOUS STASIS ULCER OF OTHER PART OF LEFT LOWER LEG LIMITED TO BREAKDOWN OF SKIN WITH VARICOSE VEINS (HCC): Primary | ICD-10-CM

## 2024-12-20 DIAGNOSIS — E11.9 TYPE 2 DIABETES MELLITUS WITHOUT COMPLICATION, WITHOUT LONG-TERM CURRENT USE OF INSULIN (HCC): ICD-10-CM

## 2024-12-20 DIAGNOSIS — L97.821 VENOUS STASIS ULCER OF OTHER PART OF LEFT LOWER LEG LIMITED TO BREAKDOWN OF SKIN WITH VARICOSE VEINS (HCC): Primary | ICD-10-CM

## 2024-12-20 DIAGNOSIS — I73.9 PAD (PERIPHERAL ARTERY DISEASE) (HCC): ICD-10-CM

## 2024-12-20 PROCEDURE — G0151 HHCP-SERV OF PT,EA 15 MIN: HCPCS

## 2024-12-20 PROCEDURE — 99213 OFFICE O/P EST LOW 20 MIN: CPT | Performed by: PODIATRIST

## 2024-12-20 NOTE — PROGRESS NOTES
Assessment/Plan:     The patient's clinical examination today significant for a very superficial, partial-thickness ulceration to the anterolateral aspect of the right lower extremity and left lower extremity.  There are no signs of infection.  There are no signs of infection to the left lower extremity.     The patient is doing well from a clinical standpoint.  His wounds continue to improve bilaterally.  Xeroform dressings were applied today.  Continue visiting nurse services for assistance with wound care at home.     Recommend follow-up in 2-3 weeks.  There is no clinical need for any further antibiosis.          Diagnoses and all orders for this visit:    Venous stasis ulcer of other part of left lower leg limited to breakdown of skin with varicose veins (HCC)    Type 2 diabetes mellitus without complication, without long-term current use of insulin (HCC)    PAD (peripheral artery disease) ([facility])          Subjective:     Patient ID: Addison Woodruff is a 69 y.o. male.    The patient resents today for follow-up of bilateral venous stasis ulcerations.  He notes that the wounds are improving and he has noticed less drainage to both of his legs. He has been on diuretics. He notes no new issues today.           PAST MEDICAL HISTORY:  Past Medical History:   Diagnosis Date    Arthritis 2002    Diabetes mellitus (HCC)     ED (erectile dysfunction)     Hyperlipidemia     Hypertension     Low back pain March 2022    Obesity     S/P cervical spinal fusion 05/10/2023    Spinal stenosis of lumbar region at multiple levels 12/22/2022       PAST SURGICAL HISTORY:  Past Surgical History:   Procedure Laterality Date    CARPAL TUNNEL RELEASE Right     EYE SURGERY      strabibusmus    HAND SURGERY      left index finger    VT ARTHRD PST/PSTLAT TQ 1NTRSPC CRV BELW C2 SEGMENT N/A 05/10/2023    Procedure: Posterior cervical decompressive laminectomy and instrumented fusion C4-T1;  Surgeon: Collins Angulo MD;  Location: [facility]  OR;  Service: Neurosurgery    NH EXCISION SPERMATOCELE W/WO EPIDIDYMECTOMY Left 09/16/2021    Procedure: HYDROCELECTOMY, SCROTAL DEBRIDEMENT;  Surgeon: Jose Guadalupe Christianson MD;  Location: [facility] MAIN OR;  Service: Urology    NH NEUROPLASTY &/TRANSPOS MEDIAN NRV CARPAL TUNNE Right 07/06/2021    Procedure: RELEASE CARPAL TUNNEL;  Surgeon: Natty Cuevas MD;  Location: [facility];  Service: Orthopedics    NH NEUROPLASTY &/TRANSPOSITION ULNAR NERVE ELBOW Right 07/06/2021    Procedure: RELEASE CUBITAL TUNNEL;  Surgeon: Natty Cuevas MD;  Location: [facility];  Service: Orthopedics    SPINE SURGERY  05/10/223        ALLERGIES:  Patient has no known allergies.    MEDICATIONS:  Current Outpatient Medications   Medication Sig Dispense Refill    acetaminophen (TYLENOL) 325 mg tablet Take 2 tablets (650 mg total) by mouth every 6 (six) hours as needed for mild pain  0    amLODIPine-benazepril (LOTREL 5-10) 5-10 MG per capsule TAKE 1 CAPSULE DAILY 90 capsule 1    furosemide (LASIX) 20 mg tablet Take 1 tablet (20 mg total) by mouth daily 90 tablet 2    glimepiride (AMARYL) 1 mg tablet TAKE 1 TABLET DAILY WITH   BREAKFAST 100 tablet 1    meloxicam (MOBIC) 15 mg tablet Take 1 tablet (15 mg total) by mouth daily 30 tablet 2    methocarbamol (ROBAXIN) 500 mg tablet Take 1 tablet (500 mg total) by mouth 2 (two) times a day as needed for muscle spasms 60 tablet 0    pioglitazone-metFORMIN (ACTOPLUS MET)  MG per tablet TAKE 1 TABLET TWICE DAILY  WITH MEALS 180 tablet 1     No current facility-administered medications for this visit.       SOCIAL HISTORY:  Social History     Socioeconomic History    Marital status:      Spouse name: None    Number of children: None    Years of education: None    Highest education level: None   Occupational History    None   Tobacco Use    Smoking status: Former     Current packs/day: 0.00     Average packs/day: 1 pack/day for 47.0 years (47.0 ttl pk-yrs)     Types: Cigarettes      Start date: 1/13/1974     Quit date: 1/13/2021     Years since quitting: 3.9     Passive exposure: Past    Smokeless tobacco: Never    Tobacco comments:     advised not to smoke   Vaping Use    Vaping status: Never Used   Substance and Sexual Activity    Alcohol use: Not Currently     Comment: aprox 4 drinks a month  when out to dinner    Drug use: Not Currently     Types: Marijuana    Sexual activity: Not Currently   Other Topics Concern    None   Social History Narrative    None     Social Drivers of Health     Financial Resource Strain: Medium Risk (11/30/2023)    Overall Financial Resource Strain (CARDIA)     Difficulty of Paying Living Expenses: Somewhat hard   Food Insecurity: No Food Insecurity (11/27/2024)    Hunger Vital Sign     Worried About Running Out of Food in the Last Year: Never true     Ran Out of Food in the Last Year: Never true   Transportation Needs: No Transportation Needs (11/27/2024)    PRAPARE - Transportation     Lack of Transportation (Medical): No     Lack of Transportation (Non-Medical): No   Physical Activity: Not on file   Stress: Not on file   Social Connections: Not on file   Intimate Partner Violence: Not on file   Housing Stability: Unknown (11/27/2024)    Housing Stability Vital Sign     Unable to Pay for Housing in the Last Year: No     Number of Times Moved in the Last Year: Not on file     Homeless in the Last Year: No        Review of Systems   Constitutional: Negative.    HENT: Negative.     Eyes: Negative.    Respiratory: Negative.     Cardiovascular: Negative.    Endocrine: Negative.    Musculoskeletal: Negative.    Skin:  Positive for wound. Negative for color change.   Neurological: Negative.    Hematological: Negative.    Psychiatric/Behavioral: Negative.           Objective:     Physical Exam  Vitals reviewed.   Constitutional:       Appearance: Normal appearance.   HENT:      Head: Normocephalic and atraumatic.      Nose: Nose normal.   Eyes:      Conjunctiva/sclera:  Conjunctivae normal.      Pupils: Pupils are equal, round, and reactive to light.   Cardiovascular:      Pulses:           Dorsalis pedis pulses are 1+ on the right side and 1+ on the left side.        Posterior tibial pulses are 0 on the right side and 0 on the left side.   Pulmonary:      Effort: Pulmonary effort is normal.   Feet:      Right foot:      Skin integrity: Ulcer present. No erythema or warmth.      Left foot:      Skin integrity: Ulcer present. No erythema or warmth.      Comments: The patient's clinical examination today significant for a very superficial, partial-thickness ulceration to the anterolateral aspect of the right lower extremity and left lower extremity.  There are no signs of infection.  There are no signs of infection to the left lower extremity.     Skin:     General: Skin is warm.      Capillary Refill: Capillary refill takes 2 to 3 seconds.   Neurological:      General: No focal deficit present.      Mental Status: He is alert and oriented to person, place, and time.   Psychiatric:         Mood and Affect: Mood normal.         Behavior: Behavior normal.         Thought Content: Thought content normal.

## 2024-12-23 ENCOUNTER — HOME CARE VISIT (OUTPATIENT)
Dept: HOME HEALTH SERVICES | Facility: HOME HEALTHCARE | Age: 69
End: 2024-12-23
Payer: MEDICARE

## 2024-12-23 VITALS
RESPIRATION RATE: 20 BRPM | OXYGEN SATURATION: 97 % | SYSTOLIC BLOOD PRESSURE: 150 MMHG | TEMPERATURE: 97.8 F | DIASTOLIC BLOOD PRESSURE: 80 MMHG | HEART RATE: 77 BPM

## 2024-12-23 PROCEDURE — G0299 HHS/HOSPICE OF RN EA 15 MIN: HCPCS

## 2024-12-25 ENCOUNTER — HOME CARE VISIT (OUTPATIENT)
Dept: HOME HEALTH SERVICES | Facility: HOME HEALTHCARE | Age: 69
End: 2024-12-25
Payer: MEDICARE

## 2024-12-25 VITALS
OXYGEN SATURATION: 98 % | RESPIRATION RATE: 18 BRPM | DIASTOLIC BLOOD PRESSURE: 68 MMHG | TEMPERATURE: 97.8 F | SYSTOLIC BLOOD PRESSURE: 122 MMHG | HEART RATE: 68 BPM

## 2024-12-25 PROCEDURE — G0300 HHS/HOSPICE OF LPN EA 15 MIN: HCPCS

## 2024-12-26 ENCOUNTER — PATIENT OUTREACH (OUTPATIENT)
Dept: CASE MANAGEMENT | Facility: OTHER | Age: 69
End: 2024-12-26

## 2024-12-26 NOTE — PROGRESS NOTES
"Outpatient Care Management Note:    CM called Addison. He states that [facility] nurses are continuing. His legs are improving and have less \"water seepage\". Addison states that he saw podiatry on 12/20 and they were happy with his progress. He is taking lasix daily.     Addison is still considering hiring private help.  He is waiting on a call back from [facility]. CM will follow up in 1 month.       "

## 2024-12-27 ENCOUNTER — HOME CARE VISIT (OUTPATIENT)
Dept: HOME HEALTH SERVICES | Facility: HOME HEALTHCARE | Age: 69
End: 2024-12-27
Payer: MEDICARE

## 2024-12-27 VITALS
RESPIRATION RATE: 20 BRPM | TEMPERATURE: 97.2 F | OXYGEN SATURATION: 95 % | HEART RATE: 94 BPM | DIASTOLIC BLOOD PRESSURE: 59 MMHG | SYSTOLIC BLOOD PRESSURE: 111 MMHG

## 2024-12-27 PROCEDURE — G0299 HHS/HOSPICE OF RN EA 15 MIN: HCPCS

## 2024-12-30 ENCOUNTER — HOME CARE VISIT (OUTPATIENT)
Dept: HOME HEALTH SERVICES | Facility: HOME HEALTHCARE | Age: 69
End: 2024-12-30
Payer: MEDICARE

## 2024-12-30 VITALS
HEART RATE: 92 BPM | OXYGEN SATURATION: 96 % | DIASTOLIC BLOOD PRESSURE: 8 MMHG | SYSTOLIC BLOOD PRESSURE: 124 MMHG | TEMPERATURE: 98.4 F | RESPIRATION RATE: 20 BRPM

## 2024-12-30 PROCEDURE — G0299 HHS/HOSPICE OF RN EA 15 MIN: HCPCS

## 2025-01-01 ENCOUNTER — HOME CARE VISIT (OUTPATIENT)
Dept: HOME HEALTH SERVICES | Facility: HOME HEALTHCARE | Age: 70
End: 2025-01-01
Payer: MEDICARE

## 2025-01-01 VITALS
DIASTOLIC BLOOD PRESSURE: 60 MMHG | OXYGEN SATURATION: 94 % | HEART RATE: 73 BPM | TEMPERATURE: 97.3 F | SYSTOLIC BLOOD PRESSURE: 106 MMHG | RESPIRATION RATE: 20 BRPM

## 2025-01-01 PROCEDURE — G0299 HHS/HOSPICE OF RN EA 15 MIN: HCPCS

## 2025-01-02 DIAGNOSIS — E11.9 TYPE 2 DIABETES MELLITUS WITHOUT COMPLICATION, WITHOUT LONG-TERM CURRENT USE OF INSULIN (HCC): ICD-10-CM

## 2025-01-03 ENCOUNTER — HOME CARE VISIT (OUTPATIENT)
Dept: HOME HEALTH SERVICES | Facility: HOME HEALTHCARE | Age: 70
End: 2025-01-03
Payer: MEDICARE

## 2025-01-03 VITALS
DIASTOLIC BLOOD PRESSURE: 72 MMHG | RESPIRATION RATE: 20 BRPM | OXYGEN SATURATION: 98 % | HEART RATE: 88 BPM | SYSTOLIC BLOOD PRESSURE: 122 MMHG | TEMPERATURE: 97.8 F

## 2025-01-03 PROCEDURE — G0299 HHS/HOSPICE OF RN EA 15 MIN: HCPCS

## 2025-01-03 RX ORDER — PIOGLITAZONE HCL AND METFORMIN HCL 500; 15 MG/1; MG/1
1 TABLET ORAL 2 TIMES DAILY WITH MEALS
Qty: 180 TABLET | Refills: 1 | Status: SHIPPED | OUTPATIENT
Start: 2025-01-03

## 2025-01-06 ENCOUNTER — HOME CARE VISIT (OUTPATIENT)
Dept: HOME HEALTH SERVICES | Facility: HOME HEALTHCARE | Age: 70
End: 2025-01-06
Payer: MEDICARE

## 2025-01-06 ENCOUNTER — APPOINTMENT (OUTPATIENT)
Dept: LAB | Facility: HOSPITAL | Age: 70
End: 2025-01-06
Payer: MEDICARE

## 2025-01-06 ENCOUNTER — TRANSCRIBE ORDERS (OUTPATIENT)
Dept: LAB | Facility: HOSPITAL | Age: 70
End: 2025-01-06

## 2025-01-06 VITALS
RESPIRATION RATE: 20 BRPM | HEART RATE: 52 BPM | TEMPERATURE: 97.9 F | OXYGEN SATURATION: 99 % | DIASTOLIC BLOOD PRESSURE: 60 MMHG | SYSTOLIC BLOOD PRESSURE: 118 MMHG

## 2025-01-06 DIAGNOSIS — D50.9 IRON DEFICIENCY ANEMIA, UNSPECIFIED IRON DEFICIENCY ANEMIA TYPE: Primary | ICD-10-CM

## 2025-01-06 DIAGNOSIS — D50.9 IRON DEFICIENCY ANEMIA, UNSPECIFIED IRON DEFICIENCY ANEMIA TYPE: ICD-10-CM

## 2025-01-06 LAB — HEMOCCULT STL QL IA: NEGATIVE

## 2025-01-06 PROCEDURE — G0328 FECAL BLOOD SCRN IMMUNOASSAY: HCPCS

## 2025-01-06 PROCEDURE — G0299 HHS/HOSPICE OF RN EA 15 MIN: HCPCS

## 2025-01-07 ENCOUNTER — HOME CARE VISIT (OUTPATIENT)
Dept: HOME HEALTH SERVICES | Facility: HOME HEALTHCARE | Age: 70
End: 2025-01-07
Payer: MEDICARE

## 2025-01-07 DIAGNOSIS — M54.50 CHRONIC BILATERAL LOW BACK PAIN WITHOUT SCIATICA: ICD-10-CM

## 2025-01-07 DIAGNOSIS — G89.29 CHRONIC BILATERAL LOW BACK PAIN WITHOUT SCIATICA: ICD-10-CM

## 2025-01-08 ENCOUNTER — HOME CARE VISIT (OUTPATIENT)
Dept: HOME HEALTH SERVICES | Facility: HOME HEALTHCARE | Age: 70
End: 2025-01-08
Payer: MEDICARE

## 2025-01-08 VITALS
OXYGEN SATURATION: 95 % | HEART RATE: 89 BPM | DIASTOLIC BLOOD PRESSURE: 58 MMHG | RESPIRATION RATE: 20 BRPM | SYSTOLIC BLOOD PRESSURE: 118 MMHG | TEMPERATURE: 97.8 F

## 2025-01-08 PROCEDURE — G0299 HHS/HOSPICE OF RN EA 15 MIN: HCPCS

## 2025-01-08 NOTE — TELEPHONE ENCOUNTER
Patient called to request a refill for their methocarbamol advised a refill was requested on 1/7/25 and is pending approval. Patient verbalized understanding and is in agreement.

## 2025-01-09 RX ORDER — METHOCARBAMOL 500 MG/1
500 TABLET, FILM COATED ORAL 2 TIMES DAILY PRN
Qty: 60 TABLET | Refills: 0 | Status: SHIPPED | OUTPATIENT
Start: 2025-01-09

## 2025-01-10 ENCOUNTER — HOME CARE VISIT (OUTPATIENT)
Dept: HOME HEALTH SERVICES | Facility: HOME HEALTHCARE | Age: 70
End: 2025-01-10
Payer: MEDICARE

## 2025-01-10 VITALS
DIASTOLIC BLOOD PRESSURE: 68 MMHG | HEART RATE: 65 BPM | TEMPERATURE: 98 F | RESPIRATION RATE: 20 BRPM | SYSTOLIC BLOOD PRESSURE: 120 MMHG | OXYGEN SATURATION: 98 %

## 2025-01-10 PROCEDURE — G0299 HHS/HOSPICE OF RN EA 15 MIN: HCPCS

## 2025-01-11 ENCOUNTER — HOME CARE VISIT (OUTPATIENT)
Dept: HOME HEALTH SERVICES | Facility: HOME HEALTHCARE | Age: 70
End: 2025-01-11
Payer: MEDICARE

## 2025-01-12 ENCOUNTER — TELEPHONE (OUTPATIENT)
Dept: OTHER | Facility: OTHER | Age: 70
End: 2025-01-12

## 2025-01-12 NOTE — TELEPHONE ENCOUNTER
Patient was found  in his home yesterday, . Kareem with [facility] Coroner's office would like a call back from Dr. Howard regarding medical history and signing death certificate at earliest convenience. Please follow up at [phone]

## 2025-01-20 NOTE — TELEPHONE ENCOUNTER
Call [phone] for Primary Care Provider   Please see chart notes below- Patient  in home    Remove PCP

## 2025-01-22 ENCOUNTER — PATIENT OUTREACH (OUTPATIENT)
Dept: CASE MANAGEMENT | Facility: OTHER | Age: 70
End: 2025-01-22

## 2025-01-22 NOTE — PROGRESS NOTES
Outpatient Care Management Note:    Chart reviewed for outreach purposes.  25 [facility] note states that patient  at home.  CM will forward to PCP .

## 2025-01-27 ENCOUNTER — TELEPHONE (OUTPATIENT)
Age: 70
End: 2025-01-27

## 2025-01-28 NOTE — TELEPHONE ENCOUNTER
01/28/25 10:35 AM    The office's request has been received, reviewed, and noted that it no longer requires attention; duplicate request. This message will now be completed.    Josse Schmidt

## (undated) DEVICE — SUT SILK 3-0 30 IN SA84H

## (undated) DEVICE — SUPER SPONGES,MEDIUM: Brand: KERLIX

## (undated) DEVICE — BETHLEHEM UNIVERSAL SPINE, KIT: Brand: CARDINAL HEALTH

## (undated) DEVICE — GLOVE SRG BIOGEL 6.5

## (undated) DEVICE — SPONGE PVP SCRUB WING STERILE

## (undated) DEVICE — GLOVE SRG BIOGEL 8

## (undated) DEVICE — JP PERF DRN SIL FLT 7MM FULL: Brand: CARDINAL HEALTH

## (undated) DEVICE — ANTIBACTERIAL UNDYED BRAIDED (POLYGLACTIN 910), SYNTHETIC ABSORBABLE SUTURE: Brand: COATED VICRYL

## (undated) DEVICE — INTENDED FOR TISSUE SEPARATION, AND OTHER PROCEDURES THAT REQUIRE A SHARP SURGICAL BLADE TO PUNCTURE OR CUT.: Brand: BARD-PARKER SAFETY BLADES SIZE 15, STERILE

## (undated) DEVICE — GROUNDING PAD UNIVERSAL SLW

## (undated) DEVICE — STERILE BETHLEHEM PLASTIC HAND: Brand: CARDINAL HEALTH

## (undated) DEVICE — GLOVE INDICATOR PI UNDERGLOVE SZ 8.5 BLUE

## (undated) DEVICE — PREP SURGICAL PURPREP 26ML

## (undated) DEVICE — DRESSING MEPILEX BORDER 4 X 8 IN

## (undated) DEVICE — STRETCH BANDAGE: Brand: CURITY

## (undated) DEVICE — LIGHT HANDLE COVER SLEEVE DISP BLUE STELLAR

## (undated) DEVICE — GAUZE SPONGES,USP TYPE VII GAUZE, 12 PLY: Brand: CURITY

## (undated) DEVICE — ADHESIVE SKIN HIGH VISCOSITY EXOFIN 1ML

## (undated) DEVICE — MINI BLADE ROUND TIP SHARP ON ONE SIDE

## (undated) DEVICE — NEURO PATTIES 1/2 X 3

## (undated) DEVICE — INTENDED FOR TISSUE SEPARATION, AND OTHER PROCEDURES THAT REQUIRE A SHARP SURGICAL BLADE TO PUNCTURE OR CUT.: Brand: BARD-PARKER ® CARBON RIB-BACK BLADES

## (undated) DEVICE — GLOVE INDICATOR PI UNDERGLOVE SZ 6.5 BLUE

## (undated) DEVICE — 3M™ STERI-STRIP™ REINFORCED ADHESIVE SKIN CLOSURES, R1547, 1/2 IN X 4 IN (12 MM X 100 MM), 6 STRIPS/ENVELOPE: Brand: 3M™ STERI-STRIP™

## (undated) DEVICE — BETADINE OINTMENT FOIL PACK

## (undated) DEVICE — DRILL BIT G3606010 2.4MM

## (undated) DEVICE — DRESSING MEPILEX AG BORDER POST-OP 4 X 8 IN

## (undated) DEVICE — ACE WRAP 6 IN STERILE

## (undated) DEVICE — ANTIBACTERIAL VIOLET BRAIDED (POLYGLACTIN 910), SYNTHETIC ABSORBABLE SUTURE: Brand: COATED VICRYL

## (undated) DEVICE — HEMOSTATIC MATRIX SURGIFLO 8ML W/THROMBIN

## (undated) DEVICE — SWABSTCK, BENZOIN TINCTURE, 1/PK, STRL: Brand: APLICARE

## (undated) DEVICE — SYRINGE 10ML LL CONTROL TOP

## (undated) DEVICE — TIBURON PEDIATRIC DRAPE: Brand: CONVERTORS

## (undated) DEVICE — ALL PURPOSE SPONGES,NON-WOVEN, 4 PLY: Brand: CURITY

## (undated) DEVICE — PACK GENERAL LF

## (undated) DEVICE — SUT MONOCRYL 3-0 SH 27 IN Y416H

## (undated) DEVICE — MAYFIELD® DISPOSABLE ADULT SKULL PIN (PLASTIC BASE): Brand: MAYFIELD®

## (undated) DEVICE — SUT CHROMIC 3-0 SH 27 IN G122H

## (undated) DEVICE — NEURO SURGICAL CLIPPER BLADE

## (undated) DEVICE — NEEDLE 25G X 1 1/2

## (undated) DEVICE — DRAPE SHEET X-LG

## (undated) DEVICE — BIPOLAR SEALER 23-113-1 AQM 2.3: Brand: AQUAMANTYS™

## (undated) DEVICE — OR2 STERILE LABELS: Brand: CENTURION

## (undated) DEVICE — PROXIMATE PLUS MD MULTI-DIRECTIONAL RELEASE SKIN STAPLERS CONTAINS 35 STAINLESS STEEL STAPLES APPROXIMATE CLOSED DIMENSIONS: 6.9MM X 3.9MM WIDE: Brand: PROXIMATE

## (undated) DEVICE — BOWL ASSY BM210 DUAL BLADE DISPOSABLE: Brand: MIDAS REX™

## (undated) DEVICE — IMPERVIOUS STOCKINETTE: Brand: DEROYAL

## (undated) DEVICE — SUSPENSORY LARGE 480-1848-4

## (undated) DEVICE — ASTOUND STANDARD SURGICAL GOWN, XL: Brand: CONVERTORS

## (undated) DEVICE — WET SKIN PREP TRAY: Brand: MEDLINE INDUSTRIES, INC.

## (undated) DEVICE — TRAY FOLEY 16FR URIMETER SURESTEP

## (undated) DEVICE — JACKSON-PRATT 100CC BULB RESERVOIR: Brand: CARDINAL HEALTH

## (undated) DEVICE — SPECIMEN CONTAINER STERILE PEEL PACK

## (undated) DEVICE — GLOVE INDICATOR PI UNDERGLOVE SZ 7 BLUE

## (undated) DEVICE — CUFF TOURNIQUET 18 X 4 IN QUICK CONNECT DISP 1 BLADDER

## (undated) DEVICE — SUT PROLENE 3-0 V-7 36 IN 8976H

## (undated) DEVICE — MONITORING SPINAL IMPULSE CASE FEE

## (undated) DEVICE — SUT CHROMIC 2-0 SH 27 IN G123H

## (undated) DEVICE — SNAP KOVER: Brand: UNBRANDED

## (undated) DEVICE — SUT CHROMIC 2-0 18 IN SG13T

## (undated) DEVICE — GLOVE SRG BIOGEL 7

## (undated) DEVICE — 3M™ TEGADERM™ TRANSPARENT FILM DRESSING FRAME STYLE, 1626W, 4 IN X 4-3/4 IN (10 CM X 12 CM), 50/CT 4CT/CASE: Brand: 3M™ TEGADERM™

## (undated) DEVICE — BASIC DOUBLE BASIN 2-LF: Brand: MEDLINE INDUSTRIES, INC.

## (undated) DEVICE — 3M™ STERI-STRIP™ REINFORCED ADHESIVE SKIN CLOSURES, R1546, 1/4 IN X 4 IN (6 MM X 100 MM), 10 STRIPS/ENVELOPE: Brand: 3M™ STERI-STRIP™

## (undated) DEVICE — PLUMEPEN PRO 10FT

## (undated) DEVICE — ELECTRODE BLADE E-Z CLEAN 4IN -0014A

## (undated) DEVICE — STANDARD SURGICAL GOWN, L: Brand: CONVERTORS

## (undated) DEVICE — SPONGE STICK WITH PVP-I: Brand: KENDALL

## (undated) DEVICE — DISPOSABLE EQUIPMENT COVER: Brand: SMALL TOWEL DRAPE

## (undated) DEVICE — KNIFE LIGHT 10,PK: Brand: KNIFELIGHT

## (undated) DEVICE — DRAPE SURGIKIT SADDLE BAG

## (undated) DEVICE — TIBURON SPLIT SHEET: Brand: CONVERTORS

## (undated) DEVICE — CHLORAPREP HI-LITE 26ML ORANGE

## (undated) DEVICE — TOOL MR8-9MH30 MR8 9CM MATCH 3MM: Brand: MIDAS REX MR8

## (undated) DEVICE — OCCLUSIVE GAUZE STRIP,3% BISMUTH TRIBROMOPHENATE IN PETROLATUM BLEND: Brand: XEROFORM

## (undated) DEVICE — SUPPLY FEE STD

## (undated) DEVICE — CURITY NON-ADHERENT STRIPS: Brand: CURITY